# Patient Record
Sex: MALE | Race: WHITE | Employment: OTHER | URBAN - METROPOLITAN AREA
[De-identification: names, ages, dates, MRNs, and addresses within clinical notes are randomized per-mention and may not be internally consistent; named-entity substitution may affect disease eponyms.]

---

## 2017-04-11 ENCOUNTER — HOSPITAL ENCOUNTER (EMERGENCY)
Age: 57
Discharge: HOME | End: 2017-04-11
Attending: EMERGENCY MEDICINE
Payer: MEDICARE

## 2017-04-11 VITALS
HEIGHT: 69 IN | SYSTOLIC BLOOD PRESSURE: 141 MMHG | TEMPERATURE: 98.4 F | OXYGEN SATURATION: 95 % | HEART RATE: 88 BPM | BODY MASS INDEX: 35.55 KG/M2 | RESPIRATION RATE: 16 BRPM | WEIGHT: 240 LBS | DIASTOLIC BLOOD PRESSURE: 92 MMHG

## 2017-04-11 DIAGNOSIS — L02.91 ABSCESS: Primary | ICD-10-CM

## 2017-04-11 PROCEDURE — 99282 EMERGENCY DEPT VISIT SF MDM: CPT

## 2017-04-11 RX ORDER — SULFAMETHOXAZOLE AND TRIMETHOPRIM 800; 160 MG/1; MG/1
1 TABLET ORAL 2 TIMES DAILY
Qty: 20 TABLET | Refills: 0 | Status: SHIPPED | OUTPATIENT
Start: 2017-04-11 | End: 2017-04-21

## 2017-04-11 RX ORDER — CEPHALEXIN 500 MG/1
500 CAPSULE ORAL 4 TIMES DAILY
Qty: 40 CAPSULE | Refills: 0 | Status: SHIPPED | OUTPATIENT
Start: 2017-04-11 | End: 2017-04-21

## 2017-04-11 ASSESSMENT — ENCOUNTER SYMPTOMS: COLOR CHANGE: 1

## 2017-04-11 ASSESSMENT — PAIN SCALES - GENERAL: PAINLEVEL_OUTOF10: 8

## 2017-05-06 ENCOUNTER — APPOINTMENT (OUTPATIENT)
Dept: GENERAL RADIOLOGY | Age: 57
End: 2017-05-06
Payer: MEDICARE

## 2017-05-06 ENCOUNTER — HOSPITAL ENCOUNTER (EMERGENCY)
Age: 57
Discharge: HOME | End: 2017-05-06
Payer: MEDICARE

## 2017-05-06 VITALS
SYSTOLIC BLOOD PRESSURE: 160 MMHG | RESPIRATION RATE: 24 BRPM | OXYGEN SATURATION: 96 % | DIASTOLIC BLOOD PRESSURE: 88 MMHG | TEMPERATURE: 97.7 F | HEART RATE: 88 BPM

## 2017-05-06 DIAGNOSIS — S60.459S: ICD-10-CM

## 2017-05-06 DIAGNOSIS — R07.9 CHEST PAIN, UNSPECIFIED TYPE: Primary | ICD-10-CM

## 2017-05-06 DIAGNOSIS — B99.9 GRANULOMA DUE TO INFECTION: ICD-10-CM

## 2017-05-06 LAB
ABSOLUTE EOS #: 0.1 K/UL (ref 0–0.4)
ABSOLUTE LYMPH #: 1.6 K/UL (ref 1–4.8)
ABSOLUTE MONO #: 0.7 K/UL (ref 0.2–0.8)
ANION GAP SERPL CALCULATED.3IONS-SCNC: 14 MMOL/L (ref 9–17)
BASOPHILS # BLD: 1 %
BASOPHILS ABSOLUTE: 0 K/UL (ref 0–0.2)
BNP INTERPRETATION: ABNORMAL
BUN BLDV-MCNC: 18 MG/DL (ref 6–20)
BUN/CREAT BLD: 17 (ref 9–20)
CALCIUM SERPL-MCNC: 8.9 MG/DL (ref 8.6–10.4)
CHLORIDE BLD-SCNC: 103 MMOL/L (ref 98–107)
CK MB: 3.9 NG/ML
CO2: 23 MMOL/L (ref 20–31)
CREAT SERPL-MCNC: 1.08 MG/DL (ref 0.7–1.2)
D-DIMER QUANTITATIVE: 0.72 MG/L FEU
DIFFERENTIAL TYPE: ABNORMAL
EOSINOPHILS RELATIVE PERCENT: 1 %
GFR AFRICAN AMERICAN: >60 ML/MIN
GFR NON-AFRICAN AMERICAN: >60 ML/MIN
GFR SERPL CREATININE-BSD FRML MDRD: ABNORMAL ML/MIN/{1.73_M2}
GFR SERPL CREATININE-BSD FRML MDRD: ABNORMAL ML/MIN/{1.73_M2}
GLUCOSE BLD-MCNC: 134 MG/DL (ref 70–99)
HCT VFR BLD CALC: 42.9 % (ref 41–53)
HEMOGLOBIN: 14.4 G/DL (ref 13.5–17.5)
LYMPHOCYTES # BLD: 22 %
MCH RBC QN AUTO: 29.1 PG (ref 26–34)
MCHC RBC AUTO-ENTMCNC: 33.5 G/DL (ref 31–37)
MCV RBC AUTO: 86.7 FL (ref 80–100)
MONOCYTES # BLD: 10 %
MYOGLOBIN: 62 NG/ML (ref 28–72)
PDW BLD-RTO: 14.6 % (ref 11.5–14.5)
PLATELET # BLD: 127 K/UL (ref 130–400)
PLATELET ESTIMATE: ABNORMAL
PMV BLD AUTO: 8.8 FL (ref 6–12)
POTASSIUM SERPL-SCNC: 3.9 MMOL/L (ref 3.7–5.3)
PRO-BNP: 1082 PG/ML
RBC # BLD: 4.95 M/UL (ref 4.5–5.9)
RBC # BLD: ABNORMAL 10*6/UL
SEG NEUTROPHILS: 66 %
SEGMENTED NEUTROPHILS ABSOLUTE COUNT: 4.7 K/UL (ref 1.8–7.7)
SODIUM BLD-SCNC: 140 MMOL/L (ref 135–144)
TOTAL CK: 116 U/L (ref 39–308)
TROPONIN INTERP: NORMAL
TROPONIN T: <0.03 NG/ML
WBC # BLD: 7.1 K/UL (ref 3.5–11)
WBC # BLD: ABNORMAL 10*3/UL

## 2017-05-06 PROCEDURE — 73140 X-RAY EXAM OF FINGER(S): CPT

## 2017-05-06 PROCEDURE — 85379 FIBRIN DEGRADATION QUANT: CPT

## 2017-05-06 PROCEDURE — 82550 ASSAY OF CK (CPK): CPT

## 2017-05-06 PROCEDURE — 11055 PARING/CUTG B9 HYPRKER LES 1: CPT

## 2017-05-06 PROCEDURE — 82553 CREATINE MB FRACTION: CPT

## 2017-05-06 PROCEDURE — 88305 TISSUE EXAM BY PATHOLOGIST: CPT

## 2017-05-06 PROCEDURE — 84484 ASSAY OF TROPONIN QUANT: CPT

## 2017-05-06 PROCEDURE — 71020 XR CHEST STANDARD TWO VW: CPT

## 2017-05-06 PROCEDURE — 85025 COMPLETE CBC W/AUTO DIFF WBC: CPT

## 2017-05-06 PROCEDURE — 80048 BASIC METABOLIC PNL TOTAL CA: CPT

## 2017-05-06 PROCEDURE — 83880 ASSAY OF NATRIURETIC PEPTIDE: CPT

## 2017-05-06 PROCEDURE — 6370000000 HC RX 637 (ALT 250 FOR IP)

## 2017-05-06 PROCEDURE — 2500000003 HC RX 250 WO HCPCS

## 2017-05-06 PROCEDURE — 99285 EMERGENCY DEPT VISIT HI MDM: CPT

## 2017-05-06 PROCEDURE — 83874 ASSAY OF MYOGLOBIN: CPT

## 2017-05-06 PROCEDURE — 93005 ELECTROCARDIOGRAM TRACING: CPT

## 2017-05-06 RX ORDER — CARVEDILOL 12.5 MG/1
12.5 TABLET ORAL 2 TIMES DAILY WITH MEALS
Status: ON HOLD | COMMUNITY
End: 2019-02-20

## 2017-05-06 RX ORDER — FLUTICASONE PROPIONATE 50 MCG
1 SPRAY, SUSPENSION (ML) NASAL DAILY
Status: ON HOLD | COMMUNITY
End: 2019-02-17

## 2017-05-06 RX ORDER — CLOPIDOGREL BISULFATE 75 MG/1
75 TABLET ORAL DAILY
Status: ON HOLD | COMMUNITY
End: 2019-02-20

## 2017-05-06 RX ORDER — CEPHALEXIN 500 MG/1
500 CAPSULE ORAL 3 TIMES DAILY
Qty: 21 CAPSULE | Refills: 0 | Status: SHIPPED | OUTPATIENT
Start: 2017-05-06 | End: 2017-05-31 | Stop reason: ALTCHOICE

## 2017-05-06 RX ORDER — LIDOCAINE HYDROCHLORIDE 10 MG/ML
20 INJECTION, SOLUTION INFILTRATION; PERINEURAL ONCE
Status: COMPLETED | OUTPATIENT
Start: 2017-05-06 | End: 2017-05-06

## 2017-05-06 RX ORDER — FEXOFENADINE HCL 180 MG/1
180 TABLET ORAL DAILY
COMMUNITY
End: 2017-11-22

## 2017-05-06 RX ORDER — DOXYCYCLINE 100 MG/1
100 TABLET ORAL 2 TIMES DAILY
Qty: 28 TABLET | Refills: 0 | Status: SHIPPED | OUTPATIENT
Start: 2017-05-06 | End: 2017-05-20

## 2017-05-06 RX ORDER — FENOFIBRATE 160 MG/1
160 TABLET ORAL DAILY
Status: ON HOLD | COMMUNITY
End: 2017-11-23 | Stop reason: HOSPADM

## 2017-05-06 RX ADMIN — LIDOCAINE HYDROCHLORIDE 20 ML: 10 INJECTION, SOLUTION INFILTRATION; PERINEURAL at 16:32

## 2017-05-06 RX ADMIN — THROMBIN TOPICAL RECOMBINANT 5000 UNITS: KIT at 17:49

## 2017-05-06 ASSESSMENT — PAIN DESCRIPTION - LOCATION: LOCATION: CHEST;FINGER (COMMENT WHICH ONE)

## 2017-05-06 ASSESSMENT — ENCOUNTER SYMPTOMS
SHORTNESS OF BREATH: 1
CHEST TIGHTNESS: 1
NAUSEA: 1
PHOTOPHOBIA: 0

## 2017-05-06 ASSESSMENT — PAIN SCALES - GENERAL
PAINLEVEL_OUTOF10: 7
PAINLEVEL_OUTOF10: 7

## 2017-05-06 ASSESSMENT — PAIN DESCRIPTION - DESCRIPTORS: DESCRIPTORS: DISCOMFORT

## 2017-05-06 ASSESSMENT — PAIN DESCRIPTION - PAIN TYPE: TYPE: ACUTE PAIN

## 2017-05-08 LAB
EKG ATRIAL RATE: 78 BPM
EKG P AXIS: 69 DEGREES
EKG P-R INTERVAL: 210 MS
EKG Q-T INTERVAL: 400 MS
EKG QRS DURATION: 130 MS
EKG QTC CALCULATION (BAZETT): 456 MS
EKG R AXIS: 72 DEGREES
EKG T AXIS: 43 DEGREES
EKG VENTRICULAR RATE: 78 BPM

## 2017-05-11 LAB — DERMATOLOGY PATHOLOGY REPORT: NORMAL

## 2017-05-31 ENCOUNTER — HOSPITAL ENCOUNTER (EMERGENCY)
Age: 57
Discharge: HOME | End: 2017-05-31
Payer: MEDICARE

## 2017-05-31 VITALS
BODY MASS INDEX: 35.25 KG/M2 | DIASTOLIC BLOOD PRESSURE: 98 MMHG | HEART RATE: 90 BPM | HEIGHT: 69 IN | TEMPERATURE: 97.7 F | RESPIRATION RATE: 18 BRPM | SYSTOLIC BLOOD PRESSURE: 148 MMHG | OXYGEN SATURATION: 98 % | WEIGHT: 238 LBS

## 2017-05-31 DIAGNOSIS — L03.011 PARONYCHIA OF FINGER, RIGHT: Primary | ICD-10-CM

## 2017-05-31 DIAGNOSIS — L03.90 CELLULITIS, UNSPECIFIED CELLULITIS SITE: ICD-10-CM

## 2017-05-31 PROCEDURE — 99283 EMERGENCY DEPT VISIT LOW MDM: CPT

## 2017-05-31 RX ORDER — CEPHALEXIN 500 MG/1
500 CAPSULE ORAL 3 TIMES DAILY
Qty: 21 CAPSULE | Refills: 0 | Status: SHIPPED | OUTPATIENT
Start: 2017-05-31 | End: 2017-07-06

## 2017-05-31 RX ORDER — HYDROCODONE BITARTRATE AND ACETAMINOPHEN 5; 325 MG/1; MG/1
1 TABLET ORAL EVERY 6 HOURS PRN
Qty: 10 TABLET | Refills: 0 | Status: SHIPPED | OUTPATIENT
Start: 2017-05-31 | End: 2017-06-07

## 2017-05-31 RX ORDER — DOXYCYCLINE HYCLATE 100 MG
100 TABLET ORAL 2 TIMES DAILY
Qty: 20 TABLET | Refills: 0 | Status: SHIPPED | OUTPATIENT
Start: 2017-05-31 | End: 2017-07-06

## 2017-05-31 ASSESSMENT — ENCOUNTER SYMPTOMS
SINUS PRESSURE: 0
DIARRHEA: 0
WHEEZING: 0
SORE THROAT: 0
COLOR CHANGE: 0
COUGH: 0
NAUSEA: 0
ABDOMINAL PAIN: 0
CONSTIPATION: 0
SHORTNESS OF BREATH: 0
RHINORRHEA: 0
VOMITING: 0

## 2017-05-31 ASSESSMENT — PAIN SCALES - GENERAL
PAINLEVEL_OUTOF10: 9
PAINLEVEL_OUTOF10: 9

## 2017-05-31 ASSESSMENT — PAIN DESCRIPTION - LOCATION: LOCATION: FINGER (COMMENT WHICH ONE)

## 2017-05-31 ASSESSMENT — PAIN DESCRIPTION - ORIENTATION: ORIENTATION: LEFT

## 2017-07-06 ENCOUNTER — HOSPITAL ENCOUNTER (EMERGENCY)
Age: 57
Discharge: HOME | End: 2017-07-06
Attending: EMERGENCY MEDICINE
Payer: MEDICARE

## 2017-07-06 VITALS
TEMPERATURE: 97.9 F | HEIGHT: 69 IN | DIASTOLIC BLOOD PRESSURE: 98 MMHG | OXYGEN SATURATION: 95 % | BODY MASS INDEX: 35.55 KG/M2 | WEIGHT: 240 LBS | SYSTOLIC BLOOD PRESSURE: 151 MMHG | HEART RATE: 103 BPM | RESPIRATION RATE: 18 BRPM

## 2017-07-06 DIAGNOSIS — L03.011 PARONYCHIA OF RIGHT THUMB: Primary | ICD-10-CM

## 2017-07-06 PROCEDURE — 99283 EMERGENCY DEPT VISIT LOW MDM: CPT

## 2017-07-06 RX ORDER — CEPHALEXIN 500 MG/1
500 CAPSULE ORAL 3 TIMES DAILY
Qty: 30 CAPSULE | Refills: 0 | Status: SHIPPED | OUTPATIENT
Start: 2017-07-06 | End: 2017-11-22 | Stop reason: ALTCHOICE

## 2017-07-06 RX ORDER — SULFAMETHOXAZOLE AND TRIMETHOPRIM 800; 160 MG/1; MG/1
1 TABLET ORAL 2 TIMES DAILY
Qty: 20 TABLET | Refills: 0 | Status: SHIPPED | OUTPATIENT
Start: 2017-07-06 | End: 2017-11-22 | Stop reason: ALTCHOICE

## 2017-07-06 ASSESSMENT — PAIN DESCRIPTION - LOCATION: LOCATION: FINGER (COMMENT WHICH ONE)

## 2017-07-06 ASSESSMENT — PAIN DESCRIPTION - FREQUENCY: FREQUENCY: CONTINUOUS

## 2017-07-06 ASSESSMENT — PAIN DESCRIPTION - DESCRIPTORS: DESCRIPTORS: ACHING;DISCOMFORT;TENDER;THROBBING

## 2017-07-06 ASSESSMENT — PAIN SCALES - GENERAL: PAINLEVEL_OUTOF10: 7

## 2017-11-22 ENCOUNTER — HOSPITAL ENCOUNTER (INPATIENT)
Age: 57
LOS: 1 days | Discharge: HOME | DRG: 443 | End: 2017-11-23
Attending: EMERGENCY MEDICINE | Admitting: INTERNAL MEDICINE
Payer: MEDICARE

## 2017-11-22 ENCOUNTER — APPOINTMENT (OUTPATIENT)
Dept: CT IMAGING | Age: 57
DRG: 443 | End: 2017-11-22
Payer: MEDICARE

## 2017-11-22 ENCOUNTER — APPOINTMENT (OUTPATIENT)
Dept: ULTRASOUND IMAGING | Age: 57
DRG: 443 | End: 2017-11-22
Payer: MEDICARE

## 2017-11-22 ENCOUNTER — APPOINTMENT (OUTPATIENT)
Dept: MRI IMAGING | Age: 57
DRG: 443 | End: 2017-11-22
Payer: MEDICARE

## 2017-11-22 DIAGNOSIS — R79.89 ABNORMAL LIVER FUNCTION TEST: ICD-10-CM

## 2017-11-22 DIAGNOSIS — R10.11 ABDOMINAL PAIN, RIGHT UPPER QUADRANT: Primary | ICD-10-CM

## 2017-11-22 PROBLEM — E80.6 HYPERBILIRUBINEMIA: Status: ACTIVE | Noted: 2017-11-22

## 2017-11-22 LAB
-: ABNORMAL
ABSOLUTE EOS #: 0.3 K/UL (ref 0–0.4)
ABSOLUTE IMMATURE GRANULOCYTE: ABNORMAL K/UL (ref 0–0.3)
ABSOLUTE LYMPH #: 0.7 K/UL (ref 1–4.8)
ABSOLUTE MONO #: 0.6 K/UL (ref 0.2–0.8)
ALBUMIN SERPL-MCNC: 3.8 G/DL (ref 3.5–5.2)
ALBUMIN/GLOBULIN RATIO: ABNORMAL (ref 1–2.5)
ALP BLD-CCNC: 189 U/L (ref 40–129)
ALT SERPL-CCNC: 129 U/L (ref 5–41)
AMORPHOUS: ABNORMAL
AMYLASE: 34 U/L (ref 28–100)
ANION GAP SERPL CALCULATED.3IONS-SCNC: 11 MMOL/L (ref 9–17)
AST SERPL-CCNC: 73 U/L
BACTERIA: ABNORMAL
BASOPHILS # BLD: 1 % (ref 0–2)
BASOPHILS ABSOLUTE: 0 K/UL (ref 0–0.2)
BILIRUB SERPL-MCNC: 3.66 MG/DL (ref 0.3–1.2)
BILIRUBIN DIRECT: 2.43 MG/DL
BILIRUBIN URINE: ABNORMAL
BILIRUBIN, INDIRECT: 1.23 MG/DL (ref 0–1)
BUN BLDV-MCNC: 18 MG/DL (ref 6–20)
BUN/CREAT BLD: 21 (ref 9–20)
CALCIUM SERPL-MCNC: 9 MG/DL (ref 8.6–10.4)
CASTS UA: ABNORMAL /LPF
CHLORIDE BLD-SCNC: 101 MMOL/L (ref 98–107)
CO2: 23 MMOL/L (ref 20–31)
COLOR: ABNORMAL
COMMENT UA: ABNORMAL
CREAT SERPL-MCNC: 0.87 MG/DL (ref 0.7–1.2)
CRYSTALS, UA: ABNORMAL /HPF
DIFFERENTIAL TYPE: ABNORMAL
EOSINOPHILS RELATIVE PERCENT: 7 % (ref 1–4)
EPITHELIAL CELLS UA: ABNORMAL /HPF (ref 0–5)
GFR AFRICAN AMERICAN: >60 ML/MIN
GFR NON-AFRICAN AMERICAN: >60 ML/MIN
GFR SERPL CREATININE-BSD FRML MDRD: ABNORMAL ML/MIN/{1.73_M2}
GFR SERPL CREATININE-BSD FRML MDRD: ABNORMAL ML/MIN/{1.73_M2}
GLOBULIN: ABNORMAL G/DL (ref 1.5–3.8)
GLUCOSE BLD-MCNC: 155 MG/DL (ref 70–99)
GLUCOSE URINE: NEGATIVE
HAV IGM SER IA-ACNC: NONREACTIVE
HAV IGM SER IA-ACNC: NONREACTIVE
HCT VFR BLD CALC: 44.3 % (ref 41–53)
HEMOGLOBIN: 14.7 G/DL (ref 13.5–17.5)
HEPATITIS B CORE IGM ANTIBODY: NONREACTIVE
HEPATITIS B CORE IGM ANTIBODY: NONREACTIVE
HEPATITIS B SURFACE ANTIGEN: NONREACTIVE
HEPATITIS B SURFACE ANTIGEN: NONREACTIVE
HEPATITIS C ANTIBODY: NONREACTIVE
HEPATITIS C ANTIBODY: NONREACTIVE
IMMATURE GRANULOCYTES: ABNORMAL %
KETONES, URINE: NEGATIVE
LEUKOCYTE ESTERASE, URINE: NEGATIVE
LIPASE: 62 U/L (ref 13–60)
LYMPHOCYTES # BLD: 16 % (ref 24–44)
MCH RBC QN AUTO: 29.1 PG (ref 26–34)
MCHC RBC AUTO-ENTMCNC: 33.3 G/DL (ref 31–37)
MCV RBC AUTO: 87.4 FL (ref 80–100)
MONOCYTES # BLD: 13 % (ref 1–7)
MUCUS: ABNORMAL
NITRITE, URINE: NEGATIVE
OTHER OBSERVATIONS UA: ABNORMAL
PDW BLD-RTO: 15.1 % (ref 11.5–14.5)
PH UA: 5.5 (ref 5–8)
PLATELET # BLD: 102 K/UL (ref 130–400)
PLATELET ESTIMATE: ABNORMAL
PMV BLD AUTO: 8.4 FL (ref 6–12)
POTASSIUM SERPL-SCNC: 4 MMOL/L (ref 3.7–5.3)
PROTEIN UA: ABNORMAL
RBC # BLD: 5.07 M/UL (ref 4.5–5.9)
RBC # BLD: ABNORMAL 10*6/UL
RBC UA: ABNORMAL /HPF (ref 0–2)
RENAL EPITHELIAL, UA: ABNORMAL /HPF
SEG NEUTROPHILS: 63 % (ref 36–66)
SEGMENTED NEUTROPHILS ABSOLUTE COUNT: 2.7 K/UL (ref 1.8–7.7)
SODIUM BLD-SCNC: 135 MMOL/L (ref 135–144)
SPECIFIC GRAVITY UA: 1.03 (ref 1–1.03)
TOTAL PROTEIN: 6.6 G/DL (ref 6.4–8.3)
TRICHOMONAS: ABNORMAL
TURBIDITY: CLEAR
URINE HGB: NEGATIVE
UROBILINOGEN, URINE: NORMAL
WBC # BLD: 4.3 K/UL (ref 3.5–11)
WBC # BLD: ABNORMAL 10*3/UL
WBC UA: ABNORMAL /HPF (ref 0–5)
YEAST: ABNORMAL

## 2017-11-22 PROCEDURE — 80074 ACUTE HEPATITIS PANEL: CPT

## 2017-11-22 PROCEDURE — 82150 ASSAY OF AMYLASE: CPT

## 2017-11-22 PROCEDURE — 74176 CT ABD & PELVIS W/O CONTRAST: CPT

## 2017-11-22 PROCEDURE — 80076 HEPATIC FUNCTION PANEL: CPT

## 2017-11-22 PROCEDURE — 99284 EMERGENCY DEPT VISIT MOD MDM: CPT

## 2017-11-22 PROCEDURE — 85025 COMPLETE CBC W/AUTO DIFF WBC: CPT

## 2017-11-22 PROCEDURE — 2580000003 HC RX 258: Performed by: NURSE PRACTITIONER

## 2017-11-22 PROCEDURE — 80048 BASIC METABOLIC PNL TOTAL CA: CPT

## 2017-11-22 PROCEDURE — A9579 GAD-BASE MR CONTRAST NOS,1ML: HCPCS | Performed by: EMERGENCY MEDICINE

## 2017-11-22 PROCEDURE — 1200000000 HC SEMI PRIVATE

## 2017-11-22 PROCEDURE — 36415 COLL VENOUS BLD VENIPUNCTURE: CPT

## 2017-11-22 PROCEDURE — 76705 ECHO EXAM OF ABDOMEN: CPT

## 2017-11-22 PROCEDURE — 2500000003 HC RX 250 WO HCPCS: Performed by: EMERGENCY MEDICINE

## 2017-11-22 PROCEDURE — 81001 URINALYSIS AUTO W/SCOPE: CPT

## 2017-11-22 PROCEDURE — 6360000004 HC RX CONTRAST MEDICATION: Performed by: EMERGENCY MEDICINE

## 2017-11-22 PROCEDURE — 2580000003 HC RX 258: Performed by: EMERGENCY MEDICINE

## 2017-11-22 PROCEDURE — 74183 MRI ABD W/O CNTR FLWD CNTR: CPT

## 2017-11-22 PROCEDURE — 2580000003 HC RX 258: Performed by: INTERNAL MEDICINE

## 2017-11-22 PROCEDURE — 83690 ASSAY OF LIPASE: CPT

## 2017-11-22 RX ORDER — CARVEDILOL 12.5 MG/1
12.5 TABLET ORAL 2 TIMES DAILY WITH MEALS
Status: DISCONTINUED | OUTPATIENT
Start: 2017-11-22 | End: 2017-11-23 | Stop reason: HOSPADM

## 2017-11-22 RX ORDER — FLUTICASONE PROPIONATE 50 MCG
1 SPRAY, SUSPENSION (ML) NASAL DAILY
Status: DISCONTINUED | OUTPATIENT
Start: 2017-11-23 | End: 2017-11-23 | Stop reason: HOSPADM

## 2017-11-22 RX ORDER — BISACODYL 10 MG
10 SUPPOSITORY, RECTAL RECTAL DAILY PRN
Status: DISCONTINUED | OUTPATIENT
Start: 2017-11-22 | End: 2017-11-23 | Stop reason: HOSPADM

## 2017-11-22 RX ORDER — POTASSIUM CHLORIDE 7.45 MG/ML
10 INJECTION INTRAVENOUS PRN
Status: DISCONTINUED | OUTPATIENT
Start: 2017-11-22 | End: 2017-11-23 | Stop reason: HOSPADM

## 2017-11-22 RX ORDER — MORPHINE SULFATE 2 MG/ML
2 INJECTION, SOLUTION INTRAMUSCULAR; INTRAVENOUS
Status: DISCONTINUED | OUTPATIENT
Start: 2017-11-22 | End: 2017-11-23 | Stop reason: HOSPADM

## 2017-11-22 RX ORDER — SODIUM CHLORIDE 0.9 % (FLUSH) 0.9 %
10 SYRINGE (ML) INJECTION PRN
Status: DISCONTINUED | OUTPATIENT
Start: 2017-11-22 | End: 2017-11-23 | Stop reason: HOSPADM

## 2017-11-22 RX ORDER — NICOTINE 21 MG/24HR
1 PATCH, TRANSDERMAL 24 HOURS TRANSDERMAL DAILY PRN
Status: DISCONTINUED | OUTPATIENT
Start: 2017-11-22 | End: 2017-11-23 | Stop reason: HOSPADM

## 2017-11-22 RX ORDER — MORPHINE SULFATE 4 MG/ML
4 INJECTION, SOLUTION INTRAMUSCULAR; INTRAVENOUS
Status: DISCONTINUED | OUTPATIENT
Start: 2017-11-22 | End: 2017-11-23 | Stop reason: HOSPADM

## 2017-11-22 RX ORDER — NITROGLYCERIN 0.4 MG/1
0.4 TABLET SUBLINGUAL EVERY 5 MIN PRN
Status: DISCONTINUED | OUTPATIENT
Start: 2017-11-22 | End: 2017-11-23 | Stop reason: HOSPADM

## 2017-11-22 RX ORDER — LISINOPRIL 10 MG/1
5 TABLET ORAL DAILY
Status: CANCELLED | OUTPATIENT
Start: 2017-11-22

## 2017-11-22 RX ORDER — ONDANSETRON 2 MG/ML
4 INJECTION INTRAMUSCULAR; INTRAVENOUS EVERY 6 HOURS PRN
Status: DISCONTINUED | OUTPATIENT
Start: 2017-11-22 | End: 2017-11-23 | Stop reason: HOSPADM

## 2017-11-22 RX ORDER — BACTERIOSTATIC SODIUM CHLORIDE 0.9 %
20 VIAL (ML) INJECTION
Status: COMPLETED | OUTPATIENT
Start: 2017-11-22 | End: 2017-11-22

## 2017-11-22 RX ORDER — POTASSIUM CHLORIDE 20 MEQ/1
40 TABLET, EXTENDED RELEASE ORAL PRN
Status: DISCONTINUED | OUTPATIENT
Start: 2017-11-22 | End: 2017-11-23 | Stop reason: HOSPADM

## 2017-11-22 RX ORDER — LISINOPRIL AND HYDROCHLOROTHIAZIDE 12.5; 1 MG/1; MG/1
1 TABLET ORAL DAILY
Status: CANCELLED | OUTPATIENT
Start: 2017-11-22

## 2017-11-22 RX ORDER — POTASSIUM CHLORIDE 20MEQ/15ML
40 LIQUID (ML) ORAL PRN
Status: DISCONTINUED | OUTPATIENT
Start: 2017-11-22 | End: 2017-11-23 | Stop reason: HOSPADM

## 2017-11-22 RX ORDER — SODIUM CHLORIDE 9 MG/ML
INJECTION, SOLUTION INTRAVENOUS CONTINUOUS
Status: DISCONTINUED | OUTPATIENT
Start: 2017-11-22 | End: 2017-11-23 | Stop reason: HOSPADM

## 2017-11-22 RX ORDER — NITROGLYCERIN 0.4 MG/1
0.4 TABLET SUBLINGUAL EVERY 5 MIN PRN
Status: ON HOLD | COMMUNITY
End: 2019-02-20 | Stop reason: HOSPADM

## 2017-11-22 RX ORDER — CETIRIZINE HYDROCHLORIDE 10 MG/1
10 TABLET ORAL DAILY
Status: CANCELLED | OUTPATIENT
Start: 2017-11-22

## 2017-11-22 RX ORDER — CLOPIDOGREL BISULFATE 75 MG/1
75 TABLET ORAL DAILY
Status: DISCONTINUED | OUTPATIENT
Start: 2017-11-22 | End: 2017-11-23 | Stop reason: HOSPADM

## 2017-11-22 RX ORDER — MAGNESIUM SULFATE 1 G/100ML
1 INJECTION INTRAVENOUS PRN
Status: DISCONTINUED | OUTPATIENT
Start: 2017-11-22 | End: 2017-11-23 | Stop reason: HOSPADM

## 2017-11-22 RX ORDER — SODIUM CHLORIDE 0.9 % (FLUSH) 0.9 %
10 SYRINGE (ML) INJECTION EVERY 12 HOURS SCHEDULED
Status: DISCONTINUED | OUTPATIENT
Start: 2017-11-22 | End: 2017-11-23 | Stop reason: HOSPADM

## 2017-11-22 RX ORDER — ASPIRIN 81 MG/1
81 TABLET ORAL DAILY
Status: DISCONTINUED | OUTPATIENT
Start: 2017-11-22 | End: 2017-11-23 | Stop reason: HOSPADM

## 2017-11-22 RX ORDER — SODIUM CHLORIDE 9 MG/ML
INJECTION, SOLUTION INTRAVENOUS CONTINUOUS
Status: DISCONTINUED | OUTPATIENT
Start: 2017-11-22 | End: 2017-11-23

## 2017-11-22 RX ORDER — SODIUM CHLORIDE 0.9 % (FLUSH) 0.9 %
10 SYRINGE (ML) INJECTION
Status: COMPLETED | OUTPATIENT
Start: 2017-11-22 | End: 2017-11-22

## 2017-11-22 RX ADMIN — SODIUM CHLORIDE 20 ML: 9 INJECTION, SOLUTION INTRAMUSCULAR; INTRAVENOUS; SUBCUTANEOUS at 16:55

## 2017-11-22 RX ADMIN — Medication 10 ML: at 19:18

## 2017-11-22 RX ADMIN — SODIUM CHLORIDE 150 ML/HR: 9 INJECTION, SOLUTION INTRAVENOUS at 15:17

## 2017-11-22 RX ADMIN — SODIUM CHLORIDE: 9 INJECTION, SOLUTION INTRAVENOUS at 19:19

## 2017-11-22 RX ADMIN — GADOPENTETATE DIMEGLUMINE 20 ML: 469.01 INJECTION INTRAVENOUS at 16:55

## 2017-11-22 RX ADMIN — Medication 10 ML: at 16:56

## 2017-11-22 ASSESSMENT — PAIN SCALES - GENERAL: PAINLEVEL_OUTOF10: 0

## 2017-11-22 ASSESSMENT — ENCOUNTER SYMPTOMS
CONSTIPATION: 0
COLOR CHANGE: 0
VOMITING: 1
ABDOMINAL PAIN: 1
DIARRHEA: 0
ANAL BLEEDING: 0
NAUSEA: 1
SHORTNESS OF BREATH: 0
COUGH: 0
BACK PAIN: 0
BLOOD IN STOOL: 0

## 2017-11-22 NOTE — ED PROVIDER NOTES
[address] ED  eMERGENCY dEPARTMENT eNCOUnter      Pt Name: Lou Paulino  MRN: [id number]  [place] 1960  Date of evaluation: 11/22/2017  Provider: Shagufta Tadeo NP    CHIEF COMPLAINT       Chief Complaint   Patient presents with    Hematuria    Hernia         HISTORY OF PRESENT ILLNESS  (Location/Symptom, Timing/Onset, Context/Setting, Quality, Duration, Modifying Factors, Severity.)   Lou Paulino is a 60 y.o. male who presents to the emergency department via private auto. Reports right abd discomfort/bloating and one episode of emesis Sunday 11-19-17. The following day he developed hematuria. Denies fever, chills, dysuria, diarrhea, constipation. Rates his pain 0/10 at this time. Nursing Notes were reviewed. ALLERGIES     Review of patient's allergies indicates no known allergies. CURRENT MEDICATIONS       Current Discharge Medication List      CONTINUE these medications which have NOT CHANGED    Details   cephALEXin (KEFLEX) 500 MG capsule Take 1 capsule by mouth 3 times daily  Qty: 30 capsule, Refills: 0      fluticasone (FLONASE) 50 MCG/ACT nasal spray 1 spray by Nasal route daily      carvedilol (COREG) 12.5 MG tablet Take 12.5 mg by mouth 2 times daily (with meals)      fenofibrate 160 MG tablet Take 160 mg by mouth daily      clopidogrel (PLAVIX) 75 MG tablet Take 75 mg by mouth daily      atorvastatin (LIPITOR) 80 MG tablet Take 80 mg by mouth nightly      lisinopril-hydrochlorothiazide (PRINZIDE;ZESTORETIC) 10-12.5 MG per tablet Take 1 tablet by mouth daily      aspirin 81 MG tablet Take 81 mg by mouth daily. lisinopril (PRINIVIL;ZESTRIL) 5 MG tablet Take 1 tablet by mouth daily.   Qty: 30 tablet, Refills: 3      sulfamethoxazole-trimethoprim (BACTRIM DS) 800-160 MG per tablet Take 1 tablet by mouth 2 times daily  Qty: 20 tablet, Refills: 0      fexofenadine (ALLEGRA) 180 MG tablet Take 180 mg by mouth daily      nitroGLYCERIN (NITROSTAT) 0.3 MG SL tablet Place 1 HENT:   Mouth/Throat: Oropharynx is clear and moist.   Eyes: Conjunctivae are normal.   Cardiovascular: Normal rate, regular rhythm and normal heart sounds. Pulmonary/Chest: Effort normal and breath sounds normal. No respiratory distress. He has no wheezes. He has no rales. Abdominal: Soft. Bowel sounds are normal. He exhibits no distension. There is no tenderness. There is no rebound, no guarding and no CVA tenderness. Musculoskeletal: He exhibits no edema. Neurological: He is alert and oriented to person, place, and time. Skin: Skin is warm and dry. No rash noted. He is not diaphoretic. Psychiatric: He has a normal mood and affect. His behavior is normal.   Vitals reviewed. DIAGNOSTIC RESULTS     RADIOLOGY:   Non-plain film images such as CT, Ultrasound and MRI are read by the radiologist. [facility] radiographic images are visualized and preliminarily interpreted by the emergency physician with the below findings:    Interpretation per the Radiologist below, if available at the time of this note:    Ct Abdomen Pelvis Wo Iv Contrast Additional Contrast? None    Result Date: 11/22/2017  EXAMINATION: CT OF THE ABDOMEN AND PELVIS WITHOUT CONTRAST 11/22/2017 1:35 pm TECHNIQUE: CT of the abdomen and pelvis was performed without the administration of intravenous contrast. Multiplanar reformatted images are provided for review. Dose modulation, iterative reconstruction, and/or weight based adjustment of the mA/kV was utilized to reduce the radiation dose to as low as reasonably achievable. COMPARISON: None. HISTORY: ORDERING SYSTEM PROVIDED HISTORY: rt abd pain, hematuria TECHNOLOGIST PROVIDED HISTORY: Additional Contrast?->None FINDINGS: Lower Chest: There is mild cardiomegaly. Organs: The liver, pancreas, and spleen are grossly within normal limits for a noncontrast study.   There is mild nonspecific thickening of the gallbladder wall at the level of the gallbladder fundus, best appreciated on image number 77 of series 2. No adrenal masses are identified and there is no hydronephrosis. There are nonobstructing right renal stones. There is stranding of fat around both kidneys. No ureteral stones or bladder stones are detected. GI/Bowel: There is no small bowel obstruction, free-air, or free-fluid. The appendix is normal in caliber. There is diverticulosis coli. Pelvis: Pelvic phleboliths are present. There is mild stranding of fat around the urinary bladder. Peritoneum/Retroperitoneum: Vascular calcifications are present. There is a mildly enlarged portacaval lymph node measuring up to 15 mm in short axis. Bones/Soft Tissues: There is grade 1 anterolisthesis of L5 on S1 secondary to bilateral pars interarticularis defects with mild multilevel degenerative disc disease. Right-sided nephrolithiasis without evidence of an obstructive uropathy. Stranding of fat around the urinary bladder -rule out cystitis. Diverticulosis coli without evidence of diverticulitis. Focal thickening of the wall of the gallbladder fundus, possibly related to a collapsed Phrygian cap. Us Gallbladder Ruq    Result Date: 11/22/2017  EXAMINATION: RIGHT UPPER QUADRANT ULTRASOUND 11/22/2017 2:13 pm COMPARISON: None. HISTORY: ORDERING SYSTEM PROVIDED HISTORY: RUQ pain Right upper quadrant pain for 2 days FINDINGS: LIVER:  The liver demonstrates normal echogenicity without evidence of intrahepatic biliary ductal dilatation. BILIARY SYSTEM:  Gallbladder is unremarkable without evidence of pericholecystic fluid, wall thickening or stones. Negative sonographic Rodriguez's sign. Common bile duct is within normal limits measuring 3.6 cm. RIGHT KIDNEY: The right kidney is grossly unremarkable without evidence of hydronephrosis. PANCREAS:  Not evaluated OTHER: No evidence of right upper quadrant ascites. Unremarkable right upper quadrant ultrasound.      LABS:  Labs Reviewed   UA W/REFLEX CULTURE - Abnormal; Notable for the following: Urinalysis was negative for Hgb. He will be admitted for further evaluation and treatment. Hepatitis panel is pending. CONSULTS:  IP CONSULT TO GI      FINAL IMPRESSION      1. Abdominal pain, right upper quadrant    2.  Abnormal liver function test          DISPOSITION/PLAN   DISPOSITION Admitted    PATIENT REFERRED TO:   Sherman Portillo, [address] [place] [address]  [phone]            DISCHARGE MEDICATIONS:     Current Discharge Medication List              (Please note that portions of this note were completed with a voice recognition program.  Efforts were made to edit the dictations but occasionally words are mis-transcribed.)    JESSICA Verdugo NP  11/22/17 2832

## 2017-11-23 VITALS
WEIGHT: 240 LBS | BODY MASS INDEX: 35.55 KG/M2 | HEART RATE: 66 BPM | SYSTOLIC BLOOD PRESSURE: 112 MMHG | HEIGHT: 69 IN | OXYGEN SATURATION: 95 % | TEMPERATURE: 97.7 F | RESPIRATION RATE: 20 BRPM | DIASTOLIC BLOOD PRESSURE: 65 MMHG

## 2017-11-23 PROBLEM — N20.0 RIGHT NEPHROLITHIASIS: Status: ACTIVE | Noted: 2017-11-23

## 2017-11-23 PROBLEM — R10.11 RUQ PAIN: Status: ACTIVE | Noted: 2017-11-23

## 2017-11-23 PROBLEM — R31.0 GROSS HEMATURIA: Status: ACTIVE | Noted: 2017-11-23

## 2017-11-23 LAB
ALBUMIN SERPL-MCNC: 3.6 G/DL (ref 3.5–5.2)
ALBUMIN/GLOBULIN RATIO: ABNORMAL (ref 1–2.5)
ALP BLD-CCNC: 176 U/L (ref 40–129)
ALT SERPL-CCNC: 114 U/L (ref 5–41)
ANION GAP SERPL CALCULATED.3IONS-SCNC: 12 MMOL/L (ref 9–17)
AST SERPL-CCNC: 67 U/L
BILIRUB SERPL-MCNC: 2.63 MG/DL (ref 0.3–1.2)
BUN BLDV-MCNC: 16 MG/DL (ref 6–20)
BUN/CREAT BLD: 23 (ref 9–20)
CALCIUM SERPL-MCNC: 8.4 MG/DL (ref 8.6–10.4)
CHLORIDE BLD-SCNC: 101 MMOL/L (ref 98–107)
CO2: 25 MMOL/L (ref 20–31)
CREAT SERPL-MCNC: 0.7 MG/DL (ref 0.7–1.2)
GFR AFRICAN AMERICAN: >60 ML/MIN
GFR NON-AFRICAN AMERICAN: >60 ML/MIN
GFR SERPL CREATININE-BSD FRML MDRD: ABNORMAL ML/MIN/{1.73_M2}
GFR SERPL CREATININE-BSD FRML MDRD: ABNORMAL ML/MIN/{1.73_M2}
GLUCOSE BLD-MCNC: 122 MG/DL (ref 70–99)
HCT VFR BLD CALC: 42 % (ref 41–53)
HEMOGLOBIN: 13.9 G/DL (ref 13.5–17.5)
MCH RBC QN AUTO: 29.2 PG (ref 26–34)
MCHC RBC AUTO-ENTMCNC: 33.1 G/DL (ref 31–37)
MCV RBC AUTO: 88.2 FL (ref 80–100)
PDW BLD-RTO: 15 % (ref 11.5–14.5)
PLATELET # BLD: 103 K/UL (ref 130–400)
PMV BLD AUTO: 9.1 FL (ref 6–12)
POTASSIUM SERPL-SCNC: 3.7 MMOL/L (ref 3.7–5.3)
RBC # BLD: 4.77 M/UL (ref 4.5–5.9)
SODIUM BLD-SCNC: 138 MMOL/L (ref 135–144)
TOTAL PROTEIN: 6.4 G/DL (ref 6.4–8.3)
WBC # BLD: 5 K/UL (ref 3.5–11)

## 2017-11-23 PROCEDURE — 90732 PPSV23 VACC 2 YRS+ SUBQ/IM: CPT | Performed by: INTERNAL MEDICINE

## 2017-11-23 PROCEDURE — 6370000000 HC RX 637 (ALT 250 FOR IP): Performed by: INTERNAL MEDICINE

## 2017-11-23 PROCEDURE — 36415 COLL VENOUS BLD VENIPUNCTURE: CPT

## 2017-11-23 PROCEDURE — 99222 1ST HOSP IP/OBS MODERATE 55: CPT | Performed by: INTERNAL MEDICINE

## 2017-11-23 PROCEDURE — 2580000003 HC RX 258: Performed by: INTERNAL MEDICINE

## 2017-11-23 PROCEDURE — 6360000002 HC RX W HCPCS: Performed by: INTERNAL MEDICINE

## 2017-11-23 PROCEDURE — G0009 ADMIN PNEUMOCOCCAL VACCINE: HCPCS | Performed by: INTERNAL MEDICINE

## 2017-11-23 PROCEDURE — 80053 COMPREHEN METABOLIC PANEL: CPT

## 2017-11-23 PROCEDURE — 85027 COMPLETE CBC AUTOMATED: CPT

## 2017-11-23 RX ADMIN — ASPIRIN 81 MG: 81 TABLET, COATED ORAL at 08:34

## 2017-11-23 RX ADMIN — PNEUMOCOCCAL VACCINE POLYVALENT 0.5 ML
25; 25; 25; 25; 25; 25; 25; 25; 25; 25; 25; 25; 25; 25; 25; 25; 25; 25; 25; 25; 25; 25; 25 INJECTION, SOLUTION INTRAMUSCULAR; SUBCUTANEOUS at 13:11

## 2017-11-23 RX ADMIN — SODIUM CHLORIDE: 9 INJECTION, SOLUTION INTRAVENOUS at 04:17

## 2017-11-23 RX ADMIN — FLUTICASONE PROPIONATE 1 SPRAY: 50 SPRAY, METERED NASAL at 08:34

## 2017-11-23 RX ADMIN — ENOXAPARIN SODIUM 40 MG: 40 INJECTION SUBCUTANEOUS at 08:34

## 2017-11-23 RX ADMIN — Medication 10 ML: at 08:33

## 2017-11-23 RX ADMIN — CLOPIDOGREL BISULFATE 75 MG: 75 TABLET ORAL at 08:34

## 2017-11-23 RX ADMIN — CARVEDILOL 12.5 MG: 12.5 TABLET, FILM COATED ORAL at 08:34

## 2017-11-23 NOTE — CONSULTS
extended release tablet 40 mEq, 40 mEq, Oral, PRN **OR** potassium chloride 20 MEQ/15ML (10%) oral solution 40 mEq, 40 mEq, Oral, PRN **OR** potassium chloride 10 mEq/100 mL IVPB (Peripheral Line), 10 mEq, Intravenous, PRN, Yolette Columbus P Blood, DO    magnesium sulfate 1 g in dextrose 5% 100 mL IVPB, 1 g, Intravenous, PRN, Charli P Blood, DO    morphine injection 2 mg, 2 mg, Intravenous, Q2H PRN **OR** morphine injection 4 mg, 4 mg, Intravenous, Q2H PRN, Charli P Blood, DO    magnesium hydroxide (MILK OF MAGNESIA) 400 MG/5ML suspension 30 mL, 30 mL, Oral, Daily PRN, Yolette Mateo P Blood, DO    bisacodyl (DULCOLAX) suppository 10 mg, 10 mg, Rectal, Daily PRN, Yolette Mateo P Blood, DO    ondansetron (ZOFRAN) injection 4 mg, 4 mg, Intravenous, Q6H PRN, Charli P Blood, DO    nicotine (NICODERM CQ) 21 MG/24HR 1 patch, 1 patch, Transdermal, Daily PRN, Yolette Columbus P Blood, DO    0.9 % sodium chloride infusion, , Intravenous, Continuous, Charli P Blood, DO, Last Rate: 75 mL/hr at 11/23/17 0417    enoxaparin (LOVENOX) injection 40 mg, 40 mg, Subcutaneous, Daily, Charli P Blood, DO, 40 mg at 11/23/17 0834       ALLERGIES:   No Known Allergies       FAMILY HISTORY: The patient's family history was reviewed. SOCIAL HISTORY:   Social History     Social History    Marital status: Single     Spouse name: N/A    Number of children: N/A    Years of education: N/A     Occupational History    Not on file. Social History Main Topics    Smoking status: Former Smoker     Quit date: 1/16/1999    Smokeless tobacco: Never Used    Alcohol use No    Drug use: No    Sexual activity: Not on file     Other Topics Concern    Not on file     Social History Narrative    No narrative on file         REVIEW OF SYSTEMS: A 12-point review of systems was obtained and pertinent positives and negatives were enumerated above in the history of present illness. All other reviewed systems / symptoms were negative.      ROS      PHYSICAL EXAMINATION: Vital signs reviewed per the nursing documentation. [unfilled]   [unfilled]   Body mass index is 35.55 kg/m². General:  A O x 3 in NAD   Psych: . Normal affect. Mentation normal   HEENT: PERRLA. Clear conjunctivae and sclerae. Moist oral mucosae, no lesions or ulcers. The neck is supple, without lymphadenopathy or jugular venous distension. No masses. Normal thyroid. Cardiovascular: S1 S2 RRR no rubs or murmurs. Pulmonary: clear BL. No accessory muscle usage. Abdominal Exam: Soft, NT ND, no hepato or spleno megaly, +BS, no ascites. No groin masses or lymphadenopathy. Extremities: No edema. Skin: Warm skin. No skin rash. No spider nevi palmar erythema nail dystrophy. Joint: No joint swelling or deformity. Neurological: intact sensory. DTR+.  No asterixis     LABORATORY DATA: Reviewed   Lab Results   Component Value Date    WBC 5.0 11/23/2017    HGB 13.9 11/23/2017    HCT 42.0 11/23/2017    MCV 88.2 11/23/2017     (L) 11/23/2017     11/23/2017    K 3.7 11/23/2017     11/23/2017    CO2 25 11/23/2017    BUN 16 11/23/2017    CREATININE 0.70 11/23/2017    LABPROT 6.5 01/02/2012    LABALBU 3.6 11/23/2017    BILITOT 2.63 (H) 11/23/2017    ALKPHOS 176 (H) 11/23/2017    AST 67 (H) 11/23/2017     (H) 11/23/2017      Lab Results   Component Value Date    RBC 4.77 11/23/2017    HGB 13.9 11/23/2017    MCV 88.2 11/23/2017    MCH 29.2 11/23/2017    MCHC 33.1 11/23/2017    RDW 15.0 (H) 11/23/2017    MPV 9.1 11/23/2017    BASOPCT 1 11/22/2017    LYMPHSABS 0.70 (L) 11/22/2017    MONOSABS 0.60 11/22/2017    NEUTROABS 2.70 11/22/2017    EOSABS 0.30 11/22/2017    BASOSABS 0.00 11/22/2017          DIAGNOSTIC TESTING:   Ct Abdomen Pelvis Wo Iv Contrast Additional Contrast? None    Result Date: 11/22/2017  EXAMINATION: CT OF THE ABDOMEN AND PELVIS WITHOUT CONTRAST 11/22/2017 1:35 pm TECHNIQUE: CT of the abdomen and pelvis was performed without the administration of intravenous contrast. Multiplanar reformatted images are provided for review. Dose modulation, iterative reconstruction, and/or weight based adjustment of the mA/kV was utilized to reduce the radiation dose to as low as reasonably achievable. COMPARISON: None. HISTORY: ORDERING SYSTEM PROVIDED HISTORY: rt abd pain, hematuria TECHNOLOGIST PROVIDED HISTORY: Additional Contrast?->None FINDINGS: Lower Chest: There is mild cardiomegaly. Organs: The liver, pancreas, and spleen are grossly within normal limits for a noncontrast study. There is mild nonspecific thickening of the gallbladder wall at the level of the gallbladder fundus, best appreciated on image number 77 of series 2. No adrenal masses are identified and there is no hydronephrosis. There are nonobstructing right renal stones. There is stranding of fat around both kidneys. No ureteral stones or bladder stones are detected. GI/Bowel: There is no small bowel obstruction, free-air, or free-fluid. The appendix is normal in caliber. There is diverticulosis coli. Pelvis: Pelvic phleboliths are present. There is mild stranding of fat around the urinary bladder. Peritoneum/Retroperitoneum: Vascular calcifications are present. There is a mildly enlarged portacaval lymph node measuring up to 15 mm in short axis. Bones/Soft Tissues: There is grade 1 anterolisthesis of L5 on S1 secondary to bilateral pars interarticularis defects with mild multilevel degenerative disc disease. Right-sided nephrolithiasis without evidence of an obstructive uropathy. Stranding of fat around the urinary bladder -rule out cystitis. Diverticulosis coli without evidence of diverticulitis. Focal thickening of the wall of the gallbladder fundus, possibly related to a collapsed Phrygian cap. Us Gallbladder Ruq    Result Date: 11/22/2017  EXAMINATION: RIGHT UPPER QUADRANT ULTRASOUND 11/22/2017 2:13 pm COMPARISON: None.  HISTORY: ORDERING SYSTEM PROVIDED HISTORY: RUQ pain Right upper

## 2017-11-23 NOTE — PLAN OF CARE
Problem: Pain:  Goal: Pain level will decrease  Pain level will decrease   Outcome: Ongoing  Pain level assessed and rated on a 0-10 scale  Assess characteristics of pain  PRN pain medication given per pt request  Non-pharmacological interventions implemented  Report ineffective pain management to physician  Update pt and family of any changes  Pt instructed to call out with new onset of pain  Continue to monitor      Goal: Control of acute pain  Control of acute pain   Outcome: Ongoing  Pain level assessed and rated on a 0-10 scale  Assess characteristics of pain  PRN pain medication given per pt request  Non-pharmacological interventions implemented  Report ineffective pain management to physician  Update pt and family of any changes  Pt instructed to call out with new onset of pain  Continue to monitor

## 2017-11-23 NOTE — H&P
[facility]    HISTORY AND PHYSICAL EXAMINATION            Date:   11/23/2017  Patient name:  Eugenie Banerjee  Date of admission:  11/22/2017 12:55 PM  MRN:   [id number]  Account:  [de-identified]  YOB: 1960  PCP:    Keira Chen PA-C  Room:   2015/2015-01  Code Status:    Full Code    Chief Complaint:     Chief Complaint   Patient presents with    Hematuria    Hernia       History Obtained From:     patient, electronic medical record    History of Present Illness: The patient is a 60 y.o. Non-/non  male who presents with Hematuria and Hernia   and he is admitted to the hospital for the management of  Blood in urine. He states he has had painless hematuria for 4 days pta. The day before it started he had a distended abdomen with n/v. After vomiting, he felt better. The hematuria is a \"clear red\" after initially being pink. Then developed ruq pain yesterday and had ongoing hematuria so came to hospital for eval.  He had tried prevacid and zantac with no relief. Hasn't eaten much last couple of days. Feels fine today and noticed urine is clearing some, but is still blood tinged        Past Medical History:     Past Medical History:   Diagnosis Date    CAD (coronary artery disease)     Cancer ([facility] [address]     skin    CHF (congestive heart failure) (HCC)     Heart attack     Heart disease     Hyperlipidemia     Hypertension     Stroke ([facility] [address]     2011        Past Surgical History:     Past Surgical History:   Procedure Laterality Date    CORONARY ANGIOPLASTY WITH STENT PLACEMENT      CORONARY ARTERY BYPASS GRAFT      qrad    EYE SURGERY      EYE SURGERY          Medications Prior to Admission:     Prior to Admission medications    Medication Sig Start Date End Date Taking?  Authorizing Provider   Polyethyl Glycol-Propyl Glycol (SYSTANE OP) Place into the left eye as needed (dry eye)    Yes Historical Provider, in drinking, increase in urination, hot or cold intolerance  MUSCULOSKELETAL:  negative joint pains, muscle aches, swelling of joints  NEUROLOGICAL:  negative for headaches, dizziness, lightheadedness, numbness, pain, tingling extremities  BEHAVIOR/PSYCH:  negative for depression, anxiety    Physical Exam:   /81   Pulse 73   Temp 98.1 °F (36.7 °C) (Oral)   Resp 20   Ht 5' 8.9\" (1.75 m)   Wt 240 lb (108.9 kg)   SpO2 96%   BMI 35.55 kg/m²   Temp (24hrs), Av.8 °F (36.6 °C), Min:97.5 °F (36.4 °C), Max:98.1 °F (36.7 °C)    No results for input(s): POCGLU in the last 72 hours. Intake/Output Summary (Last 24 hours) at 17 1128  Last data filed at 17 5729   Gross per 24 hour   Intake           812.38 ml   Output              200 ml   Net           612.38 ml       General Appearance:  alert, well appearing, and in no acute distress  Mental status: oriented to person, place, and time with normal affect  Head:  normocephalic, atraumatic. Eye: no icterus, redness, pupils equal and reactive, extraocular eye movements intact, conjunctiva clear  Ear: normal external ear, no discharge, hearing intact  Nose:  no drainage noted  Mouth: mucous membranes moist  Neck: supple, no carotid bruits, thyroid not palpable  Lungs: Bilateral equal air entry, clear to ausculation, no wheezing, rales or rhonchi, normal effort  Cardiovascular: normal rate, regular rhythm, no murmur, gallop, rub.   Abdomen: Soft, nontender, nondistended, normal bowel sounds, no hepatomegaly or splenomegaly; obese  Neurologic: There are no new focal motor or sensory deficits, normal muscle tone and bulk, no abnormal sensation, normal speech, cranial nerves II through XII grossly intact  Skin: No gross lesions, rashes, bruising or bleeding on exposed skin area  Extremities:  peripheral pulses palpable, no pedal edema or calf pain with palpation  Psych: normal affect     Osteopathic Examination: negative for  myalgias, arthralgias, pain, Direct 2.43 (H) <0.31 mg/dL    Bilirubin, Indirect 1.23 (H) 0.00 - 1.00 mg/dL    Total Protein 6.6 6.4 - 8.3 g/dL    Globulin NOT REPORTED 1.5 - 3.8 g/dL    Albumin/Globulin Ratio NOT REPORTED 1.0 - 2.5   Lipase    Collection Time: 11/22/17  1:37 PM   Result Value Ref Range    Lipase 62 (H) 13 - 60 U/L   Amylase    Collection Time: 11/22/17  1:37 PM   Result Value Ref Range    Amylase 34 28 - 100 U/L   Hepatitis Panel, Acute    Collection Time: 11/22/17  1:37 PM   Result Value Ref Range    Hepatitis B Surface Ag NONREACTIVE NR    Hepatitis C Ab NONREACTIVE NR    Hep B Core Ab, IgM NONREACTIVE NR    Hep A IgM NONREACTIVE NR   Hepatitis Panel, Acute    Collection Time: 11/22/17  6:14 PM   Result Value Ref Range    Hepatitis B Surface Ag NONREACTIVE NR    Hepatitis C Ab NONREACTIVE NR    Hep B Core Ab, IgM NONREACTIVE NR    Hep A IgM NONREACTIVE NR   Comprehensive metabolic panel    Collection Time: 11/23/17  7:02 AM   Result Value Ref Range    Glucose 122 (H) 70 - 99 mg/dL    BUN 16 6 - 20 mg/dL    CREATININE 0.70 0.70 - 1.20 mg/dL    Bun/Cre Ratio 23 (H) 9 - 20    Calcium 8.4 (L) 8.6 - 10.4 mg/dL    Sodium 138 135 - 144 mmol/L    Potassium 3.7 3.7 - 5.3 mmol/L    Chloride 101 98 - 107 mmol/L    CO2 25 20 - 31 mmol/L    Anion Gap 12 9 - 17 mmol/L    Alkaline Phosphatase 176 (H) 40 - 129 U/L     (H) 5 - 41 U/L    AST 67 (H) <40 U/L    Total Bilirubin 2.63 (H) 0.3 - 1.2 mg/dL    Total Protein 6.4 6.4 - 8.3 g/dL    Alb 3.6 3.5 - 5.2 g/dL    Albumin/Globulin Ratio NOT REPORTED 1.0 - 2.5    GFR Non-African American >60 >60 mL/min    GFR African American >60 >60 mL/min    GFR Comment          GFR Staging NOT REPORTED    CBC    Collection Time: 11/23/17  7:02 AM   Result Value Ref Range    WBC 5.0 3.5 - 11.0 k/uL    RBC 4.77 4.5 - 5.9 m/uL    Hemoglobin 13.9 13.5 - 17.5 g/dL    Hematocrit 42.0 41 - 53 %    MCV 88.2 80 - 100 fL    MCH 29.2 26 - 34 pg    MCHC 33.1 31 - 37 g/dL    RDW 15.0 (H) 11.5 - 14.5 %    Platelets

## 2017-11-23 NOTE — DISCHARGE SUMMARY
[facility]    Discharge Summary     Patient ID: Boyd Martínez  :  1960   MRN: [id number]     ACCOUNT:  [de-identified]   Patient's PCP: Awa Clinton PA-C  Admit Date: 2017   Discharge Date: 2017     Length of Stay: 1  Code Status:  Full Code  Admitting Physician: No Palacios Blood, DO  Discharge Physician: No Palacios Blood, DO     Active Discharge Diagnoses:     Primary Problem  Hyperbilirubinemia      Hospital Problems  Active Hospital Problems    Diagnosis Date Noted    Gross hematuria [R31.0] 2017    RUQ pain [R10.11] 2017    Right nephrolithiasis [N20.0] 2017    Hypertension [I10]     Hyperlipidemia [E78.5]     CAD (coronary artery disease) [I25.10]     Hyperbilirubinemia [E80.6] 2017       Admission Condition:  fair     Discharged Condition: good    Hospital Stay:     Hospital Course:  Boyd Martínez is a 60 y.o. male who was admitted for the management of   Hyperbilirubinemia , presented to ER with Hematuria and Hernia    Admitted with painless hematuria and hyperbilirubinemia. Statin and fibrate held. Hep panel neg. mrcp and ruq us neg.   Seen by gi: they recommend outpt f/u; believe it may have been a passed stone      Significant therapeutic interventions: ivf, hold meds    Significant Diagnostic Studies:   Labs / Micro:  CBC:   Lab Results   Component Value Date    WBC 5.0 2017    RBC 4.77 2017    RBC 5.00 2012    HGB 13.9 2017    HCT 42.0 2017    MCV 88.2 2017    MCH 29.2 2017    MCHC 33.1 2017    RDW 15.0 2017     2017     2012     CMP:    Lab Results   Component Value Date    GLUCOSE 122 2017    GLUCOSE 132 2012     2017    K 3.7 2017     2017    CO2 25 2017    BUN 16 2017    CREATININE 0.70 2017    ANIONGAP 12 2017    ALKPHOS 176 2017 bilateral pars interarticularis defects with mild multilevel degenerative disc disease. Right-sided nephrolithiasis without evidence of an obstructive uropathy. Stranding of fat around the urinary bladder -rule out cystitis. Diverticulosis coli without evidence of diverticulitis. Focal thickening of the wall of the gallbladder fundus, possibly related to a collapsed Phrygian cap. Us Gallbladder Ruq    Result Date: 11/22/2017  EXAMINATION: RIGHT UPPER QUADRANT ULTRASOUND 11/22/2017 2:13 pm COMPARISON: None. HISTORY: ORDERING SYSTEM PROVIDED HISTORY: RUQ pain Right upper quadrant pain for 2 days FINDINGS: LIVER:  The liver demonstrates normal echogenicity without evidence of intrahepatic biliary ductal dilatation. BILIARY SYSTEM:  Gallbladder is unremarkable without evidence of pericholecystic fluid, wall thickening or stones. Negative sonographic Rodriguez's sign. Common bile duct is within normal limits measuring 3.6 cm. RIGHT KIDNEY: The right kidney is grossly unremarkable without evidence of hydronephrosis. PANCREAS:  Not evaluated OTHER: No evidence of right upper quadrant ascites. Unremarkable right upper quadrant ultrasound. Mri Abdomen W Wo Contrast Mrcp    Result Date: 11/22/2017  EXAMINATION: MRI OF THE ABDOMEN WITH AND WITHOUT CONTRAST AND MRCP 11/22/2017 4:56 pm TECHNIQUE: Multiplanar multisequence MRI of the abdomen was performed with and without the administration of intravenous contrast.  After initial T2 axial and coronal images, thick slab, thin slab and 3D coronal MRCP sequences were obtained without the administration of intravenous contrast.  MIP images are provided for review. COMPARISON: CT dated November 22, 2017 and ultrasound dated November 22, 2017 HISTORY: ORDERING SYSTEM PROVIDED HISTORY: hyperbilirubinemia FINDINGS: Lung bases:  Clear lung bases. Cardiomegaly. Organs: The liver is normal in size, contour, and signal intensity. No focal hepatic masses.   The gallbladder is normal without gallstones or wall thickening. Specifically, the gallbladder fundus is unremarkable. No intra or extrahepatic biliary dilatation. The spleen, pancreas, adrenal glands, and kidneys demonstrate no significant abnormality. Stomach and visualized small and large bowel:  No significant abnormality. Peritoneal cavity/Retroperitoneum:  No free fluid. No pathologic lymphadenopathy. Aorta and its branches are normal in course and caliber. Soft tissues/bones:  No acute or aggressive osseous lesion. 1. No MRI findings to explain the patient's hyperbilirubinemia. 2. No intra or extrahepatic biliary dilatation. No choledocholithiasis. 3. Normal gallbladder. Specifically, the gallbladder fundus is unremarkable. 4. Cardiomegaly. Consultations:    Consults:     Final Specialist Recommendations/Findings:   IP CONSULT TO GI      The patient was seen and examined on day of discharge and this discharge summary is in conjunction with any daily progress note from day of discharge. Discharge plan:     Disposition: Home    Physician Follow Up:     Della Wynn PA-C  [facility]  [phone]         GI    Requiring Further Evaluation/Follow Up POST HOSPITALIZATION/Incidental Findings: repeat lft    Diet: cardiac diet    Activity: As tolerated    Instructions to Patient:     Discharge Medications:      Medication List      CHANGE how you take these medications    nitroGLYCERIN 0.4 MG SL tablet  Commonly known as:  NITROSTAT  What changed:  Another medication with the same name was removed. Continue taking this medication, and follow the directions you see here.         CONTINUE taking these medications    aspirin 81 MG tablet     carvedilol 12.5 MG tablet  Commonly known as:  COREG     clopidogrel 75 MG tablet  Commonly known as:  PLAVIX     fluticasone 50 MCG/ACT nasal spray  Commonly known as:  FLONASE     MULTIVITAMIN ADULT PO     SYSTANE OP        STOP taking these medications    atorvastatin 80 MG tablet  Commonly known as:  LIPITOR     fenofibrate 160 MG tablet     fexofenadine 180 MG tablet  Commonly known as:  ALLEGRA            Time Spent on discharge is  20 mins in patient examination, evaluation, counseling as well as medication reconciliation, prescriptions for required medications, discharge plan and follow up. Electronically signed by   Karina Sandhu DO  11/23/2017  11:33 AM      Thank you Dr. Adenike Gan PA-C, [place] for the opportunity to be involved in this patient's care.

## 2018-01-26 ENCOUNTER — HOSPITAL ENCOUNTER (OUTPATIENT)
Age: 58
Setting detail: SPECIMEN
Discharge: HOME | End: 2018-01-26
Payer: MEDICARE

## 2018-01-28 LAB
CULTURE: ABNORMAL
CULTURE: ABNORMAL
DIRECT EXAM: ABNORMAL
DIRECT EXAM: ABNORMAL
Lab: ABNORMAL
ORGANISM: ABNORMAL
SPECIMEN DESCRIPTION: ABNORMAL
STATUS: ABNORMAL

## 2018-06-15 ENCOUNTER — HOSPITAL ENCOUNTER (EMERGENCY)
Age: 58
Discharge: HOME | End: 2018-06-15
Attending: EMERGENCY MEDICINE

## 2018-06-15 VITALS
HEART RATE: 96 BPM | BODY MASS INDEX: 34.26 KG/M2 | TEMPERATURE: 97.8 F | OXYGEN SATURATION: 100 % | HEIGHT: 70 IN | WEIGHT: 239.3 LBS | RESPIRATION RATE: 14 BRPM | SYSTOLIC BLOOD PRESSURE: 148 MMHG | DIASTOLIC BLOOD PRESSURE: 110 MMHG

## 2018-06-15 DIAGNOSIS — L03.012 CELLULITIS OF FINGER OF LEFT HAND: Primary | ICD-10-CM

## 2018-06-15 DIAGNOSIS — L03.011 CELLULITIS OF FINGER OF RIGHT HAND: ICD-10-CM

## 2018-06-15 PROCEDURE — 99283 EMERGENCY DEPT VISIT LOW MDM: CPT

## 2018-06-15 RX ORDER — DOXYCYCLINE HYCLATE 100 MG
100 TABLET ORAL 2 TIMES DAILY
Qty: 20 TABLET | Refills: 0 | Status: SHIPPED | OUTPATIENT
Start: 2018-06-15 | End: 2018-06-25

## 2018-06-15 ASSESSMENT — ENCOUNTER SYMPTOMS: COLOR CHANGE: 1

## 2018-06-15 ASSESSMENT — PAIN DESCRIPTION - LOCATION: LOCATION: FINGER (COMMENT WHICH ONE)

## 2018-06-15 ASSESSMENT — PAIN SCALES - GENERAL: PAINLEVEL_OUTOF10: 3

## 2018-06-15 ASSESSMENT — PAIN DESCRIPTION - PAIN TYPE: TYPE: ACUTE PAIN

## 2019-01-10 ENCOUNTER — HOSPITAL ENCOUNTER (EMERGENCY)
Age: 59
Discharge: HOME | End: 2019-01-10
Attending: EMERGENCY MEDICINE
Payer: MEDICAID

## 2019-01-10 VITALS
BODY MASS INDEX: 36.96 KG/M2 | DIASTOLIC BLOOD PRESSURE: 105 MMHG | SYSTOLIC BLOOD PRESSURE: 172 MMHG | RESPIRATION RATE: 20 BRPM | HEIGHT: 66 IN | WEIGHT: 230 LBS | HEART RATE: 89 BPM | OXYGEN SATURATION: 98 % | TEMPERATURE: 97.9 F

## 2019-01-10 DIAGNOSIS — H00.012 HORDEOLUM EXTERNUM OF RIGHT LOWER EYELID: Primary | ICD-10-CM

## 2019-01-10 PROCEDURE — 99283 EMERGENCY DEPT VISIT LOW MDM: CPT

## 2019-01-10 RX ORDER — ERYTHROMYCIN 5 MG/G
OINTMENT OPHTHALMIC
Qty: 1 TUBE | Refills: 0 | Status: ON HOLD | OUTPATIENT
Start: 2019-01-10 | End: 2019-02-20 | Stop reason: HOSPADM

## 2019-01-10 ASSESSMENT — PAIN DESCRIPTION - DESCRIPTORS: DESCRIPTORS: PRESSURE

## 2019-01-10 ASSESSMENT — ENCOUNTER SYMPTOMS
FACIAL SWELLING: 1
PHOTOPHOBIA: 0
COLOR CHANGE: 1
SHORTNESS OF BREATH: 0
VOICE CHANGE: 0
EYE PAIN: 0
SORE THROAT: 0
TROUBLE SWALLOWING: 0
COUGH: 0

## 2019-01-10 ASSESSMENT — VISUAL ACUITY
OS: 20/25
OD: 20/25
OU: 20/20

## 2019-01-10 ASSESSMENT — PAIN DESCRIPTION - ORIENTATION: ORIENTATION: RIGHT

## 2019-01-10 ASSESSMENT — PAIN SCALES - GENERAL: PAINLEVEL_OUTOF10: 10

## 2019-01-10 ASSESSMENT — PAIN DESCRIPTION - PAIN TYPE: TYPE: ACUTE PAIN

## 2019-01-10 ASSESSMENT — PAIN DESCRIPTION - LOCATION: LOCATION: EYE

## 2019-02-17 ENCOUNTER — HOSPITAL ENCOUNTER (INPATIENT)
Age: 59
LOS: 3 days | Discharge: HOME | DRG: 291 | End: 2019-02-20
Attending: EMERGENCY MEDICINE | Admitting: INTERNAL MEDICINE
Payer: MEDICARE

## 2019-02-17 ENCOUNTER — APPOINTMENT (OUTPATIENT)
Dept: GENERAL RADIOLOGY | Age: 59
DRG: 291 | End: 2019-02-17
Payer: MEDICARE

## 2019-02-17 DIAGNOSIS — I50.9 ACUTE ON CHRONIC CONGESTIVE HEART FAILURE, UNSPECIFIED HEART FAILURE TYPE (HCC): ICD-10-CM

## 2019-02-17 DIAGNOSIS — J18.9 PNEUMONIA DUE TO ORGANISM: Primary | ICD-10-CM

## 2019-02-17 PROBLEM — I50.21 ACUTE SYSTOLIC HF (HEART FAILURE) (HCC): Status: ACTIVE | Noted: 2019-02-17

## 2019-02-17 LAB
ABSOLUTE EOS #: 0.1 K/UL (ref 0–0.4)
ABSOLUTE IMMATURE GRANULOCYTE: ABNORMAL K/UL (ref 0–0.3)
ABSOLUTE LYMPH #: 1.2 K/UL (ref 1–4.8)
ABSOLUTE MONO #: 0.5 K/UL (ref 0.2–0.8)
ANION GAP SERPL CALCULATED.3IONS-SCNC: 14 MMOL/L (ref 9–17)
BASOPHILS # BLD: 0 % (ref 0–2)
BASOPHILS ABSOLUTE: 0 K/UL (ref 0–0.2)
BNP INTERPRETATION: ABNORMAL
BUN BLDV-MCNC: 17 MG/DL (ref 8–23)
BUN/CREAT BLD: 18 (ref 9–20)
CALCIUM SERPL-MCNC: 8.7 MG/DL (ref 8.6–10.4)
CHLORIDE BLD-SCNC: 102 MMOL/L (ref 98–107)
CO2: 24 MMOL/L (ref 20–31)
CREAT SERPL-MCNC: 0.96 MG/DL (ref 0.7–1.2)
DIFFERENTIAL TYPE: ABNORMAL
DIRECT EXAM: NORMAL
EOSINOPHILS RELATIVE PERCENT: 1 % (ref 1–4)
GFR AFRICAN AMERICAN: >60 ML/MIN
GFR NON-AFRICAN AMERICAN: >60 ML/MIN
GFR SERPL CREATININE-BSD FRML MDRD: ABNORMAL ML/MIN/{1.73_M2}
GFR SERPL CREATININE-BSD FRML MDRD: ABNORMAL ML/MIN/{1.73_M2}
GLUCOSE BLD-MCNC: 139 MG/DL (ref 70–99)
HCT VFR BLD CALC: 46.3 % (ref 41–53)
HEMOGLOBIN: 14.6 G/DL (ref 13.5–17.5)
IMMATURE GRANULOCYTES: ABNORMAL %
LYMPHOCYTES # BLD: 19 % (ref 24–44)
Lab: NORMAL
MCH RBC QN AUTO: 27.1 PG (ref 26–34)
MCHC RBC AUTO-ENTMCNC: 31.6 G/DL (ref 31–37)
MCV RBC AUTO: 85.6 FL (ref 80–100)
MONOCYTES # BLD: 8 % (ref 1–7)
MYOGLOBIN: 67 NG/ML (ref 28–72)
MYOGLOBIN: 86 NG/ML (ref 28–72)
NRBC AUTOMATED: ABNORMAL PER 100 WBC
PDW BLD-RTO: 14.6 % (ref 11.5–14.5)
PLATELET # BLD: 99 K/UL (ref 130–400)
PLATELET ESTIMATE: ABNORMAL
PMV BLD AUTO: ABNORMAL FL (ref 6–12)
POTASSIUM SERPL-SCNC: 4 MMOL/L (ref 3.7–5.3)
PRO-BNP: 1092 PG/ML
RBC # BLD: 5.41 M/UL (ref 4.5–5.9)
RBC # BLD: ABNORMAL 10*6/UL
SEG NEUTROPHILS: 72 % (ref 36–66)
SEGMENTED NEUTROPHILS ABSOLUTE COUNT: 4.3 K/UL (ref 1.8–7.7)
SODIUM BLD-SCNC: 140 MMOL/L (ref 135–144)
SPECIMEN DESCRIPTION: NORMAL
TROPONIN INTERP: ABNORMAL
TROPONIN INTERP: NORMAL
TROPONIN T: ABNORMAL NG/ML
TROPONIN T: NORMAL NG/ML
TROPONIN, HIGH SENSITIVITY: 22 NG/L (ref 0–22)
TROPONIN, HIGH SENSITIVITY: 23 NG/L (ref 0–22)
WBC # BLD: 6.1 K/UL (ref 3.5–11)
WBC # BLD: ABNORMAL 10*3/UL

## 2019-02-17 PROCEDURE — 85025 COMPLETE CBC W/AUTO DIFF WBC: CPT

## 2019-02-17 PROCEDURE — 87804 INFLUENZA ASSAY W/OPTIC: CPT

## 2019-02-17 PROCEDURE — 93005 ELECTROCARDIOGRAM TRACING: CPT

## 2019-02-17 PROCEDURE — 99222 1ST HOSP IP/OBS MODERATE 55: CPT | Performed by: NURSE PRACTITIONER

## 2019-02-17 PROCEDURE — 84484 ASSAY OF TROPONIN QUANT: CPT

## 2019-02-17 PROCEDURE — 2580000003 HC RX 258: Performed by: INTERNAL MEDICINE

## 2019-02-17 PROCEDURE — 87040 BLOOD CULTURE FOR BACTERIA: CPT

## 2019-02-17 PROCEDURE — 6360000002 HC RX W HCPCS: Performed by: EMERGENCY MEDICINE

## 2019-02-17 PROCEDURE — 80048 BASIC METABOLIC PNL TOTAL CA: CPT

## 2019-02-17 PROCEDURE — 6370000000 HC RX 637 (ALT 250 FOR IP): Performed by: INTERNAL MEDICINE

## 2019-02-17 PROCEDURE — 2580000003 HC RX 258: Performed by: EMERGENCY MEDICINE

## 2019-02-17 PROCEDURE — 83880 ASSAY OF NATRIURETIC PEPTIDE: CPT

## 2019-02-17 PROCEDURE — 71045 X-RAY EXAM CHEST 1 VIEW: CPT

## 2019-02-17 PROCEDURE — 36415 COLL VENOUS BLD VENIPUNCTURE: CPT

## 2019-02-17 PROCEDURE — 2060000000 HC ICU INTERMEDIATE R&B

## 2019-02-17 PROCEDURE — 99285 EMERGENCY DEPT VISIT HI MDM: CPT

## 2019-02-17 PROCEDURE — 83874 ASSAY OF MYOGLOBIN: CPT

## 2019-02-17 RX ORDER — CARVEDILOL 12.5 MG/1
12.5 TABLET ORAL 2 TIMES DAILY WITH MEALS
Status: DISCONTINUED | OUTPATIENT
Start: 2019-02-18 | End: 2019-02-20 | Stop reason: HOSPADM

## 2019-02-17 RX ORDER — CLOPIDOGREL BISULFATE 75 MG/1
75 TABLET ORAL DAILY
Status: DISCONTINUED | OUTPATIENT
Start: 2019-02-18 | End: 2019-02-20 | Stop reason: HOSPADM

## 2019-02-17 RX ORDER — ONDANSETRON 2 MG/ML
4 INJECTION INTRAMUSCULAR; INTRAVENOUS EVERY 6 HOURS PRN
Status: DISCONTINUED | OUTPATIENT
Start: 2019-02-17 | End: 2019-02-17 | Stop reason: SDUPTHER

## 2019-02-17 RX ORDER — SODIUM CHLORIDE 0.9 % (FLUSH) 0.9 %
10 SYRINGE (ML) INJECTION PRN
Status: DISCONTINUED | OUTPATIENT
Start: 2019-02-17 | End: 2019-02-20 | Stop reason: HOSPADM

## 2019-02-17 RX ORDER — ERYTHROMYCIN 5 MG/G
OINTMENT OPHTHALMIC NIGHTLY
Status: DISCONTINUED | OUTPATIENT
Start: 2019-02-17 | End: 2019-02-20 | Stop reason: HOSPADM

## 2019-02-17 RX ORDER — DOCUSATE SODIUM 100 MG/1
100 CAPSULE, LIQUID FILLED ORAL 2 TIMES DAILY
Status: DISCONTINUED | OUTPATIENT
Start: 2019-02-17 | End: 2019-02-20 | Stop reason: HOSPADM

## 2019-02-17 RX ORDER — IPRATROPIUM BROMIDE AND ALBUTEROL SULFATE 2.5; .5 MG/3ML; MG/3ML
1 SOLUTION RESPIRATORY (INHALATION) EVERY 4 HOURS PRN
Status: DISCONTINUED | OUTPATIENT
Start: 2019-02-17 | End: 2019-02-20 | Stop reason: HOSPADM

## 2019-02-17 RX ORDER — ASPIRIN 81 MG/1
81 TABLET, CHEWABLE ORAL DAILY
Status: DISCONTINUED | OUTPATIENT
Start: 2019-02-18 | End: 2019-02-20 | Stop reason: HOSPADM

## 2019-02-17 RX ORDER — ONDANSETRON 2 MG/ML
4 INJECTION INTRAMUSCULAR; INTRAVENOUS EVERY 6 HOURS PRN
Status: DISCONTINUED | OUTPATIENT
Start: 2019-02-17 | End: 2019-02-20 | Stop reason: HOSPADM

## 2019-02-17 RX ORDER — FLUTICASONE PROPIONATE 50 MCG
1 SPRAY, SUSPENSION (ML) NASAL DAILY
Status: DISCONTINUED | OUTPATIENT
Start: 2019-02-18 | End: 2019-02-20 | Stop reason: HOSPADM

## 2019-02-17 RX ORDER — FUROSEMIDE 10 MG/ML
40 INJECTION INTRAMUSCULAR; INTRAVENOUS 2 TIMES DAILY
Status: DISCONTINUED | OUTPATIENT
Start: 2019-02-18 | End: 2019-02-20

## 2019-02-17 RX ORDER — ONDANSETRON 4 MG/1
4 TABLET, ORALLY DISINTEGRATING ORAL EVERY 6 HOURS PRN
Status: DISCONTINUED | OUTPATIENT
Start: 2019-02-17 | End: 2019-02-20 | Stop reason: HOSPADM

## 2019-02-17 RX ORDER — BISACODYL 10 MG
10 SUPPOSITORY, RECTAL RECTAL DAILY PRN
Status: DISCONTINUED | OUTPATIENT
Start: 2019-02-17 | End: 2019-02-20 | Stop reason: HOSPADM

## 2019-02-17 RX ORDER — LISINOPRIL 5 MG/1
5 TABLET ORAL DAILY
Status: DISCONTINUED | OUTPATIENT
Start: 2019-02-18 | End: 2019-02-20 | Stop reason: HOSPADM

## 2019-02-17 RX ORDER — SODIUM CHLORIDE 0.9 % (FLUSH) 0.9 %
10 SYRINGE (ML) INJECTION EVERY 12 HOURS SCHEDULED
Status: DISCONTINUED | OUTPATIENT
Start: 2019-02-17 | End: 2019-02-20 | Stop reason: HOSPADM

## 2019-02-17 RX ORDER — NITROGLYCERIN 0.4 MG/1
0.4 TABLET SUBLINGUAL EVERY 5 MIN PRN
Status: DISCONTINUED | OUTPATIENT
Start: 2019-02-17 | End: 2019-02-20 | Stop reason: HOSPADM

## 2019-02-17 RX ORDER — FUROSEMIDE 10 MG/ML
40 INJECTION INTRAMUSCULAR; INTRAVENOUS ONCE
Status: COMPLETED | OUTPATIENT
Start: 2019-02-17 | End: 2019-02-17

## 2019-02-17 RX ORDER — ACETAMINOPHEN 325 MG/1
650 TABLET ORAL EVERY 4 HOURS PRN
Status: DISCONTINUED | OUTPATIENT
Start: 2019-02-17 | End: 2019-02-20 | Stop reason: HOSPADM

## 2019-02-17 RX ADMIN — AZITHROMYCIN MONOHYDRATE 500 MG: 500 INJECTION, POWDER, LYOPHILIZED, FOR SOLUTION INTRAVENOUS at 18:26

## 2019-02-17 RX ADMIN — Medication 10 ML: at 22:34

## 2019-02-17 RX ADMIN — CEFTRIAXONE SODIUM 1 G: 1 INJECTION, POWDER, FOR SOLUTION INTRAMUSCULAR; INTRAVENOUS at 17:55

## 2019-02-17 RX ADMIN — FUROSEMIDE 40 MG: 10 INJECTION, SOLUTION INTRAMUSCULAR; INTRAVENOUS at 18:26

## 2019-02-17 RX ADMIN — ERYTHROMYCIN: 5 OINTMENT OPHTHALMIC at 22:33

## 2019-02-17 RX ADMIN — DOCUSATE SODIUM 100 MG: 100 CAPSULE, LIQUID FILLED ORAL at 22:34

## 2019-02-17 ASSESSMENT — ENCOUNTER SYMPTOMS
ABDOMINAL PAIN: 0
CONSTIPATION: 0
COUGH: 1
VOMITING: 0
SHORTNESS OF BREATH: 1
COLOR CHANGE: 0
FACIAL SWELLING: 0
DIARRHEA: 0
EYE DISCHARGE: 0
EYE REDNESS: 0

## 2019-02-17 ASSESSMENT — PAIN SCALES - GENERAL: PAINLEVEL_OUTOF10: 0

## 2019-02-18 ENCOUNTER — APPOINTMENT (OUTPATIENT)
Dept: GENERAL RADIOLOGY | Age: 59
DRG: 291 | End: 2019-02-18
Payer: MEDICARE

## 2019-02-18 PROBLEM — Z91.199 NONCOMPLIANCE: Status: ACTIVE | Noted: 2019-02-18

## 2019-02-18 LAB
ANION GAP SERPL CALCULATED.3IONS-SCNC: 10 MMOL/L (ref 9–17)
BUN BLDV-MCNC: 15 MG/DL (ref 8–23)
BUN/CREAT BLD: 16 (ref 9–20)
CALCIUM SERPL-MCNC: 8.9 MG/DL (ref 8.6–10.4)
CHLORIDE BLD-SCNC: 102 MMOL/L (ref 98–107)
CHOLESTEROL/HDL RATIO: 9.6
CHOLESTEROL: 277 MG/DL
CO2: 29 MMOL/L (ref 20–31)
CREAT SERPL-MCNC: 0.93 MG/DL (ref 0.7–1.2)
EKG ATRIAL RATE: 100 BPM
EKG P AXIS: 65 DEGREES
EKG P-R INTERVAL: 198 MS
EKG Q-T INTERVAL: 390 MS
EKG QRS DURATION: 124 MS
EKG QTC CALCULATION (BAZETT): 503 MS
EKG R AXIS: 90 DEGREES
EKG T AXIS: 23 DEGREES
EKG VENTRICULAR RATE: 100 BPM
ESTIMATED AVERAGE GLUCOSE: 146 MG/DL
GFR AFRICAN AMERICAN: >60 ML/MIN
GFR NON-AFRICAN AMERICAN: >60 ML/MIN
GFR SERPL CREATININE-BSD FRML MDRD: ABNORMAL ML/MIN/{1.73_M2}
GFR SERPL CREATININE-BSD FRML MDRD: ABNORMAL ML/MIN/{1.73_M2}
GLUCOSE BLD-MCNC: 116 MG/DL (ref 70–99)
HBA1C MFR BLD: 6.7 % (ref 4–6)
HCT VFR BLD CALC: 44.4 % (ref 41–53)
HDLC SERPL-MCNC: 29 MG/DL
HEMOGLOBIN: 14.6 G/DL (ref 13.5–17.5)
LDL CHOLESTEROL: 232 MG/DL (ref 0–130)
LV EF: 35 %
LVEF MODALITY: NORMAL
MAGNESIUM: 2 MG/DL (ref 1.6–2.6)
MCH RBC QN AUTO: 27.9 PG (ref 26–34)
MCHC RBC AUTO-ENTMCNC: 33 G/DL (ref 31–37)
MCV RBC AUTO: 84.6 FL (ref 80–100)
NRBC AUTOMATED: ABNORMAL PER 100 WBC
PDW BLD-RTO: 14.6 % (ref 11.5–14.5)
PLATELET # BLD: 98 K/UL (ref 130–400)
PMV BLD AUTO: ABNORMAL FL (ref 6–12)
POTASSIUM SERPL-SCNC: 3.8 MMOL/L (ref 3.7–5.3)
RBC # BLD: 5.25 M/UL (ref 4.5–5.9)
SODIUM BLD-SCNC: 141 MMOL/L (ref 135–144)
TRIGL SERPL-MCNC: 79 MG/DL
TSH SERPL DL<=0.05 MIU/L-ACNC: 1.21 MIU/L (ref 0.3–5)
VLDLC SERPL CALC-MCNC: ABNORMAL MG/DL (ref 1–30)
WBC # BLD: 5.7 K/UL (ref 3.5–11)

## 2019-02-18 PROCEDURE — 6360000002 HC RX W HCPCS: Performed by: INTERNAL MEDICINE

## 2019-02-18 PROCEDURE — 6370000000 HC RX 637 (ALT 250 FOR IP): Performed by: INTERNAL MEDICINE

## 2019-02-18 PROCEDURE — 83036 HEMOGLOBIN GLYCOSYLATED A1C: CPT

## 2019-02-18 PROCEDURE — 2580000003 HC RX 258: Performed by: INTERNAL MEDICINE

## 2019-02-18 PROCEDURE — 2060000000 HC ICU INTERMEDIATE R&B

## 2019-02-18 PROCEDURE — 2500000003 HC RX 250 WO HCPCS: Performed by: NURSE PRACTITIONER

## 2019-02-18 PROCEDURE — 99232 SBSQ HOSP IP/OBS MODERATE 35: CPT | Performed by: INTERNAL MEDICINE

## 2019-02-18 PROCEDURE — 84443 ASSAY THYROID STIM HORMONE: CPT

## 2019-02-18 PROCEDURE — 36415 COLL VENOUS BLD VENIPUNCTURE: CPT

## 2019-02-18 PROCEDURE — 80048 BASIC METABOLIC PNL TOTAL CA: CPT

## 2019-02-18 PROCEDURE — 71046 X-RAY EXAM CHEST 2 VIEWS: CPT

## 2019-02-18 PROCEDURE — 85027 COMPLETE CBC AUTOMATED: CPT

## 2019-02-18 PROCEDURE — 93306 TTE W/DOPPLER COMPLETE: CPT

## 2019-02-18 PROCEDURE — 80061 LIPID PANEL: CPT

## 2019-02-18 PROCEDURE — 83735 ASSAY OF MAGNESIUM: CPT

## 2019-02-18 RX ORDER — ATORVASTATIN CALCIUM 80 MG/1
80 TABLET, FILM COATED ORAL NIGHTLY
Status: DISCONTINUED | OUTPATIENT
Start: 2019-02-18 | End: 2019-02-20 | Stop reason: HOSPADM

## 2019-02-18 RX ADMIN — DOCUSATE SODIUM 100 MG: 100 CAPSULE, LIQUID FILLED ORAL at 09:03

## 2019-02-18 RX ADMIN — Medication 10 ML: at 09:05

## 2019-02-18 RX ADMIN — FUROSEMIDE 40 MG: 10 INJECTION, SOLUTION INTRAMUSCULAR; INTRAVENOUS at 09:03

## 2019-02-18 RX ADMIN — ACETAMINOPHEN 650 MG: 325 TABLET ORAL at 09:18

## 2019-02-18 RX ADMIN — CARVEDILOL 12.5 MG: 12.5 TABLET, FILM COATED ORAL at 17:27

## 2019-02-18 RX ADMIN — CARVEDILOL 12.5 MG: 12.5 TABLET, FILM COATED ORAL at 09:04

## 2019-02-18 RX ADMIN — Medication 10 ML: at 20:56

## 2019-02-18 RX ADMIN — MICONAZOLE NITRATE: 20.6 POWDER TOPICAL at 09:48

## 2019-02-18 RX ADMIN — FLUTICASONE PROPIONATE 1 SPRAY: 50 SPRAY, METERED NASAL at 09:05

## 2019-02-18 RX ADMIN — LISINOPRIL 5 MG: 5 TABLET ORAL at 09:05

## 2019-02-18 RX ADMIN — ERYTHROMYCIN: 5 OINTMENT OPHTHALMIC at 20:56

## 2019-02-18 RX ADMIN — ASPIRIN 81 MG 81 MG: 81 TABLET ORAL at 09:05

## 2019-02-18 RX ADMIN — ATORVASTATIN CALCIUM 80 MG: 80 TABLET, FILM COATED ORAL at 20:56

## 2019-02-18 RX ADMIN — MICONAZOLE NITRATE: 20.6 POWDER TOPICAL at 20:56

## 2019-02-18 RX ADMIN — CLOPIDOGREL BISULFATE 75 MG: 75 TABLET ORAL at 09:04

## 2019-02-18 RX ADMIN — FUROSEMIDE 40 MG: 10 INJECTION, SOLUTION INTRAMUSCULAR; INTRAVENOUS at 17:27

## 2019-02-18 RX ADMIN — MICONAZOLE NITRATE: 20.6 POWDER TOPICAL at 00:07

## 2019-02-18 RX ADMIN — ENOXAPARIN SODIUM 40 MG: 40 INJECTION SUBCUTANEOUS at 12:55

## 2019-02-18 ASSESSMENT — PAIN DESCRIPTION - ORIENTATION
ORIENTATION: RIGHT;LEFT;LOWER

## 2019-02-18 ASSESSMENT — PAIN SCALES - GENERAL
PAINLEVEL_OUTOF10: 2
PAINLEVEL_OUTOF10: 6
PAINLEVEL_OUTOF10: 5
PAINLEVEL_OUTOF10: 0
PAINLEVEL_OUTOF10: 8
PAINLEVEL_OUTOF10: 8
PAINLEVEL_OUTOF10: 0
PAINLEVEL_OUTOF10: 0
PAINLEVEL_OUTOF10: 3

## 2019-02-18 ASSESSMENT — PAIN DESCRIPTION - ONSET
ONSET: ON-GOING
ONSET: AWAKENED FROM SLEEP
ONSET: ON-GOING

## 2019-02-18 ASSESSMENT — PAIN DESCRIPTION - PROGRESSION
CLINICAL_PROGRESSION: GRADUALLY IMPROVING

## 2019-02-18 ASSESSMENT — PAIN DESCRIPTION - PAIN TYPE
TYPE: ACUTE PAIN

## 2019-02-18 ASSESSMENT — PAIN DESCRIPTION - LOCATION
LOCATION: FOOT

## 2019-02-18 ASSESSMENT — PAIN DESCRIPTION - FREQUENCY
FREQUENCY: INTERMITTENT

## 2019-02-18 ASSESSMENT — PAIN DESCRIPTION - DESCRIPTORS
DESCRIPTORS: ACHING;DISCOMFORT
DESCRIPTORS: DISCOMFORT;SORE
DESCRIPTORS: DULL
DESCRIPTORS: ACHING;DISCOMFORT

## 2019-02-19 LAB
ANION GAP SERPL CALCULATED.3IONS-SCNC: 12 MMOL/L (ref 9–17)
BUN BLDV-MCNC: 19 MG/DL (ref 8–23)
BUN/CREAT BLD: 20 (ref 9–20)
CALCIUM SERPL-MCNC: 8.6 MG/DL (ref 8.6–10.4)
CHLORIDE BLD-SCNC: 100 MMOL/L (ref 98–107)
CO2: 27 MMOL/L (ref 20–31)
CREAT SERPL-MCNC: 0.93 MG/DL (ref 0.7–1.2)
GFR AFRICAN AMERICAN: >60 ML/MIN
GFR NON-AFRICAN AMERICAN: >60 ML/MIN
GFR SERPL CREATININE-BSD FRML MDRD: ABNORMAL ML/MIN/{1.73_M2}
GFR SERPL CREATININE-BSD FRML MDRD: ABNORMAL ML/MIN/{1.73_M2}
GLUCOSE BLD-MCNC: 113 MG/DL (ref 70–99)
POTASSIUM SERPL-SCNC: 3.8 MMOL/L (ref 3.7–5.3)
SODIUM BLD-SCNC: 139 MMOL/L (ref 135–144)

## 2019-02-19 PROCEDURE — 36415 COLL VENOUS BLD VENIPUNCTURE: CPT

## 2019-02-19 PROCEDURE — 2060000000 HC ICU INTERMEDIATE R&B

## 2019-02-19 PROCEDURE — 80048 BASIC METABOLIC PNL TOTAL CA: CPT

## 2019-02-19 PROCEDURE — 6370000000 HC RX 637 (ALT 250 FOR IP): Performed by: INTERNAL MEDICINE

## 2019-02-19 PROCEDURE — 6360000002 HC RX W HCPCS: Performed by: INTERNAL MEDICINE

## 2019-02-19 PROCEDURE — 99232 SBSQ HOSP IP/OBS MODERATE 35: CPT | Performed by: INTERNAL MEDICINE

## 2019-02-19 PROCEDURE — 2580000003 HC RX 258: Performed by: INTERNAL MEDICINE

## 2019-02-19 RX ADMIN — FLUTICASONE PROPIONATE 1 SPRAY: 50 SPRAY, METERED NASAL at 08:46

## 2019-02-19 RX ADMIN — Medication 10 ML: at 11:57

## 2019-02-19 RX ADMIN — MICONAZOLE NITRATE: 20.6 POWDER TOPICAL at 08:43

## 2019-02-19 RX ADMIN — MICONAZOLE NITRATE: 20.6 POWDER TOPICAL at 20:05

## 2019-02-19 RX ADMIN — ENOXAPARIN SODIUM 40 MG: 40 INJECTION SUBCUTANEOUS at 08:43

## 2019-02-19 RX ADMIN — CARVEDILOL 12.5 MG: 12.5 TABLET, FILM COATED ORAL at 17:56

## 2019-02-19 RX ADMIN — Medication 10 ML: at 20:06

## 2019-02-19 RX ADMIN — FUROSEMIDE 40 MG: 10 INJECTION, SOLUTION INTRAMUSCULAR; INTRAVENOUS at 17:56

## 2019-02-19 RX ADMIN — DOCUSATE SODIUM 100 MG: 100 CAPSULE, LIQUID FILLED ORAL at 20:06

## 2019-02-19 RX ADMIN — CLOPIDOGREL BISULFATE 75 MG: 75 TABLET ORAL at 08:43

## 2019-02-19 RX ADMIN — ATORVASTATIN CALCIUM 80 MG: 80 TABLET, FILM COATED ORAL at 20:06

## 2019-02-19 RX ADMIN — CARVEDILOL 12.5 MG: 12.5 TABLET, FILM COATED ORAL at 08:43

## 2019-02-19 RX ADMIN — FUROSEMIDE 40 MG: 10 INJECTION, SOLUTION INTRAMUSCULAR; INTRAVENOUS at 08:43

## 2019-02-19 RX ADMIN — ASPIRIN 81 MG 81 MG: 81 TABLET ORAL at 08:43

## 2019-02-19 RX ADMIN — DOCUSATE SODIUM 100 MG: 100 CAPSULE, LIQUID FILLED ORAL at 08:43

## 2019-02-19 RX ADMIN — LISINOPRIL 5 MG: 5 TABLET ORAL at 08:43

## 2019-02-19 RX ADMIN — ERYTHROMYCIN: 5 OINTMENT OPHTHALMIC at 20:10

## 2019-02-19 ASSESSMENT — PAIN SCALES - GENERAL
PAINLEVEL_OUTOF10: 0

## 2019-02-20 VITALS
RESPIRATION RATE: 18 BRPM | HEIGHT: 69 IN | TEMPERATURE: 98.4 F | OXYGEN SATURATION: 94 % | WEIGHT: 243.6 LBS | DIASTOLIC BLOOD PRESSURE: 82 MMHG | BODY MASS INDEX: 36.08 KG/M2 | HEART RATE: 85 BPM | SYSTOLIC BLOOD PRESSURE: 106 MMHG

## 2019-02-20 LAB
ANION GAP SERPL CALCULATED.3IONS-SCNC: 10 MMOL/L (ref 9–17)
BUN BLDV-MCNC: 23 MG/DL (ref 8–23)
BUN/CREAT BLD: 25 (ref 9–20)
CALCIUM SERPL-MCNC: 8.8 MG/DL (ref 8.6–10.4)
CHLORIDE BLD-SCNC: 100 MMOL/L (ref 98–107)
CO2: 28 MMOL/L (ref 20–31)
CREAT SERPL-MCNC: 0.92 MG/DL (ref 0.7–1.2)
GFR AFRICAN AMERICAN: >60 ML/MIN
GFR NON-AFRICAN AMERICAN: >60 ML/MIN
GFR SERPL CREATININE-BSD FRML MDRD: ABNORMAL ML/MIN/{1.73_M2}
GFR SERPL CREATININE-BSD FRML MDRD: ABNORMAL ML/MIN/{1.73_M2}
GLUCOSE BLD-MCNC: 129 MG/DL (ref 70–99)
POTASSIUM SERPL-SCNC: 4.4 MMOL/L (ref 3.7–5.3)
SODIUM BLD-SCNC: 138 MMOL/L (ref 135–144)

## 2019-02-20 PROCEDURE — 6360000002 HC RX W HCPCS: Performed by: INTERNAL MEDICINE

## 2019-02-20 PROCEDURE — 36415 COLL VENOUS BLD VENIPUNCTURE: CPT

## 2019-02-20 PROCEDURE — 6370000000 HC RX 637 (ALT 250 FOR IP): Performed by: INTERNAL MEDICINE

## 2019-02-20 PROCEDURE — G0008 ADMIN INFLUENZA VIRUS VAC: HCPCS | Performed by: INTERNAL MEDICINE

## 2019-02-20 PROCEDURE — 2580000003 HC RX 258: Performed by: INTERNAL MEDICINE

## 2019-02-20 PROCEDURE — 90686 IIV4 VACC NO PRSV 0.5 ML IM: CPT | Performed by: INTERNAL MEDICINE

## 2019-02-20 PROCEDURE — 80048 BASIC METABOLIC PNL TOTAL CA: CPT

## 2019-02-20 PROCEDURE — 99238 HOSP IP/OBS DSCHRG MGMT 30/<: CPT | Performed by: INTERNAL MEDICINE

## 2019-02-20 RX ORDER — CLOPIDOGREL BISULFATE 75 MG/1
75 TABLET ORAL DAILY
Qty: 30 TABLET | Refills: 3 | Status: ON HOLD | OUTPATIENT
Start: 2019-02-20 | End: 2020-12-22 | Stop reason: HOSPADM

## 2019-02-20 RX ORDER — ATORVASTATIN CALCIUM 80 MG/1
80 TABLET, FILM COATED ORAL NIGHTLY
Qty: 30 TABLET | Refills: 3 | Status: SHIPPED | OUTPATIENT
Start: 2019-02-20

## 2019-02-20 RX ORDER — CARVEDILOL 12.5 MG/1
12.5 TABLET ORAL 2 TIMES DAILY WITH MEALS
Qty: 60 TABLET | Refills: 3 | Status: SHIPPED | OUTPATIENT
Start: 2019-02-20

## 2019-02-20 RX ORDER — BUMETANIDE 1 MG/1
1 TABLET ORAL DAILY
Status: DISCONTINUED | OUTPATIENT
Start: 2019-02-20 | End: 2019-02-20 | Stop reason: HOSPADM

## 2019-02-20 RX ORDER — BUMETANIDE 1 MG/1
1 TABLET ORAL DAILY
Qty: 30 TABLET | Refills: 3 | Status: ON HOLD | OUTPATIENT
Start: 2019-02-20 | End: 2019-03-03 | Stop reason: SDUPTHER

## 2019-02-20 RX ORDER — LISINOPRIL 5 MG/1
5 TABLET ORAL DAILY
Qty: 30 TABLET | Refills: 3 | Status: ON HOLD | OUTPATIENT
Start: 2019-02-21 | End: 2022-10-15 | Stop reason: HOSPADM

## 2019-02-20 RX ADMIN — FLUTICASONE PROPIONATE 1 SPRAY: 50 SPRAY, METERED NASAL at 08:54

## 2019-02-20 RX ADMIN — Medication 10 ML: at 08:38

## 2019-02-20 RX ADMIN — ENOXAPARIN SODIUM 40 MG: 40 INJECTION SUBCUTANEOUS at 08:37

## 2019-02-20 RX ADMIN — DOCUSATE SODIUM 100 MG: 100 CAPSULE, LIQUID FILLED ORAL at 08:38

## 2019-02-20 RX ADMIN — CARVEDILOL 12.5 MG: 12.5 TABLET, FILM COATED ORAL at 08:38

## 2019-02-20 RX ADMIN — ASPIRIN 81 MG 81 MG: 81 TABLET ORAL at 08:38

## 2019-02-20 RX ADMIN — MICONAZOLE NITRATE: 20.6 POWDER TOPICAL at 08:54

## 2019-02-20 RX ADMIN — BUMETANIDE 1 MG: 1 TABLET ORAL at 11:01

## 2019-02-20 RX ADMIN — CLOPIDOGREL BISULFATE 75 MG: 75 TABLET ORAL at 08:38

## 2019-02-20 RX ADMIN — INFLUENZA A VIRUS A/MICHIGAN/45/2015 X-275 (H1N1) ANTIGEN (FORMALDEHYDE INACTIVATED), INFLUENZA A VIRUS A/SINGAPORE/INFIMH-16-0019/2016 IVR-186 (H3N2) ANTIGEN (FORMALDEHYDE INACTIVATED), INFLUENZA B VIRUS B/PHUKET/3073/2013 ANTIGEN (FORMALDEHYDE INACTIVATED), AND INFLUENZA B VIRUS B/MARYLAND/15/2016 BX-69A ANTIGEN (FORMALDEHYDE INACTIVATED) 0.5 ML: 15; 15; 15; 15 INJECTION, SUSPENSION INTRAMUSCULAR at 11:00

## 2019-02-20 RX ADMIN — FUROSEMIDE 40 MG: 10 INJECTION, SOLUTION INTRAMUSCULAR; INTRAVENOUS at 08:38

## 2019-02-20 ASSESSMENT — PAIN SCALES - GENERAL
PAINLEVEL_OUTOF10: 0

## 2019-02-23 LAB
CULTURE: NORMAL
CULTURE: NORMAL
Lab: NORMAL
Lab: NORMAL
SPECIMEN DESCRIPTION: NORMAL
SPECIMEN DESCRIPTION: NORMAL

## 2019-03-01 ENCOUNTER — APPOINTMENT (OUTPATIENT)
Dept: CT IMAGING | Age: 59
DRG: 293 | End: 2019-03-01
Payer: MEDICARE

## 2019-03-01 ENCOUNTER — APPOINTMENT (OUTPATIENT)
Dept: GENERAL RADIOLOGY | Age: 59
DRG: 293 | End: 2019-03-01
Payer: MEDICARE

## 2019-03-01 ENCOUNTER — TELEPHONE (OUTPATIENT)
Dept: OTHER | Facility: CLINIC | Age: 59
End: 2019-03-01

## 2019-03-01 ENCOUNTER — HOSPITAL ENCOUNTER (INPATIENT)
Age: 59
LOS: 2 days | Discharge: HOME | DRG: 293 | End: 2019-03-03
Attending: EMERGENCY MEDICINE | Admitting: INTERNAL MEDICINE
Payer: MEDICARE

## 2019-03-01 DIAGNOSIS — M79.89 LEG SWELLING: Primary | ICD-10-CM

## 2019-03-01 DIAGNOSIS — R06.00 DYSPNEA, UNSPECIFIED TYPE: ICD-10-CM

## 2019-03-01 DIAGNOSIS — I50.23 ACUTE ON CHRONIC SYSTOLIC (CONGESTIVE) HEART FAILURE (HCC): ICD-10-CM

## 2019-03-01 DIAGNOSIS — I50.9 CONGESTIVE HEART FAILURE, UNSPECIFIED HF CHRONICITY, UNSPECIFIED HEART FAILURE TYPE (HCC): ICD-10-CM

## 2019-03-01 LAB
ABSOLUTE EOS #: 0.2 K/UL (ref 0–0.4)
ABSOLUTE IMMATURE GRANULOCYTE: ABNORMAL K/UL (ref 0–0.3)
ABSOLUTE LYMPH #: 1.2 K/UL (ref 1–4.8)
ABSOLUTE MONO #: 0.7 K/UL (ref 0.2–0.8)
ANION GAP SERPL CALCULATED.3IONS-SCNC: 10 MMOL/L (ref 9–17)
BASOPHILS # BLD: 1 % (ref 0–2)
BASOPHILS ABSOLUTE: 0.1 K/UL (ref 0–0.2)
BNP INTERPRETATION: ABNORMAL
BUN BLDV-MCNC: 19 MG/DL (ref 8–23)
BUN/CREAT BLD: 23 (ref 9–20)
CALCIUM SERPL-MCNC: 8.9 MG/DL (ref 8.6–10.4)
CHLORIDE BLD-SCNC: 101 MMOL/L (ref 98–107)
CO2: 27 MMOL/L (ref 20–31)
CREAT SERPL-MCNC: 0.84 MG/DL (ref 0.7–1.2)
D-DIMER QUANTITATIVE: 0.86 MG/L FEU
DIFFERENTIAL TYPE: ABNORMAL
EOSINOPHILS RELATIVE PERCENT: 2 % (ref 1–4)
GFR AFRICAN AMERICAN: >60 ML/MIN
GFR NON-AFRICAN AMERICAN: >60 ML/MIN
GFR SERPL CREATININE-BSD FRML MDRD: ABNORMAL ML/MIN/{1.73_M2}
GFR SERPL CREATININE-BSD FRML MDRD: ABNORMAL ML/MIN/{1.73_M2}
GLUCOSE BLD-MCNC: 106 MG/DL (ref 70–99)
HCT VFR BLD CALC: 43.2 % (ref 41–53)
HEMOGLOBIN: 14.3 G/DL (ref 13.5–17.5)
IMMATURE GRANULOCYTES: ABNORMAL %
LYMPHOCYTES # BLD: 17 % (ref 24–44)
MAGNESIUM: 1.8 MG/DL (ref 1.6–2.6)
MCH RBC QN AUTO: 27.5 PG (ref 26–34)
MCHC RBC AUTO-ENTMCNC: 33 G/DL (ref 31–37)
MCV RBC AUTO: 83.4 FL (ref 80–100)
MONOCYTES # BLD: 10 % (ref 1–7)
NRBC AUTOMATED: ABNORMAL PER 100 WBC
PDW BLD-RTO: 15.5 % (ref 11.5–14.5)
PHOSPHORUS: 3.4 MG/DL (ref 2.5–4.5)
PLATELET # BLD: 115 K/UL (ref 130–400)
PLATELET ESTIMATE: ABNORMAL
PMV BLD AUTO: 9.3 FL (ref 6–12)
POTASSIUM SERPL-SCNC: 4 MMOL/L (ref 3.7–5.3)
PRO-BNP: 1090 PG/ML
RBC # BLD: 5.18 M/UL (ref 4.5–5.9)
RBC # BLD: ABNORMAL 10*6/UL
SEG NEUTROPHILS: 70 % (ref 36–66)
SEGMENTED NEUTROPHILS ABSOLUTE COUNT: 5.3 K/UL (ref 1.8–7.7)
SODIUM BLD-SCNC: 138 MMOL/L (ref 135–144)
TROPONIN INTERP: NORMAL
TROPONIN T: NORMAL NG/ML
TROPONIN, HIGH SENSITIVITY: 22 NG/L (ref 0–22)
WBC # BLD: 7.5 K/UL (ref 3.5–11)
WBC # BLD: ABNORMAL 10*3/UL

## 2019-03-01 PROCEDURE — 83880 ASSAY OF NATRIURETIC PEPTIDE: CPT

## 2019-03-01 PROCEDURE — 2060000000 HC ICU INTERMEDIATE R&B

## 2019-03-01 PROCEDURE — 6360000002 HC RX W HCPCS: Performed by: EMERGENCY MEDICINE

## 2019-03-01 PROCEDURE — 71260 CT THORAX DX C+: CPT

## 2019-03-01 PROCEDURE — 71046 X-RAY EXAM CHEST 2 VIEWS: CPT

## 2019-03-01 PROCEDURE — 2580000003 HC RX 258: Performed by: INTERNAL MEDICINE

## 2019-03-01 PROCEDURE — 96365 THER/PROPH/DIAG IV INF INIT: CPT

## 2019-03-01 PROCEDURE — 80048 BASIC METABOLIC PNL TOTAL CA: CPT

## 2019-03-01 PROCEDURE — 84484 ASSAY OF TROPONIN QUANT: CPT

## 2019-03-01 PROCEDURE — 85025 COMPLETE CBC W/AUTO DIFF WBC: CPT

## 2019-03-01 PROCEDURE — 93005 ELECTROCARDIOGRAM TRACING: CPT

## 2019-03-01 PROCEDURE — 6360000002 HC RX W HCPCS: Performed by: INTERNAL MEDICINE

## 2019-03-01 PROCEDURE — 84100 ASSAY OF PHOSPHORUS: CPT

## 2019-03-01 PROCEDURE — 2580000003 HC RX 258: Performed by: EMERGENCY MEDICINE

## 2019-03-01 PROCEDURE — 6370000000 HC RX 637 (ALT 250 FOR IP): Performed by: INTERNAL MEDICINE

## 2019-03-01 PROCEDURE — 96376 TX/PRO/DX INJ SAME DRUG ADON: CPT

## 2019-03-01 PROCEDURE — 83735 ASSAY OF MAGNESIUM: CPT

## 2019-03-01 PROCEDURE — 2500000003 HC RX 250 WO HCPCS: Performed by: INTERNAL MEDICINE

## 2019-03-01 PROCEDURE — 96375 TX/PRO/DX INJ NEW DRUG ADDON: CPT

## 2019-03-01 PROCEDURE — 99223 1ST HOSP IP/OBS HIGH 75: CPT | Performed by: NURSE PRACTITIONER

## 2019-03-01 PROCEDURE — 6360000004 HC RX CONTRAST MEDICATION: Performed by: EMERGENCY MEDICINE

## 2019-03-01 PROCEDURE — 99285 EMERGENCY DEPT VISIT HI MDM: CPT

## 2019-03-01 PROCEDURE — 2500000003 HC RX 250 WO HCPCS: Performed by: EMERGENCY MEDICINE

## 2019-03-01 PROCEDURE — 94761 N-INVAS EAR/PLS OXIMETRY MLT: CPT

## 2019-03-01 PROCEDURE — 85379 FIBRIN DEGRADATION QUANT: CPT

## 2019-03-01 RX ORDER — ASPIRIN 81 MG/1
81 TABLET ORAL DAILY
Status: DISCONTINUED | OUTPATIENT
Start: 2019-03-01 | End: 2019-03-03 | Stop reason: HOSPADM

## 2019-03-01 RX ORDER — ONDANSETRON 2 MG/ML
4 INJECTION INTRAMUSCULAR; INTRAVENOUS EVERY 6 HOURS PRN
Status: DISCONTINUED | OUTPATIENT
Start: 2019-03-01 | End: 2019-03-01 | Stop reason: CLARIF

## 2019-03-01 RX ORDER — NICOTINE 21 MG/24HR
1 PATCH, TRANSDERMAL 24 HOURS TRANSDERMAL DAILY PRN
Status: DISCONTINUED | OUTPATIENT
Start: 2019-03-01 | End: 2019-03-03 | Stop reason: HOSPADM

## 2019-03-01 RX ORDER — BUMETANIDE 0.25 MG/ML
1 INJECTION, SOLUTION INTRAMUSCULAR; INTRAVENOUS EVERY 12 HOURS
Status: DISCONTINUED | OUTPATIENT
Start: 2019-03-01 | End: 2019-03-03 | Stop reason: HOSPADM

## 2019-03-01 RX ORDER — BUMETANIDE 0.25 MG/ML
1 INJECTION, SOLUTION INTRAMUSCULAR; INTRAVENOUS ONCE
Status: COMPLETED | OUTPATIENT
Start: 2019-03-01 | End: 2019-03-01

## 2019-03-01 RX ORDER — SODIUM CHLORIDE 0.9 % (FLUSH) 0.9 %
10 SYRINGE (ML) INJECTION PRN
Status: DISCONTINUED | OUTPATIENT
Start: 2019-03-01 | End: 2019-03-01 | Stop reason: SDUPTHER

## 2019-03-01 RX ORDER — CLOPIDOGREL BISULFATE 75 MG/1
75 TABLET ORAL DAILY
Status: DISCONTINUED | OUTPATIENT
Start: 2019-03-01 | End: 2019-03-03 | Stop reason: HOSPADM

## 2019-03-01 RX ORDER — MAGNESIUM SULFATE 1 G/100ML
1 INJECTION INTRAVENOUS
Status: DISCONTINUED | OUTPATIENT
Start: 2019-03-01 | End: 2019-03-01

## 2019-03-01 RX ORDER — ATORVASTATIN CALCIUM 80 MG/1
80 TABLET, FILM COATED ORAL NIGHTLY
Status: DISCONTINUED | OUTPATIENT
Start: 2019-03-01 | End: 2019-03-03 | Stop reason: HOSPADM

## 2019-03-01 RX ORDER — ACETAMINOPHEN 325 MG/1
650 TABLET ORAL EVERY 4 HOURS PRN
Status: DISCONTINUED | OUTPATIENT
Start: 2019-03-01 | End: 2019-03-03 | Stop reason: HOSPADM

## 2019-03-01 RX ORDER — LORAZEPAM 2 MG/ML
1 INJECTION INTRAMUSCULAR ONCE
Status: COMPLETED | OUTPATIENT
Start: 2019-03-01 | End: 2019-03-01

## 2019-03-01 RX ORDER — SODIUM CHLORIDE 0.9 % (FLUSH) 0.9 %
10 SYRINGE (ML) INJECTION EVERY 12 HOURS SCHEDULED
Status: DISCONTINUED | OUTPATIENT
Start: 2019-03-01 | End: 2019-03-03 | Stop reason: HOSPADM

## 2019-03-01 RX ORDER — LISINOPRIL 5 MG/1
5 TABLET ORAL DAILY
Status: DISCONTINUED | OUTPATIENT
Start: 2019-03-01 | End: 2019-03-03 | Stop reason: HOSPADM

## 2019-03-01 RX ORDER — SODIUM CHLORIDE 0.9 % (FLUSH) 0.9 %
10 SYRINGE (ML) INJECTION PRN
Status: DISCONTINUED | OUTPATIENT
Start: 2019-03-01 | End: 2019-03-03 | Stop reason: HOSPADM

## 2019-03-01 RX ORDER — CARVEDILOL 12.5 MG/1
12.5 TABLET ORAL 2 TIMES DAILY WITH MEALS
Status: DISCONTINUED | OUTPATIENT
Start: 2019-03-01 | End: 2019-03-03 | Stop reason: HOSPADM

## 2019-03-01 RX ORDER — ONDANSETRON 2 MG/ML
4 INJECTION INTRAMUSCULAR; INTRAVENOUS EVERY 6 HOURS PRN
Status: DISCONTINUED | OUTPATIENT
Start: 2019-03-01 | End: 2019-03-03 | Stop reason: HOSPADM

## 2019-03-01 RX ORDER — 0.9 % SODIUM CHLORIDE 0.9 %
80 INTRAVENOUS SOLUTION INTRAVENOUS ONCE
Status: COMPLETED | OUTPATIENT
Start: 2019-03-01 | End: 2019-03-01

## 2019-03-01 RX ORDER — MAGNESIUM SULFATE 1 G/100ML
1 INJECTION INTRAVENOUS ONCE
Status: COMPLETED | OUTPATIENT
Start: 2019-03-01 | End: 2019-03-01

## 2019-03-01 RX ORDER — ONDANSETRON 4 MG/1
4 TABLET, ORALLY DISINTEGRATING ORAL EVERY 6 HOURS PRN
Status: DISCONTINUED | OUTPATIENT
Start: 2019-03-01 | End: 2019-03-03 | Stop reason: HOSPADM

## 2019-03-01 RX ADMIN — MAGNESIUM SULFATE IN DEXTROSE 1 G: 10 INJECTION, SOLUTION INTRAVENOUS at 16:54

## 2019-03-01 RX ADMIN — LORAZEPAM 1 MG: 2 INJECTION, SOLUTION INTRAMUSCULAR; INTRAVENOUS at 15:19

## 2019-03-01 RX ADMIN — ASPIRIN 81 MG: 81 TABLET, COATED ORAL at 22:29

## 2019-03-01 RX ADMIN — BUMETANIDE 1 MG: 0.25 INJECTION INTRAMUSCULAR; INTRAVENOUS at 15:29

## 2019-03-01 RX ADMIN — MAGNESIUM SULFATE HEPTAHYDRATE 1 G: 1 INJECTION, SOLUTION INTRAVENOUS at 15:53

## 2019-03-01 RX ADMIN — Medication 10 ML: at 22:31

## 2019-03-01 RX ADMIN — Medication 10 ML: at 15:46

## 2019-03-01 RX ADMIN — MAGNESIUM SULFATE HEPTAHYDRATE 1 G: 1 INJECTION, SOLUTION INTRAVENOUS at 19:21

## 2019-03-01 RX ADMIN — CLOPIDOGREL BISULFATE 75 MG: 75 TABLET ORAL at 22:30

## 2019-03-01 RX ADMIN — BUMETANIDE 1 MG: 0.25 INJECTION INTRAMUSCULAR; INTRAVENOUS at 22:27

## 2019-03-01 RX ADMIN — LISINOPRIL 5 MG: 5 TABLET ORAL at 22:28

## 2019-03-01 RX ADMIN — IOPAMIDOL 75 ML: 755 INJECTION, SOLUTION INTRAVENOUS at 15:46

## 2019-03-01 RX ADMIN — ENOXAPARIN SODIUM 30 MG: 30 INJECTION SUBCUTANEOUS at 22:30

## 2019-03-01 RX ADMIN — BUMETANIDE 1 MG: 0.25 INJECTION INTRAMUSCULAR; INTRAVENOUS at 14:31

## 2019-03-01 RX ADMIN — SODIUM CHLORIDE 80 ML: 9 INJECTION, SOLUTION INTRAVENOUS at 15:46

## 2019-03-01 RX ADMIN — ATORVASTATIN CALCIUM 80 MG: 80 TABLET, FILM COATED ORAL at 22:29

## 2019-03-01 ASSESSMENT — PAIN SCALES - GENERAL
PAINLEVEL_OUTOF10: 0
PAINLEVEL_OUTOF10: 0

## 2019-03-02 ENCOUNTER — APPOINTMENT (OUTPATIENT)
Dept: GENERAL RADIOLOGY | Age: 59
DRG: 293 | End: 2019-03-02
Payer: MEDICARE

## 2019-03-02 LAB
ANION GAP SERPL CALCULATED.3IONS-SCNC: 10 MMOL/L (ref 9–17)
BNP INTERPRETATION: ABNORMAL
BUN BLDV-MCNC: 14 MG/DL (ref 8–23)
BUN/CREAT BLD: 16 (ref 9–20)
CALCIUM SERPL-MCNC: 8.5 MG/DL (ref 8.6–10.4)
CHLORIDE BLD-SCNC: 100 MMOL/L (ref 98–107)
CO2: 31 MMOL/L (ref 20–31)
CREAT SERPL-MCNC: 0.87 MG/DL (ref 0.7–1.2)
EKG ATRIAL RATE: 81 BPM
EKG P AXIS: 87 DEGREES
EKG P-R INTERVAL: 214 MS
EKG Q-T INTERVAL: 432 MS
EKG QRS DURATION: 128 MS
EKG QTC CALCULATION (BAZETT): 501 MS
EKG R AXIS: 93 DEGREES
EKG T AXIS: 55 DEGREES
EKG VENTRICULAR RATE: 81 BPM
GFR AFRICAN AMERICAN: >60 ML/MIN
GFR NON-AFRICAN AMERICAN: >60 ML/MIN
GFR SERPL CREATININE-BSD FRML MDRD: ABNORMAL ML/MIN/{1.73_M2}
GFR SERPL CREATININE-BSD FRML MDRD: ABNORMAL ML/MIN/{1.73_M2}
GLUCOSE BLD-MCNC: 104 MG/DL (ref 70–99)
HCT VFR BLD CALC: 45.9 % (ref 41–53)
HEMOGLOBIN: 14.6 G/DL (ref 13.5–17.5)
MAGNESIUM: 2 MG/DL (ref 1.6–2.6)
MCH RBC QN AUTO: 27 PG (ref 26–34)
MCHC RBC AUTO-ENTMCNC: 31.9 G/DL (ref 31–37)
MCV RBC AUTO: 84.9 FL (ref 80–100)
NRBC AUTOMATED: ABNORMAL PER 100 WBC
PDW BLD-RTO: 16 % (ref 11.5–14.5)
PLATELET # BLD: 120 K/UL (ref 130–400)
PMV BLD AUTO: 9.6 FL (ref 6–12)
POTASSIUM SERPL-SCNC: 3.8 MMOL/L (ref 3.7–5.3)
PRO-BNP: 901 PG/ML
RBC # BLD: 5.41 M/UL (ref 4.5–5.9)
SODIUM BLD-SCNC: 141 MMOL/L (ref 135–144)
TSH SERPL DL<=0.05 MIU/L-ACNC: 1.37 MIU/L (ref 0.3–5)
WBC # BLD: 7.7 K/UL (ref 3.5–11)

## 2019-03-02 PROCEDURE — 2580000003 HC RX 258: Performed by: INTERNAL MEDICINE

## 2019-03-02 PROCEDURE — 97530 THERAPEUTIC ACTIVITIES: CPT

## 2019-03-02 PROCEDURE — 36415 COLL VENOUS BLD VENIPUNCTURE: CPT

## 2019-03-02 PROCEDURE — 6370000000 HC RX 637 (ALT 250 FOR IP): Performed by: INTERNAL MEDICINE

## 2019-03-02 PROCEDURE — 6360000002 HC RX W HCPCS: Performed by: INTERNAL MEDICINE

## 2019-03-02 PROCEDURE — 2060000000 HC ICU INTERMEDIATE R&B

## 2019-03-02 PROCEDURE — 97165 OT EVAL LOW COMPLEX 30 MIN: CPT

## 2019-03-02 PROCEDURE — 97535 SELF CARE MNGMENT TRAINING: CPT

## 2019-03-02 PROCEDURE — 2500000003 HC RX 250 WO HCPCS: Performed by: INTERNAL MEDICINE

## 2019-03-02 PROCEDURE — 84443 ASSAY THYROID STIM HORMONE: CPT

## 2019-03-02 PROCEDURE — 83880 ASSAY OF NATRIURETIC PEPTIDE: CPT

## 2019-03-02 PROCEDURE — 83735 ASSAY OF MAGNESIUM: CPT

## 2019-03-02 PROCEDURE — 71046 X-RAY EXAM CHEST 2 VIEWS: CPT

## 2019-03-02 PROCEDURE — 85027 COMPLETE CBC AUTOMATED: CPT

## 2019-03-02 PROCEDURE — 99232 SBSQ HOSP IP/OBS MODERATE 35: CPT | Performed by: INTERNAL MEDICINE

## 2019-03-02 PROCEDURE — 80048 BASIC METABOLIC PNL TOTAL CA: CPT

## 2019-03-02 RX ADMIN — ENOXAPARIN SODIUM 30 MG: 30 INJECTION SUBCUTANEOUS at 08:38

## 2019-03-02 RX ADMIN — CARVEDILOL 12.5 MG: 12.5 TABLET, FILM COATED ORAL at 18:11

## 2019-03-02 RX ADMIN — ASPIRIN 81 MG: 81 TABLET, COATED ORAL at 08:38

## 2019-03-02 RX ADMIN — BUMETANIDE 1 MG: 0.25 INJECTION INTRAMUSCULAR; INTRAVENOUS at 08:38

## 2019-03-02 RX ADMIN — ATORVASTATIN CALCIUM 80 MG: 80 TABLET, FILM COATED ORAL at 20:15

## 2019-03-02 RX ADMIN — BUMETANIDE 1 MG: 0.25 INJECTION INTRAMUSCULAR; INTRAVENOUS at 20:15

## 2019-03-02 RX ADMIN — CARVEDILOL 12.5 MG: 12.5 TABLET, FILM COATED ORAL at 08:38

## 2019-03-02 RX ADMIN — MICONAZOLE NITRATE: 20.6 POWDER TOPICAL at 14:22

## 2019-03-02 RX ADMIN — MICONAZOLE NITRATE: 20.6 POWDER TOPICAL at 20:16

## 2019-03-02 RX ADMIN — CLOPIDOGREL BISULFATE 75 MG: 75 TABLET ORAL at 08:38

## 2019-03-02 RX ADMIN — ENOXAPARIN SODIUM 30 MG: 30 INJECTION SUBCUTANEOUS at 20:15

## 2019-03-02 RX ADMIN — Medication 10 ML: at 08:30

## 2019-03-02 RX ADMIN — Medication 10 ML: at 20:15

## 2019-03-02 RX ADMIN — LISINOPRIL 5 MG: 5 TABLET ORAL at 08:38

## 2019-03-02 ASSESSMENT — PAIN DESCRIPTION - LOCATION: LOCATION: GROIN

## 2019-03-02 ASSESSMENT — PAIN SCALES - GENERAL
PAINLEVEL_OUTOF10: 0
PAINLEVEL_OUTOF10: 10
PAINLEVEL_OUTOF10: 0

## 2019-03-02 ASSESSMENT — PAIN DESCRIPTION - PAIN TYPE: TYPE: ACUTE PAIN

## 2019-03-02 ASSESSMENT — PAIN DESCRIPTION - ORIENTATION: ORIENTATION: RIGHT

## 2019-03-03 VITALS
OXYGEN SATURATION: 95 % | HEART RATE: 77 BPM | DIASTOLIC BLOOD PRESSURE: 74 MMHG | WEIGHT: 243.17 LBS | TEMPERATURE: 98.7 F | BODY MASS INDEX: 36.02 KG/M2 | RESPIRATION RATE: 15 BRPM | SYSTOLIC BLOOD PRESSURE: 123 MMHG | HEIGHT: 69 IN

## 2019-03-03 LAB
ANION GAP SERPL CALCULATED.3IONS-SCNC: 10 MMOL/L (ref 9–17)
BUN BLDV-MCNC: 19 MG/DL (ref 8–23)
BUN/CREAT BLD: 21 (ref 9–20)
CALCIUM SERPL-MCNC: 8.5 MG/DL (ref 8.6–10.4)
CHLORIDE BLD-SCNC: 99 MMOL/L (ref 98–107)
CO2: 31 MMOL/L (ref 20–31)
CREAT SERPL-MCNC: 0.9 MG/DL (ref 0.7–1.2)
GFR AFRICAN AMERICAN: >60 ML/MIN
GFR NON-AFRICAN AMERICAN: >60 ML/MIN
GFR SERPL CREATININE-BSD FRML MDRD: ABNORMAL ML/MIN/{1.73_M2}
GFR SERPL CREATININE-BSD FRML MDRD: ABNORMAL ML/MIN/{1.73_M2}
GLUCOSE BLD-MCNC: 161 MG/DL (ref 70–99)
POTASSIUM SERPL-SCNC: 3.9 MMOL/L (ref 3.7–5.3)
SODIUM BLD-SCNC: 140 MMOL/L (ref 135–144)

## 2019-03-03 PROCEDURE — 2500000003 HC RX 250 WO HCPCS: Performed by: INTERNAL MEDICINE

## 2019-03-03 PROCEDURE — 2580000003 HC RX 258: Performed by: INTERNAL MEDICINE

## 2019-03-03 PROCEDURE — 99239 HOSP IP/OBS DSCHRG MGMT >30: CPT | Performed by: INTERNAL MEDICINE

## 2019-03-03 PROCEDURE — 6360000002 HC RX W HCPCS: Performed by: INTERNAL MEDICINE

## 2019-03-03 PROCEDURE — 36415 COLL VENOUS BLD VENIPUNCTURE: CPT

## 2019-03-03 PROCEDURE — 80048 BASIC METABOLIC PNL TOTAL CA: CPT

## 2019-03-03 PROCEDURE — 6370000000 HC RX 637 (ALT 250 FOR IP): Performed by: INTERNAL MEDICINE

## 2019-03-03 RX ORDER — BUMETANIDE 1 MG/1
TABLET ORAL
Qty: 45 TABLET | Refills: 3 | Status: ON HOLD | OUTPATIENT
Start: 2019-03-03 | End: 2019-03-13 | Stop reason: SDUPTHER

## 2019-03-03 RX ADMIN — CLOPIDOGREL BISULFATE 75 MG: 75 TABLET ORAL at 08:51

## 2019-03-03 RX ADMIN — MICONAZOLE NITRATE: 20.6 POWDER TOPICAL at 09:00

## 2019-03-03 RX ADMIN — LISINOPRIL 5 MG: 5 TABLET ORAL at 08:51

## 2019-03-03 RX ADMIN — ASPIRIN 81 MG: 81 TABLET, COATED ORAL at 08:51

## 2019-03-03 RX ADMIN — Medication 10 ML: at 09:00

## 2019-03-03 RX ADMIN — ENOXAPARIN SODIUM 30 MG: 30 INJECTION SUBCUTANEOUS at 08:51

## 2019-03-03 RX ADMIN — CARVEDILOL 12.5 MG: 12.5 TABLET, FILM COATED ORAL at 08:50

## 2019-03-03 RX ADMIN — BUMETANIDE 1 MG: 0.25 INJECTION INTRAMUSCULAR; INTRAVENOUS at 08:51

## 2019-03-03 ASSESSMENT — PAIN SCALES - GENERAL
PAINLEVEL_OUTOF10: 0

## 2019-03-11 ENCOUNTER — HOSPITAL ENCOUNTER (INPATIENT)
Age: 59
LOS: 2 days | Discharge: HOME | DRG: 291 | End: 2019-03-13
Attending: EMERGENCY MEDICINE | Admitting: INTERNAL MEDICINE
Payer: MEDICARE

## 2019-03-11 ENCOUNTER — APPOINTMENT (OUTPATIENT)
Dept: GENERAL RADIOLOGY | Age: 59
DRG: 291 | End: 2019-03-11
Payer: MEDICARE

## 2019-03-11 DIAGNOSIS — J18.9 PNEUMONIA DUE TO ORGANISM: ICD-10-CM

## 2019-03-11 DIAGNOSIS — I50.9 CONGESTIVE HEART FAILURE, UNSPECIFIED HF CHRONICITY, UNSPECIFIED HEART FAILURE TYPE (HCC): ICD-10-CM

## 2019-03-11 DIAGNOSIS — R06.00 DYSPNEA AND RESPIRATORY ABNORMALITIES: Primary | ICD-10-CM

## 2019-03-11 DIAGNOSIS — R06.89 DYSPNEA AND RESPIRATORY ABNORMALITIES: Primary | ICD-10-CM

## 2019-03-11 DIAGNOSIS — Z78.9 FAILURE OF OUTPATIENT TREATMENT: ICD-10-CM

## 2019-03-11 LAB
ABSOLUTE EOS #: 0.1 K/UL (ref 0–0.4)
ABSOLUTE IMMATURE GRANULOCYTE: ABNORMAL K/UL (ref 0–0.3)
ABSOLUTE LYMPH #: 1.1 K/UL (ref 1–4.8)
ABSOLUTE MONO #: 0.6 K/UL (ref 0.2–0.8)
ANION GAP SERPL CALCULATED.3IONS-SCNC: 10 MMOL/L (ref 9–17)
BASOPHILS # BLD: 1 % (ref 0–2)
BASOPHILS ABSOLUTE: 0 K/UL (ref 0–0.2)
BNP INTERPRETATION: ABNORMAL
BUN BLDV-MCNC: 17 MG/DL (ref 8–23)
BUN/CREAT BLD: 19 (ref 9–20)
CALCIUM SERPL-MCNC: 8.6 MG/DL (ref 8.6–10.4)
CHLORIDE BLD-SCNC: 103 MMOL/L (ref 98–107)
CO2: 26 MMOL/L (ref 20–31)
CREAT SERPL-MCNC: 0.91 MG/DL (ref 0.7–1.2)
DIFFERENTIAL TYPE: ABNORMAL
EOSINOPHILS RELATIVE PERCENT: 2 % (ref 1–4)
GFR AFRICAN AMERICAN: >60 ML/MIN
GFR NON-AFRICAN AMERICAN: >60 ML/MIN
GFR SERPL CREATININE-BSD FRML MDRD: ABNORMAL ML/MIN/{1.73_M2}
GFR SERPL CREATININE-BSD FRML MDRD: ABNORMAL ML/MIN/{1.73_M2}
GLUCOSE BLD-MCNC: 115 MG/DL (ref 70–99)
HCT VFR BLD CALC: 42.4 % (ref 41–53)
HEMOGLOBIN: 13.8 G/DL (ref 13.5–17.5)
IMMATURE GRANULOCYTES: ABNORMAL %
LYMPHOCYTES # BLD: 21 % (ref 24–44)
MCH RBC QN AUTO: 27.4 PG (ref 26–34)
MCHC RBC AUTO-ENTMCNC: 32.5 G/DL (ref 31–37)
MCV RBC AUTO: 84.3 FL (ref 80–100)
MONOCYTES # BLD: 10 % (ref 1–7)
NRBC AUTOMATED: ABNORMAL PER 100 WBC
PDW BLD-RTO: 16 % (ref 11.5–14.5)
PLATELET # BLD: 99 K/UL (ref 130–400)
PLATELET ESTIMATE: ABNORMAL
PMV BLD AUTO: 9.6 FL (ref 6–12)
POTASSIUM SERPL-SCNC: 3.8 MMOL/L (ref 3.7–5.3)
PRO-BNP: 1142 PG/ML
RBC # BLD: 5.03 M/UL (ref 4.5–5.9)
RBC # BLD: ABNORMAL 10*6/UL
SEG NEUTROPHILS: 66 % (ref 36–66)
SEGMENTED NEUTROPHILS ABSOLUTE COUNT: 3.6 K/UL (ref 1.8–7.7)
SODIUM BLD-SCNC: 139 MMOL/L (ref 135–144)
WBC # BLD: 5.4 K/UL (ref 3.5–11)
WBC # BLD: ABNORMAL 10*3/UL

## 2019-03-11 PROCEDURE — 2580000003 HC RX 258: Performed by: EMERGENCY MEDICINE

## 2019-03-11 PROCEDURE — 36415 COLL VENOUS BLD VENIPUNCTURE: CPT

## 2019-03-11 PROCEDURE — 96365 THER/PROPH/DIAG IV INF INIT: CPT

## 2019-03-11 PROCEDURE — 85025 COMPLETE CBC W/AUTO DIFF WBC: CPT

## 2019-03-11 PROCEDURE — 87070 CULTURE OTHR SPECIMN AEROBIC: CPT

## 2019-03-11 PROCEDURE — 96375 TX/PRO/DX INJ NEW DRUG ADDON: CPT

## 2019-03-11 PROCEDURE — 2500000003 HC RX 250 WO HCPCS: Performed by: EMERGENCY MEDICINE

## 2019-03-11 PROCEDURE — 87205 SMEAR GRAM STAIN: CPT

## 2019-03-11 PROCEDURE — 6360000002 HC RX W HCPCS: Performed by: EMERGENCY MEDICINE

## 2019-03-11 PROCEDURE — 71045 X-RAY EXAM CHEST 1 VIEW: CPT

## 2019-03-11 PROCEDURE — 1200000000 HC SEMI PRIVATE

## 2019-03-11 PROCEDURE — 99284 EMERGENCY DEPT VISIT MOD MDM: CPT

## 2019-03-11 PROCEDURE — 80048 BASIC METABOLIC PNL TOTAL CA: CPT

## 2019-03-11 PROCEDURE — 83880 ASSAY OF NATRIURETIC PEPTIDE: CPT

## 2019-03-11 PROCEDURE — 87040 BLOOD CULTURE FOR BACTERIA: CPT

## 2019-03-11 RX ORDER — IPRATROPIUM BROMIDE AND ALBUTEROL SULFATE 2.5; .5 MG/3ML; MG/3ML
1 SOLUTION RESPIRATORY (INHALATION)
Status: DISCONTINUED | OUTPATIENT
Start: 2019-03-12 | End: 2019-03-12

## 2019-03-11 RX ORDER — ACETAMINOPHEN 325 MG/1
650 TABLET ORAL EVERY 4 HOURS PRN
Status: DISCONTINUED | OUTPATIENT
Start: 2019-03-11 | End: 2019-03-13 | Stop reason: HOSPADM

## 2019-03-11 RX ORDER — SODIUM CHLORIDE 9 MG/ML
INJECTION, SOLUTION INTRAVENOUS CONTINUOUS
Status: DISCONTINUED | OUTPATIENT
Start: 2019-03-11 | End: 2019-03-12

## 2019-03-11 RX ORDER — NICOTINE 21 MG/24HR
1 PATCH, TRANSDERMAL 24 HOURS TRANSDERMAL DAILY PRN
Status: DISCONTINUED | OUTPATIENT
Start: 2019-03-11 | End: 2019-03-13 | Stop reason: HOSPADM

## 2019-03-11 RX ORDER — LISINOPRIL 5 MG/1
5 TABLET ORAL DAILY
Status: DISCONTINUED | OUTPATIENT
Start: 2019-03-12 | End: 2019-03-13 | Stop reason: HOSPADM

## 2019-03-11 RX ORDER — ATORVASTATIN CALCIUM 80 MG/1
80 TABLET, FILM COATED ORAL NIGHTLY
Status: DISCONTINUED | OUTPATIENT
Start: 2019-03-11 | End: 2019-03-13 | Stop reason: HOSPADM

## 2019-03-11 RX ORDER — ALBUTEROL SULFATE 2.5 MG/3ML
2.5 SOLUTION RESPIRATORY (INHALATION)
Status: DISCONTINUED | OUTPATIENT
Start: 2019-03-11 | End: 2019-03-12

## 2019-03-11 RX ORDER — BUMETANIDE 0.25 MG/ML
0.5 INJECTION, SOLUTION INTRAMUSCULAR; INTRAVENOUS ONCE
Status: COMPLETED | OUTPATIENT
Start: 2019-03-11 | End: 2019-03-11

## 2019-03-11 RX ORDER — SODIUM CHLORIDE 0.9 % (FLUSH) 0.9 %
10 SYRINGE (ML) INJECTION PRN
Status: DISCONTINUED | OUTPATIENT
Start: 2019-03-11 | End: 2019-03-13 | Stop reason: HOSPADM

## 2019-03-11 RX ORDER — SODIUM CHLORIDE 0.9 % (FLUSH) 0.9 %
10 SYRINGE (ML) INJECTION EVERY 12 HOURS SCHEDULED
Status: DISCONTINUED | OUTPATIENT
Start: 2019-03-11 | End: 2019-03-13 | Stop reason: HOSPADM

## 2019-03-11 RX ORDER — CARVEDILOL 12.5 MG/1
12.5 TABLET ORAL 2 TIMES DAILY WITH MEALS
Status: DISCONTINUED | OUTPATIENT
Start: 2019-03-12 | End: 2019-03-13 | Stop reason: HOSPADM

## 2019-03-11 RX ORDER — CLOPIDOGREL BISULFATE 75 MG/1
75 TABLET ORAL DAILY
Status: DISCONTINUED | OUTPATIENT
Start: 2019-03-12 | End: 2019-03-13 | Stop reason: HOSPADM

## 2019-03-11 RX ORDER — ONDANSETRON 2 MG/ML
4 INJECTION INTRAMUSCULAR; INTRAVENOUS EVERY 6 HOURS PRN
Status: DISCONTINUED | OUTPATIENT
Start: 2019-03-11 | End: 2019-03-12 | Stop reason: SDUPTHER

## 2019-03-11 RX ORDER — ALBUTEROL SULFATE 2.5 MG/3ML
2.5 SOLUTION RESPIRATORY (INHALATION)
Status: DISCONTINUED | OUTPATIENT
Start: 2019-03-11 | End: 2019-03-13 | Stop reason: HOSPADM

## 2019-03-11 RX ORDER — BUMETANIDE 1 MG/1
1 TABLET ORAL DAILY
Status: DISCONTINUED | OUTPATIENT
Start: 2019-03-12 | End: 2019-03-12

## 2019-03-11 RX ORDER — ASPIRIN 81 MG/1
81 TABLET ORAL DAILY
Status: DISCONTINUED | OUTPATIENT
Start: 2019-03-12 | End: 2019-03-13 | Stop reason: HOSPADM

## 2019-03-11 RX ADMIN — BUMETANIDE 0.5 MG: 0.25 INJECTION INTRAMUSCULAR; INTRAVENOUS at 21:06

## 2019-03-11 RX ADMIN — AZITHROMYCIN MONOHYDRATE 500 MG: 500 INJECTION, POWDER, LYOPHILIZED, FOR SOLUTION INTRAVENOUS at 22:56

## 2019-03-11 RX ADMIN — CEFTRIAXONE SODIUM 1 G: 1 INJECTION, POWDER, FOR SOLUTION INTRAMUSCULAR; INTRAVENOUS at 21:07

## 2019-03-11 ASSESSMENT — PAIN DESCRIPTION - PAIN TYPE: TYPE: ACUTE PAIN

## 2019-03-11 ASSESSMENT — PAIN DESCRIPTION - DESCRIPTORS: DESCRIPTORS: TIGHTNESS

## 2019-03-11 ASSESSMENT — ENCOUNTER SYMPTOMS
NAUSEA: 0
ABDOMINAL PAIN: 0
DIARRHEA: 0
SINUS PRESSURE: 0
RHINORRHEA: 0
COUGH: 0
COLOR CHANGE: 0
SORE THROAT: 0
VOMITING: 0
WHEEZING: 0
SHORTNESS OF BREATH: 0
CONSTIPATION: 0

## 2019-03-11 ASSESSMENT — PAIN DESCRIPTION - LOCATION: LOCATION: LEG

## 2019-03-11 ASSESSMENT — PAIN SCALES - GENERAL: PAINLEVEL_OUTOF10: 10

## 2019-03-11 ASSESSMENT — PAIN DESCRIPTION - ORIENTATION: ORIENTATION: RIGHT;LEFT

## 2019-03-12 ENCOUNTER — APPOINTMENT (OUTPATIENT)
Dept: GENERAL RADIOLOGY | Age: 59
DRG: 291 | End: 2019-03-12
Payer: MEDICARE

## 2019-03-12 LAB
ADENOVIRUS PCR: NOT DETECTED
ANION GAP SERPL CALCULATED.3IONS-SCNC: 10 MMOL/L (ref 9–17)
BORDETELLA PERTUSSIS PCR: NOT DETECTED
BUN BLDV-MCNC: 15 MG/DL (ref 8–23)
BUN/CREAT BLD: 16 (ref 9–20)
CALCIUM SERPL-MCNC: 8.5 MG/DL (ref 8.6–10.4)
CHLAMYDIA PNEUMONIAE BY PCR: NOT DETECTED
CHLORIDE BLD-SCNC: 103 MMOL/L (ref 98–107)
CO2: 29 MMOL/L (ref 20–31)
CORONAVIRUS 229E PCR: NOT DETECTED
CORONAVIRUS HKU1 PCR: NOT DETECTED
CORONAVIRUS NL63 PCR: NOT DETECTED
CORONAVIRUS OC43 PCR: NOT DETECTED
CREAT SERPL-MCNC: 0.91 MG/DL (ref 0.7–1.2)
GFR AFRICAN AMERICAN: >60 ML/MIN
GFR NON-AFRICAN AMERICAN: >60 ML/MIN
GFR SERPL CREATININE-BSD FRML MDRD: ABNORMAL ML/MIN/{1.73_M2}
GFR SERPL CREATININE-BSD FRML MDRD: ABNORMAL ML/MIN/{1.73_M2}
GLUCOSE BLD-MCNC: 103 MG/DL (ref 70–99)
HCT VFR BLD CALC: 40 % (ref 41–53)
HEMOGLOBIN: 13 G/DL (ref 13.5–17.5)
HUMAN METAPNEUMOVIRUS PCR: NOT DETECTED
INFLUENZA A BY PCR: NOT DETECTED
INFLUENZA A H1 (2009) PCR: NORMAL
INFLUENZA A H1 PCR: NORMAL
INFLUENZA A H3 PCR: NORMAL
INFLUENZA B BY PCR: NOT DETECTED
MCH RBC QN AUTO: 27 PG (ref 26–34)
MCHC RBC AUTO-ENTMCNC: 32.6 G/DL (ref 31–37)
MCV RBC AUTO: 82.9 FL (ref 80–100)
MYCOPLASMA PNEUMONIAE PCR: NOT DETECTED
NRBC AUTOMATED: ABNORMAL PER 100 WBC
PARAINFLUENZA 1 PCR: NOT DETECTED
PARAINFLUENZA 2 PCR: NOT DETECTED
PARAINFLUENZA 3 PCR: NOT DETECTED
PARAINFLUENZA 4 PCR: NOT DETECTED
PDW BLD-RTO: 16.1 % (ref 11.5–14.5)
PLATELET # BLD: 92 K/UL (ref 130–400)
PMV BLD AUTO: 9.5 FL (ref 6–12)
POTASSIUM SERPL-SCNC: 3.6 MMOL/L (ref 3.7–5.3)
PROCALCITONIN: 0.06 NG/ML
RBC # BLD: 4.83 M/UL (ref 4.5–5.9)
RESP SYNCYTIAL VIRUS PCR: NOT DETECTED
RHINO/ENTEROVIRUS PCR: NOT DETECTED
SODIUM BLD-SCNC: 142 MMOL/L (ref 135–144)
SPECIMEN DESCRIPTION: NORMAL
WBC # BLD: 4.7 K/UL (ref 3.5–11)

## 2019-03-12 PROCEDURE — 87581 M.PNEUMON DNA AMP PROBE: CPT

## 2019-03-12 PROCEDURE — 94760 N-INVAS EAR/PLS OXIMETRY 1: CPT

## 2019-03-12 PROCEDURE — 6370000000 HC RX 637 (ALT 250 FOR IP): Performed by: NURSE PRACTITIONER

## 2019-03-12 PROCEDURE — 87798 DETECT AGENT NOS DNA AMP: CPT

## 2019-03-12 PROCEDURE — 6360000002 HC RX W HCPCS: Performed by: NURSE PRACTITIONER

## 2019-03-12 PROCEDURE — 36415 COLL VENOUS BLD VENIPUNCTURE: CPT

## 2019-03-12 PROCEDURE — 94640 AIRWAY INHALATION TREATMENT: CPT

## 2019-03-12 PROCEDURE — 87633 RESP VIRUS 12-25 TARGETS: CPT

## 2019-03-12 PROCEDURE — 2500000003 HC RX 250 WO HCPCS: Performed by: NURSE PRACTITIONER

## 2019-03-12 PROCEDURE — 97116 GAIT TRAINING THERAPY: CPT

## 2019-03-12 PROCEDURE — APPSS60 APP SPLIT SHARED TIME 46-60 MINUTES: Performed by: NURSE PRACTITIONER

## 2019-03-12 PROCEDURE — 87486 CHLMYD PNEUM DNA AMP PROBE: CPT

## 2019-03-12 PROCEDURE — 97530 THERAPEUTIC ACTIVITIES: CPT

## 2019-03-12 PROCEDURE — 1200000000 HC SEMI PRIVATE

## 2019-03-12 PROCEDURE — 84145 PROCALCITONIN (PCT): CPT

## 2019-03-12 PROCEDURE — 71046 X-RAY EXAM CHEST 2 VIEWS: CPT

## 2019-03-12 PROCEDURE — 85027 COMPLETE CBC AUTOMATED: CPT

## 2019-03-12 PROCEDURE — 80048 BASIC METABOLIC PNL TOTAL CA: CPT

## 2019-03-12 PROCEDURE — 99222 1ST HOSP IP/OBS MODERATE 55: CPT | Performed by: INTERNAL MEDICINE

## 2019-03-12 PROCEDURE — 2500000003 HC RX 250 WO HCPCS: Performed by: INTERNAL MEDICINE

## 2019-03-12 PROCEDURE — 97535 SELF CARE MNGMENT TRAINING: CPT

## 2019-03-12 PROCEDURE — 97163 PT EVAL HIGH COMPLEX 45 MIN: CPT

## 2019-03-12 PROCEDURE — 97166 OT EVAL MOD COMPLEX 45 MIN: CPT

## 2019-03-12 PROCEDURE — 2580000003 HC RX 258: Performed by: NURSE PRACTITIONER

## 2019-03-12 RX ORDER — POTASSIUM CHLORIDE 20 MEQ/1
40 TABLET, EXTENDED RELEASE ORAL ONCE
Status: COMPLETED | OUTPATIENT
Start: 2019-03-12 | End: 2019-03-12

## 2019-03-12 RX ORDER — BUMETANIDE 0.25 MG/ML
1 INJECTION, SOLUTION INTRAMUSCULAR; INTRAVENOUS 2 TIMES DAILY
Status: DISCONTINUED | OUTPATIENT
Start: 2019-03-12 | End: 2019-03-13 | Stop reason: HOSPADM

## 2019-03-12 RX ORDER — ALBUTEROL SULFATE 2.5 MG/3ML
2.5 SOLUTION RESPIRATORY (INHALATION) 3 TIMES DAILY
Status: DISCONTINUED | OUTPATIENT
Start: 2019-03-12 | End: 2019-03-13 | Stop reason: HOSPADM

## 2019-03-12 RX ORDER — NITROGLYCERIN 0.4 MG/1
0.4 TABLET SUBLINGUAL EVERY 5 MIN PRN
COMMUNITY

## 2019-03-12 RX ORDER — VITAMIN E 268 MG
400 CAPSULE ORAL DAILY
COMMUNITY
End: 2022-01-12

## 2019-03-12 RX ORDER — ONDANSETRON 4 MG/1
4 TABLET, ORALLY DISINTEGRATING ORAL EVERY 6 HOURS PRN
Status: DISCONTINUED | OUTPATIENT
Start: 2019-03-12 | End: 2019-03-13 | Stop reason: HOSPADM

## 2019-03-12 RX ORDER — BUMETANIDE 1 MG/1
1 TABLET ORAL 2 TIMES DAILY
Status: DISCONTINUED | OUTPATIENT
Start: 2019-03-13 | End: 2019-03-12

## 2019-03-12 RX ORDER — ONDANSETRON 2 MG/ML
4 INJECTION INTRAMUSCULAR; INTRAVENOUS EVERY 6 HOURS PRN
Status: DISCONTINUED | OUTPATIENT
Start: 2019-03-12 | End: 2019-03-13 | Stop reason: HOSPADM

## 2019-03-12 RX ORDER — BUMETANIDE 0.25 MG/ML
1 INJECTION, SOLUTION INTRAMUSCULAR; INTRAVENOUS ONCE
Status: COMPLETED | OUTPATIENT
Start: 2019-03-12 | End: 2019-03-12

## 2019-03-12 RX ADMIN — CARVEDILOL 12.5 MG: 12.5 TABLET, FILM COATED ORAL at 16:25

## 2019-03-12 RX ADMIN — BUMETANIDE 1 MG: 1 TABLET ORAL at 09:49

## 2019-03-12 RX ADMIN — ATORVASTATIN CALCIUM 80 MG: 80 TABLET, FILM COATED ORAL at 21:33

## 2019-03-12 RX ADMIN — BUMETANIDE 1 MG: 0.25 INJECTION INTRAMUSCULAR; INTRAVENOUS at 21:33

## 2019-03-12 RX ADMIN — CLOPIDOGREL BISULFATE 75 MG: 75 TABLET ORAL at 09:49

## 2019-03-12 RX ADMIN — ALBUTEROL SULFATE 2.5 MG: 2.5 SOLUTION RESPIRATORY (INHALATION) at 15:01

## 2019-03-12 RX ADMIN — Medication 10 ML: at 01:40

## 2019-03-12 RX ADMIN — LISINOPRIL 5 MG: 5 TABLET ORAL at 09:49

## 2019-03-12 RX ADMIN — CEFTRIAXONE SODIUM 1 G: 1 INJECTION, POWDER, FOR SOLUTION INTRAMUSCULAR; INTRAVENOUS at 21:30

## 2019-03-12 RX ADMIN — ALBUTEROL SULFATE 2.5 MG: 2.5 SOLUTION RESPIRATORY (INHALATION) at 21:09

## 2019-03-12 RX ADMIN — BUMETANIDE 1 MG: 0.25 INJECTION INTRAMUSCULAR; INTRAVENOUS at 09:49

## 2019-03-12 RX ADMIN — CARVEDILOL 12.5 MG: 12.5 TABLET, FILM COATED ORAL at 09:49

## 2019-03-12 RX ADMIN — Medication 10 ML: at 09:50

## 2019-03-12 RX ADMIN — ATORVASTATIN CALCIUM 80 MG: 80 TABLET, FILM COATED ORAL at 01:40

## 2019-03-12 RX ADMIN — ALBUTEROL SULFATE 2.5 MG: 2.5 SOLUTION RESPIRATORY (INHALATION) at 10:01

## 2019-03-12 RX ADMIN — Medication 10 ML: at 21:36

## 2019-03-12 RX ADMIN — POTASSIUM CHLORIDE 40 MEQ: 20 TABLET, EXTENDED RELEASE ORAL at 09:49

## 2019-03-12 RX ADMIN — ASPIRIN 81 MG: 81 TABLET, COATED ORAL at 09:49

## 2019-03-12 ASSESSMENT — PAIN SCALES - GENERAL
PAINLEVEL_OUTOF10: 0

## 2019-03-13 VITALS
TEMPERATURE: 97.8 F | RESPIRATION RATE: 16 BRPM | BODY MASS INDEX: 36.84 KG/M2 | OXYGEN SATURATION: 95 % | DIASTOLIC BLOOD PRESSURE: 72 MMHG | HEIGHT: 68 IN | WEIGHT: 243.1 LBS | SYSTOLIC BLOOD PRESSURE: 119 MMHG | HEART RATE: 77 BPM

## 2019-03-13 LAB
ABSOLUTE EOS #: 0.1 K/UL (ref 0–0.4)
ABSOLUTE IMMATURE GRANULOCYTE: ABNORMAL K/UL (ref 0–0.3)
ABSOLUTE LYMPH #: 1.3 K/UL (ref 1–4.8)
ABSOLUTE MONO #: 0.5 K/UL (ref 0.2–0.8)
ANION GAP SERPL CALCULATED.3IONS-SCNC: 9 MMOL/L (ref 9–17)
BASOPHILS # BLD: 1 % (ref 0–2)
BASOPHILS ABSOLUTE: 0 K/UL (ref 0–0.2)
BNP INTERPRETATION: ABNORMAL
BUN BLDV-MCNC: 14 MG/DL (ref 8–23)
BUN/CREAT BLD: 16 (ref 9–20)
CALCIUM SERPL-MCNC: 8.6 MG/DL (ref 8.6–10.4)
CHLORIDE BLD-SCNC: 101 MMOL/L (ref 98–107)
CO2: 27 MMOL/L (ref 20–31)
CREAT SERPL-MCNC: 0.86 MG/DL (ref 0.7–1.2)
DIFFERENTIAL TYPE: ABNORMAL
EOSINOPHILS RELATIVE PERCENT: 2 % (ref 1–4)
GFR AFRICAN AMERICAN: >60 ML/MIN
GFR NON-AFRICAN AMERICAN: >60 ML/MIN
GFR SERPL CREATININE-BSD FRML MDRD: ABNORMAL ML/MIN/{1.73_M2}
GFR SERPL CREATININE-BSD FRML MDRD: ABNORMAL ML/MIN/{1.73_M2}
GLUCOSE BLD-MCNC: 168 MG/DL (ref 70–99)
HCT VFR BLD CALC: 42.2 % (ref 41–53)
HEMOGLOBIN: 13.9 G/DL (ref 13.5–17.5)
IMMATURE GRANULOCYTES: ABNORMAL %
LYMPHOCYTES # BLD: 23 % (ref 24–44)
MCH RBC QN AUTO: 27.4 PG (ref 26–34)
MCHC RBC AUTO-ENTMCNC: 32.9 G/DL (ref 31–37)
MCV RBC AUTO: 83.3 FL (ref 80–100)
MONOCYTES # BLD: 10 % (ref 1–7)
NRBC AUTOMATED: ABNORMAL PER 100 WBC
PDW BLD-RTO: 16.3 % (ref 11.5–14.5)
PLATELET # BLD: 105 K/UL (ref 130–400)
PLATELET ESTIMATE: ABNORMAL
PMV BLD AUTO: 9.5 FL (ref 6–12)
POTASSIUM SERPL-SCNC: 3.8 MMOL/L (ref 3.7–5.3)
PRO-BNP: 895 PG/ML
RBC # BLD: 5.06 M/UL (ref 4.5–5.9)
RBC # BLD: ABNORMAL 10*6/UL
SEG NEUTROPHILS: 64 % (ref 36–66)
SEGMENTED NEUTROPHILS ABSOLUTE COUNT: 3.5 K/UL (ref 1.8–7.7)
SODIUM BLD-SCNC: 137 MMOL/L (ref 135–144)
WBC # BLD: 5.4 K/UL (ref 3.5–11)
WBC # BLD: ABNORMAL 10*3/UL

## 2019-03-13 PROCEDURE — 99238 HOSP IP/OBS DSCHRG MGMT 30/<: CPT | Performed by: INTERNAL MEDICINE

## 2019-03-13 PROCEDURE — 36415 COLL VENOUS BLD VENIPUNCTURE: CPT

## 2019-03-13 PROCEDURE — APPSS60 APP SPLIT SHARED TIME 46-60 MINUTES: Performed by: NURSE PRACTITIONER

## 2019-03-13 PROCEDURE — 97530 THERAPEUTIC ACTIVITIES: CPT

## 2019-03-13 PROCEDURE — 2500000003 HC RX 250 WO HCPCS: Performed by: INTERNAL MEDICINE

## 2019-03-13 PROCEDURE — 2580000003 HC RX 258: Performed by: NURSE PRACTITIONER

## 2019-03-13 PROCEDURE — 6360000002 HC RX W HCPCS: Performed by: NURSE PRACTITIONER

## 2019-03-13 PROCEDURE — 97116 GAIT TRAINING THERAPY: CPT

## 2019-03-13 PROCEDURE — 83880 ASSAY OF NATRIURETIC PEPTIDE: CPT

## 2019-03-13 PROCEDURE — 85025 COMPLETE CBC W/AUTO DIFF WBC: CPT

## 2019-03-13 PROCEDURE — 6370000000 HC RX 637 (ALT 250 FOR IP): Performed by: NURSE PRACTITIONER

## 2019-03-13 PROCEDURE — 97110 THERAPEUTIC EXERCISES: CPT

## 2019-03-13 PROCEDURE — 80048 BASIC METABOLIC PNL TOTAL CA: CPT

## 2019-03-13 RX ORDER — VITAMIN E 268 MG
400 CAPSULE ORAL DAILY
Status: DISCONTINUED | OUTPATIENT
Start: 2019-03-13 | End: 2019-03-13 | Stop reason: RX

## 2019-03-13 RX ORDER — NITROGLYCERIN 0.4 MG/1
0.4 TABLET SUBLINGUAL EVERY 5 MIN PRN
Status: DISCONTINUED | OUTPATIENT
Start: 2019-03-13 | End: 2019-03-13 | Stop reason: HOSPADM

## 2019-03-13 RX ORDER — AZITHROMYCIN 250 MG/1
250 TABLET, FILM COATED ORAL SEE ADMIN INSTRUCTIONS
Qty: 6 TABLET | Refills: 0 | Status: SHIPPED | OUTPATIENT
Start: 2019-03-13 | End: 2019-03-18

## 2019-03-13 RX ORDER — BUMETANIDE 1 MG/1
TABLET ORAL
Qty: 60 TABLET | Refills: 3 | Status: ON HOLD | OUTPATIENT
Start: 2019-03-13 | End: 2022-10-12

## 2019-03-13 RX ADMIN — LISINOPRIL 5 MG: 5 TABLET ORAL at 10:36

## 2019-03-13 RX ADMIN — Medication 10 ML: at 10:36

## 2019-03-13 RX ADMIN — ASPIRIN 81 MG: 81 TABLET, COATED ORAL at 10:36

## 2019-03-13 RX ADMIN — CARVEDILOL 12.5 MG: 12.5 TABLET, FILM COATED ORAL at 10:35

## 2019-03-13 RX ADMIN — CLOPIDOGREL BISULFATE 75 MG: 75 TABLET ORAL at 10:36

## 2019-03-13 RX ADMIN — AZITHROMYCIN MONOHYDRATE 500 MG: 500 INJECTION, POWDER, LYOPHILIZED, FOR SOLUTION INTRAVENOUS at 00:17

## 2019-03-13 RX ADMIN — BUMETANIDE 1 MG: 0.25 INJECTION INTRAMUSCULAR; INTRAVENOUS at 10:36

## 2019-04-01 ENCOUNTER — HOSPITAL ENCOUNTER (OUTPATIENT)
Dept: OTHER | Age: 59
Discharge: HOME | End: 2019-04-01
Payer: MEDICARE

## 2019-04-01 VITALS
HEART RATE: 82 BPM | OXYGEN SATURATION: 99 % | RESPIRATION RATE: 16 BRPM | DIASTOLIC BLOOD PRESSURE: 68 MMHG | SYSTOLIC BLOOD PRESSURE: 112 MMHG | WEIGHT: 231.8 LBS | BODY MASS INDEX: 35.25 KG/M2

## 2019-04-01 PROCEDURE — G0463 HOSPITAL OUTPT CLINIC VISIT: HCPCS

## 2019-04-01 PROCEDURE — 99211 OFF/OP EST MAY X REQ PHY/QHP: CPT

## 2019-04-01 ASSESSMENT — PAIN SCALES - GENERAL: PAINLEVEL_OUTOF10: 0

## 2019-04-01 NOTE — PROGRESS NOTES
Date:  2019  Time:  3:43 PM    CHF Clinic at [address] [facility]    Office: [phone] Fax: [phone]    Re:  Johana Noriega   Patient : 1960    Vital Signs: /68   Pulse 82   Resp 16   Wt 231 lb 12.8 oz (105.1 kg)   SpO2 99%   BMI 35.25 kg/m²                       O2 Device: None (Room air)                           No results for input(s): CBC, HGB, HCT, WBC, PLATELET, NA, K, CL, CO2, BUN, CREATININE, GLUCOSE, BNP, INR in the last 72 hours. Respiratory:    Assessment  Charting Type: Reassessment    Breath Sounds  Right Upper Lobe: Clear  Right Middle Lobe: Clear  Right Lower Lobe: Clear  Left Upper Lobe: Clear  Left Lower Lobe: Clear    Cough/Sputum  Cough: Dry  Frequency: Occasional         Peripheral Vascular  RLE Edema: +1, Pitting  LLE Edema: +2, Pitting      Complaints: None at this time    Physician Orders None    Comment : Patient here for initial visit. Weight and vital signs obtained and assessment completed. Patient was recently discharged from [facility] with CHF related symptoms. He states he is feeling much better, no complaints today. He denies any chest pain or shortness of breath at this time. Moderate lower leg, ankle and pedal edema noted, L>R. Patient states the leg swelling is improved since he was discharged from the hospital, but still has significant swelling to the left leg. Encouraged patient to wear knee high compression stockings during the day and patient verbalized understanding. Teaching initiated on importance of daily weights, following a low sodium diet, fluid restrictions of 2L/24 hours, S/S CHF and disease process. Handouts on low sodium diet given to patient to take home. Inquired about his appointment with Cardiologist and he states he doesn't have one scheduled. [facility] Cardiology office and made appt. With Dr. Joseluis Rodriguez for 04/15/19 at 315 pm. Patient notified of appt. And next CHF Clinic appt. Set for 4 weeks. Patient encouraged to call clinic if any problems related to CHF arise prior to next visit and he verbalized understanding.     Electronically signed by Gurjit Garcia RN on 4/1/2019 at 3:43 PM

## 2019-04-01 NOTE — PROGRESS NOTES
bathrooms. Add extra light switches or use remote switches (such as switches that go on or off when you clap your hands) to make it easier to turn lights on if you have to get up during the night. · Install sturdy handrails on stairways. Put grab bars near your shower, bathtub, and toilet. · Store household items on low shelves so that you do not have to climb or reach high. Or use a reaching device that you can get at a medical supply store. If you have to climb for something, use a step stool with handrails, or ask someone to get it for you. · Keep a cordless phone and a flashlight with new batteries by your bed. If possible, put a phone in each of the main rooms of your house, or carry a cell phone in case you fall and cannot reach a phone. Or you can wear a device around your neck or wrist. You push a button that sends a signal for help. · Wear low-heeled shoes that fit well and give your feet good support. Use footwear with nonskid soles. Check the heels and soles of your shoes for wear. Repair or replace worn heels or soles. · Do not wear socks without shoes on wood floors. · Walk on the grass when the sidewalks are slippery. If you live in an area that gets snow and ice in the winter, sprinkle salt on slippery steps and sidewalks. Where can you learn more? Go to https://Satomilm.Streamezzo. org and sign in to your [employer] account. Enter V009 in the [facility] box to learn more about Diabetes and Preventing Falls: After Your Visit.     If you do not have an account, please click on the Sign Up Now link. © 7266-6593 Healthwise, Incorporated. Care instructions adapted under license by [facility].  This care instruction is for use with your licensed healthcare professional. If you have questions about a medical condition or this instruction, always ask your healthcare professional. [address] any warranty or liability for your use of this information. Content Version: 69.8.954251;  Last Revised: August 6, 2013

## 2019-04-01 NOTE — PROGRESS NOTES
Verbally reviewed medication list with patient; patient verbalized understanding. Discussed 2000mg/day sodium restricted diet; patient verbalized understanding. Moderate daily exercise encouraged as tolerated. Discussed rest breaks as needed; patient verbalized understanding. Patient instructed to weigh self at the same time of each day, using same clothes and same scale; reinforced teaching to monitor for 3-5 lb weight increase over 1-2 days, and to notify the CHF clinic at [phone] or physician office if weight change noted. Patient verbalized understanding. Risks of smoking discussed with the patient if applicable; patient strongly discouraged to smoke. Patient verbalized understanding. Signs and symptoms of CHF discussed with patient, such as feeling more tired than normal, feeling short of breath, coughing that increases when you lie down, sudden weight gain, swelling of your feet, legs or belly. Patient verbalized understanding to notify the CHF clinic at [phone] or physician office if these symptoms occur. Compliance with plan of care and further disease process causes discussed with patient, patient encouraged to keep all follow up appointments. Patient verbalized understanding.

## 2019-05-08 ENCOUNTER — HOSPITAL ENCOUNTER (OUTPATIENT)
Dept: OTHER | Age: 59
Discharge: HOME | End: 2019-05-08
Payer: MEDICAID

## 2019-05-08 VITALS
WEIGHT: 234.8 LBS | RESPIRATION RATE: 16 BRPM | OXYGEN SATURATION: 100 % | SYSTOLIC BLOOD PRESSURE: 104 MMHG | DIASTOLIC BLOOD PRESSURE: 66 MMHG | BODY MASS INDEX: 35.7 KG/M2 | HEART RATE: 80 BPM

## 2019-05-08 PROCEDURE — 99212 OFFICE O/P EST SF 10 MIN: CPT | Performed by: NURSE PRACTITIONER

## 2019-05-08 RX ORDER — M-VIT,TX,IRON,MINS/CALC/FOLIC 27MG-0.4MG
1 TABLET ORAL DAILY
COMMUNITY

## 2019-05-08 RX ORDER — MULTIVIT WITH MINERALS/LUTEIN
250 TABLET ORAL
Status: ON HOLD | COMMUNITY
End: 2022-10-15 | Stop reason: HOSPADM

## 2019-05-08 ASSESSMENT — PAIN SCALES - GENERAL: PAINLEVEL_OUTOF10: 0

## 2019-05-08 NOTE — PROGRESS NOTES
Date:  2019  Time:  3:39 PM    CHF Clinic at [address] [place] [address]    Office: [phone] Fax: [phone]    Re:  Tamara Hernandes   Patient : 1960    Vital Signs: /66   Pulse 80   Resp 16   Wt 234 lb 12.8 oz (106.5 kg)   SpO2 100%   BMI 35.70 kg/m²                       O2 Device: None (Room air)                           No results for input(s): CBC, HGB, HCT, WBC, PLATELET, NA, K, CL, CO2, BUN, CREATININE, GLUCOSE, BNP, INR in the last 72 hours. Respiratory:    Assessment  Charting Type: Reassessment    Breath Sounds  Right Upper Lobe: Clear  Right Middle Lobe: Clear  Right Lower Lobe: Clear  Left Upper Lobe: Clear  Left Lower Lobe: Clear    Cough/Sputum  Cough: Dry  Frequency: Occasional         Peripheral Vascular  RLE Edema: Pitting, Trace  LLE Edema: None      Complaints: None at this time    Physician Orders None    Comment : Weight up 3 lbs from last month. Patient states he has been drinking more than the 2L fluid limit and ate out yesterday as well. Reinforced low sodium diet and monitoring his fluid intake. Weight and vital signs checked and assessment completed. He denies any increase in chest pain or shortness of breath at this time. Minimal edema noted to lower legs, ankles and feet with a decrease noted from last visit. Medication list reviewed and updated. Patient is having nasal surgery later this month at a [address] facility. Instructed patient that scheduling office needs to check with Cardiology about stopping ASA and Plavix prior to surgery and patient states he is aware and will check with scheduling office. No S/S acute CHF noted at this time. Next CHF Clinic appt. In 4 weeks.     Electronically signed by Piedad Bowling RN on 2019 at 3:39 PM

## 2019-05-08 NOTE — PROGRESS NOTES
Verbally reviewed medication list with patient; patient verbalized understanding. Discussed 2000mg/day sodium restricted diet; patient verbalized understanding. Moderate daily exercise encouraged as tolerated. Discussed rest breaks as needed; patient verbalized understanding. Patient instructed to weigh self at the same time of each day, using same clothes and same scale; reinforced teaching to monitor for 3-5 lb weight increase over 1-2 days, and to notify the CHF clinic at [phone] or physician office if weight change noted. Patient verbalized understanding. Risks of smoking discussed with the patient if applicable; patient strongly discouraged to smoke. Patient verbalized understanding. Signs and symptoms of CHF discussed with patient, such as feeling more tired than normal, feeling short of breath, coughing that increases when you lie down, sudden weight gain, swelling of your feet, legs or belly. Patient verbalized understanding to notify the CHF clinic at [phone] or physician office if these symptoms occur. Compliance with plan of care and further disease process causes discussed with patient, patient encouraged to keep all follow up appointments. Patient verbalized understanding.

## 2019-06-25 ENCOUNTER — HOSPITAL ENCOUNTER (OUTPATIENT)
Dept: OTHER | Age: 59
Discharge: HOME | End: 2019-06-25
Payer: MEDICAID

## 2019-06-25 VITALS
WEIGHT: 239.38 LBS | OXYGEN SATURATION: 99 % | BODY MASS INDEX: 36.4 KG/M2 | DIASTOLIC BLOOD PRESSURE: 73 MMHG | SYSTOLIC BLOOD PRESSURE: 105 MMHG | HEART RATE: 71 BPM | RESPIRATION RATE: 18 BRPM

## 2019-06-25 PROCEDURE — G0463 HOSPITAL OUTPT CLINIC VISIT: HCPCS

## 2019-06-25 PROCEDURE — 99212 OFFICE O/P EST SF 10 MIN: CPT

## 2019-06-25 NOTE — PROGRESS NOTES
Date:  2019  Time:  1:48 PM    CHF Clinic at [address] [place] [address]    Office: [phone] Fax: [phone]    Re:  Caryl Diehl   Patient : 1960    Vital Signs: /73   Pulse 71   Resp 18   Wt 239 lb 6 oz (108.6 kg)   SpO2 99%   BMI 36.40 kg/m²                       O2 Device: None (Room air)                           No results for input(s): CBC, HGB, HCT, WBC, PLATELET, NA, K, CL, CO2, BUN, CREATININE, GLUCOSE, BNP, INR in the last 72 hours. Respiratory:    Assessment  Charting Type: Reassessment    Breath Sounds  Right Upper Lobe: Clear  Right Middle Lobe: Clear  Right Lower Lobe: Clear  Left Upper Lobe: Clear  Left Lower Lobe: Clear    Cough/Sputum  Cough: None         Peripheral Vascular  RLE Edema: Trace  LLE Edema: None      Complaints: \" I gained weight\"  Physician Orders none at this time   Comment :Arya ambulates per without shortness of breath. His weight is up 5.5 lbs since 19. Pt has not been adhering to a low Na diet and has a poor concept of what he was not to eat. Lately he is eating more because of a recent nasal surgery he able to taste food again. Hand outs on low Na diet given to pt and wife and    discussed at length. Medication compliance stressed pt is to call [facility] office for further instructions on his Lipitor and and possible additional medication.  Follow up assessment will be on 19  Electronically signed by Benjamin Blanc RN on 2019 at 1:48 PM

## 2019-06-25 NOTE — PROGRESS NOTES
Verbally reviewed medication list with patient; patient verbalized understanding. Discussed 2000mg/day sodium restricted diet; patient verbalized understanding. Moderate daily exercise encouraged as tolerated. Discussed rest breaks as needed; patient verbalized understanding. Patient instructed to weigh self at the same time of each day, using same clothes and same scale; reinforced teaching to monitor for 3-5 lb weight increase over 1-2 days, and to notify the CHF clinic at [phone] or physician office if weight change noted. Patient verbalized understanding. Risks of smoking discussed with the patient if applicable; patient strongly discouraged to smoke. Patient verbalized understanding. Signs and symptoms of CHF discussed with patient, such as feeling more tired than normal, feeling short of breath, coughing that increases when you lie down, sudden weight gain, swelling of your feet, legs or belly. Patient verbalized understanding to notify the CHF clinic at [phone] or physician office if these symptoms occur. Compliance with plan of care and further disease process causes discussed with patient, patient encouraged to keep all follow up appointments. Patient verbalized understanding.

## 2019-06-27 ENCOUNTER — HOSPITAL ENCOUNTER (EMERGENCY)
Age: 59
Discharge: HOME | End: 2019-06-27
Attending: EMERGENCY MEDICINE
Payer: MEDICARE

## 2019-06-27 VITALS
TEMPERATURE: 97.5 F | RESPIRATION RATE: 18 BRPM | HEART RATE: 68 BPM | HEIGHT: 68 IN | WEIGHT: 220 LBS | OXYGEN SATURATION: 96 % | DIASTOLIC BLOOD PRESSURE: 78 MMHG | BODY MASS INDEX: 33.34 KG/M2 | SYSTOLIC BLOOD PRESSURE: 119 MMHG

## 2019-06-27 DIAGNOSIS — S00.86XA INSECT BITE, NONVENOMOUS OF FACE, NECK, AND SCALP EXCEPT EYE, INITIAL ENCOUNTER: Primary | ICD-10-CM

## 2019-06-27 DIAGNOSIS — S00.06XA INSECT BITE, NONVENOMOUS OF FACE, NECK, AND SCALP EXCEPT EYE, INITIAL ENCOUNTER: Primary | ICD-10-CM
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
## 2019-06-27 DIAGNOSIS — S10.96XA INSECT BITE, NONVENOMOUS OF FACE, NECK, AND SCALP EXCEPT EYE, INITIAL ENCOUNTER: Primary | ICD-10-CM

## 2019-06-27 PROCEDURE — 96372 THER/PROPH/DIAG INJ SC/IM: CPT

## 2019-06-27 PROCEDURE — 99282 EMERGENCY DEPT VISIT SF MDM: CPT

## 2019-06-27 PROCEDURE — 6360000002 HC RX W HCPCS: Performed by: EMERGENCY MEDICINE

## 2019-06-27 RX ORDER — DEXAMETHASONE SODIUM PHOSPHATE 10 MG/ML
10 INJECTION INTRAMUSCULAR; INTRAVENOUS ONCE
Status: COMPLETED | OUTPATIENT
Start: 2019-06-27 | End: 2019-06-27

## 2019-06-27 RX ADMIN — DEXAMETHASONE SODIUM PHOSPHATE 10 MG: 10 INJECTION INTRAMUSCULAR; INTRAVENOUS at 02:15

## 2019-06-27 NOTE — ED NOTES
Pt presents to the er c/o bug bites.  Pt has a red non raised area on his right cheek and an area on the anterior right side of his neck with a 2 incha lamar of red raised rash without drainage     Meera Hinojosa RN  06/27/19 [id number]

## 2019-06-27 NOTE — ED PROVIDER NOTES
EMERGENCY DEPARTMENT ENCOUNTER    Pt Name: Mya Vela  MRN: [id number]  [place] 1960  Date of evaluation: 6/27/19  CHIEF COMPLAINT       Chief Complaint   Patient presents with   [address]   HPI    78-year-old male presents to the ED for evaluation of redness and swelling over to the right side of his neck. Patient states that him and his wife has been getting bitten by insects at their house. Patient states that he has noted he was bitten in the neck yesterday. Patient is here because the bite site has been swelling and his wife wants him to be evaluated in the ER because his wife developed anaphylactic reaction to insect bite and had to be intubated in the past.  Patient denies difficult breathing or difficulty swallowing. Patient denies fever chills. Patient denies chest pain. REVIEW OF SYSTEMS     Review of Systems   All other systems reviewed and are negative. PASTMEDICAL HISTORY     Past Medical History:   Diagnosis Date    CAD (coronary artery disease)     Cancer ([facility]     skin    CHF (congestive heart failure) (HCC)     Heart attack ([facility]     Heart disease     Hyperlipidemia     Hypertension     MI [facility]ocardial infarction) ([facility]     MRSA (methicillin resistant staph aureus) culture positive 01/26/2018    abdomen    Stroke [facility])     2011     SURGICAL HISTORY       Past Surgical History:   Procedure Laterality Date    CORONARY ANGIOPLASTY WITH STENT PLACEMENT      CORONARY ARTERY BYPASS GRAFT      qrad    CORONARY ARTERY BYPASS GRAFT      EYE SURGERY      EYE SURGERY       CURRENT MEDICATIONS       Previous Medications    ASCORBIC ACID (VITAMIN C) 250 MG TABLET    Take 250 mg by mouth three times a week    ASPIRIN 81 MG CHEWABLE TABLET    Take 81 mg by mouth daily. ATORVASTATIN (LIPITOR) 80 MG TABLET    Take 1 tablet by mouth nightly    BUMETANIDE (BUMEX) 1 MG TABLET    Take 1 tablet by mouth every morning AND 1 tablet Daily with lunch. CARVEDILOL (COREG) 12.5 MG TABLET    Take 1 tablet by mouth 2 times daily (with meals)    CLOPIDOGREL (PLAVIX) 75 MG TABLET    Take 1 tablet by mouth daily    LISINOPRIL (PRINIVIL;ZESTRIL) 5 MG TABLET    Take 1 tablet by mouth daily    MULTIPLE VITAMINS-MINERALS (THERAPEUTIC MULTIVITAMIN-MINERALS) TABLET    Take 1 tablet by mouth daily    NITROGLYCERIN (NITROSTAT) 0.4 MG SL TABLET    Place 0.4 mg under the tongue every 5 minutes as needed for Chest pain up to max of 3 total doses. If no relief after 1 dose, call 911. VITAMIN E 400 UNIT CAPSULE    Take 400 Units by mouth daily     ALLERGIES     has No Known Allergies. FAMILY HISTORY     indicated that his mother is . He indicated that his father is . He indicated that only one of his two sisters is alive. SOCIAL HISTORY       Social History     Tobacco Use    Smoking status: Former Smoker     Last attempt to quit: 1999     Years since quittin.4    Smokeless tobacco: Never Used   Substance Use Topics    Alcohol use: No    Drug use: No     PHYSICAL EXAM     INITIAL VITALS:   Vitals:    19 0032 19 0032   BP:  119/78   Pulse:  68   Resp:  18   Temp:  97.5 °F (36.4 °C)   TempSrc:  Oral   SpO2:  96%   Weight: 220 lb (99.8 kg)    Height: 5' 8\" (1.727 m)        Physical Exam   Constitutional: He is oriented to person, place, and time. He appears well-developed. HENT:   Head: Normocephalic and atraumatic. Eyes: Conjunctivae and EOM are normal.   Neck: Normal range of motion. Neck supple. Cardiovascular: Normal rate, S1 normal and normal heart sounds. Pulmonary/Chest: Effort normal and breath sounds normal.   Abdominal: Soft. There is no tenderness. Musculoskeletal: Normal range of motion. He exhibits no edema. Neurological: He is alert and oriented to person, place, and time. Skin: Skin is warm and dry. MEDICAL DECISION MAKING:     Allergic dermatitis to insect bites.   Patient given a shot of Decadron in the department. Patient will continue taking Benadryl at home as needed. CRITICAL CARE:       PROCEDURES:    Procedures    DIAGNOSTIC RESULTS   EKG:All EKG's are interpreted by the Emergency Department Physician who either signs or Co-signs this chart in the absence of a cardiologist.        RADIOLOGY:All plain film, CT, MRI, and formal ultrasound images (except ED bedside ultrasound) are read by the radiologist, see reports below, unless otherwisenoted in MDM or here. No orders to display     LABS: All lab results were reviewed by myself, and all abnormals are listed below. Labs Reviewed - No data to display    EMERGENCY DEPARTMENTCOURSE:         Vitals:    Vitals:    06/27/19 0032 06/27/19 0032   BP:  119/78   Pulse:  68   Resp:  18   Temp:  97.5 °F (36.4 °C)   TempSrc:  Oral   SpO2:  96%   Weight: 220 lb (99.8 kg)    Height: 5' 8\" (1.727 m)        The patient was given the following medications while in the emergency department:  Orders Placed This Encounter   Medications    dexamethasone (DECADRON) injection 10 mg     CONSULTS:  None    FINAL IMPRESSION      1.  Insect bite, nonvenomous of face, neck, and scalp except eye, initial encounter          DISPOSITION/PLAN   DISPOSITION Decision To Discharge 06/27/2019 02:13:29 AM      PATIENT REFERRED TO:  Eddie Rose, [address]  [phone]    Schedule an appointment as soon as possible for a visit       DISCHARGE MEDICATIONS:  New Prescriptions    No medications on file     Junito Irene MD  Attending Emergency Physician                    Minnie Weiss MD  06/27/19 1938

## 2019-07-06 ENCOUNTER — HOSPITAL ENCOUNTER (EMERGENCY)
Age: 59
Discharge: HOME | End: 2019-07-06
Payer: MEDICARE

## 2019-07-06 VITALS
TEMPERATURE: 98.4 F | HEART RATE: 87 BPM | HEIGHT: 68 IN | OXYGEN SATURATION: 98 % | DIASTOLIC BLOOD PRESSURE: 90 MMHG | RESPIRATION RATE: 18 BRPM | BODY MASS INDEX: 34.1 KG/M2 | WEIGHT: 225 LBS | SYSTOLIC BLOOD PRESSURE: 148 MMHG

## 2019-07-06 DIAGNOSIS — R21 FACIAL RASH: Primary | ICD-10-CM

## 2019-07-06 PROCEDURE — 6370000000 HC RX 637 (ALT 250 FOR IP): Performed by: PHYSICIAN ASSISTANT

## 2019-07-06 PROCEDURE — 99282 EMERGENCY DEPT VISIT SF MDM: CPT

## 2019-07-06 RX ORDER — MUPIROCIN CALCIUM 20 MG/G
CREAM TOPICAL
Qty: 1 TUBE | Refills: 0 | Status: SHIPPED | OUTPATIENT
Start: 2019-07-06 | End: 2019-08-05

## 2019-07-06 RX ORDER — DOXYCYCLINE HYCLATE 100 MG
100 TABLET ORAL 2 TIMES DAILY
Qty: 14 TABLET | Refills: 0 | Status: SHIPPED | OUTPATIENT
Start: 2019-07-06 | End: 2019-07-13

## 2019-07-06 RX ORDER — DOXYCYCLINE 100 MG/1
100 CAPSULE ORAL ONCE
Status: COMPLETED | OUTPATIENT
Start: 2019-07-06 | End: 2019-07-06

## 2019-07-06 RX ADMIN — DOXYCYCLINE 100 MG: 100 CAPSULE ORAL at 23:36

## 2019-07-06 ASSESSMENT — PAIN SCALES - GENERAL: PAINLEVEL_OUTOF10: 10

## 2019-07-06 ASSESSMENT — PAIN DESCRIPTION - ORIENTATION: ORIENTATION: RIGHT

## 2019-07-06 ASSESSMENT — PAIN DESCRIPTION - DESCRIPTORS: DESCRIPTORS: TENDER

## 2019-07-06 ASSESSMENT — PAIN DESCRIPTION - PAIN TYPE: TYPE: ACUTE PAIN

## 2019-07-06 ASSESSMENT — PAIN DESCRIPTION - LOCATION: LOCATION: FACE

## 2019-07-07 ASSESSMENT — ENCOUNTER SYMPTOMS
VOMITING: 0
COUGH: 0
COLOR CHANGE: 0
NAUSEA: 0
WHEEZING: 0
ABDOMINAL PAIN: 0

## 2019-07-23 ENCOUNTER — HOSPITAL ENCOUNTER (OUTPATIENT)
Dept: OTHER | Age: 59
Discharge: HOME | End: 2019-07-23
Payer: MEDICARE

## 2019-07-23 VITALS
HEART RATE: 84 BPM | BODY MASS INDEX: 36.31 KG/M2 | OXYGEN SATURATION: 98 % | WEIGHT: 238.8 LBS | DIASTOLIC BLOOD PRESSURE: 77 MMHG | SYSTOLIC BLOOD PRESSURE: 115 MMHG | RESPIRATION RATE: 18 BRPM

## 2019-07-23 PROCEDURE — 99212 OFFICE O/P EST SF 10 MIN: CPT | Performed by: NURSE PRACTITIONER

## 2019-07-23 NOTE — PROGRESS NOTES
Preventing Falls: After Your Visit  Your Care Instructions  If you are a patient with heart problems you may be at risk for falling because of unsteadiness, dizziness, diabetes, and shortness of breathe. Some of your medicines also may add to your risk. By making your home safer, you can lower your risk of falling. You can make your home safer with a few simple measures. Follow-up care is a key part of your treatment and safety. Be sure to make and go to all appointments, and call your doctor if you are having problems. It's also a good idea to know your test results and keep a list of the medicines you take. How can you care for yourself at home? Taking care of yourself  · Keep your blood sugar at a target level (which you set with your doctor). · Exercise regularly, as approved by your Doctor, to improve your strength, muscle tone, and balance. Walk if you can. Swimming may be a good choice if you cannot walk easily. · Have your vision checked as often as your doctor recommends. It is usually once a year or more often if you have eye problems. · Know the side effects of the medicines you take. Ask your doctor or pharmacist whether the medicines you take can affect your balance. Sleeping pills or sedatives can affect your balance. · Limit the amount of alcohol you drink. Alcohol can impair your balance and other senses. ·   Preventing falls at home  · Remove raised doorway thresholds, throw rugs, and clutter. Repair loose carpet or raised areas in the floor. · Move furniture and electrical cords to keep them out of walking paths. · Use nonskid floor wax, and wipe up spills right away, especially on ceramic tile floors. · If you use a walker or cane, put rubber tips on it. If you use crutches, clean the bottoms of them regularly with an abrasive pad, such as steel wool. · Keep your house well lit, especially [place], and outside walkways.  Use night-lights in areas such as hallways and

## 2019-07-23 NOTE — PROGRESS NOTES
Date:  2019  Time:  1:31 PM    CHF Clinic at [facility]    Office: [phone] Fax: [phone]    Re:  Jaylon Saul   Patient : 1960    Vital Signs: /77   Pulse 84   Resp 18   Wt 238 lb 12.8 oz (108.3 kg)   SpO2 98%   BMI 36.31 kg/m²                       O2 Device: None (Room air)                         No results for input(s): CBC, HGB, HCT, WBC, PLATELET, NA, K, CL, CO2, BUN, CREATININE, GLUCOSE, BNP, INR in the last 72 hours. Respiratory:       Breath Sounds  Right Upper Lobe: Clear  Right Middle Lobe: Clear  Right Lower Lobe: Clear  Left Upper Lobe: Clear  Left Lower Lobe: Clear    Cough/Sputum  Cough: None     Peripheral Vascular  RLE Edema: +1, Pitting  LLE Edema: +1, Pitting    Complaints: Ongoing SOB with exertion but not increased. Comment : Weight decreased 1/2 pound. Ongoing SOB with exertion but not increased. No chest pain. Reinforced low sodium diet, fluid limits, and daily weights. Encourage patient to call with concerns. F/u appointment 19.     Electronically signed by Maribel Rios RN on 2019 at 1:31 PM

## 2019-08-23 ENCOUNTER — HOSPITAL ENCOUNTER (OUTPATIENT)
Dept: VASCULAR LAB | Age: 59
Discharge: HOME | End: 2019-08-23
Payer: MEDICARE

## 2019-08-23 DIAGNOSIS — E78.5 HYPERLIPIDEMIA, UNSPECIFIED HYPERLIPIDEMIA TYPE: Primary | ICD-10-CM

## 2019-08-23 PROCEDURE — 93880 EXTRACRANIAL BILAT STUDY: CPT

## 2019-08-27 ENCOUNTER — OFFICE VISIT (OUTPATIENT)
Dept: DERMATOLOGY | Age: 59
End: 2019-08-27
Payer: MEDICARE

## 2019-08-27 VITALS
DIASTOLIC BLOOD PRESSURE: 87 MMHG | WEIGHT: 239.6 LBS | HEIGHT: 69 IN | HEART RATE: 91 BPM | OXYGEN SATURATION: 98 % | BODY MASS INDEX: 35.49 KG/M2 | SYSTOLIC BLOOD PRESSURE: 134 MMHG

## 2019-08-27 DIAGNOSIS — L73.9 FOLLICULITIS: Primary | ICD-10-CM

## 2019-08-27 PROCEDURE — 3017F COLORECTAL CA SCREEN DOC REV: CPT | Performed by: DERMATOLOGY

## 2019-08-27 PROCEDURE — G8598 ASA/ANTIPLAT THER USED: HCPCS | Performed by: DERMATOLOGY

## 2019-08-27 PROCEDURE — 1036F TOBACCO NON-USER: CPT | Performed by: DERMATOLOGY

## 2019-08-27 PROCEDURE — G8427 DOCREV CUR MEDS BY ELIG CLIN: HCPCS | Performed by: DERMATOLOGY

## 2019-08-27 PROCEDURE — G8417 CALC BMI ABV UP PARAM F/U: HCPCS | Performed by: DERMATOLOGY

## 2019-08-27 PROCEDURE — 99202 OFFICE O/P NEW SF 15 MIN: CPT | Performed by: DERMATOLOGY

## 2019-08-27 RX ORDER — CLINDAMYCIN PHOSPHATE 10 UG/ML
LOTION TOPICAL
Qty: 60 ML | Refills: 3 | Status: SHIPPED | OUTPATIENT
Start: 2019-08-27 | End: 2020-07-23 | Stop reason: ALTCHOICE

## 2019-08-27 RX ORDER — DOXYCYCLINE HYCLATE 100 MG/1
CAPSULE ORAL
Qty: 60 CAPSULE | Refills: 0 | Status: SHIPPED | OUTPATIENT
Start: 2019-08-27 | End: 2020-01-23

## 2019-08-28 ENCOUNTER — TELEPHONE (OUTPATIENT)
Dept: DERMATOLOGY | Age: 59
End: 2019-08-28

## 2019-08-28 NOTE — TELEPHONE ENCOUNTER
Pt called stating that his scripts are expensive. Was wondering if there was something else. Dr. William Bragg wanted to prescribe.  Said he tried good rx and it didn't do him any good

## 2019-09-12 ENCOUNTER — HOSPITAL ENCOUNTER (OUTPATIENT)
Dept: OTHER | Age: 59
Discharge: HOME | End: 2019-09-12
Payer: MEDICARE

## 2019-09-12 VITALS
HEART RATE: 84 BPM | OXYGEN SATURATION: 98 % | DIASTOLIC BLOOD PRESSURE: 82 MMHG | RESPIRATION RATE: 18 BRPM | SYSTOLIC BLOOD PRESSURE: 118 MMHG

## 2019-09-12 PROCEDURE — 99212 OFFICE O/P EST SF 10 MIN: CPT | Performed by: NURSE PRACTITIONER

## 2019-09-12 NOTE — PROGRESS NOTES
bathrooms. Add extra light switches or use remote switches (such as switches that go on or off when you clap your hands) to make it easier to turn lights on if you have to get up during the night. · Install sturdy handrails on stairways. Put grab bars near your shower, bathtub, and toilet. · Store household items on low shelves so that you do not have to climb or reach high. Or use a reaching device that you can get at a medical supply store. If you have to climb for something, use a step stool with handrails, or ask someone to get it for you. · Keep a cordless phone and a flashlight with new batteries by your bed. If possible, put a phone in each of the main rooms of your house, or carry a cell phone in case you fall and cannot reach a phone. Or you can wear a device around your neck or wrist. You push a button that sends a signal for help. · Wear low-heeled shoes that fit well and give your feet good support. Use footwear with nonskid soles. Check the heels and soles of your shoes for wear. Repair or replace worn heels or soles. · Do not wear socks without shoes on wood floors. · Walk on the grass when the sidewalks are slippery. If you live in an area that gets snow and ice in the winter, sprinkle salt on slippery steps and sidewalks. Where can you learn more? Go to https://[employer]. org and sign in to your [employer] account. Enter Y381 in the [facility] box to learn more about Diabetes and Preventing Falls: After Your Visit.     If you do not have an account, please click on the Sign Up Now link. © 7553-1336 Healthwise, Incorporated. Care instructions adapted under license by [facility].  This care instruction is for use with your licensed healthcare professional. If you have questions about a medical condition or this instruction, always ask your healthcare professional. [address] any warranty or liability for your use of this

## 2019-09-18 ENCOUNTER — TELEPHONE (OUTPATIENT)
Dept: GASTROENTEROLOGY | Age: 59
End: 2019-09-18

## 2019-10-02 NOTE — TELEPHONE ENCOUNTER
Patient called back to schedule colon screen per referral. New patient questionnaire scanned. Patient does have CHF and is on Plavix/ASA. Scheduled new patient appointment for 11/11/19 At [facility] with Dr Abebe Mendieta with patient's girlfriend, Danna Councilman. New patient packet mailed. Offered sooner at [place] location and patient declined. Put on wait list for [address]

## 2019-10-10 ENCOUNTER — HOSPITAL ENCOUNTER (OUTPATIENT)
Age: 59
Discharge: HOME | End: 2019-10-10
Payer: MEDICARE

## 2019-10-10 ENCOUNTER — HOSPITAL ENCOUNTER (OUTPATIENT)
Dept: OTHER | Age: 59
Discharge: HOME | End: 2019-10-10
Payer: MEDICARE

## 2019-10-10 VITALS
BODY MASS INDEX: 35.59 KG/M2 | OXYGEN SATURATION: 98 % | WEIGHT: 241 LBS | HEART RATE: 93 BPM | DIASTOLIC BLOOD PRESSURE: 106 MMHG | RESPIRATION RATE: 20 BRPM | SYSTOLIC BLOOD PRESSURE: 164 MMHG

## 2019-10-10 DIAGNOSIS — I50.23 ACUTE ON CHRONIC SYSTOLIC (CONGESTIVE) HEART FAILURE (HCC): Primary | ICD-10-CM

## 2019-10-10 LAB
ANION GAP SERPL CALCULATED.3IONS-SCNC: 15 MMOL/L (ref 9–17)
BUN BLDV-MCNC: 13 MG/DL (ref 8–23)
BUN/CREAT BLD: ABNORMAL (ref 9–20)
CALCIUM SERPL-MCNC: 9 MG/DL (ref 8.6–10.4)
CHLORIDE BLD-SCNC: 104 MMOL/L (ref 98–107)
CO2: 21 MMOL/L (ref 20–31)
CREAT SERPL-MCNC: 0.59 MG/DL (ref 0.7–1.2)
GFR AFRICAN AMERICAN: >60 ML/MIN
GFR NON-AFRICAN AMERICAN: >60 ML/MIN
GFR SERPL CREATININE-BSD FRML MDRD: ABNORMAL ML/MIN/{1.73_M2}
GFR SERPL CREATININE-BSD FRML MDRD: ABNORMAL ML/MIN/{1.73_M2}
GLUCOSE BLD-MCNC: 135 MG/DL (ref 70–99)
POTASSIUM SERPL-SCNC: 4.5 MMOL/L (ref 3.7–5.3)
SODIUM BLD-SCNC: 140 MMOL/L (ref 135–144)

## 2019-10-10 PROCEDURE — 99213 OFFICE O/P EST LOW 20 MIN: CPT

## 2019-10-10 PROCEDURE — 80048 BASIC METABOLIC PNL TOTAL CA: CPT

## 2019-11-22 ENCOUNTER — HOSPITAL ENCOUNTER (OUTPATIENT)
Dept: OTHER | Age: 59
Discharge: HOME | End: 2019-11-22
Payer: MEDICARE

## 2019-11-22 VITALS
DIASTOLIC BLOOD PRESSURE: 90 MMHG | OXYGEN SATURATION: 97 % | HEART RATE: 86 BPM | SYSTOLIC BLOOD PRESSURE: 130 MMHG | BODY MASS INDEX: 35.12 KG/M2 | RESPIRATION RATE: 20 BRPM | WEIGHT: 237.8 LBS

## 2019-11-22 PROCEDURE — 99212 OFFICE O/P EST SF 10 MIN: CPT

## 2019-12-03 ENCOUNTER — TELEPHONE (OUTPATIENT)
Dept: GASTROENTEROLOGY | Age: 59
End: 2019-12-03

## 2019-12-06 ENCOUNTER — TELEPHONE (OUTPATIENT)
Dept: GASTROENTEROLOGY | Age: 59
End: 2019-12-06

## 2019-12-13 ENCOUNTER — HOSPITAL ENCOUNTER (EMERGENCY)
Age: 59
Discharge: HOME | End: 2019-12-13
Attending: EMERGENCY MEDICINE
Payer: MEDICARE

## 2019-12-13 VITALS
WEIGHT: 246.2 LBS | RESPIRATION RATE: 16 BRPM | SYSTOLIC BLOOD PRESSURE: 136 MMHG | HEART RATE: 72 BPM | DIASTOLIC BLOOD PRESSURE: 78 MMHG | BODY MASS INDEX: 37.31 KG/M2 | TEMPERATURE: 97.3 F | OXYGEN SATURATION: 98 % | HEIGHT: 68 IN

## 2019-12-13 DIAGNOSIS — H92.02 LEFT EAR PAIN: Primary | ICD-10-CM

## 2019-12-13 PROCEDURE — 99283 EMERGENCY DEPT VISIT LOW MDM: CPT

## 2019-12-13 PROCEDURE — 6370000000 HC RX 637 (ALT 250 FOR IP): Performed by: EMERGENCY MEDICINE

## 2019-12-13 RX ORDER — IBUPROFEN 800 MG/1
800 TABLET ORAL EVERY 8 HOURS PRN
Qty: 30 TABLET | Refills: 0 | Status: SHIPPED | OUTPATIENT
Start: 2019-12-13 | End: 2020-01-23

## 2019-12-13 RX ORDER — IBUPROFEN 800 MG/1
800 TABLET ORAL ONCE
Status: COMPLETED | OUTPATIENT
Start: 2019-12-13 | End: 2019-12-13

## 2019-12-13 RX ADMIN — IBUPROFEN 800 MG: 800 TABLET, FILM COATED ORAL at 02:25

## 2019-12-13 ASSESSMENT — ENCOUNTER SYMPTOMS
COLOR CHANGE: 0
COUGH: 0
EYE DISCHARGE: 0
DIARRHEA: 0
NAUSEA: 0
EYE REDNESS: 0
SORE THROAT: 0
RHINORRHEA: 0
SHORTNESS OF BREATH: 0
VOMITING: 0

## 2019-12-13 ASSESSMENT — PAIN SCALES - GENERAL: PAINLEVEL_OUTOF10: 8

## 2019-12-20 ENCOUNTER — HOSPITAL ENCOUNTER (OUTPATIENT)
Dept: OTHER | Age: 59
Discharge: HOME | End: 2019-12-20
Payer: MEDICARE

## 2019-12-20 VITALS
SYSTOLIC BLOOD PRESSURE: 145 MMHG | OXYGEN SATURATION: 96 % | RESPIRATION RATE: 18 BRPM | DIASTOLIC BLOOD PRESSURE: 87 MMHG | BODY MASS INDEX: 36.19 KG/M2 | WEIGHT: 238 LBS | HEART RATE: 87 BPM

## 2019-12-20 PROCEDURE — 99212 OFFICE O/P EST SF 10 MIN: CPT | Performed by: NURSE PRACTITIONER

## 2020-01-21 ENCOUNTER — HOSPITAL ENCOUNTER (OUTPATIENT)
Dept: OTHER | Age: 60
Discharge: HOME | End: 2020-01-21
Payer: MEDICARE

## 2020-01-21 VITALS
DIASTOLIC BLOOD PRESSURE: 92 MMHG | HEART RATE: 80 BPM | WEIGHT: 243 LBS | RESPIRATION RATE: 20 BRPM | SYSTOLIC BLOOD PRESSURE: 152 MMHG | BODY MASS INDEX: 36.95 KG/M2 | OXYGEN SATURATION: 98 %

## 2020-01-21 PROCEDURE — 99212 OFFICE O/P EST SF 10 MIN: CPT

## 2020-01-21 NOTE — PROGRESS NOTES
Date:  2020  Time:  2:02 PM    CHF Clinic at [address] [place] [address]    Office: [phone] Fax: [phone]    Re:  Verdon Paget   Patient : 1960    Vital Signs: BP (!) 152/92 Comment: No meds taken yet today  Pulse 80   Resp 20   Wt 243 lb (110.2 kg)   SpO2 98%   BMI 36.95 kg/m²                       O2 Device: None (Room air)                           No results for input(s): CBC, HGB, HCT, WBC, PLATELET, NA, K, CL, CO2, BUN, CREATININE, GLUCOSE, BNP, INR in the last 72 hours. Respiratory:    Assessment  Charting Type: Reassessment    Breath Sounds  Right Upper Lobe: Clear  Right Middle Lobe: Clear  Right Lower Lobe: Clear  Left Upper Lobe: Clear  Left Lower Lobe: Clear    Cough/Sputum  Cough: None       Peripheral Vascular  RLE Edema: None  LLE Edema: None    Complaints: Nothing new    Comment : Weight increased 5 lbs in one month. Patient states he has  Been eating too much lately. No other new or acute s/s CHF. Reminded of low salt diet and fluid limits. States compliance with meds. Needs to reschedule a stress test that he cancelled at [address] [place] [address] last month. Also being worked up for a colonoscopy soon. Next visit here 4 weeks 20.     Electronically signed by Drea Serrano RN on 2020 at 2:02 PM

## 2020-01-23 ENCOUNTER — OFFICE VISIT (OUTPATIENT)
Dept: GASTROENTEROLOGY | Age: 60
End: 2020-01-23
Payer: MEDICARE

## 2020-01-23 ENCOUNTER — TELEPHONE (OUTPATIENT)
Dept: GASTROENTEROLOGY | Age: 60
End: 2020-01-23

## 2020-01-23 ENCOUNTER — HOSPITAL ENCOUNTER (OUTPATIENT)
Age: 60
Setting detail: SPECIMEN
Discharge: HOME | End: 2020-01-23
Payer: MEDICARE

## 2020-01-23 VITALS
HEART RATE: 85 BPM | WEIGHT: 247 LBS | SYSTOLIC BLOOD PRESSURE: 135 MMHG | BODY MASS INDEX: 37.56 KG/M2 | DIASTOLIC BLOOD PRESSURE: 89 MMHG

## 2020-01-23 LAB
ABSOLUTE EOS #: 0.1 K/UL (ref 0–0.44)
ABSOLUTE IMMATURE GRANULOCYTE: 0.04 K/UL (ref 0–0.3)
ABSOLUTE LYMPH #: 2.1 K/UL (ref 1.1–3.7)
ABSOLUTE MONO #: 0.72 K/UL (ref 0.1–1.2)
ALBUMIN SERPL-MCNC: 4.2 G/DL (ref 3.5–5.2)
ALBUMIN/GLOBULIN RATIO: 1.3 (ref 1–2.5)
ALP BLD-CCNC: 93 U/L (ref 40–129)
ALT SERPL-CCNC: 22 U/L (ref 5–41)
AST SERPL-CCNC: 20 U/L
BASOPHILS # BLD: 1 % (ref 0–2)
BASOPHILS ABSOLUTE: 0.04 K/UL (ref 0–0.2)
BILIRUB SERPL-MCNC: 0.78 MG/DL (ref 0.3–1.2)
BILIRUBIN DIRECT: 0.16 MG/DL
BILIRUBIN, INDIRECT: 0.62 MG/DL (ref 0–1)
DIFFERENTIAL TYPE: ABNORMAL
EOSINOPHILS RELATIVE PERCENT: 2 % (ref 1–4)
GLOBULIN: NORMAL G/DL (ref 1.5–3.8)
HCT VFR BLD CALC: 51.1 % (ref 40.7–50.3)
HEMOGLOBIN: 16.7 G/DL (ref 13–17)
IMMATURE GRANULOCYTES: 1 %
LYMPHOCYTES # BLD: 31 % (ref 24–43)
MCH RBC QN AUTO: 29 PG (ref 25.2–33.5)
MCHC RBC AUTO-ENTMCNC: 32.7 G/DL (ref 28.4–34.8)
MCV RBC AUTO: 88.7 FL (ref 82.6–102.9)
MONOCYTES # BLD: 11 % (ref 3–12)
NRBC AUTOMATED: 0 PER 100 WBC
PDW BLD-RTO: 13.2 % (ref 11.8–14.4)
PLATELET # BLD: 113 K/UL (ref 138–453)
PLATELET ESTIMATE: ABNORMAL
PMV BLD AUTO: 11.2 FL (ref 8.1–13.5)
RBC # BLD: 5.76 M/UL (ref 4.21–5.77)
RBC # BLD: ABNORMAL 10*6/UL
SEG NEUTROPHILS: 54 % (ref 36–65)
SEGMENTED NEUTROPHILS ABSOLUTE COUNT: 3.87 K/UL (ref 1.5–8.1)
TOTAL PROTEIN: 7.4 G/DL (ref 6.4–8.3)
WBC # BLD: 6.9 K/UL (ref 3.5–11.3)
WBC # BLD: ABNORMAL 10*3/UL

## 2020-01-23 PROCEDURE — G8417 CALC BMI ABV UP PARAM F/U: HCPCS | Performed by: INTERNAL MEDICINE

## 2020-01-23 PROCEDURE — G8427 DOCREV CUR MEDS BY ELIG CLIN: HCPCS | Performed by: INTERNAL MEDICINE

## 2020-01-23 PROCEDURE — 1036F TOBACCO NON-USER: CPT | Performed by: INTERNAL MEDICINE

## 2020-01-23 PROCEDURE — 3017F COLORECTAL CA SCREEN DOC REV: CPT | Performed by: INTERNAL MEDICINE

## 2020-01-23 PROCEDURE — G8484 FLU IMMUNIZE NO ADMIN: HCPCS | Performed by: INTERNAL MEDICINE

## 2020-01-23 PROCEDURE — 99213 OFFICE O/P EST LOW 20 MIN: CPT | Performed by: INTERNAL MEDICINE

## 2020-01-23 ASSESSMENT — ENCOUNTER SYMPTOMS
CONSTIPATION: 0
CHOKING: 0
SINUS PRESSURE: 0
ABDOMINAL PAIN: 0
VOICE CHANGE: 0
RECTAL PAIN: 0
DIARRHEA: 0
BLOOD IN STOOL: 0
BACK PAIN: 0
ANAL BLEEDING: 0
COUGH: 0
VOMITING: 0
ABDOMINAL DISTENTION: 0
EYES NEGATIVE: 1
SORE THROAT: 0
TROUBLE SWALLOWING: 0
WHEEZING: 0
RESPIRATORY NEGATIVE: 1
NAUSEA: 0
GASTROINTESTINAL NEGATIVE: 1
ALLERGIC/IMMUNOLOGIC NEGATIVE: 1

## 2020-01-23 NOTE — TELEPHONE ENCOUNTER
Colonoscopy ordered for patient today. Valery Gonzalez chose not to schedule at this time, he reported that he has other operations that need to occur first. Valery Gonzalez plans to call to schedule colon screening at a later date. Valery Gonzalez is on Plavix and will need clearance.

## 2020-01-23 NOTE — PROGRESS NOTES
100 MG capsule, Take 1 pill twice daily with food, Disp: 60 capsule, Rfl: 0    clindamycin (CLEOCIN T) 1 % lotion, Apply to face, chest and back daily, Disp: 60 mL, Rfl: 3    benzoyl peroxide 5 % external liquid, Wash face, chest and back 1-2 times daily, Disp: 227 g, Rfl: 3    Multiple Vitamins-Minerals (THERAPEUTIC MULTIVITAMIN-MINERALS) tablet, Take 1 tablet by mouth daily, Disp: , Rfl:     Ascorbic Acid (VITAMIN C) 250 MG tablet, Take 250 mg by mouth three times a week, Disp: , Rfl:     bumetanide (BUMEX) 1 MG tablet, Take 1 tablet by mouth every morning AND 1 tablet Daily with lunch., Disp: 60 tablet, Rfl: 3    nitroGLYCERIN (NITROSTAT) 0.4 MG SL tablet, Place 0.4 mg under the tongue every 5 minutes as needed for Chest pain up to max of 3 total doses. If no relief after 1 dose, call 911., Disp: , Rfl:     vitamin E 400 UNIT capsule, Take 400 Units by mouth daily, Disp: , Rfl:     atorvastatin (LIPITOR) 80 MG tablet, Take 1 tablet by mouth nightly, Disp: 30 tablet, Rfl: 3    carvedilol (COREG) 12.5 MG tablet, Take 1 tablet by mouth 2 times daily (with meals), Disp: 60 tablet, Rfl: 3    clopidogrel (PLAVIX) 75 MG tablet, Take 1 tablet by mouth daily, Disp: 30 tablet, Rfl: 3    lisinopril (PRINIVIL;ZESTRIL) 5 MG tablet, Take 1 tablet by mouth daily, Disp: 30 tablet, Rfl: 3    aspirin 81 MG chewable tablet, Take 81 mg by mouth daily. , Disp: , Rfl:     ALLERGIES:   No Known Allergies    FAMILY HISTORY:       Problem Relation Age of Onset    Coronary Art Dis Father     Liver Disease Father     Alcohol Abuse Sister          SOCIAL HISTORY:   Social History     Socioeconomic History    Marital status: Single     Spouse name: Not on file    Number of children: Not on file    Years of education: Not on file    Highest education level: Not on file   Occupational History    Not on file   Social Needs    Financial resource strain: Not on file    Food insecurity:     Worry: Not on file     Inability: Not on file    Transportation needs:     Medical: Not on file     Non-medical: Not on file   Tobacco Use    Smoking status: Former Smoker     Last attempt to quit: 1999     Years since quittin.0    Smokeless tobacco: Never Used   Substance and Sexual Activity    Alcohol use: No    Drug use: No    Sexual activity: Not on file   Lifestyle    Physical activity:     Days per week: Not on file     Minutes per session: Not on file    Stress: Not on file   Relationships    Social connections:     Talks on phone: Not on file     Gets together: Not on file     Attends Hoahaoism service: Not on file     Active member of club or organization: Not on file     Attends meetings of clubs or organizations: Not on file     Relationship status: Not on file    Intimate partner violence:     Fear of current or ex partner: Not on file     Emotionally abused: Not on file     Physically abused: Not on file     Forced sexual activity: Not on file   Other Topics Concern    Not on file   Social History Narrative    Not on file       REVIEW OF SYSTEMS: A 12-point review of systemswas obtained and pertinent positives and negatives were enumerated above in the history of present illness. All other reviewed systems / symptoms were negative. Review of Systems   Constitutional: Negative. Negative for appetite change, fatigue and unexpected weight change. HENT: Negative. Negative for dental problem, postnasal drip, sinus pressure, sore throat, trouble swallowing and voice change. Eyes: Negative. Negative for visual disturbance. Respiratory: Negative. Negative for cough, choking and wheezing. Cardiovascular: Negative. Negative for chest pain, palpitations and leg swelling. Gastrointestinal: Negative. Negative for abdominal distention, abdominal pain, anal bleeding, blood in stool, constipation, diarrhea, nausea, rectal pain and vomiting. Endocrine: Negative. Genitourinary: Negative.   Negative for difficulty urinating. Musculoskeletal: Negative. Negative for arthralgias, back pain, gait problem and myalgias. Skin: Negative. Allergic/Immunologic: Negative. Negative for environmental allergies and food allergies. Neurological: Negative. Negative for dizziness, weakness, light-headedness, numbness and headaches. Hematological: Negative. Does not bruise/bleed easily. Psychiatric/Behavioral: Negative. Negative for sleep disturbance. The patient is not nervous/anxious. LABORATORY DATA: Reviewed  Lab Results   Component Value Date    WBC 5.4 03/13/2019    HGB 13.9 03/13/2019    HCT 42.2 03/13/2019    MCV 83.3 03/13/2019     (L) 03/13/2019     10/10/2019    K 4.5 10/10/2019     10/10/2019    CO2 21 10/10/2019    BUN 13 10/10/2019    CREATININE 0.59 (L) 10/10/2019    LABPROT 6.5 01/02/2012    LABALBU 3.6 11/23/2017    BILITOT 2.63 (H) 11/23/2017    ALKPHOS 176 (H) 11/23/2017    AST 67 (H) 11/23/2017     (H) 11/23/2017         Lab Results   Component Value Date    RBC 5.06 03/13/2019    HGB 13.9 03/13/2019    MCV 83.3 03/13/2019    MCH 27.4 03/13/2019    MCHC 32.9 03/13/2019    RDW 16.3 (H) 03/13/2019    MPV 9.5 03/13/2019    BASOPCT 1 03/13/2019    LYMPHSABS 1.30 03/13/2019    MONOSABS 0.50 03/13/2019    NEUTROABS 3.50 03/13/2019    EOSABS 0.10 03/13/2019    BASOSABS 0.00 03/13/2019         DIAGNOSTIC TESTING:     No results found. There were no vitals taken for this visit. PHYSICAL EXAMINATION: Vital signs reviewed per the nursing documentation. There is no height or weight on file to calculate BMI. Physical Exam      I personally reviewed the nurse's notes and documentation and I agree with her notes. General: alert, appears stated age and cooperative Psych: Normal. and Alert and oriented, appropriate affect. . Normal affect. Mentation normal  HEENT: PERRLA. Clear conjunctivae and sclerae. Moist oral mucosae, no lesions or ulcers.   The neck is supple, without lymphadenopathy or jugular venous distension. No masses. Normal thyroid. Cardiovascular: S1 S2 RRR no rubs or murmurs. Pulmonary: clear BL. No accessory muscle usage. Abdominal Exam: Soft, NT ND, no hepato or spleno megaly, +BS, no ascites. IMPRESSION: Mr. Kaveh Day is a 62 y.o. male with elevated LFTs and thrombocytopenia. We discussed work-up. We will start with rechecking LFTs, fibro-Scan, and screening colonoscopy. He is going for stress test soon. He will get hernia repair. He will call us after that and will arrange for the colonoscopy to be done 1 month after his surgery. Spent 30 minutes providing patient education and counseling. Thank you for allowing me to participate in the care of Mr. Kaveh Day. For any further questions please do not hesitate to contact me. I have reviewed and agree with the MA/LPN ROS. Note is dictated utilizing voice recognition software. Unfortunately this leads to occasional typographical errors. Please contact our office if you have any questions.     Sridevi Long MD  [facility]  O: #[phone]

## 2020-01-29 ENCOUNTER — OFFICE VISIT (OUTPATIENT)
Dept: DERMATOLOGY | Age: 60
End: 2020-01-29
Payer: MEDICARE

## 2020-01-29 ENCOUNTER — HOSPITAL ENCOUNTER (OUTPATIENT)
Age: 60
Setting detail: SPECIMEN
Discharge: HOME | End: 2020-01-29
Payer: MEDICARE

## 2020-01-29 VITALS
OXYGEN SATURATION: 96 % | DIASTOLIC BLOOD PRESSURE: 97 MMHG | WEIGHT: 243.2 LBS | HEIGHT: 68 IN | BODY MASS INDEX: 36.86 KG/M2 | SYSTOLIC BLOOD PRESSURE: 161 MMHG | HEART RATE: 92 BPM

## 2020-01-29 PROCEDURE — 3017F COLORECTAL CA SCREEN DOC REV: CPT | Performed by: DERMATOLOGY

## 2020-01-29 PROCEDURE — G8427 DOCREV CUR MEDS BY ELIG CLIN: HCPCS | Performed by: DERMATOLOGY

## 2020-01-29 PROCEDURE — 11102 TANGNTL BX SKIN SINGLE LES: CPT | Performed by: DERMATOLOGY

## 2020-01-29 PROCEDURE — 1036F TOBACCO NON-USER: CPT | Performed by: DERMATOLOGY

## 2020-01-29 PROCEDURE — 99213 OFFICE O/P EST LOW 20 MIN: CPT | Performed by: DERMATOLOGY

## 2020-01-29 PROCEDURE — G8484 FLU IMMUNIZE NO ADMIN: HCPCS | Performed by: DERMATOLOGY

## 2020-01-29 PROCEDURE — G8417 CALC BMI ABV UP PARAM F/U: HCPCS | Performed by: DERMATOLOGY

## 2020-01-29 NOTE — PATIENT INSTRUCTIONS
BIOPSY WOUND CARE    A biopsy is where a small piece of skin tissue is removed and examined by a pathologist.  When a biopsy is done, there is a small wound site that requires proper care to prevent infection and scarring. Some biopsies require sutures and their removal.    How to Care for Biopsy Wound    A.  Leave band-aid or dressing on for 24 hours. B. Wash two times a day with soap and water. C.  Let the wound air dry, then apply Vaseline ointment and cover with a Band-Aid       unless otherwise instructed by your provider. D. If there is slight discomfort, you may give acetaminophen or ibuprofen. Bleeding:  Every effort is made to stop bleeding prior to you leaving the dermatology clinic. Unfortunately, people will occasionally start to bleed after leaving the clinic. If you start to bleed hold firm pressure for 15 minutes to the area. If you are on blood thinners I recommend keeping a product called wound seal (can buy at any drug store) at home as this can be a useful adjunct to stop bleeding. If after holding pressure for 15 minutes and/or applying wound seal the bleeding does not stop please call the dermatology clinic. When To Call the Doctor    Call the Dermatology Clinic or your doctor if any of the following occur:    A. Redness and swelling  B. Tenderness and warm to touch  C.  Drainage from wound  D. Fever    Biopsy Results    Biopsy results are usually available in 1-2 weeks. We provide biopsy results in letters for begin results or we will call for any concerning results. If you have not heard from our staff please call the office within 2 weeks. Please call our office with any concerns at [phone].

## 2020-01-30 RX ORDER — LIDOCAINE HYDROCHLORIDE AND EPINEPHRINE 10; 10 MG/ML; UG/ML
0.5 INJECTION, SOLUTION INFILTRATION; PERINEURAL ONCE
Status: COMPLETED | OUTPATIENT
Start: 2020-01-30 | End: 2020-01-30

## 2020-01-30 RX ADMIN — LIDOCAINE HYDROCHLORIDE AND EPINEPHRINE 0.5 ML: 10; 10 INJECTION, SOLUTION INFILTRATION; PERINEURAL at 07:51

## 2020-01-30 NOTE — PROGRESS NOTES
and warm to touch  C.  Drainage from wound  D. Fever    Biopsy Results    Biopsy results are usually available in 1-2 weeks. We provide biopsy results in letters for begin results or we will call for any concerning results. If you have not heard from our staff please call the office within 2 weeks. Please call our office with any concerns at [phone]. Photo surveillance performed: Yes    Follow-up: based on biopsy    This note was created with the assistance of aspeech-recognition program.  Although the intention is to generate a document that actually reflects thecontent of the visit, no guarantees can be provided that every mistake has been identified and corrected by editing.     Electronically signed by Kristin Saldana MD on 1/30/20 at 7:49 AM

## 2020-02-05 LAB — DERMATOLOGY PATHOLOGY REPORT: NORMAL

## 2020-02-18 ENCOUNTER — HOSPITAL ENCOUNTER (OUTPATIENT)
Dept: OTHER | Age: 60
Discharge: HOME | End: 2020-02-18
Payer: MEDICARE

## 2020-02-18 VITALS
BODY MASS INDEX: 36.92 KG/M2 | WEIGHT: 242.8 LBS | HEART RATE: 96 BPM | DIASTOLIC BLOOD PRESSURE: 92 MMHG | OXYGEN SATURATION: 98 % | SYSTOLIC BLOOD PRESSURE: 152 MMHG | RESPIRATION RATE: 20 BRPM

## 2020-02-18 PROCEDURE — 99212 OFFICE O/P EST SF 10 MIN: CPT

## 2020-02-20 ENCOUNTER — HOSPITAL ENCOUNTER (EMERGENCY)
Age: 60
Discharge: HOME | End: 2020-02-20
Attending: EMERGENCY MEDICINE
Payer: MEDICARE

## 2020-02-20 VITALS
SYSTOLIC BLOOD PRESSURE: 147 MMHG | HEIGHT: 68 IN | BODY MASS INDEX: 36.37 KG/M2 | TEMPERATURE: 99.6 F | DIASTOLIC BLOOD PRESSURE: 70 MMHG | WEIGHT: 240 LBS | OXYGEN SATURATION: 96 % | HEART RATE: 101 BPM | RESPIRATION RATE: 18 BRPM

## 2020-02-20 PROCEDURE — 2500000003 HC RX 250 WO HCPCS: Performed by: NURSE PRACTITIONER

## 2020-02-20 PROCEDURE — 99282 EMERGENCY DEPT VISIT SF MDM: CPT

## 2020-02-20 RX ORDER — LIDOCAINE HYDROCHLORIDE 10 MG/ML
20 INJECTION, SOLUTION INFILTRATION; PERINEURAL ONCE
Status: COMPLETED | OUTPATIENT
Start: 2020-02-20 | End: 2020-02-20

## 2020-02-20 RX ORDER — CEPHALEXIN 500 MG/1
500 CAPSULE ORAL 3 TIMES DAILY
Qty: 21 CAPSULE | Refills: 0 | Status: SHIPPED | OUTPATIENT
Start: 2020-02-20 | End: 2020-07-23 | Stop reason: ALTCHOICE

## 2020-02-20 RX ADMIN — LIDOCAINE HYDROCHLORIDE 20 ML: 10 INJECTION, SOLUTION INFILTRATION; PERINEURAL at 14:22

## 2020-02-20 ASSESSMENT — ENCOUNTER SYMPTOMS
DIARRHEA: 0
RHINORRHEA: 0
VOMITING: 0
SHORTNESS OF BREATH: 0
COUGH: 0
SINUS PRESSURE: 0
ABDOMINAL PAIN: 0
WHEEZING: 0
CONSTIPATION: 0
COLOR CHANGE: 0
NAUSEA: 0
SORE THROAT: 0

## 2020-02-20 ASSESSMENT — PAIN DESCRIPTION - PAIN TYPE: TYPE: ACUTE PAIN

## 2020-02-20 ASSESSMENT — PAIN DESCRIPTION - DESCRIPTORS: DESCRIPTORS: THROBBING;TENDER

## 2020-02-20 ASSESSMENT — PAIN SCALES - GENERAL
PAINLEVEL_OUTOF10: 10
PAINLEVEL_OUTOF10: 10

## 2020-02-20 ASSESSMENT — PAIN DESCRIPTION - ORIENTATION: ORIENTATION: LEFT

## 2020-02-20 ASSESSMENT — PAIN DESCRIPTION - LOCATION: LOCATION: FINGER (COMMENT WHICH ONE)

## 2020-02-20 NOTE — ED PROVIDER NOTES
eMERGENCY dEPARTMENT eNCOUnter   Independent Attestation     Pt Name: Thien Painting  MRN: [id number]  [place] 1960  Date of evaluation: 2/20/20     Thien Painting is a 62 y.o. male with CC: Foreign Body (lt hand index finger past 2 wks)      Based on the medical record the care appears appropriate. I was personally available for consultation in the Emergency Department.     Amber Daniels MD  Attending Emergency Physician                    Raheem Starkey MD  02/20/20 8659
Negative for chest pain and palpitations. Gastrointestinal: Negative for abdominal pain, constipation, diarrhea, nausea and vomiting. Genitourinary: Negative for dysuria and hematuria. Musculoskeletal: Negative for arthralgias and myalgias. Skin: Negative for color change and rash. Neurological: Negative for dizziness, weakness and headaches. Hematological: Negative for adenopathy. All other systems reviewed and are negative. Except as noted above the remainder of the review of systems was reviewed and negative. PHYSICAL EXAM    (up to 7 for level 4, 8 or more for level 5)     ED Triage Vitals [02/20/20 1418]   BP Temp Temp Source Pulse Resp SpO2 Height Weight   (!) 147/70 99.6 °F (37.6 °C) Oral 101 18 96 % 5' 8\" (1.727 m) 240 lb (108.9 kg)       Physical Exam  Vitals signs reviewed. Constitutional:       Appearance: He is well-developed. HENT:      Head: Normocephalic and atraumatic. Eyes:      Conjunctiva/sclera: Conjunctivae normal.      Pupils: Pupils are equal, round, and reactive to light. Neck:      Musculoskeletal: Normal range of motion and neck supple. Cardiovascular:      Rate and Rhythm: Normal rate and regular rhythm. Pulmonary:      Effort: Pulmonary effort is normal. No respiratory distress. Breath sounds: Normal breath sounds. No stridor. Abdominal:      General: Bowel sounds are normal.      Palpations: Abdomen is soft. Musculoskeletal: Normal range of motion. Hands:    Lymphadenopathy:      Cervical: No cervical adenopathy. Skin:     General: Skin is warm and dry. Findings: No rash. Neurological:      Mental Status: He is alert and oriented to person, place, and time. LABS:  Labs Reviewed - No data to display    All other labs were within normal range or not returned as of this dictation.     EMERGENCY DEPARTMENT COURSE and DIFFERENTIAL DIAGNOSIS/MDM:   Vitals:    Vitals:    02/20/20 1418   BP: (!) 147/70   Pulse: 101   Resp:

## 2020-02-20 NOTE — ED NOTES
PT to ED with a wood splinter in his index finger Lt hand for the last week, pt has swelling and redness to his finger, pt states he pulled it out.      Isma Perez RN  02/20/20 7021

## 2020-03-03 ENCOUNTER — TELEPHONE (OUTPATIENT)
Dept: OTHER | Age: 60
End: 2020-03-03

## 2020-03-03 ENCOUNTER — HOSPITAL ENCOUNTER (OUTPATIENT)
Dept: NUCLEAR MEDICINE | Age: 60
Discharge: HOME | End: 2020-03-05
Payer: MEDICARE

## 2020-03-03 ENCOUNTER — HOSPITAL ENCOUNTER (OUTPATIENT)
Dept: NON INVASIVE DIAGNOSTICS | Age: 60
Discharge: HOME | End: 2020-03-03
Payer: MEDICARE

## 2020-03-03 RX ORDER — SODIUM CHLORIDE 0.9 % (FLUSH) 0.9 %
10 SYRINGE (ML) INJECTION PRN
Status: DISCONTINUED | OUTPATIENT
Start: 2020-03-03 | End: 2020-03-03 | Stop reason: HOSPADM

## 2020-03-03 RX ORDER — ALBUTEROL SULFATE 90 UG/1
2 AEROSOL, METERED RESPIRATORY (INHALATION) PRN
Status: DISCONTINUED | OUTPATIENT
Start: 2020-03-03 | End: 2020-03-03 | Stop reason: HOSPADM

## 2020-03-03 RX ORDER — METOPROLOL TARTRATE 5 MG/5ML
5 INJECTION INTRAVENOUS EVERY 5 MIN PRN
Status: DISCONTINUED | OUTPATIENT
Start: 2020-03-03 | End: 2020-03-03 | Stop reason: HOSPADM

## 2020-03-03 RX ORDER — NITROGLYCERIN 0.4 MG/1
0.4 TABLET SUBLINGUAL EVERY 5 MIN PRN
Status: DISCONTINUED | OUTPATIENT
Start: 2020-03-03 | End: 2020-03-03 | Stop reason: HOSPADM

## 2020-03-03 RX ORDER — ATROPINE SULFATE 0.1 MG/ML
0.5 INJECTION INTRAVENOUS EVERY 5 MIN PRN
Status: DISCONTINUED | OUTPATIENT
Start: 2020-03-03 | End: 2020-03-03 | Stop reason: HOSPADM

## 2020-03-03 RX ORDER — SODIUM CHLORIDE 9 MG/ML
500 INJECTION, SOLUTION INTRAVENOUS CONTINUOUS PRN
Status: DISCONTINUED | OUTPATIENT
Start: 2020-03-03 | End: 2020-03-03 | Stop reason: HOSPADM

## 2020-03-03 RX ORDER — AMINOPHYLLINE DIHYDRATE 25 MG/ML
50 INJECTION, SOLUTION INTRAVENOUS PRN
Status: DISCONTINUED | OUTPATIENT
Start: 2020-03-03 | End: 2020-03-03 | Stop reason: HOSPADM

## 2020-03-03 NOTE — TELEPHONE ENCOUNTER
Patient phoned here complaining of cough and a runny nose for 15 days. He had to cancel his stress test because he couldn't lay flat. Suggested to patient he visit his PCP. With conversation, patient does not sound short of breath. He voices understanding to call for appt with PCP.

## 2020-05-26 ENCOUNTER — TELEPHONE (OUTPATIENT)
Dept: GASTROENTEROLOGY | Age: 60
End: 2020-05-26

## 2020-07-23 ENCOUNTER — INITIAL CONSULT (OUTPATIENT)
Dept: BARIATRICS/WEIGHT MGMT | Age: 60
End: 2020-07-23
Payer: MEDICARE

## 2020-07-23 VITALS
WEIGHT: 240 LBS | SYSTOLIC BLOOD PRESSURE: 128 MMHG | RESPIRATION RATE: 18 BRPM | DIASTOLIC BLOOD PRESSURE: 78 MMHG | BODY MASS INDEX: 36.37 KG/M2 | HEART RATE: 76 BPM | HEIGHT: 68 IN | TEMPERATURE: 97 F

## 2020-07-23 PROCEDURE — G8427 DOCREV CUR MEDS BY ELIG CLIN: HCPCS | Performed by: SURGERY

## 2020-07-23 PROCEDURE — 99204 OFFICE O/P NEW MOD 45 MIN: CPT | Performed by: SURGERY

## 2020-07-23 PROCEDURE — G8417 CALC BMI ABV UP PARAM F/U: HCPCS | Performed by: SURGERY

## 2020-07-23 PROCEDURE — 1036F TOBACCO NON-USER: CPT | Performed by: SURGERY

## 2020-07-23 PROCEDURE — 3017F COLORECTAL CA SCREEN DOC REV: CPT | Performed by: SURGERY

## 2020-07-27 ENCOUNTER — HOSPITAL ENCOUNTER (OUTPATIENT)
Dept: OTHER | Age: 60
Discharge: HOME | End: 2020-07-27
Payer: MEDICARE

## 2020-07-27 VITALS
WEIGHT: 244 LBS | RESPIRATION RATE: 16 BRPM | OXYGEN SATURATION: 99 % | BODY MASS INDEX: 36.14 KG/M2 | HEART RATE: 85 BPM | HEIGHT: 69 IN | SYSTOLIC BLOOD PRESSURE: 130 MMHG | TEMPERATURE: 96.5 F | DIASTOLIC BLOOD PRESSURE: 90 MMHG

## 2020-07-27 PROCEDURE — 99212 OFFICE O/P EST SF 10 MIN: CPT

## 2020-07-27 NOTE — PROGRESS NOTES
Date:  2020  Time:  10:25 AM    CHF Clinic at [facility]    Office: [phone] Fax: [phone]    Re:  Tahira Pulido   Patient : 1960    Vital Signs: BP (!) 130/90   Pulse 85   Temp 96.5 °F (35.8 °C)   Resp 16   Ht 5' 9\" (1.753 m)   Wt 244 lb (110.7 kg)   SpO2 99%   BMI 36.03 kg/m²                                                   No results for input(s): CBC, HGB, HCT, WBC, PLATELET, NA, K, CL, CO2, BUN, CREATININE, GLUCOSE, BNP, INR in the last 72 hours. Respiratory:    Assessment  Charting Type: Reassessment    Breath Sounds  Right Upper Lobe: Clear  Right Middle Lobe: Clear  Right Lower Lobe: Clear  Left Upper Lobe: Clear  Left Lower Lobe: Clear    Cough/Sputum  Cough: None         Peripheral Vascular  RLE Edema: Trace  LLE Edema: Trace      Complaints: No new compliants    Physician Orders No new orders    Comment : patient last seen in February weight 242 today 244 no signs of Acute CHF noted. Trace pedal edema measurements down from February . Discussed in detail low sodium diet and fluid restriction of 2000ml per day. Patients medications unchanged continues on [id number] PO BID, Has eye surgery and hernia surgeryin August scheduled will see CHF in .     Electronically signed by Hermila Owen RN on 2020 at 10:25 AM

## 2020-08-02 NOTE — PROGRESS NOTES
[facility]  [facility] INVASIVE BARIATRIC SURG  [address] [place] [address] [place]  [address] [facility] [address]  Dept: [phone]    ROBOTIC & MINIMALLY INVASIVE SURGERY  PROGRESS NOTE INITIAL EVALUATION     Patient: Paty Crum        Service Date: 2020      HPI:     Chief Complaint   Patient presents with    Hernia     umbilical, R sided inguinal     Surgical Consult       The patient is a pleasant 62y.o. year old male  with morbid obesity and a ventral and possible left inguinal hernia, who stands Height: 5' 8\" (172.7 cm) tall with a weight of Weight: 240 lb (108.9 kg) , resulting in a BMI of Body mass index is 36.49 kg/m². He would like the hernias repaired as he does have pain. He states the hernias reduces. He noticed a left groin bulge. He denies diarrhea, constipation, melena or hematochezia. He had recent stent placement in the past few years and is on anticoagulation. The patient denies  a history of deep vein thrombosis, pulmonary embolism, renal failure and hepatic failure.       Medical History:  Past Medical History:   Diagnosis Date    CAD (coronary artery disease)     Cancer ([facility]     skin    CHF (congestive heart failure) (HCC)     Heart attack ([facility]     Heart disease     Hyperlipidemia     Hypertension     MI (myocardial infarction) ([facility]     MRSA (methicillin resistant staph aureus) culture positive 2018    abdomen    Stroke [facility])            Surgical History:  Past Surgical History:   Procedure Laterality Date    CORONARY ANGIOPLASTY WITH STENT PLACEMENT      CORONARY ARTERY BYPASS GRAFT      qrad    CORONARY ARTERY BYPASS GRAFT      EYE SURGERY      EYE SURGERY         Family History:      Problem Relation Age of Onset    Coronary Art Dis Father     Liver Disease Father     Alcohol Abuse Sister        Social History:   Social History     Tobacco Use    Smoking status: Former Smoker     Last attempt to quit: 1999     Years since quittin.5    [] Denies need for education    English Speaking Ability    [x] Speaks English well   [] Reads English well   [] Understands spoken english    [] Understands written English   [] No need for interpretive support      [] Might benefit from interpretive support   []  required for all services     REVIEW OF SYSTEMS: (Negative unless marked otherwise)       Do you or have you had any of the following?   Cardiovascular YES NO Respiratory YES NO   High Blood Pressure   [x]   [] COPD   []   []   Heart Attack   []   [] TB/Positive skin Test   []   []   Congestive Heart Failure   []   [] Obstructive Sleep Apnea   []   []   Coronary Artery Disease   [x]   [] Asthma   []   []   Circulation Problems   [x]   []      Activity Intolerance   [x]   [] Gastrointestinal YES NO   Peripheral Vascular Disease   []   [] Gastric Problems   []   []        Colorectal problems   []   []   Hematological YES NO Ulcer disease   []   []   Bleeding Tendencies   []   [] Liver disease   []   []   Blood Transfusion last 30d   []   [] Gallstones   []   []   Anemia   []   [] Refulx or Heartburn   []   []   Blood Clots   []   []      High Cholesterol   []   [] Muscoloskeletal YES NO   High Triglycerides   []   [] Joint Limitations   [x]   []      Muscle Weakness   []   []   Eyes, Ears, Nose, Throat YES NO Multiple Sclerosis   []   []   Cataracts   []   [] Arthritis   [x]   []   Glasses   []   []      Blurred Vision   []   [] Cancer   [x]   []   Hearing Aids   []   [] Type: Skin     Ringing in Ears   []   []      Difficulty Swallowing   []   [] Encodrine YES NO      Diabetes   []   []   Neurological YES NO Thyroid   []   []   Stroke   []   []      Seizure   []   [] Psychiatric Disorder YES NO   Dizziness/Blackouts/Fainting   []   [] Depression   []   []   Memory Impairement   []   [] Bipolar   []   []   Parkinson's   []   [] Anxiety disorder   []   []           Genitourinary/Gyn YES NO Skin Intact   [x]   []   Urinary Infection   []   [] Rash x   Stones   []   []      Kidney Disease   []   []      Incontinent   []   []                                     PRESENT ILLNESS:     Weight Parameters  Weight 240 lb (108.9 kg)   Height 5' 8\" (1.727 m)   BMI Body mass index is 36.49 kg/m². IBW     EBW               IMMUNIZATION STATUS  Immunization History   Administered Date(s) Administered    Influenza Virus Vaccine 10/22/2017    Influenza, [place], 6 mo and older, IM, PF (Flulaval, Fluarix) 02/20/2019    Pneumococcal Polysaccharide ([id number]) 11/23/2017       FALLS ASSESSMENT    [x] LOW RISK FOR FALLS    [] MODERATE RISK FOR FALLS    [] Difficulty walking/selfcare    [] Falls in the past 2 months    [] Suspicion of Clinician    [] Other:      SMOKING CESSATION     [x] Not needed     [] Instructed to stop smoking    [] Pamphlet community resources given     VTE SCREEN    [] Family hx DVT/PE  /   [] Personal hx of DVT/PE    [x] Denies any family or personal hx of DVT/PE    Physician Review    [x] Past medical, family, & social history reviewed and discussed with patient. Review of surgery and post-surgical changes (by surgeon for surgical patients only)    [x] Lifelong diet expectations reviewed with patient      PHYSICAL EXAMINATION:      /78 (Site: Right Upper Arm, Position: Sitting, Cuff Size: Large Adult)   Pulse 76   Temp 97 °F (36.1 °C) (Infrared)   Resp 18   Ht 5' 8\" (1.727 m)   Wt 240 lb (108.9 kg)   BMI 36.49 kg/m²     Constitutional:  Vital signs are normal. The patient appears well-developed   HEENT:      Head: Normocephalic. Atraumatic     Eyes: pupils are equal and reactive. No scleral icterus is present. Neck: No mass and no thyromegaly present. Cardiovascular: Normal rate, regular rhythm, S1 normal and S2 normal.  Bilateral pulses present. Pulmonary/Chest: Effort normal and breath sounds normal. No retractions. Abdominal: Soft. Normal appearance. There is no organomegaly. No tenderness.  There is no rigidity, no rebound, no guarding and no Rodriguez's sign. Reducible ventral, left inguinal and possible right inguinal hernia  Musculoskeletal:      Right lower leg: Normal. No tenderness and no edema. Left lower leg: Normal. No tenderness and no edema. Lymphadenopathy:     No cervical adenopathy, No Exrtemity Adenopathy. Neurological: The patient is alert and oriented. Moving all four extremities equally, sensation grossly intact bilateral.  Skin: Skin is warm, dry and intact. Psychiatric: The patient has a normal mood and affect. Speech is normal and behavior is normal. Judgment and thought content normal. Cognition and memory are normal.     RECOMMENDATIONS:     We spent a great deal of time discussing the risks and benefits of Robotic/Laparoscopic Ventral and bilateral inguinal hernia repair with mesh, possible open, including but not limited to injury to intra-abdominal organs, recurrence of hernias, mesh infection or complications, the need for re-operative therapy,  prolonged hospitalization,  mechanical ventilation,  and death. We discussed the possibility of bleeding, the need for blood transfusions, blood clots, hospital-acquired and intra-abdominal infection, anastomotic stricture, and worsening neuralgia or chronic james . We discussed the need for post-operative visit compliance, behavior modifications and diet changes. PLAN:       Diagnosis Orders   1. Umbilical hernia without obstruction and without gangrene     2. Non-recurrent bilateral inguinal hernia without obstruction or gangrene     3.  Acute on chronic systolic (congestive) heart failure (HCC)                We discussed hernia repair options vs conservative management  Will need cardiology clearance  Will need to hold antiplatelet therapy  We discussed the risks of bleeding  Risks of hernia repair discussed, he would like to move forward with surgery  Will need PAT testing and labs        Electronically signed by Jacquelyn Greer DO on 8/2/2020 at 10:07 AM

## 2020-08-03 ENCOUNTER — HOSPITAL ENCOUNTER (OUTPATIENT)
Dept: PREADMISSION TESTING | Age: 60
Setting detail: SPECIMEN
Discharge: HOME | End: 2020-08-07
Payer: MEDICARE

## 2020-08-03 PROCEDURE — U0003 INFECTIOUS AGENT DETECTION BY NUCLEIC ACID (DNA OR RNA); SEVERE ACUTE RESPIRATORY SYNDROME CORONAVIRUS 2 (SARS-COV-2) (CORONAVIRUS DISEASE [COVID-19]), AMPLIFIED PROBE TECHNIQUE, MAKING USE OF HIGH THROUGHPUT TECHNOLOGIES AS DESCRIBED BY CMS-2020-01-R: HCPCS

## 2020-08-06 ENCOUNTER — HOSPITAL ENCOUNTER (OUTPATIENT)
Dept: CARDIAC CATH/INVASIVE PROCEDURES | Age: 60
Discharge: HOME | End: 2020-08-06
Payer: MEDICARE

## 2020-08-06 VITALS
SYSTOLIC BLOOD PRESSURE: 131 MMHG | WEIGHT: 241 LBS | DIASTOLIC BLOOD PRESSURE: 89 MMHG | HEIGHT: 69 IN | RESPIRATION RATE: 18 BRPM | TEMPERATURE: 98.2 F | BODY MASS INDEX: 35.7 KG/M2 | HEART RATE: 77 BPM | OXYGEN SATURATION: 97 %

## 2020-08-06 LAB
GFR NON-AFRICAN AMERICAN: >60 ML/MIN
GFR SERPL CREATININE-BSD FRML MDRD: >60 ML/MIN
GFR SERPL CREATININE-BSD FRML MDRD: NORMAL ML/MIN/{1.73_M2}
GLUCOSE BLD-MCNC: 177 MG/DL (ref 74–100)
POC CHLORIDE: 106 MMOL/L (ref 98–107)
POC CREATININE: 0.7 MG/DL (ref 0.51–1.19)
POC HEMATOCRIT: 50 % (ref 41–53)
POC HEMOGLOBIN: 16.8 G/DL (ref 13.5–17.5)
POC POTASSIUM: 4.3 MMOL/L (ref 3.5–4.5)
POC SODIUM: 140 MMOL/L (ref 138–146)
SARS-COV-2, NAA: NOT DETECTED

## 2020-08-06 PROCEDURE — 84295 ASSAY OF SERUM SODIUM: CPT

## 2020-08-06 PROCEDURE — C1894 INTRO/SHEATH, NON-LASER: HCPCS

## 2020-08-06 PROCEDURE — C1725 CATH, TRANSLUMIN NON-LASER: HCPCS

## 2020-08-06 PROCEDURE — 2709999900 HC NON-CHARGEABLE SUPPLY

## 2020-08-06 PROCEDURE — 7100000001 HC PACU RECOVERY - ADDTL 15 MIN

## 2020-08-06 PROCEDURE — 82947 ASSAY GLUCOSE BLOOD QUANT: CPT

## 2020-08-06 PROCEDURE — C1769 GUIDE WIRE: HCPCS

## 2020-08-06 PROCEDURE — 2500000003 HC RX 250 WO HCPCS

## 2020-08-06 PROCEDURE — 85014 HEMATOCRIT: CPT

## 2020-08-06 PROCEDURE — C1760 CLOSURE DEV, VASC: HCPCS

## 2020-08-06 PROCEDURE — 82435 ASSAY OF BLOOD CHLORIDE: CPT

## 2020-08-06 PROCEDURE — 6360000002 HC RX W HCPCS

## 2020-08-06 PROCEDURE — 82565 ASSAY OF CREATININE: CPT

## 2020-08-06 PROCEDURE — 6360000004 HC RX CONTRAST MEDICATION

## 2020-08-06 PROCEDURE — 85049 AUTOMATED PLATELET COUNT: CPT

## 2020-08-06 PROCEDURE — 84132 ASSAY OF SERUM POTASSIUM: CPT

## 2020-08-06 PROCEDURE — 93459 L HRT ART/GRFT ANGIO: CPT | Performed by: INTERNAL MEDICINE

## 2020-08-06 PROCEDURE — 7100000000 HC PACU RECOVERY - FIRST 15 MIN

## 2020-08-06 RX ORDER — SODIUM CHLORIDE 0.9 % (FLUSH) 0.9 %
10 SYRINGE (ML) INJECTION PRN
Status: DISCONTINUED | OUTPATIENT
Start: 2020-08-06 | End: 2020-08-07 | Stop reason: HOSPADM

## 2020-08-06 RX ORDER — SODIUM CHLORIDE 9 MG/ML
INJECTION, SOLUTION INTRAVENOUS CONTINUOUS
Status: DISCONTINUED | OUTPATIENT
Start: 2020-08-06 | End: 2020-08-07 | Stop reason: HOSPADM

## 2020-08-06 RX ADMIN — SODIUM CHLORIDE: 9 INJECTION, SOLUTION INTRAVENOUS at 12:18

## 2020-08-06 NOTE — OP NOTE
[facility]        Date:   8/6/2020  Patient name:  Delano Mejia  Date of admission:  8/6/2020 11:23 AM  MRN:   [id number]  YOB: 1960    CARDIAC CATHETERIZATION    Operators:  Jerrod Law MD      Procedure performed:       [x] Left Heart Catheterization. [x] Graft Angiography. [x] Left Ventriculography. [] Right Heart Catheterization. [x] Coronary Angiography. [] Aortic Valve Studies. [] PCI:      [] Other:         Pre Procedure Conscious Sedation Data:    ASA Class:    [] I [x] II [] III [] IV    Mallampati Class:  [] I [x] II [] III [] IV      Indication:  [] STEMI      [x] + Stress test  [] ACS      [] + EKG Changes  [] Non Q MI       [] Significant Risk Factors  [] Recurrent Angina             [] Diabetes Mellitus    [] New LBBB      [] Uncontrolled HTN. [] CHF / Low EF changes     [] Abnormal CTA / Ca Score  [x] Other: Pre op    Procedure:  Access:  [x] Femoral  [] Radial  artery       [x] Right  [] Left    Procedure: After informed consent was obtained with explanation of the risks and benefits, patient was brought to the cath lab. The right and left groin were prepped and draped in sterile fashion. 1% lidocaine was used for local block. The  right femoral artery was cannulated with 6  Fr sheath with brisk arterial blood return. The side port was frequently flushed and aspirated with normal saline. Findings:     LMCA: Normal 0% stenosis.     LAD: Single stenosis. with patent LIMA-LAD  Lesion on Prox LAD: 95% stenosis. LCx: Chronic occlusion 100 % . with patent SVG-OM  Lesion on Prox CX: 100% stenosis. RCA: Chronic occlusion 100 % . Lesion on Prox RCA: Ostial.100% stenosis. Cardiac Grafts      -  There is a LIMA graft that originates at the LIMA and attaches to the      Mid LAD. Patent with 0% stenosis      -  There is a Vein graft that originates at the Aorta Left and attaches to      the 1st Ob Samantha.  Patent with luminal irregularities of 20-30%. Coronary Tree      Dominance: Right      The LV gram was performed in the POSEY 30 position. LVEF: 40%    Estimated blood loss: 10 ml    Conclusions:  1. Multi-vessel Coronary Artery Disease. 2. Patent LIMA-LAD and SVG to OM. 3. Occluded native RCA with left to right collaterals. 4. Mildly impaired ventricular function. 5. Dilated aortic root with no other grafts seen. Recommendations:  1. Medical Therapy. 2. Risk Factors Modification. 3. Post Cath Protocol    History and Risk Factors    [x] Hypertension     [] Family history of CAD  [x] Hyperlipidemia     [] Cerebrovascular Disease   [] Prior MI       [] Peripheral Vascular disease   [] Prior PCI              [] Diabetes Mellitus    [] Left Main PCI. [] Currently on Dialysis. [x] Prior CABG. [] Currently smoker. [] Cardiac Arrest outside of healthcare facility. [] Yes    [x] No        Witnessed     [] Yes   [] No     Arrest after arrival of EMS  [] Yes   [] No     [] Cardiac Arrest at other Facility. [] Yes   [x] No    Pre-Procedure Information. Heart Failure       [] Yes    [x] No        Class  [] I      [x] II  [] III    [] IV. New Diagnosis    [x] Yes  [] No    HF Type      [x] Systolic   [] Diastolic          [] Unknown. Diagnostic Test:   EKG       [x] Normal   [] Abnormal    New antiarrhythmia medications:    [] Yes   [] No   New onset atrial fibrillation / Flutter     [] Yes   [] No   ECG Abnormalities:      [] V. Fib   [] Danae V. Tach           [] NS V. T   [] New LBBB           [] T.  Inv  []  ST dev > 0.5 mm         [] PVC's freq  [] PVC's infrequent    Stress Test Performed:      [x] Yes    [] No     Type:     [] Stress Echo   [] Exercise Stress Test (no imaging)      [] Stress Nuclear  [] Stress Imaging     Results   [] Negative   [x] Positive        [] Indeterminate  [] Unavailable     If Positive/ Risk / Extent of Ischemia:       [] Low  [x] Intermediate         [] High  [] Unavailable      Cardiac CTA Performed:     [] Yes    [x] No      Results   [] CAD   [] Non obstructive CAD      [] No CAD   [] Uncertain      [] Unknown   [] Structural Disease. Pre Procedure Medications:   [x] Yes    [] No         [x] ASA   [] Beta Blockers      [] Nitrate   [] Ca Channel Blockers      [] Ranolazine   [x] Statin       [] Plavix/Others antiplatelets      Electronically signed on 8/6/2020 at 4:41 PM by: Bryce Dickey MD  Fellow, [address]      Physician Statement  I have discussed the case of Mark Multani including pertinent history and exam findings with the resident. I have seen and examined the patient and the key elements of the encounter have been performed by me. I agree with the assessment, plan and orders as documented by the resident With changes made to the note. Procedure performed by me.     Electronically signed by Bishop Melissa MD on 8/9/2020 at 10:07 PM.    [facility]      [phone]

## 2020-08-06 NOTE — H&P
[facility]   History & Physical               Today's Date: 8/6/2020  Patient Name: Delano Mejia  Date of admission: 8/6/2020 11:23 AM  Patient's age: 62 y.o., 1960  Admission Dx: Abnormal result of other cardiovascular function study [R94.39]    Reason for Admission:  Abnormal Stress test    CHIEF COMPLAINT:    No chief complaint on file. History Obtained From:  patient    HISTORY OF PRESENT ILLNESS:    The patient is a 62 y.o.  male who is admitted to the hospital for an elective cath. Patient was being evaluated by primary care provider for an elective hernia repair and upon questioning reported dyspnea on exertion. PCP subsequently ordered a stress test which showed inferior wall large transmural infarct, basal anterior wall mild ischemia and inferior wall severe hypokinesis with an EF of 30%. Patient is here today to follow up on his positive stress test with an elective cath. Reports no chest pain, SOB, palpitations, syncope or other CV symptoms today. Past Medical History:   has a past medical history of CAD (coronary artery disease), Cancer ([address] CHF (congestive heart failure) ([address] Heart attack ([address] Heart disease, Hyperlipidemia, Hypertension, MI (myocardial infarction) ([address] MRSA (methicillin resistant staph aureus) culture positive, and Stroke ([facility] [address] Past Surgical History:   has a past surgical history that includes Eye surgery; Coronary artery bypass graft; Coronary angioplasty with stent; eye surgery; and Coronary artery bypass graft. Home Medications:    Prior to Admission medications    Medication Sig Start Date End Date Taking?  Authorizing Provider   Multiple Vitamins-Minerals (THERAPEUTIC MULTIVITAMIN-MINERALS) tablet Take 1 tablet by mouth daily    Historical Provider, MD   Ascorbic Acid (VITAMIN C) 250 MG tablet Take 250 mg by mouth three times a week    Historical Provider, MD   bumetanide (BUMEX) 1 MG tablet Take 1 tablet by mouth every morning AND 1 tablet Daily with lunch. 3/13/19   ELY Black NP   nitroGLYCERIN (NITROSTAT) 0.4 MG SL tablet Place 0.4 mg under the tongue every 5 minutes as needed for Chest pain up to max of 3 total doses. If no relief after 1 dose, call 911. Historical Provider, MD   vitamin E 400 UNIT capsule Take 400 Units by mouth daily    Historical Provider, MD   atorvastatin (LIPITOR) 80 MG tablet Take 1 tablet by mouth nightly 2/20/19   Morena Sethi MD   carvedilol (COREG) 12.5 MG tablet Take 1 tablet by mouth 2 times daily (with meals) 2/20/19   Morena Sethi MD   clopidogrel (PLAVIX) 75 MG tablet Take 1 tablet by mouth daily 2/20/19   Morena Sethi MD   lisinopril (PRINIVIL;ZESTRIL) 5 MG tablet Take 1 tablet by mouth daily 2/21/19   Morena Sethi MD   aspirin 81 MG chewable tablet Take 81 mg by mouth daily. Historical Provider, MD        Current Facility-Administered Medications: 0.9 % sodium chloride infusion, , Intravenous, Continuous  sodium chloride flush 0.9 % injection 10 mL, 10 mL, Intravenous, PRN    Allergies:  Patient has no known allergies. Social History:   reports that he quit smoking about 21 years ago. He has never used smokeless tobacco. He reports that he does not drink alcohol or use drugs. Family History: family history includes Alcohol Abuse in his sister; Coronary Art Dis in his father; Liver Disease in his father. REVIEW OF SYSTEMS:      · Constitutional: there has been no unanticipated weight loss. · Eyes: No visual changes or diplopia. · ENT: No Headaches  · Cardiovascular:  Remaining as above  · Respiratory: No cough  · Gastrointestinal: No abdominal pain. No change in bowel or bladder habits. · Genitourinary: No dysuria, trouble voiding, or hematuria. · Neurological: No headache. PHYSICAL EXAM:      There were no vitals taken for this visit.    No intake or output data in the 24 hours ending 08/06/20 1156    Constitutional and General Appearance: alert, cooperative, no distress and appears stated age  [de-identified]:  · PERRL, EOMI  Respiratory:  · Normal excursion and expansion without use of accessory muscles  · Resp Auscultation:  Good respiratory effort. No for increased work of breathing. On auscultation: clear to auscultation bilaterally  Cardiovascular:  · Regular rate and rhythm  · S1/S2  · No murmurs  · The apical impulse is not displaced  Abdomen:  · Soft  · Bowel sounds present  · Non-tender to palpation  Extremities:  · No cyanosis or clubbing  · No Lower extremity edema  Skin:  · Warm and dry  Neurological:  · Alert and oriented. · Moves all extremities well    DATA:    Diagnostics:    Stress Test: 6/25/2020  Inferior wall large transmural infarct. Basal anterior wall mild ischemia, inferior wall severe hypokinesis. EF 30%      Labs:       FASTING LIPID PANEL:  Lab Results   Component Value Date    HDL 29 02/18/2019    TRIG 79 02/18/2019         Patient's Active Problem List  Active Problems:    * No active hospital problems. *  Resolved Problems:    * No resolved hospital problems. *      IMPRESSION:    1. Non-ischemic cardiomyopathy EF 35%  2. HFrEF (EF 35%)  3. CAD s/p CABG - LIMA to LAD & SVG to OM  4. HTN  5. HLD    RECOMMENDATIONS:  1. Proceed with procedure  2. Recommendations to follow after procedure      Discussed with patient and Nurse. Stephanie Azevedo M.D. Fellow, [address]      Attending Physician Statement  I have discussed the case of Sara Adams including pertinent history and exam findings with the resident. I have seen and examined the patient and the key elements of the encounter have been performed by me. I agree with the assessment, plan and orders as documented by the resident With changes made to the note.      Electronically signed by Dominique Mcpherson MD on 8/9/2020 at 9:53 PM.    [facility]      [phone]

## 2020-08-06 NOTE — PROGRESS NOTES
Patient admitted, consent signed and questions answered. Patient ready for procedure. Call light to reach with side rails up 2 of 2. sites clipped. family at bedside with patient. History and physical done.

## 2020-08-07 LAB — PLATELET # BLD: 99 K/UL (ref 138–453)

## 2020-08-24 ENCOUNTER — HOSPITAL ENCOUNTER (OUTPATIENT)
Dept: OTHER | Age: 60
Discharge: HOME | End: 2020-08-24
Payer: MEDICARE

## 2020-08-24 VITALS
HEART RATE: 82 BPM | OXYGEN SATURATION: 99 % | DIASTOLIC BLOOD PRESSURE: 82 MMHG | RESPIRATION RATE: 20 BRPM | WEIGHT: 239 LBS | SYSTOLIC BLOOD PRESSURE: 140 MMHG | BODY MASS INDEX: 35.29 KG/M2

## 2020-08-24 PROCEDURE — 99212 OFFICE O/P EST SF 10 MIN: CPT

## 2020-08-24 NOTE — PROGRESS NOTES
Date:  2020  Time:  10:34 AM    CHF Clinic at [facility]    Office: [phone] Fax: [phone]    Re:  Kanchan Joseph   Patient : 1960    Vital Signs: BP (!) 140/82   Pulse 82   Resp 20   Wt 239 lb (108.4 kg)   SpO2 99%   BMI 35.29 kg/m²                       O2 Device: Nasal cannula                           No results for input(s): CBC, HGB, HCT, WBC, PLATELET, NA, K, CL, CO2, BUN, CREATININE, GLUCOSE, BNP, INR in the last 72 hours. Respiratory:    Assessment  Charting Type: Reassessment    Breath Sounds  Right Upper Lobe: Clear  Right Middle Lobe: Clear  Right Lower Lobe: Clear, Diminished  Left Upper Lobe: Clear  Left Lower Lobe: Clear, Diminished    Cough/Sputum  Cough: None       Peripheral Vascular  RLE Edema: None  LLE Edema: None    Complaints: Nothing new    Comment : Patient here ambulatory. Weight down 5 lbs in one month. No new or acute s/s CHF. Reminded of low salt diet and fluid limits. Next visit here 2020.     Electronically signed by Clary Stiles RN on 2020 at 10:34 AM

## 2020-09-21 ENCOUNTER — HOSPITAL ENCOUNTER (EMERGENCY)
Age: 60
Discharge: HOME | End: 2020-09-21
Attending: EMERGENCY MEDICINE
Payer: MEDICARE

## 2020-09-21 VITALS
WEIGHT: 238 LBS | TEMPERATURE: 98.3 F | DIASTOLIC BLOOD PRESSURE: 62 MMHG | HEIGHT: 68 IN | HEART RATE: 88 BPM | BODY MASS INDEX: 36.07 KG/M2 | RESPIRATION RATE: 14 BRPM | OXYGEN SATURATION: 99 % | SYSTOLIC BLOOD PRESSURE: 132 MMHG

## 2020-09-21 PROCEDURE — 99281 EMR DPT VST MAYX REQ PHY/QHP: CPT

## 2020-09-21 RX ORDER — SULFAMETHOXAZOLE AND TRIMETHOPRIM 800; 160 MG/1; MG/1
1 TABLET ORAL 2 TIMES DAILY
Qty: 20 TABLET | Refills: 0 | Status: SHIPPED | OUTPATIENT
Start: 2020-09-21 | End: 2020-10-01

## 2020-09-21 RX ORDER — CEPHALEXIN 500 MG/1
500 CAPSULE ORAL 3 TIMES DAILY
Qty: 30 CAPSULE | Refills: 0 | Status: SHIPPED | OUTPATIENT
Start: 2020-09-21 | End: 2020-10-01

## 2020-09-21 ASSESSMENT — PAIN DESCRIPTION - LOCATION: LOCATION: ARM

## 2020-09-21 ASSESSMENT — PAIN SCALES - GENERAL: PAINLEVEL_OUTOF10: 3

## 2020-09-21 ASSESSMENT — PAIN DESCRIPTION - DESCRIPTORS: DESCRIPTORS: DISCOMFORT

## 2020-09-21 ASSESSMENT — ENCOUNTER SYMPTOMS
VOMITING: 0
SINUS PRESSURE: 0
COUGH: 0
NAUSEA: 0
SHORTNESS OF BREATH: 0
COLOR CHANGE: 1

## 2020-09-21 ASSESSMENT — PAIN DESCRIPTION - ORIENTATION: ORIENTATION: RIGHT;LEFT

## 2020-09-21 ASSESSMENT — PAIN DESCRIPTION - PAIN TYPE: TYPE: ACUTE PAIN

## 2020-09-21 NOTE — ED PROVIDER NOTES
eMERGENCY dEPARTMENT eNCOUnter   Independent Attestation     Pt Name: Tahira Pulido  MRN: [id number]  [place] 1960  Date of evaluation: 9/21/20     Tahira Pulido is a 62 y.o. male with CC: Insect Bite (bilateral arms)      Based on the medical record the care appears appropriate. I was personally available for consultation in the Emergency Department.     Baldomero Bueno MD  Attending Emergency Physician                    Baldomero Bueno MD  09/21/20 0186

## 2020-09-21 NOTE — ED PROVIDER NOTES
[address] ED  EMERGENCY DEPARTMENT ENCOUNTER      Pt Name: Paty Crum  MRN: [id number]  [place] 1960  Date of evaluation: 9/21/20  CHIEF COMPLAINT       Chief Complaint   Patient presents with    Insect Bite     bilateral arms     HISTORY OF PRESENT ILLNESS   Presents emergency room via private auto with bug bites to the forearms that started 2 weeks ago. He states that he noticed some bites and scabs to the bilateral forearms a couple of weeks ago. 3 days ago he noticed a larger 1 in the left forearm that has increased in size over the past 2 days. He is got a central scab with some clear drainage. He denies any purulent drainage, fevers, body aches or vomiting. The history is provided by the patient. REVIEW OF SYSTEMS     Review of Systems   Constitutional: Negative for activity change and fever. HENT: Negative for congestion and sinus pressure. Respiratory: Negative for cough and shortness of breath. Cardiovascular: Negative for chest pain and palpitations. Gastrointestinal: Negative for nausea and vomiting. Musculoskeletal: Negative for arthralgias and joint swelling. Skin: Positive for color change and wound. Neurological: Negative for dizziness and headaches. Psychiatric/Behavioral: Negative for confusion and decreased concentration.      PASTMEDICAL HISTORY     Past Medical History:   Diagnosis Date    CAD (coronary artery disease)     Cancer ([facility]     skin    CHF (congestive heart failure) (HCC)     Heart attack ([facility]     Heart disease     Hyperlipidemia     Hypertension     MI [facility]myocardial infarction) ([facility] [facility]     MRSA (methicillin resistant staph aureus) culture positive 01/26/2018    abdomen    Stroke [facility])     2011     SURGICAL HISTORY       Past Surgical History:   Procedure Laterality Date   [address] [facility]    unsure of date, bilateral radials    CORONARY ANGIOPLASTY WITH STENT PLACEMENT      CORONARY ARTERY BYPASS GRAFT      qrad    CORONARY ARTERY BYPASS GRAFT      EYE SURGERY      EYE SURGERY      SKIN BIOPSY      back     CURRENT MEDICATIONS       Discharge Medication List as of 2020  5:46 PM      CONTINUE these medications which have NOT CHANGED    Details   Multiple Vitamins-Minerals (THERAPEUTIC MULTIVITAMIN-MINERALS) tablet Take 1 tablet by mouth dailyHistorical Med      Ascorbic Acid (VITAMIN C) 250 MG tablet Take 250 mg by mouth three times a weekHistorical Med      bumetanide (BUMEX) 1 MG tablet Take 1 tablet by mouth every morning AND 1 tablet Daily with lunch., Disp-60 tablet, R-3Normal      nitroGLYCERIN (NITROSTAT) 0.4 MG SL tablet Place 0.4 mg under the tongue every 5 minutes as needed for Chest pain up to max of 3 total doses. If no relief after 1 dose, call 911. Historical Med      vitamin E 400 UNIT capsule Take 400 Units by mouth dailyHistorical Med      atorvastatin (LIPITOR) 80 MG tablet Take 1 tablet by mouth nightly, Disp-30 tablet, R-3Normal      carvedilol (COREG) 12.5 MG tablet Take 1 tablet by mouth 2 times daily (with meals), Disp-60 tablet, R-3Normal      clopidogrel (PLAVIX) 75 MG tablet Take 1 tablet by mouth daily, Disp-30 tablet, R-3Normal      lisinopril (PRINIVIL;ZESTRIL) 5 MG tablet Take 1 tablet by mouth daily, Disp-30 tablet, R-3Normal      aspirin 81 MG chewable tablet Take 81 mg by mouth daily. Historical Med           ALLERGIES     has No Known Allergies. FAMILY HISTORY     He indicated that his mother is . He indicated that his father is . He indicated that only one of his two sisters is alive.      SOCIAL HISTORY       Social History     Tobacco Use    Smoking status: Former Smoker     Last attempt to quit: 1999     Years since quittin.6    Smokeless tobacco: Never Used   Substance Use Topics    Alcohol use: No    Drug use: No     PHYSICAL EXAM     INITIAL VITALS: /62   Pulse 88   Temp 98.3 °F (36.8 °C) (Oral)   Resp 14   Ht 5' 8\" (1.727 m)   Wt 238 lb (108 kg) SpO2 99%   BMI 36.19 kg/m²    Physical Exam  Constitutional:       Appearance: Normal appearance. HENT:      Right Ear: External ear normal.      Left Ear: External ear normal.   Eyes:      General:         Right eye: No discharge. Left eye: No discharge. Conjunctiva/sclera: Conjunctivae normal.   Cardiovascular:      Rate and Rhythm: Normal rate and regular rhythm. Pulmonary:      Effort: Pulmonary effort is normal.      Breath sounds: Normal breath sounds. Musculoskeletal: Normal range of motion. Skin:     General: Skin is warm and dry. Comments: 3 to 4 mm scabbed areas to the bilateral forearms. 1 area to the right proximal forearm measuring approximately 2 cm in diameter. Small central scab measuring 2 mm. Scant amount of clear drainage. No purulent drainage. No additional surrounding erythema. No fluctuance   Neurological:      General: No focal deficit present. Mental Status: He is alert and oriented to person, place, and time. Psychiatric:         Mood and Affect: Mood normal.         Thought Content: Thought content normal.         DIAGNOSTIC RESULTS     RADIOLOGY:All plain film, CT, MRI, and formal ultrasound images (except ED bedside ultrasound) are read by the radiologist, see reports below, unless otherwise noted in MDM or here. Interpretation per the Radiologist below, if available at the time of this note:    No orders to display       LABS:  Labs Reviewed - No data to display    All other labs were within normal range or not returned as of this dictation. EMERGENCY DEPARTMENT COURSE and DIFFERENTIAL DIAGNOSIS/MDM:   Vitals:    Vitals:    20 1723   BP: 132/62   Pulse: 88   Resp: 14   Temp: 98.3 °F (36.8 °C)   TempSrc: Oral   SpO2: 99%   Weight: 238 lb (108 kg)   Height: 5' 8\" (1.727 m)       Medical Decision Makin-year-old male with an area of cellulitis. He is afebrile does not appear ill. Emergent work-up is not indicated.   He will be started on antibiotics. He has an appointment with his PCP on Friday that he was encouraged to keep for follow-up. The patient was given the following meds in the ED:  Orders Placed This Encounter   Medications    sulfamethoxazole-trimethoprim (BACTRIM DS) 800-160 MG per tablet     Sig: Take 1 tablet by mouth 2 times daily for 10 days     Dispense:  20 tablet     Refill:  0    cephALEXin (KEFLEX) 500 MG capsule     Sig: Take 1 capsule by mouth 3 times daily for 10 days     Dispense:  30 capsule     Refill:  0       FINAL IMPRESSION      1.  Cellulitis of left upper extremity          DISPOSITION/PLAN   DISPOSITION Decision To Discharge 09/21/2020 05:42:17 PM      PATIENT REFERRED TO:   Priscilla Dangelo PA-C  [address]  [facility] [address] [facility]  [phone]      Friday as scheduled      DISCHARGE MEDICATIONS:     Discharge Medication List as of 9/21/2020  5:46 PM      START taking these medications    Details   sulfamethoxazole-trimethoprim (BACTRIM DS) 800-160 MG per tablet Take 1 tablet by mouth 2 times daily for 10 days, Disp-20 tablet,R-0Print      cephALEXin (KEFLEX) 500 MG capsule Take 1 capsule by mouth 3 times daily for 10 days, Disp-30 capsule,R-0Print             CRITICAL CARE TIME       Please note that portions of this note were completed with a voice recognition program.      ELY OLIVER CNP, APRN - CNP  09/21/20 2542

## 2020-10-27 ENCOUNTER — HOSPITAL ENCOUNTER (OUTPATIENT)
Dept: OTHER | Age: 60
Discharge: HOME | End: 2020-10-27
Payer: MEDICARE

## 2020-10-27 VITALS
WEIGHT: 242 LBS | TEMPERATURE: 98 F | DIASTOLIC BLOOD PRESSURE: 80 MMHG | SYSTOLIC BLOOD PRESSURE: 120 MMHG | HEART RATE: 81 BPM | RESPIRATION RATE: 16 BRPM | OXYGEN SATURATION: 96 % | BODY MASS INDEX: 36.8 KG/M2

## 2020-10-27 PROCEDURE — 99212 OFFICE O/P EST SF 10 MIN: CPT

## 2020-11-09 ENCOUNTER — TELEPHONE (OUTPATIENT)
Dept: BARIATRICS/WEIGHT MGMT | Age: 60
End: 2020-11-09

## 2020-11-09 NOTE — TELEPHONE ENCOUNTER
Patient calls and is ready to go forward with    Umbilical hernia repair  Bilateral Inguinal Hernia repair    He states he has had some other issues going on in his life and his wife had to have surgery also. I sent another request for cardiac clearance to [facility] today and told patient I will follow up with him in the next 24-48 hours.

## 2020-11-18 NOTE — TELEPHONE ENCOUNTER
Spoke with patient he states he is having a test w/[facility] on 11/23 and should receive clearance after that. If patient does not hear from our office by 11/25 he will contact us as a follow up.

## 2020-11-18 NOTE — TELEPHONE ENCOUNTER
Patient called stating he is having \" a scan\" on 11/13/20 for [facility]. He wanted to know if we had cardiac clearance and I let him now we did not. He is calling their office to see when we will get the clearance sent back to our office.

## 2020-11-19 ENCOUNTER — HOSPITAL ENCOUNTER (OUTPATIENT)
Dept: ULTRASOUND IMAGING | Age: 60
Discharge: HOME | End: 2020-11-21
Payer: MEDICARE

## 2020-11-19 PROCEDURE — 76705 ECHO EXAM OF ABDOMEN: CPT

## 2020-11-19 PROCEDURE — 91200 LIVER ELASTOGRAPHY: CPT

## 2020-11-22 ENCOUNTER — APPOINTMENT (OUTPATIENT)
Dept: CT IMAGING | Age: 60
End: 2020-11-22
Payer: MEDICARE

## 2020-11-22 ENCOUNTER — APPOINTMENT (OUTPATIENT)
Dept: GENERAL RADIOLOGY | Age: 60
End: 2020-11-22
Payer: MEDICARE

## 2020-11-22 ENCOUNTER — HOSPITAL ENCOUNTER (EMERGENCY)
Age: 60
Discharge: HOME | End: 2020-11-22
Attending: EMERGENCY MEDICINE
Payer: MEDICARE

## 2020-11-22 VITALS
HEIGHT: 69 IN | HEART RATE: 79 BPM | WEIGHT: 240 LBS | BODY MASS INDEX: 35.55 KG/M2 | RESPIRATION RATE: 22 BRPM | DIASTOLIC BLOOD PRESSURE: 77 MMHG | OXYGEN SATURATION: 97 % | TEMPERATURE: 98 F | SYSTOLIC BLOOD PRESSURE: 141 MMHG

## 2020-11-22 LAB
-: ABNORMAL
ABSOLUTE EOS #: 0.16 K/UL (ref 0–0.44)
ABSOLUTE IMMATURE GRANULOCYTE: 0.04 K/UL (ref 0–0.3)
ABSOLUTE LYMPH #: 2.06 K/UL (ref 1.1–3.7)
ABSOLUTE MONO #: 0.67 K/UL (ref 0.1–1.2)
ALBUMIN SERPL-MCNC: 4.7 G/DL (ref 3.5–5.2)
ALBUMIN/GLOBULIN RATIO: ABNORMAL (ref 1–2.5)
ALP BLD-CCNC: 95 U/L (ref 40–129)
ALT SERPL-CCNC: 23 U/L (ref 5–41)
AMORPHOUS: ABNORMAL
ANION GAP SERPL CALCULATED.3IONS-SCNC: 11 MMOL/L (ref 9–17)
APPEARANCE: CLEAR
AST SERPL-CCNC: 22 U/L
BACTERIA: ABNORMAL
BASOPHILS # BLD: 1 % (ref 0–2)
BASOPHILS ABSOLUTE: 0.05 K/UL (ref 0–0.2)
BILIRUB SERPL-MCNC: 0.62 MG/DL (ref 0.3–1.2)
BILIRUBIN URINE: NEGATIVE
BILIRUBIN, POC: NEGATIVE
BLOOD URINE, POC: NORMAL
BUN BLDV-MCNC: 17 MG/DL (ref 8–23)
BUN/CREAT BLD: 20 (ref 9–20)
CALCIUM SERPL-MCNC: 9.5 MG/DL (ref 8.6–10.4)
CASTS UA: ABNORMAL /LPF
CHLORIDE BLD-SCNC: 103 MMOL/L (ref 98–107)
CHP ED QC CHECK: YES
CLARITY, POC: CLEAR
CO2: 24 MMOL/L (ref 20–31)
COLOR, POC: YELLOW
COLOR: YELLOW
COMMENT UA: ABNORMAL
CREAT SERPL-MCNC: 0.83 MG/DL (ref 0.7–1.2)
CRYSTALS, UA: ABNORMAL /HPF
DIFFERENTIAL TYPE: ABNORMAL
EOSINOPHILS RELATIVE PERCENT: 2 % (ref 1–4)
EPITHELIAL CELLS UA: ABNORMAL /HPF (ref 0–5)
GFR AFRICAN AMERICAN: >60 ML/MIN
GFR NON-AFRICAN AMERICAN: >60 ML/MIN
GFR SERPL CREATININE-BSD FRML MDRD: ABNORMAL ML/MIN/{1.73_M2}
GFR SERPL CREATININE-BSD FRML MDRD: ABNORMAL ML/MIN/{1.73_M2}
GLUCOSE BLD-MCNC: 141 MG/DL (ref 70–99)
GLUCOSE URINE, POC: NEGATIVE
GLUCOSE URINE: NEGATIVE
HCT VFR BLD CALC: 48.3 % (ref 40.7–50.3)
HEMOGLOBIN: 16.4 G/DL (ref 13–17)
IMMATURE GRANULOCYTES: 1 %
KETONES, POC: NEGATIVE
KETONES, URINE: NEGATIVE
LEUKOCYTE EST, POC: NEGATIVE
LEUKOCYTE ESTERASE, URINE: NEGATIVE
LYMPHOCYTES # BLD: 31 % (ref 24–43)
MCH RBC QN AUTO: 30.1 PG (ref 25.2–33.5)
MCHC RBC AUTO-ENTMCNC: 34 G/DL (ref 28.4–34.8)
MCV RBC AUTO: 88.8 FL (ref 82.6–102.9)
MONOCYTES # BLD: 10 % (ref 3–12)
MUCUS: ABNORMAL
NITRITE, POC: NEGATIVE
NITRITE, URINE: NEGATIVE
NRBC AUTOMATED: 0 PER 100 WBC
OTHER OBSERVATIONS UA: ABNORMAL
PDW BLD-RTO: 12.4 % (ref 11.8–14.4)
PH UA: 6 (ref 5–8)
PH, POC: 5.5
PLATELET # BLD: 128 K/UL (ref 138–453)
PLATELET ESTIMATE: ABNORMAL
PMV BLD AUTO: 10.7 FL (ref 8.1–13.5)
POTASSIUM SERPL-SCNC: 3.9 MMOL/L (ref 3.7–5.3)
PROTEIN UA: NEGATIVE
PROTEIN, POC: NORMAL
RBC # BLD: 5.44 M/UL (ref 4.21–5.77)
RBC # BLD: ABNORMAL 10*6/UL
RBC UA: ABNORMAL /HPF (ref 0–2)
RENAL EPITHELIAL, UA: ABNORMAL /HPF
SEG NEUTROPHILS: 56 % (ref 36–65)
SEGMENTED NEUTROPHILS ABSOLUTE COUNT: 3.74 K/UL (ref 1.5–8.1)
SODIUM BLD-SCNC: 138 MMOL/L (ref 135–144)
SPECIFIC GRAVITY UA: 1.03 (ref 1–1.03)
SPECIFIC GRAVITY, POC: 1.03
TOTAL PROTEIN: 7.4 G/DL (ref 6.4–8.3)
TRICHOMONAS: ABNORMAL
TURBIDITY: CLEAR
URINE HGB: ABNORMAL
UROBILINOGEN, POC: 0.2
UROBILINOGEN, URINE: NORMAL
WBC # BLD: 6.7 K/UL (ref 3.5–11.3)
WBC # BLD: ABNORMAL 10*3/UL
WBC UA: ABNORMAL /HPF (ref 0–5)
YEAST: ABNORMAL

## 2020-11-22 PROCEDURE — 74177 CT ABD & PELVIS W/CONTRAST: CPT

## 2020-11-22 PROCEDURE — 96376 TX/PRO/DX INJ SAME DRUG ADON: CPT

## 2020-11-22 PROCEDURE — 96374 THER/PROPH/DIAG INJ IV PUSH: CPT

## 2020-11-22 PROCEDURE — 6360000002 HC RX W HCPCS: Performed by: PHYSICIAN ASSISTANT

## 2020-11-22 PROCEDURE — 85025 COMPLETE CBC W/AUTO DIFF WBC: CPT

## 2020-11-22 PROCEDURE — 81001 URINALYSIS AUTO W/SCOPE: CPT

## 2020-11-22 PROCEDURE — 72100 X-RAY EXAM L-S SPINE 2/3 VWS: CPT

## 2020-11-22 PROCEDURE — 6360000004 HC RX CONTRAST MEDICATION: Performed by: PHYSICIAN ASSISTANT

## 2020-11-22 PROCEDURE — 99282 EMERGENCY DEPT VISIT SF MDM: CPT

## 2020-11-22 PROCEDURE — 96375 TX/PRO/DX INJ NEW DRUG ADDON: CPT

## 2020-11-22 PROCEDURE — 80053 COMPREHEN METABOLIC PANEL: CPT

## 2020-11-22 PROCEDURE — 2580000003 HC RX 258: Performed by: PHYSICIAN ASSISTANT

## 2020-11-22 RX ORDER — ONDANSETRON 2 MG/ML
4 INJECTION INTRAMUSCULAR; INTRAVENOUS ONCE
Status: COMPLETED | OUTPATIENT
Start: 2020-11-22 | End: 2020-11-22

## 2020-11-22 RX ORDER — 0.9 % SODIUM CHLORIDE 0.9 %
80 INTRAVENOUS SOLUTION INTRAVENOUS ONCE
Status: COMPLETED | OUTPATIENT
Start: 2020-11-22 | End: 2020-11-22

## 2020-11-22 RX ORDER — MORPHINE SULFATE 4 MG/ML
4 INJECTION, SOLUTION INTRAMUSCULAR; INTRAVENOUS ONCE
Status: COMPLETED | OUTPATIENT
Start: 2020-11-22 | End: 2020-11-22

## 2020-11-22 RX ORDER — KETOROLAC TROMETHAMINE 30 MG/ML
30 INJECTION, SOLUTION INTRAMUSCULAR; INTRAVENOUS ONCE
Status: COMPLETED | OUTPATIENT
Start: 2020-11-22 | End: 2020-11-22

## 2020-11-22 RX ORDER — HYDROCODONE BITARTRATE AND ACETAMINOPHEN 5; 325 MG/1; MG/1
1-2 TABLET ORAL EVERY 8 HOURS PRN
Qty: 12 TABLET | Refills: 0 | Status: SHIPPED | OUTPATIENT
Start: 2020-11-22 | End: 2020-11-25

## 2020-11-22 RX ORDER — METHOCARBAMOL 750 MG/1
750 TABLET, FILM COATED ORAL 4 TIMES DAILY
Qty: 40 TABLET | Refills: 0 | Status: SHIPPED | OUTPATIENT
Start: 2020-11-22 | End: 2020-12-02

## 2020-11-22 RX ORDER — PREDNISONE 20 MG/1
20 TABLET ORAL 2 TIMES DAILY
Qty: 10 TABLET | Refills: 0 | Status: SHIPPED | OUTPATIENT
Start: 2020-11-22 | End: 2020-11-27

## 2020-11-22 RX ORDER — HYDROMORPHONE HYDROCHLORIDE 1 MG/ML
1 INJECTION, SOLUTION INTRAMUSCULAR; INTRAVENOUS; SUBCUTANEOUS ONCE
Status: COMPLETED | OUTPATIENT
Start: 2020-11-22 | End: 2020-11-22

## 2020-11-22 RX ORDER — ORPHENADRINE CITRATE 30 MG/ML
60 INJECTION INTRAMUSCULAR; INTRAVENOUS ONCE
Status: COMPLETED | OUTPATIENT
Start: 2020-11-22 | End: 2020-11-22

## 2020-11-22 RX ORDER — SODIUM CHLORIDE 0.9 % (FLUSH) 0.9 %
10 SYRINGE (ML) INJECTION ONCE
Status: COMPLETED | OUTPATIENT
Start: 2020-11-22 | End: 2020-11-22

## 2020-11-22 RX ORDER — DEXAMETHASONE SODIUM PHOSPHATE 10 MG/ML
10 INJECTION, SOLUTION INTRAMUSCULAR; INTRAVENOUS ONCE
Status: COMPLETED | OUTPATIENT
Start: 2020-11-22 | End: 2020-11-22

## 2020-11-22 RX ADMIN — MORPHINE SULFATE 4 MG: 4 INJECTION, SOLUTION INTRAMUSCULAR; INTRAVENOUS at 19:36

## 2020-11-22 RX ADMIN — DEXAMETHASONE SODIUM PHOSPHATE 10 MG: 10 INJECTION, SOLUTION INTRAMUSCULAR; INTRAVENOUS at 22:11

## 2020-11-22 RX ADMIN — ONDANSETRON 4 MG: 2 INJECTION INTRAMUSCULAR; INTRAVENOUS at 22:18

## 2020-11-22 RX ADMIN — Medication 10 ML: at 20:54

## 2020-11-22 RX ADMIN — SODIUM CHLORIDE 80 ML: 9 INJECTION, SOLUTION INTRAVENOUS at 20:54

## 2020-11-22 RX ADMIN — ONDANSETRON 4 MG: 2 INJECTION INTRAMUSCULAR; INTRAVENOUS at 19:36

## 2020-11-22 RX ADMIN — KETOROLAC TROMETHAMINE 30 MG: 30 INJECTION, SOLUTION INTRAMUSCULAR at 22:12

## 2020-11-22 RX ADMIN — ORPHENADRINE CITRATE 60 MG: 60 INJECTION INTRAMUSCULAR; INTRAVENOUS at 22:12

## 2020-11-22 RX ADMIN — IOPAMIDOL 75 ML: 755 INJECTION, SOLUTION INTRAVENOUS at 20:54

## 2020-11-22 RX ADMIN — HYDROMORPHONE HYDROCHLORIDE 1 MG: 1 INJECTION, SOLUTION INTRAMUSCULAR; INTRAVENOUS; SUBCUTANEOUS at 20:34

## 2020-11-22 RX ADMIN — HYDROMORPHONE HYDROCHLORIDE 1 MG: 1 INJECTION, SOLUTION INTRAMUSCULAR; INTRAVENOUS; SUBCUTANEOUS at 22:19

## 2020-11-22 ASSESSMENT — PAIN SCALES - GENERAL
PAINLEVEL_OUTOF10: 7
PAINLEVEL_OUTOF10: 10
PAINLEVEL_OUTOF10: 9
PAINLEVEL_OUTOF10: 10
PAINLEVEL_OUTOF10: 10

## 2020-11-23 NOTE — ED PROVIDER NOTES
every 5 minutes as needed for Chest pain up to max of 3 total doses. If no relief after 1 dose, call 911. VITAMIN E 400 UNIT CAPSULE    Take 400 Units by mouth daily       PAST MEDICAL HISTORY         Diagnosis Date    CAD (coronary artery disease)     Cancer ([facility] [address]     skin    CHF (congestive heart failure) (HCC)     Heart attack ([facility] [address] [facility]     Heart disease     Hyperlipidemia     Hypertension     MI (myocardial infarction) ([facility] [facility]     MRSA (methicillin resistant staph aureus) culture positive 2018    abdomen    Stroke [facility])     2011       SURGICAL HISTORY           Procedure Laterality Date    CARDIAC SURGERY      unsure of date, bilateral radials    CORONARY ANGIOPLASTY WITH STENT PLACEMENT      CORONARY ARTERY BYPASS GRAFT      qrad    CORONARY ARTERY BYPASS GRAFT      EYE SURGERY      EYE SURGERY      SKIN BIOPSY      back         FAMILY HISTORY           Problem Relation Age of Onset    Coronary Art Dis Father     Liver Disease Father     Alcohol Abuse Sister      Family Status   Relation Name Status    Father      Mother      Sister  Alive    Sister          SOCIAL HISTORY      reports that he quit smoking about 21 years ago. He has never used smokeless tobacco. He reports that he does not drink alcohol or use drugs. REVIEW OFSYSTEMS    (2-9 systems for level 4, 10 or more for level 5)   Review of Systems    Except as noted above the remainder of the review of systems was reviewed and negative. PHYSICAL EXAM    (up to 7 for level 4, 8 or more for level 5)     ED Triage Vitals [20 1856]   BP Temp Temp Source Pulse Resp SpO2 Height Weight   (!) 141/77 98 °F (36.7 °C) Oral 79 22 97 % 5' 9\" (1.753 m) 240 lb (108.9 kg)      Physical Exam  Constitutional:       Appearance: He is well-developed. HENT:      Head: Normocephalic and atraumatic. Neck:      Musculoskeletal: Normal range of motion and neck supple.    Cardiovascular:      Rate and Rhythm: Normal rate and regular rhythm. Pulmonary:      Effort: Pulmonary effort is normal.      Breath sounds: Normal breath sounds. Abdominal:      Palpations: Abdomen is soft. Tenderness: There is abdominal tenderness. Musculoskeletal: Normal range of motion. Back:    Skin:     General: Skin is warm. Neurological:      Mental Status: He is alert and oriented to person, place, and time. Psychiatric:         Behavior: Behavior normal.                 DIAGNOSTIC RESULTS     EKG: All EKG's are interpreted by the Emergency Department Physician who either signs or Co-signs this chart in the absence of a cardiologist.        RADIOLOGY:   Non-plain film images such as CT, Ultrasound and MRI are read by the radiologist. Plain radiographic images arevisualized and preliminarily interpreted by the emergency physician with the below findings:        Interpretation per the Radiologist below, if available at thetime of this note:          ED BEDSIDE ULTRASOUND:   Performed by ED Physician - none    LABS:  Labs Reviewed   CBC WITH AUTO DIFFERENTIAL - Abnormal; Notable for the following components:       Result Value    Platelets 635 (*)     Immature Granulocytes 1 (*)     All other components within normal limits   COMPREHENSIVE METABOLIC PANEL - Abnormal; Notable for the following components:    Glucose 141 (*)     All other components within normal limits   URINE RT REFLEX TO CULTURE - Abnormal; Notable for the following components:    Specific Gravity, UA 1.033 (*)     Urine Hgb TRACE (*)     All other components within normal limits   MICROSCOPIC URINALYSIS - Abnormal; Notable for the following components:    Bacteria, UA RARE (*)     All other components within normal limits   POCT URINALYSIS DIPSTICK - Normal       All other labs were within normal range or not returned as of this dictation.     EMERGENCY DEPARTMENT COURSE and DIFFERENTIAL DIAGNOSIS/MDM:   Vitals:    Vitals:    11/22/20 1856   BP: (!) 141/77   Pulse: 79   Resp: 22   Temp: 98 °F (36.7 °C)   TempSrc: Oral   SpO2: 97%   Weight: 240 lb (108.9 kg)   Height: 5' 9\" (1.753 m)     Will DC home. Work up is negative for any acute process. Will treat for pain and have patient follow up with his surgeon. Pain seems more consistent with sciatica today. Pre-hypertension/Hypertension: The patient has been informed that they may have pre-hypertension or Hypertension based on a blood pressure reading in the emergency department. I recommend that the patient call the primary care provider listed on their discharge instructions or a physician of their choice this week to arrange follow up for further evaluation of possible pre-hypertension or Hypertension. CONSULTS:  None    PROCEDURES:  Procedures        FINAL IMPRESSION      1. Unilateral inguinal hernia without obstruction or gangrene, recurrence not specified    2. Sciatica of right side    3. Elevated blood pressure reading          DISPOSITION/PLAN   DISPOSITION Decision To Discharge 11/22/2020 09:46:22 PM      PATIENTREFERRED TO:   Marcin Diaz PA-C  [facility]  [phone]    In 3 days        DISCHARGE MEDICATIONS:     New Prescriptions    HYDROCODONE-ACETAMINOPHEN (NORCO) 5-325 MG PER TABLET    Take 1-2 tablets by mouth every 8 hours as needed for Pain for up to 3 days.     METHOCARBAMOL (ROBAXIN-750) 750 MG TABLET    Take 1 tablet by mouth 4 times daily for 10 days    PREDNISONE (DELTASONE) 20 MG TABLET    Take 1 tablet by mouth 2 times daily for 5 days           (Please note that portions of this note were completed with a voice recognition program.  Efforts were made to edit thedictations but occasionally words are mis-transcribed.)    JEYSON Weber PA-C  11/22/20 5655

## 2020-11-23 NOTE — ED PROVIDER NOTES
The patient was seen and examined by me in conjunction with the mid-level provider. I agree with his/her assessment and treatment plan. My clinical impression is that the patient has sciatica.      Dieudonne Villegas MD  11/22/20 2028

## 2020-11-23 NOTE — ED NOTES
Pt presents to the er c/o pain radiating from his right lower back down through the right buttock and into the right posterior thigh with no known injury pt states that he has ahernia in his right buttock     Star Casanova RN  11/22/20 7757

## 2020-11-25 NOTE — TELEPHONE ENCOUNTER
Patient called asking if we had received his cardiac clearance. I let patient know we still have not received.

## 2020-12-08 NOTE — TELEPHONE ENCOUNTER
Received cardiac clearance from [facility] - scanned into media  Patient states he is in a lot of pain and wants this done ASAP states he is in pain and was in [place] [facility] on 11/22/20  Per [facility] he needs to be off Asprin for 7 days. Per sheet:  XI RoboticBilateral Inguinal hernia and umbilical hernia repair w/mesh                     2 hours    Dr. Avila Armijo - 12/21, 12/22 or 12/23?

## 2020-12-10 RX ORDER — SODIUM CHLORIDE, SODIUM LACTATE, POTASSIUM CHLORIDE, CALCIUM CHLORIDE 600; 310; 30; 20 MG/100ML; MG/100ML; MG/100ML; MG/100ML
1000 INJECTION, SOLUTION INTRAVENOUS CONTINUOUS
Status: CANCELLED | OUTPATIENT
Start: 2020-12-10

## 2020-12-10 NOTE — TELEPHONE ENCOUNTER
Spoke with patient and confirmed all upcoming dates and times.   Hold Plavix and ASA starting 12/14/20

## 2020-12-10 NOTE — TELEPHONE ENCOUNTER
Spoke with patient - he is good with being scheduled on 12/21/20  Explained to patient that I will call him back after dates and times are finalized.   Patient verbalized understanding that he needs to stop Plavix and ASA on 12/14/20

## 2020-12-14 ENCOUNTER — HOSPITAL ENCOUNTER (OUTPATIENT)
Dept: PREADMISSION TESTING | Age: 60
Discharge: HOME | End: 2020-12-18
Payer: MEDICARE

## 2020-12-14 VITALS
DIASTOLIC BLOOD PRESSURE: 91 MMHG | HEIGHT: 69 IN | TEMPERATURE: 96.1 F | RESPIRATION RATE: 20 BRPM | SYSTOLIC BLOOD PRESSURE: 143 MMHG | BODY MASS INDEX: 35.26 KG/M2 | HEART RATE: 55 BPM | WEIGHT: 238.04 LBS | OXYGEN SATURATION: 97 %

## 2020-12-14 LAB
INR BLD: 1
MRSA, DNA, NASAL: ABNORMAL
PROTHROMBIN TIME: 10.3 SEC (ref 9–12)
SPECIMEN DESCRIPTION: ABNORMAL

## 2020-12-14 PROCEDURE — 87641 MR-STAPH DNA AMP PROBE: CPT

## 2020-12-14 PROCEDURE — 36415 COLL VENOUS BLD VENIPUNCTURE: CPT

## 2020-12-14 PROCEDURE — 85610 PROTHROMBIN TIME: CPT

## 2020-12-14 ASSESSMENT — PAIN DESCRIPTION - FREQUENCY: FREQUENCY: CONTINUOUS

## 2020-12-14 ASSESSMENT — PAIN DESCRIPTION - ONSET: ONSET: AWAKENED FROM SLEEP

## 2020-12-14 ASSESSMENT — PAIN DESCRIPTION - ORIENTATION: ORIENTATION: RIGHT

## 2020-12-14 ASSESSMENT — PAIN DESCRIPTION - PAIN TYPE: TYPE: CHRONIC PAIN

## 2020-12-14 ASSESSMENT — PAIN DESCRIPTION - PROGRESSION: CLINICAL_PROGRESSION: GRADUALLY WORSENING

## 2020-12-14 ASSESSMENT — PAIN DESCRIPTION - LOCATION: LOCATION: BACK;LEG

## 2020-12-14 ASSESSMENT — PAIN SCALES - GENERAL: PAINLEVEL_OUTOF10: 1

## 2020-12-14 ASSESSMENT — PAIN DESCRIPTION - DESCRIPTORS: DESCRIPTORS: ACHING;BURNING

## 2020-12-14 NOTE — H&P (VIEW-ONLY)
History and Physical    Pt Name: John Law  MRN: [id number]  YOB: 1960  Date of evaluation: 12/14/2020    SUBJECTIVE:   History of Chief Complaint:    Patient complains of inguinal hernia. He has been experiencing quite a bit of pain relating to this, including pain in the lower abdomen and then also into the legs, right worse than the left. He has been scheduled for repair of bilateral inguinal hernias, as well as umbilical.  Past Medical History    has a past medical history of CAD (coronary artery disease), CHF (congestive heart failure) ([facility] [address] Heart attack ([facility] [address] Hyperlipidemia, Hypertension, MI (myocardial infarction) ([facility] MRSA (methicillin resistant staph aureus) culture positive, Skin cancer, [place], Stroke [facility]), Wears dentures, Wears reading eyeglasses, and Wellness examination. Past Surgical History   has a past surgical history that includes Coronary artery bypass graft; Coronary angioplasty with stent; skin biopsy; Cardiac surgery (1999); Cardiac catheterization (08/06/2020); eye surgery; and Mandible surgery. Medications  Prior to Admission medications    Medication Sig Start Date End Date Taking? Authorizing Provider   Multiple Vitamins-Minerals (THERAPEUTIC MULTIVITAMIN-MINERALS) tablet Take 1 tablet by mouth daily   Yes Historical Provider, MD   Ascorbic Acid (VITAMIN C) 250 MG tablet Take 250 mg by mouth three times a week   Yes Historical Provider, MD   bumetanide (BUMEX) 1 MG tablet Take 1 tablet by mouth every morning AND 1 tablet Daily with lunch. 3/13/19  Yes ELY Black NP   nitroGLYCERIN (NITROSTAT) 0.4 MG SL tablet Place 0.4 mg under the tongue every 5 minutes as needed for Chest pain up to max of 3 total doses. If no relief after 1 dose, call 911.   Dr. Belle Nelson   Yes Historical Provider, MD   vitamin E 400 UNIT capsule Take 400 Units by mouth daily Stop 7 days prior to surgery   Yes Historical Provider, MD atorvastatin (LIPITOR) 80 MG tablet Take 1 tablet by mouth nightly  Patient taking differently: Take 80 mg by mouth nightly Dr. Camron Rodriguez 2/20/19  Yes Jaz Whitaker MD   carvedilol (COREG) 12.5 MG tablet Take 1 tablet by mouth 2 times daily (with meals)  Patient taking differently: Take 12.5 mg by mouth 2 times daily (with meals) Dr. Camron Rodriguez 2/20/19  Yes Jaz Whitaker MD   clopidogrel (PLAVIX) 75 MG tablet Take 1 tablet by mouth daily  Patient taking differently: Take 75 mg by mouth daily Stop 7 days prior to surgery and resume after per Dr. Camron Rodriguez 2/20/19  Yes Jaz Whitaker MD   lisinopril (PRINIVIL;ZESTRIL) 5 MG tablet Take 1 tablet by mouth daily  Patient taking differently: Take 5 mg by mouth daily Dr. Camron Rodriguez 2/21/19  Yes Jaz Whitaker MD   aspirin 81 MG chewable tablet Take 81 mg by mouth daily Per Dr. Camron Rodriguez. Ok to stop 7 days prior to surgery   Yes Toro Amato MD     Allergies  has No Known Allergies. Family History  family history includes Alcohol Abuse in his sister; Coronary Art Dis in his father; Liver Disease in his father. Social History   reports that he quit smoking about 21 years ago. He has never used smokeless tobacco.   reports no history of alcohol use. reports no history of drug use. Marital Status single  Occupation disabled    OBJECTIVE:   VITALS:  height is 5' 9\" (1.753 m) and weight is 238 lb 0.6 oz (108 kg). His temporal temperature is 96.1 °F (35.6 °C). His blood pressure is 143/91 (abnormal) and his pulse is 55. His respiration is 20 and oxygen saturation is 97%. CONSTITUTIONAL:alert & oriented x 3, no acute distress. Pleasant and talkative. SKIN:  Warm and dry, no rashes on exposed areas of skin. Right forearm with large linear scar. HEAD:  Normocephalic, atraumatic. EYES: PERRL. EOMs intact. Wearing glasses. EARS:  Hearing grossly WNL. NOSE:  Nares patent. No rhinorrhea.   MOUTH/THROAT:  benign  NECK:supple, no lymphadenopathy LUNGS: Clear to auscultation bilaterally, no wheezes. CARDIOVASCULAR:   Regular rate and rhythm. ABDOMEN: soft, non tender, non distended. Rotund. EXTREMITIES: no edema bilateral lower extremities. Testing:   EKG: in paper chart from [facility]  Labs pending: drawn 12/14/2020  And also in epic    IMPRESSIONS:   1. hernias  2.  has a past medical history of CAD (coronary artery disease), CHF (congestive heart failure) ([facility] [address] [address] Heart attack ([facility] [address] Hyperlipidemia, Hypertension, MI (myocardial infarction) ([facility] [address] MRSA (methicillin resistant staph aureus) culture positive (01/26/2018), Skin cancer, [place], Stroke [facility]), Wears dentures, Wears reading eyeglasses, and Wellness examination.    PLANS:   1. Bilateral inguinal and umbilical hernia repairs    REBECCA ONEAL PA-C  Electronically signed 12/14/2020 at 10:56 AM

## 2020-12-14 NOTE — H&P
History and Physical    Pt Name: Lianne Seth  MRN: [id number]  YOB: 1960  Date of evaluation: 12/14/2020    SUBJECTIVE:   History of Chief Complaint:    Patient complains of inguinal hernia. He has been experiencing quite a bit of pain relating to this, including pain in the lower abdomen and then also into the legs, right worse than the left. He has been scheduled for repair of bilateral inguinal hernias, as well as umbilical.  Past Medical History    has a past medical history of CAD (coronary artery disease), CHF (congestive heart failure) ([facility] [address] Heart attack ([facility] [address] Hyperlipidemia, Hypertension, [facility] MRSA (methicillin resistant staph aureus) culture positive, Skin cancer, Snores, Stroke [facility]), Wears dentures, Wears reading eyeglasses, and Wellness examination. Past Surgical History   has a past surgical history that includes Coronary artery bypass graft; Coronary angioplasty with stent; skin biopsy; Cardiac surgery (1999); Cardiac catheterization (08/06/2020); eye surgery; and Mandible surgery. Medications  Prior to Admission medications    Medication Sig Start Date End Date Taking? Authorizing Provider   Multiple Vitamins-Minerals (THERAPEUTIC MULTIVITAMIN-MINERALS) tablet Take 1 tablet by mouth daily   Yes Historical Provider, MD   Ascorbic Acid (VITAMIN C) 250 MG tablet Take 250 mg by mouth three times a week   Yes Historical Provider, MD   bumetanide (BUMEX) 1 MG tablet Take 1 tablet by mouth every morning AND 1 tablet Daily with lunch. 3/13/19  Yes ELY Black NP   nitroGLYCERIN (NITROSTAT) 0.4 MG SL tablet Place 0.4 mg under the tongue every 5 minutes as needed for Chest pain up to max of 3 total doses. If no relief after 1 dose, call 911.   Dr. Lucy Khan   Yes Historical Provider, MD   vitamin E 400 UNIT capsule Take 400 Units by mouth daily Stop 7 days prior to surgery   Yes Historical Provider, MD   atorvastatin (LIPITOR) 80 MG tablet Take 1 tablet by mouth nightly  Patient taking differently: Take 80 mg by mouth nightly Dr. Anita Martino 2/20/19  Yes Paco Mckeon MD   carvedilol (COREG) 12.5 MG tablet Take 1 tablet by mouth 2 times daily (with meals)  Patient taking differently: Take 12.5 mg by mouth 2 times daily (with meals) Dr. Anita Martino 2/20/19  Yes Paco Mckeon MD   clopidogrel (PLAVIX) 75 MG tablet Take 1 tablet by mouth daily  Patient taking differently: Take 75 mg by mouth daily Stop 7 days prior to surgery and resume after per Dr. Anita Martino 2/20/19  Yes Paco Mckeon MD   lisinopril (PRINIVIL;ZESTRIL) 5 MG tablet Take 1 tablet by mouth daily  Patient taking differently: Take 5 mg by mouth daily Dr. Anita Martino 2/21/19  Yes Paco Mckeon MD   aspirin 81 MG chewable tablet Take 81 mg by mouth daily Per Dr. Anita Martino. Ok to stop 7 days prior to surgery   Yes Historical Provider, MD     Allergies  has No Known Allergies. Family History  family history includes Alcohol Abuse in his sister; Coronary Art Dis in his father; Liver Disease in his father. Social History   reports that he quit smoking about 21 years ago. He has never used smokeless tobacco.   reports no history of alcohol use. reports no history of drug use. Marital Status single  Occupation disabled    OBJECTIVE:   VITALS:  height is 5' 9\" (1.753 m) and weight is 238 lb 0.6 oz (108 kg). His temporal temperature is 96.1 °F (35.6 °C). His blood pressure is 143/91 (abnormal) and his pulse is 55. His respiration is 20 and oxygen saturation is 97%. CONSTITUTIONAL:alert & oriented x 3, no acute distress. Pleasant and talkative. SKIN:  Warm and dry, no rashes on exposed areas of skin. Right forearm with large linear scar. HEAD:  Normocephalic, atraumatic. EYES: PERRL. EOMs intact. Wearing glasses. EARS:  Hearing grossly WNL. NOSE:  Nares patent. No rhinorrhea. MOUTH/THROAT:  benign  NECK:supple, no lymphadenopathy  LUNGS: Clear to auscultation bilaterally, no wheezes.   CARDIOVASCULAR:   Regular rate

## 2020-12-15 ENCOUNTER — TELEPHONE (OUTPATIENT)
Dept: BARIATRICS/WEIGHT MGMT | Age: 60
End: 2020-12-15

## 2020-12-15 NOTE — TELEPHONE ENCOUNTER
Elsie from PAT called, stated patient's MRSA is positive, please advise if ointment is going to be ordered. Left message for PAT.

## 2020-12-17 ENCOUNTER — HOSPITAL ENCOUNTER (OUTPATIENT)
Dept: LAB | Age: 60
Setting detail: SPECIMEN
Discharge: HOME | End: 2020-12-17
Payer: MEDICARE

## 2020-12-17 PROCEDURE — U0003 INFECTIOUS AGENT DETECTION BY NUCLEIC ACID (DNA OR RNA); SEVERE ACUTE RESPIRATORY SYNDROME CORONAVIRUS 2 (SARS-COV-2) (CORONAVIRUS DISEASE [COVID-19]), AMPLIFIED PROBE TECHNIQUE, MAKING USE OF HIGH THROUGHPUT TECHNOLOGIES AS DESCRIBED BY CMS-2020-01-R: HCPCS

## 2020-12-18 LAB
SARS-COV-2, RAPID: NORMAL
SARS-COV-2: NORMAL
SARS-COV-2: NOT DETECTED
SOURCE: NORMAL

## 2020-12-21 ENCOUNTER — HOSPITAL ENCOUNTER (OUTPATIENT)
Age: 60
Setting detail: OBSERVATION
Discharge: HOME | End: 2020-12-22
Attending: SURGERY | Admitting: SURGERY
Payer: MEDICARE

## 2020-12-21 ENCOUNTER — ANESTHESIA (OUTPATIENT)
Dept: OPERATING ROOM | Age: 60
End: 2020-12-21
Payer: MEDICARE

## 2020-12-21 ENCOUNTER — ANESTHESIA EVENT (OUTPATIENT)
Dept: OPERATING ROOM | Age: 60
End: 2020-12-21
Payer: MEDICARE

## 2020-12-21 VITALS — OXYGEN SATURATION: 99 % | DIASTOLIC BLOOD PRESSURE: 88 MMHG | TEMPERATURE: 98.6 F | SYSTOLIC BLOOD PRESSURE: 135 MMHG

## 2020-12-21 PROBLEM — Z87.19 STATUS POST INGUINAL HERNIA REPAIR: Status: ACTIVE | Noted: 2020-12-21

## 2020-12-21 PROBLEM — Z98.890 STATUS POST INGUINAL HERNIA REPAIR: Status: ACTIVE | Noted: 2020-12-21

## 2020-12-21 LAB
GLUCOSE BLD-MCNC: 202 MG/DL (ref 74–100)
POC CHLORIDE: 104 MMOL/L (ref 98–107)
POC IONIZED CALCIUM: 1.15 MMOL/L (ref 1.15–1.33)
POC POTASSIUM: 4.6 MMOL/L (ref 3.5–4.5)
POC SODIUM: 139 MMOL/L (ref 138–146)

## 2020-12-21 PROCEDURE — 2580000003 HC RX 258: Performed by: SURGERY

## 2020-12-21 PROCEDURE — 3700000001 HC ADD 15 MINUTES (ANESTHESIA): Performed by: SURGERY

## 2020-12-21 PROCEDURE — S2900 ROBOTIC SURGICAL SYSTEM: HCPCS | Performed by: SURGERY

## 2020-12-21 PROCEDURE — C1760 CLOSURE DEV, VASC: HCPCS | Performed by: SURGERY

## 2020-12-21 PROCEDURE — 6360000002 HC RX W HCPCS: Performed by: NURSE ANESTHETIST, CERTIFIED REGISTERED

## 2020-12-21 PROCEDURE — 2500000003 HC RX 250 WO HCPCS: Performed by: NURSE ANESTHETIST, CERTIFIED REGISTERED

## 2020-12-21 PROCEDURE — 6360000002 HC RX W HCPCS

## 2020-12-21 PROCEDURE — 82435 ASSAY OF BLOOD CHLORIDE: CPT

## 2020-12-21 PROCEDURE — 3600000009 HC SURGERY ROBOT BASE: Performed by: SURGERY

## 2020-12-21 PROCEDURE — 3600000019 HC SURGERY ROBOT ADDTL 15MIN: Performed by: SURGERY

## 2020-12-21 PROCEDURE — 7100000001 HC PACU RECOVERY - ADDTL 15 MIN: Performed by: SURGERY

## 2020-12-21 PROCEDURE — 88304 TISSUE EXAM BY PATHOLOGIST: CPT

## 2020-12-21 PROCEDURE — 2709999900 HC NON-CHARGEABLE SUPPLY: Performed by: SURGERY

## 2020-12-21 PROCEDURE — 84132 ASSAY OF SERUM POTASSIUM: CPT

## 2020-12-21 PROCEDURE — 2720000010 HC SURG SUPPLY STERILE: Performed by: SURGERY

## 2020-12-21 PROCEDURE — 2580000003 HC RX 258: Performed by: ANESTHESIOLOGY

## 2020-12-21 PROCEDURE — C1781 MESH (IMPLANTABLE): HCPCS | Performed by: SURGERY

## 2020-12-21 PROCEDURE — 6360000002 HC RX W HCPCS: Performed by: ANESTHESIOLOGY

## 2020-12-21 PROCEDURE — 49652 PR LAP, VENTRAL HERNIA REPAIR,REDUCIBLE: CPT | Performed by: SURGERY

## 2020-12-21 PROCEDURE — 49650 LAP ING HERNIA REPAIR INIT: CPT | Performed by: SURGERY

## 2020-12-21 PROCEDURE — 6360000002 HC RX W HCPCS: Performed by: SURGERY

## 2020-12-21 PROCEDURE — 3700000000 HC ANESTHESIA ATTENDED CARE: Performed by: SURGERY

## 2020-12-21 PROCEDURE — 2500000003 HC RX 250 WO HCPCS: Performed by: SURGERY

## 2020-12-21 PROCEDURE — 7100000000 HC PACU RECOVERY - FIRST 15 MIN: Performed by: SURGERY

## 2020-12-21 PROCEDURE — 82330 ASSAY OF CALCIUM: CPT

## 2020-12-21 PROCEDURE — 84295 ASSAY OF SERUM SODIUM: CPT

## 2020-12-21 PROCEDURE — G0378 HOSPITAL OBSERVATION PER HR: HCPCS

## 2020-12-21 PROCEDURE — 82947 ASSAY GLUCOSE BLOOD QUANT: CPT

## 2020-12-21 DEVICE — MESH SURG W3.5XL6IN POLY SELF FIXATING RECT W/ RESRB PLA: Type: IMPLANTABLE DEVICE | Site: GROIN | Status: FUNCTIONAL

## 2020-12-21 DEVICE — MESH SURG DIA4.5IN CIR SEPRA TECHNOLOGY VENTRALIGHT: Type: IMPLANTABLE DEVICE | Status: FUNCTIONAL

## 2020-12-21 RX ORDER — OXYCODONE HYDROCHLORIDE AND ACETAMINOPHEN 5; 325 MG/1; MG/1
1 TABLET ORAL EVERY 6 HOURS PRN
Status: DISCONTINUED | OUTPATIENT
Start: 2020-12-21 | End: 2020-12-22 | Stop reason: HOSPADM

## 2020-12-21 RX ORDER — ONDANSETRON 4 MG/1
4 TABLET, ORALLY DISINTEGRATING ORAL EVERY 8 HOURS PRN
Status: DISCONTINUED | OUTPATIENT
Start: 2020-12-21 | End: 2020-12-22 | Stop reason: HOSPADM

## 2020-12-21 RX ORDER — IPRATROPIUM BROMIDE AND ALBUTEROL SULFATE 2.5; .5 MG/3ML; MG/3ML
1 SOLUTION RESPIRATORY (INHALATION)
Status: DISCONTINUED | OUTPATIENT
Start: 2020-12-22 | End: 2020-12-22 | Stop reason: HOSPADM

## 2020-12-21 RX ORDER — HEPARIN SODIUM 5000 [USP'U]/ML
5000 INJECTION, SOLUTION INTRAVENOUS; SUBCUTANEOUS EVERY 8 HOURS SCHEDULED
Status: DISCONTINUED | OUTPATIENT
Start: 2020-12-21 | End: 2020-12-22 | Stop reason: HOSPADM

## 2020-12-21 RX ORDER — FENTANYL CITRATE 50 UG/ML
50 INJECTION, SOLUTION INTRAMUSCULAR; INTRAVENOUS EVERY 5 MIN PRN
Status: DISCONTINUED | OUTPATIENT
Start: 2020-12-21 | End: 2020-12-21 | Stop reason: HOSPADM

## 2020-12-21 RX ORDER — HEPARIN SODIUM 5000 [USP'U]/ML
5000 INJECTION, SOLUTION INTRAVENOUS; SUBCUTANEOUS ONCE
Status: COMPLETED | OUTPATIENT
Start: 2020-12-21 | End: 2020-12-21

## 2020-12-21 RX ORDER — FENTANYL CITRATE 50 UG/ML
25 INJECTION, SOLUTION INTRAMUSCULAR; INTRAVENOUS EVERY 5 MIN PRN
Status: DISCONTINUED | OUTPATIENT
Start: 2020-12-21 | End: 2020-12-21 | Stop reason: HOSPADM

## 2020-12-21 RX ORDER — SODIUM CHLORIDE 0.9 % (FLUSH) 0.9 %
10 SYRINGE (ML) INJECTION EVERY 12 HOURS SCHEDULED
Status: DISCONTINUED | OUTPATIENT
Start: 2020-12-21 | End: 2020-12-22 | Stop reason: HOSPADM

## 2020-12-21 RX ORDER — PROMETHAZINE HYDROCHLORIDE 25 MG/1
25 TABLET ORAL EVERY 6 HOURS PRN
Qty: 20 TABLET | Refills: 0 | Status: SHIPPED | OUTPATIENT
Start: 2020-12-21 | End: 2020-12-28

## 2020-12-21 RX ORDER — DEXAMETHASONE SODIUM PHOSPHATE 4 MG/ML
INJECTION, SOLUTION INTRA-ARTICULAR; INTRALESIONAL; INTRAMUSCULAR; INTRAVENOUS; SOFT TISSUE PRN
Status: DISCONTINUED | OUTPATIENT
Start: 2020-12-21 | End: 2020-12-21 | Stop reason: SDUPTHER

## 2020-12-21 RX ORDER — NITROGLYCERIN 0.4 MG/1
0.4 TABLET SUBLINGUAL EVERY 5 MIN PRN
Status: DISCONTINUED | OUTPATIENT
Start: 2020-12-21 | End: 2020-12-22 | Stop reason: HOSPADM

## 2020-12-21 RX ORDER — CYCLOBENZAPRINE HCL 10 MG
10 TABLET ORAL 2 TIMES DAILY
Status: DISCONTINUED | OUTPATIENT
Start: 2020-12-21 | End: 2020-12-22 | Stop reason: HOSPADM

## 2020-12-21 RX ORDER — LISINOPRIL 5 MG/1
5 TABLET ORAL DAILY
Status: DISCONTINUED | OUTPATIENT
Start: 2020-12-22 | End: 2020-12-22 | Stop reason: HOSPADM

## 2020-12-21 RX ORDER — BUPIVACAINE HYDROCHLORIDE 5 MG/ML
INJECTION, SOLUTION PERINEURAL PRN
Status: DISCONTINUED | OUTPATIENT
Start: 2020-12-21 | End: 2020-12-21 | Stop reason: HOSPADM

## 2020-12-21 RX ORDER — ROCURONIUM BROMIDE 10 MG/ML
INJECTION, SOLUTION INTRAVENOUS PRN
Status: DISCONTINUED | OUTPATIENT
Start: 2020-12-21 | End: 2020-12-21 | Stop reason: SDUPTHER

## 2020-12-21 RX ORDER — CLOPIDOGREL BISULFATE 75 MG/1
75 TABLET ORAL DAILY
Status: DISCONTINUED | OUTPATIENT
Start: 2020-12-22 | End: 2020-12-22

## 2020-12-21 RX ORDER — CARVEDILOL 12.5 MG/1
12.5 TABLET ORAL 2 TIMES DAILY WITH MEALS
Status: DISCONTINUED | OUTPATIENT
Start: 2020-12-22 | End: 2020-12-22 | Stop reason: HOSPADM

## 2020-12-21 RX ORDER — NEOSTIGMINE METHYLSULFATE 5 MG/5 ML
SYRINGE (ML) INTRAVENOUS PRN
Status: DISCONTINUED | OUTPATIENT
Start: 2020-12-21 | End: 2020-12-21 | Stop reason: SDUPTHER

## 2020-12-21 RX ORDER — OXYCODONE HYDROCHLORIDE AND ACETAMINOPHEN 5; 325 MG/1; MG/1
2 TABLET ORAL EVERY 6 HOURS PRN
Status: DISCONTINUED | OUTPATIENT
Start: 2020-12-21 | End: 2020-12-22 | Stop reason: HOSPADM

## 2020-12-21 RX ORDER — FENTANYL CITRATE 50 UG/ML
INJECTION, SOLUTION INTRAMUSCULAR; INTRAVENOUS
Status: COMPLETED
Start: 2020-12-21 | End: 2020-12-21

## 2020-12-21 RX ORDER — MAGNESIUM HYDROXIDE 1200 MG/15ML
LIQUID ORAL CONTINUOUS PRN
Status: COMPLETED | OUTPATIENT
Start: 2020-12-21 | End: 2020-12-21

## 2020-12-21 RX ORDER — ATORVASTATIN CALCIUM 80 MG/1
80 TABLET, FILM COATED ORAL NIGHTLY
Status: DISCONTINUED | OUTPATIENT
Start: 2020-12-21 | End: 2020-12-22 | Stop reason: HOSPADM

## 2020-12-21 RX ORDER — SODIUM CHLORIDE, SODIUM LACTATE, POTASSIUM CHLORIDE, CALCIUM CHLORIDE 600; 310; 30; 20 MG/100ML; MG/100ML; MG/100ML; MG/100ML
1000 INJECTION, SOLUTION INTRAVENOUS CONTINUOUS
Status: DISCONTINUED | OUTPATIENT
Start: 2020-12-21 | End: 2020-12-21

## 2020-12-21 RX ORDER — FENTANYL CITRATE 50 UG/ML
INJECTION, SOLUTION INTRAMUSCULAR; INTRAVENOUS PRN
Status: DISCONTINUED | OUTPATIENT
Start: 2020-12-21 | End: 2020-12-21 | Stop reason: SDUPTHER

## 2020-12-21 RX ORDER — PROMETHAZINE HYDROCHLORIDE 25 MG/1
25 TABLET ORAL EVERY 6 HOURS PRN
Status: DISCONTINUED | OUTPATIENT
Start: 2020-12-21 | End: 2020-12-22 | Stop reason: HOSPADM

## 2020-12-21 RX ORDER — ONDANSETRON 2 MG/ML
INJECTION INTRAMUSCULAR; INTRAVENOUS PRN
Status: DISCONTINUED | OUTPATIENT
Start: 2020-12-21 | End: 2020-12-21 | Stop reason: SDUPTHER

## 2020-12-21 RX ORDER — OXYCODONE HYDROCHLORIDE AND ACETAMINOPHEN 5; 325 MG/1; MG/1
1 TABLET ORAL EVERY 6 HOURS PRN
Qty: 28 TABLET | Refills: 0 | Status: SHIPPED | OUTPATIENT
Start: 2020-12-21 | End: 2020-12-28

## 2020-12-21 RX ORDER — PROPOFOL 10 MG/ML
INJECTION, EMULSION INTRAVENOUS PRN
Status: DISCONTINUED | OUTPATIENT
Start: 2020-12-21 | End: 2020-12-21 | Stop reason: SDUPTHER

## 2020-12-21 RX ORDER — SODIUM CHLORIDE 0.9 % (FLUSH) 0.9 %
10 SYRINGE (ML) INJECTION PRN
Status: DISCONTINUED | OUTPATIENT
Start: 2020-12-21 | End: 2020-12-22 | Stop reason: HOSPADM

## 2020-12-21 RX ORDER — BUMETANIDE 1 MG/1
1 TABLET ORAL DAILY
Status: DISCONTINUED | OUTPATIENT
Start: 2020-12-22 | End: 2020-12-22 | Stop reason: HOSPADM

## 2020-12-21 RX ORDER — VANCOMYCIN HYDROCHLORIDE 1 G/20ML
INJECTION, POWDER, LYOPHILIZED, FOR SOLUTION INTRAVENOUS PRN
Status: DISCONTINUED | OUTPATIENT
Start: 2020-12-21 | End: 2020-12-21 | Stop reason: SDUPTHER

## 2020-12-21 RX ORDER — LIDOCAINE HYDROCHLORIDE 10 MG/ML
INJECTION, SOLUTION EPIDURAL; INFILTRATION; INTRACAUDAL; PERINEURAL PRN
Status: DISCONTINUED | OUTPATIENT
Start: 2020-12-21 | End: 2020-12-21 | Stop reason: SDUPTHER

## 2020-12-21 RX ORDER — ONDANSETRON 2 MG/ML
4 INJECTION INTRAMUSCULAR; INTRAVENOUS EVERY 6 HOURS PRN
Status: DISCONTINUED | OUTPATIENT
Start: 2020-12-21 | End: 2020-12-22 | Stop reason: HOSPADM

## 2020-12-21 RX ORDER — CYCLOBENZAPRINE HCL 10 MG
10 TABLET ORAL 3 TIMES DAILY PRN
Qty: 21 TABLET | Refills: 0 | Status: SHIPPED | OUTPATIENT
Start: 2020-12-21 | End: 2020-12-31

## 2020-12-21 RX ORDER — GLYCOPYRROLATE 1 MG/5 ML
SYRINGE (ML) INTRAVENOUS PRN
Status: DISCONTINUED | OUTPATIENT
Start: 2020-12-21 | End: 2020-12-21 | Stop reason: SDUPTHER

## 2020-12-21 RX ORDER — PHENYLEPHRINE HYDROCHLORIDE 10 MG/ML
INJECTION INTRAVENOUS PRN
Status: DISCONTINUED | OUTPATIENT
Start: 2020-12-21 | End: 2020-12-21 | Stop reason: SDUPTHER

## 2020-12-21 RX ADMIN — Medication 0.4 MG: at 19:27

## 2020-12-21 RX ADMIN — FENTANYL CITRATE 50 MCG: 50 INJECTION, SOLUTION INTRAMUSCULAR; INTRAVENOUS at 20:37

## 2020-12-21 RX ADMIN — Medication 1 MG: at 19:30

## 2020-12-21 RX ADMIN — Medication 0.2 MG: at 19:30

## 2020-12-21 RX ADMIN — LIDOCAINE HYDROCHLORIDE 50 MG: 10 INJECTION, SOLUTION EPIDURAL; INFILTRATION; INTRACAUDAL; PERINEURAL at 15:23

## 2020-12-21 RX ADMIN — VANCOMYCIN HYDROCHLORIDE 1000 MG: 1 INJECTION, POWDER, LYOPHILIZED, FOR SOLUTION INTRAVENOUS at 15:35

## 2020-12-21 RX ADMIN — ONDANSETRON 4 MG: 2 INJECTION INTRAMUSCULAR; INTRAVENOUS at 18:15

## 2020-12-21 RX ADMIN — ROCURONIUM BROMIDE 10 MG: 10 INJECTION, SOLUTION INTRAVENOUS at 18:16

## 2020-12-21 RX ADMIN — ROCURONIUM BROMIDE 10 MG: 10 INJECTION, SOLUTION INTRAVENOUS at 17:03

## 2020-12-21 RX ADMIN — FENTANYL CITRATE 50 MCG: 50 INJECTION, SOLUTION INTRAMUSCULAR; INTRAVENOUS at 20:32

## 2020-12-21 RX ADMIN — PHENYLEPHRINE HYDROCHLORIDE 200 MCG: 10 INJECTION INTRAVENOUS at 15:44

## 2020-12-21 RX ADMIN — PHENYLEPHRINE HYDROCHLORIDE 100 MCG: 10 INJECTION INTRAVENOUS at 16:46

## 2020-12-21 RX ADMIN — SODIUM CHLORIDE, POTASSIUM CHLORIDE, SODIUM LACTATE AND CALCIUM CHLORIDE 1000 ML: 600; 310; 30; 20 INJECTION, SOLUTION INTRAVENOUS at 13:17

## 2020-12-21 RX ADMIN — PROPOFOL 150 MG: 10 INJECTION, EMULSION INTRAVENOUS at 15:23

## 2020-12-21 RX ADMIN — SODIUM CHLORIDE, POTASSIUM CHLORIDE, SODIUM LACTATE AND CALCIUM CHLORIDE: 600; 310; 30; 20 INJECTION, SOLUTION INTRAVENOUS at 17:04

## 2020-12-21 RX ADMIN — FENTANYL CITRATE 50 MCG: 50 INJECTION, SOLUTION INTRAMUSCULAR; INTRAVENOUS at 19:28

## 2020-12-21 RX ADMIN — HEPARIN SODIUM 5000 UNITS: 5000 INJECTION INTRAVENOUS; SUBCUTANEOUS at 13:15

## 2020-12-21 RX ADMIN — ROCURONIUM BROMIDE 50 MG: 10 INJECTION, SOLUTION INTRAVENOUS at 15:24

## 2020-12-21 RX ADMIN — FENTANYL CITRATE 50 MCG: 50 INJECTION, SOLUTION INTRAMUSCULAR; INTRAVENOUS at 22:06

## 2020-12-21 RX ADMIN — DEXAMETHASONE SODIUM PHOSPHATE 4 MG: 4 INJECTION, SOLUTION INTRAMUSCULAR; INTRAVENOUS at 15:41

## 2020-12-21 RX ADMIN — PROPOFOL 50 MG: 10 INJECTION, EMULSION INTRAVENOUS at 15:24

## 2020-12-21 RX ADMIN — Medication 2 MG: at 19:27

## 2020-12-21 RX ADMIN — FENTANYL CITRATE 100 MCG: 50 INJECTION, SOLUTION INTRAMUSCULAR; INTRAVENOUS at 15:23

## 2020-12-21 ASSESSMENT — PULMONARY FUNCTION TESTS
PIF_VALUE: 22
PIF_VALUE: 42
PIF_VALUE: 37
PIF_VALUE: 43
PIF_VALUE: 22
PIF_VALUE: 44
PIF_VALUE: 39
PIF_VALUE: 44
PIF_VALUE: 39
PIF_VALUE: 42
PIF_VALUE: 22
PIF_VALUE: 40
PIF_VALUE: 38
PIF_VALUE: 20
PIF_VALUE: 42
PIF_VALUE: 26
PIF_VALUE: 43
PIF_VALUE: 45
PIF_VALUE: 40
PIF_VALUE: 23
PIF_VALUE: 43
PIF_VALUE: 38
PIF_VALUE: 24
PIF_VALUE: 42
PIF_VALUE: 45
PIF_VALUE: 39
PIF_VALUE: 40
PIF_VALUE: 44
PIF_VALUE: 39
PIF_VALUE: 44
PIF_VALUE: 39
PIF_VALUE: 40
PIF_VALUE: 42
PIF_VALUE: 36
PIF_VALUE: 42
PIF_VALUE: 40
PIF_VALUE: 38
PIF_VALUE: 42
PIF_VALUE: 40
PIF_VALUE: 42
PIF_VALUE: 40
PIF_VALUE: 45
PIF_VALUE: 39
PIF_VALUE: 40
PIF_VALUE: 42
PIF_VALUE: 24
PIF_VALUE: 40
PIF_VALUE: 44
PIF_VALUE: 39
PIF_VALUE: 22
PIF_VALUE: 41
PIF_VALUE: 41
PIF_VALUE: 40
PIF_VALUE: 40
PIF_VALUE: 42
PIF_VALUE: 44
PIF_VALUE: 17
PIF_VALUE: 40
PIF_VALUE: 42
PIF_VALUE: 41
PIF_VALUE: 41
PIF_VALUE: 43
PIF_VALUE: 23
PIF_VALUE: 43
PIF_VALUE: 40
PIF_VALUE: 40
PIF_VALUE: 42
PIF_VALUE: 41
PIF_VALUE: 23
PIF_VALUE: 45
PIF_VALUE: 44
PIF_VALUE: 43
PIF_VALUE: 1
PIF_VALUE: 40
PIF_VALUE: 41
PIF_VALUE: 25
PIF_VALUE: 45
PIF_VALUE: 39
PIF_VALUE: 2
PIF_VALUE: 39
PIF_VALUE: 44
PIF_VALUE: 2
PIF_VALUE: 1
PIF_VALUE: 45
PIF_VALUE: 40
PIF_VALUE: 45
PIF_VALUE: 39
PIF_VALUE: 42
PIF_VALUE: 39
PIF_VALUE: 40
PIF_VALUE: 39
PIF_VALUE: 42
PIF_VALUE: 35
PIF_VALUE: 43
PIF_VALUE: 42
PIF_VALUE: 42
PIF_VALUE: 43
PIF_VALUE: 40
PIF_VALUE: 21
PIF_VALUE: 39
PIF_VALUE: 40
PIF_VALUE: 42
PIF_VALUE: 32
PIF_VALUE: 40
PIF_VALUE: 39
PIF_VALUE: 42
PIF_VALUE: 39
PIF_VALUE: 42
PIF_VALUE: 45
PIF_VALUE: 35
PIF_VALUE: 31
PIF_VALUE: 2
PIF_VALUE: 37
PIF_VALUE: 40
PIF_VALUE: 40
PIF_VALUE: 41
PIF_VALUE: 41
PIF_VALUE: 42
PIF_VALUE: 45
PIF_VALUE: 15
PIF_VALUE: 44
PIF_VALUE: 40
PIF_VALUE: 23
PIF_VALUE: 44
PIF_VALUE: 43
PIF_VALUE: 44
PIF_VALUE: 43
PIF_VALUE: 45
PIF_VALUE: 25
PIF_VALUE: 40
PIF_VALUE: 44
PIF_VALUE: 44
PIF_VALUE: 41
PIF_VALUE: 34
PIF_VALUE: 44
PIF_VALUE: 44
PIF_VALUE: 45
PIF_VALUE: 45
PIF_VALUE: 22
PIF_VALUE: 42
PIF_VALUE: 42
PIF_VALUE: 41
PIF_VALUE: 44
PIF_VALUE: 43
PIF_VALUE: 42
PIF_VALUE: 41
PIF_VALUE: 43
PIF_VALUE: 44
PIF_VALUE: 44
PIF_VALUE: 36
PIF_VALUE: 44
PIF_VALUE: 24
PIF_VALUE: 45
PIF_VALUE: 39
PIF_VALUE: 40
PIF_VALUE: 39
PIF_VALUE: 42
PIF_VALUE: 39
PIF_VALUE: 26
PIF_VALUE: 39
PIF_VALUE: 39
PIF_VALUE: 21
PIF_VALUE: 44
PIF_VALUE: 45
PIF_VALUE: 43
PIF_VALUE: 23
PIF_VALUE: 42
PIF_VALUE: 42
PIF_VALUE: 41
PIF_VALUE: 27
PIF_VALUE: 40
PIF_VALUE: 35
PIF_VALUE: 10
PIF_VALUE: 40
PIF_VALUE: 2
PIF_VALUE: 41
PIF_VALUE: 45
PIF_VALUE: 27
PIF_VALUE: 41
PIF_VALUE: 26
PIF_VALUE: 41
PIF_VALUE: 40
PIF_VALUE: 40
PIF_VALUE: 22
PIF_VALUE: 21
PIF_VALUE: 42
PIF_VALUE: 45
PIF_VALUE: 39
PIF_VALUE: 42
PIF_VALUE: 41
PIF_VALUE: 43
PIF_VALUE: 41
PIF_VALUE: 42
PIF_VALUE: 42
PIF_VALUE: 25
PIF_VALUE: 1
PIF_VALUE: 41
PIF_VALUE: 39
PIF_VALUE: 43
PIF_VALUE: 1
PIF_VALUE: 45
PIF_VALUE: 40
PIF_VALUE: 43
PIF_VALUE: 40
PIF_VALUE: 31
PIF_VALUE: 25
PIF_VALUE: 24
PIF_VALUE: 23
PIF_VALUE: 41
PIF_VALUE: 43
PIF_VALUE: 45
PIF_VALUE: 40
PIF_VALUE: 39
PIF_VALUE: 6
PIF_VALUE: 44
PIF_VALUE: 40
PIF_VALUE: 44
PIF_VALUE: 42
PIF_VALUE: 36
PIF_VALUE: 44
PIF_VALUE: 43
PIF_VALUE: 39
PIF_VALUE: 45
PIF_VALUE: 42
PIF_VALUE: 41
PIF_VALUE: 43
PIF_VALUE: 39
PIF_VALUE: 41
PIF_VALUE: 41
PIF_VALUE: 40
PIF_VALUE: 42
PIF_VALUE: 25
PIF_VALUE: 42
PIF_VALUE: 42
PIF_VALUE: 40
PIF_VALUE: 42
PIF_VALUE: 27
PIF_VALUE: 42
PIF_VALUE: 37
PIF_VALUE: 45
PIF_VALUE: 39
PIF_VALUE: 39
PIF_VALUE: 42
PIF_VALUE: 40
PIF_VALUE: 44
PIF_VALUE: 43
PIF_VALUE: 40
PIF_VALUE: 45
PIF_VALUE: 42
PIF_VALUE: 42
PIF_VALUE: 25
PIF_VALUE: 40
PIF_VALUE: 45
PIF_VALUE: 22
PIF_VALUE: 4
PIF_VALUE: 38
PIF_VALUE: 40
PIF_VALUE: 42

## 2020-12-21 ASSESSMENT — PAIN SCALES - GENERAL
PAINLEVEL_OUTOF10: 5
PAINLEVEL_OUTOF10: 0
PAINLEVEL_OUTOF10: 5
PAINLEVEL_OUTOF10: 5
PAINLEVEL_OUTOF10: 9
PAINLEVEL_OUTOF10: 5
PAINLEVEL_OUTOF10: 6
PAINLEVEL_OUTOF10: 7
PAINLEVEL_OUTOF10: 0
PAINLEVEL_OUTOF10: 7
PAINLEVEL_OUTOF10: 0
PAINLEVEL_OUTOF10: 7
PAINLEVEL_OUTOF10: 7
PAINLEVEL_OUTOF10: 5
PAINLEVEL_OUTOF10: 6

## 2020-12-21 ASSESSMENT — PAIN DESCRIPTION - DESCRIPTORS: DESCRIPTORS: ACHING;BURNING

## 2020-12-21 ASSESSMENT — ENCOUNTER SYMPTOMS: SHORTNESS OF BREATH: 1

## 2020-12-21 ASSESSMENT — PAIN - FUNCTIONAL ASSESSMENT: PAIN_FUNCTIONAL_ASSESSMENT: 0-10

## 2020-12-21 NOTE — INTERVAL H&P NOTE
Pt Name: Roetta Fleischer  MRN: [id number]  YOB: 1960  Date of evaluation: 12/21/2020    I have reviewed the patient's history and physical examination completed in pre-admission testing on 12/14/20. No changes to history or on examination today, unless noted below. Negative covid-19 screening on 12/17/20.     RA COSTELLO APRN-CNP  12/21/20  1:15 PM

## 2020-12-21 NOTE — PROGRESS NOTES
16FR DIAZ WITH 10CC BALLOON INSERTED AFTER INDUCTION. STERILE TECHNIQUE USED. ONE ATTEMPT. CLEAR, YELLOW URINE RETURED. PATIENT TOLERATED WELL. PLAN TO D/C AFTER PROCEDURE. SURGEON AND NSA TEAM AWARE.

## 2020-12-21 NOTE — ANESTHESIA PRE PROCEDURE
Department of Anesthesiology  Preprocedure Note       Name:  Rosario Pallas   Age:  62 y.o.  :  1960                                          MRN:  [id number]         Date:  2020      Surgeon: Christine Abel):  Dalia Rivas DO    Procedure: Procedure(s):  XI LAPAROSCOPIC ROBOTIC INGUINAL HERNIA REPAIR WITH MESH; UMBILICAL HERNIA REPAIR WITH MESH    Medications prior to admission:   Prior to Admission medications    Medication Sig Start Date End Date Taking? Authorizing Provider   Multiple Vitamins-Minerals (THERAPEUTIC MULTIVITAMIN-MINERALS) tablet Take 1 tablet by mouth daily   Yes Historical Provider, MD   Ascorbic Acid (VITAMIN C) 250 MG tablet Take 250 mg by mouth three times a week   Yes Historical Provider, MD   bumetanide (BUMEX) 1 MG tablet Take 1 tablet by mouth every morning AND 1 tablet Daily with lunch. 3/13/19  Yes ELY Black NP   vitamin E 400 UNIT capsule Take 400 Units by mouth daily Stop 7 days prior to surgery   Yes Historical Provider, MD   atorvastatin (LIPITOR) 80 MG tablet Take 1 tablet by mouth nightly  Patient taking differently: Take 80 mg by mouth nightly Dr. Alexander Hayes 19  Yes Lindsay Garces MD   carvedilol (COREG) 12.5 MG tablet Take 1 tablet by mouth 2 times daily (with meals)  Patient taking differently: Take 12.5 mg by mouth 2 times daily (with meals) Dr. Alexander Hayes 19  Yes Lindsay Garces MD   lisinopril (PRINIVIL;ZESTRIL) 5 MG tablet Take 1 tablet by mouth daily  Patient taking differently: Take 5 mg by mouth daily Dr. Alexander Hayes 19  Yes Lindsay Garces MD   nitroGLYCERIN (NITROSTAT) 0.4 MG SL tablet Place 0.4 mg under the tongue every 5 minutes as needed for Chest pain up to max of 3 total doses. If no relief after 1 dose, call 911.   Dr. Benites Ours Provider, MD   clopidogrel (PLAVIX) 75 MG tablet Take 1 tablet by mouth daily  Patient taking differently: Take 75 mg by mouth daily Stop 7 days prior to surgery and resume after per Dr. Alexander Hayes 19 Catrachito Mae MD   aspirin 81 MG chewable tablet Take 81 mg by mouth daily Per Dr. Quentin De La Torre. Ok to stop 7 days prior to surgery    Historical Provider, MD       Current medications:    Current Facility-Administered Medications   Medication Dose Route Frequency Provider Last Rate Last Admin    ceFAZolin (ANCEF) 2 g in dextrose 5 % 50 mL IVPB  2 g Intravenous Once Ela Watson,         lactated ringers infusion 1,000 mL  1,000 mL Intravenous Continuous Gomez Bynum MD 50 mL/hr at 12/21/20 1317 1,000 mL at 12/21/20 1317       Allergies:  No Known Allergies    Problem List:    Patient Active Problem List   Diagnosis Code    Hyperbilirubinemia E80.6    Essential hypertension I10    Hyperlipidemia E78.5    CAD (coronary artery disease) I25.10    Gross hematuria R31.0    Abdominal pain, right upper quadrant R10.11    Right nephrolithiasis N20.0    Abnormal liver function test P08.8    Acute systolic HF (heart failure) (HCC) I50.21    Noncompliance Z91.19    Acute on chronic systolic (congestive) heart failure (HCC) I50.23    Pneumonia J18.9    Dyspnea and respiratory abnormalities R06.00, R06.89       Past Medical History:        Diagnosis Date    CAD (coronary artery disease)     2020 cath    CHF (congestive heart failure) ([facility] [address]     Dr. Reece Young Heart [facility])     Hyperlipidemia     Dr. Reece Young Hypertension     Dr. Reece Young MI (myocardial infarction) [facility])     MRSA (methicillin resistant staph aureus) culture positive 01/26/2018    abdomen    Skin cancer     Snores     no cpap    Stroke [facility])     2011  Dr. Quentin De La Torre monitors    Wears dentures     upper full denture    Wears reading eyeglasses     Wellness examination     [facility] PA-C last seen Nov 2020       Past Surgical History:        Procedure Laterality Date    CARDIAC CATHETERIZATION  08/06/2020    Dr. Dolly Hillman therapy.   Report on chart   [address]    unsure of date, bilateral radials    CORONARY ANGIOPLASTY WITH STENT PLACEMENT      6 total stents per patient    CORONARY ARTERY BYPASS GRAFT      4 vessel bypass    EYE SURGERY      eye injury  new lens  and laser lt eye 2019    MANDIBLE SURGERY      plate/hardware present    SKIN BIOPSY      back       Social History:    Social History     Tobacco Use    Smoking status: Former Smoker     Quit date: 1999     Years since quittin.9    Smokeless tobacco: Never Used   Substance Use Topics    Alcohol use: No                                Counseling given: Not Answered      Vital Signs (Current):   Vitals:    20 1302   BP: (!) 159/117   Pulse: 89   Resp: 18   Temp: 97.2 °F (36.2 °C)   TempSrc: Temporal   SpO2: 97%   Weight: 238 lb (108 kg)   Height: 5' 9\" (1.753 m)                                              BP Readings from Last 3 Encounters:   20 (!) 159/117   20 (!) 143/91   20 (!) 141/77       NPO Status: Time of last liquid consumption:                         Time of last solid consumption:                         Date of last liquid consumption: 20                        Date of last solid food consumption: 20    BMI:   Wt Readings from Last 3 Encounters:   20 238 lb (108 kg)   20 238 lb 0.6 oz (108 kg)   20 240 lb (108.9 kg)     Body mass index is 35.15 kg/m².     CBC:   Lab Results   Component Value Date    WBC 6.7 2020    RBC 5.44 2020    RBC 5.00 2012    HGB 16.4 2020    HCT 48.3 2020    MCV 88.8 2020    RDW 12.4 2020     2020     2012       CMP:   Lab Results   Component Value Date     2020    K 3.9 2020     2020    CO2 24 2020    BUN 17 2020    CREATININE 0.83 2020    GFRAA >60 2020    LABGLOM >60 2020    GLUCOSE 141 2020    GLUCOSE 132 2012    PROT 7.4 2020    CALCIUM 9.5 2020    BILITOT 0.62 2020    ALKPHOS 95 11/22/2020    AST 22 11/22/2020    ALT 23 11/22/2020       POC Tests:   Recent Labs     12/21/20  1313   POCGLU 202*   POCNA 139   POCK 4.6*   POCCL 104       Coags:   Lab Results   Component Value Date    PROTIME 10.3 12/14/2020    INR 1.0 12/14/2020       HCG (If Applicable): No results found for: PREGTESTUR, PREGSERUM, HCG, HCGQUANT     ABGs: No results found for: PHART, PO2ART, AUW6QXB, AFX2RVG, BEART, I5GWUMNO     Type & Screen (If Applicable):  No results found for: LABABO, LABRH    Drug/Infectious Status (If Applicable):  Lab Results   Component Value Date    HEPCAB NONREACTIVE 11/22/2017       COVID-19 Screening (If Applicable):   Lab Results   Component Value Date    COVID19 Not Detected 12/17/2020    COVID19 Not Detected 08/03/2020         Anesthesia Evaluation    Airway: Mallampati: II     Neck ROM: full   Dental:    (+) upper dentures      Pulmonary: breath sounds clear to auscultation  (+) pneumonia:  shortness of breath:      (-) sleep apnea                           Cardiovascular:    (+) hypertension:, past MI:, CAD:, CABG/stent:, CHF:, hyperlipidemia        Rhythm: regular  Rate: normal           Beta Blocker:  Dose within 24 Hrs      ROS comment: EF 35%     Neuro/Psych:   (+) CVA:,             GI/Hepatic/Renal: Neg GI/Hepatic/Renal ROS  (+) renal disease: kidney stones,      (-) liver disease       Endo/Other: Negative Endo/Other ROS                    Abdominal:   (+) obese,         Vascular:   + PVD, aortic or cerebral, . Anesthesia Plan      general     ASA 4       Induction: intravenous. Anesthetic plan and risks discussed with patient. Plan discussed with CRNA.                   Elyse Hernandez MD   12/21/2020

## 2020-12-22 VITALS
DIASTOLIC BLOOD PRESSURE: 71 MMHG | HEIGHT: 69 IN | TEMPERATURE: 98.9 F | WEIGHT: 238 LBS | HEART RATE: 73 BPM | SYSTOLIC BLOOD PRESSURE: 112 MMHG | RESPIRATION RATE: 16 BRPM | BODY MASS INDEX: 35.25 KG/M2 | OXYGEN SATURATION: 92 %

## 2020-12-22 PROBLEM — Z98.890 S/P REPAIR OF VENTRAL HERNIA: Status: ACTIVE | Noted: 2020-12-22

## 2020-12-22 PROBLEM — Z87.19 S/P REPAIR OF VENTRAL HERNIA: Status: ACTIVE | Noted: 2020-12-22

## 2020-12-22 PROCEDURE — 6360000002 HC RX W HCPCS: Performed by: STUDENT IN AN ORGANIZED HEALTH CARE EDUCATION/TRAINING PROGRAM

## 2020-12-22 PROCEDURE — 6370000000 HC RX 637 (ALT 250 FOR IP): Performed by: STUDENT IN AN ORGANIZED HEALTH CARE EDUCATION/TRAINING PROGRAM

## 2020-12-22 PROCEDURE — 96372 THER/PROPH/DIAG INJ SC/IM: CPT

## 2020-12-22 PROCEDURE — G0378 HOSPITAL OBSERVATION PER HR: HCPCS

## 2020-12-22 RX ADMIN — ATORVASTATIN CALCIUM 80 MG: 80 TABLET, FILM COATED ORAL at 00:06

## 2020-12-22 RX ADMIN — LISINOPRIL 5 MG: 5 TABLET ORAL at 08:10

## 2020-12-22 RX ADMIN — HEPARIN SODIUM 5000 UNITS: 5000 INJECTION INTRAVENOUS; SUBCUTANEOUS at 06:14

## 2020-12-22 RX ADMIN — OXYCODONE HYDROCHLORIDE AND ACETAMINOPHEN 2 TABLET: 5; 325 TABLET ORAL at 06:14

## 2020-12-22 RX ADMIN — CARVEDILOL 12.5 MG: 12.5 TABLET, FILM COATED ORAL at 08:10

## 2020-12-22 RX ADMIN — CYCLOBENZAPRINE 10 MG: 10 TABLET, FILM COATED ORAL at 08:10

## 2020-12-22 RX ADMIN — CYCLOBENZAPRINE 10 MG: 10 TABLET, FILM COATED ORAL at 00:06

## 2020-12-22 RX ADMIN — OXYCODONE HYDROCHLORIDE AND ACETAMINOPHEN 2 TABLET: 5; 325 TABLET ORAL at 00:07

## 2020-12-22 RX ADMIN — BUMETANIDE 1 MG: 1 TABLET ORAL at 08:10

## 2020-12-22 RX ADMIN — HEPARIN SODIUM 5000 UNITS: 5000 INJECTION INTRAVENOUS; SUBCUTANEOUS at 00:07

## 2020-12-22 ASSESSMENT — PAIN SCALES - GENERAL
PAINLEVEL_OUTOF10: 7
PAINLEVEL_OUTOF10: 7

## 2020-12-22 NOTE — PROGRESS NOTES
General Surgery:  Daily Progress Note                  PATIENT NAME: Lyndsay Pendleton     TODAY'S DATE: 12/22/2020, 6:43 AM    SUBJECTIVE:     Pt seen and examined at bedside this morning. No acute events overnight. Afebrile. Pain well controlled oral pain medication. Tolerating diet. Out of bed ambulating. OBJECTIVE:   VITALS:  /89   Pulse 79   Temp 97.7 °F (36.5 °C) (Oral)   Resp 16   Ht 5' 9\" (1.753 m)   Wt 238 lb (108 kg)   SpO2 92%   BMI 35.15 kg/m²      INTAKE/OUTPUT:      Intake/Output Summary (Last 24 hours) at 12/22/2020 7309  Last data filed at 12/21/2020 2157  Gross per 24 hour   Intake 1700 ml   Output 420 ml   Net 1280 ml       PHYSICAL EXAM:  General Appearance:  awake, alert, oriented, in no acute distress  HEENT:  Normocephalic, atraumatic, mucus membranes moist   Skin:  Skin color, texture, turgor normal. No rashes or lesions. Lungs:  No chest wall tenderness. Heart:  Heart regular rate and rhythm  Abdomen: Soft, nontender, nondistended, port site incisions healing well, no bleeding or drainage noted, no peritoneal signs, no rebound tenderness, wearing abdominal binder  Extremities: Extremities warm to touch, pink, with no edema.         Data:  CBC with Differential:    Lab Results   Component Value Date    WBC 6.7 11/22/2020    RBC 5.44 11/22/2020    RBC 5.00 01/02/2012    HGB 16.4 11/22/2020    HCT 48.3 11/22/2020     11/22/2020     01/02/2012    MCV 88.8 11/22/2020    MCH 30.1 11/22/2020    MCHC 34.0 11/22/2020    RDW 12.4 11/22/2020    LYMPHOPCT 31 11/22/2020    MONOPCT 10 11/22/2020    BASOPCT 1 11/22/2020    MONOSABS 0.67 11/22/2020    LYMPHSABS 2.06 11/22/2020    EOSABS 0.16 11/22/2020    BASOSABS 0.05 11/22/2020    DIFFTYPE NOT REPORTED 11/22/2020     BMP:    Lab Results   Component Value Date     11/22/2020    K 3.9 11/22/2020     11/22/2020    CO2 24 11/22/2020    BUN 17 11/22/2020    LABALBU 4.7 11/22/2020    LABALBU 4.1 01/02/2012    CREATININE 0.83 11/22/2020    CALCIUM 9.5 11/22/2020    GFRAA >60 11/22/2020    LABGLOM >60 11/22/2020    GLUCOSE 141 11/22/2020    GLUCOSE 132 01/02/2012       ASSESSMENT:  Active Hospital Problems    Diagnosis Date Noted    Status post inguinal hernia repair [N27.512, Z87.19] 12/21/2020       1. 62 y.o. male postop day 1 status post bilateral inguinal hernia repair with mesh, umbilical hernia repair with mesh    Plan:  1. Patient seen examined at bedside this morning. 2. General diet as tolerated  3. Pain control  4. Encourage patient to be out of bed to ambulate/up to chair  5. Continue to use incentive spirometer  6. Prescriptions printed in chart. 7. We will plan for discharge later this morning. 8. Discharge instructions discussed at bedside with patient.     Electronically signed by Meet Reynolds DO  on 12/22/2020 at 6:43 AM

## 2020-12-22 NOTE — OP NOTE
Operative Note      Patient: Maykel Mcconnell  YOB: 1960  MRN: [id number]    Date of Procedure: 12/21/2020    Pre-Op Diagnosis: BILATERAL INGUINAL HERNIA, UMBILICAL HERNIA    Post-Op Diagnosis: Same       Procedure(s):  XI LAPAROSCOPIC ROBOTIC INGUINAL HERNIA REPAIR WITH MESH; UMBILICAL HERNIA REPAIR WITH MESH    Surgeon(s):  Jessy Fox DO    Assistant:   First Assistant: Nohemi Maldonado RN  Resident: Olamide Khan DO    Anesthesia: General    Estimated Blood Loss (mL): Minimal    Complications: None    Specimens:   ID Type Source Tests Collected by Time Destination   A : [address] Tissue Hernia Sac SURGICAL PATHOLOGY Jessy Fox DO 12/21/2020 1918        Implants:  Implant Name Type Inv. Item Serial No.  Lot No. LRB No. Used Action   MESH SURG W3.5XL6IN POLY SELF FIXATING RECT W/ RESRB JACEK  MESH SURG W3.5XL6IN POLY SELF FIXATING RECT W/ RESRB JACEK  [employer]  SURGICAL-WD [id number] Left 1 Implanted   MESH SURG W3.5XL6IN POLY SELF FIXATING RECT W/ RESRB JACEK  MESH SURG W3.5XL6IN POLY SELF FIXATING RECT W/ RESRB JACEK  [employer] US SURGICAL-WD [id number] Right 1 Implanted   MESH SURG DIA4. 5IN CIR [employer] VENTRALIGHT  MESH SURG DIA4. 5IN CIR [employer] VENTRALIGHT  BARD DAVOL-WD [id number] N/A 1 Implanted         Drains: * No LDAs found *    Findings: See below    Detailed Description of Procedure:       Operative Indications:  54 year old male with a reducible ventral hernia and a right possible left inguinal hernia.  The risks, benefits and options were explained to the patient.  Option of no surgery given.  Risks of bleeding, infection, mesh infection or complication, chronic pain, need for further surgery, damage to surrounding structures were explained.  The patient consented to the procedure.      Medications: Ancef, Heparin, SCD's      Operative Narrative:   The patient was taken to the operative suite where he was placed in supine position and administered general anesthesia. Mildred Russell was then prepared and draped in normal sterile fashion.  SCD's placed.  Time out called and procedure and patient confirmed.  A 12 mm port with an optical trocar was then placed in the left upper quadrant at Salinas's point. Rhea Child direct visualization pneumoperitoneum was established without incident.  The underlying organs without bleeding or visible succus.     Three 8 mm ports placed for inguinal hernia repair robotically in standard fashion.     There were bilateral indirect hernias and the right had a large direct component.  We started with the left side. A preperitoneal flap created from the medial umbilical ligament and extended lateral. The dissection carried through the preperitoneal space and down to expose coopers ligament.  Coopers ligament further exposed with meticulous blunt dissection.  The direct hernia sac dissected out.  The GORDON dissection carried further lateral exposing the vas deferens, and cord structures, as well as the space 2 cm below Coopers ligament.  The indirect hernia sac dissected out and freed from the cord structures with blunt and sharp dissection until reduced. There was also a large cord lipoma that was reduced. This required significant meticulous dissection.   The preperitoneal flap further created laterally so we could fit a large 10x15 cm ProGrip mesh in the region.  We turned our dissection to the right side where there was a very large indirect and direct defect.     A preperitoneal flap created from the medial umbilical ligament and extended lateral. The dissection carried through the preperitoneal space and down to expose coopers ligament.  Coopers ligament further exposed with meticulous blunt dissection.  The indirect hernia sac dissected out. Radha Specter indirect sac included a piece of bowel that was easily reduced.  The GORDON dissection carried further lateral exposing the vas deferens, and cord structures.  The indirect hernia sac dissected out and freed from the cord structures with blunt and sharp dissection until reduced.  The direct hernia also dissected out.  The preperitoneal flap further created laterally so we could fit a large 10 x 15 cm ProGrip mesh in the region.      10 x 15 cm ProGrip mesh placed down in each side and unrolled to provide maximal coverage for the direct and indirect hernias on both side.  The mesh was noted to sit flush on both sides with good coverage of all defects and about 2 cm below coopers ligament.  The two pieces of mesh did slightly overlap at midline.  Hemostasis noted bilaterally.  I reinforced position of the mesh to the Billy's ligament on the right with a 2-0 vicryl suture to allow for fixation.     The peritoneal flap closed on both sides with a running 2-0 V lock suture.  There was no exposed mesh.  The counts were reported to me as correct.     We undocked the robot. I closed the midline and right sided port with 0 vicryl with a suture passer under visualization.     Separate incisions made to close the umbilical hernia. Then two 8 mm robotic ports then placed under direct visualization in the left abdomen laterally as possible, each about 4 finger breadths apart.  The robot again docked. Elease Rogers site noted.  No viscera in the hernia.   We ran our 3rd 8 mm port through the left upper quadrant 12 mm port.      We dissected at the hernia until there was a full preperitoneal window around the hernia defect in circular fashion, this preperitoneal tissue/fat removed from the abdomen.  The remaining preperitoneal fat withdrawn down from the hernia site and removed from the abdomen.  The hernia site was approximately 2 cm in diameter at the site of maximal diameter. The hernia site was then closed with an 0-V lock suture in running fashion at hernia site.  Proper fascial re-approximation noted.  Hemostasis noted throughout the pre-peritoneal space.      Gloves then changed.  A [employer] ST 4.5 inch

## 2020-12-22 NOTE — BRIEF OP NOTE
Brief Postoperative Note      Patient: Roetta Fleischer  YOB: 1960  MRN: [id number]    Date of Procedure: 12/21/2020    Pre-Op Diagnosis: BILATERAL INGUINAL HERNIA, UMBILICAL HERNIA    Post-Op Diagnosis: Same       Procedure(s):  XI LAPAROSCOPIC ROBOTIC INGUINAL HERNIA REPAIR WITH MESH; UMBILICAL HERNIA REPAIR WITH MESH    Surgeon(s):  Chinyere Beyer DO    Assistant:  First Assistant: Nelsy Feldman RN  Resident: Dimitrios Ross DO    Anesthesia: General    Estimated Blood Loss (mL): 12 mL    Complications: None    Specimens:   ID Type Source Tests Collected by Time Destination   A : [address] [facility] Tissue Hernia Sac SURGICAL PATHOLOGY Chinyere Beyer DO 12/21/2020 1918        Implants:  Implant Name Type Inv. Item Serial No.  Lot No. LRB No. Used Action   MESH SURG W3.5XL6IN POLY SELF FIXATING RECT W/ RESRB JACEK  MESH SURG W3.5XL6IN POLY SELF FIXATING RECT W/ RESRB JACEK  [employer]  SURGICAL-WD [id number] Left 1 Implanted   MESH SURG W3.5XL6IN POLY SELF FIXATING RECT W/ RESRB JACEK  MESH SURG W3.5XL6IN POLY SELF FIXATING RECT W/ RESRB JACEK  [employer] US SURGICAL-WD [id number] Right 1 Implanted   MESH SURG DIA4. 5IN CIR [employer] VENTRALIGHT  MESH SURG DIA4. 5IN CIR [employer] VENTRALIGHT  BARD DAVOL-WD [id number] N/A 1 Implanted         Drains: * No LDAs found *    Findings: repair of bilateral inguinal hernias with mesh, repair of umbilical hernia with mesh.  Wound class 2     Electronically signed by Dimitrios Ross DO on 12/21/2020 at 8:05 PM

## 2020-12-22 NOTE — ANESTHESIA POSTPROCEDURE EVALUATION
Department of Anesthesiology  Postprocedure Note    Patient: Lianne Seth  MRN: [id number]  YOB: 1960  Date of evaluation: 12/21/2020  Time:  9:18 PM     Procedure Summary     Date: 12/21/20 Room / Location: [facility] 16 / [facility]    Anesthesia Start: 9503 Anesthesia Stop: 1948    Procedure: Akbar Macario ROBOTIC INGUINAL HERNIA REPAIR WITH MESH; UMBILICAL HERNIA REPAIR WITH MESH (Bilateral Abdomen) Diagnosis: (BILATERAL INGUINAL HERNIA, UMBILICAL HERNIA)    Surgeons: Jean Carlos Neville DO Responsible Provider: Teena Alfonso MD    Anesthesia Type: general ASA Status: 4          Anesthesia Type: general    Omega Phase I: Omega Score: 8    Omega Phase II:      Last vitals: Reviewed and per EMR flowsheets.        Anesthesia Post Evaluation    Patient location during evaluation: PACU  Patient participation: complete - patient participated  Level of consciousness: sleepy but conscious  Pain score: 5 (sleepy)  Nausea & Vomiting: no nausea  Cardiovascular status: hemodynamically stable  Respiratory status: nasal cannula

## 2020-12-22 NOTE — DISCHARGE SUMMARY
Surgery Discharge Summary     Patient Identification  Akil Holden is a 62 y.o. male. :  1960  Admit Date:  2020    Discharge date:   2020                                  Disposition: home    Discharge Diagnoses:   Patient Active Problem List   Diagnosis    Hyperbilirubinemia    Essential hypertension    Hyperlipidemia    CAD (coronary artery disease)    Gross hematuria    Abdominal pain, right upper quadrant    Right nephrolithiasis    Abnormal liver function test    Acute systolic HF (heart failure) (HCC)    Noncompliance    Acute on chronic systolic (congestive) heart failure (HCC)    Pneumonia    Dyspnea and respiratory abnormalities    Status post inguinal hernia repair    S/P repair of ventral hernia       Condition on discharge: Good    Consults: None    Surgery:   XI LAPAROSCOPIC ROBOTIC INGUINAL HERNIA REPAIR WITH MESH; UMBILICAL HERNIA REPAIR WITH MESH       Patient Instructions: Activity: no heavy lifting, pushing, pulling for 6 weeks, no driving for 2 weeks or while on analgesics  Diet: As tolerated  Follow-up with Dr. Nelly Menard in 1 weeks. See pre-printed instructions in chart and given to patient upon discharge. Discharge Medications:      Raymond Santiago   Home Medication Instructions KTP:[id number]    Printed on:20 1314   Medication Information                      Ascorbic Acid (VITAMIN C) 250 MG tablet  Take 250 mg by mouth three times a week             aspirin 81 MG chewable tablet  Take 81 mg by mouth daily Per Dr. Isela Cagle. Ok to stop 7 days prior to surgery             atorvastatin (LIPITOR) 80 MG tablet  Take 1 tablet by mouth nightly             bumetanide (BUMEX) 1 MG tablet  Take 1 tablet by mouth every morning AND 1 tablet Daily with lunch.              carvedilol (COREG) 12.5 MG tablet  Take 1 tablet by mouth 2 times daily (with meals)             cyclobenzaprine (FLEXERIL) 10 MG tablet  Take 1 tablet by mouth 3 times daily as needed for Muscle spasms             lisinopril (PRINIVIL;ZESTRIL) 5 MG tablet  Take 1 tablet by mouth daily             Multiple Vitamins-Minerals (THERAPEUTIC MULTIVITAMIN-MINERALS) tablet  Take 1 tablet by mouth daily             nitroGLYCERIN (NITROSTAT) 0.4 MG SL tablet  Place 0.4 mg under the tongue every 5 minutes as needed for Chest pain up to max of 3 total doses. If no relief after 1 dose, call 911. Dr. Nelida Amezquita             oxyCODONE-acetaminophen (PERCOCET) 5-325 MG per tablet  Take 1 tablet by mouth every 6 hours as needed for Pain for up to 7 days. Intended supply: 7 days. Take lowest dose possible to manage pain             promethazine (PHENERGAN) 25 MG tablet  Take 1 tablet by mouth every 6 hours as needed for Nausea             vitamin E 400 UNIT capsule  Take 400 Units by mouth daily Stop 7 days prior to surgery                  HPI and Hospital Course:   62 y.o. male presented on 12/21/2020 for repair of his bilateral inguinal hernias and umbilical hernia. Patient was taken to the operating room. Patient underwent robotic repair of his bilateral inguinal hernia and umbilical hernia. Both were repaired using mesh. Patient surgical course was uneventful. The patient was admitted postoperatively for pain control and observation. The hospital course was uneventful. On day of discharge pt was tolerating regular diet, pain controlled with oral medications and ambulating without difficulty. Patient's discharge instructions were discussed with him at bedside. All questions and concerns were answered. Patient is to follow-up in the clinic in 1 week for reevaluation.       Electronically signed by Syed Mcqueen DO on 12/22/2020 at 1:14 PM

## 2020-12-22 NOTE — PROGRESS NOTES
POST-OPERATIVE PROGRESS NOTE    Patient: Deb Van    DATE: 12/21/2020    Surgery: robotic inguinal hernia repair with mesh and umbilical hernia repair with mesh     Subjective:   Patient seen and examined. Pain is currently controlled. He denies any nausea or vomiting. Has been up [address] [place] [address]. Urinating without difficulty. Has not passed flatus. Objective:  Vital signs and Nurse's note reviewed  Post-op vital signs: stable    BP (!) 148/85   Pulse 85   Temp 97.9 °F (36.6 °C) (Oral)   Resp 16   Ht 5' 9\" (1.753 m)   Wt 238 lb (108 kg)   SpO2 94%   BMI 35.15 kg/m²    Gen:  A&Ox3, NAD  CV: Regular rate   Resp: no acute respiratory distress or accessory muscle use   Abd:  Soft, mild distention, appropriately tender to palpation. Skin incisions clean and dry with skin glue intact   Ext:  Warm, no cyanosis or edema    Assessment:   POD # 0 S/P robotic inguinal hernia repair with mesh and umbilical hernia repair with mesh  Recovering well post-op     Plan:    Continue current care  Pain control: dilaudid and percocet prn   Diet: general   Increase activity as tolerated.       Electronically signed by Blaze Forte DO  on 12/21/2020 at 11:57 PM

## 2020-12-23 LAB — SURGICAL PATHOLOGY REPORT: NORMAL

## 2020-12-30 ENCOUNTER — OFFICE VISIT (OUTPATIENT)
Dept: BARIATRICS/WEIGHT MGMT | Age: 60
End: 2020-12-30

## 2020-12-30 VITALS
WEIGHT: 240 LBS | RESPIRATION RATE: 20 BRPM | SYSTOLIC BLOOD PRESSURE: 118 MMHG | HEIGHT: 69 IN | DIASTOLIC BLOOD PRESSURE: 70 MMHG | BODY MASS INDEX: 35.55 KG/M2 | HEART RATE: 74 BPM

## 2020-12-30 DIAGNOSIS — Z98.890 S/P REPAIR OF VENTRAL HERNIA: Primary | ICD-10-CM

## 2020-12-30 DIAGNOSIS — Z87.19 S/P REPAIR OF VENTRAL HERNIA: Primary | ICD-10-CM

## 2020-12-30 PROCEDURE — 99024 POSTOP FOLLOW-UP VISIT: CPT | Performed by: SURGERY

## 2021-01-04 RX ORDER — POLYETHYLENE GLYCOL 3350 17 G/17G
POWDER, FOR SOLUTION ORAL
Qty: 238 G | Refills: 0 | Status: SHIPPED | OUTPATIENT
Start: 2021-01-04 | End: 2022-04-04

## 2021-01-04 NOTE — TELEPHONE ENCOUNTER
Talked to Daya Gaspar regarding scheduling. He is now scheduled STA Tuesday 01/26/21 @ 9:30 am scr colon Miralax/Mag Cit Dr Prudencio Portillo. Covid test STA 01/22/21 @ 2:30 pm.  Bowel prep instructions mailed. Plavix clearance faxed to Dr Pattie Brunner at [facility].

## 2021-01-08 ENCOUNTER — TELEPHONE (OUTPATIENT)
Dept: GASTROENTEROLOGY | Age: 61
End: 2021-01-08

## 2021-01-08 NOTE — TELEPHONE ENCOUNTER
Cardiac clearance received and scanned. Ok to hold Plavix 7 days prior to procedure per Dr Rhonda Hay.

## 2021-01-08 NOTE — TELEPHONE ENCOUNTER
[facility] for Scot Alfonso to inform him of cardiac clearance being received. He can hold Plavix 7 days prior to colonoscopy beginning on 01/19/21.

## 2021-01-10 NOTE — PROGRESS NOTES
[facility]  [address]  [address]  [facility] [address]  Dept: [phone]    12/30/2020    CC: Post Hernia repair    History:  56year old male 1 week post ventral and inguinal hernia repair. He is doing well. No nausea, vomiting, fevers/chills. Having stools. No wound concerns. Having stools.   Tolerating diet    Review of Systems:    General  Negative for: [] Weight Change   [x] Fatigue   [x] Fevers & Chills    [] Appetite change [] Other:    Positive for: [] Weight Change   [] Fatigue   [] Fevers & Chills    [] Appetite change [] Other:   Cardiac  Negative for: [x] Chest Pain   [] Difficulty Breathing   [] Leg Cramps [x] Edema of Lower Extremeties    [] Left   [] Right      Positive for: [] Chest Pain   [] Difficulty Breathing   [] Leg Cramps [] Edema of Lower Extremeties    [] Left   [] Right   Pulmonary  Negative for: [x] Shortness of Breath [] Wheeze [x] Cough  [] Calf Pain     Positive for: [] Shortness of Breath [] Wheeze [] Cough  [] Calf Pain   Gastro-Intestinal Negative for: [] Heartburn   [] Reflux   [] Dysphagia   [] Melena   [] BRBPR  [x] Vomiting   [x] Abdominal Pain   [] Diarrhea  [] Hernia    [x] Constipation  [] Other:     Positive for: [] Heartburn   [] Reflux   [] Dysphagia   [] Melena   [] BRBPR  [] Vomiting   [] Abdominal Pain   [] Diarrhea  [] Hernia    [] Constipation  [] Other:    Muskuloskeletal Negative for: [] Joint pain   [] Back pain   [] Knee Pain   [] Muscle weakness   [x] Edema [] Other:     Positive for: [] Joint pain   [] Back pain   [] Knee Pain   [] Muscle weakness  [] Edema [] Other:    Neurologic Negative for: [] Syncope   [] Insomnia   [] Being treated for depression  [] Other:     Positive for: [] Syncope   [] Insomnia   [] Being treated for depression  [] Other:    Skin Negative for: [] Wound:   [] Open   [] Draining   [] Incisional     [] Rash   [] Hair Loss  [] Other:     Positive for: [] Wound:   [] Open   [] Draining [] Incisional  [] Rash   [] Hair Loss  [] Other:      Physical Exam:  /70 (Site: Left Upper Arm, Position: Sitting, Cuff Size: Large Adult)   Pulse 74   Resp 20   Ht 5' 9\" (1.753 m)   Wt 240 lb (108.9 kg)   BMI 35.44 kg/m²   Constitutional:  Vital signs are normal. The patient appears well-developed and well-nourished. HEENT:   Head: Normocephalic. Atraumatic  Eyes: pupils are equal and reactive. No scleral icterus is present. Neck: No mass and no thyromegaly present. Cardiovascular: Normal rate, regular rhythm, S1 normal and S2 normal.  Radial pulses present   Pulmonary/Chest: Effort normal and breath sounds normal. No retractions  Abdominal: Soft. Normal appearance. There is no organomegaly. No tenderness. There is no rigidity, no rebound, no guarding and no Rodriguez's sign. Musculoskeletal:        Right lower leg: Normal. No tenderness and no edema. Left lower leg: Normal. No tenderness and no edema. Neurological: The patient is alert and oriented. Moving all 4 extremities, sensation grossly intact bilateral  Skin: Skin is warm, dry and intact. Psychiatric: The patient has a normal mood and affect.  Speech is normal and behavior is normal. Judgment and thought content normal. Cognition and memory are normal.     Assessment:  1 week post hernia repair  Incisions CDI    Plan:  No lifting over 15 lbs for 6 weeks  Resume all anticoagulation, he had not yet resumed it as asked  Showers, do not submerge wounds  Ambulation  Overall doing well

## 2021-01-13 ENCOUNTER — TELEPHONE (OUTPATIENT)
Dept: GASTROENTEROLOGY | Age: 61
End: 2021-01-13

## 2021-01-13 NOTE — TELEPHONE ENCOUNTER
Returned call to [facility], no answer. LVM stating that new instructions will be mailed but it may be best for him to call with an e-mail address to send them to due to slow mail time.

## 2021-01-13 NOTE — TELEPHONE ENCOUNTER
Arya bennett stating he lost his bowel prep instructions. He would like new instructions mailed and a return call.

## 2021-01-14 NOTE — TELEPHONE ENCOUNTER
Patient called office in need of prep instructions. Has not received in the mail and had lost the other one. Will be picking up at [facility] on 1/15/21 between 12:30-2 pm.  Writer thanked and call ended. Printed and given to check-in staff to take with them for the patient.

## 2021-01-15 ENCOUNTER — OFFICE VISIT (OUTPATIENT)
Dept: BARIATRICS/WEIGHT MGMT | Age: 61
End: 2021-01-15

## 2021-01-15 VITALS
WEIGHT: 239 LBS | HEIGHT: 69 IN | BODY MASS INDEX: 35.4 KG/M2 | HEART RATE: 88 BPM | SYSTOLIC BLOOD PRESSURE: 142 MMHG | RESPIRATION RATE: 18 BRPM | TEMPERATURE: 97.2 F | DIASTOLIC BLOOD PRESSURE: 72 MMHG

## 2021-01-15 DIAGNOSIS — Z98.890 S/P REPAIR OF VENTRAL HERNIA: Primary | ICD-10-CM

## 2021-01-15 DIAGNOSIS — Z87.19 S/P REPAIR OF VENTRAL HERNIA: Primary | ICD-10-CM

## 2021-01-15 PROCEDURE — 99024 POSTOP FOLLOW-UP VISIT: CPT | Performed by: SURGERY

## 2021-01-15 RX ORDER — LANOLIN ALCOHOL/MO/W.PET/CERES
100 CREAM (GRAM) TOPICAL DAILY
COMMUNITY
End: 2021-11-17 | Stop reason: HOSPADM

## 2021-01-22 ENCOUNTER — HOSPITAL ENCOUNTER (OUTPATIENT)
Dept: LAB | Age: 61
Setting detail: SPECIMEN
Discharge: HOME | End: 2021-01-22
Payer: MEDICARE

## 2021-01-22 DIAGNOSIS — Z01.818 PREOP TESTING: Primary | ICD-10-CM

## 2021-01-22 PROCEDURE — U0005 INFEC AGEN DETEC AMPLI PROBE: HCPCS

## 2021-01-22 PROCEDURE — U0003 INFECTIOUS AGENT DETECTION BY NUCLEIC ACID (DNA OR RNA); SEVERE ACUTE RESPIRATORY SYNDROME CORONAVIRUS 2 (SARS-COV-2) (CORONAVIRUS DISEASE [COVID-19]), AMPLIFIED PROBE TECHNIQUE, MAKING USE OF HIGH THROUGHPUT TECHNOLOGIES AS DESCRIBED BY CMS-2020-01-R: HCPCS

## 2021-01-24 LAB
SARS-COV-2, RAPID: NORMAL
SARS-COV-2: NORMAL
SARS-COV-2: NOT DETECTED
SOURCE: NORMAL

## 2021-01-24 NOTE — PROGRESS NOTES
[facility]  [facility]  [address]  [address]  [place] [facility] [address]  Dept: [phone]    1/15/2021    CC: Post Hernia repair    History:  56year old male 3 week post ventral and inguinal hernia repair. He is doing well. No nausea, vomiting, fevers/chills. Having stools. Had a suture pop at the skin, appears to be seroma, no signs of infection  Having stools.   Tolerating diet    Review of Systems:    General  Negative for: [] Weight Change   [x] Fatigue   [x] Fevers & Chills    [] Appetite change [] Other:    Positive for: [] Weight Change   [] Fatigue   [] Fevers & Chills    [] Appetite change [] Other:   Cardiac  Negative for: [x] Chest Pain   [x] Difficulty Breathing   [] Leg Cramps [x] Edema of Lower Extremeties    [] Left   [] Right      Positive for: [] Chest Pain   [] Difficulty Breathing   [] Leg Cramps [] Edema of Lower Extremeties    [] Left   [] Right   Pulmonary  Negative for: [x] Shortness of Breath [x] Wheeze [x] Cough  [] Calf Pain     Positive for: [] Shortness of Breath [] Wheeze [] Cough  [] Calf Pain   Gastro-Intestinal Negative for: [] Heartburn   [] Reflux   [] Dysphagia   [] Melena   [] BRBPR  [x] Vomiting   [x] Abdominal Pain   [] Diarrhea  [] Hernia    [x] Constipation  [] Other:     Positive for: [] Heartburn   [] Reflux   [] Dysphagia   [] Melena   [] BRBPR  [] Vomiting   [] Abdominal Pain   [] Diarrhea  [] Hernia    [] Constipation  [] Other:    Muskuloskeletal Negative for: [] Joint pain   [] Back pain   [] Knee Pain   [] Muscle weakness   [x] Edema [] Other:     Positive for: [] Joint pain   [] Back pain   [] Knee Pain   [] Muscle weakness  [] Edema [] Other:    Neurologic Negative for: [x] Syncope   [x] Insomnia   [] Being treated for depression  [] Other:     Positive for: [] Syncope   [] Insomnia   [] Being treated for depression  [] Other:    Skin Negative for: [] Wound:   [] Open   [] Draining   [] Incisional     [] Rash   [] Hair Loss  [] Other:     Positive for: [x] Wound:   [] Open   [x] Draining    [] Incisional  [] Rash   [] Hair Loss  [] Other:      Physical Exam:  BP (!) 142/72 (Site: Right Upper Arm, Position: Sitting, Cuff Size: Large Adult)   Pulse 88   Temp 97.2 °F (36.2 °C)   Resp 18   Ht 5' 9\" (1.753 m)   Wt 239 lb (108.4 kg)   BMI 35.29 kg/m²   Constitutional:  Vital signs are normal. The patient appears well-developed and well-nourished. HEENT:   Head: Normocephalic. Atraumatic  Eyes: pupils are equal and reactive. No scleral icterus is present. Neck: No mass and no thyromegaly present. Cardiovascular: Normal rate, regular rhythm, S1 normal and S2 normal.  Radial pulses present   Pulmonary/Chest: Effort normal and breath sounds normal. No retractions  Abdominal: Soft. Normal appearance. There is no organomegaly. No tenderness. There is no rigidity, no rebound, no guarding and no Rodriguez's sign. Seroma left lateral port site  Musculoskeletal:        Right lower leg: Normal. No tenderness and no edema. Left lower leg: Normal. No tenderness and no edema. Neurological: The patient is alert and oriented. Moving all 4 extremities, sensation grossly intact bilateral  Skin: Skin is warm, dry and intact. Psychiatric: The patient has a normal mood and affect.  Speech is normal and behavior is normal. Judgment and thought content normal. Cognition and memory are normal.     Assessment:  3 week post hernia repair  Seroma left port site, does not appear infected, no erythema, no purulence    Plan:  No lifting over 15 lbs for 6 weeks  Showers, do not submerge wounds  Ambulation  Overall doing well  Follow up at 1 month period to recheck

## 2021-01-25 ENCOUNTER — TELEPHONE (OUTPATIENT)
Dept: PRIMARY CARE CLINIC | Age: 61
End: 2021-01-25

## 2021-01-29 ENCOUNTER — HOSPITAL ENCOUNTER (OUTPATIENT)
Dept: OTHER | Age: 61
Discharge: HOME | End: 2021-01-29
Payer: MEDICARE

## 2021-01-29 VITALS
WEIGHT: 240.2 LBS | BODY MASS INDEX: 35.47 KG/M2 | SYSTOLIC BLOOD PRESSURE: 140 MMHG | DIASTOLIC BLOOD PRESSURE: 100 MMHG | OXYGEN SATURATION: 98 % | HEART RATE: 96 BPM | RESPIRATION RATE: 20 BRPM

## 2021-01-29 PROCEDURE — 99212 OFFICE O/P EST SF 10 MIN: CPT

## 2021-01-29 NOTE — PROGRESS NOTES
Verbally reviewed medication list with patient; patient verbalized understanding. Discussed 2000mg/day sodium restricted diet; patient verbalized understanding. Moderate daily exercise encouraged as tolerated. Discussed rest breaks as needed; patient verbalized understanding. Patient instructed to weigh self at the same time of each day, using same clothes and same scale; reinforced teaching to monitor for 3-5 lb weight increase over 1-2 days, and to notify the CHF clinic at [phone] or physician office if weight change noted. Patient verbalized understanding. Risks of smoking discussed with the patient if applicable; patient strongly discouraged to smoke. Patient verbalized understanding. Signs and symptoms of CHF discussed with patient, such as feeling more tired than normal, feeling short of breath, coughing that increases when you lie down, sudden weight gain, swelling of your feet, legs or belly. Patient verbalized understanding to notify the CHF clinic at [phone] or physician office if these symptoms occur. Compliance with plan of care and further disease process causes discussed with patient, patient encouraged to keep all follow up appointments. Patient verbalized understanding.

## 2021-01-29 NOTE — PROGRESS NOTES
Date:  2021  Time:  1:29 PM    CHF Clinic at [facility]    Office: [phone] Fax: [phone]    Re:  Darwin Mckay   Patient : 1960    Vital Signs: BP (!) 140/100   Pulse 96   Resp 20   Wt 240 lb 3.2 oz (109 kg)   SpO2 98%   BMI 35.47 kg/m²                       O2 Device: None (Room air)                           No results for input(s): CBC, HGB, HCT, WBC, PLATELET, NA, K, CL, CO2, BUN, CREATININE, GLUCOSE, BNP, INR in the last 72 hours. Respiratory:    Assessment  Charting Type: Reassessment    Breath Sounds  Right Upper Lobe: Clear  Right Middle Lobe: Clear  Right Lower Lobe: Clear  Left Upper Lobe: Clear  Left Lower Lobe: Clear    Cough/Sputum  Cough: None         Peripheral Vascular  RLE Edema: None  LLE Edema: None      Complaints: None at this time    Physician Orders None    Comment : Arrived for scheduled visit to clinic. Weight down 1.8 lbs from last month. He denies any increase in dyspnea with exertion or chest pain. No lower leg, ankle or pedal edema noted. Medication list reviewed and updated. Reinforced low sodium diet and fluid restrictions. No s/s acute chf noted at this time. Next CHF Clinic visit in 4 weeks.     Electronically signed by Edyta Corea RN on 2021 at 1:29 PM

## 2021-03-15 ENCOUNTER — HOSPITAL ENCOUNTER (OUTPATIENT)
Dept: OTHER | Age: 61
Discharge: HOME | End: 2021-03-15
Payer: MEDICARE

## 2021-03-15 VITALS
SYSTOLIC BLOOD PRESSURE: 140 MMHG | WEIGHT: 240 LBS | RESPIRATION RATE: 20 BRPM | DIASTOLIC BLOOD PRESSURE: 82 MMHG | OXYGEN SATURATION: 98 % | BODY MASS INDEX: 35.44 KG/M2 | HEART RATE: 69 BPM

## 2021-03-15 PROCEDURE — 99212 OFFICE O/P EST SF 10 MIN: CPT

## 2021-03-15 NOTE — PROGRESS NOTES
Date:  3/15/2021  Time:  2:38 PM    CHF Clinic at [address] [place] [address]    Office: [phone] Fax: [phone]    Re:  Poncho Estrada   Patient : 1960    Vital Signs: BP (!) 140/82   Pulse 69   Resp 20   Wt 240 lb (108.9 kg)   SpO2 98%   BMI 35.44 kg/m²                       O2 Device: None (Room air)                           No results for input(s): CBC, HGB, HCT, WBC, PLATELET, NA, K, CL, CO2, BUN, CREATININE, GLUCOSE, BNP, INR in the last 72 hours. Respiratory:    Assessment  Charting Type: Reassessment    Breath Sounds  Right Upper Lobe: Clear  Right Middle Lobe: Clear  Right Lower Lobe: Clear, Diminished  Left Upper Lobe: Clear  Left Lower Lobe: Clear, Diminished    Cough/Sputum  Cough: None         Peripheral Vascular  RLE Edema: None  LLE Edema: None      Complaints: None at this time    Physician Orders None    Comment : Patient arrived for scheduled visit. His weight is unchanged from last visit 6 weeks ago. He denies any increase in dyspnea with exertion or chest pain. No lower leg, ankle or pedal edema noted. Medication list reviewed and updated. Reinforced low sodium diet and fluid restrictions. Patient does not want to schedule his next appt. At this time due to insurance issues. Will call us when he is ready to set up another appt.     Electronically signed by Julianna Dance, RN on 3/15/2021 at 2:38 PM

## 2021-03-15 NOTE — PROGRESS NOTES
Verbally reviewed medication list with patient; patient verbalized understanding. Discussed 2000mg/day sodium restricted diet; patient verbalized understanding. Moderate daily exercise encouraged as tolerated. Discussed rest breaks as needed; patient verbalized understanding. Patient instructed to weigh self at the same time of each day, using same clothes and same scale; reinforced teaching to monitor for 3-5 lb weight increase over 1-2 days, and to notify the CHF clinic at [phone] or physician office if weight change noted. Patient verbalized understanding. Risks of smoking discussed with the patient if applicable; patient strongly discouraged to smoke. Patient verbalized understanding. Signs and symptoms of CHF discussed with patient, such as feeling more tired than normal, feeling short of breath, coughing that increases when you lie down, sudden weight gain, swelling of your feet, legs or belly. Patient verbalized understanding to notify the CHF clinic at [phone] or physician office if these symptoms occur. Compliance with plan of care and further disease process causes discussed with patient, patient encouraged to keep all follow up appointments. Patient verbalized understanding.

## 2021-05-19 ENCOUNTER — TELEPHONE (OUTPATIENT)
Dept: ONCOLOGY | Age: 61
End: 2021-05-19

## 2021-05-19 NOTE — TELEPHONE ENCOUNTER
RECEIVED A FAXED [address] CANNOT SCHEDULE REFERRAL UNTIL I RECEIVED RECENT LAB RESULTS. WRITER CALLED REFERRING DOC OFFICE AND SPOKE TO BERENICE, BERENICE IS HAVING THE [facility]ICAL [facility]IST[facility] FAX LABS AND A [address] NOTE [phone]. WILL SCHEDULE APPOINTMENT ONCE IT'S RECEIVED.

## 2021-06-04 ENCOUNTER — HOSPITAL ENCOUNTER (OUTPATIENT)
Facility: MEDICAL CENTER | Age: 61
Discharge: HOME | End: 2021-06-04
Payer: MEDICARE

## 2021-06-04 ENCOUNTER — INITIAL CONSULT (OUTPATIENT)
Dept: ONCOLOGY | Age: 61
End: 2021-06-04
Payer: MEDICARE

## 2021-06-04 ENCOUNTER — TELEPHONE (OUTPATIENT)
Dept: ONCOLOGY | Age: 61
End: 2021-06-04

## 2021-06-04 VITALS
DIASTOLIC BLOOD PRESSURE: 106 MMHG | SYSTOLIC BLOOD PRESSURE: 151 MMHG | HEIGHT: 69 IN | BODY MASS INDEX: 35.44 KG/M2 | WEIGHT: 239.3 LBS | HEART RATE: 80 BPM | RESPIRATION RATE: 16 BRPM | TEMPERATURE: 96.5 F

## 2021-06-04 DIAGNOSIS — D69.6 THROMBOCYTOPENIA (HCC): ICD-10-CM

## 2021-06-04 DIAGNOSIS — D69.6 THROMBOCYTOPENIA (HCC): Primary | ICD-10-CM

## 2021-06-04 LAB
ABSOLUTE EOS #: 0.13 K/UL (ref 0–0.44)
ABSOLUTE IMMATURE GRANULOCYTE: 0.03 K/UL (ref 0–0.3)
ABSOLUTE LYMPH #: 1.58 K/UL (ref 1.1–3.7)
ABSOLUTE MONO #: 0.53 K/UL (ref 0.1–1.2)
ABSOLUTE RETIC #: 0.1 M/UL (ref 0.03–0.08)
BASOPHILS # BLD: 1 % (ref 0–2)
BASOPHILS ABSOLUTE: 0.04 K/UL (ref 0–0.2)
DIFFERENTIAL TYPE: ABNORMAL
EOSINOPHILS RELATIVE PERCENT: 2 % (ref 1–4)
FERRITIN: 69 UG/L (ref 30–400)
FOLATE: 17.9 NG/ML
HCT VFR BLD CALC: 47.7 % (ref 40.7–50.3)
HEMOGLOBIN: 15.7 G/DL (ref 13–17)
IMMATURE GRANULOCYTES: 1 %
IMMATURE RETIC FRACT: 13.5 % (ref 2.7–18.3)
IRON SATURATION: 26 % (ref 20–55)
IRON: 88 UG/DL (ref 59–158)
LYMPHOCYTES # BLD: 27 % (ref 24–43)
MCH RBC QN AUTO: 29 PG (ref 25.2–33.5)
MCHC RBC AUTO-ENTMCNC: 32.9 G/DL (ref 28.4–34.8)
MCV RBC AUTO: 88.2 FL (ref 82.6–102.9)
MONOCYTES # BLD: 9 % (ref 3–12)
NRBC AUTOMATED: 0 PER 100 WBC
PDW BLD-RTO: 13 % (ref 11.8–14.4)
PLATELET # BLD: 105 K/UL (ref 138–453)
PLATELET ESTIMATE: ABNORMAL
PMV BLD AUTO: 10.2 FL (ref 8.1–13.5)
RBC # BLD: 5.41 M/UL (ref 4.21–5.77)
RBC # BLD: ABNORMAL 10*6/UL
RETIC %: 1.8 % (ref 0.5–1.9)
RETIC HEMOGLOBIN: 33.6 PG (ref 28.2–35.7)
SEG NEUTROPHILS: 61 % (ref 36–65)
SEGMENTED NEUTROPHILS ABSOLUTE COUNT: 3.63 K/UL (ref 1.5–8.1)
TOTAL IRON BINDING CAPACITY: 344 UG/DL (ref 250–450)
UNSATURATED IRON BINDING CAPACITY: 256 UG/DL (ref 112–347)
VITAMIN B-12: 448 PG/ML (ref 232–1245)
WBC # BLD: 5.9 K/UL (ref 3.5–11.3)
WBC # BLD: ABNORMAL 10*3/UL

## 2021-06-04 PROCEDURE — 82607 VITAMIN B-12: CPT

## 2021-06-04 PROCEDURE — G8417 CALC BMI ABV UP PARAM F/U: HCPCS | Performed by: INTERNAL MEDICINE

## 2021-06-04 PROCEDURE — 85045 AUTOMATED RETICULOCYTE COUNT: CPT

## 2021-06-04 PROCEDURE — 99202 OFFICE O/P NEW SF 15 MIN: CPT | Performed by: INTERNAL MEDICINE

## 2021-06-04 PROCEDURE — 82746 ASSAY OF FOLIC ACID SERUM: CPT

## 2021-06-04 PROCEDURE — 83540 ASSAY OF IRON: CPT

## 2021-06-04 PROCEDURE — 99204 OFFICE O/P NEW MOD 45 MIN: CPT | Performed by: INTERNAL MEDICINE

## 2021-06-04 PROCEDURE — 85025 COMPLETE CBC W/AUTO DIFF WBC: CPT

## 2021-06-04 PROCEDURE — 36415 COLL VENOUS BLD VENIPUNCTURE: CPT

## 2021-06-04 PROCEDURE — 82728 ASSAY OF FERRITIN: CPT

## 2021-06-04 PROCEDURE — G8427 DOCREV CUR MEDS BY ELIG CLIN: HCPCS | Performed by: INTERNAL MEDICINE

## 2021-06-04 PROCEDURE — 83550 IRON BINDING TEST: CPT

## 2021-06-04 NOTE — TELEPHONE ENCOUNTER
RAUL HERE FOR CONSULTATION  LABS TODAY  RV 2-3 WKS  LABS CDP, VITAMIN B 12 & FOLATE, FERRITIN, FE TIBC, PATH REVIEW SMEAR DONE ON EXIT  MD VISIT 6/18/21 @ 10AM  AVS PRINTED W/ INSTRUCTIONS AND GIVEN TO PT ON EXIT

## 2021-06-04 NOTE — PROGRESS NOTES
ROBOTIC INGUINAL HERNIA REPAIR WITH MESH; UMBILICAL HERNIA REPAIR WITH MESH performed by Radha Shore DO at [address]      plate/hardware present    SKIN BIOPSY      back       No Known Allergies    Current Outpatient Medications   Medication Sig Dispense Refill    thiamine 100 MG tablet Take 100 mg by mouth daily      vitamin E 100 units capsule Take 100 Units by mouth daily      bisacodyl (DULCOLAX) 5 MG EC tablet TAKE 4 TABS AS DIRECTED BY PHYSICIAN OFFICE 4 tablet 0    Multiple Vitamins-Minerals (THERAPEUTIC MULTIVITAMIN-MINERALS) tablet Take 1 tablet by mouth daily      Ascorbic Acid (VITAMIN C) 250 MG tablet Take 250 mg by mouth three times a week      bumetanide (BUMEX) 1 MG tablet Take 1 tablet by mouth every morning AND 1 tablet Daily with lunch. 60 tablet 3    nitroGLYCERIN (NITROSTAT) 0.4 MG SL tablet Place 0.4 mg under the tongue every 5 minutes as needed for Chest pain up to max of 3 total doses. If no relief after 1 dose, call 911. Dr. Henry Hendrix vitamin E 400 UNIT capsule Take 400 Units by mouth daily Stop 7 days prior to surgery      atorvastatin (LIPITOR) 80 MG tablet Take 1 tablet by mouth nightly (Patient taking differently: Take 80 mg by mouth nightly Dr. Madrid November) 30 tablet 3    carvedilol (COREG) 12.5 MG tablet Take 1 tablet by mouth 2 times daily (with meals) (Patient taking differently: Take 12.5 mg by mouth 2 times daily (with meals) Dr. Madrid November) 60 tablet 3    lisinopril (PRINIVIL;ZESTRIL) 5 MG tablet Take 1 tablet by mouth daily (Patient taking differently: Take 5 mg by mouth daily Dr. Madrid November) 30 tablet 3    aspirin 81 MG chewable tablet Take 81 mg by mouth daily Per Dr. Madrid November. Ok to stop 7 days prior to surgery      polyethylene glycol (GLYCOLAX) 17 GM/SCOOP powder Use as directed by following your patient instructions given by office.  (Patient not taking: Reported on 6/4/2021) 238 g 0    magnesium citrate solution Take 296 mLs by mouth once for 1 dose 296 mL 0     No current facility-administered medications for this visit. Social History     Socioeconomic History    Marital status: Single     Spouse name: Not on file    Number of children: Not on file    Years of education: Not on file    Highest education level: Not on file   Occupational History    Not on file   Tobacco Use    Smoking status: Former Smoker     Quit date: 1999     Years since quittin.3    Smokeless tobacco: Never Used   Vaping Use    Vaping Use: Never used   Substance and Sexual Activity    Alcohol use: No    Drug use: No    Sexual activity: Not on file   Other Topics Concern    Not on file   Social History Narrative    Not on file     Social Determinants of Health     Financial Resource Strain:     Difficulty of Paying Living Expenses:    Food Insecurity:     Worried About Running Out of Food in the Last Year:     [address] in the Last Year:    Transportation Needs:     Lack of Transportation (Medical):  Lack of Transportation (Non-Medical):    Physical Activity:     Days of Exercise per Week:     Minutes of Exercise per Session:    Stress:     Feeling of Stress :    Social Connections:     Frequency of Communication with Friends and Family:     Frequency of Social Gatherings with Friends and Family:     Attends [employer] Services:     Active Member of Clubs or Organizations:     Attends Club or Organization Meetings:     Marital Status:    Intimate Partner Violence:     Fear of Current or Ex-Partner:     Emotionally Abused:     Physically Abused:     Sexually Abused:        Family History   Problem Relation Age of Onset    Coronary Art Dis Father     Liver Disease Father     Alcohol Abuse Sister         REVIEW OF SYSTEM:     Constitutional: No fever or chills.  No night sweats, no weight loss   Eyes: No eye discharge, double vision, or eye pain   HEENT: negative for sore mouth, sore throat, hoarseness and voice change   Respiratory: negative for cough , sputum, dyspnea, wheezing, hemoptysis, chest pain   Cardiovascular: negative for chest pain, dyspnea, palpitations, orthopnea, PND   Gastrointestinal: negative for nausea, vomiting, diarrhea, constipation, abdominal pain, Dysphagia, hematemesis and hematochezia   Genitourinary: negative for frequency, dysuria, nocturia, urinary incontinence, and hematuria   Integument: negative for rash, skin lesions, bruises.    Hematologic/Lymphatic: negative for easy bruising, bleeding, lymphadenopathy, petechiae and swelling/edema   Endocrine: negative for heat or cold intolerance, tremor, weight changes, change in bowel habits and hair loss   Musculoskeletal: negative for myalgias, arthralgias, pain, joint swelling,and bone pain   Neurological: negative for headaches, dizziness, seizures, weakness, numbness       OBJECTIVE:         Vitals:    06/04/21 0946   BP: (!) 151/106   Pulse:    Resp:    Temp:        PHYSICAL EXAM:   General appearance - well appearing, no in pain or distress   Mental status - alert and cooperative   Eyes - pupils equal and reactive, extraocular eye movements intact   Ears - bilateral TM's and external ear canals normal   Mouth - mucous membranes moist, pharynx normal without lesions   Neck - supple, no significant adenopathy   Lymphatics - no palpable lymphadenopathy, no hepatosplenomegaly   Chest - clear to auscultation, no wheezes, rales or rhonchi, symmetric air entry   Heart - normal rate, regular rhythm, normal S1, S2, no murmurs, rubs, clicks or gallops   Abdomen - soft, nontender, nondistended, no masses or organomegaly   Neurological - alert, oriented, normal speech, no focal findings or movement disorder noted   Musculoskeletal - no joint tenderness, deformity or swelling   Extremities - peripheral pulses normal, no pedal edema, no clubbing or cyanosis   Skin - normal coloration and turgor, no rashes, no suspicious skin lesions noted ,      LABORATORY DATA:     Lab Results Component Value Date    WBC 6.7 11/22/2020    HGB 16.4 11/22/2020    HCT 48.3 11/22/2020    MCV 88.8 11/22/2020     (L) 11/22/2020    LYMPHOPCT 31 11/22/2020    RBC 5.44 11/22/2020    MCH 30.1 11/22/2020    MCHC 34.0 11/22/2020    RDW 12.4 11/22/2020    MONOPCT 10 11/22/2020    BASOPCT 1 11/22/2020    NEUTROABS 3.74 11/22/2020    LYMPHSABS 2.06 11/22/2020    MONOSABS 0.67 11/22/2020    EOSABS 0.16 11/22/2020    BASOSABS 0.05 11/22/2020         Chemistry        Component Value Date/Time     11/22/2020 1923    K 3.9 11/22/2020 1923     11/22/2020 1923    CO2 24 11/22/2020 1923    BUN 17 11/22/2020 1923    CREATININE 0.83 11/22/2020 1923        Component Value Date/Time    CALCIUM 9.5 11/22/2020 1923    ALKPHOS 95 11/22/2020 1923    AST 22 11/22/2020 1923    ALT 23 11/22/2020 1923    BILITOT 0.62 11/22/2020 1923        PATHOLOGY DATA:   Reviewed  IMAGING DATA:    Reviewed  ASSESSMENT:    Jimmie Patton is a 56 y.o. is a 56 y.o. with history of CAD, CHF with chronic thrombocytopenia. I reviewed his past medical records. His thrombocytopenia appears very mild and has been stable over past 10 years. Given the stability of his thrombocytopenia however many years I do not suspect aggressive underlying bone marrow condition however I will rule out other etiologies for his low platelets including any nutritional deficiency. I will rule out peripheral blood smear. I discussed possibility of bone marrow biopsy if the work-up suggest otherwise or his platelet counts drops further. Patient's questions were sought and answered to satisfaction and agreed to proceed with the plan. PLAN:   Lab work today  Return to clinic in 2 to 3 weeks to discuss further recommendations      Robb Muro MD  Hematologist/Medical Oncologist      This note is created with the assistance of a speech recognition program.  While intending to generate a document that actually reflects the content of the visit, the document

## 2021-06-07 LAB — SURGICAL PATHOLOGY REPORT: NORMAL

## 2021-06-08 LAB — PATHOLOGIST REVIEW: NORMAL

## 2021-06-11 ENCOUNTER — HOSPITAL ENCOUNTER (OUTPATIENT)
Facility: MEDICAL CENTER | Age: 61
End: 2021-06-11
Payer: MEDICAID

## 2021-06-18 ENCOUNTER — OFFICE VISIT (OUTPATIENT)
Dept: ONCOLOGY | Age: 61
End: 2021-06-18
Payer: MEDICARE

## 2021-06-18 ENCOUNTER — TELEPHONE (OUTPATIENT)
Dept: ONCOLOGY | Age: 61
End: 2021-06-18

## 2021-06-18 VITALS
SYSTOLIC BLOOD PRESSURE: 128 MMHG | BODY MASS INDEX: 35.75 KG/M2 | WEIGHT: 242.1 LBS | DIASTOLIC BLOOD PRESSURE: 84 MMHG | TEMPERATURE: 98.2 F | RESPIRATION RATE: 16 BRPM | HEART RATE: 73 BPM

## 2021-06-18 DIAGNOSIS — D69.6 THROMBOCYTOPENIA (HCC): Primary | ICD-10-CM

## 2021-06-18 PROCEDURE — 3017F COLORECTAL CA SCREEN DOC REV: CPT | Performed by: INTERNAL MEDICINE

## 2021-06-18 PROCEDURE — G8417 CALC BMI ABV UP PARAM F/U: HCPCS | Performed by: INTERNAL MEDICINE

## 2021-06-18 PROCEDURE — G8427 DOCREV CUR MEDS BY ELIG CLIN: HCPCS | Performed by: INTERNAL MEDICINE

## 2021-06-18 PROCEDURE — 99211 OFF/OP EST MAY X REQ PHY/QHP: CPT | Performed by: INTERNAL MEDICINE

## 2021-06-18 PROCEDURE — 1036F TOBACCO NON-USER: CPT | Performed by: INTERNAL MEDICINE

## 2021-06-18 PROCEDURE — 99214 OFFICE O/P EST MOD 30 MIN: CPT | Performed by: INTERNAL MEDICINE

## 2021-06-18 NOTE — PROGRESS NOTES
Patient ID: Elissa Alcazar, 1960, [id number], 56 y.o. Referred by : Rebecca Raines PA-C, PA-C  Reason for consultation:   Mild thrombocytopenia  HISTORY OF PRESENT ILLNESS:    Hematologic history:    Elissa Alcazar is a 56 y.o. male with past medical history of CAD, CHF, hyperlipidemia and chronic thrombocytopenia seen during initial consultation visit. Patient recently had blood work with his primary care physician which showed low platelets and therefore he was referred to hematology for further evaluation. Review of his prior records indicate that he has chronic thrombocytopenia at least since 2012. His platelets have been ranging around 100 on most occasions. At times they have been within normal limits. He denies any unintentional weight loss, drenching night sweats fever chills. He denies any smoking or tobacco abuse history and also denies any alcohol abuse history. Interval history:  Patient is returning for visit and to discuss lab results and further recommendations. He denies any new bleeding symptoms. He denies any initial weight loss, drenching night sweats or fever chills. During this visit patient's allergy, social, medical, surgical history and medications were reviewed and updated. Past Medical History:   Diagnosis Date    CAD (coronary artery disease)     2020 cath    CHF (congestive heart failure) (HCC)     Dr. Gupta Augustus attack [facility])     Hyperlipidemia     Dr. Balwinder Solis Hypertension     Dr. Balwinder Solis MI (myocardial infarction) [facility])     MRSA (methicillin resistant staph aureus) culture positive 01/26/2018    abdomen    Skin cancer     Snores     no cpap    Stroke [facility])     2011  Dr. Sara Moreno Wears dentures     upper full denture    Wears reading eyeglasses     Wellness examination     Radha Hickman PA-C last seen Nov 2020       Past Surgical History:   Procedure Laterality Date    CARDIAC CATHETERIZATION  08/06/2020    Dr. Mallory Pugh therapy. Report on chart   [address]    unsure of date, bilateral radials    CORONARY ANGIOPLASTY WITH STENT PLACEMENT      6 total stents per patient    CORONARY ARTERY BYPASS GRAFT      4 vessel bypass    EYE SURGERY      eye injury  new lens  and laser lt eye 2019    HERNIA REPAIR Bilateral 12/21/2020    XI LAPAROSCOPIC ROBOTIC INGUINAL HERNIA REPAIR WITH MESH; UMBILICAL HERNIA REPAIR WITH MESH performed by Radha Shore DO at [address]      plate/hardware present    SKIN BIOPSY      back       No Known Allergies    Current Outpatient Medications   Medication Sig Dispense Refill    thiamine 100 MG tablet Take 100 mg by mouth daily      Multiple Vitamins-Minerals (THERAPEUTIC MULTIVITAMIN-MINERALS) tablet Take 1 tablet by mouth daily      Ascorbic Acid (VITAMIN C) 250 MG tablet Take 250 mg by mouth three times a week      bumetanide (BUMEX) 1 MG tablet Take 1 tablet by mouth every morning AND 1 tablet Daily with lunch. 60 tablet 3    atorvastatin (LIPITOR) 80 MG tablet Take 1 tablet by mouth nightly (Patient taking differently: Take 80 mg by mouth nightly Dr. Madrid November) 30 tablet 3    carvedilol (COREG) 12.5 MG tablet Take 1 tablet by mouth 2 times daily (with meals) (Patient taking differently: Take 12.5 mg by mouth 2 times daily (with meals) Dr. Madrid November) 60 tablet 3    lisinopril (PRINIVIL;ZESTRIL) 5 MG tablet Take 1 tablet by mouth daily (Patient taking differently: Take 5 mg by mouth daily Dr. Madrid November) 30 tablet 3    aspirin 81 MG chewable tablet Take 81 mg by mouth daily Per Dr. Madrid November. Ok to stop 7 days prior to surgery      vitamin E 100 units capsule Take 100 Units by mouth daily (Patient not taking: Reported on 6/18/2021)      polyethylene glycol (GLYCOLAX) 17 GM/SCOOP powder Use as directed by following your patient instructions given by office.  (Patient not taking: Reported on 6/4/2021) 238 g 0    bisacodyl (DULCOLAX) 5 MG EC tablet TAKE 4 TABS AS DIRECTED BY PHYSICIAN OFFICE (Patient not taking: Reported on 2021) 4 tablet 0    magnesium citrate solution Take 296 mLs by mouth once for 1 dose (Patient not taking: Reported on 2021) 296 mL 0    nitroGLYCERIN (NITROSTAT) 0.4 MG SL tablet Place 0.4 mg under the tongue every 5 minutes as needed for Chest pain up to max of 3 total doses. If no relief after 1 dose, call 911. Dr. Sara Mcintosh (Patient not taking: Reported on 2021)      vitamin E 400 UNIT capsule Take 400 Units by mouth daily Stop 7 days prior to surgery (Patient not taking: Reported on 2021)       No current facility-administered medications for this visit. Social History     Socioeconomic History    Marital status: Single     Spouse name: Not on file    Number of children: Not on file    Years of education: Not on file    Highest education level: Not on file   Occupational History    Not on file   Tobacco Use    Smoking status: Former Smoker     Quit date: 1999     Years since quittin.4    Smokeless tobacco: Never Used   Vaping Use    Vaping Use: Never used   Substance and Sexual Activity    Alcohol use: No    Drug use: No    Sexual activity: Not on file   Other Topics Concern    Not on file   Social History Narrative    Not on file     Social Determinants of Health     Financial Resource Strain:     Difficulty of Paying Living Expenses:    Food Insecurity:     Worried About Running Out of Food in the Last Year:     [address] in the Last Year:    Transportation Needs:     Lack of Transportation (Medical):      Lack of Transportation (Non-Medical):    Physical Activity:     Days of Exercise per Week:     Minutes of Exercise per Session:    Stress:     Feeling of Stress :    Social Connections:     Frequency of Communication with Friends and Family:     Frequency of Social Gatherings with Friends and Family:     Attends [employer] Services:     Active Member of Clubs or Organizations:     Attends [employer] or Organization Meetings:     Marital Status:    Intimate Partner Violence:     Fear of Current or Ex-Partner:     Emotionally Abused:     Physically Abused:     Sexually Abused:        Family History   Problem Relation Age of Onset    Coronary Art Dis Father     Liver Disease Father     Alcohol Abuse Sister         REVIEW OF SYSTEM:     Constitutional: No fever or chills. No night sweats, no weight loss   Eyes: No eye discharge, double vision, or eye pain   HEENT: negative for sore mouth, sore throat, hoarseness and voice change   Respiratory: negative for cough , sputum, dyspnea, wheezing, hemoptysis, chest pain   Cardiovascular: negative for chest pain, dyspnea, palpitations, orthopnea, PND   Gastrointestinal: negative for nausea, vomiting, diarrhea, constipation, abdominal pain, Dysphagia, hematemesis and hematochezia   Genitourinary: negative for frequency, dysuria, nocturia, urinary incontinence, and hematuria   Integument: negative for rash, skin lesions, bruises.    Hematologic/Lymphatic: negative for easy bruising, bleeding, lymphadenopathy, petechiae and swelling/edema   Endocrine: negative for heat or cold intolerance, tremor, weight changes, change in bowel habits and hair loss   Musculoskeletal: negative for myalgias, arthralgias, pain, joint swelling,and bone pain   Neurological: negative for headaches, dizziness, seizures, weakness, numbness       OBJECTIVE:         Vitals:    06/18/21 1008   BP: 128/84   Pulse: 73   Resp: 16   Temp: 98.2 °F (36.8 °C)       PHYSICAL EXAM:   General appearance - well appearing, no in pain or distress   Mental status - alert and cooperative   Eyes - pupils equal and reactive, extraocular eye movements intact   Ears - bilateral TM's and external ear canals normal   Mouth - mucous membranes moist, pharynx normal without lesions   Neck - supple, no significant adenopathy   Lymphatics - no palpable lymphadenopathy, no hepatosplenomegaly   Chest - clear to auscultation, no wheezes, rales or rhonchi, symmetric air entry   Heart - normal rate, regular rhythm, normal S1, S2, no murmurs, rubs, clicks or gallops   Abdomen - soft, nontender, nondistended, no masses or organomegaly   Neurological - alert, oriented, normal speech, no focal findings or movement disorder noted   Musculoskeletal - no joint tenderness, deformity or swelling   Extremities - peripheral pulses normal, no pedal edema, no clubbing or cyanosis   Skin - normal coloration and turgor, no rashes, no suspicious skin lesions noted ,      LABORATORY DATA:     Lab Results   Component Value Date    WBC 5.9 06/04/2021    HGB 15.7 06/04/2021    HCT 47.7 06/04/2021    MCV 88.2 06/04/2021     (L) 06/04/2021    LYMPHOPCT 27 06/04/2021    RBC 5.41 06/04/2021    MCH 29.0 06/04/2021    MCHC 32.9 06/04/2021    RDW 13.0 06/04/2021    MONOPCT 9 06/04/2021    BASOPCT 1 06/04/2021    NEUTROABS 3.63 06/04/2021    LYMPHSABS 1.58 06/04/2021    MONOSABS 0.53 06/04/2021    EOSABS 0.13 06/04/2021    BASOSABS 0.04 06/04/2021         Chemistry        Component Value Date/Time     11/22/2020 1923    K 3.9 11/22/2020 1923     11/22/2020 1923    CO2 24 11/22/2020 1923    BUN 17 11/22/2020 1923    CREATININE 0.83 11/22/2020 1923        Component Value Date/Time    CALCIUM 9.5 11/22/2020 1923    ALKPHOS 95 11/22/2020 1923    AST 22 11/22/2020 1923    ALT 23 11/22/2020 1923    BILITOT 0.62 11/22/2020 1923        PATHOLOGY DATA:   Reviewed  IMAGING DATA:    Reviewed  ASSESSMENT:    Misbah Telles is a 56 y.o. is a 56 y.o. with history of CAD, CHF with chronic thrombocytopenia. I reviewed his past medical records. His thrombocytopenia appears very mild and has been stable over past 10 years. Given the stability of his thrombocytopenia however many years I do not suspect aggressive underlying bone marrow condition. I reviewed his recent lab work which showed normal B12 and iron studies.   His peripheral blood smear did not show any morphologic abnormalities. Given the stability of his thrombocytopenia over the past 9 years. I recommended conservative management with follow-up. I will consider bone marrow biopsy if his blood counts drops further. Patient's questions were sought and answered to satisfaction and agreed to proceed with the plan. PLAN:   Return to clinic in 6 months with CBC prior    Robb Manriquez MD  Hematologist/Medical Oncologist      This note is created with the assistance of a speech recognition program.  While intending to generate a document that actually reflects the content of the visit, the document can still have some errors including those of syntax and sound a like substitutions which may escape proof reading. It such instances, actual meaning can be extrapolated by contextual diversion.       CC  Dillon Carter PA-C, JEYSON

## 2021-06-18 NOTE — TELEPHONE ENCOUNTER
[facility] ARRIVES AMBULATORY FOR MD VISIT  DR Jazz Mccabe IN TO SEE PATIENT  ORDERS RECEIVED  RV 6 MONTHS WITH CBC  LABS CDP 12/17/21  MD VISIT 12/17/21 @10AM  AVS PRINTED AND GIVEN TO PATIENT WITH INSTRUCTIONS  PATIENT DISCHARGED AMBULATORY

## 2021-08-06 ENCOUNTER — HOSPITAL ENCOUNTER (EMERGENCY)
Age: 61
Discharge: HOME | End: 2021-08-06
Attending: EMERGENCY MEDICINE
Payer: MEDICARE

## 2021-08-06 VITALS
TEMPERATURE: 98.8 F | HEART RATE: 81 BPM | DIASTOLIC BLOOD PRESSURE: 94 MMHG | OXYGEN SATURATION: 97 % | HEIGHT: 68 IN | SYSTOLIC BLOOD PRESSURE: 147 MMHG | BODY MASS INDEX: 34.86 KG/M2 | WEIGHT: 230 LBS | RESPIRATION RATE: 16 BRPM

## 2021-08-06 DIAGNOSIS — L03.011 PARONYCHIA OF FINGER OF RIGHT HAND: Primary | ICD-10-CM

## 2021-08-06 DIAGNOSIS — S60.463S: ICD-10-CM
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
## 2021-08-06 PROCEDURE — 2500000003 HC RX 250 WO HCPCS: Performed by: EMERGENCY MEDICINE

## 2021-08-06 PROCEDURE — 99282 EMERGENCY DEPT VISIT SF MDM: CPT

## 2021-08-06 PROCEDURE — 26010 DRAINAGE OF FINGER ABSCESS: CPT

## 2021-08-06 RX ORDER — LIDOCAINE HYDROCHLORIDE 10 MG/ML
5 INJECTION, SOLUTION INFILTRATION; PERINEURAL ONCE
Status: COMPLETED | OUTPATIENT
Start: 2021-08-06 | End: 2021-08-06

## 2021-08-06 RX ORDER — CEPHALEXIN 500 MG/1
500 CAPSULE ORAL 4 TIMES DAILY
Qty: 28 CAPSULE | Refills: 0 | Status: SHIPPED | OUTPATIENT
Start: 2021-08-06 | End: 2021-08-13

## 2021-08-06 RX ADMIN — LIDOCAINE HYDROCHLORIDE 5 ML: 10 INJECTION, SOLUTION INFILTRATION; PERINEURAL at 22:01

## 2021-08-06 ASSESSMENT — PAIN SCALES - GENERAL
PAINLEVEL_OUTOF10: 4
PAINLEVEL_OUTOF10: 4

## 2021-08-06 ASSESSMENT — PAIN DESCRIPTION - FREQUENCY: FREQUENCY: CONTINUOUS

## 2021-08-06 ASSESSMENT — PAIN DESCRIPTION - ORIENTATION: ORIENTATION: RIGHT

## 2021-08-06 ASSESSMENT — PAIN DESCRIPTION - LOCATION: LOCATION: FINGER (COMMENT WHICH ONE)

## 2021-08-06 ASSESSMENT — PAIN DESCRIPTION - DESCRIPTORS: DESCRIPTORS: TENDER

## 2021-08-07 NOTE — ED PROVIDER NOTES
EMERGENCY DEPARTMENT ENCOUNTER    Pt Name: Harleen Loredo  MRN: [id number]  [place] 1960  Date of evaluation: 8/6/21  CHIEF COMPLAINT       Chief Complaint   Patient presents with    Insect Bite     right middle finger, 3 days ago     [facility]   Patient is a 56-year-old right-handed male who presents to the ED for evaluation of insect bite.  3 days ago he believes he received an incident bite to middle finger right hand. As a time he has had mild pain, numbness and tingling in same finger. He also reports 1 week of pain and redness to distal tip of middle finger. He reports that his tetanus is up-to-date. No other issues at this time. No fevers, cough, shortness of breath, chest pain, abdominal pain, nausea, vomiting, changes in urine or stool. REVIEW OF SYSTEMS     Review of Systems   All other systems reviewed and are negative. PASTMEDICAL HISTORY     Past Medical History:   Diagnosis Date    CAD (coronary artery disease)     2020 cath    CHF (congestive heart failure) (HCC)     Dr. Goss Flattery attack [facility])     Hyperlipidemia     Dr. Catrachito De La Rosa Hypertension     Dr. Catrachito De La Rosa MI (myocardial infarction) [facility])     MRSA (methicillin resistant staph aureus) culture positive 01/26/2018    abdomen    Skin cancer     Snores     no cpap    Stroke [facility])     2011  Dr. Kiki Crespo Wears dentures     upper full denture    Wears reading eyeglasses     Wellness examination     Pat Kraig BENÍTEZ last seen Nov 2020     SURGICAL HISTORY       Past Surgical History:   Procedure Laterality Date    CARDIAC CATHETERIZATION  08/06/2020    Dr. Hasmukh Boggs therapy.   Report on chart   [address]    unsure of date, bilateral radials    CORONARY ANGIOPLASTY WITH STENT PLACEMENT      6 total stents per patient    CORONARY ARTERY BYPASS GRAFT      4 vessel bypass    EYE SURGERY      eye injury  new lens  and laser lt eye 2019    HERNIA REPAIR Bilateral 12/21/2020 XI LAPAROSCOPIC ROBOTIC INGUINAL HERNIA REPAIR WITH MESH; UMBILICAL HERNIA REPAIR WITH MESH performed by Leanne Baires DO at [address]      plate/hardware present    SKIN BIOPSY      back     CURRENT MEDICATIONS       Discharge Medication List as of 8/6/2021 10:00 PM      CONTINUE these medications which have NOT CHANGED    Details   thiamine 100 MG tablet Take 100 mg by mouth dailyHistorical Med      !! vitamin E 100 units capsule Take 100 Units by mouth daily Historical Med      Multiple Vitamins-Minerals (THERAPEUTIC MULTIVITAMIN-MINERALS) tablet Take 1 tablet by mouth dailyHistorical Med      Ascorbic Acid (VITAMIN C) 250 MG tablet Take 250 mg by mouth three times a weekHistorical Med      bumetanide (BUMEX) 1 MG tablet Take 1 tablet by mouth every morning AND 1 tablet Daily with lunch., Disp-60 tablet, R-3Normal      !! vitamin E 400 UNIT capsule Take 400 Units by mouth daily Stop 7 days prior to surgeryHistorical Med      atorvastatin (LIPITOR) 80 MG tablet Take 1 tablet by mouth nightly, Disp-30 tablet, R-3Normal      carvedilol (COREG) 12.5 MG tablet Take 1 tablet by mouth 2 times daily (with meals), Disp-60 tablet, R-3Normal      lisinopril (PRINIVIL;ZESTRIL) 5 MG tablet Take 1 tablet by mouth daily, Disp-30 tablet, R-3Normal      aspirin 81 MG chewable tablet Take 81 mg by mouth daily Per Dr. Trevor Schrader. Ok to stop 7 days prior to surgeryHistorical Med      polyethylene glycol (GLYCOLAX) 17 GM/SCOOP powder Use as directed by following your patient instructions given by office. , Disp-238 g, R-0Normal      bisacodyl (DULCOLAX) 5 MG EC tablet TAKE 4 TABS AS DIRECTED BY PHYSICIAN OFFICE, Disp-4 tablet, R-0Normal      magnesium citrate solution Take 296 mLs by mouth once for 1 dose, Disp-296 mL, R-0Normal      nitroGLYCERIN (NITROSTAT) 0.4 MG SL tablet Place 0.4 mg under the tongue every 5 minutes as needed for Chest pain up to max of 3 total doses. If no relief after 1 dose, call 911.    TalebHistorical Med       !! - Potential duplicate medications found. Please discuss with provider. ALLERGIES     has No Known Allergies. FAMILY HISTORY     He indicated that his mother is . He indicated that his father is . He indicated that only one of his two sisters is alive. SOCIAL HISTORY       Social History     Tobacco Use    Smoking status: Former Smoker     Quit date: 1999     Years since quittin.5    Smokeless tobacco: Never Used   Vaping Use    Vaping Use: Never used   Substance Use Topics    Alcohol use: No    Drug use: No     PHYSICAL EXAM     INITIAL VITALS: BP (!) 147/94   Pulse 81   Temp 98.8 °F (37.1 °C) (Oral)   Resp 16   Ht 5' 8\" (1.727 m)   Wt 230 lb (104.3 kg)   SpO2 97%   BMI 34.97 kg/m²    Physical Exam  HENT:      Head: Normocephalic. Right Ear: External ear normal.      Left Ear: External ear normal.      Nose: Nose normal.   Eyes:      Conjunctiva/sclera: Conjunctivae normal.   Cardiovascular:      Rate and Rhythm: Normal rate. Pulmonary:      Effort: Pulmonary effort is normal.   Abdominal:      General: Abdomen is flat. Skin:     General: Skin is dry. Comments: Small lesion scabbed over with focal erythema lateral aspect proximal phalanx third digit right hand. Tenderness, swelling lateral aspect fingernail third digit right hand   Neurological:      Mental Status: He is alert. Mental status is at baseline. Psychiatric:         Mood and Affect: Mood normal.         Behavior: Behavior normal.         MEDICAL DECISION MAKING:   The patient is hemodynamically stable, afebrile, nontoxic-appearing. Physical exam notable for small scabbed over lesion proximal phalanx third digit right hand, tenderness swelling lateral aspect fingernail third digit right hand.   Based on history and exam possible insect bite along with paronychia third digit right hand  ED plan for I&D, antibiotics, discharge. PROCEDURES:    Incision/Drainage    Date/Time: 8/6/2021 10:06 PM  Performed by: Jhoana Vitale MD  Authorized by: Jhoana Vitale MD     Consent:     Consent obtained:  Verbal    Consent given by:  Patient    Risks discussed:  Bleeding, incomplete drainage, pain and infection    Alternatives discussed:  No treatment  Location:     Type:  Fluid collection (Paronychia)    Size:  1 cm    Location:  Upper extremity    Upper extremity location:  Finger    Finger location:  R long finger  Pre-procedure details:     Skin preparation:  Chloraprep  Anesthesia (see MAR for exact dosages): Anesthesia method:  Local infiltration    Local anesthetic:  Lidocaine 1% w/o epi  Procedure type:     Complexity:  Simple  Procedure details:     Needle aspiration: no      Incision types:  Stab incision    Incision depth:  Dermal    Scalpel blade:  11    Drainage:  Bloody    Drainage amount:  Scant    Packing materials:  None  Post-procedure details:     Patient tolerance of procedure: Tolerated well, no immediate complications        DIAGNOSTIC RESULTS   EKG:All EKG's are interpreted by the Emergency Department Physician who either signs or Co-signs this chart in the absence of a cardiologist.        RADIOLOGY:All plain film, CT, MRI, and formal ultrasound images (except ED bedside ultrasound) are read by the radiologist, see reports below, unless otherwisenoted in MDM or here. No orders to display     LABS: All lab results were reviewed by myself, and all abnormals are listed below. Labs Reviewed - No data to display    EMERGENCY DEPARTMENTCOURSE:   Patient did well in the ED. Paronychia opened per note above. Given Rx for Keflex. No further work-up indicated at this time. Nursing notes reviewed. At this time this is what I find, the patient appears well and does not appear sick or toxic. I gave my usual and customary discussion of the risks and benefits of discharge versus admission.   I answered the patient's questions. I gave the patient strict return precautions. Patient expressed understanding of the discharge instructions. Dictated but not reviewed. Vitals:    Vitals:    08/06/21 2053   BP: (!) 147/94   Pulse: 81   Resp: 16   Temp: 98.8 °F (37.1 °C)   TempSrc: Oral   SpO2: 97%   Weight: 230 lb (104.3 kg)   Height: 5' 8\" (1.727 m)       The patient was given the following medications while in the emergency department:  Orders Placed This Encounter   Medications    lidocaine 1 % injection 5 mL    cephALEXin (KEFLEX) 500 MG capsule     Sig: Take 1 capsule by mouth 4 times daily for 7 days     Dispense:  28 capsule     Refill:  0     CONSULTS:  None    FINAL IMPRESSION      1. Paronychia of finger of right hand    2.  Insect bite of left middle finger, sequela          DISPOSITION/PLAN   DISPOSITION Decision To Discharge 08/06/2021 09:58:55 PM      PATIENT REFERRED TO:  Fran Ledezma PA-C  [address] [place] [address]  [phone]    In 2 days      DISCHARGE MEDICATIONS:  Discharge Medication List as of 8/6/2021 10:00 PM      START taking these medications    Details   cephALEXin (KEFLEX) 500 MG capsule Take 1 capsule by mouth 4 times daily for 7 days, Disp-28 capsule, R-0Normal           Elisha Brown MD  Attending Emergency Physician                    Dionne Duke MD  08/06/21 1310

## 2021-08-07 NOTE — ED NOTES
Patient education flyer \"[facility] What you need to know\" was provided and reviewed. Questions answered and understanding was verbalized by the patient and/or family.         Alfredo Luz RN  08/06/21 3067

## 2021-10-08 ENCOUNTER — APPOINTMENT (OUTPATIENT)
Dept: GENERAL RADIOLOGY | Age: 61
End: 2021-10-08
Payer: MEDICARE

## 2021-10-08 ENCOUNTER — HOSPITAL ENCOUNTER (EMERGENCY)
Age: 61
Discharge: HOME | End: 2021-10-08
Attending: EMERGENCY MEDICINE
Payer: MEDICARE

## 2021-10-08 VITALS
HEIGHT: 69 IN | DIASTOLIC BLOOD PRESSURE: 95 MMHG | WEIGHT: 210 LBS | SYSTOLIC BLOOD PRESSURE: 134 MMHG | RESPIRATION RATE: 16 BRPM | OXYGEN SATURATION: 97 % | TEMPERATURE: 98 F | BODY MASS INDEX: 31.1 KG/M2 | HEART RATE: 76 BPM

## 2021-10-08 DIAGNOSIS — I50.9 CONGESTIVE HEART FAILURE, UNSPECIFIED HF CHRONICITY, UNSPECIFIED HEART FAILURE TYPE (HCC): Primary | ICD-10-CM

## 2021-10-08 LAB
ABSOLUTE EOS #: 0.09 K/UL (ref 0–0.44)
ABSOLUTE IMMATURE GRANULOCYTE: 0.02 K/UL (ref 0–0.3)
ABSOLUTE LYMPH #: 1.58 K/UL (ref 1.1–3.7)
ABSOLUTE MONO #: 0.64 K/UL (ref 0.1–1.2)
ANION GAP SERPL CALCULATED.3IONS-SCNC: 10 MMOL/L (ref 9–17)
BASOPHILS # BLD: 1 % (ref 0–2)
BASOPHILS ABSOLUTE: 0.03 K/UL (ref 0–0.2)
BNP INTERPRETATION: ABNORMAL
BUN BLDV-MCNC: 20 MG/DL (ref 8–23)
BUN/CREAT BLD: 25 (ref 9–20)
CALCIUM SERPL-MCNC: 9.2 MG/DL (ref 8.6–10.4)
CHLORIDE BLD-SCNC: 105 MMOL/L (ref 98–107)
CO2: 22 MMOL/L (ref 20–31)
CREAT SERPL-MCNC: 0.8 MG/DL (ref 0.7–1.2)
DIFFERENTIAL TYPE: ABNORMAL
EOSINOPHILS RELATIVE PERCENT: 2 % (ref 1–4)
GFR AFRICAN AMERICAN: >60 ML/MIN
GFR NON-AFRICAN AMERICAN: >60 ML/MIN
GFR SERPL CREATININE-BSD FRML MDRD: ABNORMAL ML/MIN/{1.73_M2}
GFR SERPL CREATININE-BSD FRML MDRD: ABNORMAL ML/MIN/{1.73_M2}
GLUCOSE BLD-MCNC: 160 MG/DL (ref 70–99)
HCT VFR BLD CALC: 46.3 % (ref 40.7–50.3)
HEMOGLOBIN: 15.1 G/DL (ref 13–17)
IMMATURE GRANULOCYTES: 0 %
LYMPHOCYTES # BLD: 27 % (ref 24–43)
MCH RBC QN AUTO: 28.8 PG (ref 25.2–33.5)
MCHC RBC AUTO-ENTMCNC: 32.6 G/DL (ref 28.4–34.8)
MCV RBC AUTO: 88.2 FL (ref 82.6–102.9)
MONOCYTES # BLD: 11 % (ref 3–12)
MYOGLOBIN: 45 NG/ML (ref 28–72)
MYOGLOBIN: 48 NG/ML (ref 28–72)
NRBC AUTOMATED: 0 PER 100 WBC
PDW BLD-RTO: 13 % (ref 11.8–14.4)
PLATELET # BLD: 111 K/UL (ref 138–453)
PLATELET ESTIMATE: ABNORMAL
PMV BLD AUTO: 11 FL (ref 8.1–13.5)
POTASSIUM SERPL-SCNC: 4.5 MMOL/L (ref 3.7–5.3)
PRO-BNP: 814 PG/ML
RBC # BLD: 5.25 M/UL (ref 4.21–5.77)
RBC # BLD: ABNORMAL 10*6/UL
SEG NEUTROPHILS: 60 % (ref 36–65)
SEGMENTED NEUTROPHILS ABSOLUTE COUNT: 3.5 K/UL (ref 1.5–8.1)
SODIUM BLD-SCNC: 137 MMOL/L (ref 135–144)
TROPONIN INTERP: NORMAL
TROPONIN INTERP: NORMAL
TROPONIN T: NORMAL NG/ML
TROPONIN T: NORMAL NG/ML
TROPONIN, HIGH SENSITIVITY: 22 NG/L (ref 0–22)
TROPONIN, HIGH SENSITIVITY: 22 NG/L (ref 0–22)
WBC # BLD: 5.9 K/UL (ref 3.5–11.3)
WBC # BLD: ABNORMAL 10*3/UL

## 2021-10-08 PROCEDURE — U0005 INFEC AGEN DETEC AMPLI PROBE: HCPCS

## 2021-10-08 PROCEDURE — 83874 ASSAY OF MYOGLOBIN: CPT

## 2021-10-08 PROCEDURE — 80048 BASIC METABOLIC PNL TOTAL CA: CPT

## 2021-10-08 PROCEDURE — 85025 COMPLETE CBC W/AUTO DIFF WBC: CPT

## 2021-10-08 PROCEDURE — 83880 ASSAY OF NATRIURETIC PEPTIDE: CPT

## 2021-10-08 PROCEDURE — U0003 INFECTIOUS AGENT DETECTION BY NUCLEIC ACID (DNA OR RNA); SEVERE ACUTE RESPIRATORY SYNDROME CORONAVIRUS 2 (SARS-COV-2) (CORONAVIRUS DISEASE [COVID-19]), AMPLIFIED PROBE TECHNIQUE, MAKING USE OF HIGH THROUGHPUT TECHNOLOGIES AS DESCRIBED BY CMS-2020-01-R: HCPCS

## 2021-10-08 PROCEDURE — 84484 ASSAY OF TROPONIN QUANT: CPT

## 2021-10-08 PROCEDURE — 71045 X-RAY EXAM CHEST 1 VIEW: CPT

## 2021-10-08 PROCEDURE — 2500000003 HC RX 250 WO HCPCS: Performed by: PHYSICIAN ASSISTANT

## 2021-10-08 PROCEDURE — 96374 THER/PROPH/DIAG INJ IV PUSH: CPT

## 2021-10-08 PROCEDURE — 93005 ELECTROCARDIOGRAM TRACING: CPT | Performed by: PHYSICIAN ASSISTANT

## 2021-10-08 PROCEDURE — 99284 EMERGENCY DEPT VISIT MOD MDM: CPT

## 2021-10-08 RX ORDER — BUMETANIDE 0.25 MG/ML
1 INJECTION, SOLUTION INTRAMUSCULAR; INTRAVENOUS ONCE
Status: COMPLETED | OUTPATIENT
Start: 2021-10-08 | End: 2021-10-08

## 2021-10-08 RX ADMIN — BUMETANIDE 1 MG: 0.25 INJECTION INTRAMUSCULAR; INTRAVENOUS at 20:57

## 2021-10-08 NOTE — ED PROVIDER NOTES
[address] [place] [address] ED  [facility]      Pt Name: Rashawn Negrete  MRN: [id number]  [place] 1960  Date ofevaluation: 10/8/2021  Provider: Ramila Almeida PA-C    CHIEF COMPLAINT       Chief Complaint   Patient presents with    Shortness of Breath    Fatigue         HISTORY OF PRESENT ILLNESS  (Location/Symptom, Timing/Onset, Context/Setting, Quality, Duration, Modifying Factors, Severity.)   Rashawn Negrete is a 56 y.o. male who presents to the emergency department with shortness of breath over the last couple weeks. No fever chills. No Nausea vomiting. No chest pain. Symptoms described as positive. No diaphoresis. No nausea vomiting. No shortness of breath here currently. Reports history of CHF. Nursing Notes were reviewed. ALLERGIES     Patient has no known allergies. CURRENT MEDICATIONS       Discharge Medication List as of 10/8/2021  8:50 PM      CONTINUE these medications which have NOT CHANGED    Details   thiamine 100 MG tablet Take 100 mg by mouth dailyHistorical Med      !! vitamin E 100 units capsule Take 100 Units by mouth daily Historical Med      polyethylene glycol (GLYCOLAX) 17 GM/SCOOP powder Use as directed by following your patient instructions given by office. , Disp-238 g, R-0Normal      bisacodyl (DULCOLAX) 5 MG EC tablet TAKE 4 TABS AS DIRECTED BY PHYSICIAN OFFICE, Disp-4 tablet, R-0Normal      magnesium citrate solution Take 296 mLs by mouth once for 1 dose, Disp-296 mL, R-0Normal      Multiple Vitamins-Minerals (THERAPEUTIC MULTIVITAMIN-MINERALS) tablet Take 1 tablet by mouth dailyHistorical Med      Ascorbic Acid (VITAMIN C) 250 MG tablet Take 250 mg by mouth three times a weekHistorical Med      bumetanide (BUMEX) 1 MG tablet Take 1 tablet by mouth every morning AND 1 tablet Daily with lunch., Disp-60 tablet, R-3Normal      nitroGLYCERIN (NITROSTAT) 0.4 MG SL tablet Place 0.4 mg under the tongue every 5 minutes as needed for Chest pain up to max of 3 total doses. If no relief after 1 dose, call 911. Dr. Jesika Connors Med      !! vitamin E 400 UNIT capsule Take 400 Units by mouth daily Stop 7 days prior to surgeryHistorical Med      atorvastatin (LIPITOR) 80 MG tablet Take 1 tablet by mouth nightly, Disp-30 tablet, R-3Normal      carvedilol (COREG) 12.5 MG tablet Take 1 tablet by mouth 2 times daily (with meals), Disp-60 tablet, R-3Normal      lisinopril (PRINIVIL;ZESTRIL) 5 MG tablet Take 1 tablet by mouth daily, Disp-30 tablet, R-3Normal      aspirin 81 MG chewable tablet Take 81 mg by mouth daily Per Dr. Edwige Chaparro. Ok to stop 7 days prior to surgeryHistorical Med       !! - Potential duplicate medications found. Please discuss with provider. PAST MEDICAL HISTORY         Diagnosis Date    CAD (coronary artery disease)     2020 cath    CH[facility]congestive [facility] failure[facility] ([facility])     Dr. Alyce Knight Heart attack [facility])     Hyperlipidemia     Dr. Alyce Knight Hypertension     Dr. Alyce Knight MI (myocardial infarction) [facility])     MRSA (methicillin resistant staph aureus) culture positive 01/26/2018    abdomen    Skin cancer     Snores     no cpap    Stroke [facility])     2011  Dr. Parminder Clements Wears dentures     upper full denture    Wears reading eyeglasses     Wellness examination     Cassandra Condon PA-C last seen Nov 2020       SURGICAL HISTORY           Procedure Laterality Date    CARDIAC CATHETERIZATION  08/06/2020    Dr. Odie Seip therapy.   Report on chart   [address]    unsure of date, bilateral radials    CORONARY ANGIOPLASTY WITH STENT PLACEMENT      6 total stents per patient    CORONARY ARTERY BYPASS GRAFT      4 vessel bypass    EYE SURGERY      eye injury  new lens  and laser lt eye 2019    HERNIA REPAIR Bilateral 12/21/2020    XI LAPAROSCOPIC ROBOTIC INGUINAL HERNIA REPAIR WITH MESH; UMBILICAL HERNIA REPAIR WITH MESH performed by Nelia Pinzon DO at [address]      plate/hardware present    SKIN BIOPSY      back         FAMILY HISTORY           Problem Relation Age of Onset    Coronary Art Dis Father     Liver Disease Father     Alcohol Abuse Sister      Family Status   Relation Name Status    Father      Mother      Sister  Alive    Sister          SOCIAL HISTORY      reports that he quit smoking about 22 years ago. He has never used smokeless tobacco. He reports that he does not drink alcohol and does not use drugs. REVIEW OFSYSTEMS    (2-9 systems for level 4, 10 or more for level 5)   Review of Systems    Except as noted above the remainder of the review of systems was reviewed and negative. PHYSICAL EXAM    (up to 7 for level 4, 8 or more for level 5)     ED Triage Vitals [10/08/21 1737]   BP Temp Temp Source Pulse Resp SpO2 Height Weight   (!) 126/112 98 °F (36.7 °C) Oral 74 16 99 % 5' 9\" (1.753 m) 210 lb (95.3 kg)      Physical Exam  Constitutional:       Appearance: He is well-developed. HENT:      Head: Normocephalic and atraumatic. Cardiovascular:      Rate and Rhythm: Normal rate and regular rhythm. Pulmonary:      Effort: Pulmonary effort is normal.      Breath sounds: Normal breath sounds. Abdominal:      Palpations: Abdomen is soft. Musculoskeletal:         General: Normal range of motion. Cervical back: Normal range of motion and neck supple. Skin:     General: Skin is warm. Neurological:      Mental Status: He is alert and oriented to person, place, and time.    Psychiatric:         Behavior: Behavior normal.                 DIAGNOSTIC RESULTS     EKG: All EKG's are interpreted by the Emergency Department Physician who either signs or Co-signs this chart in the absence of a cardiologist.        RADIOLOGY:   Non-plain film images such as CT, Ultrasound and MRI are read by the radiologist. Plain radiographic images arevisualized and preliminarily interpreted by the emergency physician with the below findings:        Interpretation per the Radiologist below, if available at thetime of this note:          ED BEDSIDE ULTRASOUND:   Performed by ED Physician - none    LABS:  Labs Reviewed   CBC WITH AUTO DIFFERENTIAL - Abnormal; Notable for the following components:       Result Value    Platelets 909 (*)     All other components within normal limits   BRAIN NATRIURETIC PEPTIDE - Abnormal; Notable for the following components:    Pro- (*)     All other components within normal limits   BASIC METABOLIC PANEL - Abnormal; Notable for the following components:    Glucose 160 (*)     Bun/Cre Ratio 25 (*)     All other components within normal limits   TROP/MYOGLOBIN   TROP/MYOGLOBIN   TROP/MYOGLOBIN   COVID-19       All other labs were within normal range or not returned as of this dictation. EMERGENCY DEPARTMENT COURSE and DIFFERENTIAL DIAGNOSIS/MDM:   Vitals:    Vitals:    10/08/21 1737 10/08/21 1814 10/08/21 2037   BP: (!) 126/112 (!) 122/96 (!) 134/95   Pulse: 74  76   Resp: 16  16   Temp: 98 °F (36.7 °C)     TempSrc: Oral     SpO2: 99% 97%    Weight: 210 lb (95.3 kg)     Height: 5' 9\" (1.753 m)       Patient is in no apparent distress. Mild CHF seen on chest x-ray. BNP is elevated over 800. Given dose of intravenous Bumex here in the ER. Discharged home instructions to increase Bumex by. Doubling it over the next 2 days and to call his doctor Monday for recheck    CONSULTS:  None    PROCEDURES:  Procedures        FINAL IMPRESSION      1.  Congestive heart failure, unspecified HF chronicity, unspecified heart failure type [facility])          DISPOSITION/PLAN   DISPOSITION Decision To Discharge 10/08/2021 08:49:37 PM      PATIENTREFERRED TO:   Yomi York, [address] [facility]  [phone]    In 3 days        DISCHARGE MEDICATIONS:     Discharge Medication List as of 10/8/2021  8:50 PM              (Please note that portions of this note were completed with a voice recognition program.  Efforts were made to edit thedictations but occasionally words are mis-transcribed.)    JEYSON West PA-C  10/08/21 6687

## 2021-10-09 NOTE — ED PROVIDER NOTES
The patient was seen and examined by me in conjunction with the mid-level provider. I agree with his/her assessment and treatment plan. Minimal CHF is found on his chest x-ray.   He does not require admission the hospital.  Will increase his diuretic for a few days and he will follow-up with his cardiologist.     Gama Alanis MD  10/08/21 2040

## 2021-10-10 LAB
SARS-COV-2: NORMAL
SARS-COV-2: NOT DETECTED
SOURCE: NORMAL

## 2021-10-11 LAB
EKG ATRIAL RATE: 300 BPM
EKG Q-T INTERVAL: 438 MS
EKG QRS DURATION: 138 MS
EKG QTC CALCULATION (BAZETT): 459 MS
EKG R AXIS: 86 DEGREES
EKG T AXIS: 61 DEGREES
EKG VENTRICULAR RATE: 66 BPM

## 2021-10-11 PROCEDURE — 93010 ELECTROCARDIOGRAM REPORT: CPT | Performed by: INTERNAL MEDICINE

## 2021-11-11 ENCOUNTER — HOSPITAL ENCOUNTER (OUTPATIENT)
Age: 61
Setting detail: SPECIMEN
Discharge: HOME | End: 2021-11-11
Payer: MEDICARE

## 2021-11-11 LAB
ANION GAP SERPL CALCULATED.3IONS-SCNC: 12 MMOL/L (ref 9–17)
BUN BLDV-MCNC: 13 MG/DL (ref 8–23)
BUN/CREAT BLD: ABNORMAL (ref 9–20)
CALCIUM SERPL-MCNC: 9.3 MG/DL (ref 8.6–10.4)
CHLORIDE BLD-SCNC: 105 MMOL/L (ref 98–107)
CO2: 21 MMOL/L (ref 20–31)
CREAT SERPL-MCNC: 0.74 MG/DL (ref 0.7–1.2)
GFR AFRICAN AMERICAN: >60 ML/MIN
GFR NON-AFRICAN AMERICAN: >60 ML/MIN
GFR SERPL CREATININE-BSD FRML MDRD: ABNORMAL ML/MIN/{1.73_M2}
GFR SERPL CREATININE-BSD FRML MDRD: ABNORMAL ML/MIN/{1.73_M2}
GLUCOSE BLD-MCNC: 141 MG/DL (ref 70–99)
POTASSIUM SERPL-SCNC: 4.3 MMOL/L (ref 3.7–5.3)
SODIUM BLD-SCNC: 138 MMOL/L (ref 135–144)

## 2021-11-17 ENCOUNTER — HOSPITAL ENCOUNTER (OUTPATIENT)
Dept: CARDIAC CATH/INVASIVE PROCEDURES | Age: 61
Discharge: HOME | End: 2021-11-17
Payer: MEDICARE

## 2021-11-17 VITALS
WEIGHT: 240 LBS | HEIGHT: 69 IN | TEMPERATURE: 98.1 F | SYSTOLIC BLOOD PRESSURE: 139 MMHG | OXYGEN SATURATION: 95 % | RESPIRATION RATE: 14 BRPM | BODY MASS INDEX: 35.55 KG/M2 | HEART RATE: 83 BPM | DIASTOLIC BLOOD PRESSURE: 82 MMHG

## 2021-11-17 LAB
GFR NON-AFRICAN AMERICAN: >60 ML/MIN
GFR SERPL CREATININE-BSD FRML MDRD: >60 ML/MIN
GFR SERPL CREATININE-BSD FRML MDRD: NORMAL ML/MIN/{1.73_M2}
GLUCOSE BLD-MCNC: 139 MG/DL (ref 74–100)
LV EF: 33 %
LVEF MODALITY: NORMAL
POC BUN: 15 MG/DL (ref 8–26)
POC CHLORIDE: 105 MMOL/L (ref 98–107)
POC CREATININE: 0.73 MG/DL (ref 0.51–1.19)
POC HEMATOCRIT: 44 % (ref 41–53)
POC HEMOGLOBIN: 14.9 G/DL (ref 13.5–17.5)
POC POTASSIUM: 4.1 MMOL/L (ref 3.5–4.5)
POC SODIUM: 142 MMOL/L (ref 138–146)

## 2021-11-17 PROCEDURE — 93312 ECHO TRANSESOPHAGEAL: CPT

## 2021-11-17 PROCEDURE — 93325 DOPPLER ECHO COLOR FLOW MAPG: CPT

## 2021-11-17 PROCEDURE — 82947 ASSAY GLUCOSE BLOOD QUANT: CPT

## 2021-11-17 PROCEDURE — 85014 HEMATOCRIT: CPT

## 2021-11-17 PROCEDURE — 2709999900 HC NON-CHARGEABLE SUPPLY

## 2021-11-17 PROCEDURE — 7100000001 HC PACU RECOVERY - ADDTL 15 MIN

## 2021-11-17 PROCEDURE — 76377 3D RENDER W/INTRP POSTPROCES: CPT

## 2021-11-17 PROCEDURE — 82435 ASSAY OF BLOOD CHLORIDE: CPT

## 2021-11-17 PROCEDURE — 93005 ELECTROCARDIOGRAM TRACING: CPT | Performed by: INTERNAL MEDICINE

## 2021-11-17 PROCEDURE — 99152 MOD SED SAME PHYS/QHP 5/>YRS: CPT

## 2021-11-17 PROCEDURE — 7100000000 HC PACU RECOVERY - FIRST 15 MIN

## 2021-11-17 PROCEDURE — 93320 DOPPLER ECHO COMPLETE: CPT

## 2021-11-17 PROCEDURE — 82565 ASSAY OF CREATININE: CPT

## 2021-11-17 PROCEDURE — 84132 ASSAY OF SERUM POTASSIUM: CPT

## 2021-11-17 PROCEDURE — 84520 ASSAY OF UREA NITROGEN: CPT

## 2021-11-17 PROCEDURE — 92960 CARDIOVERSION ELECTRIC EXT: CPT | Performed by: INTERNAL MEDICINE

## 2021-11-17 PROCEDURE — 6360000002 HC RX W HCPCS

## 2021-11-17 PROCEDURE — 84295 ASSAY OF SERUM SODIUM: CPT

## 2021-11-17 RX ORDER — SODIUM CHLORIDE 9 MG/ML
INJECTION, SOLUTION INTRAVENOUS CONTINUOUS
Status: DISCONTINUED | OUTPATIENT
Start: 2021-11-17 | End: 2021-11-18 | Stop reason: HOSPADM

## 2021-11-17 RX ORDER — AMIODARONE HYDROCHLORIDE 200 MG/1
200 TABLET ORAL DAILY
Qty: 90 TABLET | Refills: 1 | Status: ON HOLD
Start: 2021-11-17 | End: 2022-10-12

## 2021-11-17 RX ADMIN — SODIUM CHLORIDE: 9 INJECTION, SOLUTION INTRAVENOUS at 11:13

## 2021-11-17 NOTE — PROGRESS NOTES
Ambulated to BR. Tolerated well. IV discontinued. Discharge teaching completed. Family present. Discharged via [facility] with family.

## 2021-11-17 NOTE — PROCEDURES
[facility]  Transesophageal Echocardiogram and Cardioversion       Today's Date:  11/17/2021  Indication:   New onset of Afib     Operators:  Primary:   Dr. Christian Resendiz  (Attending Physician)  Assistant:   Michelle Boswell MD (Cardiovascular Fellow)    Pre Procedure Conscious Sedation Data:    ASA Class:    [] I [] II [x] III [] IV    Mallampati Class:  [x] I [] II [] III [] IV    Patient seen and examined. History and Physical reviewed. Labs reviewed. After informed consent was obtained with explanation of the risks and benefits, the patient was brought to Cath lab. All sedation was administered by the cardiologist. The oropharynx was pre anesthetisized with cetacaine spray. BENITO:    Structures:    LA:  Normal  JARED:  No thrombus  RA:  Normal  RV:   Normal  LV:  Normal  Estimated LVEF: 30 %  Aorta:   Mild atheromatous disease arch  Pericardium: No pericardial effusion        Valves:    Mitral Valve:  Structurally normal.  Severe regurgitation is identified. Aortic Valve: The aortic valve is trileaflet and opens adequately. No regurgitiation is identified. Tricuspid valve: Structurally normal. Mild  regurgitation is identified. Pulmonary valve: Normal. No significant regurgitation    No valvular vegetations or thrombus identified. BENITO Summary:     1. A BENITO was performed without complications. 2. LVEF 30 %  3  Structurally normal mitral valve. Severe MR noted. Central jet and one posterior jet. MR radius 1 cm, ERO 0.35 and MR volume 59 ml consistent with severe MR.   3.  JARED well visualized. No clot noted. 4. Proceed with Cardioversion        CARDIOVERSION:    After an adequate level of sedation was achieved, 200J in biphasic synchronized delivery was administered. conversion to normal sinus rhythm. Cardioversion was successful. The patient awoke without complications.       Recommendations :   - Will start patient on Amiodarone 200 mg BID for 1 week and 200 mg daily after that  - Follow up as OP for Mitraclip evaluation   - Plan was discussed with patient           Electronically signed by Rosa M Dotson MD on 11/17/2021 at 2:07 PM  Cardiovascular Diseases Fellow  [address]  Greta Leach, [facility]

## 2021-11-17 NOTE — PROGRESS NOTES
Received post procedure to [facility] to room 3. Assessment obtained. Restrictions reviewed with patient. Post procedure pathway initiated. Family at side. Patient without complaints.

## 2021-11-17 NOTE — H&P
[facility]  Procedure History and Physical Update          Patient Name: Maine Cruz  MRN:    [id number]  YOB: 1960  Date of evaluation:  11/17/2021    Procedure:     BENITO/CV     Indication for procedure:      New onset of Afib       Please refer to the office note completed by Dr. Mj Obando on 11/16/2021  in the medical record and note that:    [x] I have examined the patient and reviewed the H&P/Consult and there are no changes to be made to the assessment or plan. [] I have examined the patient and reviewed the H&P/Consult and have noted the following changes:    Past Medical History:   Diagnosis Date    CAD (coronary artery disease)     2020 cath    CHF (congestive heart failure) ([facility] [address]     Dr. Jaimee Mojcia attack [facility])     Hyperlipidemia     Dr. Naina Portillo Hypertension     Dr. Naina Portillo MI (myocardial infarction) [facility])     MRSA (methicillin resistant staph aureus) culture positive 01/26/2018    abdomen    Skin cancer     Snores     no cpap    Stroke [facility])     2011  Dr. Sumner [place] Wears dentures     upper full denture    Wears reading eyeglasses     Wellness examination     Akua Solis PA-C last seen Nov 2020       Past Surgical History:   Procedure Laterality Date    CARDIAC CATHETERIZATION  08/06/2020    Dr. Nguyen Blind therapy.   Report on chart   [address]    unsure of date, bilateral radials    CARDIOVERSION  11/17/2021    CORONARY ANGIOPLASTY WITH STENT PLACEMENT      6 total stents per patient    CORONARY ARTERY BYPASS GRAFT      4 vessel bypass    EYE SURGERY      eye injury  new lens  and laser lt eye 2019    HERNIA REPAIR Bilateral 12/21/2020    XI LAPAROSCOPIC ROBOTIC INGUINAL HERNIA REPAIR WITH MESH; UMBILICAL HERNIA REPAIR WITH MESH performed by Rolanda Sanon DO at [address]      plate/hardware present    OTHER SURGICAL HISTORY  11/17/2021    BENITO    SKIN BIOPSY      back       Family History Problem Relation Age of Onset    Coronary Art Dis Father     Liver Disease Father     Alcohol Abuse Sister        No Known Allergies    Prior to Admission medications    Medication Sig Start Date End Date Taking? Authorizing Provider   rivaroxaban (XARELTO) 20 MG TABS tablet Take 20 mg by mouth   Yes Historical Provider, MD   vitamin E 100 units capsule Take 100 Units by mouth daily    Yes Historical Provider, MD   bisacodyl (DULCOLAX) 5 MG EC tablet TAKE 4 TABS AS DIRECTED BY PHYSICIAN OFFICE 1/4/21  Yes Noel Bonilla MD   Multiple Vitamins-Minerals (THERAPEUTIC MULTIVITAMIN-MINERALS) tablet Take 1 tablet by mouth daily   Yes Historical Provider, MD   Ascorbic Acid (VITAMIN C) 250 MG tablet Take 250 mg by mouth three times a week   Yes Historical Provider, MD   bumetanide (BUMEX) 1 MG tablet Take 1 tablet by mouth every morning AND 1 tablet Daily with lunch. 3/13/19  Yes ELY Mata - NP   vitamin E 400 UNIT capsule Take 400 Units by mouth daily Stop 7 days prior to surgery   Yes Historical Provider, MD   carvedilol (COREG) 12.5 MG tablet Take 1 tablet by mouth 2 times daily (with meals)  Patient taking differently: Take 12.5 mg by mouth 2 times daily (with meals) Dr. Sheron Hadley 2/20/19  Yes Pricila Garcia MD   lisinopril (PRINIVIL;ZESTRIL) 5 MG tablet Take 1 tablet by mouth daily  Patient taking differently: Take 5 mg by mouth daily Dr. Sheron Hadley 2/21/19  Yes Pricila Garcia MD   aspirin 81 MG chewable tablet Take 81 mg by mouth daily Per Dr. Sheron Hadley. Ok to stop 7 days prior to surgery   Yes Historical Provider, MD   thiamine 100 MG tablet Take 100 mg by mouth daily    Historical Provider, MD   polyethylene glycol (GLYCOLAX) 17 GM/SCOOP powder Use as directed by following your patient instructions given by office.   Patient not taking: Reported on 6/4/2021 1/4/21   Noel Bonilla MD   magnesium citrate solution Take 296 mLs by mouth once for 1 dose  Patient not taking: Reported on 6/18/2021 1/4/21 1/29/21 Koki Upton MD   nitroGLYCERIN (NITROSTAT) 0.4 MG SL tablet Place 0.4 mg under the tongue every 5 minutes as needed for Chest pain up to max of 3 total doses. If no relief after 1 dose, call 911. Dr. Mj Sarmiento  Patient not taking: Reported on 6/18/2021    Historical Provider, MD   atorvastatin (LIPITOR) 80 MG tablet Take 1 tablet by mouth nightly  Patient taking differently: Take 80 mg by mouth nightly Dr. Mj Sarmiento 2/20/19   Shawna Govea MD         Vitals:    11/17/21 1105   BP: 125/85   Pulse: 77   Resp: 20   Temp: 98.1 °F (36.7 °C)   SpO2: 98%       Constitutional and General Appearance:   alert, cooperative, no distress and appears stated age  HEENT:  · PERRL, EOMI  Respiratory:  · Normal excursion and expansion without use of accessory muscles  · Resp Auscultation:  Good respiratory effort. No for increased work of breathing. On auscultation: clear to auscultation bilaterally  Cardiovascular:  · Regular rate and rhythm. · S1/S2  · No murmurs. · The apical impulse is not displaced  Abdomen:  · Soft  · Bowel sounds present  · Non-tender to palpation  Extremities:  · No cyanosis or clubbing  · Lower extremity edema: No.  Skin:  · Warm and dry  Neurological:  · Alert and oriented. · Moves all extremities well        1. Afib with RVR   2. H/o CAD - As above   3. ISchemic cardiomyopathy       Plan:  · Proceed with planned procedure. - BENITO/CV  · Further orders to follow. Pre Procedure Conscious Sedation Data:     ASA Class:                  [] I [] II [x] III [] IV     Mallampati Class:       [] I [] II [x] III [] IV     understanding the above risks and alternatives to the procedure. Transesophageal echocardiography (procedure) has been fully reviewed with the patient and written informed consent has been obtained. The risks, benefits, and alternatives were discussed, in detail, with patient.      Briefly, the potential risks include, but are not limited to, bleeding, damage to teeth or pharynx, esophageal

## 2021-11-18 LAB
EKG ATRIAL RATE: 326 BPM
EKG Q-T INTERVAL: 436 MS
EKG QRS DURATION: 136 MS
EKG QTC CALCULATION (BAZETT): 493 MS
EKG R AXIS: 90 DEGREES
EKG T AXIS: 47 DEGREES
EKG VENTRICULAR RATE: 77 BPM

## 2021-11-18 PROCEDURE — 93010 ELECTROCARDIOGRAM REPORT: CPT | Performed by: INTERNAL MEDICINE

## 2021-11-22 ENCOUNTER — APPOINTMENT (OUTPATIENT)
Dept: GENERAL RADIOLOGY | Age: 61
End: 2021-11-22
Payer: MEDICARE

## 2021-11-22 ENCOUNTER — HOSPITAL ENCOUNTER (EMERGENCY)
Age: 61
Discharge: HOME | End: 2021-11-22
Attending: EMERGENCY MEDICINE
Payer: MEDICARE

## 2021-11-22 VITALS
HEART RATE: 86 BPM | RESPIRATION RATE: 22 BRPM | DIASTOLIC BLOOD PRESSURE: 92 MMHG | OXYGEN SATURATION: 96 % | SYSTOLIC BLOOD PRESSURE: 161 MMHG

## 2021-11-22 DIAGNOSIS — R07.9 CHEST PAIN, UNSPECIFIED TYPE: Primary | ICD-10-CM

## 2021-11-22 LAB
ABSOLUTE EOS #: 0.11 K/UL (ref 0–0.44)
ABSOLUTE IMMATURE GRANULOCYTE: 0.02 K/UL (ref 0–0.3)
ABSOLUTE LYMPH #: 1.32 K/UL (ref 1.1–3.7)
ABSOLUTE MONO #: 0.64 K/UL (ref 0.1–1.2)
ANION GAP SERPL CALCULATED.3IONS-SCNC: 12 MMOL/L (ref 9–17)
BASOPHILS # BLD: 1 % (ref 0–2)
BASOPHILS ABSOLUTE: 0.04 K/UL (ref 0–0.2)
BNP INTERPRETATION: ABNORMAL
BUN BLDV-MCNC: 16 MG/DL (ref 8–23)
BUN/CREAT BLD: 20 (ref 9–20)
CALCIUM SERPL-MCNC: 8.9 MG/DL (ref 8.6–10.4)
CHLORIDE BLD-SCNC: 103 MMOL/L (ref 98–107)
CO2: 25 MMOL/L (ref 20–31)
CREAT SERPL-MCNC: 0.82 MG/DL (ref 0.7–1.2)
DIFFERENTIAL TYPE: ABNORMAL
EOSINOPHILS RELATIVE PERCENT: 2 % (ref 1–4)
GFR AFRICAN AMERICAN: >60 ML/MIN
GFR NON-AFRICAN AMERICAN: >60 ML/MIN
GFR SERPL CREATININE-BSD FRML MDRD: ABNORMAL ML/MIN/{1.73_M2}
GFR SERPL CREATININE-BSD FRML MDRD: ABNORMAL ML/MIN/{1.73_M2}
GLUCOSE BLD-MCNC: 146 MG/DL (ref 70–99)
HCT VFR BLD CALC: 45 % (ref 40.7–50.3)
HEMOGLOBIN: 14.6 G/DL (ref 13–17)
IMMATURE GRANULOCYTES: 0 %
LYMPHOCYTES # BLD: 24 % (ref 24–43)
MAGNESIUM: 1.9 MG/DL (ref 1.6–2.6)
MCH RBC QN AUTO: 28.9 PG (ref 25.2–33.5)
MCHC RBC AUTO-ENTMCNC: 32.4 G/DL (ref 28.4–34.8)
MCV RBC AUTO: 88.9 FL (ref 82.6–102.9)
MONOCYTES # BLD: 12 % (ref 3–12)
NRBC AUTOMATED: 0 PER 100 WBC
PDW BLD-RTO: 13.8 % (ref 11.8–14.4)
PLATELET # BLD: 103 K/UL (ref 138–453)
PLATELET ESTIMATE: ABNORMAL
PMV BLD AUTO: 11.2 FL (ref 8.1–13.5)
POTASSIUM SERPL-SCNC: 4.3 MMOL/L (ref 3.7–5.3)
PRO-BNP: 1400 PG/ML
RBC # BLD: 5.06 M/UL (ref 4.21–5.77)
RBC # BLD: ABNORMAL 10*6/UL
SEG NEUTROPHILS: 62 % (ref 36–65)
SEGMENTED NEUTROPHILS ABSOLUTE COUNT: 3.42 K/UL (ref 1.5–8.1)
SODIUM BLD-SCNC: 140 MMOL/L (ref 135–144)
TROPONIN INTERP: NORMAL
TROPONIN INTERP: NORMAL
TROPONIN T: NORMAL NG/ML
TROPONIN T: NORMAL NG/ML
TROPONIN, HIGH SENSITIVITY: 19 NG/L (ref 0–22)
TROPONIN, HIGH SENSITIVITY: 21 NG/L (ref 0–22)
WBC # BLD: 5.6 K/UL (ref 3.5–11.3)
WBC # BLD: ABNORMAL 10*3/UL

## 2021-11-22 PROCEDURE — 83735 ASSAY OF MAGNESIUM: CPT

## 2021-11-22 PROCEDURE — 93005 ELECTROCARDIOGRAM TRACING: CPT | Performed by: EMERGENCY MEDICINE

## 2021-11-22 PROCEDURE — 99283 EMERGENCY DEPT VISIT LOW MDM: CPT

## 2021-11-22 PROCEDURE — 84484 ASSAY OF TROPONIN QUANT: CPT

## 2021-11-22 PROCEDURE — 83880 ASSAY OF NATRIURETIC PEPTIDE: CPT

## 2021-11-22 PROCEDURE — 85025 COMPLETE CBC W/AUTO DIFF WBC: CPT

## 2021-11-22 PROCEDURE — 71045 X-RAY EXAM CHEST 1 VIEW: CPT

## 2021-11-22 PROCEDURE — 80048 BASIC METABOLIC PNL TOTAL CA: CPT

## 2021-11-22 ASSESSMENT — ENCOUNTER SYMPTOMS
DIARRHEA: 0
VOMITING: 0
SHORTNESS OF BREATH: 1
COUGH: 0
EYE REDNESS: 0
NAUSEA: 0
COLOR CHANGE: 0
CHEST TIGHTNESS: 1
RHINORRHEA: 0
SORE THROAT: 0
EYE DISCHARGE: 0

## 2021-11-22 NOTE — ED PROVIDER NOTES
EMERGENCY DEPARTMENT ENCOUNTER    Pt Name: Wellington Garcia  MRN: [id number]  [place] 1960  Date of evaluation: 11/22/21  CHIEF COMPLAINT       Chief Complaint   Patient presents with    Chest Pain     having chest pain on friday, took two nitro sublingual, it went away, was told by doc to come in today to be hecked, no chest pain currently, hx of MI, stents, and quadrupal bypass      HISTORY OF PRESENT ILLNESS   38-year-old male presents with complaints of chest pain or shortness of breath. The patient states that his symptoms actually occurred on Saturday morning. Patient was in the office of his cardiologist on Wednesday, he had a BENITO and cardioversion, he was started on amiodarone. The patient presents today with the symptoms that occurred on Saturday, he had severe chest pain and discomfort that lasted for about an hour, associated with some shortness of breath. He states has been more short of breath over the past few days. REVIEW OF SYSTEMS     Review of Systems   Constitutional: Negative for chills and fever. HENT: Negative for rhinorrhea and sore throat. Eyes: Negative for discharge, redness and visual disturbance. Respiratory: Positive for chest tightness and shortness of breath. Negative for cough. Cardiovascular: Positive for chest pain. Negative for palpitations and leg swelling. Gastrointestinal: Negative for diarrhea, nausea and vomiting. Genitourinary: Negative for dysuria and hematuria. Musculoskeletal: Negative for arthralgias, myalgias and neck pain. Skin: Negative for color change and rash. Neurological: Negative for seizures, weakness and headaches. Psychiatric/Behavioral: Negative for hallucinations, self-injury and suicidal ideas.      PASTMEDICAL HISTORY     Past Medical History:   Diagnosis Date    CAD (coronary artery disease)     2020 cath    CHF [facility]congestive heart failure) ([facility])     Dr. Orville Bhakta attack [facility])     Hyperlipidemia      Cecilia Krishnan Hypertension     Dr. Cecilia Krishnan MI (myocardial infarction) [facility])     MRSA (methicillin resistant staph aureus) culture positive 01/26/2018    abdomen    Skin cancer     Snores     no cpap    Stroke [facility])     2011  Dr. Shannon Subramanian Wears dentures     upper full denture    Wears reading eyeglasses     Wellness examination     Tameka Lopez PA-C last seen Nov 2020     Past Problem List  Patient Active Problem List   Diagnosis Code    Hyperbilirubinemia E80.6    Essential hypertension I10    Hyperlipidemia E78.5    CAD (coronary artery disease) I25.10    Gross hematuria R31.0    Abdominal pain, right upper quadrant R10.11    Right nephrolithiasis N20.0    Abnormal liver function test Q69.8    Acute systolic HF (heart failure) (HCC) I50.21    Noncompliance Z91.19    Acute on chronic systolic (congestive) heart failure (HCC) I50.23    Pneumonia J18.9    Dyspnea and respiratory abnormalities R06.00, R06.89    Status post inguinal hernia repair Z98.890, Z87.19    S/P repair of ventral hernia Z98.890, Z87.19    Post-op pain G89.18    Non-recurrent bilateral inguinal hernia without obstruction or gangrene U54.27    Umbilical hernia with obstruction, without gangrene K42.0     SURGICAL HISTORY       Past Surgical History:   Procedure Laterality Date    CARDIAC CATHETERIZATION  08/06/2020    Dr. Mitchel hardwick.   Report on chart   [address]    unsure of date, bilateral radials    CARDIOVERSION  11/17/2021    CORONARY ANGIOPLASTY WITH STENT PLACEMENT      6 total stents per patient    CORONARY ARTERY BYPASS GRAFT      4 vessel bypass    EYE SURGERY      eye injury  new lens  and laser lt eye 2019    HERNIA REPAIR Bilateral 12/21/2020    XI LAPAROSCOPIC ROBOTIC INGUINAL HERNIA REPAIR WITH MESH; UMBILICAL HERNIA REPAIR WITH MESH performed by Nelly Bowens DO at [address]      plate/hardware present    OTHER SURGICAL HISTORY  11/17/2021 BENITO    SKIN BIOPSY      back     CURRENT MEDICATIONS       Current Discharge Medication List      CONTINUE these medications which have NOT CHANGED    Details   amiodarone (CORDARONE) 200 MG tablet Take 1 tablet by mouth daily Take 2 tablets for 7 days and after that one tablet daily  Qty: 90 tablet, Refills: 1      bumetanide (BUMEX) 1 MG tablet Take 1 tablet by mouth every morning AND 1 tablet Daily with lunch. Qty: 60 tablet, Refills: 3      nitroGLYCERIN (NITROSTAT) 0.4 MG SL tablet Place 0.4 mg under the tongue every 5 minutes as needed for Chest pain up to max of 3 total doses. If no relief after 1 dose, call 911. Dr. Wilner Elder      atorvastatin (LIPITOR) 80 MG tablet Take 1 tablet by mouth nightly  Qty: 30 tablet, Refills: 3      carvedilol (COREG) 12.5 MG tablet Take 1 tablet by mouth 2 times daily (with meals)  Qty: 60 tablet, Refills: 3      lisinopril (PRINIVIL;ZESTRIL) 5 MG tablet Take 1 tablet by mouth daily  Qty: 30 tablet, Refills: 3      rivaroxaban (XARELTO) 20 MG TABS tablet Take 20 mg by mouth      !! vitamin E 100 units capsule Take 100 Units by mouth daily       polyethylene glycol (GLYCOLAX) 17 GM/SCOOP powder Use as directed by following your patient instructions given by office. Qty: 238 g, Refills: 0      bisacodyl (DULCOLAX) 5 MG EC tablet TAKE 4 TABS AS DIRECTED BY PHYSICIAN OFFICE  Qty: 4 tablet, Refills: 0      magnesium citrate solution Take 296 mLs by mouth once for 1 dose  Qty: 296 mL, Refills: 0      Multiple Vitamins-Minerals (THERAPEUTIC MULTIVITAMIN-MINERALS) tablet Take 1 tablet by mouth daily      Ascorbic Acid (VITAMIN C) 250 MG tablet Take 250 mg by mouth three times a week      !! vitamin E 400 UNIT capsule Take 400 Units by mouth daily Stop 7 days prior to surgery      aspirin 81 MG chewable tablet Take 81 mg by mouth daily Per Dr. Wilner Elder. Ok to stop 7 days prior to surgery       !! - Potential duplicate medications found. Please discuss with provider.         ALLERGIES consultation with his cardiologist.  He does not actually have any active chest pain symptoms at this time, he does state he is mildly more short of breath. 3:18 PM EST  Discussed the case with the patient's cardiologist, they recommended discharge with outpatient follow-up, return if symptoms worsen or change. Patient is completely asymptomatic at this time. He feels well and will follow up with cardiologist.         CRITICAL CARE:       PROCEDURES:    Procedures    DIAGNOSTIC RESULTS   EKG:All EKG's are interpreted by the Emergency Department Physician who either signs or Co-signs this chart in the absence of a cardiologist.    Patient's EKG shows sinus rhythm with a rate of 69 prolonged ID interval, QRS QTC intervals unremarkable patient has normal axis, no ST elevations or depressions, nonspecific EKG. RADIOLOGY:All plain film, CT, MRI, and formal ultrasound images (except ED bedside ultrasound) are read by the radiologist, see reports below, unless otherwisenoted in MDM or here. XR CHEST PORTABLE   Final Result        LABS: All lab results were reviewed by myself, and all abnormals are listed below.   Labs Reviewed   BASIC METABOLIC PANEL - Abnormal; Notable for the following components:       Result Value    Glucose 146 (*)     All other components within normal limits   CBC WITH AUTO DIFFERENTIAL - Abnormal; Notable for the following components:    Platelets 910 (*)     All other components within normal limits   BRAIN NATRIURETIC PEPTIDE - Abnormal; Notable for the following components:    Pro-BNP 1,400 (*)     All other components within normal limits   MAGNESIUM   TROPONIN   TROPONIN       EMERGENCY DEPARTMENTCOURSE:         Vitals:    Vitals:    11/22/21 1115 11/22/21 1145 11/22/21 1200 11/22/21 1230   BP: (!) 131/92 (!) 124/93 116/83 110/89   Pulse: 86 87 100 114   Resp: 16 22 17 19   SpO2: 96% 96% 95% 95%       The patient was given the following medications while in the emergency department:  No orders of the defined types were placed in this encounter. CONSULTS:  IP CONSULT TO CARDIOLOGY    FINAL IMPRESSION      1. Chest pain, unspecified type          DISPOSITION/PLAN   DISPOSITION Decision To Discharge 11/22/2021 03:17:50 PM      PATIENT REFERRED TO:  Pearl Molina, [address] [place] [address]  [phone]    Schedule an appointment as soon as possible for a visit in 2 days      DISCHARGE MEDICATIONS:  Current Discharge Medication List        The care is provided during an unprecedented national emergency due to the novel coronavirus, COVID 19.   Ho Franco MD  Attending Emergency Physician                   Ho Franco MD  11/22/21 8021

## 2021-11-23 LAB
EKG ATRIAL RATE: 69 BPM
EKG P AXIS: 56 DEGREES
EKG P-R INTERVAL: 246 MS
EKG Q-T INTERVAL: 422 MS
EKG QRS DURATION: 134 MS
EKG QTC CALCULATION (BAZETT): 452 MS
EKG R AXIS: 93 DEGREES
EKG T AXIS: 46 DEGREES
EKG VENTRICULAR RATE: 69 BPM

## 2021-11-23 PROCEDURE — 93010 ELECTROCARDIOGRAM REPORT: CPT | Performed by: INTERNAL MEDICINE

## 2021-12-13 ENCOUNTER — HOSPITAL ENCOUNTER (OUTPATIENT)
Age: 61
Setting detail: SPECIMEN
Discharge: HOME | End: 2021-12-13

## 2021-12-13 LAB
ANION GAP SERPL CALCULATED.3IONS-SCNC: 11 MMOL/L (ref 9–17)
BUN BLDV-MCNC: 19 MG/DL (ref 8–23)
BUN/CREAT BLD: ABNORMAL (ref 9–20)
CALCIUM SERPL-MCNC: 8.9 MG/DL (ref 8.6–10.4)
CHLORIDE BLD-SCNC: 102 MMOL/L (ref 98–107)
CO2: 24 MMOL/L (ref 20–31)
CREAT SERPL-MCNC: 0.89 MG/DL (ref 0.7–1.2)
GFR AFRICAN AMERICAN: >60 ML/MIN
GFR NON-AFRICAN AMERICAN: >60 ML/MIN
GFR SERPL CREATININE-BSD FRML MDRD: ABNORMAL ML/MIN/{1.73_M2}
GFR SERPL CREATININE-BSD FRML MDRD: ABNORMAL ML/MIN/{1.73_M2}
GLUCOSE BLD-MCNC: 159 MG/DL (ref 70–99)
POTASSIUM SERPL-SCNC: 4.9 MMOL/L (ref 3.7–5.3)
SODIUM BLD-SCNC: 137 MMOL/L (ref 135–144)

## 2022-01-05 ENCOUNTER — HOSPITAL ENCOUNTER (OUTPATIENT)
Facility: MEDICAL CENTER | Age: 62
End: 2022-01-05
Payer: MEDICARE

## 2022-01-12 ENCOUNTER — TELEPHONE (OUTPATIENT)
Dept: ONCOLOGY | Age: 62
End: 2022-01-12

## 2022-01-12 ENCOUNTER — HOSPITAL ENCOUNTER (OUTPATIENT)
Facility: MEDICAL CENTER | Age: 62
Discharge: HOME | End: 2022-01-12
Payer: MEDICARE

## 2022-01-12 ENCOUNTER — OFFICE VISIT (OUTPATIENT)
Dept: ONCOLOGY | Age: 62
End: 2022-01-12
Payer: MEDICARE

## 2022-01-12 VITALS
BODY MASS INDEX: 35.6 KG/M2 | SYSTOLIC BLOOD PRESSURE: 136 MMHG | RESPIRATION RATE: 18 BRPM | HEART RATE: 73 BPM | DIASTOLIC BLOOD PRESSURE: 92 MMHG | WEIGHT: 241.1 LBS | TEMPERATURE: 97.9 F

## 2022-01-12 DIAGNOSIS — D69.6 THROMBOCYTOPENIA (HCC): ICD-10-CM

## 2022-01-12 DIAGNOSIS — D69.6 THROMBOCYTOPENIA (HCC): Primary | ICD-10-CM

## 2022-01-12 LAB
ABSOLUTE EOS #: 0.12 K/UL (ref 0–0.44)
ABSOLUTE IMMATURE GRANULOCYTE: 0.01 K/UL (ref 0–0.3)
ABSOLUTE LYMPH #: 1.51 K/UL (ref 1.1–3.7)
ABSOLUTE MONO #: 0.49 K/UL (ref 0.1–1.2)
BASOPHILS # BLD: 1 % (ref 0–2)
BASOPHILS ABSOLUTE: 0.03 K/UL (ref 0–0.2)
DIFFERENTIAL TYPE: ABNORMAL
EOSINOPHILS RELATIVE PERCENT: 2 % (ref 1–4)
HCT VFR BLD CALC: 45.1 % (ref 40.7–50.3)
HEMOGLOBIN: 14.5 G/DL (ref 13–17)
IMMATURE GRANULOCYTES: 0 %
LYMPHOCYTES # BLD: 29 % (ref 24–43)
MCH RBC QN AUTO: 28.5 PG (ref 25.2–33.5)
MCHC RBC AUTO-ENTMCNC: 32.2 G/DL (ref 28.4–34.8)
MCV RBC AUTO: 88.6 FL (ref 82.6–102.9)
MONOCYTES # BLD: 9 % (ref 3–12)
NRBC AUTOMATED: 0 PER 100 WBC
PDW BLD-RTO: 14.7 % (ref 11.8–14.4)
PLATELET # BLD: 94 K/UL (ref 138–453)
PLATELET ESTIMATE: ABNORMAL
PMV BLD AUTO: 10.5 FL (ref 8.1–13.5)
RBC # BLD: 5.09 M/UL (ref 4.21–5.77)
RBC # BLD: ABNORMAL 10*6/UL
SEG NEUTROPHILS: 59 % (ref 36–65)
SEGMENTED NEUTROPHILS ABSOLUTE COUNT: 3.09 K/UL (ref 1.5–8.1)
WBC # BLD: 5.3 K/UL (ref 3.5–11.3)
WBC # BLD: ABNORMAL 10*3/UL

## 2022-01-12 PROCEDURE — G8484 FLU IMMUNIZE NO ADMIN: HCPCS | Performed by: INTERNAL MEDICINE

## 2022-01-12 PROCEDURE — 3017F COLORECTAL CA SCREEN DOC REV: CPT | Performed by: INTERNAL MEDICINE

## 2022-01-12 PROCEDURE — 1036F TOBACCO NON-USER: CPT | Performed by: INTERNAL MEDICINE

## 2022-01-12 PROCEDURE — 99211 OFF/OP EST MAY X REQ PHY/QHP: CPT | Performed by: INTERNAL MEDICINE

## 2022-01-12 PROCEDURE — 85025 COMPLETE CBC W/AUTO DIFF WBC: CPT

## 2022-01-12 PROCEDURE — 36415 COLL VENOUS BLD VENIPUNCTURE: CPT

## 2022-01-12 PROCEDURE — G8417 CALC BMI ABV UP PARAM F/U: HCPCS | Performed by: INTERNAL MEDICINE

## 2022-01-12 PROCEDURE — 99214 OFFICE O/P EST MOD 30 MIN: CPT | Performed by: INTERNAL MEDICINE

## 2022-01-12 PROCEDURE — G8427 DOCREV CUR MEDS BY ELIG CLIN: HCPCS | Performed by: INTERNAL MEDICINE

## 2022-01-12 NOTE — TELEPHONE ENCOUNTER
[facility] ARRIVES AMBULATORY FOR MD VISIT  DR James Day IN TO SEE PATIENT  ORDERS RECEIVED  RV 6 MONTHS WITH CBC  LABS CDP  07/13/22  MD VISIT 07/13/22 @2PM  AVS PRINTED AND GIVEN TO PATIENT WITH INSTRUCTIONS  PATIENT DISCHARGED AMBULATORY

## 2022-01-12 NOTE — PROGRESS NOTES
Patient ID: Sma Ortega, 1960, [id number], 59 y.o. Referred by : Marylou Jackson PA-C  Reason for consultation:   Mild thrombocytopenia  HISTORY OF PRESENT ILLNESS:    Hematologic history:    Sam Ortega is a 59 y.o. male with past medical history of CAD, CHF, hyperlipidemia and chronic thrombocytopenia seen during initial consultation visit. Patient recently had blood work with his primary care physician which showed low platelets and therefore he was referred to hematology for further evaluation. Review of his prior records indicate that he has chronic thrombocytopenia at least since 2012. His platelets have been ranging around 100 on most occasions. At times they have been within normal limits. He denies any unintentional weight loss, drenching night sweats fever chills. He denies any smoking or tobacco abuse history and also denies any alcohol abuse history. Interval history:  Patient is return for follow-up with discuss lab results and further recommendations. His recent platelets are stable around 94. He denies any significant bleeding or easy bruising. He reportedly had a fall in first week of January on ice and hurt his left shoulder. He denies any significant injury. He reports that his range of motion is now returning back to normal.      He denies any unintentional weight loss, drenching night sweats fever chills visit. He is eating and drinking well. During this visit patient's allergy, social, medical, surgical history and medications were reviewed and updated.       Past Medical History:   Diagnosis Date    CAD (coronary artery disease)     2020 cath    CHF (congestive heart failure) ([facility])     Dr. Celeste Mc attack [facility])     Hyperlipidemia     Dr. Macey Rainey Hypertension     Dr. Macey Rainey MI (myocardial infarction) [facility])     MRSA (methicillin resistant staph aureus) culture positive 01/26/2018    abdomen    Skin cancer     Snores     no cpap    Stroke [facility]) 2011  Dr. Donny Raines Wears dentures     upper full denture    Wears reading eyeglasses     Wellness examination     Fei Vidal PA-C last seen Nov 2020       Past Surgical History:   Procedure Laterality Date    CARDIAC CATHETERIZATION  08/06/2020    Dr. Christophe Boswell therapy. Report on chart   [address] [facility]    unsure of date, bilateral radials    CARDIOVERSION  11/17/2021    CORONARY ANGIOPLASTY WITH STENT PLACEMENT      6 total stents per patient    CORONARY ARTERY BYPASS GRAFT      4 vessel bypass    EYE SURGERY      eye injury  new lens  and laser lt eye 2019    HERNIA REPAIR Bilateral 12/21/2020    XI LAPAROSCOPIC ROBOTIC INGUINAL HERNIA REPAIR WITH MESH; UMBILICAL HERNIA REPAIR WITH MESH performed by Solitario Morales DO at [address]      plate/hardware present    OTHER SURGICAL HISTORY  11/17/2021    BENITO    SKIN BIOPSY      back       No Known Allergies    Current Outpatient Medications   Medication Sig Dispense Refill    rivaroxaban (XARELTO) 20 MG TABS tablet Take 20 mg by mouth      amiodarone (CORDARONE) 200 MG tablet Take 1 tablet by mouth daily Take 2 tablets for 7 days and after that one tablet daily 90 tablet 1    vitamin E 100 units capsule Take 100 Units by mouth daily       Multiple Vitamins-Minerals (THERAPEUTIC MULTIVITAMIN-MINERALS) tablet Take 1 tablet by mouth daily      Ascorbic Acid (VITAMIN C) 250 MG tablet Take 250 mg by mouth three times a week      bumetanide (BUMEX) 1 MG tablet Take 1 tablet by mouth every morning AND 1 tablet Daily with lunch.  60 tablet 3    atorvastatin (LIPITOR) 80 MG tablet Take 1 tablet by mouth nightly (Patient taking differently: Take 80 mg by mouth nightly Dr. Felisa Boeck) 30 tablet 3    carvedilol (COREG) 12.5 MG tablet Take 1 tablet by mouth 2 times daily (with meals) (Patient taking differently: Take 12.5 mg by mouth 2 times daily (with meals) Dr. Felisa Boeck) 60 tablet 3    lisinopril (PRINIVIL;ZESTRIL) 5 MG tablet Take 1 tablet by mouth daily (Patient taking differently: Take 5 mg by mouth daily Dr. Shimon Gary) 30 tablet 3    aspirin 81 MG chewable tablet Take 81 mg by mouth daily Per Dr. Shimon Gary. Ok to stop 7 days prior to surgery      polyethylene glycol (GLYCOLAX) 17 GM/SCOOP powder Use as directed by following your patient instructions given by office. (Patient not taking: Reported on 2021) 238 g 0    bisacodyl (DULCOLAX) 5 MG EC tablet TAKE 4 TABS AS DIRECTED BY PHYSICIAN OFFICE (Patient not taking: Reported on 2022) 4 tablet 0    magnesium citrate solution Take 296 mLs by mouth once for 1 dose (Patient not taking: Reported on 2021) 296 mL 0    nitroGLYCERIN (NITROSTAT) 0.4 MG SL tablet Place 0.4 mg under the tongue every 5 minutes as needed for Chest pain up to max of 3 total doses. If no relief after 1 dose, call 911. Dr. Shimon Gary (Patient not taking: Reported on 2022)       No current facility-administered medications for this visit. Social History     Socioeconomic History    Marital status: Single     Spouse name: Not on file    Number of children: Not on file    Years of education: Not on file    Highest education level: Not on file   Occupational History    Not on file   Tobacco Use    Smoking status: Former Smoker     Quit date: 1999     Years since quittin.0    Smokeless tobacco: Never Used   Vaping Use    Vaping Use: Never used   Substance and Sexual Activity    Alcohol use: No    Drug use: No    Sexual activity: Not on file   Other Topics Concern    Not on file   Social History Narrative    Not on file     Social Determinants of Health     Financial Resource Strain:     Difficulty of Paying Living Expenses: Not on file   Food Insecurity:     Worried About [address] in the Last Year: Not on file    Denny of Food in the Last Year: Not on file   Transportation Needs:     Lack of Transportation (Medical):  Not on file    Lack of Transportation (Non-Medical): Not on file   Physical Activity:     Days of Exercise per Week: Not on file    Minutes of Exercise per Session: Not on file   Stress:     Feeling of Stress : Not on file   Social Connections:     Frequency of Communication with Friends and Family: Not on file    Frequency of Social Gatherings with Friends and Family: Not on file    Attends Druze Services: Not on file    Active Member of [address] [place] [address] or Organizations: Not on file    Attends Club or Organization Meetings: Not on file    Marital Status: Not on file   Intimate Partner Violence:     Fear of Current or Ex-Partner: Not on file    Emotionally Abused: Not on file    Physically Abused: Not on file    Sexually Abused: Not on file   Housing Stability:     Unable to Pay for Housing in the Last Year: Not on file    Number of [place] in the Last Year: Not on file    Unstable Housing in the Last Year: Not on file       Family History   Problem Relation Age of Onset    Coronary Art Dis Father     Liver Disease Father     Alcohol Abuse Sister         REVIEW OF SYSTEM:     Constitutional: No fever or chills. No night sweats, no weight loss   Eyes: No eye discharge, double vision, or eye pain   HEENT: negative for sore mouth, sore throat, hoarseness and voice change   Respiratory: negative for cough , sputum, dyspnea, wheezing, hemoptysis, chest pain   Cardiovascular: negative for chest pain, dyspnea, palpitations, orthopnea, PND   Gastrointestinal: negative for nausea, vomiting, diarrhea, constipation, abdominal pain, Dysphagia, hematemesis and hematochezia   Genitourinary: negative for frequency, dysuria, nocturia, urinary incontinence, and hematuria   Integument: negative for rash, skin lesions, bruises.    Hematologic/Lymphatic: negative for easy bruising, bleeding, lymphadenopathy, petechiae and swelling/edema   Endocrine: negative for heat or cold intolerance, tremor, weight changes, change in bowel habits and hair loss Musculoskeletal: negative for myalgias, arthralgias, pain, joint swelling,and bone pain   Neurological: negative for headaches, dizziness, seizures, weakness, numbness       OBJECTIVE:         Vitals:    01/12/22 1409   BP: (!) 136/92   Pulse:    Resp:    Temp:        PHYSICAL EXAM:   General appearance - well appearing, no in pain or distress   Mental status - alert and cooperative   Eyes - pupils equal and reactive, extraocular eye movements intact   Ears - bilateral TM's and external ear canals normal   Mouth - mucous membranes moist, pharynx normal without lesions   Neck - supple, no significant adenopathy   Lymphatics - no palpable lymphadenopathy, no hepatosplenomegaly   Chest - clear to auscultation, no wheezes, rales or rhonchi, symmetric air entry   Heart - normal rate, regular rhythm, normal S1, S2, no murmurs, rubs, clicks or gallops   Abdomen - soft, nontender, nondistended, no masses or organomegaly   Neurological - alert, oriented, normal speech, no focal findings or movement disorder noted   Musculoskeletal - no joint tenderness, deformity or swelling   Extremities - peripheral pulses normal, no pedal edema, no clubbing or cyanosis   Skin - normal coloration and turgor, no rashes, no suspicious skin lesions noted ,      LABORATORY DATA:     Lab Results   Component Value Date    WBC 5.3 01/12/2022    HGB 14.5 01/12/2022    HCT 45.1 01/12/2022    MCV 88.6 01/12/2022    PLT 94 (L) 01/12/2022    LYMPHOPCT 29 01/12/2022    RBC 5.09 01/12/2022    MCH 28.5 01/12/2022    MCHC 32.2 01/12/2022    RDW 14.7 (H) 01/12/2022    MONOPCT 9 01/12/2022    BASOPCT 1 01/12/2022    NEUTROABS 3.09 01/12/2022    LYMPHSABS 1.51 01/12/2022    MONOSABS 0.49 01/12/2022    EOSABS 0.12 01/12/2022    BASOSABS 0.03 01/12/2022         Chemistry        Component Value Date/Time     12/13/2021 1250    K 4.9 12/13/2021 1250     12/13/2021 1250    CO2 24 12/13/2021 1250    BUN 19 12/13/2021 1250    CREATININE 0.89 12/13/2021 1250        Component Value Date/Time    CALCIUM 8.9 12/13/2021 1250    ALKPHOS 95 11/22/2020 1923    AST 22 11/22/2020 1923    ALT 23 11/22/2020 1923    BILITOT 0.62 11/22/2020 1923        PATHOLOGY DATA:   Reviewed  IMAGING DATA:    Reviewed  ASSESSMENT:    Yamileth Hood is a 59 y.o. is a 59 y.o. with history of CAD, CHF with chronic thrombocytopenia. I reviewed his past medical records. His thrombocytopenia appears very mild and has been stable over past 10 years. Given the stability of his thrombocytopenia however many years I do not suspect aggressive underlying bone marrow condition. I reviewed the recent lab work and discuss that his platelets are stable and no indication for bone marrow biopsy at this point. Will continue to monitor  I will consider bone marrow biopsy if his platelet counts drops further  Patient's questions were sought and answered to satisfaction and agreed to proceed with the plan. PLAN:   I reviewed the recent lab work, discussed the diagnosis and treatment, should patient   His platelets are stable at 80 K  We will plan to repeat a CBC in 6 months   Return to clinic 6 months with labs prior       Robb Peña MD  Hematologist/Medical Oncologist      This note is created with the assistance of a speech recognition program.  While intending to generate a document that actually reflects the content of the visit, the document can still have some errors including those of syntax and sound a like substitutions which may escape proof reading. It such instances, actual meaning can be extrapolated by contextual diversion. On this date 1/12/22  I have spent 40 minutes reviewing previous notes, test results and face to face with the patient discussing the diagnosis and importance of compliance with the treatment plan. Greater than 50% of that time was spent face-to-face with the patient in counseling and coordinating her care.         MANUEL Chaidez, JEYSON

## 2022-03-28 ENCOUNTER — APPOINTMENT (OUTPATIENT)
Dept: GENERAL RADIOLOGY | Age: 62
End: 2022-03-28
Payer: MEDICARE

## 2022-03-28 ENCOUNTER — HOSPITAL ENCOUNTER (EMERGENCY)
Age: 62
Discharge: HOME | End: 2022-03-28
Attending: EMERGENCY MEDICINE
Payer: MEDICARE

## 2022-03-28 VITALS
BODY MASS INDEX: 32.49 KG/M2 | HEART RATE: 98 BPM | WEIGHT: 220 LBS | OXYGEN SATURATION: 98 % | TEMPERATURE: 98.1 F | DIASTOLIC BLOOD PRESSURE: 108 MMHG | RESPIRATION RATE: 18 BRPM | SYSTOLIC BLOOD PRESSURE: 160 MMHG

## 2022-03-28 DIAGNOSIS — S46.912A LEFT SHOULDER STRAIN, INITIAL ENCOUNTER: Primary | ICD-10-CM

## 2022-03-28 PROCEDURE — 99284 EMERGENCY DEPT VISIT MOD MDM: CPT

## 2022-03-28 PROCEDURE — 71101 X-RAY EXAM UNILAT RIBS/CHEST: CPT

## 2022-03-28 PROCEDURE — 6370000000 HC RX 637 (ALT 250 FOR IP): Performed by: PHYSICIAN ASSISTANT

## 2022-03-28 PROCEDURE — 73030 X-RAY EXAM OF SHOULDER: CPT

## 2022-03-28 PROCEDURE — 6360000002 HC RX W HCPCS: Performed by: PHYSICIAN ASSISTANT

## 2022-03-28 PROCEDURE — 96372 THER/PROPH/DIAG INJ SC/IM: CPT

## 2022-03-28 RX ORDER — MORPHINE SULFATE 4 MG/ML
4 INJECTION, SOLUTION INTRAMUSCULAR; INTRAVENOUS ONCE
Status: COMPLETED | OUTPATIENT
Start: 2022-03-28 | End: 2022-03-28

## 2022-03-28 RX ORDER — HYDROCODONE BITARTRATE AND ACETAMINOPHEN 5; 325 MG/1; MG/1
1-2 TABLET ORAL EVERY 8 HOURS PRN
Qty: 12 TABLET | Refills: 0 | Status: SHIPPED | OUTPATIENT
Start: 2022-03-28 | End: 2022-03-31

## 2022-03-28 RX ORDER — METAXALONE 800 MG/1
800 TABLET ORAL ONCE
Status: COMPLETED | OUTPATIENT
Start: 2022-03-28 | End: 2022-03-28

## 2022-03-28 RX ORDER — METHOCARBAMOL 750 MG/1
750 TABLET, FILM COATED ORAL 4 TIMES DAILY
Qty: 40 TABLET | Refills: 0 | Status: SHIPPED | OUTPATIENT
Start: 2022-03-28 | End: 2022-04-07

## 2022-03-28 RX ORDER — NAPROXEN 500 MG/1
500 TABLET ORAL 2 TIMES DAILY WITH MEALS
Qty: 20 TABLET | Refills: 0 | Status: SHIPPED | OUTPATIENT
Start: 2022-03-28 | End: 2022-03-28

## 2022-03-28 RX ADMIN — MORPHINE SULFATE 4 MG: 4 INJECTION, SOLUTION INTRAMUSCULAR; INTRAVENOUS at 16:40

## 2022-03-28 RX ADMIN — METAXALONE 800 MG: 800 TABLET ORAL at 16:40

## 2022-03-28 ASSESSMENT — PAIN SCALES - GENERAL: PAINLEVEL_OUTOF10: 10

## 2022-03-28 ASSESSMENT — PAIN - FUNCTIONAL ASSESSMENT: PAIN_FUNCTIONAL_ASSESSMENT: 0-10

## 2022-03-28 NOTE — ED PROVIDER NOTES
[address] [place] [address] ED  [facility]      Pt Name: Neva Soria  MRN: [id number]  [place] 1960  Date ofevaluation: 3/28/2022  Provider: Wilner Loya PA-C    CHIEF COMPLAINT       Chief Complaint   Patient presents with    Shoulder Pain     Fell on ice 12 weeks ago; was not evaluated at this time; reports worsening pain          HISTORY OF PRESENT ILLNESS  (Location/Symptom, Timing/Onset, Context/Setting, Quality, Duration, Modifying Factors, Severity.)   Neva Soria is a 59 y.o. male who presents to the emergency department with left shoulder pain status post falling on the ice around 3 months ago. Pain worse with movement and relieved with rest.  Pain has been consistent since the fall. He never sought evaluation. Ports the pain has been present for too long so he cannot wait any longer and decided to come in today. Nursing Notes were reviewed. ALLERGIES     Patient has no known allergies. CURRENT MEDICATIONS       Previous Medications    AMIODARONE (CORDARONE) 200 MG TABLET    Take 1 tablet by mouth daily Take 2 tablets for 7 days and after that one tablet daily    ASCORBIC ACID (VITAMIN C) 250 MG TABLET    Take 250 mg by mouth three times a week    ASPIRIN 81 MG CHEWABLE TABLET    Take 81 mg by mouth daily Per Dr. Evangelista Mitchell. Ok to stop 7 days prior to surgery    ATORVASTATIN (LIPITOR) 80 MG TABLET    Take 1 tablet by mouth nightly    BISACODYL (DULCOLAX) 5 MG EC TABLET    TAKE 4 TABS AS DIRECTED BY PHYSICIAN OFFICE    BUMETANIDE (BUMEX) 1 MG TABLET    Take 1 tablet by mouth every morning AND 1 tablet Daily with lunch.     CARVEDILOL (COREG) 12.5 MG TABLET    Take 1 tablet by mouth 2 times daily (with meals)    LISINOPRIL (PRINIVIL;ZESTRIL) 5 MG TABLET    Take 1 tablet by mouth daily    MAGNESIUM CITRATE SOLUTION    Take 296 mLs by mouth once for 1 dose    MULTIPLE VITAMINS-MINERALS (THERAPEUTIC MULTIVITAMIN-MINERALS) TABLET    Take 1 tablet by mouth daily NITROGLYCERIN (NITROSTAT) 0.4 MG SL TABLET    Place 0.4 mg under the tongue every 5 minutes as needed for Chest pain up to max of 3 total doses. If no relief after 1 dose, call 911. Dr. Gloria Matos [facility]) 17 GM/SCOOP POWDER    Use as directed by following your patient instructions given by office. RIVAROXABAN (XARELTO) 20 MG TABS TABLET    Take 20 mg by mouth    VITAMIN E 100 UNITS CAPSULE    Take 100 Units by mouth daily        PAST MEDICAL HISTORY         Diagnosis Date    CAD (coronary artery disease)     2020 cath    CHF (congestive heart failure) (HCC)     Dr. Noemy Carter attack [facility])     Hyperlipidemia     Dr. Eaton Stable Hypertension     Dr. Eaton Stable MI (myocardial infarction) [facility])     MRSA (methicillin resistant staph aureus) culture positive 01/26/2018    abdomen    Skin cancer     Snores     no cpap    Stroke [facility])     2011  Dr. Korey Sullivan Wears dentures     upper full denture    Wears reading eyeglasses     Wellness examination     Nile Doom PA-C last seen Nov 2020       SURGICAL HISTORY           Procedure Laterality Date    CARDIAC CATHETERIZATION  08/06/2020    Dr. Joyce Alpers therapy.   Report on chart   [address] [facility]    unsure of date, bilateral radials    CARDIOVERSION  11/17/2021    CORONARY ANGIOPLASTY WITH STENT PLACEMENT      6 total stents per patient    CORONARY ARTERY BYPASS GRAFT      4 vessel bypass    EYE SURGERY      eye injury  new lens  and laser lt eye 2019    HERNIA REPAIR Bilateral 12/21/2020    XI LAPAROSCOPIC ROBOTIC INGUINAL HERNIA REPAIR WITH MESH; UMBILICAL HERNIA REPAIR WITH MESH performed by Sonam Zuniga DO at [facility]      plate/hardware present    OTHER SURGICAL HISTORY  11/17/2021    BENITO    SKIN BIOPSY      back         FAMILY HISTORY           Problem Relation Age of Onset    Coronary Art Dis Father     Liver Disease Father     Alcohol Abuse Sister      Family Status Relation Name Status    Father      Mother      Sister  Alive    Sister          SOCIAL HISTORY      reports that he quit smoking about 23 years ago. He has never used smokeless tobacco. He reports that he does not drink alcohol and does not use drugs. REVIEW OFSYSTEMS    (2-9 systems for level 4, 10 or more for level 5)   Review of Systems    Except as noted above the remainder of the review of systems was reviewed and negative. PHYSICAL EXAM    (up to 7 for level 4, 8 or more for level 5)     ED Triage Vitals   BP Temp Temp src Pulse Resp SpO2 Height Weight   22 1619 22 1619 -- 22 1618 22 1618 22 1618 -- 22 1619   (!) 209/140 98.1 °F (36.7 °C)  98 18 98 %  220 lb (99.8 kg)      Physical Exam  Constitutional:       Appearance: He is well-developed. HENT:      Head: Normocephalic and atraumatic. Cardiovascular:      Rate and Rhythm: Normal rate and regular rhythm. Pulmonary:      Effort: Pulmonary effort is normal.      Breath sounds: Normal breath sounds. Abdominal:      Palpations: Abdomen is soft. Musculoskeletal:         General: Normal range of motion. Arms:       Cervical back: Normal range of motion and neck supple. Skin:     General: Skin is warm. Neurological:      Mental Status: He is alert and oriented to person, place, and time.    Psychiatric:         Behavior: Behavior normal.                 DIAGNOSTIC RESULTS     EKG: All EKG's are interpreted by the Emergency Department Physician who either signs or Co-signs this chart in the absence of a cardiologist.        RADIOLOGY:   Non-plain film images such as CT, Ultrasound and MRI are read by the radiologist. Plain radiographic images arevisualized and preliminarily interpreted by the emergency physician with the below findings:        Interpretation per the Radiologist below, if available at thetime of this note:          ED BEDSIDE ULTRASOUND:   Performed by ED Physician - none    LABS:  Labs Reviewed - No data to display    All other labs were within normal range or not returned as of this dictation. EMERGENCY DEPARTMENT COURSE and DIFFERENTIAL DIAGNOSIS/MDM:   Vitals:    Vitals:    03/28/22 1618 03/28/22 1619 03/28/22 1708   BP:  (!) 209/140 (!) 160/108   Pulse: 98     Resp: 18     Temp:  98.1 °F (36.7 °C)    SpO2: 98%     Weight:  220 lb (99.8 kg)      X-rays are negative for any acute process. Patient given a sling discharged home orthopedic referral.    Patient declined nsaids and stated he cannot take them. CONSULTS:  None    PROCEDURES:  Procedures  Left shoulder sling well placed by nursing staff. Post application examination by me reveals that it is appropriately placed and the left upper extremity is neuro/vasc intact. FINAL IMPRESSION      1. Left shoulder strain, initial encounter          DISPOSITION/PLAN   DISPOSITION Decision To Discharge 03/28/2022 05:16:36 PM      PATIENTREFERRED TO:   Pino Ribera MD  [facility]  [address] [phone]    In 3 days      [facility], [address] [place] [address].  [phone]    In 3 days        DISCHARGE MEDICATIONS:     New Prescriptions    HYDROCODONE-ACETAMINOPHEN (NORCO) 5-325 MG PER TABLET    Take 1-2 tablets by mouth every 8 hours as needed for Pain for up to 3 days.     METHOCARBAMOL (ROBAXIN-750) 750 MG TABLET    Take 1 tablet by mouth 4 times daily for 10 days    NAPROXEN (NAPROSYN) 500 MG TABLET    Take 1 tablet by mouth 2 times daily (with meals)           (Please note that portions of this note were completed with a voice recognition program.  Efforts were made to edit thedictations but occasionally words are mis-transcribed.)    JEYSON Cassidy PA-C  03/28/22 Mellisa Ovalle PA-C  03/28/22 1267

## 2022-03-28 NOTE — ED PROVIDER NOTES
This visit was performed by both a physician and an APC. I personally evaluated and examined the patient. I performed all aspects of the MDM as documented. The patient will follow up with orthopedics. He was advised not to wear the sling for any more than 3 days.      Monica Dai MD  03/28/22 2966

## 2022-04-01 ENCOUNTER — TELEPHONE (OUTPATIENT)
Dept: ORTHOPEDIC SURGERY | Age: 62
End: 2022-04-01

## 2022-04-04 ENCOUNTER — HOSPITAL ENCOUNTER (OUTPATIENT)
Dept: GENERAL RADIOLOGY | Age: 62
Discharge: HOME | DRG: 227 | End: 2022-04-06
Payer: MEDICARE

## 2022-04-04 ENCOUNTER — HOSPITAL ENCOUNTER (INPATIENT)
Dept: CARDIAC CATH/INVASIVE PROCEDURES | Age: 62
LOS: 1 days | Discharge: HOME | DRG: 227 | End: 2022-04-05
Attending: INTERNAL MEDICINE | Admitting: INTERNAL MEDICINE
Payer: MEDICARE

## 2022-04-04 ENCOUNTER — APPOINTMENT (OUTPATIENT)
Dept: GENERAL RADIOLOGY | Age: 62
DRG: 227 | End: 2022-04-04
Attending: INTERNAL MEDICINE
Payer: MEDICARE

## 2022-04-04 DIAGNOSIS — Z95.810 STATUS POST IMPLANTATION OF AUTOMATIC CARDIOVERTER/DEFIBRILLATOR (AICD): ICD-10-CM

## 2022-04-04 LAB
GFR NON-AFRICAN AMERICAN: >60 ML/MIN
GFR SERPL CREATININE-BSD FRML MDRD: >60 ML/MIN
GFR SERPL CREATININE-BSD FRML MDRD: NORMAL ML/MIN/{1.73_M2}
GLUCOSE BLD-MCNC: 223 MG/DL (ref 74–100)
POC BUN: 14 MG/DL (ref 8–26)
POC CHLORIDE: 105 MMOL/L (ref 98–107)
POC CREATININE: 0.78 MG/DL (ref 0.51–1.19)
POC HEMATOCRIT: 47 % (ref 41–53)
POC HEMOGLOBIN: 16.1 G/DL (ref 13.5–17.5)
POC POTASSIUM: 4.3 MMOL/L (ref 3.5–4.5)
POC SODIUM: 142 MMOL/L (ref 138–146)

## 2022-04-04 PROCEDURE — 2709999900 HC NON-CHARGEABLE SUPPLY

## 2022-04-04 PROCEDURE — C1892 INTRO/SHEATH,FIXED,PEEL-AWAY: HCPCS

## 2022-04-04 PROCEDURE — C1777 LEAD, AICD, ENDO SINGLE COIL: HCPCS

## 2022-04-04 PROCEDURE — 6360000002 HC RX W HCPCS

## 2022-04-04 PROCEDURE — 2580000003 HC RX 258

## 2022-04-04 PROCEDURE — 2780000010 HC IMPLANT OTHER

## 2022-04-04 PROCEDURE — 84132 ASSAY OF SERUM POTASSIUM: CPT

## 2022-04-04 PROCEDURE — 1200000000 HC SEMI PRIVATE

## 2022-04-04 PROCEDURE — 2580000003 HC RX 258: Performed by: STUDENT IN AN ORGANIZED HEALTH CARE EDUCATION/TRAINING PROGRAM

## 2022-04-04 PROCEDURE — 0JH608Z INSERTION OF DEFIBRILLATOR GENERATOR INTO CHEST SUBCUTANEOUS TISSUE AND FASCIA, OPEN APPROACH: ICD-10-PCS | Performed by: STUDENT IN AN ORGANIZED HEALTH CARE EDUCATION/TRAINING PROGRAM

## 2022-04-04 PROCEDURE — 82947 ASSAY GLUCOSE BLOOD QUANT: CPT

## 2022-04-04 PROCEDURE — 2500000003 HC RX 250 WO HCPCS

## 2022-04-04 PROCEDURE — 6370000000 HC RX 637 (ALT 250 FOR IP): Performed by: STUDENT IN AN ORGANIZED HEALTH CARE EDUCATION/TRAINING PROGRAM

## 2022-04-04 PROCEDURE — 82435 ASSAY OF BLOOD CHLORIDE: CPT

## 2022-04-04 PROCEDURE — 02HK3KZ INSERTION OF DEFIBRILLATOR LEAD INTO RIGHT VENTRICLE, PERCUTANEOUS APPROACH: ICD-10-PCS | Performed by: STUDENT IN AN ORGANIZED HEALTH CARE EDUCATION/TRAINING PROGRAM

## 2022-04-04 PROCEDURE — 71045 X-RAY EXAM CHEST 1 VIEW: CPT

## 2022-04-04 PROCEDURE — 85014 HEMATOCRIT: CPT

## 2022-04-04 PROCEDURE — 84295 ASSAY OF SERUM SODIUM: CPT

## 2022-04-04 PROCEDURE — 7100000000 HC PACU RECOVERY - FIRST 15 MIN

## 2022-04-04 PROCEDURE — C1722 AICD, SINGLE CHAMBER: HCPCS

## 2022-04-04 PROCEDURE — C1894 INTRO/SHEATH, NON-LASER: HCPCS

## 2022-04-04 PROCEDURE — 99222 1ST HOSP IP/OBS MODERATE 55: CPT | Performed by: INTERNAL MEDICINE

## 2022-04-04 PROCEDURE — 33249 INSJ/RPLCMT DEFIB W/LEAD(S): CPT

## 2022-04-04 PROCEDURE — 7100000001 HC PACU RECOVERY - ADDTL 15 MIN

## 2022-04-04 PROCEDURE — 84520 ASSAY OF UREA NITROGEN: CPT

## 2022-04-04 PROCEDURE — 82565 ASSAY OF CREATININE: CPT

## 2022-04-04 RX ORDER — ONDANSETRON 2 MG/ML
4 INJECTION INTRAMUSCULAR; INTRAVENOUS EVERY 6 HOURS PRN
Status: DISCONTINUED | OUTPATIENT
Start: 2022-04-04 | End: 2022-04-05 | Stop reason: HOSPADM

## 2022-04-04 RX ORDER — CEFAZOLIN SODIUM 1 G/50ML
1000 INJECTION, SOLUTION INTRAVENOUS ONCE
Status: DISCONTINUED | OUTPATIENT
Start: 2022-04-04 | End: 2022-04-05 | Stop reason: HOSPADM

## 2022-04-04 RX ORDER — POLYETHYLENE GLYCOL 3350 17 G/17G
17 POWDER, FOR SOLUTION ORAL DAILY PRN
Status: DISCONTINUED | OUTPATIENT
Start: 2022-04-04 | End: 2022-04-05 | Stop reason: HOSPADM

## 2022-04-04 RX ORDER — SODIUM CHLORIDE 0.9 % (FLUSH) 0.9 %
5-40 SYRINGE (ML) INJECTION PRN
Status: DISCONTINUED | OUTPATIENT
Start: 2022-04-04 | End: 2022-04-05 | Stop reason: HOSPADM

## 2022-04-04 RX ORDER — SODIUM CHLORIDE 9 MG/ML
INJECTION, SOLUTION INTRAVENOUS CONTINUOUS
Status: DISCONTINUED | OUTPATIENT
Start: 2022-04-04 | End: 2022-04-05 | Stop reason: HOSPADM

## 2022-04-04 RX ORDER — SODIUM CHLORIDE 9 MG/ML
INJECTION, SOLUTION INTRAVENOUS PRN
Status: DISCONTINUED | OUTPATIENT
Start: 2022-04-04 | End: 2022-04-05 | Stop reason: HOSPADM

## 2022-04-04 RX ORDER — CLOPIDOGREL BISULFATE 75 MG/1
75 TABLET ORAL DAILY
COMMUNITY
End: 2022-06-15 | Stop reason: ALTCHOICE

## 2022-04-04 RX ORDER — ONDANSETRON 2 MG/ML
4 INJECTION INTRAMUSCULAR; INTRAVENOUS EVERY 6 HOURS PRN
Status: DISCONTINUED | OUTPATIENT
Start: 2022-04-04 | End: 2022-04-04

## 2022-04-04 RX ORDER — SODIUM CHLORIDE 9 MG/ML
25 INJECTION, SOLUTION INTRAVENOUS PRN
Status: DISCONTINUED | OUTPATIENT
Start: 2022-04-04 | End: 2022-04-05 | Stop reason: HOSPADM

## 2022-04-04 RX ORDER — SODIUM CHLORIDE 0.9 % (FLUSH) 0.9 %
5-40 SYRINGE (ML) INJECTION EVERY 12 HOURS SCHEDULED
Status: DISCONTINUED | OUTPATIENT
Start: 2022-04-04 | End: 2022-04-05 | Stop reason: HOSPADM

## 2022-04-04 RX ORDER — ACETAMINOPHEN 325 MG/1
650 TABLET ORAL EVERY 6 HOURS PRN
Status: DISCONTINUED | OUTPATIENT
Start: 2022-04-04 | End: 2022-04-05 | Stop reason: HOSPADM

## 2022-04-04 RX ORDER — ONDANSETRON 4 MG/1
4 TABLET, ORALLY DISINTEGRATING ORAL EVERY 8 HOURS PRN
Status: DISCONTINUED | OUTPATIENT
Start: 2022-04-04 | End: 2022-04-05 | Stop reason: HOSPADM

## 2022-04-04 RX ORDER — SODIUM CHLORIDE 0.9 % (FLUSH) 0.9 %
10 SYRINGE (ML) INJECTION PRN
Status: DISCONTINUED | OUTPATIENT
Start: 2022-04-04 | End: 2022-04-05 | Stop reason: HOSPADM

## 2022-04-04 RX ORDER — SODIUM CHLORIDE 0.9 % (FLUSH) 0.9 %
10 SYRINGE (ML) INJECTION EVERY 12 HOURS SCHEDULED
Status: DISCONTINUED | OUTPATIENT
Start: 2022-04-04 | End: 2022-04-05 | Stop reason: HOSPADM

## 2022-04-04 RX ORDER — ACETAMINOPHEN 650 MG/1
650 SUPPOSITORY RECTAL EVERY 6 HOURS PRN
Status: DISCONTINUED | OUTPATIENT
Start: 2022-04-04 | End: 2022-04-05 | Stop reason: HOSPADM

## 2022-04-04 RX ADMIN — SODIUM CHLORIDE, PRESERVATIVE FREE 10 ML: 5 INJECTION INTRAVENOUS at 21:09

## 2022-04-04 RX ADMIN — SODIUM CHLORIDE: 9 INJECTION, SOLUTION INTRAVENOUS at 10:31

## 2022-04-04 RX ADMIN — ACETAMINOPHEN 650 MG: 325 TABLET ORAL at 21:08

## 2022-04-04 ASSESSMENT — PAIN SCALES - GENERAL
PAINLEVEL_OUTOF10: 8
PAINLEVEL_OUTOF10: 0
PAINLEVEL_OUTOF10: 0

## 2022-04-04 ASSESSMENT — ENCOUNTER SYMPTOMS
RESPIRATORY NEGATIVE: 1
GASTROINTESTINAL NEGATIVE: 1

## 2022-04-04 NOTE — H&P
[facility]   History & Physical               Today's Date: 4/4/2022  Patient Name: Young Jin  Date of admission: 4/4/2022 10:07 AM  Patient's age: 59 y. o., 1960  Admission Dx: ICD (implantable cardioverter-defibrillator) battery depletion [Z45.02]    Reason for Admission:  AICD    CHIEF COMPLAINT:    No chief complaint on file. History Obtained From:  patient, electronic medical record    HISTORY OF PRESENT ILLNESS:      The patient is a 59 y.o.  male who is admitted to the hospital for AICD. He has history of ischemic cardiomyopathy. He was seen in the outpatient setting with Dr Ning Pena. Echocardiogram was done and it showed EF less than 35 % thus he was referred for AICD for secondary prevention. Past Medical History:   has a past medical history of CAD (coronary artery disease), CHF (congestive heart failure) ([address] Heart attack ([address] Hyperlipidemia, Hypertension, MI (myocardial infarction) ([address] MRSA (methicillin resistant staph aureus) culture positive, Skin cancer, Snores, Stroke [facility]), Wears dentures, Wears reading eyeglasses, and Wellness examination. Past Surgical History:   has a past surgical history that includes Coronary artery bypass graft; Coronary angioplasty with stent; skin biopsy; eye surgery; Mandible surgery; hernia repair (Bilateral, 12/21/2020); Cardioversion (11/17/2021); other surgical history (11/17/2021); Cardiac surgery (1999); Cardiac catheterization (08/06/2020); and Cardiac defibrillator placement (04/04/2022). Home Medications:    Prior to Admission medications    Medication Sig Start Date End Date Taking?  Authorizing Provider   clopidogrel (PLAVIX) 75 MG tablet Take 75 mg by mouth daily   Yes Historical Provider, MD   OXYCODONE HCL PO Take 1 tablet by mouth 3 times daily as needed Took 2 tabs 4-3-22 at HS   Yes Historical Provider, MD   Cyanocobalamin (VITAMIN B-12 PO) Take 1 tablet by mouth daily   Yes Historical Provider, MD methocarbamol (ROBAXIN-750) 750 MG tablet Take 1 tablet by mouth 4 times daily for 10 days 3/28/22 4/7/22  Miles Gillette PA-C   naproxen (NAPROSYN) 500 MG tablet Take 1 tablet by mouth 2 times daily (with meals) 3/28/22 3/28/22  Miles Gillette PA-C   rivaroxaban (XARELTO) 20 MG TABS tablet Take 20 mg by mouth    Historical Provider, MD   amiodarone (CORDARONE) 200 MG tablet Take 1 tablet by mouth daily Take 2 tablets for 7 days and after that one tablet daily  Patient taking differently: Take 200 mg by mouth daily  11/17/21   Madeleine Kincaid MD   vitamin E 100 units capsule Take 100 Units by mouth daily     Historical Provider, MD   magnesium citrate solution Take 296 mLs by mouth once for 1 dose  Patient not taking: Reported on 6/18/2021 1/4/21 1/29/21  Andrea Fountain MD   Multiple Vitamins-Minerals (THERAPEUTIC MULTIVITAMIN-MINERALS) tablet Take 1 tablet by mouth daily    Historical Provider, MD   Ascorbic Acid (VITAMIN C) 250 MG tablet Take 500 mg by mouth three times a week     Historical Provider, MD   bumetanide (BUMEX) 1 MG tablet Take 1 tablet by mouth every morning AND 1 tablet Daily with lunch. 3/13/19   ELY Schulz - NP   nitroGLYCERIN (NITROSTAT) 0.4 MG SL tablet Place 0.4 mg under the tongue every 5 minutes as needed for Chest pain up to max of 3 total doses. If no relief after 1 dose, call 911.   Dr. Mary Sanders  Patient not taking: Reported on 1/12/2022    Historical Provider, MD   atorvastatin (LIPITOR) 80 MG tablet Take 1 tablet by mouth nightly  Patient taking differently: Take 80 mg by mouth nightly Dr. Mary Sanders 2/20/19   Kev Macias MD   carvedilol (COREG) 12.5 MG tablet Take 1 tablet by mouth 2 times daily (with meals)  Patient taking differently: Take 12.5 mg by mouth 2 times daily (with meals) Dr. Mary Sanders 2/20/19   Kev Macias MD   lisinopril (PRINIVIL;ZESTRIL) 5 MG tablet Take 1 tablet by mouth daily  Patient taking differently: Take 5 mg by mouth daily Dr. Mary Sanders 2/21/19   Nara Hoyos MD   aspirin 81 MG chewable tablet Take 81 mg by mouth daily Per Dr. Noam Gaspar. Ok to stop 7 days prior to surgery    Historical Provider, MD        Current Facility-Administered Medications: vancomycin (VANCOCIN) 1,000 mg in sodium chloride 0.9 % 1,000 mL irrigation, 1,000 mg, Irrigation, Once  0.9 % sodium chloride infusion, , IntraVENous, Continuous  ceFAZolin (ANCEF) 1000 mg in dextrose 5 % 50 mL IVPB (premix), 1,000 mg, IntraVENous, Once    Allergies:  Patient has no known allergies. Social History:   reports that he quit smoking about 23 years ago. He has never used smokeless tobacco. He reports that he does not drink alcohol and does not use drugs. Family History: family history includes Alcohol Abuse in his sister; Coronary Art Dis in his father; Liver Disease in his father. REVIEW OF SYSTEMS:      · Constitutional: there has been no unanticipated weight loss. · Eyes: No visual changes or diplopia. · ENT: No Headaches  · Cardiovascular:  Remaining as above  · Respiratory: No cough  · Gastrointestinal: No abdominal pain. No change in bowel or bladder habits. · Genitourinary: No dysuria, trouble voiding, or hematuria. · Neurological: No headache. PHYSICAL EXAM:      /87   Pulse 80   Temp 98.1 °F (36.7 °C) (Oral)   Resp 18   Ht 5' 9\" (1.753 m)   Wt 233 lb (105.7 kg)   SpO2 95%   BMI 34.41 kg/m²    No intake or output data in the 24 hours ending 04/04/22 1246        Constitutional and General Appearance:   alert, cooperative, no distress and appears stated age  HEENT:  · PERRL, EOMI  Respiratory:  · Normal excursion and expansion without use of accessory muscles  · Resp Auscultation:  Good respiratory effort. No for increased work of breathing. On auscultation: clear to auscultation bilaterally  Cardiovascular:  · Regular rate and rhythm  · S1/S2  · No murmurs  · The apical impulse is not displaced  Abdomen:  · Soft  · Bowel sounds present  · Non-tender to palpation  Extremities:  · No cyanosis or clubbing  · Lower extremity edema: No  Skin:  · Warm and dry  Neurological:  · Alert and oriented. · Moves all extremities well    Labs:     CBC: No results for input(s): WBC, HGB, HCT, PLT in the last 72 hours. BMP:   Recent Labs     04/04/22  1026   CREATININE 0.78   LABGLOM >60     FASTING LIPID PANEL:  Lab Results   Component Value Date    HDL 29 02/18/2019    TRIG 79 02/18/2019     LIVER PROFILE:No results for input(s): AST, ALT, LABALBU in the last 72 hours. Patient's Active Problem List  Active Problems:    * No active hospital problems. *  Resolved Problems:    * No resolved hospital problems. *        IMPRESSION:      1. Ischemic cardiomyopathy  2. CAD s/p CABG  3. Essential hypertension  4. Hyperlipidemia      RECOMMENDATIONS:    AICD    Risks, benefits, alternatives, and details discussed extensively. Accepts and consents. Discussed with patient and Nurse. Raúl Aldridge MD, M.D. Fellow, [address] [place] [address]      Please note that part of this chart were generated using voice recognition  dictation software. Although every effort was made to ensure the accuracy of this automated transcription, some errors in transcription may have occurred. Attestation signed by      Attending Physician Statement:    I have discussed the care of  Vika Gross , including pertinent history and exam findings, with the Cardiology fellow/resident. I have seen and examined the patient and the key elements of all parts of the encounter have been performed by me. I agree with the assessment, plan and orders as documented by the fellow/resident, after I modified exam findings and plan of treatments, and the final version is my approved version of the assessment.      Additional Comments:

## 2022-04-04 NOTE — PROGRESS NOTES
Patient admitted, consent signed, all questions answered. 2% Chlorhexidine cloths used to prep site left anterior chest wall. Pt ready for procedure. Bed in low position, call light to reach with side rails up 2 of 2. Friend at bedside with patient.

## 2022-04-04 NOTE — PROCEDURES
[facility] Cardiology Consultant                Procedure Note  Dual Chamber ICD         Vika Gross (74 y.o., male)  1960 4/4/2022      Procedure: AICD    Operators:  Primary: Dr Blaine Zhao   Assistant/ CV Fellow: Indication: Low EF     Pre Procedure Conscious Sedation Data:    ASA Class:    [] I [x] II [] III [] IV    Mallampati Class:  [] I [x] II [] III [] IV     AICD / CRT Indication: Pre procedure review    Documentation to support the medical necessity f procedure. Reason for placement:     [] Initial [] Recall/Malfunction  [] Upgrade  [] Beyond useful life limit    EF measured by:   [] Echo [] Stress Test  [] Muga  [] Angiography    For Secondary prevention - Complete this section. A.   [] Documented episodes of cardiac arrest due to V.Fib, not due to transient reversible causes, with no irreversible brain damage   (NCD Covered indication I)  [] Documented sustained VT, either spontaneous or induced by EP study, not associated with an acute MI and notdue to a transient or reversible cause; and patient does not have irreversible brain damage from pre existing cerebral disease. (NCD Covered Insication 2). Docment as  []  Ischemic. [] Non Ischemic. For Primary prevention Patients - Complete Section B & C. Section B.  [] Documented familial or inherited conditions with a high risk of life threatening VT (Long QT, HOC). (Cpovered indication 3)  [] Coronary artery disease with documented prior MI annd LVEF < 35%, and inducivble sustained VT or VF at EP study. (NCD Covered Indication 4). [] Documented prior MI and LVEF < 30%. (NCD Covered Indication 5)  [] Ischemic dilated CM (IDCM), documented prior MI, NYHA class II/III heart failure, and EF < 35%. (NCD Covered Indication 6). [] NIDCM > 3 months, NYHA class II/III heart failure, and LVEF < 35%   (NCD Covered Indication 7 & 9).   [] Documented prior MI and NYHA class IV heart failure and meets all current medicare coverage for CRT device. Section C. (if one or more box is checked, then patient does not meet NCD coverage requirements for primary prevention)  [] NYHA class IV (Covered Indications 4 and 8)  [] Cardiogenic shock or symptomatic hypotension while in a stable baseline rhythm. [] CABG or PTCA within past 3 months  [] Acute MI within past 40 days. [] Patient is unable to give informed consent. [] Symptoms / Findings making them a candidate for coronary revascularization. [] Irreversible brain damage from pre existing cerebral disease.  [] Any disease, other than cardiac disease, with likely survival less than 1 year.  / Device Data:        Name    [employer] HARPREET Sheehan # Serial #   Pacer/ICD/Bi-V [id number]  [id number]   RV-Lead [id number]       R VENT   R waves: 6 mV   Threshold: 361   Impedance: 1.5*0.5       [x] Sedation monitoring  [x] Left Subclavian Angiogram   [x] RV lead   [] RA lead   [] LV lead   [] Intra - OP Lead Testing  [] Coronary Sinus Angiogram   [x] Pocket   [x] Generators Implant    Procedure  After the usual preparation of the left neck and chest, the patient was draped in the usual sterile manner. Local anesthesia was infused below the left clavicle from the midline laterally. An incision was made inferior and parallel to the clavicle. The incision was carried down to the fascia. A pocket was formed inferior using blunt dissection. A thin walled 18 gauge needle was used to puncture the left subclavian vein using the modified Seldinger technique. A guide wire was passed into the right heart under fluoroscopic control. This was repeated with a second guide wire. RV Lead Implant:  A 9.0 Syriac sheath was then passed over the guide wire and the guide wire removed. The ventricular lead was advanced through the sheath into the right heart. The lead was then positioned in the right ventricle under fluoroscopic control.   The acute pacing and sensing thresholds were measured and found to be satisfactory. Generator: The implanted leads were attached to the pacemaker / AICD using the setscrews. The pocket was irrigated with antibiotic solution. The pulse generator and leads were coiled and placed in the pocket. Fluoroscopy was used to verify the final placement of the pacemaker and leads. The pocket was closed using multiple layers of suture and a dry sterile dressing was applied. There were no complications, patient tolerated the procedure well. The patient left the EP lab in stable condition. Impression / Device:  Successful Implantation of: AICD  Estimated blood loss less than 25 ml    Plan:  Telemetry monitoring  Interigate pacemaker before discharge  CXR if needed  W[facility] in 7days  Discharge if patient remains stable    Patient instructed to no showering or get the wound wet for 1 week, no driving for 1 week, no lifting more than 10 pounds for 4 weeks, no arm raising above the shoulder for 4 weeks. No lovenox or heparin at any dose is to be given    Electronically signed by Richar Cardenas MD on 4/4/2022 at 12:41 PM      I have reviewed the case / procedure with resident / fellow  I have examined the patient personally  Patient agree with treatment plan as discussed before, final arrangement based on my evaluation and exam.    Risk and benefit of procedure planned were explained in details. Procedure was performed by me personally, with all aspect of the procedure being done using standard protocol. Note was modified based on my own assessment and treatment.     Heath Newman MD  [facility]ology Consultants

## 2022-04-04 NOTE — PROGRESS NOTES
Returns to room [facility] 6 post procedure. Denies discomfort. Pressure dressing intact left anterior chest.    Sling intact to left arm. Taking lunch well. Friend at bedside.

## 2022-04-04 NOTE — RESEARCH
Asked by Dr. Irene Coles to evaluate pt. For protocol: [employer] Registry Study: A Multi-Center Registry Evaluation Participants Who receive [employer] Envelope or No Envelope During CIED Implantation. . Pt met preliminary inclusion/exclusion criteria. Pt  approached at 11:30. Multilpe questions answered. Consent given to pt to read. Pt. Read study consent. Questions answered. Consent voluntarily signed at 12:05 on 4/4/22. A copy of signed consent given to pt.

## 2022-04-04 NOTE — CONSULTS
Patient - Ana Alvarado   MRN -  [id number]   Acct # - [de-identified]   - 1960        Date of evaluation -  2022    REASON FOR THE CONSULTATION:  Left pneumothorax post AICD  HISTORY OF PRESENT ILLNESS:    Ana Alvarado is a 59y.o. year old male who developed left pneumothorax small - post AICD . He has no cough , no sputum , no sob . sats 97 % on ra . He has pain at the site of aicd . No hx of asthma or copd   No home o2           Immunization   Immunization History   Administered Date(s) Administered    Influenza Virus Vaccine 10/22/2017    Influenza, Janas Rota, 6 mo and older, IM, PF (Flulaval, Fluarix) 2019    Pneumococcal Polysaccharide ([id number]) 2017    PAST MEDICAL HISTORY:       Diagnosis Date    CAD (coronary artery disease)      cath    CHF (congestive heart failure) (HCC)     Dr. Dasha Mcqueen attack [facility])     Hyperlipidemia     Dr. Devendra Lindsey Hypertension     Dr. Devendra Lindsey MI (myocardial infarction) [facility])     MRSA (methicillin resistant staph aureus) culture positive 2018    abdomen    Skin cancer     Snores     no cpap    Stroke [facility])       Dr. Sonam Dominguez Wears dentures     upper full denture    Wears reading eyeglasses     Wellness examination     Cecil Gallardo PA-C last seen 2020         Family History:       Problem Relation Age of Onset    Coronary Art Dis Father     Liver Disease Father     Alcohol Abuse Sister        SURGICAL HISTORY:   Past Surgical History:   Procedure Laterality Date    CARDIAC CATHETERIZATION  2020    Dr. Angelina Merritt therapy.   Report [facility]   [facility]   [address]    unsure of date, bilateral radials    CARDIOVERSION  2021    CORONARY ANGIOPLASTY WITH STENT PLACEMENT      6 total stents per patient    CORONARY ARTERY BYPASS GRAFT      4 vessel bypass    EYE SURGERY      eye injury  new lens  and laser lt eye 2019   [address] Bilateral 12/21/2020    XI LAPAROSCOPIC ROBOTIC INGUINAL HERNIA REPAIR WITH MESH; UMBILICAL HERNIA REPAIR WITH MESH performed by Dalia Rivas DO at [address]      plate/hardware present    OTHER SURGICAL HISTORY  11/17/2021    [facility]    SKIN BIOPSY      back           SOCIAL AND OCCUPATIONAL HEALTH:    Sabiha Santiago       TOBACCO:   reports that he quit smoking about 23 years ago. He has never used smokeless tobacco.  ETOH:   reports no history of alcohol use. ALLERGIES:  No Known Allergies    Home Meds:   Prior to Admission medications    Medication Sig Start Date End Date Taking?  Authorizing Provider   clopidogrel (PLAVIX) 75 MG tablet Take 75 mg by mouth daily   Yes Historical Provider, MD   OXYCODONE HCL PO Take 1 tablet by mouth 3 times daily as needed Took 2 tabs 4-3-22 at HS   Yes Historical Provider, MD   Cyanocobalamin (VITAMIN B-12 PO) Take 1 tablet by mouth daily   Yes Historical Provider, MD   methocarbamol (ROBAXIN-750) 750 MG tablet Take 1 tablet by mouth 4 times daily for 10 days 3/28/22 4/7/22  Keturah Alexander PA-C   naproxen (NAPROSYN) 500 MG tablet Take 1 tablet by mouth 2 times daily (with meals) 3/28/22 3/28/22  Keturah Alexander PA-C   rivaroxaban (XARELTO) 20 MG TABS tablet Take 20 mg by mouth    Historical Provider, MD   amiodarone (CORDARONE) 200 MG tablet Take 1 tablet by mouth daily Take 2 tablets for 7 days and after that one tablet daily  Patient taking differently: Take 200 mg by mouth daily  11/17/21   Juan Miguel Foreman MD   vitamin E 100 units capsule Take 100 Units by mouth daily     Historical Provider, MD   magnesium citrate solution Take 296 mLs by mouth once for 1 dose  Patient not taking: Reported on 6/18/2021 1/4/21 1/29/21  Marco A Moreno MD   Multiple Vitamins-Minerals (THERAPEUTIC MULTIVITAMIN-MINERALS) tablet Take 1 tablet by mouth daily    Historical Provider, MD   Ascorbic Acid (VITAMIN C) 250 MG tablet Take 500 mg by mouth three times a week     Historical Provider, MD   bumetanide (BUMEX) 1 MG tablet Take 1 tablet by mouth every morning AND 1 tablet Daily with lunch. 3/13/19   Kae Dubin, APRN - NP   nitroGLYCERIN (NITROSTAT) 0.4 MG SL tablet Place 0.4 mg under the tongue every 5 minutes as needed for Chest pain up to max of 3 total doses. If no relief after 1 dose, call 911. Dr. Mark Butler  Patient not taking: Reported on 1/12/2022    Historical Provider, MD   atorvastatin (LIPITOR) 80 MG tablet Take 1 tablet by mouth nightly  Patient taking differently: Take 80 mg by mouth nightly Dr. Mark Butler 2/20/19   Cinda Martin MD   carvedilol (COREG) 12.5 MG tablet Take 1 tablet by mouth 2 times daily (with meals)  Patient taking differently: Take 12.5 mg by mouth 2 times daily (with meals) Dr. Mark Butler 2/20/19   Cinda Martin MD   lisinopril (PRINIVIL;ZESTRIL) 5 MG tablet Take 1 tablet by mouth daily  Patient taking differently: Take 5 mg by mouth daily Dr. Mark Butler 2/21/19   Cinda Martin MD   aspirin 81 MG chewable tablet Take 81 mg by mouth daily Per Dr. Mark Butler. Ok to stop 7 days prior to surgery    Historical Provider, MD     CURRENT MEDS :  Scheduled Meds:   vancomycin irrigation  1,000 mg Irrigation Once    ceFAZolin  1,000 mg IntraVENous Once    sodium chloride flush  5-40 mL IntraVENous 2 times per day    sodium chloride flush  10 mL IntraVENous 2 times per day    enoxaparin  40 mg SubCUTAneous Daily       Continuous Infusions:   sodium chloride Stopped (04/04/22 1505)    sodium chloride      sodium chloride             REVIEW OF SYSTEMS:  Review of Systems   Constitutional: Negative. HENT: Negative. Respiratory: Negative. Cardiovascular: Negative for palpitations and leg swelling. Meadows at icd site    Gastrointestinal: Negative. Neurological: Negative.            Vitals:  /81   Pulse 88   Temp 98.1 °F (36.7 °C) (Oral)   Resp 20   Ht 5' 9\" (1.753 m)   Wt 233 lb (105.7 kg)   SpO2 98%   BMI 34.41 kg/m² PHYSICAL EXAM:  General Appearance:    Alert, cooperative, no distress, appears stated age   Head:    Normocephalic, without obvious abnormality, atraumatic      Eyes:    PERRL, conjunctiva/corneas clear, EOM's intact   Ears:    Normal TM's and external ear canals, both ears   Nose:   Nares normal, septum midline, mucosa normal, no drainage        or sinus tenderness   Throat:   Lips, mucosa, and tongue normal; teeth and gums normal   Neck:   Supple, symmetrical, trachea midline, no adenopathy;     thyroid:  no enlargement/tenderness/nodules;    Back:     Symmetric, no curvature, ROM normal, no CVA tenderness   Lungs:       Clear to auscultation bilaterally, respirations unlabored no dullness no bb , no wheezing no rales , vent all lobes , diaphram motion normal    Chest Wall:   left icd bandaged       Heart:    Regular rate and rhythm, S1 and S2 normal, no murmur, rub        or gallop no rvh                           Abdomen:                                                 Pulses:                              Skin:                  Lymph nodes:                    Neurologic:                  Soft, non-tender, bowel sounds active all four quadrants,     no masses, no organomegaly         2+ and symmetric all extremities     Skin color, texture, turgor normal, no rashes or lesions       Cervical, supraclavicular not enlarged or matted or tender        Clubbing No  Lower ext edema No1+   [] , 2 +  [] , 3+   []  Upper ext edema No       Musculoskeletal - no joint swelling or tenderness or synovitis          CBC: No results for input(s): WBC, HGB, PLT in the last 72 hours. BMP:    Recent Labs     04/04/22  1026   CREATININE 0.78     Hepatic: No results for input(s): AST, ALT, ALB, BILITOT, ALKPHOS in the last 72 hours.   Amylase:   Lab Results   Component Value Date    AMYLASE 34 11/22/2017     Lipase:   Lab Results   Component Value Date    LIPASE 62 11/22/2017     Troponin: No results for input(s): TROPONINI in the last 72 hours. BNP: No results for input(s): BNP in the last 72 hours. Lipids: No results for input(s): CHOL, HDL in the last 72 hours. Invalid input(s): LDLCALCU  ABGs: No results found for: PHART, PO2ART, AUM9QPN  INR: No results for input(s): INR in the last 72 hours. Thyroid:   Lab Results   Component Value Date    TSH 1.37 03/02/2019     Urinalysis: No results for input(s): BACTERIA, BLOODU, CLARITYU, COLORU, PHUR, PROTEINU, RBCUA, SPECGRAV, BILIRUBINUR, NITRU, WBCUA, LEUKOCYTESUR, GLUCOSEU in the last 72 hours. XR RIBS LEFT INCLUDE CHEST (MIN 3 VIEWS)    Result Date: 3/28/2022  No acute cardiopulmonary process negative for acute displaced rib fracture Negative for shoulder fracture. XR CHEST PORTABLE    Result Date: 4/4/2022  1. There is a possible small left apical pneumothorax. However when compared to the prior left rib films could this could reflect shadow from the anterior left 2nd rib which is otherwise obscured due to cardiac device. 2. Mild pulmonary edema. The findings were sent to the Radiology Results [address] at 3:06 pm on 4/4/2022to be communicated to a licensed caregiver. IMPRESSION:    Left iatrogenic pneumothorax   ischemic cardiomyopathy   Post AI CD     Hx of cad , CABG              PLAN:      Xray chest reviewed - agree with radiologist -  But Will repeat chest xray in 4 hrs at 8 Pm  - if progressive then will need chest tube . If stable or improved then xray chest in am   D/w pt and his family   D/w Rn         I hope this updates you on my evaluation and clinical thinking. Thank you for allowing me to participate in his care.      Sincerely,      Electronically signed by Landen Ortiz MD on 4/4/2022 at 3:44 PM

## 2022-04-05 ENCOUNTER — APPOINTMENT (OUTPATIENT)
Dept: GENERAL RADIOLOGY | Age: 62
DRG: 227 | End: 2022-04-05
Attending: INTERNAL MEDICINE
Payer: MEDICARE

## 2022-04-05 VITALS
WEIGHT: 233 LBS | OXYGEN SATURATION: 95 % | TEMPERATURE: 97.4 F | BODY MASS INDEX: 34.51 KG/M2 | DIASTOLIC BLOOD PRESSURE: 96 MMHG | HEART RATE: 79 BPM | HEIGHT: 69 IN | RESPIRATION RATE: 17 BRPM | SYSTOLIC BLOOD PRESSURE: 164 MMHG

## 2022-04-05 LAB
ALBUMIN SERPL-MCNC: 3.9 G/DL (ref 3.5–5.2)
ALBUMIN/GLOBULIN RATIO: 1.4 (ref 1–2.5)
ALP BLD-CCNC: 86 U/L (ref 40–129)
ALT SERPL-CCNC: 16 U/L (ref 5–41)
ANION GAP SERPL CALCULATED.3IONS-SCNC: 11 MMOL/L (ref 9–17)
AST SERPL-CCNC: 20 U/L
BILIRUB SERPL-MCNC: 1.15 MG/DL (ref 0.3–1.2)
BUN BLDV-MCNC: 12 MG/DL (ref 8–23)
CALCIUM SERPL-MCNC: 8.5 MG/DL (ref 8.6–10.4)
CHLORIDE BLD-SCNC: 101 MMOL/L (ref 98–107)
CO2: 23 MMOL/L (ref 20–31)
CREAT SERPL-MCNC: 0.57 MG/DL (ref 0.7–1.2)
GFR AFRICAN AMERICAN: >60 ML/MIN
GFR NON-AFRICAN AMERICAN: >60 ML/MIN
GFR SERPL CREATININE-BSD FRML MDRD: ABNORMAL ML/MIN/{1.73_M2}
GLUCOSE BLD-MCNC: 193 MG/DL (ref 70–99)
HCT VFR BLD CALC: 45.2 % (ref 40.7–50.3)
HEMOGLOBIN: 15.3 G/DL (ref 13–17)
MCH RBC QN AUTO: 30.1 PG (ref 25.2–33.5)
MCHC RBC AUTO-ENTMCNC: 33.8 G/DL (ref 28.4–34.8)
MCV RBC AUTO: 88.8 FL (ref 82.6–102.9)
NRBC AUTOMATED: 0 PER 100 WBC
PDW BLD-RTO: 13.9 % (ref 11.8–14.4)
PLATELET # BLD: NORMAL K/UL (ref 138–453)
PLATELET, FLUORESCENCE: 102 K/UL (ref 138–453)
PLATELET, IMMATURE FRACTION: 2.9 % (ref 1.1–10.3)
POTASSIUM SERPL-SCNC: 4 MMOL/L (ref 3.7–5.3)
RBC # BLD: 5.09 M/UL (ref 4.21–5.77)
SODIUM BLD-SCNC: 135 MMOL/L (ref 135–144)
TOTAL PROTEIN: 6.6 G/DL (ref 6.4–8.3)
WBC # BLD: 7 K/UL (ref 3.5–11.3)

## 2022-04-05 PROCEDURE — 85027 COMPLETE CBC AUTOMATED: CPT

## 2022-04-05 PROCEDURE — 85055 RETICULATED PLATELET ASSAY: CPT

## 2022-04-05 PROCEDURE — 80053 COMPREHEN METABOLIC PANEL: CPT

## 2022-04-05 PROCEDURE — 2580000003 HC RX 258: Performed by: STUDENT IN AN ORGANIZED HEALTH CARE EDUCATION/TRAINING PROGRAM

## 2022-04-05 PROCEDURE — 71045 X-RAY EXAM CHEST 1 VIEW: CPT

## 2022-04-05 PROCEDURE — 6370000000 HC RX 637 (ALT 250 FOR IP): Performed by: STUDENT IN AN ORGANIZED HEALTH CARE EDUCATION/TRAINING PROGRAM

## 2022-04-05 PROCEDURE — 36415 COLL VENOUS BLD VENIPUNCTURE: CPT

## 2022-04-05 RX ORDER — OXYCODONE HYDROCHLORIDE AND ACETAMINOPHEN 5; 325 MG/1; MG/1
1 TABLET ORAL EVERY 4 HOURS PRN
Status: DISCONTINUED | OUTPATIENT
Start: 2022-04-05 | End: 2022-04-05 | Stop reason: HOSPADM

## 2022-04-05 RX ADMIN — OXYCODONE HYDROCHLORIDE AND ACETAMINOPHEN 1 TABLET: 5; 325 TABLET ORAL at 06:15

## 2022-04-05 RX ADMIN — SODIUM CHLORIDE, PRESERVATIVE FREE 10 ML: 5 INJECTION INTRAVENOUS at 09:02

## 2022-04-05 ASSESSMENT — PAIN SCALES - GENERAL: PAINLEVEL_OUTOF10: 10

## 2022-04-05 NOTE — PROGRESS NOTES
Explained to pt. Discharge instructions. Pt. Uziel Li understanding. All questions answered. Pt. Wheeled down to lobby to awaiting [facility]. All belongings in hand.

## 2022-04-05 NOTE — CARE COORDINATION
Case Management Initial Discharge Plan  Lyndsay Pendleton,             Met with:patient to discuss discharge plans. Information verified: address, contacts, phone number, , insurance Yes  Insurance Provider: Medicare, [place] Medicaid    Emergency Contact/Next of Kin name & number: rose mary Lind, [phone]  Who are involved in patient's support system? Marjonathan Buckner, estuardozachary    PCP: Zac Ernst PA-C  Date of last visit:       Discharge Planning    Living Arrangements:    Lives with [facility] has 2 stories  2 stairs to climb to get into front door, flight of stairs to climb to reach second floor  Location of bedroom/bathroom in home Main level    Patient able to perform ADL's:Independent    Current Services (outpatient & in home) None  DME equipment: None  DME provider: N/a    Is patient receiving oral anticoagulation therapy? Pt states that he was on Eliquis, did not take for the last 3 days prior to coming in. Does patient have any issues/concerns obtaining medications? Yes  If yes, what are patient's concerns? Pt states that Eliquis is expensive. Is there a preferred Pharmacy after hours or on weekends? Yes    If yes, which [facility]? [facility]    Potential Assistance Needed:   Pt states that he has an Eliquis card, states that he was given free samples for physician office. Patient agreeable to home care: No  Luana of choice provided:  n/a    Prior SNF/Rehab Placement and Facility: No  Agreeable to SNF/Rehab: No  Luana of choice provided: n/a     Evaluation: no    Patient expects to be discharged to:   Home    If home: is the family and/or caregiver wiling & able to provide support at home? Yes  Who will be providing this support?  Girlfriend    Transportation provider: Rose Mary  Transportation arrangements needed for discharge: No    Readmission Risk              Risk of Unplanned Readmission:  12             Does patient have a readmission risk score greater than 14?: No  If yes, follow-up appointment must be made within 7 days of discharge. Goals of Care: Increase comfort. Educated pt on transitional options, provided freedom of choice and are agreeable with plan      Discharge Plan: Pt's plan is to home. Pt states that he will have transportation. [address], [address], notified that per pt, Eliquis is expensive, and he states that the coupon card doesn't help as he has Medicare/Medicaid insurance.           Electronically signed by Trinh Giang RN on 4/5/22 at 8:59 AM EDT

## 2022-04-05 NOTE — PLAN OF CARE
Problem: Anxiety:  Goal: Level of anxiety will decrease  Description: Level of anxiety will decrease  4/4/2022 1103 by Nghia Lopez RN  Outcome: Met This Shift     Problem: Falls - Risk of:  Goal: Will remain free from falls  Description: Will remain free from falls  Outcome: Ongoing     Problem: Falls - Risk of:  Goal: Absence of physical injury  Description: Absence of physical injury  Outcome: Ongoing

## 2022-04-05 NOTE — PLAN OF CARE
Problem: Anxiety:  Goal: Level of anxiety will decrease  Description: Level of anxiety will decrease  4/5/2022 1153 by Joan Vincent RN  Outcome: Ongoing  4/5/2022 0058 by Mariella Delgado RN  Outcome: Ongoing     Problem: Falls - Risk of:  Goal: Will remain free from falls  Description: Will remain free from falls  4/5/2022 1153 by Joan Vincent RN  Outcome: Ongoing  4/5/2022 0058 by Mariella Delgado RN  Outcome: Ongoing  Goal: Absence of physical injury  Description: Absence of physical injury  4/5/2022 1153 by Joan Vincent RN  Outcome: Ongoing  4/5/2022 0058 by Mariella Delgado RN  Outcome: Ongoing

## 2022-04-05 NOTE — PROGRESS NOTES
Physical Therapy        Physical Therapy Cancel Note      DATE: 2022    NAME: Abimbola Adam  MRN: [id number]   : 1960      Patient not seen this date for Physical Therapy due to:    Patient Declined: Pt denies need for PT assessment, report independent mobilty  nursing agree as pt is being discharged home to care for dogs. Please reconsult should PT need arise.        Electronically signed by Rosanne Clemons PT on 2022 at 11:07 AM

## 2022-04-05 NOTE — DISCHARGE SUMMARY
[facility]  Discharge Note                 Name:  Christoph Diehl  YOB: 1960  Social Security Number:  [id number]  Medical Record Number:  [id number]    Date of Admission:  4/4/2022  Date of Discharge:  4/6/2022    Admitting physician: Cassandra Prince MD    Discharge Attending: ELY Sy NP, CNP  Primary Care Physician: Carly Blanco PA-C  Consultants: Cardiology  Discharge to Home in stable condition    HOSPITAL ADMISSION PROBLEM LIST:  Patient Active Problem List   Diagnosis    Hyperbilirubinemia    Essential hypertension    Hyperlipidemia    CAD (coronary artery disease)    Gross hematuria    Abdominal pain, right upper quadrant    Right nephrolithiasis    Abnormal liver function test    Acute systolic HF (heart failure) ([address]    Noncompliance    Acute on chronic systolic (congestive) heart failure (HCC)    Pneumonia    Dyspnea and respiratory abnormalities    Status post inguinal hernia repair    S/P repair of ventral hernia    Post-op pain    Non-recurrent bilateral inguinal hernia without obstruction or gangrene    Umbilical hernia with obstruction, without gangrene    Status post implantation of automatic cardioverter/defibrillator (AICD)         Procedures:     HOSPITAL COURSE :           The patient was admitted for:  AICD implant for [facility] Procedures if any: AICD placement  Medications changes recommendation: see medication list  Follow Up Plan: 7-10 days wound check      Discharge exam:   Vitals:    04/05/22 1130   BP: (!) 164/96   Pulse: 79   Resp: 17   Temp: 97.4 °F (36.3 °C)   SpO2: 95%     Neuro: normal  Chest: Clear to ausculation. No wheezing. Cardiac: Regular rate. s1 and s2 auscultated. No murmur noted. SR on tele   Abdomen/groin: soft, non-tender, without masses or organomegaly  Lower extremity edema: none  Left upper chest:  CDI with staples in place.      Discharge Medications:     Medication List      CHANGE how you take these medications     amiodarone 200 MG tablet; Commonly known as: CORDARONE; Take 1 tablet by   mouth daily Take 2 tablets for 7 days and after that one tablet daily; What changed: additional instructions   atorvastatin 80 MG tablet; Commonly known as: LIPITOR; Take 1 tablet by   mouth nightly; What changed: additional instructions   carvedilol 12.5 MG tablet; Commonly known as: COREG; Take 1 tablet by   mouth 2 times daily (with meals); What changed: additional instructions   lisinopril 5 MG tablet; Commonly known as: PRINIVIL;ZESTRIL; Take 1   tablet by mouth daily; What changed: additional instructions     CONTINUE taking these medications     aspirin 81 MG chewable tablet   bumetanide 1 MG tablet; Commonly known as: [employer] [facility] [employer]; Take 1 tablet by mouth   every morning AND 1 tablet Daily with lunch. clopidogrel 75 MG tablet; Commonly known as: PLAVIX   magnesium citrate solution; Take 296 mLs by mouth once for 1 dose   methocarbamol 750 MG tablet; Commonly known as: Robaxin-750; Take 1   tablet by mouth 4 times daily for 10 days   nitroGLYCERIN 0.4 MG SL tablet; Commonly known as: NITROSTAT   OXYCODONE HCL PO   therapeutic multivitamin-minerals tablet   VITAMIN B-12 PO   vitamin C 250 MG tablet   vitamin E 100 units capsule   Xarelto 20 MG Tabs tablet; Generic drug: rivaroxaban       AICD Implant 4/4/2022     / Device Data:          Name     [employer] HARPREET Blanco # Serial #   Pacer/ICD/Bi-V [id number]  [id number]   RV-Lead [id number]         R VENT   R waves: 6 mV   Threshold: 361   Impedance: 1.5*0.5       Impression / Device:  Successful Implantation of: AICD  Estimated blood loss less than 25 ml     Plan:  Telemetry monitoring  Interigate pacemaker before discharge  CXR if needed  Wound check at [facility] in 7days  Discharge if patient remains stable    CXR 4/5/2022  FINDINGS:   Median sternotomy wires are noted.   A left single

## 2022-04-05 NOTE — DISCHARGE INSTR - DIET
Good nutrition is important when healing from an illness, injury, or surgery. Follow any nutrition recommendations given to you during your hospital stay. If you were given an oral nutrition supplement while in the hospital, continue to take this supplement at home. You can take it with meals, in-between meals, and/or before bedtime. These supplements can be purchased at most local grocery stores, pharmacies, and chain [employer]-stores. If you have any questions about your diet or nutrition, call the hospital and ask for the dietitian. Heart-Healthy Diet   Sodium, Fat, and Cholesterol Controlled Diet       What Is a Heart Healthy Diet? A heart-healthy diet is one that limits sodium , certain types of fat , and cholesterol . This type of diet is recommended for:   People with any form of cardiovascular disease (eg, coronary heart disease , peripheral vascular disease , previous heart attack , previous stroke )   People with risk factors for cardiovascular disease, such as high blood pressure , high cholesterol , or diabetes   Anyone who wants to lower their risk of developing cardiovascular disease   Sodium    Sodium is a mineral found in many foods. In general, most people consume much more sodium than they need. Diets high in sodium can increase blood pressure and lead to edema (water retention). On a heart-healthy diet, you should consume no more than 2,300 mg (milligrams) of sodium per dayabout the amount in one teaspoon of table salt. The foods highest in sodium include table salt (about 50% sodium), processed foods, convenience foods, and preserved foods. Cholesterol    Cholesterol is a fat-like, waxy substance in your blood. Our bodies make some cholesterol. It is also found in animal products, with the highest amounts in fatty meat, egg yolks, whole milk, cheese, shellfish, and organ meats. On a heart-healthy diet, you should limit your cholesterol intake to less than 200 mg per day.    It is normal and important to have some cholesterol in your bloodstream. But too much cholesterol can cause plaque to build up within your arteries, which can eventually lead to a heart attack or stroke. The two types of cholesterol that are most commonly referred to are:   Low-density lipoprotein (LDL) cholesterol  Also known as bad cholesterol, this is the cholesterol that tends to build up along your arteries. Bad cholesterol levels are increased by eating fats that are saturated or hydrogenated. Optimal level of this cholesterol is less than 100. Over 130 starts to get risky for heart disease. High-density lipoprotein (HDL) cholesterol  Also known as good cholesterol, this type of cholesterol actually carries cholesterol away from your arteries and may, therefore, help lower your risk of having a heart attack. You want this level to be high (ideally greater than 60). It is a risk to have a level less than 40. You can raise this good cholesterol by eating olive oil, canola oil, avocados, or nuts. Exercise raises this level, too. Fat    Fat is calorie dense and packs a lot of calories into a small amount of food. Even though fats should be limited due to their high calorie content, not all fats are bad. In fact, some fats are quite healthful. Fat can be broken down into four main types.    The good-for-you fats are:   Monounsaturated fat  found in oils such as olive and canola, avocados, and nuts and natural nut butters; can decrease cholesterol levels, while keeping levels of HDL cholesterol high   Polyunsaturated fat  found in oils such as safflower, sunflower, soybean, corn, and sesame; can decrease total cholesterol and LDL cholesterol   Omega-3 fatty acids  particularly those found in fatty fish (such as salmon, trout, tuna, mackerel, herring, and sardines); can decrease risk of arrhythmias, decrease triglyceride levels, and slightly lower blood pressure   The fats that you want to limit are:   Saturated fat  found in animal products, many fast foods, and a few vegetables; increases total blood cholesterol, including LDL levels   Animal fats that are saturated include: butter, lard, whole-milk dairy products, meat fat, and poultry skin   Vegetable fats that are saturated include: hydrogenated shortening, palm oil, coconut oil, cocoa butter   Hydrogenated or trans fat  found in margarine and vegetable shortening, most shelf stable snack foods, and fried foods; increases LDL and decreases HDL     It is generally recommended that you limit your total fat for the day to less than 30% of your total calories. If you follow an 1800-calorie heart healthy diet, for example, this would mean 60 grams of fat or less per day. Saturated fat and trans fat in your diet raises your blood cholesterol the most, much more than dietary cholesterol does. For this reason, on a heart-healthy diet, less than 7% of your calories should come from saturated fat and ideally 0% from trans fat. On an 1800-calorie diet, this translates into less than 14 grams of saturated fat per day, leaving 46 grams of fat to come from mono- and polyunsaturated fats.    Food Choices on a Heart Healthy Diet   Food Category   Foods Recommended   Foods to Avoid   Grains   Breads and rolls without salted tops Most dry and cooked cereals Unsalted crackers and breadsticks Low-sodium or homemade breadcrumbs or stuffing All rice and pastas   Breads, rolls, and crackers with salted tops High-fat baked goods (eg, muffins, donuts, pastries) Quick breads, self-rising flour, and biscuit mixes Regular bread crumbs Instant hot cereals Commercially prepared rice, pasta, or stuffing mixes   Vegetables   Most fresh, frozen, and low-sodium canned vegetables Low-sodium and salt-free vegetable juices Canned vegetables if unsalted or rinsed   Regular canned vegetables and juices, including sauerkraut and pickled vegetables Frozen vegetables with sauces Commercially prepared potato and vegetable mixes   Fruits   Most fresh, frozen, and canned fruits All fruit juices   Fruits processed with salt or sodium   Milk   Nonfat or low-fat (1%) milk Nonfat or low-fat yogurt Cottage cheese, low-fat ricotta, cheeses labeled as low-fat and low-sodium   Whole milk Reduced-fat (2%) milk Malted and chocolate milk Full fat yogurt Most cheeses (unless low-fat and low salt) Buttermilk (no more than 1 cup per week)   Meats and Beans   Lean cuts of fresh or frozen beef, veal, lamb, or pork (look for the word loin) Fresh or frozen poultry without the skin Fresh or frozen fish and some shellfish Egg whites and egg substitutes (Limit whole eggs to three per week) Tofu Nuts or seeds (unsalted, dry-roasted), low-sodium peanut butter Dried peas, beans, and lentils   Any smoked, cured, salted, or canned meat, fish, or poultry (including dixon, chipped beef, cold cuts, hot dogs, sausages, sardines, and anchovies) Poultry skins Breaded and/or fried fish or meats Canned peas, beans, and lentils Salted nuts   Fats and Oils   Olive oil and canola oil Low-sodium, low-fat salad dressings and mayonnaise   Butter, margarine, coconut and palm oils, dixon fat   Snacks, Sweets, and Condiments   Low-sodium or unsalted versions of broths, soups, soy sauce, and condiments Pepper, herbs, and spices; vinegar, lemon, or lime juice Low-fat frozen desserts (yogurt, sherbet, fruit bars) Sugar, cocoa powder, honey, syrup, jam, and preserves Low-fat, trans-fat free cookies, cakes, and pies Jose and animal crackers, fig bars, elli snaps   High-fat desserts Broth, soups, gravies, and sauces, made from instant mixes or other high-sodium ingredients Salted snack foods Canned olives Meat tenderizers, seasoning salt, and most flavored vinegars   Beverages   Low-sodium carbonated beverages Tea and coffee in moderation Soy milk   Commercially softened water   Suggestions   Make whole grains, fruits, and vegetables the base of your diet.     Choose heart-healthy fats such as canola, olive, and flaxseed oil, and foods high in heart-healthy fats, such as nuts, seeds, soybeans, tofu, and fish. Eat fish at least twice per week; the fish highest in omega-3 fatty acids and lowest in mercury include salmon, herring, mackerel, sardines, and canned chunk light tuna. If you eat fish less than twice per week or have high triglycerides, talk to your doctor about taking fish oil supplements. Read food labels. For products low in fat and cholesterol, look for fat free, low-fat, cholesterol free, saturated fat free, and trans fat freeAlso scan the Nutrition Facts Label, which lists saturated fat, trans fat, and cholesterol amounts. For products low in sodium, look for sodium free, very low sodium, low sodium, no added salt, and unsalted   Skip the salt when cooking or at the table; if food needs more flavor, get creative and try out different herbs and spices. Garlic and onion also add substantial flavor to foods. Trim any visible fat off meat and poultry before cooking, and drain the fat off after barber. Use cooking methods that require little or no added fat, such as grilling, boiling, baking, poaching, broiling, roasting, steaming, stir-frying, and sauting. Avoid fast food and convenience food. They tend to be high in saturated and trans fat and have a lot of added salt. Talk to a registered dietitian for individualized diet advice.       Last Reviewed: March 2011 Boyd Barillas MS, MPH, RD   Updated: 3/29/2011

## 2022-04-05 NOTE — PROGRESS NOTES
Occupational [address] [employer]  Occupational Therapy Not Seen Note    DATE: 2022    NAME: Dm Reynaga  MRN: [id number]   : 1960      Patient not seen this date for Occupational Therapy due to:    Patient Declined: Pt up ambulating independently in room at therapist arrival; pt states no need for OT evaluation despite additionally reporting to writer that he was dizzy upon first standing from EOB. Pt educated on role of OT in acute care and benefit of participation in therapy evaluation, pt continued to report no needs for therapy stating \"I'm just over-tired, otherwise I am fine\" and reporting no need for therapy services. Pt provided continued education/encouragement for participation however pt reports no needs and declines therapy services. Pt reports supportive significant other is able to assist him with any needs at discharge. RN is aware and agreeable for therapy to sign off based on pt wishes. Please re-order therapy if pt with change in wishes for therapy services or change in medical status.        Electronically signed by Renea Olivo OT on 2022 at 11:46 AM

## 2022-04-05 NOTE — PROGRESS NOTES
Congestive Heart Failure Education completed and charted. CHF booklet given. Patient was receptive to education. Discussed the  importance of medication compliance. Discussed the importance of a heart healthy diet. Discussed 2000 mg sodium-restricted daily diet. Patient instructed to limit fluid intake to  1.5 to 2 liters per day. Patient instructed to weigh self at the same time of each day each morning, reinforced teaching to monitor for 3-5 lb weight increase over 1-2 days notify physician if change noted. Signs and symptoms of CHF discussed with patient, such as feeling more tired than normal, feeling short of breath, coughing that increases when lying down, sudden weight gain, swelling of the feet, legs or belly. Patient verbalized understanding to notify physician office if these symptoms occur.     EF 35%

## 2022-04-26 ENCOUNTER — TELEPHONE (OUTPATIENT)
Dept: ONCOLOGY | Age: 62
End: 2022-04-26

## 2022-04-26 NOTE — TELEPHONE ENCOUNTER
PT CALLED ON 4/25/22 AT 1346 AND LEFT MESSAGE R/T \"CARDIOLOGY WORK\", \"[facility]\", NAME AND PHONE NUMBER, ABOVE ONLY INFO LEFT IN MESSAGE,   ON 4/26/22CALLED PT BACK, TO ASK IF HAS A QUESTION, BUT NO ANSWER AND NO ANSWERING MACHINE. PT CALLED BACK AND AGAIN TO STATE THAT WANTED TO INFORM OFFICE THAT HE IS HAVING  A \"TEST FOR LUNGS\". NO TEST SCHEDULED IN Epic.    7/13/22 AT 1400 IS NEXT APPT WITH DR Kilgore Or.

## 2022-05-02 ENCOUNTER — HOSPITAL ENCOUNTER (OUTPATIENT)
Age: 62
Setting detail: SPECIMEN
Discharge: HOME | End: 2022-05-02

## 2022-05-02 LAB
ALT SERPL-CCNC: 20 U/L (ref 5–41)
AST SERPL-CCNC: 18 U/L
T3 FREE: 2.82 PG/ML (ref 2.02–4.43)
THYROXINE, FREE: 1.29 NG/DL (ref 0.93–1.7)
TSH SERPL DL<=0.05 MIU/L-ACNC: 1.26 UIU/ML (ref 0.3–5)

## 2022-05-17 ENCOUNTER — HOSPITAL ENCOUNTER (INPATIENT)
Age: 62
LOS: 2 days | Discharge: HOME | DRG: 246 | End: 2022-05-19
Attending: EMERGENCY MEDICINE | Admitting: INTERNAL MEDICINE
Payer: MEDICARE

## 2022-05-17 ENCOUNTER — APPOINTMENT (OUTPATIENT)
Dept: GENERAL RADIOLOGY | Age: 62
DRG: 246 | End: 2022-05-17
Payer: MEDICARE

## 2022-05-17 DIAGNOSIS — I21.4 NSTEMI (NON-ST ELEVATED MYOCARDIAL INFARCTION) (HCC): Primary | ICD-10-CM

## 2022-05-17 DIAGNOSIS — I20.8 STABLE ANGINA (HCC): ICD-10-CM

## 2022-05-17 LAB
ABSOLUTE EOS #: 0.03 K/UL (ref 0–0.44)
ABSOLUTE IMMATURE GRANULOCYTE: 0.03 K/UL (ref 0–0.3)
ABSOLUTE LYMPH #: 1.1 K/UL (ref 1.1–3.7)
ABSOLUTE MONO #: 0.59 K/UL (ref 0.1–1.2)
ANION GAP SERPL CALCULATED.3IONS-SCNC: 12 MMOL/L (ref 9–17)
BASOPHILS # BLD: 0 % (ref 0–2)
BASOPHILS ABSOLUTE: <0.03 K/UL (ref 0–0.2)
BUN BLDV-MCNC: 18 MG/DL (ref 8–23)
CALCIUM SERPL-MCNC: 9.1 MG/DL (ref 8.6–10.4)
CHLORIDE BLD-SCNC: 101 MMOL/L (ref 98–107)
CO2: 22 MMOL/L (ref 20–31)
CREAT SERPL-MCNC: 0.87 MG/DL (ref 0.7–1.2)
EOSINOPHILS RELATIVE PERCENT: 1 % (ref 1–4)
GFR AFRICAN AMERICAN: >60 ML/MIN
GFR NON-AFRICAN AMERICAN: >60 ML/MIN
GFR SERPL CREATININE-BSD FRML MDRD: ABNORMAL ML/MIN/{1.73_M2}
GLUCOSE BLD-MCNC: 306 MG/DL (ref 70–99)
HCT VFR BLD CALC: 42.7 % (ref 40.7–50.3)
HEMOGLOBIN: 14.1 G/DL (ref 13–17)
IMMATURE GRANULOCYTES: 1 %
LYMPHOCYTES # BLD: 19 % (ref 24–43)
MAGNESIUM: 1.8 MG/DL (ref 1.6–2.6)
MCH RBC QN AUTO: 29.7 PG (ref 25.2–33.5)
MCHC RBC AUTO-ENTMCNC: 33 G/DL (ref 28.4–34.8)
MCV RBC AUTO: 90.1 FL (ref 82.6–102.9)
MONOCYTES # BLD: 10 % (ref 3–12)
NRBC AUTOMATED: 0 PER 100 WBC
PARTIAL THROMBOPLASTIN TIME: 82.2 SEC (ref 20.5–30.5)
PDW BLD-RTO: 13.3 % (ref 11.8–14.4)
PLATELET # BLD: ABNORMAL K/UL (ref 138–453)
PLATELET, FLUORESCENCE: 101 K/UL (ref 138–453)
PLATELET, IMMATURE FRACTION: 4 % (ref 1.1–10.3)
POTASSIUM SERPL-SCNC: 3.5 MMOL/L (ref 3.7–5.3)
RBC # BLD: 4.74 M/UL (ref 4.21–5.77)
REASON FOR REJECTION: NORMAL
SEG NEUTROPHILS: 69 % (ref 36–65)
SEGMENTED NEUTROPHILS ABSOLUTE COUNT: 3.97 K/UL (ref 1.5–8.1)
SODIUM BLD-SCNC: 135 MMOL/L (ref 135–144)
TROPONIN, HIGH SENSITIVITY: 82 NG/L (ref 0–22)
TROPONIN, HIGH SENSITIVITY: 89 NG/L (ref 0–22)
WBC # BLD: 5.7 K/UL (ref 3.5–11.3)
ZZ NTE CLEAN UP: ORDERED TEST: NORMAL
ZZ NTE WITH NAME CLEAN UP: SPECIMEN SOURCE: NORMAL

## 2022-05-17 PROCEDURE — 83735 ASSAY OF MAGNESIUM: CPT

## 2022-05-17 PROCEDURE — 71046 X-RAY EXAM CHEST 2 VIEWS: CPT

## 2022-05-17 PROCEDURE — 99285 EMERGENCY DEPT VISIT HI MDM: CPT

## 2022-05-17 PROCEDURE — 85055 RETICULATED PLATELET ASSAY: CPT

## 2022-05-17 PROCEDURE — 96365 THER/PROPH/DIAG IV INF INIT: CPT

## 2022-05-17 PROCEDURE — 84484 ASSAY OF TROPONIN QUANT: CPT

## 2022-05-17 PROCEDURE — 2500000003 HC RX 250 WO HCPCS: Performed by: STUDENT IN AN ORGANIZED HEALTH CARE EDUCATION/TRAINING PROGRAM

## 2022-05-17 PROCEDURE — 2060000000 HC ICU INTERMEDIATE R&B

## 2022-05-17 PROCEDURE — 96375 TX/PRO/DX INJ NEW DRUG ADDON: CPT

## 2022-05-17 PROCEDURE — 85025 COMPLETE CBC W/AUTO DIFF WBC: CPT

## 2022-05-17 PROCEDURE — 80048 BASIC METABOLIC PNL TOTAL CA: CPT

## 2022-05-17 PROCEDURE — 6370000000 HC RX 637 (ALT 250 FOR IP): Performed by: STUDENT IN AN ORGANIZED HEALTH CARE EDUCATION/TRAINING PROGRAM

## 2022-05-17 PROCEDURE — 85730 THROMBOPLASTIN TIME PARTIAL: CPT

## 2022-05-17 PROCEDURE — 6360000002 HC RX W HCPCS: Performed by: STUDENT IN AN ORGANIZED HEALTH CARE EDUCATION/TRAINING PROGRAM

## 2022-05-17 PROCEDURE — 93005 ELECTROCARDIOGRAM TRACING: CPT | Performed by: STUDENT IN AN ORGANIZED HEALTH CARE EDUCATION/TRAINING PROGRAM

## 2022-05-17 PROCEDURE — 99223 1ST HOSP IP/OBS HIGH 75: CPT | Performed by: INTERNAL MEDICINE

## 2022-05-17 RX ORDER — NITROGLYCERIN 20 MG/100ML
5-200 INJECTION INTRAVENOUS CONTINUOUS
Status: DISCONTINUED | OUTPATIENT
Start: 2022-05-17 | End: 2022-05-19 | Stop reason: HOSPADM

## 2022-05-17 RX ORDER — HEPARIN SODIUM 1000 [USP'U]/ML
2000 INJECTION, SOLUTION INTRAVENOUS; SUBCUTANEOUS PRN
Status: DISCONTINUED | OUTPATIENT
Start: 2022-05-17 | End: 2022-05-19 | Stop reason: HOSPADM

## 2022-05-17 RX ORDER — NITROGLYCERIN 0.4 MG/1
0.4 TABLET SUBLINGUAL ONCE
Status: COMPLETED | OUTPATIENT
Start: 2022-05-17 | End: 2022-05-17

## 2022-05-17 RX ORDER — HEPARIN SODIUM 1000 [USP'U]/ML
4000 INJECTION, SOLUTION INTRAVENOUS; SUBCUTANEOUS PRN
Status: DISCONTINUED | OUTPATIENT
Start: 2022-05-17 | End: 2022-05-19 | Stop reason: HOSPADM

## 2022-05-17 RX ORDER — HEPARIN SODIUM AND DEXTROSE 10000; 5 [USP'U]/100ML; G/100ML
5-30 INJECTION INTRAVENOUS CONTINUOUS
Status: DISCONTINUED | OUTPATIENT
Start: 2022-05-17 | End: 2022-05-19

## 2022-05-17 RX ORDER — HEPARIN SODIUM 1000 [USP'U]/ML
4000 INJECTION, SOLUTION INTRAVENOUS; SUBCUTANEOUS ONCE
Status: COMPLETED | OUTPATIENT
Start: 2022-05-17 | End: 2022-05-17

## 2022-05-17 RX ADMIN — HEPARIN SODIUM 4000 UNITS: 1000 INJECTION INTRAVENOUS; SUBCUTANEOUS at 21:20

## 2022-05-17 RX ADMIN — Medication 9.6 UNITS/KG/HR: at 21:21

## 2022-05-17 RX ADMIN — NITROGLYCERIN 0.4 MG: 0.4 TABLET SUBLINGUAL at 21:22

## 2022-05-17 RX ADMIN — NITROGLYCERIN 5 MCG/MIN: 20 INJECTION INTRAVENOUS at 22:35

## 2022-05-17 ASSESSMENT — PAIN SCALES - GENERAL: PAINLEVEL_OUTOF10: 5

## 2022-05-17 ASSESSMENT — PAIN - FUNCTIONAL ASSESSMENT: PAIN_FUNCTIONAL_ASSESSMENT: 0-10

## 2022-05-17 NOTE — ED PROVIDER NOTES
[facility]  Emergency Department Encounter  EmergencyMedicine Resident     Pt Name:Arya Sinclair  MRN: [id number]  [place] 1960  Date of evaluation: 5/17/22  PCP:  Jv العلي PA-C    This patient was evaluated in the Emergency Department for symptoms described in the history of present illness. The patient was evaluated in the context of the global COVID-19 pandemic, which necessitated consideration that the patient might be at risk for infection with the SARS-CoV-2 virus that causes COVID-19. Institutional protocols and algorithms that pertain to the evaluation of patients at risk for COVID-19 are in a state of rapid change based on information released by regulatory bodies including the CDC and federal and state organizations. These policies and algorithms were followed during the patient's care in the ED. CHIEF COMPLAINT       Chief Complaint   Patient presents with    Chest Pain     x2 weeks, increased today, relief with nitro by ems       HISTORY OF PRESENT ILLNESS  (Location/Symptom, Timing/Onset, Context/Setting, Quality, Duration, Modifying Factors, Severity.)      Rosio Kirk is a 59 y.o. male who presents with chest pain. Patient has been having 2 weeks of stable angina, chest pain with exertion, moderate severity, pressure-like, anterior, radiating to bilateral shoulders, associated with shortness of breath. Patient denies diaphoresis, lightheadedness, nausea, vomiting, lower extremity swelling or pain. Patient has history of MI in the past, stents, CABG, follows with cardiology. Patient reports that he called cardiology clinic today, recommended to come to the emergency department for evaluation.   Patient took a full dose aspirin and 2 nitroglycerin tablets prior to arrival.    PAST MEDICAL / SURGICAL / SOCIAL / FAMILY HISTORY      has a past medical history of CAD (coronary artery disease), CHF (congestive heart failure) ([facility] [address] Heart attack ([facility] [address] Hyperlipidemia, Hypertension, MI (myocardial infarction) ([facility] [address] MRSA (methicillin resistant staph aureus) culture positive, Skin cancer, Snores, Stroke [facility]), Wears dentures, Wears reading eyeglasses, and Wellness examination. has a past surgical history that includes Coronary artery bypass graft; Coronary angioplasty with stent; skin biopsy; eye surgery; Mandible surgery; hernia repair (Bilateral, 2020); Cardioversion (2021); other surgical history (2021); Cardiac surgery (); Cardiac catheterization (2020); and Cardiac defibrillator placement (2022). Social History     Socioeconomic History    Marital status: Single     Spouse name: Not on file    Number of children: Not on file    Years of education: Not on file    Highest education level: Not on file   Occupational History    Not on file   Tobacco Use    Smoking status: Former Smoker     Quit date: 1999     Years since quittin.3    Smokeless tobacco: Never Used   Vaping Use    Vaping Use: Never used   Substance and Sexual Activity    Alcohol use: No    Drug use: No    Sexual activity: Not on file   Other Topics Concern    Not on file   Social History Narrative    Not on file     Social Determinants of Health     Financial Resource Strain:     Difficulty of Paying Living Expenses: Not on file   Food Insecurity:     Worried About [address] in the Last Year: Not on file    Denny of Food in the Last Year: Not on file   Transportation Needs:     Lack of Transportation (Medical): Not on file    Lack of Transportation (Non-Medical):  Not on file   Physical Activity:     Days of Exercise per Week: Not on file    Minutes of Exercise per Session: Not on file   Stress:     Feeling of Stress : Not on file   Social Connections:     Frequency of Communication with Friends and Family: Not on file    Frequency of Social Gatherings with Friends and Family: Not on file    Attends Faith Services: Not on file   [address] [facility] [employer] or Organizations: Not on file    Attends Club or Organization Meetings: Not on file    Marital Status: Not on file   Intimate Partner Violence:     Fear of Current or Ex-Partner: Not on file    Emotionally Abused: Not on file    Physically Abused: Not on file    Sexually Abused: Not on file   Housing Stability:     Unable to Pay for Housing in the Last Year: Not on file    Number of [place] in the Last Year: Not on file    Unstable Housing in the Last Year: Not on file       Family History   Problem Relation Age of Onset    Coronary Art Dis Father     Liver Disease Father     Alcohol Abuse Sister        Allergies:  Patient has no known allergies. Home Medications:  Prior to Admission medications    Medication Sig Start Date End Date Taking?  Authorizing Provider   clopidogrel (PLAVIX) 75 MG tablet Take 75 mg by mouth daily    Historical Provider, MD   OXYCODONE HCL PO Take 1 tablet by mouth 3 times daily as needed Took 2 tabs 4-3-22 at HS    Historical Provider, MD   Cyanocobalamin (VITAMIN B-12 PO) Take 1 tablet by mouth daily    Historical Provider, MD   naproxen (NAPROSYN) 500 MG tablet Take 1 tablet by mouth 2 times daily (with meals) 3/28/22 3/28/22  Farida Peralta PA-C   rivaroxaban (XARELTO) 20 MG TABS tablet Take 20 mg by mouth    Historical Provider, MD   amiodarone (CORDARONE) 200 MG tablet Take 1 tablet by mouth daily Take 2 tablets for 7 days and after that one tablet daily  Patient taking differently: Take 200 mg by mouth daily  11/17/21   Trudi Moura MD   vitamin E 100 units capsule Take 100 Units by mouth daily     Historical Provider, MD   magnesium citrate solution Take 296 mLs by mouth once for 1 dose  Patient not taking: Reported on 6/18/2021 1/4/21 1/29/21  Tameka Moore MD   Multiple Vitamins-Minerals (THERAPEUTIC MULTIVITAMIN-MINERALS) tablet Take 1 tablet by mouth daily    Historical Provider, MD   Ascorbic Acid (VITAMIN C) 250 MG difficulty, weakness, light-headedness, numbness and headaches. Psychiatric/Behavioral: Negative for confusion. PHYSICAL EXAM   (up to 7 for level 4, 8 or more for level 5)      INITIAL VITALS:   BP (!) 140/104   Pulse 82   Temp 98 °F (36.7 °C) (Oral)   Resp 18   Ht 5' 9\" (1.753 m)   Wt 241 lb 1.6 oz (109.4 kg)   SpO2 94%   BMI 35.60 kg/m²     Physical Exam  Constitutional:       General: He is not in acute distress. Appearance: Normal appearance. He is well-developed. He is not ill-appearing, toxic-appearing or diaphoretic. HENT:      Head: Normocephalic and atraumatic. Right Ear: External ear normal.      Left Ear: External ear normal.   Eyes:      General:         Right eye: No discharge. Left eye: No discharge. Extraocular Movements: Extraocular movements intact. Pupils: Pupils are equal, round, and reactive to light. Neck:      Vascular: No JVD. Trachea: No tracheal deviation. Cardiovascular:      Rate and Rhythm: Normal rate and regular rhythm. Pulses: Normal pulses. Heart sounds: Normal heart sounds. No murmur heard. No friction rub. No gallop. Pulmonary:      Effort: Pulmonary effort is normal. No respiratory distress. Breath sounds: Normal breath sounds. No stridor. No wheezing, rhonchi or rales. Chest:      Chest wall: No tenderness. Abdominal:      General: There is no distension. Palpations: Abdomen is soft. There is no mass. Tenderness: There is no abdominal tenderness. There is no right CVA tenderness, left CVA tenderness or guarding. Musculoskeletal:         General: No tenderness. Normal range of motion. Cervical back: Normal range of motion and neck supple. No rigidity or tenderness. Right lower leg: No edema. Left lower leg: No edema. Skin:     General: Skin is warm. Capillary Refill: Capillary refill takes less than 2 seconds. Neurological:      General: No focal deficit present. Mental Status: He is alert and oriented to person, place, and time. Cranial Nerves: No cranial nerve deficit. Sensory: No sensory deficit. Motor: No weakness. Coordination: Coordination normal.      Gait: Gait normal.   Psychiatric:         Mood and Affect: Mood normal.         Behavior: Behavior normal.         DIFFERENTIAL  DIAGNOSIS     PLAN (LABS / IMAGING / EKG):  Orders Placed This Encounter   Procedures    XR CHEST (2 VW)    CBC with Auto Differential    Basic Metabolic Panel w/ Reflex to MG    Troponin    Immature Platelet Fraction    APTT    Magnesium    Comprehensive Metabolic Panel w/ Reflex to MG    Magnesium    Troponin    Brain natriuretic peptide    Lipid panel - fasting    CBC    SPECIMEN REJECTION    APTT    PREVIOUS SPECIMEN    ADULT DIET; Clear Liquid; No Caffeine    Vital signs per unit routine    Notify physician    Notify Cardiologist    Bedrest    Daily weights    Intake and output    Place intermittent pneumatic compression device    Telemetry monitoring - continuous duration    Full Code    Inpatient consult to Cardiology    Inpatient consult to Hospitalist    Initiate Oxygen Therapy Protocol    Pulse oximetry, continuous    Pacer Interrogate    EKG 12 Lead    EKG 12 lead    EKG 12 lead    ADMIT TO INPATIENT       MEDICATIONS ORDERED:  Orders Placed This Encounter   Medications    nitroGLYCERIN (NITROSTAT) SL tablet 0.4 mg    heparin (porcine) injection 4,000 Units    heparin (porcine) injection 4,000 Units    heparin (porcine) injection 2,000 Units    heparin 25,000 units in dextrose 5 % 250 mL infusion (rate based)    nitroGLYCERIN 50 mg in dextrose 5% 250 mL infusion     Order Specific Question:   Titrate Infusion? Answer:   Yes     Order Specific Question:   Initial Infusion Dose: Answer:   5 mcg/min     Order Specific Question:   Goal of Therapy is:      Answer:   Chest pain symptom relief     Order Specific Question: Contact Provider if:     Answer:   SBP less than 90 mmHg    amiodarone (CORDARONE) tablet 200 mg    aspirin chewable tablet 81 mg    atorvastatin (LIPITOR) tablet 80 mg    bumetanide (BUMEX) tablet 1 mg    carvedilol (COREG) tablet 12.5 mg    clopidogrel (PLAVIX) tablet 75 mg    lisinopril (PRINIVIL;ZESTRIL) tablet 5 mg    therapeutic multivitamin-minerals 1 tablet    sodium chloride flush 0.9 % injection 5-40 mL    sodium chloride flush 0.9 % injection 10 mL    0.9 % sodium chloride infusion    OR Linked Order Group     potassium chloride (KLOR-CON M) extended release tablet 40 mEq     potassium bicarb-citric acid (EFFER-K) effervescent tablet 40 mEq     potassium chloride 10 mEq/100 mL IVPB (Peripheral Line)    magnesium sulfate 1000 mg in dextrose 5% 100 mL IVPB    OR Linked Order Group     ondansetron (ZOFRAN-ODT) disintegrating tablet 4 mg     ondansetron (ZOFRAN) injection 4 mg    OR Linked Order Group     acetaminophen (TYLENOL) tablet 650 mg     acetaminophen (TYLENOL) suppository 650 mg    magnesium hydroxide (MILK OF MAGNESIA) 400 MG/5ML suspension 30 mL    nitroGLYCERIN (NITROSTAT) SL tablet 0.4 mg       DDX: ACS, atypical chest pain    DIAGNOSTIC RESULTS / EMERGENCY DEPARTMENT COURSE / MDM   LAB RESULTS:  Results for orders placed or performed during the hospital encounter of 05/17/22   CBC with Auto Differential   Result Value Ref Range    WBC 5.7 3.5 - 11.3 k/uL    RBC 4.74 4.21 - 5.77 m/uL    Hemoglobin 14.1 13.0 - 17.0 g/dL    Hematocrit 42.7 40.7 - 50.3 %    MCV 90.1 82.6 - 102.9 fL    MCH 29.7 25.2 - 33.5 pg    MCHC 33.0 28.4 - 34.8 g/dL    RDW 13.3 11.8 - 14.4 %    Platelets See Reflexed IPF Result 138 - 453 k/uL    NRBC Automated 0.0 0.0 per 100 WBC    Seg Neutrophils 69 (H) 36 - 65 %    Lymphocytes 19 (L) 24 - 43 %    Monocytes 10 3 - 12 %    Eosinophils % 1 1 - 4 %    Basophils 0 0 - 2 %    Immature Granulocytes 1 (H) 0 %    Segs Absolute 3.97 1.50 - 8.10 k/uL Absolute Lymph # 1.10 1.10 - 3.70 k/uL    Absolute Mono # 0.59 0.10 - 1.20 k/uL    Absolute Eos # 0.03 0.00 - 0.44 k/uL    Basophils Absolute <0.03 0.00 - 0.20 k/uL    Absolute Immature Granulocyte 0.03 0.00 - 0.30 k/uL   Basic Metabolic Panel w/ Reflex to MG   Result Value Ref Range    Glucose 306 (H) 70 - 99 mg/dL    BUN 18 8 - 23 mg/dL    CREATININE 0.87 0.70 - 1.20 mg/dL    Calcium 9.1 8.6 - 10.4 mg/dL    Sodium 135 135 - 144 mmol/L    Potassium 3.5 (L) 3.7 - 5.3 mmol/L    Chloride 101 98 - 107 mmol/L    CO2 22 20 - 31 mmol/L    Anion Gap 12 9 - 17 mmol/L    GFR Non-African American >60 >60 mL/min    GFR African American >60 >60 mL/min    GFR Comment         Troponin   Result Value Ref Range    Troponin, High Sensitivity 82 (HH) 0 - 22 ng/L   Troponin   Result Value Ref Range    Troponin, High Sensitivity 89 (HH) 0 - 22 ng/L   Immature Platelet Fraction   Result Value Ref Range    Platelet, Immature Fraction 4.0 1.1 - 10.3 %    Platelet, Fluorescence 101 (L) 138 - 453 k/uL   Magnesium   Result Value Ref Range    Magnesium 1.8 1.6 - 2.6 mg/dL   SPECIMEN REJECTION   Result Value Ref Range    Specimen Source . BLOOD     Ordered Test PTT     Reason for Rejection       Unable to perform testing: Specimen quantity not sufficient. APTT   Result Value Ref Range    PTT 82.2 (H) 20.5 - 30.5 sec       IMPRESSION: 40-year-old male with CAD presenting with stable angina, concern for ACS, will obtain EKG, troponins. Will consult with cardiology. Will obtain basic labs to evaluate for anemia, lecture light abnormality. Will obtain chest x-ray. Patient had relief with 2 nitroglycerin tablets, will administer 1 more nitroglycerin tablets and start patient on nitro drip if patient is not chest pain-free. Patient already received aspirin.     RADIOLOGY:  XR CHEST (2 VW)    Result Date: 5/17/2022  EXAMINATION: TWO XRAY VIEWS OF THE CHEST 5/17/2022 5:39 pm COMPARISON: 04/05/2022 HISTORY: ORDERING SYSTEM PROVIDED HISTORY: Chest pain TECHNOLOGIST PROVIDED HISTORY: Chest pain FINDINGS: Stable ICD lead. Cardiac size is enlarged. No acute infiltrates are seen . The central pulmonary vascularity is hazy and indistinct. No pneumothorax. No pleural effusions identified . Postsurgical changes overlying the mediastinum. Stable cardiomegaly and central pulmonary vascular congestion       EKG  EKG Interpretation    Interpreted by me    Rhythm: normal sinus   Rate: normal  Axis: normal  Ectopy: none  Conduction: normal  ST Segments: no acute change  T Waves: no acute change  Q Waves: none    Clinical Impression: no acute changes and normal EKG, similar to prior EKG    All EKG's are interpreted by the Emergency Department Physician who either signs or Co-signs this chart in the absence of a cardiologist.    EMERGENCY DEPARTMENT COURSE:  Patient came to emergency department, HPI and physical exam were conducted. All nursing notes were reviewed. EKG found no acute changes, troponins elevated, started patient on heparin, consulted with cardiology who agreed with nitro drip, heparin drip, admission to [facility] for further management. Patient is admitted for further management of his NSTEMI. No notes of EC Admission Criteria type on file. PROCEDURES:      CONSULTS:  IP CONSULT TO CARDIOLOGY  IP CONSULT TO HOSPITALIST    CRITICAL CARE:      FINAL IMPRESSION      1. NSTEMI (non-ST elevated myocardial infarction) ([address]    2. Stable angina ([address]          DISPOSITION / PLAN     DISPOSITION Admitted 05/17/2022 09:43:13 PM      PATIENT REFERRED TO:  No follow-up provider specified.     DISCHARGE MEDICATIONS:  Current Discharge Medication List          Areli Aparicio MD  Emergency Medicine Resident    (Please note that portions of thisnote were completed with a voice recognition program.  Efforts were made to edit the dictations but occasionally words are mis-transcribed.)        Areli Aparicio MD  Resident  05/18/22 0753

## 2022-05-18 ENCOUNTER — APPOINTMENT (OUTPATIENT)
Dept: CARDIAC CATH/INVASIVE PROCEDURES | Age: 62
DRG: 246 | End: 2022-05-18
Payer: MEDICARE

## 2022-05-18 PROBLEM — E87.6 HYPOKALEMIA: Status: ACTIVE | Noted: 2022-05-18

## 2022-05-18 PROBLEM — R63.0 ANOREXIA: Status: ACTIVE | Noted: 2022-01-01

## 2022-05-18 PROBLEM — D69.6 THROMBOCYTOPENIA (HCC): Status: ACTIVE | Noted: 2022-05-18

## 2022-05-18 PROBLEM — E11.65 UNCONTROLLED TYPE 2 DIABETES MELLITUS WITH HYPERGLYCEMIA (HCC): Status: ACTIVE | Noted: 2022-01-01

## 2022-05-18 PROBLEM — R11.0 NAUSEA: Status: ACTIVE | Noted: 2022-05-18

## 2022-05-18 PROBLEM — G47.33 OSA (OBSTRUCTIVE SLEEP APNEA): Status: ACTIVE | Noted: 2022-05-18

## 2022-05-18 PROBLEM — Z95.1 S/P CABG (CORONARY ARTERY BYPASS GRAFT): Status: ACTIVE | Noted: 2022-05-18

## 2022-05-18 PROBLEM — E66.9 OBESITY (BMI 30-39.9): Status: ACTIVE | Noted: 2022-05-18

## 2022-05-18 LAB
ALBUMIN SERPL-MCNC: 4.1 G/DL (ref 3.5–5.2)
ALBUMIN/GLOBULIN RATIO: 1.5 (ref 1–2.5)
ALP BLD-CCNC: 104 U/L (ref 40–129)
ALT SERPL-CCNC: 20 U/L (ref 5–41)
ANION GAP SERPL CALCULATED.3IONS-SCNC: 14 MMOL/L (ref 9–17)
AST SERPL-CCNC: 24 U/L
BILIRUB SERPL-MCNC: 1.91 MG/DL (ref 0.3–1.2)
BUN BLDV-MCNC: 15 MG/DL (ref 8–23)
CALCIUM SERPL-MCNC: 9.1 MG/DL (ref 8.6–10.4)
CHLORIDE BLD-SCNC: 100 MMOL/L (ref 98–107)
CHOLESTEROL/HDL RATIO: 8.1
CHOLESTEROL: 251 MG/DL
CO2: 25 MMOL/L (ref 20–31)
CREAT SERPL-MCNC: 0.7 MG/DL (ref 0.7–1.2)
EKG ATRIAL RATE: 80 BPM
EKG P AXIS: 74 DEGREES
EKG P-R INTERVAL: 256 MS
EKG Q-T INTERVAL: 456 MS
EKG QRS DURATION: 160 MS
EKG QTC CALCULATION (BAZETT): 525 MS
EKG R AXIS: 98 DEGREES
EKG T AXIS: 60 DEGREES
EKG VENTRICULAR RATE: 80 BPM
GFR AFRICAN AMERICAN: >60 ML/MIN
GFR NON-AFRICAN AMERICAN: >60 ML/MIN
GFR SERPL CREATININE-BSD FRML MDRD: ABNORMAL ML/MIN/{1.73_M2}
GLUCOSE BLD-MCNC: 139 MG/DL (ref 70–99)
GLUCOSE BLD-MCNC: 146 MG/DL (ref 75–110)
GLUCOSE BLD-MCNC: 202 MG/DL (ref 75–110)
HCT VFR BLD CALC: 42.2 % (ref 40.7–50.3)
HDLC SERPL-MCNC: 31 MG/DL
HEMOGLOBIN: 14.4 G/DL (ref 13–17)
LDL CHOLESTEROL: 207 MG/DL (ref 0–130)
MAGNESIUM: 1.7 MG/DL (ref 1.6–2.6)
MCH RBC QN AUTO: 30.4 PG (ref 25.2–33.5)
MCHC RBC AUTO-ENTMCNC: 34.1 G/DL (ref 28.4–34.8)
MCV RBC AUTO: 89 FL (ref 82.6–102.9)
NRBC AUTOMATED: 0 PER 100 WBC
PARTIAL THROMBOPLASTIN TIME: 29.3 SEC (ref 20.5–30.5)
PARTIAL THROMBOPLASTIN TIME: 36.2 SEC (ref 20.5–30.5)
PDW BLD-RTO: 13.3 % (ref 11.8–14.4)
PLATELET # BLD: NORMAL K/UL (ref 138–453)
PLATELET, FLUORESCENCE: 98 K/UL (ref 138–453)
PLATELET, IMMATURE FRACTION: 3.8 % (ref 1.1–10.3)
POTASSIUM SERPL-SCNC: 3.3 MMOL/L (ref 3.7–5.3)
PRO-BNP: 760 PG/ML
RBC # BLD: 4.74 M/UL (ref 4.21–5.77)
SODIUM BLD-SCNC: 139 MMOL/L (ref 135–144)
TOTAL PROTEIN: 6.8 G/DL (ref 6.4–8.3)
TRIGL SERPL-MCNC: 64 MG/DL
TROPONIN, HIGH SENSITIVITY: 128 NG/L (ref 0–22)
TROPONIN, HIGH SENSITIVITY: 140 NG/L (ref 0–22)
WBC # BLD: 6.2 K/UL (ref 3.5–11.3)

## 2022-05-18 PROCEDURE — C1769 GUIDE WIRE: HCPCS

## 2022-05-18 PROCEDURE — C9604 PERC D-E COR REVASC T CABG S: HCPCS

## 2022-05-18 PROCEDURE — C1892 INTRO/SHEATH,FIXED,PEEL-AWAY: HCPCS

## 2022-05-18 PROCEDURE — C1894 INTRO/SHEATH, NON-LASER: HCPCS

## 2022-05-18 PROCEDURE — 6360000004 HC RX CONTRAST MEDICATION

## 2022-05-18 PROCEDURE — 80061 LIPID PANEL: CPT

## 2022-05-18 PROCEDURE — 6360000002 HC RX W HCPCS: Performed by: INTERNAL MEDICINE

## 2022-05-18 PROCEDURE — 2580000003 HC RX 258: Performed by: NURSE PRACTITIONER

## 2022-05-18 PROCEDURE — 6370000000 HC RX 637 (ALT 250 FOR IP)

## 2022-05-18 PROCEDURE — 7100000000 HC PACU RECOVERY - FIRST 15 MIN

## 2022-05-18 PROCEDURE — 82947 ASSAY GLUCOSE BLOOD QUANT: CPT

## 2022-05-18 PROCEDURE — 85027 COMPLETE CBC AUTOMATED: CPT

## 2022-05-18 PROCEDURE — 85730 THROMBOPLASTIN TIME PARTIAL: CPT

## 2022-05-18 PROCEDURE — 93005 ELECTROCARDIOGRAM TRACING: CPT | Performed by: NURSE PRACTITIONER

## 2022-05-18 PROCEDURE — 36415 COLL VENOUS BLD VENIPUNCTURE: CPT

## 2022-05-18 PROCEDURE — 6360000002 HC RX W HCPCS

## 2022-05-18 PROCEDURE — C1874 STENT, COATED/COV W/DEL SYS: HCPCS

## 2022-05-18 PROCEDURE — 2580000003 HC RX 258: Performed by: STUDENT IN AN ORGANIZED HEALTH CARE EDUCATION/TRAINING PROGRAM

## 2022-05-18 PROCEDURE — 84484 ASSAY OF TROPONIN QUANT: CPT

## 2022-05-18 PROCEDURE — 83735 ASSAY OF MAGNESIUM: CPT

## 2022-05-18 PROCEDURE — 93010 ELECTROCARDIOGRAM REPORT: CPT | Performed by: INTERNAL MEDICINE

## 2022-05-18 PROCEDURE — 93455 CORONARY ART/GRFT ANGIO S&I: CPT

## 2022-05-18 PROCEDURE — 99233 SBSQ HOSP IP/OBS HIGH 50: CPT | Performed by: INTERNAL MEDICINE

## 2022-05-18 PROCEDURE — 7100000001 HC PACU RECOVERY - ADDTL 15 MIN

## 2022-05-18 PROCEDURE — 83880 ASSAY OF NATRIURETIC PEPTIDE: CPT

## 2022-05-18 PROCEDURE — 2060000000 HC ICU INTERMEDIATE R&B

## 2022-05-18 PROCEDURE — 6360000002 HC RX W HCPCS: Performed by: STUDENT IN AN ORGANIZED HEALTH CARE EDUCATION/TRAINING PROGRAM

## 2022-05-18 PROCEDURE — C1725 CATH, TRANSLUMIN NON-LASER: HCPCS

## 2022-05-18 PROCEDURE — 2709999900 HC NON-CHARGEABLE SUPPLY

## 2022-05-18 PROCEDURE — 80053 COMPREHEN METABOLIC PANEL: CPT

## 2022-05-18 PROCEDURE — 6370000000 HC RX 637 (ALT 250 FOR IP): Performed by: NURSE PRACTITIONER

## 2022-05-18 PROCEDURE — 6370000000 HC RX 637 (ALT 250 FOR IP): Performed by: INTERNAL MEDICINE

## 2022-05-18 PROCEDURE — 85055 RETICULATED PLATELET ASSAY: CPT

## 2022-05-18 PROCEDURE — C1887 CATHETER, GUIDING: HCPCS

## 2022-05-18 RX ORDER — ATORVASTATIN CALCIUM 80 MG/1
80 TABLET, FILM COATED ORAL NIGHTLY
Status: DISCONTINUED | OUTPATIENT
Start: 2022-05-18 | End: 2022-05-19 | Stop reason: HOSPADM

## 2022-05-18 RX ORDER — INSULIN LISPRO 100 [IU]/ML
0-3 INJECTION, SOLUTION INTRAVENOUS; SUBCUTANEOUS NIGHTLY
Status: DISCONTINUED | OUTPATIENT
Start: 2022-05-18 | End: 2022-05-19 | Stop reason: HOSPADM

## 2022-05-18 RX ORDER — CLOPIDOGREL BISULFATE 75 MG/1
75 TABLET ORAL DAILY
Status: DISCONTINUED | OUTPATIENT
Start: 2022-05-18 | End: 2022-05-19 | Stop reason: HOSPADM

## 2022-05-18 RX ORDER — ACETAMINOPHEN 650 MG/1
650 SUPPOSITORY RECTAL EVERY 6 HOURS PRN
Status: DISCONTINUED | OUTPATIENT
Start: 2022-05-18 | End: 2022-05-19 | Stop reason: HOSPADM

## 2022-05-18 RX ORDER — BUMETANIDE 1 MG/1
1 TABLET ORAL 2 TIMES DAILY
Status: DISCONTINUED | OUTPATIENT
Start: 2022-05-18 | End: 2022-05-18

## 2022-05-18 RX ORDER — AMIODARONE HYDROCHLORIDE 200 MG/1
200 TABLET ORAL DAILY
Status: DISCONTINUED | OUTPATIENT
Start: 2022-05-18 | End: 2022-05-19 | Stop reason: HOSPADM

## 2022-05-18 RX ORDER — NITROGLYCERIN 0.4 MG/1
0.4 TABLET SUBLINGUAL EVERY 5 MIN PRN
Status: DISCONTINUED | OUTPATIENT
Start: 2022-05-18 | End: 2022-05-19 | Stop reason: HOSPADM

## 2022-05-18 RX ORDER — POTASSIUM CHLORIDE 20 MEQ/1
40 TABLET, EXTENDED RELEASE ORAL PRN
Status: DISCONTINUED | OUTPATIENT
Start: 2022-05-18 | End: 2022-05-19 | Stop reason: HOSPADM

## 2022-05-18 RX ORDER — CARVEDILOL 12.5 MG/1
12.5 TABLET ORAL 2 TIMES DAILY WITH MEALS
Status: DISCONTINUED | OUTPATIENT
Start: 2022-05-18 | End: 2022-05-19 | Stop reason: HOSPADM

## 2022-05-18 RX ORDER — POLYETHYLENE GLYCOL 3350 17 G/17G
17 POWDER, FOR SOLUTION ORAL DAILY
Status: DISCONTINUED | OUTPATIENT
Start: 2022-05-18 | End: 2022-05-19 | Stop reason: HOSPADM

## 2022-05-18 RX ORDER — SODIUM CHLORIDE 0.9 % (FLUSH) 0.9 %
5-40 SYRINGE (ML) INJECTION EVERY 12 HOURS SCHEDULED
Status: DISCONTINUED | OUTPATIENT
Start: 2022-05-18 | End: 2022-05-19 | Stop reason: HOSPADM

## 2022-05-18 RX ORDER — POTASSIUM CHLORIDE 7.45 MG/ML
10 INJECTION INTRAVENOUS PRN
Status: DISCONTINUED | OUTPATIENT
Start: 2022-05-18 | End: 2022-05-19 | Stop reason: HOSPADM

## 2022-05-18 RX ORDER — ASPIRIN 81 MG/1
81 TABLET, CHEWABLE ORAL DAILY
Status: DISCONTINUED | OUTPATIENT
Start: 2022-05-18 | End: 2022-05-19 | Stop reason: HOSPADM

## 2022-05-18 RX ORDER — BUMETANIDE 0.25 MG/ML
0.5 INJECTION, SOLUTION INTRAMUSCULAR; INTRAVENOUS 2 TIMES DAILY
Status: DISCONTINUED | OUTPATIENT
Start: 2022-05-18 | End: 2022-05-18

## 2022-05-18 RX ORDER — FUROSEMIDE 10 MG/ML
20 INJECTION INTRAMUSCULAR; INTRAVENOUS 2 TIMES DAILY
Status: DISCONTINUED | OUTPATIENT
Start: 2022-05-18 | End: 2022-05-19 | Stop reason: HOSPADM

## 2022-05-18 RX ORDER — FENTANYL CITRATE 50 UG/ML
25 INJECTION, SOLUTION INTRAMUSCULAR; INTRAVENOUS ONCE
Status: COMPLETED | OUTPATIENT
Start: 2022-05-18 | End: 2022-05-18

## 2022-05-18 RX ORDER — ONDANSETRON 4 MG/1
4 TABLET, ORALLY DISINTEGRATING ORAL EVERY 8 HOURS PRN
Status: DISCONTINUED | OUTPATIENT
Start: 2022-05-18 | End: 2022-05-19 | Stop reason: HOSPADM

## 2022-05-18 RX ORDER — MAGNESIUM SULFATE 1 G/100ML
1000 INJECTION INTRAVENOUS PRN
Status: DISCONTINUED | OUTPATIENT
Start: 2022-05-18 | End: 2022-05-19 | Stop reason: HOSPADM

## 2022-05-18 RX ORDER — INSULIN LISPRO 100 [IU]/ML
0-6 INJECTION, SOLUTION INTRAVENOUS; SUBCUTANEOUS
Status: DISCONTINUED | OUTPATIENT
Start: 2022-05-18 | End: 2022-05-19 | Stop reason: HOSPADM

## 2022-05-18 RX ORDER — SODIUM CHLORIDE 0.9 % (FLUSH) 0.9 %
10 SYRINGE (ML) INJECTION PRN
Status: DISCONTINUED | OUTPATIENT
Start: 2022-05-18 | End: 2022-05-19 | Stop reason: HOSPADM

## 2022-05-18 RX ORDER — FAMOTIDINE 20 MG/1
20 TABLET, FILM COATED ORAL 2 TIMES DAILY
Status: DISCONTINUED | OUTPATIENT
Start: 2022-05-18 | End: 2022-05-19 | Stop reason: HOSPADM

## 2022-05-18 RX ORDER — ACETAMINOPHEN 325 MG/1
650 TABLET ORAL EVERY 6 HOURS PRN
Status: DISCONTINUED | OUTPATIENT
Start: 2022-05-18 | End: 2022-05-19 | Stop reason: HOSPADM

## 2022-05-18 RX ORDER — ONDANSETRON 2 MG/ML
4 INJECTION INTRAMUSCULAR; INTRAVENOUS EVERY 6 HOURS PRN
Status: DISCONTINUED | OUTPATIENT
Start: 2022-05-18 | End: 2022-05-19 | Stop reason: HOSPADM

## 2022-05-18 RX ORDER — DEXTROSE MONOHYDRATE 50 MG/ML
100 INJECTION, SOLUTION INTRAVENOUS PRN
Status: DISCONTINUED | OUTPATIENT
Start: 2022-05-18 | End: 2022-05-19 | Stop reason: HOSPADM

## 2022-05-18 RX ORDER — BUMETANIDE 0.25 MG/ML
1 INJECTION, SOLUTION INTRAMUSCULAR; INTRAVENOUS 2 TIMES DAILY
Status: DISCONTINUED | OUTPATIENT
Start: 2022-05-18 | End: 2022-05-18

## 2022-05-18 RX ORDER — LISINOPRIL 5 MG/1
5 TABLET ORAL DAILY
Status: DISCONTINUED | OUTPATIENT
Start: 2022-05-18 | End: 2022-05-19 | Stop reason: HOSPADM

## 2022-05-18 RX ORDER — M-VIT,TX,IRON,MINS/CALC/FOLIC 27MG-0.4MG
1 TABLET ORAL DAILY
Status: DISCONTINUED | OUTPATIENT
Start: 2022-05-18 | End: 2022-05-19 | Stop reason: HOSPADM

## 2022-05-18 RX ORDER — SODIUM CHLORIDE 9 MG/ML
INJECTION, SOLUTION INTRAVENOUS PRN
Status: DISCONTINUED | OUTPATIENT
Start: 2022-05-18 | End: 2022-05-19 | Stop reason: HOSPADM

## 2022-05-18 RX ADMIN — ATORVASTATIN CALCIUM 80 MG: 80 TABLET, FILM COATED ORAL at 00:45

## 2022-05-18 RX ADMIN — FENTANYL CITRATE 25 MCG: 50 INJECTION, SOLUTION INTRAMUSCULAR; INTRAVENOUS at 19:17

## 2022-05-18 RX ADMIN — LISINOPRIL 5 MG: 5 TABLET ORAL at 10:14

## 2022-05-18 RX ADMIN — HEPARIN SODIUM 4000 UNITS: 1000 INJECTION INTRAVENOUS; SUBCUTANEOUS at 03:33

## 2022-05-18 RX ADMIN — FAMOTIDINE 20 MG: 20 TABLET, FILM COATED ORAL at 10:13

## 2022-05-18 RX ADMIN — SODIUM CHLORIDE, PRESERVATIVE FREE 10 ML: 5 INJECTION INTRAVENOUS at 21:45

## 2022-05-18 RX ADMIN — Medication 1 TABLET: at 10:12

## 2022-05-18 RX ADMIN — ATORVASTATIN CALCIUM 80 MG: 80 TABLET, FILM COATED ORAL at 21:45

## 2022-05-18 RX ADMIN — CARVEDILOL 12.5 MG: 12.5 TABLET, FILM COATED ORAL at 09:05

## 2022-05-18 RX ADMIN — BUMETANIDE 1 MG: 1 TABLET ORAL at 00:45

## 2022-05-18 RX ADMIN — HEPARIN SODIUM 2000 UNITS: 1000 INJECTION INTRAVENOUS; SUBCUTANEOUS at 10:40

## 2022-05-18 RX ADMIN — POTASSIUM CHLORIDE 20 MEQ: 1500 TABLET, EXTENDED RELEASE ORAL at 07:35

## 2022-05-18 RX ADMIN — INSULIN LISPRO 1 UNITS: 100 INJECTION, SOLUTION INTRAVENOUS; SUBCUTANEOUS at 22:18

## 2022-05-18 RX ADMIN — ASPIRIN 81 MG: 81 TABLET, CHEWABLE ORAL at 10:14

## 2022-05-18 RX ADMIN — FAMOTIDINE 20 MG: 20 TABLET, FILM COATED ORAL at 21:45

## 2022-05-18 RX ADMIN — AMIODARONE HYDROCHLORIDE 200 MG: 200 TABLET ORAL at 10:14

## 2022-05-18 RX ADMIN — CLOPIDOGREL 75 MG: 75 TABLET, FILM COATED ORAL at 10:14

## 2022-05-18 RX ADMIN — FUROSEMIDE 20 MG: 10 INJECTION, SOLUTION INTRAMUSCULAR; INTRAVENOUS at 11:39

## 2022-05-18 RX ADMIN — Medication 13.6 UNITS/KG/HR: at 03:40

## 2022-05-18 RX ADMIN — POTASSIUM CHLORIDE 20 MEQ: 1500 TABLET, EXTENDED RELEASE ORAL at 07:41

## 2022-05-18 ASSESSMENT — ENCOUNTER SYMPTOMS
NAUSEA: 1
NAUSEA: 0
ABDOMINAL PAIN: 0
SORE THROAT: 0
VOMITING: 0
CHEST TIGHTNESS: 0
COUGH: 0
CONSTIPATION: 0
BLOOD IN STOOL: 0
WHEEZING: 0
DIARRHEA: 0
APNEA: 1
SHORTNESS OF BREATH: 1
RHINORRHEA: 0

## 2022-05-18 ASSESSMENT — PAIN SCALES - GENERAL
PAINLEVEL_OUTOF10: 0
PAINLEVEL_OUTOF10: 0

## 2022-05-18 NOTE — PROGRESS NOTES
Right femoral art line removed at 19:20   Manual pressure held for 12 minutes. No bleeding or hematoma noted, band aid applied. Pedal pulses per doppler.       Report called to Bhavesh Doyle RN

## 2022-05-18 NOTE — PLAN OF CARE
Problem: Discharge Planning  Goal: Discharge to home or other facility with appropriate resources  5/18/2022 1828 by Lynn Rosenbaum RN  Outcome: Progressing  5/18/2022 0513 by Anna Taylor RN  Outcome: Progressing  5/18/2022 0513 by Anna Taylor RN  Outcome: Progressing  Flowsheets (Taken 5/18/2022 7712)  Discharge to home or other facility with appropriate resources:   Identify barriers to discharge with patient and caregiver   Identify discharge learning needs (meds, wound care, etc)   Arrange for needed discharge resources and transportation as appropriate     Problem: Pain  Goal: Verbalizes/displays adequate comfort level or baseline comfort level  5/18/2022 1828 by Lynn Rosenbaum RN  Outcome: Progressing  5/18/2022 0513 by Anna Taylor RN  Outcome: Progressing  5/18/2022 0513 by Anna Taylor RN  Outcome: Progressing     Problem: Chronic Conditions and Co-morbidities  Goal: Patient's chronic conditions and co-morbidity symptoms are monitored and maintained or improved  Outcome: Progressing     Problem: Safety - Adult  Goal: Free from fall injury  Outcome: Progressing     Problem: ABCDS Injury Assessment  Goal: Absence of physical injury  Outcome: Progressing

## 2022-05-18 NOTE — PLAN OF CARE
Problem: Discharge Planning  Goal: Discharge to home or other facility with appropriate resources  5/18/2022 0513 by Missy Turner RN  Outcome: Progressing  5/18/2022 0513 by Missy Turner RN  Outcome: Progressing  Flowsheets (Taken 5/18/2022 7800)  Discharge to home or other facility with appropriate resources:   Identify barriers to discharge with patient and caregiver   Identify discharge learning needs (meds, wound care, etc)   Arrange for needed discharge resources and transportation as appropriate     Problem: Pain  Goal: Verbalizes/displays adequate comfort level or baseline comfort level  5/18/2022 0513 by Missy Turner RN  Outcome: Progressing  5/18/2022 0513 by Missy Turner RN  Outcome: Progressing

## 2022-05-18 NOTE — PROGRESS NOTES
[facility]  Documentation Note                Admission Dx: Stable angina ([facility] [address] [I20.8]  NSTEMI (non-ST elevated myocardial infarction) ([facility] [address] [I21.4]    Past Medical History:   has a past medical history of CAD (coronary artery disease), CHF (congestive heart failure) ([facility] [address] Heart attack ([facility] [address] Hyperlipidemia, Hypertension, MI (myocardial infarction) ([facility] [address] MRSA (methicillin resistant staph aureus) culture positive, Skin cancer, Snores, Stroke [facility]), Wears dentures, Wears reading eyeglasses, and Wellness examination. Previous Testing:     ICD 4/4/2022: [employer] device implanted by Dr. Shari Buckner     ECHO 3/15/2022: EF 35%, mild MR/TR. BENITO/CV 11/29/2021:   1. A BENITO was performed without complications. 2. LVEF 30 %  3  Structurally normal mitral valve. Severe MR noted. Central jet and one posterior jet. MR radius 1 cm, ERO 0.35 and MR volume 59 ml consistent with severe MR.   3.  JARED well visualized. No clot noted. 4. Successful CV to NSR.     MUGA 12/3/2020: EF 37%. CATH 8/6/2020:   1. Multi-vessel Coronary Artery Disease. 2. Patent LIMA-LAD and SVG to OM. 3. Occluded native RCA with left to right collaterals. 4. Mildly impaired ventricular function. 5. Dilated aortic root with no other grafts seen. Previous office/hospital visit:   Dr. Isela Cagle 12/21/2021:   1. Cardiac cath 2016. MVD. LIMA- Mid LAD and SVG-OM patent, stress test a [facility] in 2017 showed no ischemia with inferior infarction with severely reduced left ventricular systolic function. Cardiolite stress test on June 2008 done in 2020 showed large inferior infarction a mild anterior basal ischemia with an ejection fraction of 30%. Coronary angiography done on August 3, 2020 showed severe native vessel disease a patent LIMA to LAD patent SVG to OM1 occluded RCA with left-to-right collateral seen ejection fraction was reported to be 40% on LV angiogram.  2. Essential hypertension  3.  Ischemic Cardiomyopathy ECHO 2/17/19 EF 35%, Mild MR. Dilated RV with reduced systolic function. MUGA scan done on December 2020 showing ejection fraction of 37%. 4. History of medication noncompliance. 5. Hyperlipidemia on Lipitor. LDL was 191 on May of 2020.  6. Lipid panel 08/06/2021 showed total cholesterol 340, HDL 34, triglyceride 283, .  7. Paroxysmal atrial fibrillation. Carmine done on November 17, 2021 ejection fraction is 30% severe mitral regurgitation noted. Cardioversion into normal sinus rhythm. On amiodarone and Xarelto. Plan --   1. Coronary artery disease status post CABG. Stable and continue current medications. 2. Severe ischemic cardiomyopathy. On maximal medical therapy. Feels much better compared to before. Continue current medications. We will repeat an echocardiogram in 2 months and will follow-up in 2 months to see if he will be a candidate for an AICD placement. 3. hyperlipidemia. On statins. Ena Benoit was declined by his insurance before. We applied again and we will follow-up on that. 4. paroxysmal atrial fibrillation. Has been in normal sinus rhythm and on Xarelto. I will see him back in 2 months.     Brian Larose, [facility]

## 2022-05-18 NOTE — PROGRESS NOTES
Patient had plavix in cath lab. Patient complaining of plavix being stuck in his throat. Patient continues to try and sit up to gag out the pill. Patient is requesting a Dr to come to the bedside. Dr Julieta Forrest at bedside. Suction turned on at trying to ease  Discomfort. Patient states he thinks the pill went down. Groin remains unchanged with artline sutured in place.

## 2022-05-18 NOTE — ED PROVIDER NOTES
[facility]     Emergency Department     Faculty Attestation    I performed a history and physical examination of the patient and discussed management with the resident. I have reviewed and agree with the residents findings including all diagnostic interpretations, and treatment plans as written at the time of my review. Any areas of disagreement are noted on the chart. I was personally present for the key portions of any procedures. I have documented in the chart those procedures where I was not present during the key portions. For Physician Assistant/ Nurse Practitioner cases/documentation I have personally evaluated this patient and have completed at least one if not all key elements of the E/M (history, physical exam, and MDM). Additional findings are as noted. This patient was evaluated in the Emergency Department for symptoms described in the history of present illness. The patient was evaluated in the context of the global COVID-19 pandemic, which necessitated consideration that the patient might be at risk for infection with the SARS-CoV-2 virus that causes COVID-19. Institutional protocols and algorithms that pertain to the evaluation of patients at risk for COVID-19 are in a state of rapid change based on information released by regulatory bodies including the CDC and federal and state organizations. These policies and algorithms were followed during the patient's care in the ED. Primary Care Physician: Edna Norman PA-C    History: This is a 59 y.o. male who presents to the Emergency Department with complaint of pain. The patient presents emergent complaint of chest pain has had off and on for the last 2 weeks. Scribes as a pressure heavy sensation. Says it radiates across his entire anterior chest wall. He has some associated shortness of breath nausea but no vomiting. He does complain of some diaphoresis.   Patient has a history of open heart surgery as well as myocardial infarction. Physical:   height is 5' 9\" (1.753 m) and weight is 230 lb (104.3 kg). His oral temperature is 98.1 °F (36.7 °C). His blood pressure is 146/101 (abnormal) and his pulse is 91. His respiration is 21 and oxygen saturation is 95%. Lungs are clear to auscultation bilateral, heart regular rate and rhythm, abdomen soft nontender    Impression: Chest pain    Plan: Chest x-ray, EKG, CBC, BMP, troponin      EKG Interpretation    Interpreted by me  Sinus rhythm with a first-degree AV block and a ventricular rate of 91, right bundle branch block, prolonged QT corrected  Compared EKG of November 22, 2021, premature atrial complex with aberrant conduction has resolved. Nonspecific interventricular conduction block is progressed to right bundle branch block. (Please note that portions of this note were completed with a voice recognition program.  Efforts were made to edit the dictations but occasionally words are mis-transcribed.)    [facility].  Shalonda Jensen MD, [facility]  Attending Emergency Medicine Physician        Katelin Grossman MD  05/17/22 [id number]

## 2022-05-18 NOTE — CARE COORDINATION
Case Management Initial Discharge Plan  Roetta Fleischer,             Met with:patient to discuss discharge plans. Information verified: address, contacts, phone number, , insurance Yes  Insurance Provider: Medicare, [facility]    Emergency Contact/Next of Kin name & number: [phone]  Who are involved in patient's support system? Daja Amato and friends    PCP: Moon Soliman PA-C  Date of last visit: Dec 2021      Discharge Planning    Living Arrangements:  Spouse/Significant Other     Home has 2 stories  2 stairs to climb to get into front door  Location of bedroom/bathroom in home main level    Patient able to perform ADL's:Independent    Current Services (outpatient & in home) none  DME equipment: walker, cane, w/c  DME provider:     Is patient receiving oral anticoagulation therapy? No        Does patient have any issues/concerns obtaining medications? No      Is there a preferred Pharmacy after hours or on weekends? Yes    If yes, which pharmacy? [facility]    Potential Assistance Needed:  N/A    Patient agreeable to home care: No  [place] of choice provided:  n/a    Prior SNF/Rehab Placement and Facility: N/A  Agreeable to SNF/Rehab: No  [place] of choice provided: n/a     Evaluation: n/a    Expected Discharge date:       Patient expects to be discharged to:   home    If home: is the family and/or caregiver wiling & able to provide support at home? yes  Who will be providing this support? Daja Amtao*    Follow Up Appointment: Best Day/ Time:     Transportation provider: Bishnu Dacosta  Transportation arrangements needed for discharge: No    Readmission Risk              Risk of Unplanned Readmission:  15             Does patient have a readmission risk score greater than 14?: Yes  If yes, follow-up appointment must be made within 7 days of discharge.      Goals of Care: no chest pain      Educated patient on transitional options, provided freedom of choice and are agreeable with plan      Discharge Plan: home with NENITA Barlow          Electronically signed by Clarissa Morillo RN on 5/18/22 at 9:49 AM EDT

## 2022-05-18 NOTE — PROGRESS NOTES
Dr Luh Barreto notified of patients anxiousness and feeling short of breath while being flat. Resp and SO2 all within normal limits. Order for 25mcg of fentanyl received for line pull.

## 2022-05-18 NOTE — CONSULTS
(Bilateral, 12/21/2020); Cardioversion (11/17/2021); other surgical history (11/17/2021); Cardiac surgery (1999); Cardiac catheterization (08/06/2020); and Cardiac defibrillator placement (04/04/2022). Home Medications:    Prior to Admission medications    Medication Sig Start Date End Date Taking? Authorizing Provider   metFORMIN (GLUCOPHAGE) 1000 MG tablet Take 1,000 mg by mouth daily   Yes Historical Provider, MD   clopidogrel (PLAVIX) 75 MG tablet Take 75 mg by mouth daily    Historical Provider, MD   OXYCODONE HCL PO Take 1 tablet by mouth 3 times daily as needed Took 2 tabs 4-3-22 at [facility]    Historical Provider, MD   Cyanocobalamin (VITAMIN B-12 PO) Take 1 tablet by mouth daily    Historical Provider, MD   naproxen (NAPROSYN) 500 MG tablet Take 1 tablet by mouth 2 times daily (with meals) 3/28/22 3/28/22  Mateus Ferguson PA-C   rivaroxaban (XARELTO) 20 MG TABS tablet Take 20 mg by mouth    Historical Provider, MD   amiodarone (CORDARONE) 200 MG tablet Take 1 tablet by mouth daily Take 2 tablets for 7 days and after that one tablet daily  Patient taking differently: Take 200 mg by mouth daily  11/17/21   Yissel Mathews MD   vitamin E 100 units capsule Take 100 Units by mouth daily     Historical Provider, MD   magnesium citrate solution Take 296 mLs by mouth once for 1 dose  Patient not taking: Reported on 6/18/2021 1/4/21 1/29/21  Amber Gagnon MD   Multiple Vitamins-Minerals (THERAPEUTIC MULTIVITAMIN-MINERALS) tablet Take 1 tablet by mouth daily    Historical Provider, MD   Ascorbic Acid (VITAMIN C) 250 MG tablet Take 500 mg by mouth three times a week     Historical Provider, MD   bumetanide (BUMEX) 1 MG tablet Take 1 tablet by mouth every morning AND 1 tablet Daily with lunch. 3/13/19   ELY Sena NP   nitroGLYCERIN (NITROSTAT) 0.4 MG SL tablet Place 0.4 mg under the tongue every 5 minutes as needed for Chest pain up to max of 3 total doses. If no relief after 1 dose, call 911.    Benitez  Patient not taking: Reported on 1/12/2022    Historical Provider, MD   atorvastatin (LIPITOR) 80 MG tablet Take 1 tablet by mouth nightly  Patient taking differently: Take 80 mg by mouth nightly Dr. Christopher Braden 2/20/19   Ema Eli MD   carvedilol (COREG) 12.5 MG tablet Take 1 tablet by mouth 2 times daily (with meals)  Patient taking differently: Take 12.5 mg by mouth 2 times daily (with meals) Dr. Christopher Braden 2/20/19   Ema Eli MD   lisinopril (PRINIVIL;ZESTRIL) 5 MG tablet Take 1 tablet by mouth daily  Patient taking differently: Take 5 mg by mouth daily Dr. Christopher Braden 2/21/19   Ema Eli MD   aspirin 81 MG chewable tablet Take 81 mg by mouth daily Per Dr. Christopher Braden. Ok to stop 7 days prior to surgery    Historical Provider, MD       amiodarone, 200 mg, Oral, Daily    aspirin, 81 mg, Oral, Daily    atorvastatin, 80 mg, Oral, Nightly    carvedilol, 12.5 mg, Oral, BID WC    clopidogrel, 75 mg, Oral, Daily    lisinopril, 5 mg, Oral, Daily    therapeutic multivitamin-minerals, 1 tablet, Oral, Daily    sodium chloride flush, 5-40 mL, IntraVENous, 2 times per day    bumetanide, 1 mg, IntraVENous, BID    famotidine, 20 mg, Oral, BID    polyethylene glycol, 17 g, Oral, Daily      Allergies:  Patient has no known allergies. Social History:   reports that he quit smoking about 23 years ago. He has never used smokeless tobacco. He reports that he does not drink alcohol and does not use drugs. Family History: family history includes Alcohol Abuse in his sister; Coronary Art Dis in his father; Liver Disease in his father. No h/o sudden cardiac death. REVIEW OF SYSTEMS:    · Constitutional: Positive for change in activity level  · Eyes: No visual changes or diplopia. No scleral icterus. · ENT: No Headaches, hearing loss or vertigo. No mouth sores or sore throat. · Cardiovascular: per HPI  · Respiratory: per HPI  · Gastrointestinal: No abdominal pain, appetite loss, blood in stools.  No change in bowel or bladder habits. · Genitourinary: No dysuria, trouble voiding, or hematuria. · Musculoskeletal:  No gait disturbance, No weakness or joint complaints. · Integumentary: No rash or pruritis. · Neurological: No headache, diplopia, change in muscle strength, numbness or tingling. No change in gait, balance, coordination, mood, affect, memory, mentation, behavior. PHYSICAL EXAM:      /66   Pulse 82   Temp 98.1 °F (36.7 °C) (Oral)   Resp 22   Ht 5' 9\" (1.753 m)   Wt 241 lb 1.6 oz (109.4 kg)   SpO2 96%   BMI 35.60 kg/m²    Constitutional and General Appearance: alert, cooperative, no distress and appears stated age  HEENT: PERRL, no cervical lymphadenopathy. No masses palpable. Normal oral mucosa  Respiratory:  · Normal excursion and expansion without use of accessory muscles  · Resp Auscultation: Good respiratory effort. No for increased work of breathing. On auscultation: clear to auscultation bilaterally  Cardiovascular:  · The apical impulse is not displaced  · Heart tones are crisp and normal. regular S1 and S2.  · Jugular venous pulsation Normal  · The carotid upstroke is normal in amplitude and contour without delay or bruit  · Peripheral pulses are symmetrical and full   Abdomen:   · No masses or tenderness  · Bowel sounds present  Extremities:  ·  No Cyanosis or Clubbing  ·  Lower extremity edema: No  ·  Skin: Warm and dry  Neurological:  · Alert and oriented. · Moves all extremities well  · No abnormalities of mood, affect, memory, mentation, or behavior are noted        EKG:    Date: 05/18/22  Reading: No acute ischemia  Right bundle branch block with first-degree AV block    LAST ECHO:  Date: 11/29/2021  Findings Summary:  1. A BENITO was performed without complications. 2. LVEF 30 %  3  Structurally normal mitral valve. Severe MR noted. Central jet and one posterior jet. MR radius 1 cm, ERO 0.35 and MR volume 59 ml consistent with severe MR.   3.  JARED well visualized. No clot noted.    4. Proceed with Cardioversion    LAST Stress Test:   Date of last ST: 11/07/2017  Major Findings:  A Lexiscan nuclear stress study was performed with regadenoson infused   using standard protocol. Aminophylline was not administered   · The stress ECG was negative   · Severe inferior wall defect moderate in size, that is non-reversible,   consistent with infarction     Stress Findings   A Lexiscan nuclear stress study was performed with regadenoson infused using standard protocol. Aminophylline was not administered. . The peak HR was 95, which is 59% of the patient\"s maximum predicted heart rate. The patient reached the end of the infusion. No angina was experienced. The patient's HR and Blood pressure demonstrated a normal response. ECG   Baseline ECG indicates sinus rhythm and non-specific ST-T wave changes with prior inferior myocardial infarction. of 0 mm was noted during stressThere was no ST segment deviation noted during stress. There were no arrhythmias during stress. The stress ECG was negative. Nuclear Study Quality   Overall image quality is good. Indications   Indication for study: CAD. Gated Spect   Severely reduced left ventricular systolic function. Abnormal left ventricular wall motion. The inferolateral wall is akinetic. The inferoseptum is akinetic. Inferior Defect   Severe inferior wall defect moderate in size, that is non-reversible, consistent with infarction    LAST Cardiac Angiography:.  Date: 08/09/2020  Findings:  LMCA: Normal 0% stenosis.     LAD: Single stenosis. with patent LIMA-LAD  Lesion on Prox LAD: 95% stenosis.     LCx: Chronic occlusion 100 % . with patent SVG-OM  Lesion on Prox CX: 100% stenosis.     RCA: Chronic occlusion 100 % . Lesion on Prox RCA: Ostial.100% stenosis.     Cardiac Grafts      - Juris Vickie is a SANTORO graft that originates at the LIMA and attaches to the      Mid LAD.  Patent with 0% stenosis      - Juris Vickie is a Vein graft that originates at the Aorta obstruction or gangrene    Umbilical hernia with obstruction, without gangrene    Status post implantation of automatic cardioverter/defibrillator (AICD)    NSTEMI (non-ST elevated myocardial infarction) ([address]    ARIADNA (obstructive sleep apnea)    S/P CABG (coronary artery bypass graft)    Nausea    Anorexia    Thrombocytopenia (HCC)           IMPRESSION:  1. NSTEMI, typical chest pain on minimal exertion with elevated troponin's( 82--> 89--> 128--> 140) and EKG shows right bundle branch block with first-degree AV block,  2. Acute on chronic HFrEF, EF 30% 11/29/2021  3. CAD s/p CABG LIMA-LAD, SVG-OM 01/2000 S/p heart cath 12/2016 TITI to SVG to OM s/p heart cath 08/09/2020 showing patent grafts with occluded native RCA with left to right collaterals. 4. ICM, EF 30 % on Echo from 11/29/2021 s/p AICD placement 04/04/2022  5. Severe MR, MR radius 1 cm, ERO 0.35 and MR volume 59 ml on BENITO from 11/29/2021  6. Hx of A.fib s/p cardioversion 11/29/2021  7. Hypertension  8. Hyperlipidemia  9. Type 2 DM  10. ARIADNA   11. Chronic thrombocytopenia  12. Hypokalemia      Recommendations:    1. Continue IV heparin and titrate IV nitro drip  2. Continue aspirin 81 mg daily, Plavix 75 mg daily Lipitor 80 mg daily, Coreg 12.5 mg twice daily, lisinopril 5 mg daily and amiodarone 200 mg  3. IV diuresis with Bumex 1 mg twice daily  4. Maintain intake output record   5. Fluid restriction  6. Monitor renal functions  7. Follow-up on AICD interrogation  8. Patient will need heart cath  9. Further recommendations to follow      Discussed with patient, family, and Nurse. Electronically signed by Yoav Farias MD on 5/18/2022 at 6:43 AM    Yoav Farias MD  PGY-2, [id number] [facility].  8:22 AM 05/18/22       Attending Physician Statement  I have discussed the care of the patient, including pertinent history and exam findings, with the resident.  I have seen and examined the patient and the key elements of all parts of the encounter have been performed by me. I agree with the assessment, plan and orders as documented by the resident.   27-year-old male extensive history of CAD history of CABG x2 LIMA to LAD SVG to OM 22 years ago, CHF HFrEF status post recent AICD  Worsening effort angina with positive Trope, NSTEMI  Plan to do the heart catheter  Paola Horner MD

## 2022-05-18 NOTE — H&P
[facility]  Office: [address], DO, Miguel Jackson, DO, Ravinder Ren, DO, Sharri Floriandameon Blood, DO, Fidel Foley MD, Kingsley Leung MD, Cong Ch MD, Shalom Pena MD, Catrachito Mae MD, Kandace Brooke MD, Alicia Ackerman MD, Mykel Hutson, DO, Surekha Stern, DO, Leandra Nogueira MD,  Jackie Oswald, DO, Taylor Martin MD, Damien Suarez MD, Qamar Nath MD, John Wang, DO, Kurtis Ferreira MD, Patricia Hendrix MD, Gillian Rutledge MD, Rosmery Laughlin, [facility], [facility], CNP, Noah Barthel, CNP, Lori Pollack, CNP, Kerry York, CNP, Christiana Dangelo, CNP, Blair Curry PA-C, Dewey Robles, [facility], Hyacinth Sarah, CNP, Sylvia Mijares, CNP, Johanne Ledesma, CNP, Mike Rouse, CNS, Tara Fonseca, [facility], Mendy Verde, CNP, Megan Morel, CNP, Nury Lazaro, [address]    HISTORY AND PHYSICAL EXAMINATION            Date:   5/18/2022  Patient name:  Vika Gross  Date of admission:  5/17/2022  7:19 PM  MRN:   [id number]  Account:  [de-identified]  YOB: 1960  PCP:    Aleta Andujar PA-C  Room:   [address] [place] [address]  Code Status:    Full Code    Chief Complaint:     Chief Complaint   Patient presents with    Chest Pain     x2 weeks, increased today, relief with nitro by ems   \"I've been down and out for at least 10 days now\"    History Obtained From:     patient    History of Present Illness:     Vika Gross is a 59 y.o. white male with ischemic cardiomyopathy, chronic systolic heart failure, multivessel cad s/p prior cabg with chronic occluded RCA with left-to-right collaterals , status post recent AICD in April for chronic systolic heart failure who now presents with 10 to 14-day history intermittent chest pain, worsening lower extremity edema with increasing shortness of breath with minimal activity , elevated troponin with NSTEMI (non-ST elevated myocardial infarction) ([facility] [address]     Patient does describe episodic chest pain over the past few days, episode today lasting 2 hours, waxing and waning in intensity, occurring with minimal activity. Chest pain midsternum without radiation. Does note associated acute severe diaphoresis with \"drenching\" sweating spells with associated nausea shortness of breath. Patient stated he attempted to rub his chest wall with worsening pain from pacemaker discomfort. He states he is struggled with pacemaker discomfort since the pacemaker was placed last month. However as noted worsening \"deeper\" episodic chest pain now. Pressure quality without radiation. he did have an episode yesterday and 1 episode earlier in the week, each lasting 2 to 3 hours. He has had reduced p.o. intake with persistent nausea. Patient admits that he has been reluctant to move with worsening shortness of breath with walking even 10 feet or leaning forward or dressing himself or any minimal activity. He admits he is surprised that his AICD did not fire. He does describe lightheadedness with dizziness. He did note worsening symptoms yesterday evening when he attempted to lay flat and go to bed, states \"I had to sit up to breathe\". Describes some component of pleurisy. he denies any flulike symptoms. Denies any fevers or chills or cough. Denies any exposure to COVID. denies any recent travel. He does have associated nausea but denies any indigestion heartburn or abdominal pain. Denies any reproduction of symptoms with palpation of chest wall or movement of shoulders. Denies any other myalgias or arthralgias. Patient recently evaluated [facility] with concern for possible malignancy?, he believes he was told ? ? Lung cancer. S/p evaluation by Dr. Lexx Dove for low platelets. Patient is unclear of the details.     Past Medical History:     Past Medical History:   Diagnosis Date    CAD (coronary artery disease)     2020 cath    CH[facility]congestive heart failure) ([facility])     Dr. Sami Yoo Heart attack ([facility] [address]     Hyperlipidemia     Dr. Tegan Allen Hypertension     Dr. Tegan Allen [facility] (myocardial infarction) [facility])     MRSA (methicillin resistant staph aureus) culture positive 01/26/2018    abdomen    Skin cancer     Snores     no cpap    Stroke [facility])     2011  Dr. Taty Heard Wears dentures     upper full denture    Wears reading eyeglasses     Wellness examination     Steven Calhoun PA-C last seen Nov 2020        Past Surgical History:     Past Surgical History:   Procedure Laterality Date    CARDIAC CATHETERIZATION  08/06/2020    Dr. Nicolas Adair therapy. Report on [facility]   [facility]  04/04/2022   [address]    unsure of date, bilateral radials    CARDIOVERSION  11/17/2021    CORONARY ANGIOPLASTY WITH STENT PLACEMENT      6 total stents per patient    CORONARY ARTERY BYPASS GRAFT      4 vessel bypass    EYE SURGERY      eye injury  new lens  and laser lt eye 2019    HERNIA REPAIR Bilateral 12/21/2020    XI LAPAROSCOPIC ROBOTIC INGUINAL HERNIA REPAIR WITH MESH; UMBILICAL HERNIA REPAIR WITH MESH performed by Ana Villela DO at [address]      plate/hardware present    OTHER SURGICAL HISTORY  11/17/2021    BENITO    SKIN BIOPSY      back        Medications Prior to Admission:     Prior to Admission medications    Medication Sig Start Date End Date Taking?  Authorizing Provider   metFORMIN (GLUCOPHAGE) 1000 MG tablet Take 1,000 mg by mouth daily   Yes Historical Provider, MD   clopidogrel (PLAVIX) 75 MG tablet Take 75 mg by mouth daily    Historical Provider, MD   OXYCODONE HCL PO Take 1 tablet by mouth 3 times daily as needed Took 2 tabs 4-3-22 at [facility]    Historical Provider, MD   Cyanocobalamin (VITAMIN B-12 PO) Take 1 tablet by mouth daily    Historical Provider, MD   naproxen (NAPROSYN) 500 MG tablet Take 1 tablet by mouth 2 times daily (with meals) 3/28/22 3/28/22  Charmayne Roys, PA-C   rivaroxaban [facility]) 20 MG TABS tablet Take 20 mg by mouth    Historical Provider, MD   amiodarone (CORDARONE) 200 MG tablet Take 1 tablet by mouth daily Take 2 tablets for 7 days and after that one tablet daily  Patient taking differently: Take 200 mg by mouth daily  11/17/21   Bassam Lamas MD   vitamin E 100 units capsule Take 100 Units by mouth daily     Historical Provider, MD   magnesium citrate solution Take 296 mLs by mouth once for 1 dose  Patient not taking: Reported on 6/18/2021 1/4/21 1/29/21  Jessica Corbin MD   Multiple Vitamins-Minerals (THERAPEUTIC MULTIVITAMIN-MINERALS) tablet Take 1 tablet by mouth daily    Historical Provider, MD   Ascorbic Acid (VITAMIN C) 250 MG tablet Take 500 mg by mouth three times a week     Historical Provider, MD   bumetanide (BUMEX) 1 MG tablet Take 1 tablet by mouth every morning AND 1 tablet Daily with lunch. 3/13/19   ELY Weathers NP   nitroGLYCERIN (NITROSTAT) 0.4 MG SL tablet Place 0.4 mg under the tongue every 5 minutes as needed for Chest pain up to max of 3 total doses. If no relief after 1 dose, call 911. Dr. Isela Cagle  Patient not taking: Reported on 1/12/2022    Historical Provider, MD   atorvastatin (LIPITOR) 80 MG tablet Take 1 tablet by mouth nightly  Patient taking differently: Take 80 mg by mouth nightly Dr. Isela Cagle 2/20/19   Elaina Wilkinson MD   carvedilol (COREG) 12.5 MG tablet Take 1 tablet by mouth 2 times daily (with meals)  Patient taking differently: Take 12.5 mg by mouth 2 times daily (with meals) Dr. Isela Cagle 2/20/19   Elaina Wilkinson MD   lisinopril (PRINIVIL;ZESTRIL) 5 MG tablet Take 1 tablet by mouth daily  Patient taking differently: Take 5 mg by mouth daily Dr. Isela Cagle 2/21/19   Elaina Wilkinson MD   aspirin 81 MG chewable tablet Take 81 mg by mouth daily Per Dr. Isela Cagle. Ok to stop 7 days prior to surgery    Historical Provider, MD        Allergies:     Patient has no known allergies. Social History:     Tobacco:    reports that he quit smoking about 23 years ago.  He has never used smokeless tobacco.  Alcohol:      reports no history of alcohol use. Drug Use:  reports no history of drug use. Patient quit tobacco over 25 years ago, denies binge drinking or excessive alcohol use, denies illegal drugs, lives with his wife. Family History:     Family History   Problem Relation Age of Onset    Coronary Art Dis Father     Liver Disease Father     Alcohol Abuse Sister    Father did have a heart attack in his 45s    Review of Systems:     Positive and Negative as described in HPI. Review of Systems     Patient mitts he does not check his sugars. He has been diabetic for a year. Denies any complications from diabetes, no neuropathy. Admits that he does not wear shoes while in the house. He does struggle with venous insufficiency with prior wounds on his anterior shins that he has been using \"creams\" with dramatic improvement. He describes increasing lower extremity edema the past 2 weeks despite reduced p.o. intake. Denies any palpitations or irregular heartbeat. He does struggle with constipation and has been on a stool softener. Denies any issues with recurrent bronchitis pneumonia or COPD. Denies any sinus congestion or postnasal drip patient does have sleep apnea status post prior sinus surgery and UPPV, did not tolerate CPAP. Does struggle with insomnia. Has been relatively sedentary over the past 2 weeks since his symptoms began. Denies any headaches. Denies any visual changes. Patient did receive COVID-vaccine. Denies any COVID exposure. He does have a history of prior stroke but denies any deficits. Denies bleeding or easy bruising. Denies rectal bleeding or bright red blood per rectum. Denies urinary symptoms, no dysuria or hematuria. Denies prior history of DVT PE. [address]   Review of systems otherwise negative, 12 systems reviewed and otherwise unremarkable    Physical Exam:   BP (!) 140/104   Pulse 82   Temp 98 °F (36.7 °C) (Oral)   Resp 18   Ht 5' 9\" (1.753 m)   Wt 241 lb 1.6 oz (109.4 kg)   SpO2 94%   BMI 35.60 kg/m²   Temp (24hrs), Av.1 °F (36.7 °C), Min:98 °F (36.7 °C), Max:98.1 °F (36.7 °C)    No results for input(s): POCGLU in the last 72 hours. No intake or output data in the 24 hours ending 22 0310    Physical Exam   General awake alert sitting up in bed chatty appropriate, follows commands pleasant   Eye exam pupils equally round reactive sclera nonicteric normal horizontal gaze  ENT oral pharynx with moist mucous membranes face symmetric neck supple  Cardiovascular regular rate and rhythm normal S1-S2 without murmurs rubs or gallops. JVD is noted at 30 degrees  Lungs diminished bibasilarly at adequate air exchange nonlabored no tachypnea no wheezing  No accessory muscle use  GI soft obese with minimal discomfort with palpation of epigastrium, no rebound or guarding bowel sounds present  Vascular chronic trophic changes with varicosities present, edema up through the lower ankles with intact dorsalis pedis and posterior tibialis  Derm dirt and debris on the plantar surface of feet, small nonhealing scabs on anterior shin without surrounding cellulitis or erythema or drainage on bilateral shins  Musculoskeletal passive range of motion of upper and lower limbs unremarkable without reproduction of symptoms with palpation of chest wall, no synovitis or joint effusions.   Left chest wall with mild discomfort at pacemaker site, no fluctuance or drainage  Neuro nonfocal normal speech fluency strength symmetric in upper and lower limbs, sensation grossly intact    Investigations:      Laboratory Testing:  Recent Results (from the past 24 hour(s))   CBC with Auto Differential    Collection Time: 22  7:55 PM   Result Value Ref Range    WBC 5.7 3.5 - 11.3 k/uL    RBC 4.74 4.21 - 5.77 m/uL    Hemoglobin 14.1 13.0 - 17.0 g/dL    Hematocrit 42.7 40.7 - 50.3 %    MCV 90.1 82.6 - 102.9 fL    MCH 29.7 25.2 - 33.5 pg    MCHC 33.0 28.4 - 34.8 g/dL RDW 13.3 11.8 - 14.4 %    Platelets See Reflexed IPF Result 138 - 453 k/uL    NRBC Automated 0.0 0.0 per 100 WBC    Seg Neutrophils 69 (H) 36 - 65 %    Lymphocytes 19 (L) 24 - 43 %    Monocytes 10 3 - 12 %    Eosinophils % 1 1 - 4 %    Basophils 0 0 - 2 %    Immature Granulocytes 1 (H) 0 %    Segs Absolute 3.97 1.50 - 8.10 k/uL    Absolute Lymph # 1.10 1.10 - 3.70 k/uL    Absolute Mono # 0.59 0.10 - 1.20 k/uL    Absolute Eos # 0.03 0.00 - 0.44 k/uL    Basophils Absolute <0.03 0.00 - 0.20 k/uL    Absolute Immature Granulocyte 0.03 0.00 - 0.30 k/uL   Basic Metabolic Panel w/ Reflex to MG    Collection Time: 05/17/22  7:55 PM   Result Value Ref Range    Glucose 306 (H) 70 - 99 mg/dL    BUN 18 8 - 23 mg/dL    CREATININE 0.87 0.70 - 1.20 mg/dL    Calcium 9.1 8.6 - 10.4 mg/dL    Sodium 135 135 - 144 mmol/L    Potassium 3.5 (L) 3.7 - 5.3 mmol/L    Chloride 101 98 - 107 mmol/L    CO2 22 20 - 31 mmol/L    Anion Gap 12 9 - 17 mmol/L    GFR Non-African American >60 >60 mL/min    GFR African American >60 >60 mL/min    GFR Comment         Troponin    Collection Time: 05/17/22  7:55 PM   Result Value Ref Range    Troponin, High Sensitivity 82 (HH) 0 - 22 ng/L   Immature Platelet Fraction    Collection Time: 05/17/22  7:55 PM   Result Value Ref Range    Platelet, Immature Fraction 4.0 1.1 - 10.3 %    Platelet, Fluorescence 101 (L) 138 - 453 k/uL   Magnesium    Collection Time: 05/17/22  7:55 PM   Result Value Ref Range    Magnesium 1.8 1.6 - 2.6 mg/dL   SPECIMEN REJECTION    Collection Time: 05/17/22  7:55 PM   Result Value Ref Range    Specimen Source . BLOOD     Ordered Test PTT     Reason for Rejection       Unable to perform testing: Specimen quantity not sufficient.    Troponin    Collection Time: 05/17/22  9:19 PM   Result Value Ref Range    Troponin, High Sensitivity 89 (HH) 0 - 22 ng/L   APTT    Collection Time: 05/17/22 11:24 PM   Result Value Ref Range    PTT 82.2 (H) 20.5 - 30.5 sec   Troponin    Collection Time: 05/18/22 1:01 AM   Result Value Ref Range    Troponin, High Sensitivity 128 (HH) 0 - 22 ng/L       Imaging/Diagnostics:  XR CHEST (2 VW)    Result Date: 5/17/2022  Stable cardiomegaly and central pulmonary vascular congestion           Assessment :      Hospital Problems           Last Modified POA    * (Principal) NSTEMI (non-ST elevated myocardial infarction) ([facility] [address] 5/17/2022 Yes    ARIADNA (obstructive sleep apnea) 5/18/2022 Yes    S/P CABG (coronary artery bypass graft) 5/18/2022 Yes    Nausea 5/18/2022 Yes    Anorexia 5/18/2022 Yes    Thrombocytopenia ([address] 5/18/2022 Yes    Essential hypertension 5/18/2022 Yes    Hyperlipidemia 5/18/2022 Yes    CAD (coronary artery disease) 5/18/2022 Yes    Acute on chronic systolic heart failure ([address] 5/18/2022 Yes          Plan:       1. Admit to the hospital service as inpatient  2. Pending cardiology input continue conservative treatment for suspected non-STEMI, possible type II with demand ischemia associated with acute on chronic systolic heart failure, fluid overload. Continue diuresis  3. ARIADNA intolerant of CPAP status post sinus surgery. Consider further evaluation, possibly contributing to worsening CHF symptoms with nocturnal symptoms/insomnia  4. Type 2 diabetes with acute hyperglycemia continue sliding scale insulin. Encourage blood sugar control  5. Anorexia with weight loss. Likely related to persistent cardiac symptoms. Continue bowel regimen with concern for constipation. Add gi prophylaxis. 6. Chronic thrombocytopenia. Patient does describe recent evaluation by oncology for possible malignancy. Prior chart reviewed, seen in consultation with oncology for low platelets, platelets have remained stable. Denies easy bruising or bleeding issues. Continue to monitor    Anticipate likely hospitalization 3 to 5 days. Discussed with nursing.   Prior labs and chart reviewed in detail.  questions and concerns and treatment plan discussed with patient in detail, approximately 8 minutes, awaiting further input from cardiology. Consultations:   IP CONSULT TO CARDIOLOGY  IP CONSULT TO HOSPITALIST     Patient is admitted as inpatient status because of co-morbidities listed above, severity of signs and symptoms as outlined, requirement for current medical therapies and most importantly because of direct risk to patient if care not provided in a hospital setting. Expected length of stay > 48 hours.     Nakia Sanders MD  5/18/2022  3:10 AM    Copy sent to Dr. Jv العلي PA-C

## 2022-05-19 VITALS
HEART RATE: 63 BPM | SYSTOLIC BLOOD PRESSURE: 107 MMHG | OXYGEN SATURATION: 97 % | HEIGHT: 69 IN | TEMPERATURE: 98.2 F | DIASTOLIC BLOOD PRESSURE: 68 MMHG | WEIGHT: 241.1 LBS | RESPIRATION RATE: 28 BRPM | BODY MASS INDEX: 35.71 KG/M2

## 2022-05-19 LAB
EKG ATRIAL RATE: 91 BPM
EKG P AXIS: 76 DEGREES
EKG P-R INTERVAL: 252 MS
EKG Q-T INTERVAL: 400 MS
EKG QRS DURATION: 164 MS
EKG QTC CALCULATION (BAZETT): 492 MS
EKG R AXIS: 105 DEGREES
EKG T AXIS: 35 DEGREES
EKG VENTRICULAR RATE: 91 BPM
GLUCOSE BLD-MCNC: 273 MG/DL (ref 75–110)
PARTIAL THROMBOPLASTIN TIME: 38.5 SEC (ref 20.5–30.5)

## 2022-05-19 PROCEDURE — B2131ZZ FLUOROSCOPY OF MULTIPLE CORONARY ARTERY BYPASS GRAFTS USING LOW OSMOLAR CONTRAST: ICD-10-PCS | Performed by: INTERNAL MEDICINE

## 2022-05-19 PROCEDURE — 6370000000 HC RX 637 (ALT 250 FOR IP): Performed by: INTERNAL MEDICINE

## 2022-05-19 PROCEDURE — 6360000002 HC RX W HCPCS: Performed by: INTERNAL MEDICINE

## 2022-05-19 PROCEDURE — 93010 ELECTROCARDIOGRAM REPORT: CPT | Performed by: INTERNAL MEDICINE

## 2022-05-19 PROCEDURE — 6360000002 HC RX W HCPCS: Performed by: STUDENT IN AN ORGANIZED HEALTH CARE EDUCATION/TRAINING PROGRAM

## 2022-05-19 PROCEDURE — 82947 ASSAY GLUCOSE BLOOD QUANT: CPT

## 2022-05-19 PROCEDURE — 2580000003 HC RX 258: Performed by: NURSE PRACTITIONER

## 2022-05-19 PROCEDURE — B2151ZZ FLUOROSCOPY OF LEFT HEART USING LOW OSMOLAR CONTRAST: ICD-10-PCS | Performed by: INTERNAL MEDICINE

## 2022-05-19 PROCEDURE — 6370000000 HC RX 637 (ALT 250 FOR IP): Performed by: NURSE PRACTITIONER

## 2022-05-19 PROCEDURE — 85730 THROMBOPLASTIN TIME PARTIAL: CPT

## 2022-05-19 PROCEDURE — 36415 COLL VENOUS BLD VENIPUNCTURE: CPT

## 2022-05-19 PROCEDURE — 4A023N8 MEASUREMENT OF CARDIAC SAMPLING AND PRESSURE, BILATERAL, PERCUTANEOUS APPROACH: ICD-10-PCS | Performed by: INTERNAL MEDICINE

## 2022-05-19 PROCEDURE — 027034Z DILATION OF CORONARY ARTERY, ONE ARTERY WITH DRUG-ELUTING INTRALUMINAL DEVICE, PERCUTANEOUS APPROACH: ICD-10-PCS | Performed by: INTERNAL MEDICINE

## 2022-05-19 PROCEDURE — B2111ZZ FLUOROSCOPY OF MULTIPLE CORONARY ARTERIES USING LOW OSMOLAR CONTRAST: ICD-10-PCS | Performed by: INTERNAL MEDICINE

## 2022-05-19 PROCEDURE — 99238 HOSP IP/OBS DSCHRG MGMT 30/<: CPT | Performed by: INTERNAL MEDICINE

## 2022-05-19 RX ADMIN — CARVEDILOL 12.5 MG: 12.5 TABLET, FILM COATED ORAL at 07:45

## 2022-05-19 RX ADMIN — Medication 1 TABLET: at 07:46

## 2022-05-19 RX ADMIN — POLYETHYLENE GLYCOL 3350 17 G: 17 POWDER, FOR SOLUTION ORAL at 07:50

## 2022-05-19 RX ADMIN — SODIUM CHLORIDE, PRESERVATIVE FREE 10 ML: 5 INJECTION INTRAVENOUS at 07:46

## 2022-05-19 RX ADMIN — Medication 17.6 UNITS/KG/HR: at 07:44

## 2022-05-19 RX ADMIN — LISINOPRIL 5 MG: 5 TABLET ORAL at 07:46

## 2022-05-19 RX ADMIN — AMIODARONE HYDROCHLORIDE 200 MG: 200 TABLET ORAL at 07:48

## 2022-05-19 RX ADMIN — HEPARIN SODIUM 2000 UNITS: 1000 INJECTION INTRAVENOUS; SUBCUTANEOUS at 07:41

## 2022-05-19 RX ADMIN — FUROSEMIDE 20 MG: 10 INJECTION, SOLUTION INTRAMUSCULAR; INTRAVENOUS at 07:46

## 2022-05-19 RX ADMIN — INSULIN LISPRO 3 UNITS: 100 INJECTION, SOLUTION INTRAVENOUS; SUBCUTANEOUS at 12:36

## 2022-05-19 RX ADMIN — ASPIRIN 81 MG: 81 TABLET, CHEWABLE ORAL at 07:45

## 2022-05-19 RX ADMIN — CLOPIDOGREL 75 MG: 75 TABLET, FILM COATED ORAL at 07:45

## 2022-05-19 RX ADMIN — FAMOTIDINE 20 MG: 20 TABLET, FILM COATED ORAL at 07:45

## 2022-05-19 ASSESSMENT — ENCOUNTER SYMPTOMS
VOMITING: 0
ABDOMINAL PAIN: 0
SHORTNESS OF BREATH: 1
APNEA: 1
NAUSEA: 1
COUGH: 0

## 2022-05-19 ASSESSMENT — PAIN SCALES - GENERAL
PAINLEVEL_OUTOF10: 0
PAINLEVEL_OUTOF10: 0

## 2022-05-19 NOTE — PROGRESS NOTES
CLINICAL PHARMACY NOTE: MEDS TO BEDS    Total # of Prescriptions Filled: 1   The following medications were delivered to the patient:  · Metformin    Additional Documentation:  copay collected in cash

## 2022-05-19 NOTE — PROGRESS NOTES
Congestive Heart Failure Education completed and charted. CHF booklet given. Patient was receptive to education. Discussed the  importance of medication compliance. Discussed the importance of a heart healthy diet. Discussed 2000 mg sodium-restricted daily diet. Patient instructed to limit fluid intake to  1.5 to 2 liters per day. Patient instructed to weigh self at the same time of each day each morning, reinforced teaching to monitor for 3-5 lb weight increase over 1-2 days notify physician if change noted. Signs and symptoms of CHF discussed with patient, such as feeling more tired than normal, feeling short of breath, coughing that increases when lying down, sudden weight gain, swelling of the feet, legs or belly. Patient verbalized understanding to notify physician office if these symptoms occur. EF 35%   Pt has been to  [facility] in the past. He does not want a referral at this time. He will contact us he states.

## 2022-05-19 NOTE — PLAN OF CARE
Problem: Discharge Planning  Goal: Discharge to home or other facility with appropriate resources  5/19/2022 0441 by Brad Hubbard RN  Outcome: Progressing  5/18/2022 1828 by Gideon Marcelo RN  Outcome: Progressing     Problem: Pain  Goal: Verbalizes/displays adequate comfort level or baseline comfort level  5/19/2022 0441 by Brad Hubbard RN  Outcome: Progressing  5/18/2022 1828 by Gideon Marcelo RN  Outcome: Progressing     Problem: Chronic Conditions and Co-morbidities  Goal: Patient's chronic conditions and co-morbidity symptoms are monitored and maintained or improved  5/19/2022 0441 by Brad Hubbard RN  Outcome: Progressing  5/18/2022 1828 by Gideon Marcelo RN  Outcome: Progressing     Problem: Safety - Adult  Goal: Free from fall injury  5/19/2022 0441 by Brad Hubbard RN  Outcome: Progressing  5/18/2022 1828 by Gideon Marcelo RN  Outcome: Progressing     Problem: ABCDS Injury Assessment  Goal: Absence of physical injury  5/19/2022 0441 by Brad Hubbard RN  Outcome: Progressing  5/18/2022 1828 by Gideon Marcelo RN  Outcome: Progressing

## 2022-05-19 NOTE — DISCHARGE SUMMARY
[facility]  Office: [address], DO, Tj Matt, DO, Raheem Kirk, DO, Coretta Carnes Blood, DO, Javier Marquez MD, Chrsitian Rubin MD, Manpreet Stuart MD, Anny Kam MD, Jenn Contreras MD, Jahaira Streeter MD, Trenton Lowe MD, Ry Mullen, DO, Merna Brantley, DO, Jadene Boast, MD,  Alina Guerra DO, Jean Pierre Craig MD, Gurdeep Mendoza MD, Manisha Kiran MD, Bree Santos DO, Celeste Felix MD, Siva Clements MD, Audra Swanson MD, Norma Harmon [facility], [facility], CNP, Rayna Catalan CNP, Palmira Giron, CNP, Marco Antonio Ribeiro, CNP, Mari Agrawal, CNP, Yareli Lynch PA-C, Wilma Tobias, [facility], Radha Peralta, CNP, Hadley Pacheco, CNP, Amanda Sahni CNP, Geovany Zambrano, CNS, Dottie Pereira, [facility], Emma Celaya, CNP, Augustin Sheridan, CNP, Kath Mackenzie, [address]    Discharge Summary    Patient ID: Haydee Pike  :  1960   MRN: [id number]     ACCOUNT:  [de-identified]   Patient's PCP: Marcin Diaz PA-C  Admit Date: 2022   Discharge Date: 2022    Discharge Physician: Rosy Fan DO     The patient was seen and examined on day of discharge and this discharge summary is in conjunction with any daily progress note from day of discharge.     Active Discharge Diagnoses:     Primary Problem  NSTEMI (non-ST elevated myocardial infarction) [facility])      Hospital Problems  Active Hospital Problems    Diagnosis Date Noted    Status post implantation of automatic cardioverter/defibrillator (AICD) [Z95.810] 2022     Priority: High    ARIADNA (obstructive sleep apnea) [G47.33] 2022     Priority: Medium    S/P CABG (coronary artery bypass graft) [Z95.1] 2022     Priority: Medium    Nausea [R11.0] 2022 Priority: Medium    Anorexia [R63.0] 05/18/2022     Priority: Medium    Thrombocytopenia ([facility] [D69.6] 05/18/2022     Priority: Medium    Obesity (BMI 30-39. 9) [E66.9] 05/18/2022     Priority: Medium    Hypokalemia [E87.6] 05/18/2022     Priority: Medium    Uncontrolled type 2 diabetes mellitus with hyperglycemia ([facility] [address] [facility] [E11.65] 05/18/2022     Priority: Medium    NSTEMI (non-ST elevated myocardial infarction) ([facility] [address] [facility] [I21.4] 05/17/2022     Priority: Medium    Acute on chronic systolic heart failure (HCC) [I50.23] 03/01/2019    Essential hypertension [I10]     Hypercholesterolemia [E78.00]     CAD (coronary artery disease) [I25.10]          Hospital Course:     Brief History  As documented in the medical record:  \"Arya Gleason is a 59 y. o. white male with ischemic cardiomyopathy, chronic systolic heart failure, multivessel cad s/p prior cabg with chronic occluded RCA with left-to-right collaterals , status post recent AICD in April for chronic systolic heart failure who now presents with 10 to 14-day history intermittent chest pain, worsening lower extremity edema with increasing shortness of breath with minimal activity , elevated troponin with NSTEMI (non-ST elevated myocardial infarction) ([facility] [address]      Patient does describe episodic chest pain over the past few days, episode today lasting 2 hours, waxing and waning in intensity, occurring with minimal activity.  Chest pain midsternum without radiation.  Does note associated acute severe diaphoresis with \"drenching\" sweating spells with associated nausea shortness of breath.  Patient stated he attempted to rub his chest wall with worsening pain from pacemaker discomfort.  He states he is struggled with pacemaker discomfort since the pacemaker was placed last month.  However as noted worsening \"deeper\" episodic chest pain now.  Pressure quality without radiation.  he did have an episode yesterday and 1 episode earlier in the week, each lasting 2 to 3 hours.  He has had reduced p.o. intake with persistent nausea.  Patient admits that he has been reluctant to move with worsening shortness of breath with walking even 10 feet or leaning forward or dressing himself or any minimal activity.  He admits he is surprised that his AICD did not fire.  He does describe lightheadedness with dizziness.  He did note worsening symptoms yesterday evening when he attempted to lay flat and go to bed, states \"I had to sit up to breathe\".  Describes some component of pleurisy.      he denies any flulike symptoms.  Denies any fevers or chills or cough.  Denies any exposure to COVID.   denies any recent travel.  He does have associated nausea but denies any indigestion heartburn or abdominal pain.  Denies any reproduction of symptoms with palpation of chest wall or movement of shoulders.  Denies any other myalgias or arthralgias.      Patient recently evaluated [facility] with concern for possible malignancy?, he believes he was told ? ? Lung cancer.  S/p evaluation by Dr. Rah Ho for low platelets.   Patient is unclear of the details. \"     The intitial assessment and plan included:  \"    * (Principal) NSTEMI (non-ST elevated myocardial infarction) ([address] 5/17/2022 Yes     ARIADNA (obstructive sleep apnea) 5/18/2022 Yes     S/P CABG (coronary artery bypass graft) 5/18/2022 Yes     Nausea 5/18/2022 Yes     Anorexia 5/18/2022 Yes     Thrombocytopenia ([address] 5/18/2022 Yes     Essential hypertension 5/18/2022 Yes     Hyperlipidemia 5/18/2022 Yes     CAD (coronary artery disease) 5/18/2022 Yes     Acute on chronic systolic heart failure ([address] 5/18/2022 Yes          Plan:  1. Admit to the hospital service as inpatient  2. Pending cardiology input continue conservative treatment for suspected non-STEMI, possible type II with demand ischemia associated with acute on chronic systolic heart failure, fluid overload.  Continue diuresis  3.  ARIADNA intolerant of CPAP status post sinus surgery.  Consider further evaluation, possibly contributing to worsening CHF symptoms with nocturnal symptoms/insomnia  4. Type 2 diabetes with acute hyperglycemia continue sliding scale insulin.  Encourage blood sugar control  5. Anorexia with weight loss.  Likely related to persistent cardiac symptoms.  Continue bowel regimen with concern for constipation. Add gi prophylaxis. 6. Chronic thrombocytopenia.  Patient does describe recent evaluation by oncology for possible malignancy.  Prior chart reviewed, seen in consultation with oncology for low platelets, platelets have remained stable.  Denies easy bruising or bleeding issues.  Continue to monitor\"     Database has included:  EKG:  Sinus rhythm with 1st degree A-V block   Right bundle branch block   Abnormal ECG   When compared with ECG of 17-NOV-2021 11:58,   Sinus rhythm has replaced Atrial fibrillation   QRS duration has increased   Inverted T waves have replaced nonspecific T wave abnormality in Inferior leads      Results for Meridith Monday (MRN [id number]) as of 5/18/2022 11:40    Ref. Range 11/22/2021 14:05 5/17/2022 19:55 5/17/2022 21:19 5/18/2022 01:01 5/18/2022 05:04   Troponin, High Sensitivity Latest Ref Range: 0 - 22 ng/L 19 82 (HH) 89 (HH) 128 (HH) 140 (HH)      Pro-PTF432 High       Echo 2019:  Summary  Mild left ventricular hypertrophy  Global left ventricular systolic function is moderately reduced  Estimated ejection fraction is 35 % . Leftward compression of inter-ventricular septum (\"D-sign\") indicating RV  pressure overload. Left atrium is moderately dilated. Right atrium is moderately dilated . Right ventricular dilatation with reduced systolic function.   Mild mitral regurgitation.     Cardiac cath 8/2020:   Multi-vessel Coronary Artery Disease.   Patent LIMA-LAD and SVG to OM.   Occluded native RCA with left to right collaterals.   Mildly impaired ventricular function.   Dilated aortic root with no other grafts seen.    Recommendations    Medical therapy as needed.   Risk factor modification.     Glucose 306  Results for Raz Hu (MRN [id number]) as of 5/18/2022 11:40    Ref. Range 2/18/2019 05:33   Hemoglobin A1C Latest Ref Range: 4.0 - 6.0 % 6.7 (H)      Cardiac cath 5/18:  Patient had PCI with TITI to his SVG     Patient's condition was stabilized  Discharge planning initiated      Discharge plan:     Aspirin  Lipitor  Coreg  Plavix  Heparin DC'd  Hematology follow-up  as scheduled  Cardiology eval & f/u as scheduled   ICD interrogation  Bumex (changed to Lasix as Bumex not available)  Glycemic contol - Monitor and control blood sugars  Blood Pressure - Monitor and control   Correct electrolyte abnormalities   Risk factor management / weight loss    CPAP  DC planning  Will discharge when arrangements complete and ok with other services. Follow-up with PCP in one week, Leandra Brown PA-C  Notify PCP of discharge     No discharge procedures on file. Significant Diagnostic Studies:     Labs / Micro:  Labs:  Hematology:  Recent Labs     05/17/22 1955 05/18/22  0504   WBC 5.7 6.2   RBC 4.74 4.74   HGB 14.1 14.4   HCT 42.7 42.2   MCV 90.1 89.0   MCH 29.7 30.4   MCHC 33.0 34.1   RDW 13.3 13.3   PLT See Reflexed IPF Result See Reflexed IPF Result     Chemistry:  Recent Labs     05/17/22 1955 05/17/22 1955 05/17/22 2119 05/18/22  0101 05/18/22  0504     --   --   --  139   K 3.5*  --   --   --  3.3*     --   --   --  100   CO2 22  --   --   --  25   GLUCOSE 306*  --   --   --  139*   BUN 18  --   --   --  15   CREATININE 0.87  --   --   --  0.70   MG 1.8  --   --   --  1.7   ANIONGAP 12  --   --   --  14   LABGLOM >60  --   --   --  >60   GFRAA >60  --   --   --  >60   CALCIUM 9.1  --   --   --  9.1   PROBNP  --   --   --   --  760*   TROPHS 82*   < > 89* 128* 140*    < > = values in this interval not displayed.      Recent Labs     05/18/22  0504 05/18/22  1205 05/18/22 2012 05/19/22  1222   PROT 6.8  --   --   --    LABALBU 4.1  --   -- --    AST 24  --   --   --    ALT 20  --   --   --    ALKPHOS 104  --   --   --    BILITOT 1.91*  --   --   --    CHOL 251*  --   --   --    HDL 31*  --   --   --    LDLCHOLESTEROL 207*  --   --   --    CHOLHDLRATIO 8.1*  --   --   --    TRIG 64  --   --   --    POCGLU  --  202* 146* 273*       Radiology:    XR CHEST (2 VW)    Result Date: 5/17/2022  EXAMINATION: TWO XRAY VIEWS OF THE CHEST 5/17/2022 5:39 pm COMPARISON: 04/05/2022 HISTORY: ORDERING SYSTEM PROVIDED HISTORY: Chest pain TECHNOLOGIST PROVIDED HISTORY: Chest pain FINDINGS: Stable ICD lead. Cardiac size is enlarged. No acute infiltrates are seen . The central pulmonary vascularity is hazy and indistinct. No pneumothorax. No pleural effusions identified . Postsurgical changes overlying the mediastinum. Stable cardiomegaly and central pulmonary vascular congestion         Consultations:    Consults:     Final Specialist Recommendations/Findings:   IP CONSULT TO CARDIOLOGY  IP CONSULT TO HOSPITALIST        Discharged Condition:    Stable     Disposition: skilled nursing facility     Physician Follow Up:    Sunitha Meza MD  [facility] [address] [facility]  [phone]    On 5/24/2022  at 1:45pm for hospital f/u with cardiology       Activity:  activity as tolerated    Diet:  cardiac diet and diabetic diet     Discharge Medications:      Medication List      CHANGE how you take these medications    amiodarone 200 MG tablet  Commonly known as: CORDARONE  Take 1 tablet by mouth daily Take 2 tablets for 7 days and after that one tablet daily  What changed: additional instructions     atorvastatin 80 MG tablet  Commonly known as: LIPITOR  Take 1 tablet by mouth nightly  What changed: additional instructions     carvedilol 12.5 MG tablet  Commonly known as: COREG  Take 1 tablet by mouth 2 times daily (with meals)  What changed: additional instructions     lisinopril 5 MG tablet  Commonly known as: PRINIVIL;ZESTRIL  Take 1 tablet by mouth daily  What changed: additional instructions     metFORMIN 1000 MG tablet  Commonly known as: GLUCOPHAGE  Take 1 tablet by mouth daily Resume on 5/21  What changed: additional instructions        CONTINUE taking these medications    aspirin 81 MG chewable tablet     bumetanide 1 MG tablet  Commonly known as: BUMEX  Take 1 tablet by mouth every morning AND 1 tablet Daily with lunch. clopidogrel 75 MG tablet  Commonly known as: PLAVIX     magnesium citrate solution  Take 296 mLs by mouth once for 1 dose     nitroGLYCERIN 0.4 MG SL tablet  Commonly known as: NITROSTAT     OXYCODONE HCL PO     therapeutic multivitamin-minerals tablet     VITAMIN B-12 PO     vitamin C 250 MG tablet     vitamin E 100 units capsule     Xarelto 20 MG Tabs tablet  Generic drug: rivaroxaban           Where to Get Your Medications      These medications were sent to [address], [address]  [address]    Phone: [phone]   · metFORMIN 1000 MG tablet         Time Spent on discharge is  20 mins in patient examination, evaluation, counseling, medication reconciliation, discharge plan and follow up. Electronically signed by   Rosy Fan DO  5/19/2022  4:58 PM      Thank you Dr. Marcin Diaz PA-C for the opportunity to be involved in this patient's care.

## 2022-05-19 NOTE — PROGRESS NOTES
[facility]  Office: [address], DO, Adarsh Cease, DO, Tim Raffi, DO, Luan Sanchez Blood, DO, Viet Aiken MD, Jerry Lechuga MD, Deangelo De Jesus MD, Wali Rizo MD, Martha Barry MD, Bharat Lai MD, Genet Kim MD, Lani Thompson, DO, Palmira Fortune, DO, Collin Barillas MD,  Van Reynoso DO, Abimael Levine MD, Aliyah Dumont MD, Jett Spencer MD, Martin Solitario, DO, Lizeth Montiel MD, Jacoby Garcia MD, Lynette Fine MD, Macario Pollock, CNP, [facility], CNP, Yue Quiroga, CNP, Mary Trevizo, CNP, Harriett Márquez, CNP, Roney Mix, CNP, NICK BatistaC, Laila Beltran, DNP, Lennox Ortiz, CNP, Mihai Owen, CNP, Jefferson Wilson, CNP, Wiley Fitzpatrick, CNS, Rosangela Gagnon, DNP, Nancy Sarkar, CNP, Shala Doyle, CNP, Joanie Winkler, CNP         [address]    Progress Note    5/19/2022    1:42 PM    Name:   Roetta Fleischer  MRN:     [id number]     Acct:      [de-identified]   Room:   [address] [place] [address] Day:  2  Admit Date:  5/17/2022  7:19 PM    PCP:   Moon Soliman PA-C  Code Status:  Full Code    Subjective:     C/C:   Chief Complaint   Patient presents with    Chest Pain     x2 weeks, increased today, relief with nitro by ems     Interval History Status:   Improved  No chest pain  Still reporting some dyspnea on exertion  Reports he is still anxious   Has air hunger, yawning    Data Base Updates:  PTT38.5 High    Still on heparin drip    [id number] High      O2 sats satisfactory    Brief History:     As documented in the medical record:  \"Mark Rojean Felty is a 59 y.o. white male with ischemic cardiomyopathy, chronic systolic heart failure, multivessel cad s/p prior cabg with chronic occluded RCA with left-to-right collaterals , status post recent AICD in April for chronic systolic heart failure who now presents with 10 to 14-day history intermittent chest pain, worsening lower extremity edema with increasing shortness of breath with minimal activity , elevated troponin with NSTEMI (non-ST elevated myocardial infarction) ([address] Patient does describe episodic chest pain over the past few days, episode today lasting 2 hours, waxing and waning in intensity, occurring with minimal activity. Chest pain midsternum without radiation. Does note associated acute severe diaphoresis with \"drenching\" sweating spells with associated nausea shortness of breath. Patient stated he attempted to rub his chest wall with worsening pain from pacemaker discomfort. He states he is struggled with pacemaker discomfort since the pacemaker was placed last month. However as noted worsening \"deeper\" episodic chest pain now. Pressure quality without radiation. he did have an episode yesterday and 1 episode earlier in the week, each lasting 2 to 3 hours. He has had reduced p.o. intake with persistent nausea. Patient admits that he has been reluctant to move with worsening shortness of breath with walking even 10 feet or leaning forward or dressing himself or any minimal activity. He admits he is surprised that his AICD did not fire. He does describe lightheadedness with dizziness. He did note worsening symptoms yesterday evening when he attempted to lay flat and go to bed, states \"I had to sit up to breathe\". Describes some component of pleurisy. he denies any flulike symptoms. Denies any fevers or chills or cough. Denies any exposure to COVID. denies any recent travel. He does have associated nausea but denies any indigestion heartburn or abdominal pain. Denies any reproduction of symptoms with palpation of chest wall or movement of shoulders. Denies any other myalgias or arthralgias. Patient recently evaluated at [facility] with concern for possible malignancy?, he believes he was told ? ? Lung cancer. S/p evaluation by Dr. Pamela Valentine for low platelets. Patient is unclear of the details. \"    The intitial assessment and plan Troponin, High Sensitivity Latest Ref Range: 0 - 22 ng/L 19 82 (HH) 89 (HH) 128 (HH) 140 (HH)     [id number] High      Echo 2019:  Summary  Mild left ventricular hypertrophy  Global left ventricular systolic function is moderately reduced  Estimated ejection fraction is 35 % . Leftward compression of inter-ventricular septum (\"D-sign\") indicating RV  pressure overload. Left atrium is moderately dilated. Right atrium is moderately dilated . Right ventricular dilatation with reduced systolic function. Mild mitral regurgitation. Cardiac cath 8/2020:   Multi-vessel Coronary Artery Disease. Patent LIMA-LAD and SVG to OM. Occluded native RCA with left to right collaterals. Mildly impaired ventricular function. Dilated aortic root with no other grafts seen. Recommendations    Medical therapy as needed. Risk factor modification. Glucose 306  Results for Cookie Augustin (MRN [id number]) as of 5/18/2022 11:40   Ref. Range 2/18/2019 05:33   Hemoglobin A1C Latest Ref Range: 4.0 - 6.0 % 6.7 (H)     Cardiac cath 5/18:  Patient had PCI with TITI to his SVG    Patient's condition was stabilized  Discharge planning initiated    Past Medical History:   has a past medical history of CAD (coronary artery disease), CHF (congestive heart failure) ([address] Heart attack ([address] Hyperlipidemia, Hypertension, MI (myocardial infarction) ([address] MRSA (methicillin resistant staph aureus) culture positive, Skin cancer, Snores, Stroke ([address] Wears dentures, Wears reading eyeglasses, and Wellness examination. Social History:   reports that he quit smoking about 23 years ago. He has never used smokeless tobacco. He reports that he does not drink alcohol and does not use drugs.      Family History:   Family History   Problem Relation Age of Onset    Coronary Art Dis Father     Liver Disease Father     Alcohol Abuse Sister        Review of Systems:     Review of Systems   Constitutional: Positive for activity change (Decreased), appetite change (Diminished), diaphoresis and fatigue (Exercise capacity reduced). Respiratory: Positive for apnea and shortness of breath (Dyspnea on exertion). Negative for cough. Cardiovascular: Positive for chest pain (Left retrosternal pain). Negative for palpitations. Gastrointestinal: Positive for nausea. Negative for abdominal pain and vomiting. Genitourinary: Negative for flank pain and hematuria. Musculoskeletal: Positive for gait problem (his legs get \"tired\" rapidly with ambulation suggesting claudication). Skin: Positive for wound (ICD site appears to be healing well). Neurological: Positive for weakness (Generalized). Psychiatric/Behavioral: Positive for sleep disturbance. Physical Examination:        Vitals  /68   Pulse 63   Temp 98.2 °F (36.8 °C) (Oral)   Resp 28   Ht 5' 9\" (1.753 m)   Wt 241 lb 1.6 oz (109.4 kg)   SpO2 97%   BMI 35.60 kg/m²   Temp (24hrs), Av.9 °F (36.6 °C), Min:97.3 °F (36.3 °C), Max:98.2 °F (36.8 °C)    Recent Labs     22  1205 22  1222   POCGLU 202* 146* 273*       Physical Exam  Vitals reviewed. Constitutional:       General: He is not in acute distress. Appearance: He is not diaphoretic. HENT:      Head: Normocephalic. Nose: Nose normal.   Eyes:      General: No scleral icterus. Conjunctiva/sclera: Conjunctivae normal.   Neck:      Trachea: No tracheal deviation. Cardiovascular:      Rate and Rhythm: Normal rate and regular rhythm. Pulmonary:      Effort: Pulmonary effort is normal. No respiratory distress. Breath sounds: Normal breath sounds. No wheezing or rales. Chest:      Chest wall: No tenderness. Abdominal:      General: There is no distension. Palpations: Abdomen is soft. Tenderness: There is no abdominal tenderness. Musculoskeletal:         General: No tenderness. Cervical back: Neck supple. Skin:     General: Skin is warm and dry.    Neurological: Mental Status: He is alert. Medications: Allergies:  No Known Allergies    Current Meds:   Scheduled Meds:    rivaroxaban  20 mg Oral Daily    amiodarone  200 mg Oral Daily    aspirin  81 mg Oral Daily    atorvastatin  80 mg Oral Nightly    carvedilol  12.5 mg Oral BID WC    clopidogrel  75 mg Oral Daily    lisinopril  5 mg Oral Daily    therapeutic multivitamin-minerals  1 tablet Oral Daily    sodium chloride flush  5-40 mL IntraVENous 2 times per day    famotidine  20 mg Oral BID    polyethylene glycol  17 g Oral Daily    furosemide  20 mg IntraVENous BID    insulin lispro  0-6 Units SubCUTAneous TID WC    insulin lispro  0-3 Units SubCUTAneous Nightly     Continuous Infusions:    sodium chloride      dextrose      nitroGLYCERIN Stopped (05/18/22 1554)     PRN Meds: sodium chloride flush, sodium chloride, potassium chloride **OR** potassium alternative oral replacement **OR** potassium chloride, magnesium sulfate, ondansetron **OR** ondansetron, acetaminophen **OR** acetaminophen, magnesium hydroxide, nitroGLYCERIN, glucose, dextrose bolus **OR** dextrose bolus, glucagon (rDNA), dextrose, heparin (porcine), heparin (porcine)    Data:     I/O (24Hr):     Intake/Output Summary (Last 24 hours) at 5/19/2022 1342  Last data filed at 5/19/2022 0800  Gross per 24 hour   Intake 1306.25 ml   Output 350 ml   Net 956.25 ml       Labs:  Hematology:  Recent Labs     05/17/22 1955 05/18/22  0504   WBC 5.7 6.2   RBC 4.74 4.74   HGB 14.1 14.4   HCT 42.7 42.2   MCV 90.1 89.0   MCH 29.7 30.4   MCHC 33.0 34.1   RDW 13.3 13.3   PLT See Reflexed IPF Result See Reflexed IPF Result     Chemistry:  Recent Labs     05/17/22 1955 05/17/22 1955 05/17/22 2119 05/18/22 0101 05/18/22  0504     --   --   --  139   K 3.5*  --   --   --  3.3*     --   --   --  100   CO2 22  --   --   --  25   GLUCOSE 306*  --   --   --  139*   BUN 18  --   --   --  15   CREATININE 0.87  --   --   --  0.70   MG 1.8  -- --   --  1.7   ANIONGAP 12  --   --   --  14   LABGLOM >60  --   --   --  >60   GFRAA >60  --   --   --  >60   CALCIUM 9.1  --   --   --  9.1   PROBNP  --   --   --   --  760*   TROPHS 82*   < > 89* 128* 140*    < > = values in this interval not displayed. Recent Labs     05/18/22  0504 05/18/22  1205 05/18/22 2012 05/19/22  1222   PROT 6.8  --   --   --    LABALBU 4.1  --   --   --    AST 24  --   --   --    ALT 20  --   --   --    ALKPHOS 104  --   --   --    BILITOT 1.91*  --   --   --    CHOL 251*  --   --   --    HDL 31*  --   --   --    LDLCHOLESTEROL 207*  --   --   --    CHOLHDLRATIO 8.1*  --   --   --    TRIG 64  --   --   --    POCGLU  --  202* 146* 273*     ABG:No results found for: POCPH, PHART, PH, POCPCO2, OZN7KPP, PCO2, POCPO2, PO2ART, PO2, POCHCO3, BGQ5PDX, HCO3, NBEA, PBEA, BEART, BE, THGBART, THB, MHR1KLO, RPJV9JLO, Q8PVDEZO, O2SAT, FIO2  Lab Results   Component Value Date/Time    SPECIAL RAC 12ML 03/11/2019 08:52 PM     Lab Results   Component Value Date/Time    CULTURE NO GROWTH 6 DAYS 03/11/2019 08:52 PM       Radiology:  XR CHEST (2 VW)    Result Date: 5/17/2022  Stable cardiomegaly and central pulmonary vascular congestion       Assessment:        Primary Problem  NSTEMI (non-ST elevated myocardial infarction) [facility])    Active Hospital Problems    Diagnosis Date Noted    Status post implantation of automatic cardioverter/defibrillator (AICD) [Z95.810] 04/04/2022     Priority: High    ARIADNA (obstructive sleep apnea) [G47.33] 05/18/2022     Priority: Medium    S/P CABG (coronary artery bypass graft) [Z95.1] 05/18/2022     Priority: Medium    Nausea [R11.0] 05/18/2022     Priority: Medium    Anorexia [R63.0] 05/18/2022     Priority: Medium    Thrombocytopenia (HCC) [D69.6] 05/18/2022     Priority: Medium    Obesity (BMI 30-39. 9) [E66.9] 05/18/2022     Priority: Medium    Hypokalemia [E87.6] 05/18/2022     Priority: Medium    Uncontrolled type 2 diabetes mellitus with hyperglycemia [facility]) [E11.65] 05/18/2022     Priority: Medium    NSTEMI (non-ST elevated myocardial infarction) ([facility] [I21.4] 05/17/2022     Priority: Medium    Acute on chronic systolic heart failure (HCC) [I50.23] 03/01/2019    Essential hypertension [I10]     Hypercholesterolemia [E78.00]     CAD (coronary artery disease) [I25.10]          Plan:        Aspirin  Lipitor  Coreg  Plavix  Heparin DC'd  Hematology follow-up  as scheduled  Cardiology eval & f/u as scheduled   ICD interrogation  Bumex (changed to Lasix as Bumex not available)  Glycemic contol - Monitor and control blood sugars  Blood Pressure - Monitor and control   Correct electrolyte abnormalities   Risk factor management / weight loss    CPAP  DC planning  Will discharge when arrangements complete and ok with other services.   Follow-up with PCP in one week, Elbert Dawkins PA-C  Notify PCP of discharge     IP CONSULT TO CARDIOLOGY  IP CONSULT TO HOSPITALIST    Gayatri Richardson DO  5/19/2022  1:42 PM

## 2022-05-19 NOTE — PLAN OF CARE
Problem: Discharge Planning  Goal: Discharge to home or other facility with appropriate resources  5/19/2022 1017 by Renetta Freeman RN  Outcome: Progressing  Flowsheets (Taken 5/19/2022 0750)  Discharge to home or other facility with appropriate resources: Identify barriers to discharge with patient and caregiver  5/19/2022 0441 by Patsy Chaudhary RN  Outcome: Progressing     Problem: Pain  Goal: Verbalizes/displays adequate comfort level or baseline comfort level  5/19/2022 1017 by Renetta Freeman RN  Outcome: Progressing  Flowsheets (Taken 5/19/2022 0746)  Verbalizes/displays adequate comfort level or baseline comfort level: Assess pain using appropriate pain scale  5/19/2022 0441 by Patsy Chaudhary RN  Outcome: Progressing     Problem: Chronic Conditions and Co-morbidities  Goal: Patient's chronic conditions and co-morbidity symptoms are monitored and maintained or improved  5/19/2022 1017 by Renetta Freeman RN  Outcome: Progressing  Flowsheets (Taken 5/19/2022 0750)  Care Plan - Patient's Chronic Conditions and Co-Morbidity Symptoms are Monitored and Maintained or Improved: Monitor and assess patient's chronic conditions and comorbid symptoms for stability, deterioration, or improvement  5/19/2022 0441 by Patsy Chaudhary RN  Outcome: Progressing     Problem: Safety - Adult  Goal: Free from fall injury  5/19/2022 1017 by Renetta Freeman RN  Outcome: Progressing  5/19/2022 0441 by Patsy Chaudhary RN  Outcome: Progressing     Problem: ABCDS Injury Assessment  Goal: Absence of physical injury  5/19/2022 1017 by Renetta Freeman RN  Outcome: Progressing  5/19/2022 0441 by Patsy Chaudhary RN  Outcome: Progressing

## 2022-05-19 NOTE — PLAN OF CARE
Problem: Discharge Planning  Goal: Discharge to home or other facility with appropriate resources  5/19/2022 1644 by Hay Erickson RN  Outcome: Adequate for Discharge  5/19/2022 1017 by Hay Erickson RN  Outcome: Progressing  Flowsheets (Taken 5/19/2022 0750)  Discharge to home or other facility with appropriate resources: Identify barriers to discharge with patient and caregiver  5/19/2022 0441 by Wili Ellis RN  Outcome: Progressing     Problem: Pain  Goal: Verbalizes/displays adequate comfort level or baseline comfort level  5/19/2022 1644 by Hay Erickson RN  Outcome: Adequate for Discharge  5/19/2022 1017 by Hay Erickson RN  Outcome: Progressing  Flowsheets (Taken 5/19/2022 0746)  Verbalizes/displays adequate comfort level or baseline comfort level: Assess pain using appropriate pain scale  5/19/2022 0441 by Wili Ellis RN  Outcome: Progressing     Problem: Chronic Conditions and Co-morbidities  Goal: Patient's chronic conditions and co-morbidity symptoms are monitored and maintained or improved  5/19/2022 1644 by Hay Erickson RN  Outcome: Adequate for Discharge  5/19/2022 1017 by Hay Erickson RN  Outcome: Progressing  Flowsheets (Taken 5/19/2022 0750)  Care Plan - Patient's Chronic Conditions and Co-Morbidity Symptoms are Monitored and Maintained or Improved: Monitor and assess patient's chronic conditions and comorbid symptoms for stability, deterioration, or improvement  5/19/2022 0441 by Wili Ellis RN  Outcome: Progressing     Problem: Safety - Adult  Goal: Free from fall injury  5/19/2022 1644 by Hay Erickson RN  Outcome: Adequate for Discharge  5/19/2022 1017 by Hay Erickson RN  Outcome: Progressing  5/19/2022 0441 by Wili Ellis RN  Outcome: Progressing     Problem: ABCDS Injury Assessment  Goal: Absence of physical injury  5/19/2022 1644 by Hay Erickson RN  Outcome: Adequate for Discharge  5/19/2022 1017 by Hay Erickson RN  Outcome: Progressing  5/19/2022 0441 by Gretchen Dial RN  Outcome: Progressing     Pt discharged home with wife. Meds to beds delivered. All questions answered. Follow up appointments discussed. Denies further needs.

## 2022-05-19 NOTE — PROGRESS NOTES
Patient transported to room 541. Right groin assessed by Linda Zamora and writer. Groin soft, no bleeding or hematoma noted. RN to check pedal pulses with doppler.

## 2022-05-19 NOTE — PROGRESS NOTES
[facility]  Progress Note                   Date:   5/19/2022  Patient name: Coreen Hernandez  Date of admission:  5/17/2022  7:19 PM  MRN:   [id number]  YOB: 1960  PCP: Catherine Carty PA-C    Reason for Admission: Stable angina ([facility] [I20.8]  NSTEMI (non-ST elevated myocardial infarction) ([facility] [I21.4]    Subjective:       Clinical Changes /Abnormalities: Seen & examined in room. No acute CV issues/cocnerns overnight. Labs, vitals, & tele reviewed. No post cath site issues/concerns. Review of Systems    Medications:   Scheduled Meds:   amiodarone  200 mg Oral Daily    aspirin  81 mg Oral Daily    atorvastatin  80 mg Oral Nightly    carvedilol  12.5 mg Oral BID WC    clopidogrel  75 mg Oral Daily    lisinopril  5 mg Oral Daily    therapeutic multivitamin-minerals  1 tablet Oral Daily    sodium chloride flush  5-40 mL IntraVENous 2 times per day    famotidine  20 mg Oral BID    polyethylene glycol  17 g Oral Daily    furosemide  20 mg IntraVENous BID    insulin lispro  0-6 Units SubCUTAneous TID WC    insulin lispro  0-3 Units SubCUTAneous Nightly     Continuous Infusions:   sodium chloride      dextrose      heparin (PORCINE) Infusion 17.6 Units/kg/hr (05/19/22 0744)    nitroGLYCERIN Stopped (05/18/22 1554)     CBC:   Recent Labs     05/17/22 1955 05/18/22  0504   WBC 5.7 6.2   HGB 14.1 14.4   PLT See Reflexed IPF Result See Reflexed IPF Result     BMP:    Recent Labs     05/17/22 1955 05/18/22  0504    139   K 3.5* 3.3*    100   CO2 22 25   BUN 18 15   CREATININE 0.87 0.70   GLUCOSE 306* 139*     Hepatic:  Recent Labs     05/18/22  0504   AST 24   ALT 20   BILITOT 1.91*   ALKPHOS 104     Troponin:   Recent Labs     05/17/22 2119 05/18/22 0101 05/18/22  0504   TROPHS 89* 128* 140*     BNP: No results for input(s): BNP in the last 72 hours.   Lipids:   Recent Labs     05/18/22  0504   CHOL 251*   HDL 31*     INR: No results for input(s): INR in the last 72 hours. Objective:   Vitals: /68   Pulse 63   Temp 98.2 °F (36.8 °C) (Oral)   Resp 28   Ht 5' 9\" (1.753 m)   Wt 241 lb 1.6 oz (109.4 kg)   SpO2 97%   BMI 35.60 kg/m²   General appearance: alert and cooperative with exam  HEENT: Head: Normocephalic, no lesions, without obvious abnormality. Neck:no JVD, trachea midline, no adenopathy  Lungs: Clear to auscultation, dim throughout. No rales  Heart: Regular rate and rhythm, s1/s2 auscultated, + murmurs  Right groin post cath site CDI. Band aid removed and left open to air. No hematoma or drainage. Abdomen: soft, non-tender, bowel sounds active  Extremities: no edema  Neurologic: not done    ICD 4/4/2022: [employer] device implanted by Dr. Gary Benitez      ECHO 3/15/2022: EF 35%, mild MR/TR.      BENITO/CV 11/29/2021:   1. A BENITO was performed without complications. 2. LVEF 30 %  3  Structurally normal mitral valve. Severe MR noted. Central jet and one posterior jet. MR radius 1 cm, ERO 0.35 and MR volume 59 ml consistent with severe MR.   3.  JARED well visualized. No clot noted.   4. Successful CV to NSR.      MUGA 12/3/2020: EF 37%.      CATH 8/6/2020:   1. Multi-vessel Coronary Artery Disease. 2. Patent LIMA-LAD and SVG to OM. 3. Occluded native RCA with left to right collaterals. 4. Mildly impaired ventricular function. 5. Dilated aortic root with no other grafts seen.     Cath 5/18/2022  Patent LIMA-LAD  SVG-OM1 prox 99% (PTCA/TITI x1)      Assessment / Acute Cardiac Problems:   NSTEMI  CAD w/ prior MVD/CABG 22yrs ago  Acute on chronic systolic CHF with AICD in situ (4/4/22)  Severe MR on BENITO 11/2021  PAF s/p CV 11/2021  HTn  HLP  DM2  ARIADNA  Chronic thrombocytopenia      Patient Active Problem List:     Hyperbilirubinemia     Essential hypertension     Hypercholesterolemia     CAD (coronary artery disease)     Gross hematuria     Abdominal pain, right upper quadrant     Right nephrolithiasis     Abnormal liver function test     Acute systolic HF (heart failure) ([facility])     Noncompliance     Acute on chronic systolic heart failure (HCC)     Pneumonia     Dyspnea and respiratory abnormalities     Status post inguinal hernia repair     S/P repair of ventral hernia     Post-op pain     Non-recurrent bilateral inguinal hernia without obstruction or gangrene     Umbilical hernia with obstruction, without gangrene     Status post implantation of automatic cardioverter/defibrillator (AICD)     NSTEMI (non-ST elevated myocardial infarction) ([facility])     ARIADNA (obstructive sleep apnea)     S/P CABG (coronary artery bypass graft)     Nausea     Anorexia     Thrombocytopenia ([facility])     Obesity (BMI 30-39. 9)     Hypokalemia     Uncontrolled type 2 diabetes mellitus with hyperglycemia ([facility]      Plan of Treatment:   1. Discussed in detail with patient post cath POC including but not limited to medications, diet, exercise, right radial artery site care, and follow-up. Questions and concerns addressed. OK for discharge home today. F/U in office as scheduled. 2. Continue ASA, Plavix, & Xarelto for 1mo. Then will stop ASA.     Electronically signed by ELY Tapia CNP on 5/19/2022 at 12:39 PM  [address]  [phone]

## 2022-05-19 NOTE — OP NOTE
[facility]    CARDIAC CATHETERIZATION    Date:   5/19/2022  Patient name:  Rosio Kirk  Date of admission:  5/17/2022  7:19 PM  MRN:   [id number]  YOB: 1960    Operators:  Primary:   Dr. Flynn Lomeli (Attending Physician)    Assistant/CV fellow:  Sam Jackson MD      Procedure performed:     [x] Left Heart Catheterization. [x] Graft Angiography. [x] Left Ventriculography. [] Right Heart Catheterization. [x] Coronary Angiography. [] Aortic Valve Studies. [] PCI:      [] Other:       Pre Procedure Conscious Sedation Data:  ASA Class:    [] I [x] II [] III [] IV    Mallampati Class:  [] I [x] II [] III [] IV      Indication:  [] STEMI      [] + Stress test  [] ACS      [] + EKG Changes  [] Non Q MI       [x] Significant Risk Factors  [x] Recurrent Angina             [] Diabetes Mellitus    [] New LBBB      [] Uncontrolled HTN. [] CHF / Low EF changes     [] Abnormal CTA / Ca Score      Procedure:  Access:  [x] Femoral  [] Radial  artery       [x] Right  [] Left    Procedure: After informed consent was obtained with explanation of the risks and benefits, patient was brought to the cath lab. The access area was prepped and draped in sterile fashion. 1% lidocaine was used for local block. The artery was cannulated with 6  Fr sheath with brisk arterial blood return. The side port was frequently flushed and aspirated with normal saline. Findings:          Estimated Blood Loss:            [x] Minimal 10-25 cc   [] Minimal < 25 cc      [] Moderate 25-50 cc         [] >50 cc        Electronically signed on 5/19/2022 at 4:00 PM by:    Sam Jackson MD  Fellow, [address] [place] [address] [facility]

## 2022-06-14 ENCOUNTER — HOSPITAL ENCOUNTER (OUTPATIENT)
Facility: MEDICAL CENTER | Age: 62
End: 2022-06-14
Payer: MEDICARE

## 2022-06-15 ENCOUNTER — HOSPITAL ENCOUNTER (OUTPATIENT)
Facility: MEDICAL CENTER | Age: 62
Discharge: HOME | End: 2022-06-15
Payer: MEDICARE

## 2022-06-15 ENCOUNTER — TELEPHONE (OUTPATIENT)
Dept: ONCOLOGY | Age: 62
End: 2022-06-15

## 2022-06-15 ENCOUNTER — OFFICE VISIT (OUTPATIENT)
Dept: ONCOLOGY | Age: 62
End: 2022-06-15
Payer: MEDICARE

## 2022-06-15 VITALS
DIASTOLIC BLOOD PRESSURE: 87 MMHG | WEIGHT: 230.9 LBS | BODY MASS INDEX: 34.1 KG/M2 | TEMPERATURE: 96.3 F | SYSTOLIC BLOOD PRESSURE: 138 MMHG | RESPIRATION RATE: 16 BRPM | HEART RATE: 89 BPM

## 2022-06-15 DIAGNOSIS — D69.6 THROMBOCYTOPENIA (HCC): Primary | ICD-10-CM

## 2022-06-15 DIAGNOSIS — D69.6 THROMBOCYTOPENIA (HCC): ICD-10-CM

## 2022-06-15 LAB
ABSOLUTE EOS #: 0.07 K/UL (ref 0–0.44)
ABSOLUTE IMMATURE GRANULOCYTE: 0.02 K/UL (ref 0–0.3)
ABSOLUTE LYMPH #: 1.56 K/UL (ref 1.1–3.7)
ABSOLUTE MONO #: 0.54 K/UL (ref 0.1–1.2)
BASOPHILS # BLD: 0 % (ref 0–2)
BASOPHILS ABSOLUTE: <0.03 K/UL (ref 0–0.2)
EOSINOPHILS RELATIVE PERCENT: 1 % (ref 1–4)
HCT VFR BLD CALC: 46 % (ref 40.7–50.3)
HEMOGLOBIN: 14.6 G/DL (ref 13–17)
IMMATURE GRANULOCYTES: 0 %
LYMPHOCYTES # BLD: 27 % (ref 24–43)
MCH RBC QN AUTO: 28.8 PG (ref 25.2–33.5)
MCHC RBC AUTO-ENTMCNC: 31.7 G/DL (ref 28.4–34.8)
MCV RBC AUTO: 90.7 FL (ref 82.6–102.9)
MONOCYTES # BLD: 9 % (ref 3–12)
NRBC AUTOMATED: 0 PER 100 WBC
PDW BLD-RTO: 13.2 % (ref 11.8–14.4)
PLATELET # BLD: 118 K/UL (ref 138–453)
PMV BLD AUTO: 11.1 FL (ref 8.1–13.5)
RBC # BLD: 5.07 M/UL (ref 4.21–5.77)
SEG NEUTROPHILS: 63 % (ref 36–65)
SEGMENTED NEUTROPHILS ABSOLUTE COUNT: 3.68 K/UL (ref 1.5–8.1)
WBC # BLD: 5.9 K/UL (ref 3.5–11.3)

## 2022-06-15 PROCEDURE — 99214 OFFICE O/P EST MOD 30 MIN: CPT | Performed by: INTERNAL MEDICINE

## 2022-06-15 PROCEDURE — 85025 COMPLETE CBC W/AUTO DIFF WBC: CPT

## 2022-06-15 PROCEDURE — 1036F TOBACCO NON-USER: CPT | Performed by: INTERNAL MEDICINE

## 2022-06-15 PROCEDURE — 36415 COLL VENOUS BLD VENIPUNCTURE: CPT

## 2022-06-15 PROCEDURE — G8417 CALC BMI ABV UP PARAM F/U: HCPCS | Performed by: INTERNAL MEDICINE

## 2022-06-15 PROCEDURE — G8427 DOCREV CUR MEDS BY ELIG CLIN: HCPCS | Performed by: INTERNAL MEDICINE

## 2022-06-15 PROCEDURE — 99211 OFF/OP EST MAY X REQ PHY/QHP: CPT | Performed by: INTERNAL MEDICINE

## 2022-06-15 PROCEDURE — 1111F DSCHRG MED/CURRENT MED MERGE: CPT | Performed by: INTERNAL MEDICINE

## 2022-06-15 PROCEDURE — 3017F COLORECTAL CA SCREEN DOC REV: CPT | Performed by: INTERNAL MEDICINE

## 2022-06-15 NOTE — TELEPHONE ENCOUNTER
RAUL ARRIVES AMBULATORY FOR MD VISIT  DR Og Turner IN TO SEE PATIENT   ORDERS RECEIVED  RV 6 MONTHS WITH LABS  LABS CDP 12/14/22  MD VISIT 12/14/22 @3:45PM  AVS PRINTED AND GIVEN TO PATIENT WITH INSTRUCTIONS  PATIENT DISCHARGED AMBULATORY

## 2022-06-15 NOTE — PROGRESS NOTES
Patient ID: Ankur Greenfield, 1960, [id number], 59 y.o. Referred by : Avani Feliz PA-C  Reason for consultation:   Mild thrombocytopenia  HISTORY OF PRESENT ILLNESS:    Hematologic history:    Ankur Greenfield is a 59 y.o. male with past medical history of CAD, CHF, hyperlipidemia and chronic thrombocytopenia seen during initial consultation visit. Patient recently had blood work with his primary care physician which showed low platelets and therefore he was referred to hematology for further evaluation. Review of his prior records indicate that he has chronic thrombocytopenia at least since 2012. His platelets have been ranging around 100 on most occasions. At times they have been within normal limits. He denies any unintentional weight loss, drenching night sweats fever chills. He denies any smoking or tobacco abuse history and also denies any alcohol abuse history. Interval history:  Patient is returning for follow-up visit and to discuss lab results and further recommendations. He denies any significant bleeding symptoms. He has occasional nosebleed reported. He denies any unintentional weight loss, drenching night sweats fever chills. Patient is on Eliquis. During this visit patient's allergy, social, medical, surgical history and medications were reviewed and updated.       Past Medical History:   Diagnosis Date    CAD (coronary artery disease)     2020 cath    CHF (congestive heart failure) ([facility])     Dr. Benedict Lama attack [facility])     Hyperlipidemia     Dr. Rubin Singh    Hypertension     Dr. Rubin Singh    MI (myocardial infarction) [facility])     MRSA (methicillin resistant staph aureus) culture positive 01/26/2018    abdomen    Skin cancer     Snores     no cpap    Stroke [facility])     2011  Dr. Rubin Singh monitors    Wears dentures     upper full denture    Wears reading eyeglasses     Wellness examination     Codie Merida PA-C last seen Nov 2020       Past Surgical History:   Procedure Laterality Date CARDIAC CATHETERIZATION  08/06/2020    Dr. Delta Gracia therapy. Report on chart    CARDIAC DEFIBRILLATOR PLACEMENT  04/04/2022    [address]    unsure of date, bilateral radials    CARDIOVERSION  11/17/2021    CORONARY ANGIOPLASTY WITH STENT PLACEMENT      6 total stents per patient    CORONARY ARTERY BYPASS GRAFT      4 vessel bypass    EYE SURGERY      eye injury  new lens  and laser lt eye 2019    HERNIA REPAIR Bilateral 12/21/2020    XI LAPAROSCOPIC ROBOTIC INGUINAL HERNIA REPAIR WITH MESH; UMBILICAL HERNIA REPAIR WITH MESH performed by Laure Alves DO at [address] [place] [address]      plate/hardware present    OTHER SURGICAL HISTORY  11/17/2021    BENITO    SKIN BIOPSY      back       No Known Allergies    Current Outpatient Medications   Medication Sig Dispense Refill    Apixaban (ELIQUIS PO) Take by mouth Patient unsure of dosage / mg.      metFORMIN (GLUCOPHAGE) 1000 MG tablet Take 1 tablet by mouth daily Resume on 5/21 60 tablet 3    Cyanocobalamin (VITAMIN B-12 PO) Take 1 tablet by mouth daily      amiodarone (CORDARONE) 200 MG tablet Take 1 tablet by mouth daily Take 2 tablets for 7 days and after that one tablet daily (Patient taking differently: Take 200 mg by mouth daily ) 90 tablet 1    Multiple Vitamins-Minerals (THERAPEUTIC MULTIVITAMIN-MINERALS) tablet Take 1 tablet by mouth daily      Ascorbic Acid (VITAMIN C) 250 MG tablet Take 500 mg by mouth three times a week       bumetanide (BUMEX) 1 MG tablet Take 1 tablet by mouth every morning AND 1 tablet Daily with lunch.  60 tablet 3    atorvastatin (LIPITOR) 80 MG tablet Take 1 tablet by mouth nightly (Patient taking differently: Take 80 mg by mouth nightly Dr. Kurt Cash) 30 tablet 3    carvedilol (COREG) 12.5 MG tablet Take 1 tablet by mouth 2 times daily (with meals) (Patient taking differently: Take 12.5 mg by mouth 2 times daily (with meals) Dr. Kurt Cash) 60 tablet 3    lisinopril (PRINIVIL;ZESTRIL) 5 MG tablet Take 1 tablet by mouth daily (Patient taking differently: Take 5 mg by mouth daily Dr. Irene Pascal) 30 tablet 3    aspirin 81 MG chewable tablet Take 81 mg by mouth daily Per Dr. Irene Pascal. Ok to stop 7 days prior to surgery      OXYCODONE HCL PO Take 1 tablet by mouth 3 times daily as needed Took 2 tabs 4-3-22 at [facility] (Patient not taking: Reported on 6/15/2022)      vitamin E 100 units capsule Take 100 Units by mouth daily       magnesium citrate solution Take 296 mLs by mouth once for 1 dose (Patient not taking: Reported on 2021) 296 mL 0    nitroGLYCERIN (NITROSTAT) 0.4 MG SL tablet Place 0.4 mg under the tongue every 5 minutes as needed for Chest pain up to max of 3 total doses. If no relief after 1 dose, call 911. Dr. Irene Pascal (Patient not taking: Reported on 2022)       No current facility-administered medications for this visit. Social History     Socioeconomic History    Marital status: Single     Spouse name: Not on file    Number of children: Not on file    Years of education: Not on file    Highest education level: Not on file   Occupational History    Not on file   Tobacco Use    Smoking status: Former Smoker     Quit date: 1999     Years since quittin.4    Smokeless tobacco: Never Used   Vaping Use    Vaping Use: Never used   Substance and Sexual Activity    Alcohol use: No    Drug use: No    Sexual activity: Not on file   Other Topics Concern    Not on file   Social History Narrative    Not on file     Social Determinants of Health     Financial Resource Strain:     Difficulty of Paying Living Expenses: Not on file   Food Insecurity:     Worried About [address] in the Last Year: Not on file    Denny of Food in the Last Year: Not on file   Transportation Needs:     Lack of Transportation (Medical): Not on file    Lack of Transportation (Non-Medical):  Not on file   Physical Activity:     Days of Exercise per Week: Not on file    Minutes of Exercise per Session: Not on file   Stress:     Feeling of Stress : Not on file   Social Connections:     Frequency of Communication with Friends and Family: Not on file    Frequency of Social Gatherings with Friends and Family: Not on file    Attends Mandaeism Services: Not on file    Active Member of Clubs or Organizations: Not on file    Attends Club or Organization Meetings: Not on file    Marital Status: Not on file   Intimate Partner Violence:     Fear of Current or Ex-Partner: Not on file    Emotionally Abused: Not on file    Physically Abused: Not on file    Sexually Abused: Not on file   Housing Stability:     Unable to Pay for Housing in the Last Year: Not on file    Number of [place] in the Last Year: Not on file    Unstable Housing in the Last Year: Not on file       Family History   Problem Relation Age of Onset    Coronary Art Dis Father     Liver Disease Father     Alcohol Abuse Sister         REVIEW OF SYSTEM:     Constitutional: No fever or chills. No night sweats, no weight loss   Eyes: No eye discharge, double vision, or eye pain   HEENT: negative for sore mouth, sore throat, hoarseness and voice change   Respiratory: negative for cough , sputum, dyspnea, wheezing, hemoptysis, chest pain   Cardiovascular: negative for chest pain, dyspnea, palpitations, orthopnea, PND   Gastrointestinal: negative for nausea, vomiting, diarrhea, constipation, abdominal pain, Dysphagia, hematemesis and hematochezia   Genitourinary: negative for frequency, dysuria, nocturia, urinary incontinence, and hematuria   Integument: negative for rash, skin lesions, bruises.    Hematologic/Lymphatic: negative for easy bruising, bleeding, lymphadenopathy, petechiae and swelling/edema   Endocrine: negative for heat or cold intolerance, tremor, weight changes, change in bowel habits and hair loss   Musculoskeletal: negative for myalgias, arthralgias, pain, joint swelling,and bone pain   Neurological: negative for headaches, dizziness, seizures, weakness, numbness       OBJECTIVE: Vitals:    06/15/22 1537   BP: 138/87   Pulse: 89   Resp: 16   Temp: (!) 96.3 °F (35.7 °C)       PHYSICAL EXAM:   General appearance - well appearing, no in pain or distress   Mental status - alert and cooperative   Eyes - pupils equal and reactive, extraocular eye movements intact   Ears - bilateral TM's and external ear canals normal   Mouth - mucous membranes moist, pharynx normal without lesions   Neck - supple, no significant adenopathy   Lymphatics - no palpable lymphadenopathy, no hepatosplenomegaly   Chest - clear to auscultation, no wheezes, rales or rhonchi, symmetric air entry   Heart - normal rate, regular rhythm, normal S1, S2, no murmurs, rubs, clicks or gallops   Abdomen - soft, nontender, nondistended, no masses or organomegaly   Neurological - alert, oriented, normal speech, no focal findings or movement disorder noted   Musculoskeletal - no joint tenderness, deformity or swelling   Extremities - peripheral pulses normal, no pedal edema, no clubbing or cyanosis   Skin - normal coloration and turgor, no rashes, no suspicious skin lesions noted ,      LABORATORY DATA:     Lab Results   Component Value Date    WBC 5.9 06/15/2022    HGB 14.6 06/15/2022    HCT 46.0 06/15/2022    MCV 90.7 06/15/2022     (L) 06/15/2022    LYMPHOPCT 27 06/15/2022    RBC 5.07 06/15/2022    MCH 28.8 06/15/2022    MCHC 31.7 06/15/2022    RDW 13.2 06/15/2022    MONOPCT 9 06/15/2022    BASOPCT 0 06/15/2022    NEUTROABS 3.68 06/15/2022    LYMPHSABS 1.56 06/15/2022    MONOSABS 0.54 06/15/2022    EOSABS 0.07 06/15/2022    BASOSABS <0.03 06/15/2022         Chemistry        Component Value Date/Time     05/18/2022 0504    K 3.3 (L) 05/18/2022 0504     05/18/2022 0504    CO2 25 05/18/2022 0504    BUN 15 05/18/2022 0504    CREATININE 0.70 05/18/2022 0504        Component Value Date/Time    CALCIUM 9.1 05/18/2022 0504    ALKPHOS 104 05/18/2022 0504    AST 24 05/18/2022 0504    ALT 20 05/18/2022 0504    BILITOT 1.91 (H) 05/18/2022 5422        PATHOLOGY DATA:   Reviewed  IMAGING DATA:    Reviewed  ASSESSMENT:    Inderjit Tang is a 59 y.o. is a 59 y.o. with history of CAD, CHF with chronic thrombocytopenia. I reviewed his past medical records. His thrombocytopenia appears very mild and has been stable over past 10 years. Given the stability of his thrombocytopenia however many years I do not suspect aggressive underlying bone marrow condition. I reviewed the recent lab work and discuss that his platelets are stable and no indication for bone marrow biopsy at this point. Will continue to monitor  I will consider bone marrow biopsy if his platelet counts drops further  Patient's questions were sought and answered to satisfaction and agreed to proceed with the plan. PLAN:   I reviewed his recent lab work, discussed diagnosis and treatment recommendations   Clinically he is doing well and denies any symptoms of significant bleeding   His platelets are stable at 118, his hemoglobin and WBCs are within normal limit  Return to clinic in 6 months with labs prior    Robb Zhao MD  Hematologist/Medical Oncologist    This note is created with the assistance of a speech recognition program.  While intending to generate a document that actually reflects the content of the visit, the document can still have some errors including those of syntax and sound a like substitutions which may escape proof reading. It such instances, actual meaning can be extrapolated by contextual diversion. On this date 6/15/22  I have spent 40 minutes reviewing previous notes, test results and face to face with the patient discussing the diagnosis and importance of compliance with the treatment plan. Greater than 50% of that time was spent face-to-face with the patient in counseling and coordinating her care.       CC  Demetrius Marcelo PA-C

## 2022-06-24 ENCOUNTER — HOSPITAL ENCOUNTER (EMERGENCY)
Age: 62
Discharge: HOME | End: 2022-06-24
Attending: EMERGENCY MEDICINE
Payer: MEDICARE

## 2022-06-24 ENCOUNTER — APPOINTMENT (OUTPATIENT)
Dept: CT IMAGING | Age: 62
End: 2022-06-24
Payer: MEDICARE

## 2022-06-24 VITALS
DIASTOLIC BLOOD PRESSURE: 82 MMHG | WEIGHT: 220 LBS | HEIGHT: 69 IN | HEART RATE: 75 BPM | OXYGEN SATURATION: 98 % | RESPIRATION RATE: 18 BRPM | TEMPERATURE: 98.3 F | SYSTOLIC BLOOD PRESSURE: 134 MMHG | BODY MASS INDEX: 32.58 KG/M2

## 2022-06-24 DIAGNOSIS — Z00.00 GENERAL MEDICAL EXAMINATION: Primary | ICD-10-CM

## 2022-06-24 LAB
ABSOLUTE EOS #: 0.1 K/UL (ref 0–0.44)
ABSOLUTE IMMATURE GRANULOCYTE: 0.02 K/UL (ref 0–0.3)
ABSOLUTE LYMPH #: 1.54 K/UL (ref 1.1–3.7)
ABSOLUTE MONO #: 0.67 K/UL (ref 0.1–1.2)
ALBUMIN SERPL-MCNC: 4.1 G/DL (ref 3.5–5.2)
ALP BLD-CCNC: 132 U/L (ref 40–129)
ALT SERPL-CCNC: 20 U/L (ref 5–41)
ANION GAP SERPL CALCULATED.3IONS-SCNC: 10 MMOL/L (ref 9–17)
AST SERPL-CCNC: 21 U/L
BASOPHILS # BLD: 0 % (ref 0–2)
BASOPHILS ABSOLUTE: <0.03 K/UL (ref 0–0.2)
BILIRUB SERPL-MCNC: 1.1 MG/DL (ref 0.3–1.2)
BUN BLDV-MCNC: 16 MG/DL (ref 8–23)
BUN/CREAT BLD: 21 (ref 9–20)
CALCIUM SERPL-MCNC: 9.2 MG/DL (ref 8.6–10.4)
CHLORIDE BLD-SCNC: 104 MMOL/L (ref 98–107)
CO2: 26 MMOL/L (ref 20–31)
CREAT SERPL-MCNC: 0.75 MG/DL (ref 0.7–1.2)
EOSINOPHILS RELATIVE PERCENT: 2 % (ref 1–4)
GFR AFRICAN AMERICAN: >60 ML/MIN
GFR NON-AFRICAN AMERICAN: >60 ML/MIN
GFR SERPL CREATININE-BSD FRML MDRD: ABNORMAL ML/MIN/{1.73_M2}
GLUCOSE BLD-MCNC: 119 MG/DL (ref 70–99)
HCT VFR BLD CALC: 46 % (ref 40.7–50.3)
HEMOGLOBIN: 14.6 G/DL (ref 13–17)
IMMATURE GRANULOCYTES: 0 %
LYMPHOCYTES # BLD: 25 % (ref 24–43)
MCH RBC QN AUTO: 28.6 PG (ref 25.2–33.5)
MCHC RBC AUTO-ENTMCNC: 31.7 G/DL (ref 28.4–34.8)
MCV RBC AUTO: 90.2 FL (ref 82.6–102.9)
MONOCYTES # BLD: 11 % (ref 3–12)
NRBC AUTOMATED: 0 PER 100 WBC
PDW BLD-RTO: 13.4 % (ref 11.8–14.4)
PLATELET # BLD: 114 K/UL (ref 138–453)
PMV BLD AUTO: 10.9 FL (ref 8.1–13.5)
POTASSIUM SERPL-SCNC: 3.9 MMOL/L (ref 3.7–5.3)
RBC # BLD: 5.1 M/UL (ref 4.21–5.77)
SEG NEUTROPHILS: 61 % (ref 36–65)
SEGMENTED NEUTROPHILS ABSOLUTE COUNT: 3.71 K/UL (ref 1.5–8.1)
SODIUM BLD-SCNC: 140 MMOL/L (ref 135–144)
TOTAL PROTEIN: 7.2 G/DL (ref 6.4–8.3)
WBC # BLD: 6.1 K/UL (ref 3.5–11.3)

## 2022-06-24 PROCEDURE — 6360000004 HC RX CONTRAST MEDICATION: Performed by: EMERGENCY MEDICINE

## 2022-06-24 PROCEDURE — 71260 CT THORAX DX C+: CPT

## 2022-06-24 PROCEDURE — 80053 COMPREHEN METABOLIC PANEL: CPT

## 2022-06-24 PROCEDURE — 2580000003 HC RX 258: Performed by: EMERGENCY MEDICINE

## 2022-06-24 PROCEDURE — 99285 EMERGENCY DEPT VISIT HI MDM: CPT

## 2022-06-24 PROCEDURE — 85025 COMPLETE CBC W/AUTO DIFF WBC: CPT

## 2022-06-24 RX ORDER — SODIUM CHLORIDE 0.9 % (FLUSH) 0.9 %
10 SYRINGE (ML) INJECTION PRN
Status: DISCONTINUED | OUTPATIENT
Start: 2022-06-24 | End: 2022-06-25 | Stop reason: HOSPADM

## 2022-06-24 RX ORDER — 0.9 % SODIUM CHLORIDE 0.9 %
80 INTRAVENOUS SOLUTION INTRAVENOUS ONCE
Status: COMPLETED | OUTPATIENT
Start: 2022-06-24 | End: 2022-06-24

## 2022-06-24 RX ADMIN — SODIUM CHLORIDE 80 ML: 9 INJECTION, SOLUTION INTRAVENOUS at 21:24

## 2022-06-24 RX ADMIN — SODIUM CHLORIDE, PRESERVATIVE FREE 10 ML: 5 INJECTION INTRAVENOUS at 21:24

## 2022-06-24 RX ADMIN — IOPAMIDOL 75 ML: 755 INJECTION, SOLUTION INTRAVENOUS at 21:23

## 2022-06-25 NOTE — ED NOTES
Pt to ED via private auto. Pt c/o possible infection around defibrillator. Pt reports he had a defibrillator placed about 6 weeks ago. Pt the incision site appeared to be healing fine until a few days ago pt noted what appeared to be a small circular scab in the middle of the incision site. Now the \"scab\" is black. Black pea sized skin noted in middle of incision site. Pt also reports the incision site appears to be more red than normal.  Pt A&Ox4, ambulatory to room.      Stephanie [facility]  06/24/22 1122

## 2022-06-25 NOTE — ED PROVIDER NOTES
EMERGENCY DEPARTMENT ENCOUNTER    Pt Name: Haydee Pike  MRN: [id number]  [place] 1960  Date of evaluation: 6/24/22  CHIEF COMPLAINT       Chief Complaint   Patient presents with    Other     worried about infection around pacemaker placement (from april). Sent by Dr Aretha Rosenberg   Patient is a 49-year-old male who received pacemaker 8 weeks ago who presents to the ED for evaluation of skin irritation to left chest wall above pacemaker. Patient picked at a small scab over the area approximately 4 days ago. 3 days ago there was a small amount of clear liquid oozing from site. He does not remember any trauma to skin area. Otherwise feeling well. Wife was concerned and sent him to the ED to rule out abscess. No other issues at this time. ROS:  No fevers, cough, shortness of breath, chest pain, abdominal pain, nausea, vomiting, changes in urine or stool. REVIEW OF SYSTEMS     Review of Systems   All other systems reviewed and are negative. PASTMEDICAL HISTORY     Past Medical History:   Diagnosis Date    CAD (coronary artery disease)     2020 cath    CHF (congestive heart failure) ([facility])     Dr. Billy Soares attack [facility])     Hyperlipidemia     Dr. Vanessa Fowler Hypertension     Dr. Vanessa Fowler MI (myocardial infarction) [facility])     MRSA (methicillin resistant staph aureus) culture positive 01/26/2018    abdomen    Skin cancer     Snores     no cpap    Stroke [facility])     2011  Dr. Barb Moran Wears dentures     upper full denture    Wears reading eyeglasses     Wellness examination     Maddie Rodríguez PA-C last seen Nov 2020     SURGICAL HISTORY       Past Surgical History:   Procedure Laterality Date    CARDIAC CATHETERIZATION  08/06/2020    Dr. Fan Santos therapy.   Report [facility] chart   [facility]  04/04/2022   [address]    unsure of date, bilateral radials    CARDIOVERSION  11/17/2021    CORONARY ANGIOPLASTY WITH STENT PLACEMENT 6 total stents per patient    CORONARY ARTERY BYPASS GRAFT      4 vessel bypass    EYE SURGERY      eye injury  new lens  and laser lt eye 2019    HERNIA REPAIR Bilateral 12/21/2020    XI LAPAROSCOPIC ROBOTIC INGUINAL HERNIA REPAIR WITH MESH; UMBILICAL HERNIA REPAIR WITH MESH performed by Nain Louie DO at [address]      plate/hardware present    OTHER SURGICAL HISTORY  11/17/2021    BENITO    SKIN BIOPSY      back     CURRENT MEDICATIONS       Previous Medications    AMIODARONE (CORDARONE) 200 MG TABLET    Take 1 tablet by mouth daily Take 2 tablets for 7 days and after that one tablet daily    APIXABAN (ELIQUIS PO)    Take by mouth Patient unsure of dosage / mg. ASCORBIC ACID (VITAMIN C) 250 MG TABLET    Take 500 mg by mouth three times a week     ASPIRIN 81 MG CHEWABLE TABLET    Take 81 mg by mouth daily Per Dr. Anitha Melendez. Ok to stop 7 days prior to surgery    ATORVASTATIN (LIPITOR) 80 MG TABLET    Take 1 tablet by mouth nightly    BUMETANIDE (BUMEX) 1 MG TABLET    Take 1 tablet by mouth every morning AND 1 tablet Daily with lunch. CARVEDILOL (COREG) 12.5 MG TABLET    Take 1 tablet by mouth 2 times daily (with meals)    CYANOCOBALAMIN (VITAMIN B-12 PO)    Take 1 tablet by mouth daily    LISINOPRIL (PRINIVIL;ZESTRIL) 5 MG TABLET    Take 1 tablet by mouth daily    MAGNESIUM CITRATE SOLUTION    Take 296 mLs by mouth once for 1 dose    METFORMIN (GLUCOPHAGE) 1000 MG TABLET    Take 1 tablet by mouth daily Resume on 5/21    MULTIPLE VITAMINS-MINERALS (THERAPEUTIC MULTIVITAMIN-MINERALS) TABLET    Take 1 tablet by mouth daily    NITROGLYCERIN (NITROSTAT) 0.4 MG SL TABLET    Place 0.4 mg under the tongue every 5 minutes as needed for Chest pain up to max of 3 total doses. If no relief after 1 dose, call 911.   Dr. Anitha Melendez    OXYCODONE HCL PO    Take 1 tablet by mouth 3 times daily as needed Took 2 tabs 4-3-22 at [facility]    VITAMIN E 100 UNITS CAPSULE    Take 100 Units by mouth daily cardiologist.        RADIOLOGY:All plain film, CT, MRI, and formal ultrasound images (except ED bedside ultrasound) are read by the radiologist, see reports below, unless otherwisenoted in MDM or here. CT CHEST W CONTRAST   Final Result   1. Pacemaker electrode overlies upper portion left pectoralis muscle in the   subcutaneous tissues. Large amount of streak and blackout artifact from the   prosthesis limits evaluation. Within this limitation no clear evidence for   fluid collections to suggest abscess. Potentially a very small fluid   collection could be obscured for suspicion is low. 2. Scattered atelectasis/scarring in the lungs. No evidence for pneumonia. 3. Dilated pulmonary trunk and pulmonary arteries with reflux of contrast   into the IVC hepatic veins similar to 2019 exam.  Findings may reflect   pulmonary hypertension and right heart failure. Alternatively, the contrast   reflux into the liver could be related to rate of IV contrast injection. LABS: All lab results were reviewed by myself, and all abnormals are listed below. Labs Reviewed   CBC WITH AUTO DIFFERENTIAL - Abnormal; Notable for the following components:       Result Value    Platelets 330 (*)     All other components within normal limits   COMPREHENSIVE METABOLIC PANEL W/ REFLEX TO MG FOR LOW K - Abnormal; Notable for the following components:    Glucose 119 (*)     Bun/Cre Ratio 21 (*)     Alkaline Phosphatase 132 (*)     All other components within normal limits       EMERGENCY DEPARTMENTCOURSE:   Patient did well in the ED. CT chest negative for abscess. Labs:  Potassium 3.9  Creatinine 0.75  Glucose 119  WBC 6.1    No further work-up indicated at this time. Nursing notes reviewed. At this time this is what I find, the patient appears well and does not appear sick or toxic. I gave my usual and customary discussion of the risks and benefits of discharge versus admission. I answered the patient's questions. I gave the patient strict return precautions. Patient expressed understanding of the discharge instructions. The care is provided during an unprecedented national emergency due to the novel coronavirus, COVID-19. Dictated but not reviewed. Vitals:    Vitals:    06/24/22 1917   BP: 134/82   Pulse: 75   Resp: 18   Temp: 98.3 °F (36.8 °C)   TempSrc: Oral   SpO2: 98%   Weight: 220 lb (99.8 kg)   Height: 5' 9\" (1.753 m)       The patient was given the following medications while in the emergency department:  Orders Placed This Encounter   Medications    0.9 % sodium chloride bolus    sodium chloride flush 0.9 % injection 10 mL    iopamidol (ISOVUE-370) 76 % injection 75 mL     CONSULTS:  None    FINAL IMPRESSION      1.  General medical examination          DISPOSITION/PLAN   DISPOSITION Decision To Discharge 06/24/2022 10:21:20 PM      PATIENT REFERRED TO:  Melchor Berry PA-C  [address] [place] [address]  [phone]    In 2 days      DISCHARGE MEDICATIONS:  New Prescriptions    No medications on file     Suzanne Cooper MD  Attending Emergency Physician                    Sarika Calixto MD  06/24/22 8846

## 2022-06-28 ENCOUNTER — HOSPITAL ENCOUNTER (EMERGENCY)
Age: 62
Discharge: HOME | End: 2022-06-28
Attending: EMERGENCY MEDICINE
Payer: MEDICARE

## 2022-06-28 VITALS
BODY MASS INDEX: 37.67 KG/M2 | DIASTOLIC BLOOD PRESSURE: 80 MMHG | HEART RATE: 75 BPM | SYSTOLIC BLOOD PRESSURE: 124 MMHG | WEIGHT: 240 LBS | TEMPERATURE: 98.2 F | HEIGHT: 67 IN

## 2022-06-28 DIAGNOSIS — L03.811 CELLULITIS AND ABSCESS OF HEAD: Primary | ICD-10-CM

## 2022-06-28 DIAGNOSIS — L02.811 CELLULITIS AND ABSCESS OF HEAD: Primary | ICD-10-CM

## 2022-06-28 PROCEDURE — 99283 EMERGENCY DEPT VISIT LOW MDM: CPT

## 2022-06-28 RX ORDER — CEPHALEXIN 500 MG/1
500 CAPSULE ORAL 4 TIMES DAILY
Qty: 40 CAPSULE | Refills: 0 | Status: SHIPPED | OUTPATIENT
Start: 2022-06-28 | End: 2022-07-08

## 2022-06-28 RX ORDER — SULFAMETHOXAZOLE AND TRIMETHOPRIM 800; 160 MG/1; MG/1
1 TABLET ORAL 2 TIMES DAILY
Qty: 20 TABLET | Refills: 0 | Status: SHIPPED | OUTPATIENT
Start: 2022-06-28 | End: 2022-07-02

## 2022-06-28 ASSESSMENT — ENCOUNTER SYMPTOMS: COLOR CHANGE: 1

## 2022-06-28 NOTE — ED PROVIDER NOTES
[address] [facility] [address] ED  EMERGENCY DEPARTMENT ENCOUNTER      Pt Name: Rosio Kirk  MRN: [id number]  [place] 1960  Date of evaluation: 6/28/2022  Provider: ELY Mcbride CNP    CHIEF COMPLAINT       Chief Complaint   Patient presents with    Abscess         HISTORY OFPRESENT ILLNESS  (Location/Symptom, Timing/Onset, Context/Setting, Quality, Duration, Modifying Factors, Severity.)   Rosio Kirk is a 59 y.o. male who presents to the emergency department by private auto for evaluation of abscess to the back of his head that he noticed 4 days ago. Patient states his girlfriend shaved some of his head around the abscess and squeezed some blood out of the several days ago. Denies fever or chills. Nursing Notes were reviewed. PASTMEDICAL HISTORY     Past Medical History:   Diagnosis Date    CAD (coronary artery disease)     2020 cath    CHF (congestive heart failure) ([facility])     Dr. Tong Hernandez attack [facility])     Hyperlipidemia     Dr. Woodie Goodell Hypertension     Dr. Woodie Goodell MI (myocardial infarction) [facility])     MRSA (methicillin resistant staph aureus) culture positive 01/26/2018    abdomen    Skin cancer     Snores     no cpap    Stroke [facility])     2011  Dr. Ashish Burch Wears dentures     upper full denture    Wears reading eyeglasses     Wellness examination     Amanda Encarnacion PA-C last seen Nov 2020         SURGICAL HISTORY       Past Surgical History:   Procedure Laterality Date    CARDIAC CATHETERIZATION  08/06/2020    Dr. Santiago Outlaw therapy.   Report on chart   [address]  04/04/2022   [address] [facility]    unsure of date, bilateral radials    CARDIOVERSION  11/17/2021    CORONARY ANGIOPLASTY WITH STENT PLACEMENT      6 total stents per patient    CORONARY ARTERY BYPASS GRAFT      4 vessel bypass    EYE SURGERY      eye injury  new lens  and laser lt eye 2019    HERNIA REPAIR Bilateral 12/21/2020    XI LAPAROSCOPIC ROBOTIC INGUINAL HERNIA REPAIR WITH MESH; UMBILICAL HERNIA REPAIR WITH MESH performed by Chinyere Beyer DO at [address]      plate/hardware present    OTHER SURGICAL HISTORY  11/17/2021    BENITO    SKIN BIOPSY      back         CURRENT MEDICATIONS     Previous Medications    AMIODARONE (CORDARONE) 200 MG TABLET    Take 1 tablet by mouth daily Take 2 tablets for 7 days and after that one tablet daily    APIXABAN (ELIQUIS PO)    Take by mouth Patient unsure of dosage / mg. ASCORBIC ACID (VITAMIN C) 250 MG TABLET    Take 500 mg by mouth three times a week     ASPIRIN 81 MG CHEWABLE TABLET    Take 81 mg by mouth daily Per Dr. Chani Garduno. Ok to stop 7 days prior to surgery    ATORVASTATIN (LIPITOR) 80 MG TABLET    Take 1 tablet by mouth nightly    BUMETANIDE (BUMEX) 1 MG TABLET    Take 1 tablet by mouth every morning AND 1 tablet Daily with lunch. CARVEDILOL (COREG) 12.5 MG TABLET    Take 1 tablet by mouth 2 times daily (with meals)    CYANOCOBALAMIN (VITAMIN B-12 PO)    Take 1 tablet by mouth daily    LISINOPRIL (PRINIVIL;ZESTRIL) 5 MG TABLET    Take 1 tablet by mouth daily    MAGNESIUM CITRATE SOLUTION    Take 296 mLs by mouth once for 1 dose    METFORMIN (GLUCOPHAGE) 1000 MG TABLET    Take 1 tablet by mouth daily Resume on 5/21    MULTIPLE VITAMINS-MINERALS (THERAPEUTIC MULTIVITAMIN-MINERALS) TABLET    Take 1 tablet by mouth daily    NITROGLYCERIN (NITROSTAT) 0.4 MG SL TABLET    Place 0.4 mg under the tongue every 5 minutes as needed for Chest pain up to max of 3 total doses. If no relief after 1 dose, call 911. Dr. Chani Garduno    OXYCODONE HCL PO    Take 1 tablet by mouth 3 times daily as needed Took 2 tabs 4-3-22 at [facility]    VITAMIN E 100 UNITS CAPSULE    Take 100 Units by mouth daily        ALLERGIES     Patient has no known allergies.     FAMILY HISTORY       Family History   Problem Relation Age of Onset    Coronary Art Dis Father     Liver Disease Father     Alcohol Abuse Sister           SOCIAL HISTORY       Social History Socioeconomic History    Marital status: Single     Spouse name: None    Number of children: None    Years of education: None    Highest education level: None   Occupational History    None   Tobacco Use    Smoking status: Former Smoker     Quit date: 1999     Years since quittin.4    Smokeless tobacco: Never Used   Vaping Use    Vaping Use: Never used   Substance and Sexual Activity    Alcohol use: No    Drug use: No    Sexual activity: None   Other Topics Concern    None   Social History Narrative    None     Social Determinants of Health     Financial Resource Strain:     Difficulty of Paying Living Expenses: Not on file   Food Insecurity:     Worried About Running Out of Food in the Last Year: Not on file    Denny of Food in the Last Year: Not on file   Transportation Needs:     Lack of Transportation (Medical): Not on file    Lack of Transportation (Non-Medical): Not on file   Physical Activity:     Days of Exercise per Week: Not on file    Minutes of Exercise per Session: Not on file   Stress:     Feeling of Stress : Not on file   Social Connections:     Frequency of Communication with Friends and Family: Not on file    Frequency of Social Gatherings with Friends and Family: Not on file    Attends Sikhism Services: Not on file    Active Member of [address] [place] [address] [employer] or Organizations: Not on file    Attends Club or Organization Meetings: Not on file    Marital Status: Not on file   Intimate Partner Violence:     Fear of Current or Ex-Partner: Not on file    Emotionally Abused: Not on file    Physically Abused: Not on file    Sexually Abused: Not on file   Housing Stability:     Unable to Pay for Housing in the Last Year: Not on file    Number of [place] in the Last Year: Not on file    Unstable Housing in the Last Year: Not on file         REVIEW OF SYSTEMS    (2-9 systems for level 4, 10 or more for level 5)     Review of Systems   Constitutional: Negative for fever. Skin: Positive for color change. All other systems reviewed and are negative. Except as noted above the remainder of the review of systems was reviewed and negative. PHYSICAL EXAM    (up to 7 for level 4, 8 or more for level 5)     ED Triage Vitals [06/28/22 0804]   BP Temp Temp Source Heart Rate Resp SpO2 Height Weight   124/80 98.2 °F (36.8 °C) Oral 75 -- -- 5' 7\" (1.702 m) 240 lb (108.9 kg)       Physical Exam  Constitutional:       Appearance: Normal appearance. He is well-developed, well-groomed and normal weight. HENT:      Head: Normocephalic. Comments: There is a firm abscess noted to the posterior scalp. Small scabbed area noted with some induration around it. Abscess does not fluctuate. Right Ear: External ear normal.      Left Ear: External ear normal.      Nose: Nose normal.   Eyes:      Conjunctiva/sclera: Conjunctivae normal.   Pulmonary:      Effort: Pulmonary effort is normal. No respiratory distress. Musculoskeletal:         General: Normal range of motion. Cervical back: Normal range of motion. Skin:     General: Skin is warm and dry. Findings: Erythema present. Neurological:      Mental Status: He is alert and oriented to person, place, and time. Psychiatric:         Mood and Affect: Mood normal.         Behavior: Behavior normal.         Thought Content:  Thought content normal.         Judgment: Judgment normal.           DIAGNOSTIC RESULTS     EKG:All EKG's are interpreted by the Emergency Department Physician who either signs or Co-signs this chart in the absence of a cardiologist.        RADIOLOGY:   Non-plain film images such as CT, Ultrasound and MRI are read by theradiologist. Plain radiographic images are visualized and preliminarily interpreted by the emergency physician with the below findings:        Interpretation per the Radiologist below, if available at the time of this note:    No orders to display         EDBEDSIDE ULTRASOUND: Performed by Vitor Looney - none    LABS:  Labs Reviewed - No data to display    All other labs were within normal range or not returned as of this dictation. EMERGENCY DEPARTMENT COURSE andDIFFERENTIAL DIAGNOSIS/MDM:   Exam shows abscess with some cellulitis to the posterior head. There is no clinical indication to drain the abscess at that time. Abscess is firm. Patient is afebrile. Discussed findings with the patient. Prescriptions written for Keflex and Bactrim. He is instructed follow-up with his PCP in 1 week. Return to ED if symptoms worsen. Vitals:    Vitals:    06/28/22 0804   BP: 124/80   Pulse: 75   Temp: 98.2 °F (36.8 °C)   TempSrc: Oral   Weight: 240 lb (108.9 kg)   Height: 5' 7\" (1.702 m)         CONSULTS:  None    RES:  Procedures    FINAL IMPRESSION      1.  Cellulitis and abscess of head          DISPOSITION/PLAN   DISPOSITION Decision To Discharge 06/28/2022 09:41:49 AM      PATIENT REFERRED TO:   Vero Mccartney, [address]  [phone]    In 1 week      [facility] ED  [address] [facility]  [phone]    If symptoms worsen      DISCHARGE MEDICATIONS:     New Prescriptions    CEPHALEXIN (KEFLEX) 500 MG CAPSULE    Take 1 capsule by mouth 4 times daily for 10 days    SULFAMETHOXAZOLE-TRIMETHOPRIM (BACTRIM DS) 800-160 MG PER TABLET    Take 1 tablet by mouth 2 times daily for 10 days     Electronically signed by ELY Bess 6/28/2022 at 9:43 AM            ELY Bess CNP  06/28/22 1723

## 2022-06-28 NOTE — ED PROVIDER NOTES
eMERGENCY dEPARTMENT eNCOUnter   Independent Attestation     Pt Name: Laila Phelps  MRN: [id number]  [place] 1960  Date of evaluation: 6/28/22     Laila Phelps is a 59 y.o. male with CC: Abscess      This visit was performed by both a physician and an APC. I performed all aspects of the MDM as documented. The care is provided during an unprecedented national emergency due to the novel coronavirus, COVID 19.     Gilbert Farley DO  Attending Emergency Physician                    R[address] Hern[address]z 1721,   06/28/22 1001

## 2022-07-02 ENCOUNTER — HOSPITAL ENCOUNTER (EMERGENCY)
Age: 62
Discharge: HOME | End: 2022-07-02
Attending: EMERGENCY MEDICINE
Payer: MEDICARE

## 2022-07-02 VITALS
HEART RATE: 78 BPM | WEIGHT: 240 LBS | OXYGEN SATURATION: 97 % | SYSTOLIC BLOOD PRESSURE: 114 MMHG | BODY MASS INDEX: 36.37 KG/M2 | HEIGHT: 68 IN | DIASTOLIC BLOOD PRESSURE: 80 MMHG | RESPIRATION RATE: 20 BRPM | TEMPERATURE: 98.1 F

## 2022-07-02 DIAGNOSIS — L02.91 ABSCESS: Primary | ICD-10-CM

## 2022-07-02 PROCEDURE — 6370000000 HC RX 637 (ALT 250 FOR IP): Performed by: EMERGENCY MEDICINE

## 2022-07-02 PROCEDURE — 99283 EMERGENCY DEPT VISIT LOW MDM: CPT

## 2022-07-02 RX ORDER — DOXYCYCLINE HYCLATE 100 MG
100 TABLET ORAL 2 TIMES DAILY
Qty: 14 TABLET | Refills: 0 | Status: SHIPPED | OUTPATIENT
Start: 2022-07-02 | End: 2022-07-09

## 2022-07-02 RX ORDER — DOXYCYCLINE 100 MG/1
100 CAPSULE ORAL ONCE
Status: COMPLETED | OUTPATIENT
Start: 2022-07-02 | End: 2022-07-02

## 2022-07-02 RX ORDER — ACETAMINOPHEN 500 MG
1000 TABLET ORAL ONCE
Status: COMPLETED | OUTPATIENT
Start: 2022-07-02 | End: 2022-07-02

## 2022-07-02 RX ORDER — TRAMADOL HYDROCHLORIDE 50 MG/1
50 TABLET ORAL EVERY 6 HOURS PRN
Qty: 18 TABLET | Refills: 0 | Status: SHIPPED | OUTPATIENT
Start: 2022-07-02 | End: 2022-07-05

## 2022-07-02 RX ADMIN — DOXYCYCLINE 100 MG: 100 CAPSULE ORAL at 13:38

## 2022-07-02 RX ADMIN — ACETAMINOPHEN 1000 MG: 500 TABLET ORAL at 13:37

## 2022-07-02 ASSESSMENT — ENCOUNTER SYMPTOMS
VOMITING: 0
COLOR CHANGE: 1
NAUSEA: 0

## 2022-07-02 ASSESSMENT — PAIN SCALES - GENERAL: PAINLEVEL_OUTOF10: 3

## 2022-07-02 ASSESSMENT — PAIN - FUNCTIONAL ASSESSMENT: PAIN_FUNCTIONAL_ASSESSMENT: 0-10

## 2022-07-02 NOTE — ED PROVIDER NOTES
[address] [facility]  Emergency Department Encounter     Pt Name: Belkys Gagnon  MRN: [id number]  [place] 1960  Date of evaluation: 7/2/22  PCP:  Mancel Mcardle, PA-C    CHIEF COMPLAINT       Chief Complaint   Patient presents with    Cyst     right posterior of head; started 1 week ago, was seen here       HISTORY OF PRESENT ILLNESS  (Location/Symptom, Timing/Onset, Context/Setting, Quality, Duration, Modifying Factors, Severity.)    Belkys Gagnon is a 59 y.o. male who presents with abscess to the back of his head/neck. He was seen here 1 week ago and started on Keflex and Bactrim and told to come back to did not get better. He states that he is still having pain in the area and has been taking Motrin and applying heat without much improvement. He has not any fevers chills sweats nausea or vomiting. He denies any drainage from the area. PAST MEDICAL / SURGICAL / SOCIAL / FAMILY HISTORY    has a past medical history of CAD (coronary artery disease), CHF (congestive heart failure) ([address] Heart attack ([address] Hyperlipidemia, Hypertension, MI (myocardial infarction) ([address] MRSA (methicillin resistant staph aureus) culture positive, Skin cancer, Snores, Stroke ([address] Wears dentures, Wears reading eyeglasses, and Wellness examination. has a past surgical history that includes Coronary artery bypass graft; Coronary angioplasty with stent; skin biopsy; eye surgery; Mandible surgery; hernia repair (Bilateral, 12/21/2020); Cardioversion (11/17/2021); other surgical history (11/17/2021); Cardiac surgery (1999); Cardiac catheterization (08/06/2020); and Cardiac defibrillator placement (04/04/2022).     Social History     Socioeconomic History    Marital status: Single     Spouse name: Not on file    Number of children: Not on file    Years of education: Not on file    Highest education level: Not on file   Occupational History    Not on file   Tobacco Use    Smoking status: Former Smoker     Quit date: 1999     Years since quittin.4    Smokeless tobacco: Never Used   Vaping Use    Vaping Use: Never used   Substance and Sexual Activity    Alcohol use: No    Drug use: No    Sexual activity: Not Currently   Other Topics Concern    Not on file   Social History Narrative    Not on file     Social Determinants of Health     Financial Resource Strain:     Difficulty of Paying Living Expenses: Not on file   Food Insecurity:     Worried About Running Out of Food in the Last Year: Not on file    Denny of Food in the Last Year: Not on file   Transportation Needs:     Lack of Transportation (Medical): Not on file    Lack of Transportation (Non-Medical): Not on file   Physical Activity:     Days of Exercise per Week: Not on file    Minutes of Exercise per Session: Not on file   Stress:     Feeling of Stress : Not on file   Social Connections:     Frequency of Communication with Friends and Family: Not on file    Frequency of Social Gatherings with Friends and Family: Not on file    Attends Evangelical Services: Not on file    Active Member of [address] [place] [address] or Organizations: Not on file    Attends Club or Organization Meetings: Not on file    Marital Status: Not on file   Intimate Partner Violence:     Fear of Current or Ex-Partner: Not on file    Emotionally Abused: Not on file    Physically Abused: Not on file    Sexually Abused: Not on file   Housing Stability:     Unable to Pay for Housing in the Last Year: Not on file    Number of [place] in the Last Year: Not on file    Unstable Housing in the Last Year: Not on file       Family History   Problem Relation Age of Onset    Coronary Art Dis Father     Liver Disease Father     Alcohol Abuse Sister        Allergies:    Patient has no known allergies. Home Medications:  Prior to Admission medications    Medication Sig Start Date End Date Taking?  Authorizing Provider   doxycycline hyclate (VIBRA-TABS) 100 MG tablet Take 1 tablet by mouth 2 times daily for 7 days 7/2/22 7/9/22 Yes Savana Rao DO   traMADol (ULTRAM) 50 MG tablet Take 1 tablet by mouth every 6 hours as needed for Pain for up to 3 days. Intended supply: 3 days. Take lowest dose possible to manage pain 7/2/22 7/5/22 Yes Savana Rao DO   cephALEXin (KEFLEX) 500 MG capsule Take 1 capsule by mouth 4 times daily for 10 days 6/28/22 7/8/22  ELY Lane - CNP   Apixaban (ELIQUIS PO) Take by mouth Patient unsure of dosage / mg. Historical Provider, MD   metFORMIN (GLUCOPHAGE) 1000 MG tablet Take 1 tablet by mouth daily Resume on 5/21 5/19/22   Trinity Kennedy DO   OXYCODONE HCL PO Take 1 tablet by mouth 3 times daily as needed Took 2 tabs 4-3-22 at [facility]  Patient not taking: Reported on 6/15/2022    Historical Provider, MD   Cyanocobalamin (VITAMIN B-12 PO) Take 1 tablet by mouth daily    Historical Provider, MD   naproxen (NAPROSYN) 500 MG tablet Take 1 tablet by mouth 2 times daily (with meals) 3/28/22 3/28/22  Martha Howard PA-C   amiodarone (CORDARONE) 200 MG tablet Take 1 tablet by mouth daily Take 2 tablets for 7 days and after that one tablet daily  Patient taking differently: Take 200 mg by mouth daily  11/17/21   Oswaldo Pang MD   vitamin E 100 units capsule Take 100 Units by mouth daily     Historical Provider, MD   magnesium citrate solution Take 296 mLs by mouth once for 1 dose  Patient not taking: Reported on 6/18/2021 1/4/21 1/29/21  Zhen Jean MD   Multiple Vitamins-Minerals (THERAPEUTIC MULTIVITAMIN-MINERALS) tablet Take 1 tablet by mouth daily    Historical Provider, MD   Ascorbic Acid (VITAMIN C) 250 MG tablet Take 500 mg by mouth three times a week     Historical Provider, MD   bumetanide (BUMEX) 1 MG tablet Take 1 tablet by mouth every morning AND 1 tablet Daily with lunch.  3/13/19   ELY Damian NP   nitroGLYCERIN (NITROSTAT) 0.4 MG SL tablet Place 0.4 mg under the tongue every 5 minutes as needed for Chest pain up to max of 3 total doses. If no relief after 1 dose, call 911. Dr. iWlner Elder  Patient not taking: Reported on 1/12/2022    Historical Provider, MD   atorvastatin (LIPITOR) 80 MG tablet Take 1 tablet by mouth nightly  Patient taking differently: Take 80 mg by mouth nightly Dr. Wilner Elder 2/20/19   Cici Varghese MD   carvedilol (COREG) 12.5 MG tablet Take 1 tablet by mouth 2 times daily (with meals)  Patient taking differently: Take 12.5 mg by mouth 2 times daily (with meals) Dr. Wilner Elder 2/20/19   Cici Varghese MD   lisinopril (PRINIVIL;ZESTRIL) 5 MG tablet Take 1 tablet by mouth daily  Patient taking differently: Take 5 mg by mouth daily Dr. Wilner Elder 2/21/19   Cici Varghese MD   aspirin 81 MG chewable tablet Take 81 mg by mouth daily Per Dr. Wilner Elder. Ok to stop 7 days prior to surgery    Historical Provider, MD       REVIEW OF SYSTEMS    (2-9 systems for level 4, 10 or more for level 5)    Review of Systems   Constitutional: Negative for chills, diaphoresis and fever. Gastrointestinal: Negative for nausea and vomiting. Musculoskeletal: Positive for myalgias. Skin: Positive for color change and wound. Allergic/Immunologic: Negative for immunocompromised state. PHYSICAL EXAM   (up to 7 for level 4, 8 or more for level 5)    VITALS:   Vitals:    07/02/22 1252   BP: 114/80   Pulse: 78   Resp: 20   Temp: 98.1 °F (36.7 °C)   TempSrc: Oral   SpO2: 97%   Weight: 240 lb (108.9 kg)   Height: 5' 8\" (1.727 m)       Physical Exam  Vitals and nursing note reviewed. Constitutional:       General: He is not in acute distress. Appearance: He is well-developed. He is not diaphoretic. HENT:      Head: Normocephalic and atraumatic. Right Ear: External ear normal.      Left Ear: External ear normal.   Eyes:      Conjunctiva/sclera: Conjunctivae normal.   Pulmonary:      Effort: Pulmonary effort is normal. No respiratory distress. Musculoskeletal:         General: Normal range of motion.       Cervical back: Normal range of motion. Skin:     General: Skin is warm and dry. Findings: Erythema present. Neurological:      General: No focal deficit present. Mental Status: He is alert. Psychiatric:         Behavior: Behavior normal.         DIFFERENTIAL  DIAGNOSIS   PLAN (LABS / IMAGING / EKG):  No orders of the defined types were placed in this encounter. MEDICATIONS ORDERED:  Orders Placed This Encounter   Medications    doxycycline monohydrate (MONODOX) capsule 100 mg     Order Specific Question:   Antimicrobial Indications     Answer:   Skin and Soft Tissue Infection    acetaminophen (TYLENOL) tablet 1,000 mg    doxycycline hyclate (VIBRA-TABS) 100 MG tablet     Sig: Take 1 tablet by mouth 2 times daily for 7 days     Dispense:  14 tablet     Refill:  0    traMADol (ULTRAM) 50 MG tablet     Sig: Take 1 tablet by mouth every 6 hours as needed for Pain for up to 3 days. Intended supply: 3 days. Take lowest dose possible to manage pain     Dispense:  18 tablet     Refill:  0     DIAGNOSTIC RESULTS / EMERGENCYDEPARTMENT COURSE / MDM   LABS:  Labs Reviewed - No data to display    RADIOLOGY:  No results found. EMERGENCY DEPARTMENT COURSE:       MDM  Number of Diagnoses or Management Options  Abscess  Diagnosis management comments: Hard, indurated cyst.  There is no area of fluctuance. It appears that the pre-existing cellulitis is likely improving after antibiotics. I explained to patient that trying to I&D today would likely yield 0 improvement and only cause bleeding more pain and more swelling. He likely needs to follow-up with a dermatologist for complete excision at a later date. Switch his antibiotics to doxycycline. Tramadol for pain. Amount and/or Complexity of Data Reviewed  Review and summarize past medical records: yes    Patient Progress  Patient progress: stable      PROCEDURES:  Procedures     CONSULTS:  None    CRITICAL CARE:  NONE    FINAL IMPRESSION     1.  Abscess DISPOSITION / PLAN   DISPOSITION Decision To Discharge 07/02/2022 01:16:43 PM      Evaluation and treatment course in the ED, and plan of care upon discharge was discussed in length with the patient. Patient had no further questions prior to being discharged and was instructed to return to the ED for new or worsening symptoms. Any changes to existing medications or new prescriptions were reviewed with patient and they expressed understanding of how to correctly take their medications and the possible side effects. PATIENT REFERRED TO:  Shawna Vaughn PA-C  [address]  [facility] [address]  [address] ED  [address] [facility]  [phone]    As needed, If symptoms worsen    Jeny Abbasi MD  [address] [facility] [address] [facility]  [phone]    Schedule an appointment as soon as possible for a visit   As needed for dermatology follow up      DISCHARGE MEDICATIONS:  New Prescriptions    DOXYCYCLINE HYCLATE (VIBRA-TABS) 100 MG TABLET    Take 1 tablet by mouth 2 times daily for 7 days    TRAMADOL (ULTRAM) 50 MG TABLET    Take 1 tablet by mouth every 6 hours as needed for Pain for up to 3 days. Intended supply: 3 days. Take lowest dose possible to manage pain       Savana Edwards DO  Emergency Medicine Physician    (Please note that portions of this note were completed with a voice recognition program.  Efforts were made to edit the dictations but occasionally words are mis-transcribed.)        Ramos Hernadez 1721,   07/02/22 2007

## 2022-07-19 ENCOUNTER — HOSPITAL ENCOUNTER (OUTPATIENT)
Dept: PULMONOLOGY | Age: 62
Discharge: HOME | End: 2022-07-19

## 2022-07-19 DIAGNOSIS — Z79.899 LONG TERM CURRENT USE OF ANTIPSYCHOTIC MEDICATION: ICD-10-CM

## 2022-07-19 DIAGNOSIS — I48.0 PAROXYSMAL ATRIAL FIBRILLATION (HCC): ICD-10-CM

## 2022-08-23 ENCOUNTER — HOSPITAL ENCOUNTER (OUTPATIENT)
Dept: PULMONOLOGY | Age: 62
Discharge: HOME | End: 2022-08-23
Payer: MEDICARE

## 2022-08-23 LAB
DLCO %PRED: 96 %
DLCO PRED: NORMAL
DLCO/VA %PRED: NORMAL
DLCO/VA PRED: NORMAL
DLCO/VA: NORMAL
DLCO: NORMAL
EXPIRATORY TIME-POST: NORMAL
EXPIRATORY TIME: NORMAL
FEF 25-75% %CHNG: NORMAL
FEF 25-75% %PRED-POST: NORMAL
FEF 25-75% %PRED-PRE: NORMAL
FEF 25-75% PRED: NORMAL
FEF 25-75%-POST: NORMAL
FEF 25-75%-PRE: NORMAL
FEV1 %PRED-POST: 47 %
FEV1 %PRED-PRE: 43 %
FEV1 PRED: NORMAL
FEV1-POST: NORMAL
FEV1-PRE: NORMAL
FEV1/FVC %PRED-POST: NORMAL
FEV1/FVC %PRED-PRE: NORMAL
FEV1/FVC PRED: NORMAL
FEV1/FVC-POST: 68 %
FEV1/FVC-PRE: 68 %
FVC %PRED-POST: NORMAL
FVC %PRED-PRE: NORMAL
FVC PRED: NORMAL
FVC-POST: NORMAL
FVC-PRE: NORMAL
GAW %PRED: NORMAL
GAW PRED: NORMAL
GAW: NORMAL
IC %PRED: NORMAL
IC PRED: NORMAL
IC: NORMAL
MEP: NORMAL
MIP: NORMAL
MVV %PRED-PRE: NORMAL
MVV PRED: NORMAL
MVV-PRE: NORMAL
PEF %PRED-POST: NORMAL
PEF %PRED-PRE: NORMAL
PEF PRED: NORMAL
PEF%CHNG: NORMAL
PEF-POST: NORMAL
PEF-PRE: NORMAL
RAW %PRED: NORMAL
RAW PRED: NORMAL
RAW: NORMAL
RV %PRED: NORMAL
RV PRED: NORMAL
RV: NORMAL
SVC %PRED: NORMAL
SVC PRED: NORMAL
SVC: NORMAL
TLC %PRED: 77 %
TLC PRED: NORMAL
TLC: NORMAL
VA %PRED: NORMAL
VA PRED: NORMAL
VA: NORMAL
VTG %PRED: NORMAL
VTG PRED: NORMAL
VTG: NORMAL

## 2022-08-23 PROCEDURE — 94060 EVALUATION OF WHEEZING: CPT

## 2022-08-23 PROCEDURE — 6370000000 HC RX 637 (ALT 250 FOR IP): Performed by: INTERNAL MEDICINE

## 2022-08-23 PROCEDURE — 94726 PLETHYSMOGRAPHY LUNG VOLUMES: CPT

## 2022-08-23 PROCEDURE — 94729 DIFFUSING CAPACITY: CPT

## 2022-08-23 RX ORDER — ALBUTEROL SULFATE 90 UG/1
2 AEROSOL, METERED RESPIRATORY (INHALATION) ONCE
Status: COMPLETED | OUTPATIENT
Start: 2022-08-23 | End: 2022-08-23

## 2022-08-23 RX ADMIN — ALBUTEROL SULFATE 2 PUFF: 90 AEROSOL, METERED RESPIRATORY (INHALATION) at 15:09

## 2022-08-23 ASSESSMENT — PULMONARY FUNCTION TESTS
FEV1_PERCENT_PREDICTED_POST: 47
FEV1/FVC_PRE: 68
FEV1/FVC_POST: 68
FEV1_PERCENT_PREDICTED_PRE: 43

## 2022-09-01 NOTE — PROCEDURES
[address] [facility] [address]                [address] [place] [address], [address] [place] [address]                               PULMONARY FUNCTION    PATIENT NAME: Henry Bueno                       :        1960  MED REC NO:   [id number]                             ROOM:  ACCOUNT NO:   [de-identified]                           ADMIT DATE: 2022  PROVIDER:     Stephanie Glover MD    DATE OF PROCEDURE:  2022    TYPE OF STUDY:  Complete PFT. RESULTS:  The patient had FEV1 of 1.48, which is severely reduced at 43%  predicted. FVC was 2.17, which is severely reduced at 49% predicted. FEV1/FVC was 68. The mid-flows were severely reduced at 31% predicted. Total lung capacity was mildly reduced at 77% predicted. The residual  volume was increased at 133% predicted. Diffusion was normal at 96%  predicted. Following bronchodilators, there was no significant change  in the flows. IMPRESSION:  Severe obstructive pulmonary disease with mild associated  restriction and significant air trapping. Diffusion was normal.  There  was no significant response to bronchodilators. Clinical correlation is  recommended.         Meredith Carmen MD    D: 2022 18:28:31       T: 2022 18:30:50     [email or web address]  Job#: [id number]     Doc#: [id number]    CC:

## 2022-09-02 ENCOUNTER — APPOINTMENT (OUTPATIENT)
Dept: GENERAL RADIOLOGY | Age: 62
End: 2022-09-02
Payer: MEDICARE

## 2022-09-02 ENCOUNTER — HOSPITAL ENCOUNTER (EMERGENCY)
Age: 62
Discharge: HOME | End: 2022-09-02
Attending: EMERGENCY MEDICINE
Payer: MEDICARE

## 2022-09-02 VITALS
HEIGHT: 68 IN | OXYGEN SATURATION: 97 % | WEIGHT: 200 LBS | TEMPERATURE: 98.8 F | RESPIRATION RATE: 14 BRPM | HEART RATE: 89 BPM | SYSTOLIC BLOOD PRESSURE: 142 MMHG | BODY MASS INDEX: 30.31 KG/M2 | DIASTOLIC BLOOD PRESSURE: 97 MMHG

## 2022-09-02 DIAGNOSIS — L03.90 CELLULITIS, UNSPECIFIED CELLULITIS SITE: Primary | ICD-10-CM

## 2022-09-02 LAB
ABSOLUTE EOS #: 0.04 K/UL (ref 0–0.44)
ABSOLUTE IMMATURE GRANULOCYTE: 0.01 K/UL (ref 0–0.3)
ABSOLUTE LYMPH #: 0.99 K/UL (ref 1.1–3.7)
ABSOLUTE MONO #: 0.59 K/UL (ref 0.1–1.2)
ANION GAP SERPL CALCULATED.3IONS-SCNC: 8 MMOL/L (ref 9–17)
BASOPHILS # BLD: 1 % (ref 0–2)
BASOPHILS ABSOLUTE: 0.03 K/UL (ref 0–0.2)
BUN BLDV-MCNC: 14 MG/DL (ref 8–23)
BUN/CREAT BLD: 17 (ref 9–20)
C-REACTIVE PROTEIN: 4.2 MG/L (ref 0–5)
CALCIUM SERPL-MCNC: 9.6 MG/DL (ref 8.6–10.4)
CHLORIDE BLD-SCNC: 101 MMOL/L (ref 98–107)
CO2: 28 MMOL/L (ref 20–31)
CREAT SERPL-MCNC: 0.81 MG/DL (ref 0.7–1.2)
EOSINOPHILS RELATIVE PERCENT: 1 % (ref 1–4)
GFR AFRICAN AMERICAN: >60 ML/MIN
GFR NON-AFRICAN AMERICAN: >60 ML/MIN
GFR SERPL CREATININE-BSD FRML MDRD: ABNORMAL ML/MIN/{1.73_M2}
GLUCOSE BLD-MCNC: 136 MG/DL (ref 70–99)
HCT VFR BLD CALC: 46.7 % (ref 40.7–50.3)
HEMOGLOBIN: 14.3 G/DL (ref 13–17)
IMMATURE GRANULOCYTES: 0 %
LYMPHOCYTES # BLD: 16 % (ref 24–43)
MCH RBC QN AUTO: 26.8 PG (ref 25.2–33.5)
MCHC RBC AUTO-ENTMCNC: 30.6 G/DL (ref 28.4–34.8)
MCV RBC AUTO: 87.6 FL (ref 82.6–102.9)
MONOCYTES # BLD: 10 % (ref 3–12)
NRBC AUTOMATED: 0 PER 100 WBC
PDW BLD-RTO: 15.5 % (ref 11.8–14.4)
PLATELET # BLD: 94 K/UL (ref 138–453)
PMV BLD AUTO: 11.2 FL (ref 8.1–13.5)
POTASSIUM SERPL-SCNC: 4.1 MMOL/L (ref 3.7–5.3)
RBC # BLD: 5.33 M/UL (ref 4.21–5.77)
RBC # BLD: ABNORMAL 10*6/UL
SEDIMENTATION RATE, ERYTHROCYTE: 5 MM/HR (ref 0–20)
SEG NEUTROPHILS: 73 % (ref 36–65)
SEGMENTED NEUTROPHILS ABSOLUTE COUNT: 4.48 K/UL (ref 1.5–8.1)
SODIUM BLD-SCNC: 137 MMOL/L (ref 135–144)
WBC # BLD: 6.1 K/UL (ref 3.5–11.3)

## 2022-09-02 PROCEDURE — 73130 X-RAY EXAM OF HAND: CPT

## 2022-09-02 PROCEDURE — 6360000002 HC RX W HCPCS: Performed by: NURSE PRACTITIONER

## 2022-09-02 PROCEDURE — 96365 THER/PROPH/DIAG IV INF INIT: CPT

## 2022-09-02 PROCEDURE — 85652 RBC SED RATE AUTOMATED: CPT

## 2022-09-02 PROCEDURE — 85025 COMPLETE CBC W/AUTO DIFF WBC: CPT

## 2022-09-02 PROCEDURE — 86140 C-REACTIVE PROTEIN: CPT

## 2022-09-02 PROCEDURE — 2580000003 HC RX 258: Performed by: NURSE PRACTITIONER

## 2022-09-02 PROCEDURE — 80048 BASIC METABOLIC PNL TOTAL CA: CPT

## 2022-09-02 PROCEDURE — 99284 EMERGENCY DEPT VISIT MOD MDM: CPT

## 2022-09-02 RX ORDER — DOXYCYCLINE HYCLATE 100 MG
100 TABLET ORAL 2 TIMES DAILY
Qty: 20 TABLET | Refills: 0 | Status: SHIPPED | OUTPATIENT
Start: 2022-09-02 | End: 2022-09-12

## 2022-09-02 RX ORDER — CEPHALEXIN 500 MG/1
500 CAPSULE ORAL 4 TIMES DAILY
Qty: 40 CAPSULE | Refills: 0 | Status: SHIPPED | OUTPATIENT
Start: 2022-09-02 | End: 2022-09-12

## 2022-09-02 RX ADMIN — CEFEPIME 2000 MG: 2 INJECTION, POWDER, FOR SOLUTION INTRAVENOUS at 17:55

## 2022-09-02 ASSESSMENT — PAIN SCALES - GENERAL: PAINLEVEL_OUTOF10: 5

## 2022-09-02 ASSESSMENT — ENCOUNTER SYMPTOMS
COLOR CHANGE: 1
SHORTNESS OF BREATH: 0

## 2022-09-02 ASSESSMENT — PAIN - FUNCTIONAL ASSESSMENT: PAIN_FUNCTIONAL_ASSESSMENT: 0-10

## 2022-09-02 NOTE — ED PROVIDER NOTES
[facility] ED  eMERGENCY dEPARTMENT eNCOUnter      Pt Name: Antonio Shanks  MRN: [id number]  [place] 1960  Date of evaluation: 9/2/2022  Provider: ELY Mcdonald CNP    CHIEF COMPLAINT       Chief Complaint   Patient presents with    Cellulitis     R middle finger         HISTORY OF PRESENT ILLNESS  (Location/Symptom, Timing/Onset, Context/Setting, Quality, Duration, Modifying Factors, Severity.)   Antonio Shanks is a 59 y.o. male who presents to the emergency department via private auto for pain, swelling, erythema to his right middle finger. Onset was 3 days ago. He woke up with a raised area to to the distal dorsal finger. It has since gotten worse. He is concerned he was bit by a spider. Denies N/T, fever, chills. Rates his pain 5/10 at this time. His tetanus status is up to date per his records. Nursing Notes were reviewed. ALLERGIES     Patient has no known allergies. CURRENT MEDICATIONS       Previous Medications    AMIODARONE (CORDARONE) 200 MG TABLET    Take 1 tablet by mouth daily Take 2 tablets for 7 days and after that one tablet daily    APIXABAN (ELIQUIS PO)    Take by mouth Patient unsure of dosage / mg. ASCORBIC ACID (VITAMIN C) 250 MG TABLET    Take 500 mg by mouth three times a week     ASPIRIN 81 MG CHEWABLE TABLET    Take 81 mg by mouth daily Per Dr. Mj Sarmiento. Ok to stop 7 days prior to surgery    ATORVASTATIN (LIPITOR) 80 MG TABLET    Take 1 tablet by mouth nightly    BUMETANIDE (BUMEX) 1 MG TABLET    Take 1 tablet by mouth every morning AND 1 tablet Daily with lunch.     CARVEDILOL (COREG) 12.5 MG TABLET    Take 1 tablet by mouth 2 times daily (with meals)    CYANOCOBALAMIN (VITAMIN B-12 PO)    Take 1 tablet by mouth daily    LISINOPRIL (PRINIVIL;ZESTRIL) 5 MG TABLET    Take 1 tablet by mouth daily    MAGNESIUM CITRATE SOLUTION    Take 296 mLs by mouth once for 1 dose    METFORMIN (GLUCOPHAGE) 1000 MG TABLET    Take 1 tablet by mouth daily Resume on 5/21    MULTIPLE VITAMINS-MINERALS (THERAPEUTIC MULTIVITAMIN-MINERALS) TABLET    Take 1 tablet by mouth daily    NITROGLYCERIN (NITROSTAT) 0.4 MG SL TABLET    Place 0.4 mg under the tongue every 5 minutes as needed for Chest pain up to max of 3 total doses. If no relief after 1 dose, call 911. Dr. Kimber Hensley    OXYCODONE HCL PO    Take 1 tablet by mouth 3 times daily as needed Took 2 tabs 4-3-22 at HS    VITAMIN E 100 UNITS CAPSULE    Take 100 Units by mouth daily        PAST MEDICAL HISTORY         Diagnosis Date    CAD (coronary artery disease)     2020 cath    CHF (congestive heart failure) ([facility] [address]     Dr. Martino Cornea attack [facility])     Hyperlipidemia     Dr. Kimber Hensley    Hypertension     Dr. Kimber Hensley    MI (myocardial infarction) [facility])     MRSA (methicillin resistant staph aureus) culture positive 01/26/2018    abdomen    Skin cancer     Snores     no cpap    Stroke [facility])     2011  Dr. Kimber Hensley monitors    Wears dentures     upper full denture    Wears reading eyeglasses     Wellness examination     Venita Chaudhary PA-C last seen Nov 2020       SURGICAL HISTORY           Procedure Laterality Date    CARDIAC CATHETERIZATION  08/06/2020    Dr. Tejeda Deter therapy.   Report on chart    CARDIAC DEFIBRILLATOR PLACEMENT  04/04/2022    [address]    unsure of date, bilateral radials    CARDIOVERSION  11/17/2021    CORONARY ANGIOPLASTY WITH STENT PLACEMENT      6 total stents per patient    CORONARY ARTERY BYPASS GRAFT      4 vessel bypass    EYE SURGERY      eye injury  new lens  and laser lt eye 2019    HERNIA REPAIR Bilateral 12/21/2020    XI LAPAROSCOPIC ROBOTIC INGUINAL HERNIA REPAIR WITH MESH; UMBILICAL HERNIA REPAIR WITH MESH performed by Emily Prather DO at [address] [place] [address]      plate/hardware present    OTHER SURGICAL HISTORY  11/17/2021    BENITO    SKIN BIOPSY      back         FAMILY HISTORY           Problem Relation Age of Onset    Coronary Art Dis Father     Liver Disease Father     Alcohol Abuse Sister      Family Status   Relation Name Status    Father      Mother      Sister  Alive    Sister          SOCIAL HISTORY      reports that he quit smoking about 23 years ago. He has never used smokeless tobacco. He reports that he does not drink alcohol and does not use drugs. REVIEW OF SYSTEMS    (2-9 systems for level 4, 10 or more for level 5)     Review of Systems   Constitutional:  Negative for chills, diaphoresis, fatigue and fever. Respiratory:  Negative for shortness of breath. Cardiovascular:  Negative for chest pain. Musculoskeletal:  Positive for arthralgias and myalgias. Skin:  Positive for color change and wound. Negative for rash. Neurological:  Negative for weakness and numbness. Except as noted above the remainder of the review of systems was reviewed and negative. PHYSICAL EXAM    (up to 7 for level 4, 8 or more for level 5)     ED Triage Vitals [22 1511]   BP Temp Temp src Heart Rate Resp SpO2 Height Weight   (!) 142/97 98.8 °F (37.1 °C) -- 89 14 97 % 5' 8\" (1.727 m) 200 lb (90.7 kg)     Physical Exam  Cardiovascular:      Pulses: Normal pulses. Musculoskeletal:      Right wrist: No swelling, deformity, tenderness or bony tenderness. Normal range of motion. Normal pulse. Right hand: No deformity or bony tenderness. Normal capillary refill. Normal pulse. Hands:       Comments: Mild swelling to right middle finger with slight decreased flexion due to the pain and swelling. No obvious deformity noted. Distal sensation intact. Skin:     Capillary Refill: Capillary refill takes less than 2 seconds.           DIAGNOSTIC RESULTS     RADIOLOGY:   Non-plain film images such as CT, Ultrasound and MRI are read by the radiologist. Plain radiographic images are visualized and preliminarily interpreted by the emergency physician with the below findings:    Interpretation per the Radiologist below, if available at the time of this note:    XR HAND RIGHT (MIN 3 VIEWS)    Result Date: 9/2/2022  EXAMINATION: THREE XRAY VIEWS OF THE RIGHT HAND 9/2/2022 5:25 pm COMPARISON: None. HISTORY: ORDERING SYSTEM PROVIDED HISTORY: middle finger injury/infection TECHNOLOGIST PROVIDED HISTORY: middle finger injury/infection Reason for Exam: spider bite to middle finger of rt hand, injury/infection Additional signs and symptoms: spider bite to middle finger of rt hand, injury/infection FINDINGS: There is no evidence of acute fracture. There is normal alignment. No acute joint abnormality. No focal osseous lesion. No focal soft tissue abnormality. No acute osseous abnormality. Surgical clips along the radial aspect of the wrist.       LABS:  Labs Reviewed   BASIC METABOLIC PANEL - Abnormal; Notable for the following components:       Result Value    Glucose 136 (*)     Anion Gap 8 (*)     All other components within normal limits   CBC WITH AUTO DIFFERENTIAL - Abnormal; Notable for the following components:    RDW 15.5 (*)     Platelets 94 (*)     Seg Neutrophils 73 (*)     Lymphocytes 16 (*)     Absolute Lymph # 0.99 (*)     All other components within normal limits   SEDIMENTATION RATE   C-REACTIVE PROTEIN       All other labs were within normal range or not returned as of this dictation. EMERGENCY DEPARTMENT COURSE and DIFFERENTIAL DIAGNOSIS/MDM:   Vitals:    Vitals:    09/02/22 1511   BP: (!) 142/97   Pulse: 89   Resp: 14   Temp: 98.8 °F (37.1 °C)   SpO2: 97%   Weight: 200 lb (90.7 kg)   Height: 5' 8\" (1.727 m)         MEDICATIONS GIVEN IN THE ED:  Medications   cefepime (MAXIPIME) 2000 mg IVPB minibag (2,000 mg IntraVENous New Bag 9/2/22 1755)       CLINICAL DECISION MAKING:  The patient presented alert with a nontoxic appearance and was seen in conjunction with Dr. Yomi Hodgson. Prescriptions were written for keflex and doxycycline. Follow up with pcp for a recheck, further evaluation and treatment.  Evaluation and treatment course in the ED, and plan of care upon discharge was discussed in length with the patient. Patient had no further questions prior to being discharged and was instructed to return to the ED for new or worsening symptoms. Care was provided during an unprecedented national emergency due to the novel coronavirus, Covid-19. FINAL IMPRESSION      1. Cellulitis, unspecified cellulitis site            Problem List  Patient Active Problem List   Diagnosis Code    Hyperbilirubinemia E80.6    Essential hypertension I10    Hypercholesterolemia E78.00    CAD (coronary artery disease) I25.10    Gross hematuria R31.0    Abdominal pain, right upper quadrant R10.11    Right nephrolithiasis N20.0    Abnormal liver function test D24.1    Acute systolic HF (heart failure) ([facility]) I50.21    Noncompliance Z91.19    Acute on chronic systolic heart failure ([facility]) I50.23    Pneumonia J18.9    Dyspnea and respiratory abnormalities R06.00, R06.89    Status post inguinal hernia repair Z98.890, Z87.19    S/P repair of ventral hernia Z98.890, Z87.19    Post-op pain G89.18    Non-recurrent bilateral inguinal hernia without obstruction or gangrene I73.55    Umbilical hernia with obstruction, without gangrene K42.0    Status post implantation of automatic cardioverter/defibrillator (AICD) Z95.810    NSTEMI (non-ST elevated myocardial infarction) ([facility]) I21.4    ARIADNA (obstructive sleep apnea) G47.33    S/P CABG (coronary artery bypass graft) Z95.1    Nausea R11.0    Anorexia R63.0    Thrombocytopenia ([facility]) D69.6    Obesity (BMI 30-39. 9) E66.9    Hypokalemia E87.6    Uncontrolled type 2 diabetes mellitus with hyperglycemia [facility]) E11.65         DISPOSITION/PLAN   DISPOSITION Decision To Discharge 09/02/2022 06:38:56 PM      PATIENT REFERRED TO:   Pawel West, [address]  [phone]    Schedule an appointment as soon as possible for a visit       Neil Hayes MD  . [address] [facility]  [phone]    Schedule an

## 2022-10-04 ENCOUNTER — TELEPHONE (OUTPATIENT)
Dept: INFUSION THERAPY | Facility: MEDICAL CENTER | Age: 62
End: 2022-10-04

## 2022-10-04 NOTE — TELEPHONE ENCOUNTER
Patient has called leaving message that he wants to talk about how he feels. Has had lung test done, not eating, saw Osker Earthly and needs lungs or heart looked at. Patient currently scheduled 12/14/22 with Dr. Dalila Mcclellan. Routed to MD to determine if appointment should be soon.

## 2022-10-11 ENCOUNTER — APPOINTMENT (OUTPATIENT)
Dept: GENERAL RADIOLOGY | Age: 62
DRG: 291 | End: 2022-10-11
Payer: MEDICARE

## 2022-10-11 ENCOUNTER — HOSPITAL ENCOUNTER (INPATIENT)
Age: 62
LOS: 5 days | Discharge: HOME HEALTH | DRG: 291 | End: 2022-10-16
Attending: EMERGENCY MEDICINE | Admitting: INTERNAL MEDICINE
Payer: MEDICARE

## 2022-10-11 DIAGNOSIS — I50.9 CONGESTIVE HEART FAILURE, UNSPECIFIED HF CHRONICITY, UNSPECIFIED HEART FAILURE TYPE (HCC): Primary | ICD-10-CM

## 2022-10-11 PROBLEM — E11.9 TYPE 2 DIABETES MELLITUS, WITHOUT LONG-TERM CURRENT USE OF INSULIN (HCC): Status: ACTIVE | Noted: 2022-10-11

## 2022-10-11 PROBLEM — I50.43 ACUTE ON CHRONIC COMBINED SYSTOLIC AND DIASTOLIC CHF (CONGESTIVE HEART FAILURE) (HCC): Status: ACTIVE | Noted: 2022-01-01

## 2022-10-11 PROBLEM — I48.0 PAROXYSMAL ATRIAL FIBRILLATION (HCC): Status: ACTIVE | Noted: 2022-10-11

## 2022-10-11 LAB
ABSOLUTE EOS #: 0.06 K/UL (ref 0–0.44)
ABSOLUTE IMMATURE GRANULOCYTE: 0.01 K/UL (ref 0–0.3)
ABSOLUTE LYMPH #: 0.82 K/UL (ref 1.1–3.7)
ABSOLUTE MONO #: 0.62 K/UL (ref 0.1–1.2)
ALBUMIN SERPL-MCNC: 3.8 G/DL (ref 3.5–5.2)
ALP BLD-CCNC: 178 U/L (ref 40–129)
ALT SERPL-CCNC: 18 U/L (ref 5–41)
ANION GAP SERPL CALCULATED.3IONS-SCNC: 12 MMOL/L (ref 9–17)
AST SERPL-CCNC: 25 U/L
BASOPHILS # BLD: 0 % (ref 0–2)
BASOPHILS ABSOLUTE: <0.03 K/UL (ref 0–0.2)
BILIRUB SERPL-MCNC: 2 MG/DL (ref 0.3–1.2)
BUN BLDV-MCNC: 22 MG/DL (ref 8–23)
BUN/CREAT BLD: 22 (ref 9–20)
CALCIUM SERPL-MCNC: 9.2 MG/DL (ref 8.6–10.4)
CHLORIDE BLD-SCNC: 99 MMOL/L (ref 98–107)
CO2: 27 MMOL/L (ref 20–31)
CREAT SERPL-MCNC: 0.99 MG/DL (ref 0.7–1.2)
EOSINOPHILS RELATIVE PERCENT: 1 % (ref 1–4)
GFR SERPL CREATININE-BSD FRML MDRD: >60 ML/MIN/1.73M2
GLUCOSE BLD-MCNC: 148 MG/DL (ref 70–99)
HCT VFR BLD CALC: 43.1 % (ref 40.7–50.3)
HEMOGLOBIN: 13.3 G/DL (ref 13–17)
IMMATURE GRANULOCYTES: 0 %
LYMPHOCYTES # BLD: 15 % (ref 24–43)
MCH RBC QN AUTO: 26.4 PG (ref 25.2–33.5)
MCHC RBC AUTO-ENTMCNC: 30.9 G/DL (ref 28.4–34.8)
MCV RBC AUTO: 85.5 FL (ref 82.6–102.9)
MONOCYTES # BLD: 11 % (ref 3–12)
MYOGLOBIN: 49 NG/ML (ref 28–72)
MYOGLOBIN: 56 NG/ML (ref 28–72)
NRBC AUTOMATED: 0 PER 100 WBC
PDW BLD-RTO: 16.3 % (ref 11.8–14.4)
PLATELET # BLD: 110 K/UL (ref 138–453)
PMV BLD AUTO: 11.9 FL (ref 8.1–13.5)
POTASSIUM SERPL-SCNC: 3.7 MMOL/L (ref 3.7–5.3)
PRO-BNP: 631 PG/ML
RBC # BLD: 5.04 M/UL (ref 4.21–5.77)
RBC # BLD: ABNORMAL 10*6/UL
SEG NEUTROPHILS: 73 % (ref 36–65)
SEGMENTED NEUTROPHILS ABSOLUTE COUNT: 4.06 K/UL (ref 1.5–8.1)
SODIUM BLD-SCNC: 138 MMOL/L (ref 135–144)
TOTAL PROTEIN: 6.6 G/DL (ref 6.4–8.3)
TROPONIN, HIGH SENSITIVITY: 25 NG/L (ref 0–22)
TROPONIN, HIGH SENSITIVITY: 25 NG/L (ref 0–22)
WBC # BLD: 5.6 K/UL (ref 3.5–11.3)

## 2022-10-11 PROCEDURE — 2060000000 HC ICU INTERMEDIATE R&B

## 2022-10-11 PROCEDURE — 83874 ASSAY OF MYOGLOBIN: CPT

## 2022-10-11 PROCEDURE — 99285 EMERGENCY DEPT VISIT HI MDM: CPT

## 2022-10-11 PROCEDURE — 93005 ELECTROCARDIOGRAM TRACING: CPT | Performed by: PHYSICIAN ASSISTANT

## 2022-10-11 PROCEDURE — 6360000002 HC RX W HCPCS: Performed by: PHYSICIAN ASSISTANT

## 2022-10-11 PROCEDURE — 36415 COLL VENOUS BLD VENIPUNCTURE: CPT

## 2022-10-11 PROCEDURE — 2500000003 HC RX 250 WO HCPCS: Performed by: PHYSICIAN ASSISTANT

## 2022-10-11 PROCEDURE — 99222 1ST HOSP IP/OBS MODERATE 55: CPT | Performed by: NURSE PRACTITIONER

## 2022-10-11 PROCEDURE — 71045 X-RAY EXAM CHEST 1 VIEW: CPT

## 2022-10-11 PROCEDURE — 84484 ASSAY OF TROPONIN QUANT: CPT

## 2022-10-11 PROCEDURE — 83880 ASSAY OF NATRIURETIC PEPTIDE: CPT

## 2022-10-11 PROCEDURE — 83036 HEMOGLOBIN GLYCOSYLATED A1C: CPT

## 2022-10-11 PROCEDURE — 80053 COMPREHEN METABOLIC PANEL: CPT

## 2022-10-11 PROCEDURE — 85025 COMPLETE CBC W/AUTO DIFF WBC: CPT

## 2022-10-11 PROCEDURE — 96374 THER/PROPH/DIAG INJ IV PUSH: CPT

## 2022-10-11 PROCEDURE — 83540 ASSAY OF IRON: CPT

## 2022-10-11 PROCEDURE — 83550 IRON BINDING TEST: CPT

## 2022-10-11 PROCEDURE — 96375 TX/PRO/DX INJ NEW DRUG ADDON: CPT

## 2022-10-11 RX ORDER — SODIUM CHLORIDE 0.9 % (FLUSH) 0.9 %
5-40 SYRINGE (ML) INJECTION EVERY 12 HOURS SCHEDULED
Status: DISCONTINUED | OUTPATIENT
Start: 2022-10-11 | End: 2022-10-16 | Stop reason: HOSPADM

## 2022-10-11 RX ORDER — ONDANSETRON 2 MG/ML
4 INJECTION INTRAMUSCULAR; INTRAVENOUS ONCE
Status: COMPLETED | OUTPATIENT
Start: 2022-10-11 | End: 2022-10-11

## 2022-10-11 RX ORDER — POTASSIUM CHLORIDE 20 MEQ/1
40 TABLET, EXTENDED RELEASE ORAL PRN
Status: DISCONTINUED | OUTPATIENT
Start: 2022-10-11 | End: 2022-10-16 | Stop reason: HOSPADM

## 2022-10-11 RX ORDER — ASPIRIN 81 MG/1
81 TABLET, CHEWABLE ORAL DAILY
Status: DISCONTINUED | OUTPATIENT
Start: 2022-10-12 | End: 2022-10-13

## 2022-10-11 RX ORDER — ACETAMINOPHEN 650 MG/1
650 SUPPOSITORY RECTAL EVERY 6 HOURS PRN
Status: DISCONTINUED | OUTPATIENT
Start: 2022-10-11 | End: 2022-10-16 | Stop reason: HOSPADM

## 2022-10-11 RX ORDER — INSULIN LISPRO 100 [IU]/ML
0-4 INJECTION, SOLUTION INTRAVENOUS; SUBCUTANEOUS NIGHTLY
Status: DISCONTINUED | OUTPATIENT
Start: 2022-10-11 | End: 2022-10-16 | Stop reason: HOSPADM

## 2022-10-11 RX ORDER — SODIUM CHLORIDE 0.9 % (FLUSH) 0.9 %
10 SYRINGE (ML) INJECTION PRN
Status: DISCONTINUED | OUTPATIENT
Start: 2022-10-11 | End: 2022-10-16 | Stop reason: HOSPADM

## 2022-10-11 RX ORDER — ONDANSETRON 4 MG/1
4 TABLET, ORALLY DISINTEGRATING ORAL EVERY 8 HOURS PRN
Status: DISCONTINUED | OUTPATIENT
Start: 2022-10-11 | End: 2022-10-16 | Stop reason: HOSPADM

## 2022-10-11 RX ORDER — LISINOPRIL 5 MG/1
5 TABLET ORAL DAILY
Status: DISCONTINUED | OUTPATIENT
Start: 2022-10-12 | End: 2022-10-13

## 2022-10-11 RX ORDER — DEXTROSE MONOHYDRATE 100 MG/ML
INJECTION, SOLUTION INTRAVENOUS CONTINUOUS PRN
Status: DISCONTINUED | OUTPATIENT
Start: 2022-10-11 | End: 2022-10-16 | Stop reason: HOSPADM

## 2022-10-11 RX ORDER — ATORVASTATIN CALCIUM 80 MG/1
80 TABLET, FILM COATED ORAL NIGHTLY
Status: DISCONTINUED | OUTPATIENT
Start: 2022-10-11 | End: 2022-10-16 | Stop reason: HOSPADM

## 2022-10-11 RX ORDER — SODIUM CHLORIDE 9 MG/ML
INJECTION, SOLUTION INTRAVENOUS PRN
Status: DISCONTINUED | OUTPATIENT
Start: 2022-10-11 | End: 2022-10-16 | Stop reason: HOSPADM

## 2022-10-11 RX ORDER — MAGNESIUM SULFATE IN WATER 40 MG/ML
2000 INJECTION, SOLUTION INTRAVENOUS PRN
Status: DISCONTINUED | OUTPATIENT
Start: 2022-10-11 | End: 2022-10-16 | Stop reason: HOSPADM

## 2022-10-11 RX ORDER — OXYCODONE HYDROCHLORIDE 5 MG/1
5 TABLET ORAL 3 TIMES DAILY PRN
Status: DISCONTINUED | OUTPATIENT
Start: 2022-10-11 | End: 2022-10-16 | Stop reason: HOSPADM

## 2022-10-11 RX ORDER — POLYETHYLENE GLYCOL 3350 17 G/17G
17 POWDER, FOR SOLUTION ORAL DAILY PRN
Status: DISCONTINUED | OUTPATIENT
Start: 2022-10-11 | End: 2022-10-16 | Stop reason: HOSPADM

## 2022-10-11 RX ORDER — ACETAMINOPHEN 325 MG/1
650 TABLET ORAL EVERY 6 HOURS PRN
Status: DISCONTINUED | OUTPATIENT
Start: 2022-10-11 | End: 2022-10-16 | Stop reason: HOSPADM

## 2022-10-11 RX ORDER — ONDANSETRON 2 MG/ML
4 INJECTION INTRAMUSCULAR; INTRAVENOUS EVERY 6 HOURS PRN
Status: DISCONTINUED | OUTPATIENT
Start: 2022-10-11 | End: 2022-10-16 | Stop reason: HOSPADM

## 2022-10-11 RX ORDER — INSULIN LISPRO 100 [IU]/ML
0-4 INJECTION, SOLUTION INTRAVENOUS; SUBCUTANEOUS
Status: DISCONTINUED | OUTPATIENT
Start: 2022-10-12 | End: 2022-10-16 | Stop reason: HOSPADM

## 2022-10-11 RX ORDER — AMIODARONE HYDROCHLORIDE 200 MG/1
200 TABLET ORAL DAILY
Status: DISCONTINUED | OUTPATIENT
Start: 2022-10-12 | End: 2022-10-16 | Stop reason: HOSPADM

## 2022-10-11 RX ORDER — POTASSIUM CHLORIDE 7.45 MG/ML
10 INJECTION INTRAVENOUS PRN
Status: DISCONTINUED | OUTPATIENT
Start: 2022-10-11 | End: 2022-10-16 | Stop reason: HOSPADM

## 2022-10-11 RX ORDER — BUMETANIDE 0.25 MG/ML
1 INJECTION, SOLUTION INTRAMUSCULAR; INTRAVENOUS EVERY 12 HOURS
Status: DISCONTINUED | OUTPATIENT
Start: 2022-10-12 | End: 2022-10-13

## 2022-10-11 RX ORDER — BUMETANIDE 0.25 MG/ML
1 INJECTION, SOLUTION INTRAMUSCULAR; INTRAVENOUS ONCE
Status: COMPLETED | OUTPATIENT
Start: 2022-10-11 | End: 2022-10-11

## 2022-10-11 RX ORDER — MORPHINE SULFATE 4 MG/ML
4 INJECTION, SOLUTION INTRAMUSCULAR; INTRAVENOUS ONCE
Status: COMPLETED | OUTPATIENT
Start: 2022-10-11 | End: 2022-10-11

## 2022-10-11 RX ORDER — CARVEDILOL 12.5 MG/1
12.5 TABLET ORAL 2 TIMES DAILY WITH MEALS
Status: DISCONTINUED | OUTPATIENT
Start: 2022-10-12 | End: 2022-10-16 | Stop reason: HOSPADM

## 2022-10-11 RX ADMIN — MORPHINE SULFATE 4 MG: 4 INJECTION, SOLUTION INTRAMUSCULAR; INTRAVENOUS at 17:37

## 2022-10-11 RX ADMIN — BUMETANIDE 1 MG: 0.25 INJECTION INTRAMUSCULAR; INTRAVENOUS at 20:38

## 2022-10-11 RX ADMIN — ONDANSETRON 4 MG: 2 INJECTION INTRAMUSCULAR; INTRAVENOUS at 17:38

## 2022-10-11 ASSESSMENT — ENCOUNTER SYMPTOMS
VOMITING: 0
CHEST TIGHTNESS: 0
COUGH: 0
COLOR CHANGE: 0
CONSTIPATION: 0
NAUSEA: 0
ABDOMINAL PAIN: 0
SHORTNESS OF BREATH: 1
DIARRHEA: 0

## 2022-10-11 NOTE — ED PROVIDER NOTES
eMERGENCY dEPARTMENT eNCOUnter   Independent Attestation     Pt Name: Tessa Johnson  MRN: [id number]  [place] 1960  Date of evaluation: 10/11/22     Tessa Johnson is a 59 y.o. male with CC: Leg Swelling ([address] for three days. Sent in from PCP)        This visit was performed by both a physician and an APC. I performed all aspects of the MDM as documented.       The care is provided during an unprecedented national emergency due to the novel coronavirus, Ivin Buerger, MD  Attending Emergency Physician            Brandee Haines MD  10/11/22 1940

## 2022-10-11 NOTE — ED PROVIDER NOTES
every morning AND 1 tablet Daily with lunch. Qty: 60 tablet, Refills: 3      nitroGLYCERIN (NITROSTAT) 0.4 MG SL tablet Place 0.4 mg under the tongue every 5 minutes as needed for Chest pain up to max of 3 total doses. If no relief after 1 dose, call 911. Dr. Aly Orosco      atorvastatin (LIPITOR) 80 MG tablet Take 1 tablet by mouth nightly  Qty: 30 tablet, Refills: 3      carvedilol (COREG) 12.5 MG tablet Take 1 tablet by mouth 2 times daily (with meals)  Qty: 60 tablet, Refills: 3      lisinopril (PRINIVIL;ZESTRIL) 5 MG tablet Take 1 tablet by mouth daily  Qty: 30 tablet, Refills: 3      aspirin 81 MG chewable tablet Take 81 mg by mouth daily Per Dr. Aly Orosco. Ok to stop 7 days prior to surgery             PAST MEDICAL HISTORY         Diagnosis Date    CAD (coronary artery disease)     2020 cath    CHF (congestive heart failure) ([facility])     Dr. Heraclio Roche attack [facility])     Hyperlipidemia     Dr. Aly Orosco    Hypertension     Dr. Aly Orosco    MI (myocardial infarction) [facility])     MRSA (methicillin resistant staph aureus) culture positive 01/26/2018    abdomen    Skin cancer     Snores     no cpap    Stroke [facility])     2011  Dr. Aly Orosco monitors    Wears dentures     upper full denture    Wears reading eyeglasses     Wellness examination     Crys Gross PA-C last seen Nov 2020       SURGICAL HISTORY           Procedure Laterality Date    CARDIAC CATHETERIZATION  08/06/2020    Dr. Jaleel Fernando therapy.   Report on chart    CARDIAC DEFIBRILLATOR PLACEMENT  04/04/2022    [address]    unsure of date, bilateral radials    CARDIOVERSION  11/17/2021    CORONARY ANGIOPLASTY WITH STENT PLACEMENT      6 total stents per patient    CORONARY ARTERY BYPASS GRAFT      4 vessel bypass    EYE SURGERY      eye injury  new lens  and laser lt eye 2019    HERNIA REPAIR Bilateral 12/21/2020    XI LAPAROSCOPIC ROBOTIC INGUINAL HERNIA REPAIR WITH MESH; UMBILICAL HERNIA REPAIR WITH MESH performed by Yolanda Colmenares DO at [facility] MANDIBLE SURGERY      plate/hardware present    OTHER SURGICAL HISTORY  2021    BENITO    SKIN BIOPSY      back         HISTORY           Problem Relation Age of Onset    Coronary Art Dis Father     Liver Disease Father     Alcohol Abuse Sister      Family Status   Relation Name Status    Father      Mother      Sister  Alive    Sister          SOCIAL HISTORY      reports that he quit smoking about 23 years ago. He has never used smokeless tobacco. He reports that he does not drink alcohol and does not use drugs. REVIEW OFSYSTEMS    (2-9 systems for level 4, 10 or more for level 5)   Review of Systems    Except as noted above the remainder of the review of systems was reviewed and negative. PHYSICAL EXAM    (up to 7 for level 4, 8 or more for level 5)     ED Triage Vitals   BP Temp Temp Source Heart Rate Resp SpO2 Height Weight   10/11/22 1538 10/11/22 1535 10/11/22 1535 10/11/22 1535 10/11/22 1535 10/11/22 1535 10/11/22 1535 10/11/22 1535   106/77 98 °F (36.7 °C) Oral 96 (!) 1 96 % 5' 9\" (1.753 m) 220 lb (99.8 kg)     Physical Exam  Constitutional:       Appearance: He is well-developed. HENT:      Head: Normocephalic and atraumatic. Cardiovascular:      Rate and Rhythm: Normal rate and regular rhythm. Pulmonary:      Effort: Pulmonary effort is normal.      Breath sounds: Normal breath sounds. Abdominal:      Palpations: Abdomen is soft. Musculoskeletal:         General: Normal range of motion. Cervical back: Normal range of motion and neck supple. Comments: Abrasions to bilateral lower extremities. Pitting edema bilaterally. With bilateral leg weeping   Skin:     General: Skin is warm. Neurological:      Mental Status: He is alert and oriented to person, place, and time.    Psychiatric:         Behavior: Behavior normal.               DIAGNOSTIC RESULTS     EKG: All EKG's are interpreted by the Emergency Department Physician who either signs or Co-signs this chart in the absence of a cardiologist.        RADIOLOGY:   Non-plain film images such as CT, Ultrasound and MRI are read by the radiologist. Plain radiographic images arevisualized and preliminarily interpreted by the emergency physician with the below findings:        Interpretation per the Radiologist below, if available at thetime of this note:          ED BEDSIDE ULTRASOUND:   Performed by ED Physician - none    LABS:  Labs Reviewed   BRAIN NATRIURETIC PEPTIDE - Abnormal; Notable for the following components:       Result Value    Pro- (*)     All other components within normal limits   TROP/MYOGLOBIN - Abnormal; Notable for the following components:    Troponin, High Sensitivity 25 (*)     All other components within normal limits   TROP/MYOGLOBIN - Abnormal; Notable for the following components:    Troponin, High Sensitivity 25 (*)     All other components within normal limits   CBC WITH AUTO DIFFERENTIAL - Abnormal; Notable for the following components:    RDW 16.3 (*)     Platelets 158 (*)     Seg Neutrophils 73 (*)     Lymphocytes 15 (*)     Absolute Lymph # 0.82 (*)     All other components within normal limits   COMPREHENSIVE METABOLIC PANEL - Abnormal; Notable for the following components:    Glucose 148 (*)     Bun/Cre Ratio 22 (*)     Alkaline Phosphatase 178 (*)     Total Bilirubin 2.0 (*)     All other components within normal limits   TROP/MYOGLOBIN   BASIC METABOLIC PANEL   BRAIN NATRIURETIC PEPTIDE   LIPID PANEL   TSH   MAGNESIUM   CBC WITH AUTO DIFFERENTIAL   IRON AND TIBC   HEMOGLOBIN A1C   POCT GLUCOSE   POCT GLUCOSE       All other labs were within normal range or not returned as of this dictation.     EMERGENCY DEPARTMENT COURSE and DIFFERENTIAL DIAGNOSIS/MDM:   Vitals:    Vitals:    10/11/22 1900 10/11/22 2015 10/11/22 2030 10/11/22 2322   BP: 114/73 123/88 (!) 133/102 135/80   Pulse: 75 74 80 80   Resp:    16   Temp:    97.5 °F (36.4 °C)   TempSrc:    Oral   SpO2:    93% Weight:       Height:         HEARTSCORE:    Will admit for chf.      Given bumex iv. Legs weeping    Short of breath,    Xr concernng for chf.    8:12 PM EDT  Spoke with link meyer from [facility] and admission was accepted     CONSULTS:  IP CONSULT TO HOSPITALIST  IP CONSULT TO DIETITIAN    PROCEDURES:  Procedures  CRITICAL CARE TIME     Due to the high probability of sudden and clinically significant deterioration in the patient's condition he required highest level of my preparedness to intervene urgently. I provided critical care time including documentation time, medication orders and management, reevaluation, vital sign assessment, ordering and reviewing of of lab tests ordering and reviewing of x-ray studies, and admission orders. Aggregate critical care time is between 35  minutes including only time during which I was engaged in work directly related to his care and did not include time spent treating other patients simultaneously. FINAL IMPRESSION      1. Congestive heart failure, unspecified HF chronicity, unspecified heart failure type [facility])          DISPOSITION/PLAN   DISPOSITION Admitted 10/11/2022 08:21:31 PM      PATIENTREFERRED TO:   No follow-up provider specified.     DISCHARGE MEDICATIONS:     Current Discharge Medication List              (Please note that portions of this note were completed with a voice recognition program.  Efforts were made to edit thedictations but occasionally words are mis-transcribed.)    JEYSON Aceves PA-C  10/12/22 0004

## 2022-10-12 ENCOUNTER — APPOINTMENT (OUTPATIENT)
Dept: GENERAL RADIOLOGY | Age: 62
DRG: 291 | End: 2022-10-12
Payer: MEDICARE

## 2022-10-12 LAB
ABSOLUTE EOS #: 0.06 K/UL (ref 0–0.44)
ABSOLUTE IMMATURE GRANULOCYTE: 0.02 K/UL (ref 0–0.3)
ABSOLUTE LYMPH #: 1.42 K/UL (ref 1.1–3.7)
ABSOLUTE MONO #: 0.77 K/UL (ref 0.1–1.2)
ANION GAP SERPL CALCULATED.3IONS-SCNC: 10 MMOL/L (ref 9–17)
BASOPHILS # BLD: 1 % (ref 0–2)
BASOPHILS ABSOLUTE: 0.03 K/UL (ref 0–0.2)
BUN BLDV-MCNC: 21 MG/DL (ref 8–23)
BUN/CREAT BLD: 19 (ref 9–20)
CALCIUM SERPL-MCNC: 9.1 MG/DL (ref 8.6–10.4)
CHLORIDE BLD-SCNC: 99 MMOL/L (ref 98–107)
CHOLESTEROL/HDL RATIO: 9
CHOLESTEROL: 242 MG/DL
CO2: 31 MMOL/L (ref 20–31)
CREAT SERPL-MCNC: 1.09 MG/DL (ref 0.7–1.2)
EKG ATRIAL RATE: 71 BPM
EKG P AXIS: 80 DEGREES
EKG P-R INTERVAL: 244 MS
EKG Q-T INTERVAL: 470 MS
EKG QRS DURATION: 148 MS
EKG QTC CALCULATION (BAZETT): 510 MS
EKG R AXIS: 96 DEGREES
EKG T AXIS: 64 DEGREES
EKG VENTRICULAR RATE: 71 BPM
EOSINOPHILS RELATIVE PERCENT: 1 % (ref 1–4)
ESTIMATED AVERAGE GLUCOSE: 166 MG/DL
GFR SERPL CREATININE-BSD FRML MDRD: >60 ML/MIN/1.73M2
GLUCOSE BLD-MCNC: 112 MG/DL (ref 70–99)
GLUCOSE BLD-MCNC: 119 MG/DL (ref 75–110)
GLUCOSE BLD-MCNC: 122 MG/DL (ref 75–110)
GLUCOSE BLD-MCNC: 159 MG/DL (ref 75–110)
GLUCOSE BLD-MCNC: 171 MG/DL (ref 75–110)
GLUCOSE BLD-MCNC: 201 MG/DL (ref 75–110)
HBA1C MFR BLD: 7.4 % (ref 4–6)
HCT VFR BLD CALC: 43.6 % (ref 40.7–50.3)
HDLC SERPL-MCNC: 27 MG/DL
HEMOGLOBIN: 13.2 G/DL (ref 13–17)
IMMATURE GRANULOCYTES: 0 %
IRON SATURATION: 8 % (ref 20–55)
IRON: 39 UG/DL (ref 59–158)
LDL CHOLESTEROL: 199 MG/DL (ref 0–130)
LYMPHOCYTES # BLD: 23 % (ref 24–43)
MAGNESIUM: 1.8 MG/DL (ref 1.6–2.6)
MCH RBC QN AUTO: 26.3 PG (ref 25.2–33.5)
MCHC RBC AUTO-ENTMCNC: 30.3 G/DL (ref 28.4–34.8)
MCV RBC AUTO: 87 FL (ref 82.6–102.9)
MONOCYTES # BLD: 13 % (ref 3–12)
MYOGLOBIN: 48 NG/ML (ref 28–72)
NRBC AUTOMATED: 0 PER 100 WBC
PDW BLD-RTO: 16.5 % (ref 11.8–14.4)
PLATELET # BLD: 109 K/UL (ref 138–453)
PMV BLD AUTO: 12.1 FL (ref 8.1–13.5)
POTASSIUM SERPL-SCNC: 3.9 MMOL/L (ref 3.7–5.3)
PRO-BNP: 742 PG/ML
RBC # BLD: 5.01 M/UL (ref 4.21–5.77)
RBC # BLD: ABNORMAL 10*6/UL
SEG NEUTROPHILS: 62 % (ref 36–65)
SEGMENTED NEUTROPHILS ABSOLUTE COUNT: 3.79 K/UL (ref 1.5–8.1)
SODIUM BLD-SCNC: 140 MMOL/L (ref 135–144)
TOTAL IRON BINDING CAPACITY: 463 UG/DL (ref 250–450)
TRIGL SERPL-MCNC: 82 MG/DL
TROPONIN, HIGH SENSITIVITY: 26 NG/L (ref 0–22)
TSH SERPL DL<=0.05 MIU/L-ACNC: 1.86 UIU/ML (ref 0.3–5)
UNSATURATED IRON BINDING CAPACITY: 424 UG/DL (ref 112–347)
WBC # BLD: 6.1 K/UL (ref 3.5–11.3)

## 2022-10-12 PROCEDURE — 97535 SELF CARE MNGMENT TRAINING: CPT

## 2022-10-12 PROCEDURE — 80048 BASIC METABOLIC PNL TOTAL CA: CPT

## 2022-10-12 PROCEDURE — 97530 THERAPEUTIC ACTIVITIES: CPT

## 2022-10-12 PROCEDURE — 85025 COMPLETE CBC W/AUTO DIFF WBC: CPT

## 2022-10-12 PROCEDURE — 97163 PT EVAL HIGH COMPLEX 45 MIN: CPT

## 2022-10-12 PROCEDURE — 82947 ASSAY GLUCOSE BLOOD QUANT: CPT

## 2022-10-12 PROCEDURE — 6370000000 HC RX 637 (ALT 250 FOR IP): Performed by: NURSE PRACTITIONER

## 2022-10-12 PROCEDURE — 84443 ASSAY THYROID STIM HORMONE: CPT

## 2022-10-12 PROCEDURE — 97116 GAIT TRAINING THERAPY: CPT

## 2022-10-12 PROCEDURE — 2500000003 HC RX 250 WO HCPCS: Performed by: NURSE PRACTITIONER

## 2022-10-12 PROCEDURE — 6360000002 HC RX W HCPCS: Performed by: NURSE PRACTITIONER

## 2022-10-12 PROCEDURE — 80061 LIPID PANEL: CPT

## 2022-10-12 PROCEDURE — 2580000003 HC RX 258: Performed by: NURSE PRACTITIONER

## 2022-10-12 PROCEDURE — 2060000000 HC ICU INTERMEDIATE R&B

## 2022-10-12 PROCEDURE — 83880 ASSAY OF NATRIURETIC PEPTIDE: CPT

## 2022-10-12 PROCEDURE — 97167 OT EVAL HIGH COMPLEX 60 MIN: CPT

## 2022-10-12 PROCEDURE — 83735 ASSAY OF MAGNESIUM: CPT

## 2022-10-12 PROCEDURE — 36415 COLL VENOUS BLD VENIPUNCTURE: CPT

## 2022-10-12 PROCEDURE — 99232 SBSQ HOSP IP/OBS MODERATE 35: CPT | Performed by: INTERNAL MEDICINE

## 2022-10-12 RX ORDER — AMIODARONE HYDROCHLORIDE 200 MG/1
200 TABLET ORAL DAILY
Status: ON HOLD | COMMUNITY
End: 2022-10-15 | Stop reason: HOSPADM

## 2022-10-12 RX ORDER — BUMETANIDE 1 MG/1
1-2 TABLET ORAL SEE ADMIN INSTRUCTIONS
Status: ON HOLD | COMMUNITY
End: 2022-10-15 | Stop reason: HOSPADM

## 2022-10-12 RX ADMIN — SODIUM CHLORIDE, PRESERVATIVE FREE 10 ML: 5 INJECTION INTRAVENOUS at 20:05

## 2022-10-12 RX ADMIN — AMIODARONE HYDROCHLORIDE 200 MG: 200 TABLET ORAL at 08:21

## 2022-10-12 RX ADMIN — BUMETANIDE 1 MG: 0.25 INJECTION INTRAMUSCULAR; INTRAVENOUS at 08:21

## 2022-10-12 RX ADMIN — LISINOPRIL 5 MG: 5 TABLET ORAL at 08:21

## 2022-10-12 RX ADMIN — ASPIRIN 81 MG 81 MG: 81 TABLET ORAL at 08:22

## 2022-10-12 RX ADMIN — SODIUM CHLORIDE, PRESERVATIVE FREE 10 ML: 5 INJECTION INTRAVENOUS at 00:40

## 2022-10-12 RX ADMIN — METFORMIN HYDROCHLORIDE 1000 MG: 500 TABLET ORAL at 08:21

## 2022-10-12 RX ADMIN — BUMETANIDE 1 MG: 0.25 INJECTION INTRAMUSCULAR; INTRAVENOUS at 20:05

## 2022-10-12 RX ADMIN — CARVEDILOL 12.5 MG: 12.5 TABLET, FILM COATED ORAL at 08:21

## 2022-10-12 RX ADMIN — ATORVASTATIN CALCIUM 80 MG: 80 TABLET, FILM COATED ORAL at 00:39

## 2022-10-12 RX ADMIN — ONDANSETRON 4 MG: 2 INJECTION INTRAMUSCULAR; INTRAVENOUS at 11:06

## 2022-10-12 RX ADMIN — OXYCODONE 5 MG: 5 TABLET ORAL at 08:21

## 2022-10-12 RX ADMIN — CARVEDILOL 12.5 MG: 12.5 TABLET, FILM COATED ORAL at 18:12

## 2022-10-12 RX ADMIN — RIVAROXABAN 20 MG: 20 TABLET, FILM COATED ORAL at 20:05

## 2022-10-12 RX ADMIN — ATORVASTATIN CALCIUM 80 MG: 80 TABLET, FILM COATED ORAL at 20:05

## 2022-10-12 RX ADMIN — SODIUM CHLORIDE, PRESERVATIVE FREE 10 ML: 5 INJECTION INTRAVENOUS at 08:22

## 2022-10-12 ASSESSMENT — PAIN SCALES - GENERAL: PAINLEVEL_OUTOF10: 8

## 2022-10-12 ASSESSMENT — PAIN DESCRIPTION - LOCATION: LOCATION: LEG

## 2022-10-12 NOTE — PROGRESS NOTES
Occupational Therapy  Facility/Department: [facility] CARE  Occupational Therapy Initial Assessment    Name: Nohemy Sanches  : 1960  MRN: [id number]  Date of Service: 10/12/2022    RN reports patient is medically stable for therapy treatment this date. Chart reviewed prior to treatment and patient is agreeable for therapy. All lines intact and patient positioned comfortably at end of treatment. All patient needs addressed prior to ending therapy session. Nohemy Sanches is a 59 y.o. male who presents to the emergency department with leg swelling for the last 3 days. Describes it as weeping. Patient also reports some shortness of breath and labored breathing. Reports history of CHF and MI in the past.  Patient has a defibrillator AICD. Patient denies any recent or active chest pain. No current chest pain. Patient Diagnosis(es): The encounter diagnosis was Congestive heart failure, unspecified HF chronicity, unspecified heart failure type ([address] Past Medical History:  has a past medical history of CAD (coronary artery disease), CHF (congestive heart failure) ([facility] Heart attack ([address] Hyperlipidemia, Hypertension, MI (myocardial infarction) ([address] MRSA (methicillin resistant staph aureus) culture positive, Skin cancer, Snores, Stroke [facility]), Wears dentures, Wears reading eyeglasses, and Wellness examination. Past Surgical History:  has a past surgical history that includes Coronary artery bypass graft; Coronary angioplasty with stent; skin biopsy; eye surgery; Mandible surgery; hernia repair (Bilateral, 2020); Cardioversion (2021); other surgical history (2021); Cardiac surgery (); Cardiac catheterization (2020); and Cardiac defibrillator placement (2022). Assessment   Performance deficits / Impairments: Decreased functional mobility ; Decreased ADL status; Decreased strength;Decreased safe awareness;Decreased endurance;Decreased posture;Decreased balance;Decreased cognition  Assessment: Skilled OT is indicated to increase overall safety awareness in function as well as strenght, balance, ADL status, functional mobility, and cognition to improve functional outcomes, I, and return to home.    Prognosis: Good  Decision Making: High Complexity  REQUIRES OT FOLLOW-UP: Yes  Activity Tolerance  Activity Tolerance: Patient Tolerated treatment well        Plan   Occupational Therapy Plan  Times Per Week: 4-5x/week  Current Treatment Recommendations: Patient/Caregiver education & training, Equipment evaluation, education, & procurement, Positioning, Functional mobility training, Endurance training, Cognitive/Perceptual training, Safety education & training, Self-Care / ADL, Balance training     Restrictions  Restrictions/Precautions  Restrictions/Precautions: Fall Risk, General Precautions    Subjective   General  Chart Reviewed: Yes  Patient assessed for rehabilitation services?: Yes  Family / Caregiver Present: No     Social/Functional History  Social/Functional History  Lives With: Significant other  Type of Home: House  Home Layout: Two level, Able to Live on Main level with bedroom/bathroom  Home Access: Stairs to enter with rails  Entrance Stairs - Number of Steps: 2  Bathroom Shower/Tub: Walk-in shower  Bathroom Toilet: Handicap height  Bathroom Equipment: Grab bars in shower, Built-in shower seat  Home Equipment: Sharion Julian, rolling, Wheelchair-manual  Has the patient had two or more falls in the past year or any fall with injury in the past year?: No (no falls but states his balance has been off lately)  Receives Help From: Other (comment)  ADL Assistance: [address] [place] [address] Avenue: Independent (shares with wife)  Ambulation Assistance: Independent (typically no AD)  Transfer Assistance: Independent  Active : Yes  Mode of Transportation: Car  Occupation: On disability  Type of Occupation: maintenance at [employer] Hobbies: reading       Objective   Heart Rate: 66  Heart Rate Source: Monitor  BP: 112/81  BP Location: Left upper arm  Patient Position: Lying left side  MAP (Calculated): 91.33  Resp: 18  SpO2: 95 %  O2 Device: None (Room air)          Observation/Palpation  Posture: Good  Observation: pt lying in bed. B LEs weeping, edematous, reddened lower legs with sores. Safety Devices  Type of Devices: All fall risk precautions in place; Bed alarm in place;Call light within reach;Nurse notified; Left in bed;Gait belt  Gait  Overall Level of Assistance: Contact-guard assistance (pt completed functional mob in room with and without RW; pt unsteady at first with reliance on device for support. pt improved with being up. Pt ed on use of call light when needing to get up)  Interventions: Safety awareness training;Verbal cues     AROM: Within functional limits  Strength: Within functional limits  Coordination: Within functional limits  Tone: Normal  Sensation: Impaired  ADL  Feeding: Independent  Grooming: Verbal cueing;Stand by assistance  UE Bathing: Stand by assistance  LE Bathing: Contact guard assistance  UE Dressing: Stand by assistance  LE Dressing: Contact guard assistance;Minimal assistance  Toileting: Contact guard assistance;Minimal assistance  Additional Comments: pt is limited by dec. safety awareness, poor attention span. Cues for safety.         Bed mobility  Supine to Sit: Stand by assistance  Sit to Supine: Stand by assistance  Transfers  Sit to stand: Contact guard assistance  Stand to sit: Contact guard assistance  Transfer Comments: cues for safety  Vision  Vision: Impaired  Vision Exceptions: Wears glasses for reading  Hearing  Hearing: Within functional limits  Cognition  Overall Cognitive Status: Exceptions  Attention Span: Difficulty attending to directions (pt is very talkative, tangential. Needs constant cues to redirect,stay on task)  Memory: Appears intact  Safety Judgement: Decreased awareness of need for safety;Decreased awareness of need for assistance  Problem Solving: Assistance required to generate solutions;Assistance required to implement solutions;Assistance required to correct errors made;Decreased awareness of errors  Insights: Decreased awareness of deficits  Initiation: Requires cues for some  Sequencing: Requires cues for some  Cognition Comment: pt is hyperverbal throughout. poor insight into deficits and role in own health. Orientation  Overall Orientation Status: Within Functional Limits  Perception  Overall Perceptual Status: WFL               Education Given To: Patient  Education Provided: Role of Therapy;Plan of Care;Home Exercise Program;Precautions; ADL Adaptive Strategies;Transfer Training;Energy Conservation; Fall Prevention Strategies; Equipment; Family Education  Education Provided Comments: attempting to verballly educate pt on CHF and diabetes for management at home.  Pt with pooor insight into ways he can manage daily; will benefit from further education  Education Method: Demonstration;Verbal  Barriers to Learning: Cognition  Education Outcome: Continued education needed                                                AM-PAC Inpatient Daily Activity Raw Score: 19 (10/12/22 1417)  AM-PAC Inpatient ADL T-Scale Score : 40.22 (10/12/22 1417)  ADL Inpatient CMS 0-100% Score: 42.8 (10/12/22 1417)  ADL Inpatient CMS G-Code Modifier : CK (10/12/22 1417)           Goals  Short Term Goals  Time Frame for Short Term Goals: by discharge, pt will  Short Term Goal 1: demo SBA with ADL transfers with approp AD/DME and good safety  Short Term Goal 2: demo SBA with functional mob in room with good safety for ADL completion with good safety/pacing  Short Term Goal 3: demo SBA with toileting routine with DME as needed  Short Term Goal 4: demo I with UB ADLs and SBA with LB ADLs with good safety, pacing with AE/DME as needed  Short Term Goal 5: demo and verb good understanding of ed provided on fall prevention techs, EC/WS techs, equip needs, pacing, B UE HEP, and disease specific education  Patient Goals   Patient goals : to go home       Therapy Time   Individual Concurrent Group Co-treatment   Time In 0933         Time Out 1030         Minutes 57          Treatment min: [address], [place]

## 2022-10-12 NOTE — CARE COORDINATION
10/12/22 0842   Service Assessment   Patient Orientation Alert and Oriented   Cognition Alert   History Provided By Patient   Primary Caregiver Self   Support Systems Spouse/Significant Other   PCP Verified by CM Yes   Last Visit to PCP Within last 3 months   Prior Functional Level Independent in ADLs/IADLs   Current Functional Level Independent in ADLs/IADLs   Can patient return to prior living arrangement Yes   Ability to make needs known: Fair   Family able to assist with home care needs: Yes   Social/Functional History   Lives With Significant other   Type of [address] Two level   Home Access Stairs to enter with rails   Entrance Stairs - Number of Steps 2   Home Equipment Cane;Walker, rolling; [address] Help From Other (comment)  (significant other Jeancarlos Guo)   ADL Assistance Independent   Active  Yes   Mode of Transportation Car   Discharge Planning   Type of Residence House   Living Arrangements Spouse/Significant Other   Current Services Prior To Admission None   Potential Assistance Needed Home Care   Type of [facility]   Patient expects to be discharged to: House   One/Two Story Residence Two story   History of falls? 0   Services At/After Discharge   Transition of Care Consult (CM Consult) Home Health   Internal Home Health Yes   Condition of Participation: Discharge Planning   The Patient and/or Patient Representative was provided with a Choice of Provider? Patient   The Patient and/Or Patient Representative agree with the Discharge Plan? Yes   Freedom of Choice list was provided with basic dialogue that supports the patient's individualized plan of care/goals, treatment preferences, and shares the quality data associated with the providers? Yes       PCP is JESSICA Joseph last visit 9/28  [facility] [place]. Patient lives with significant other Jeancarlos Guo. He is very independent.  Drives and uses no dme that is in his home. During assessment patient had almost flight of ideas going from one subject to another. He has never used any home care nor snf in past but tells writer he has 10 MI's in his past.     Patient admitted with CHF. On room air. He did have sleep study and is in need of his cpap but has not followed up with pulmonary/pcp to obtain. Therapy has been consulted. Await their evaluation. Did discuss home care regarding CHF education and he is agreeable. Sent referral to [facility] in [place] and notified Ethan Marie.

## 2022-10-12 NOTE — PROGRESS NOTES
[facility]  Office: [address], DO, Iris Contreras, DO, Supriya Patiño, DO, Delores Wu Blood, DO, Naga Bailey MD, James Guido MD, Eliezer Durant MD, Yennifer Carlos MD,  Grey Fisher MD, Crispin Tillman MD, Pete Layer, DO, Cayden Cox MD,  Milagros Angeles MD, Megan Sanchez MD, Eden Vincent, DO, Farida Sanchez MD, Juana Sevilla MD, Petar Avila MD, Galdino Ford MD, Krunal Del Angel MD, Gillian Guillen MD, Kristian Montesinos, DO, Lexie Grossman MD, Gloria Gooden MD, Delaney Kim CNP,  Torsten Balnton, CNP, Craig Sever, CNP, Julian Honeycutt, CNP,  Benjamín Devine, DNP, Chay Nettles, CNP, Adi Cruz, CNP, Ab Villela, CNP, Rajiv Fitzgerald, CNP, Ok Rodriguez, CNP, Tierra Young PA-C, Pipe Lew, CNS, Cr Zhao, TIM, Callie Rojas, FAMILIA, Adeel Hopkins CNP, Katerina Diaz, [place]    Progress Note    10/12/2022    11:37 AM    Name:   Beatrice Reid  MRN:     [id number]     Acct:      [de-identified]   Room:   [address] [place] [address] Day:  1  Admit Date:  10/11/2022  5:00 PM    PCP:   Helio Denney PA-C  Code Status:  Full Code    Subjective:     C/C:   Chief Complaint   Patient presents with    Leg Swelling     [address] [place] [address] for three days. Sent in from PCP     Interval History Status: . Feels slight improvement in leg swelling/blisters and shortness of breath after getting IV diuresis  Denies chest pain  Creatinine 1.09  Brief History:     Patient presents to the emergency room today with complaints of bilateral lower leg weeping edema. Patient states that he has been experiencing his bilateral lower leg weeping edema for approximately 2 weeks. States that they have been swollen, progressed to having blisters, and now the blisters have opened and started to drain clear fluid. Patient has a significant past medical history of coronary artery disease, CHF, hyperlipidemia and CVA. Patient does have an ICD placed and follows with [facility] cardiology consultants. Patient denies any recent fevers, chest pain, nausea, vomiting and diarrhea. Patient states he has been experiencing intermittent chills and has been short of breath for the past 1 to 2 years. Patient states he has followed up with his PCP and was supposed to see a pulmonologist, but has not done so yet. Patient states he has failed a outpatient sleep study twice but has not gotten a CPAP machine yet. Throughout the emergency room evaluation it was noted that the patient's glucose level is 148. proBNP 631. Troponin 25. Alk phos 178. Platelet 761. Chest x-ray was obtained which shows:  Enlarged cardiac silhouette, unchanged. New or worsening airspace opacities   throughout the central left lung, possibly representing atelectasis or   asymmetric pulmonary edema although additional considerations are possible. Review of Systems:     Constitutional:  negative for chills, fevers, sweats  Respiratory:  negative for cough, +dyspnea on exertion, Cardiovascular:  negative for chest pain, chest pressure/discomfort, ++lower extremity edema, denies palpitations  Gastrointestinal:  negative for abdominal pain, constipation, diarrhea, nausea, vomiting  Neurological:  negative for dizziness, headache    Medications:      Allergies:  No Known Allergies    Current Meds:   Scheduled Meds:    apixaban  5 mg Oral BID    amiodarone  200 mg Oral Daily    aspirin  81 mg Oral Daily    atorvastatin  80 mg Oral Nightly    carvedilol  12.5 mg Oral BID WC    lisinopril  5 mg Oral Daily    metFORMIN  1,000 mg Oral Daily    sodium chloride flush  5-40 mL IntraVENous 2 times per day    bumetanide  1 mg IntraVENous Q12H    insulin lispro  0-4 Units SubCUTAneous TID WC    insulin lispro  0-4 Units SubCUTAneous Nightly     Continuous Infusions:    sodium chloride      dextrose       PRN Meds: oxyCODONE, sodium chloride flush, sodium chloride, ondansetron **OR** ondansetron, polyethylene glycol, acetaminophen **OR** acetaminophen, potassium chloride **OR** potassium alternative oral replacement **OR** potassium chloride, magnesium sulfate, glucose, dextrose bolus **OR** dextrose bolus, glucagon (rDNA), dextrose    Data:     Past Medical History:   has a past medical history of CAD (coronary artery disease), CHF (congestive heart failure) ([facility] Heart attack ([facility] Hyperlipidemia, Hypertension, [facility] (my[facility] infarction) ([facility] MRSA (methicillin resistant staph aureus) culture positive, Skin cancer, Snores, Stroke ([facility] Wears dentures, Wears reading eyeglasses, and Wellness examination. Social History:   reports that he quit smoking about 23 years ago. He has never used smokeless tobacco. He reports that he does not drink alcohol and does not use drugs. Family History:   Family History   Problem Relation Age of Onset    Coronary Art Dis Father     Liver Disease Father     Alcohol Abuse Sister        Vitals:  /79   Pulse 86   Temp 98.2 °F (36.8 °C) (Oral)   Resp 16   Ht 5' 9\" (1.753 m)   Wt 220 lb (99.8 kg)   SpO2 94%   BMI 32.49 kg/m²   Temp (24hrs), Av.7 °F (36.5 °C), Min:97 °F (36.1 °C), Max:98.2 °F (36.8 °C)    Recent Labs     10/12/22  0001 10/12/22  0832   POCGLU 201* 122*       I/O (24Hr):     Intake/Output Summary (Last 24 hours) at 10/12/2022 1137  Last data filed at 10/11/2022 2310  Gross per 24 hour   Intake --   Output 500 ml   Net -500 ml       Labs:  Hematology:  Recent Labs     10/11/22  1751 10/12/22  0526   WBC 5.6 6.1   RBC 5.04 5.01   HGB 13.3 13.2   HCT 43.1 43.6   MCV 85.5 87.0   MCH 26.4 26.3   MCHC 30.9 30.3   RDW 16.3* 16.5*   * 109*   MPV 11.9 12.1     Chemistry:  Recent Labs     10/11/22  1751 10/11/22  1935 10/11/22  7848 10/12/22  0526     --   --  140   K 3.7  --   --  3.9   CL 99  --   --  99   CO2 27  --   --  31   GLUCOSE 148*  --   --  112*   BUN 22  --   --  21   CREATININE 0.99  --   -- 1.09   MG  --   --   --  1.8   ANIONGAP 12  --   --  10   LABGLOM >60  --   --  >60   CALCIUM 9.2  --   --  9.1   PROBNP 631*  --   --  742*   TROPHS 25* 25* 26*  --    MYOGLOBIN 56 49 48  --      Recent Labs     10/11/22  1751 10/12/22  0001 10/12/22  0526 10/12/22  0832   PROT 6.6  --   --   --    LABALBU 3.8  --   --   --    TSH  --   --  1.86  --    AST 25  --   --   --    ALT 18  --   --   --    ALKPHOS 178*  --   --   --    BILITOT 2.0*  --   --   --    CHOL  --   --  242*  --    HDL  --   --  27*  --    LDLCHOLESTEROL  --   --  199*  --    CHOLHDLRATIO  --   --  9.0*  --    TRIG  --   --  82  --    POCGLU  --  201*  --  122*     ABG:No results found for: POCPH, PHART, PH, POCPCO2, HMZ3FMT, PCO2, POCPO2, PO2ART, PO2, POCHCO3, YIW5AZY, HCO3, NBEA, PBEA, BEART, BE, THGBART, THB, WVM7ECL, MRBL0DRR, O1FTFXUH, O2SAT, FIO2  Lab Results   Component Value Date/Time    SPECIAL RAC 12ML 03/11/2019 08:52 PM     Lab Results   Component Value Date/Time    CULTURE NO GROWTH 6 DAYS 03/11/2019 08:52 PM       Radiology:  XR CHEST PORTABLE    Result Date: 10/11/2022  Enlarged cardiac silhouette, unchanged. New or worsening airspace opacities throughout the central left lung, possibly representing atelectasis or asymmetric pulmonary edema although additional considerations are possible.        Physical Examination:        General appearance:  alert, cooperative and no distress, sitting comfortably in chair  Mental Status:  oriented to person, place and time and normal affect  Lungs: Diminished breath sounds at bases, few bibasilar Rales c  Heart:  regular rate and rhythm, + murmur,+ AICD  Abdomen:  soft, nontender, nondistended, normal bowel sounds, no masses, hepatomegaly, splenomegaly  Extremities:  ++ Bilateral lower extremity edema with few blisters, no redness, tenderness in the calves  Skin:  no other gross lesions, rashes, induration    Assessment:        Hospital Problems             Last Modified POA    * (Principal) Acute on chronic combined systolic and diastolic CHF (congestive heart failure) ([facility] 10/11/2022 Yes    Status post implantation of automatic cardioverter/defibrillator (AICD) 10/11/2022 Yes    Obesity (BMI 30-39.9) 10/11/2022 Yes    Type 2 diabetes mellitus, without long-term current use of insulin ([facility] [facility] 10/11/2022 Yes    Paroxysmal atrial fibrillation ([facility] 10/11/2022 Yes    Essential hypertension 10/11/2022 Yes    Hypercholesterolemia 10/11/2022 Yes    CAD (coronary artery disease) 10/11/2022 Yes       Plan:        Continue IV diuresis with 1 mg Bumex twice daily  Continue home dose of amiodarone/aspirin/Eliquis/atorvastatin/Coreg/lisinopril/metformin  Consult cardiology due to chronic resistant CHF  Discussed low-salt diet  Repeat chest x-ray and labs in morning    Pool Cannon MD  10/12/2022  11:37 AM

## 2022-10-12 NOTE — H&P
[facility]  Office: [address], DO, Chris Ards, DO, Garfieldaung Coamo, DO, Karan Lance Blood, DO, Fred Green MD, Ericka Fabian MD, Gloria Ruiz MD, Allan Pond MD,  Ken Patino MD, Candy Mahmood MD, Myrna Torres, DO, Nanette Kam MD,  María Neumann MD, Mindy Abraham MD, Tori Moritz, DO, Genoveva Szymanski MD, Cynthia Toledo MD, Tee Gaviria MD, Jessica Huynh MD, Gigi Ardon MD, Ember Charles MD, Vin Cornejo, DO, Ahsan Lundberg MD, Densie Bocanegra MD, Rumalda Snellen, CNP,  Darrick Amor, CNP, Duarte Reid, CNP, Polo Larry, CNP,  Kala Carlos, DNP, Ree Wallace, CNP, Nimco Baer, CNP, Nydia Mercado, CNP, Kyung West, CNP, Elsy Singh, CNP, Uriah Ventura PA-C, Chin Webb, CNS, Trenton Littlejohn, [facility], Danyelle Brantley, CNP, Nadia Humphries, CNP, Mayrlou Cody, CNP         [facility]    HISTORY AND PHYSICAL EXAMINATION            Date:   10/11/2022  Patient name:  Natalie Scott  Date of admission:  10/11/2022  5:00 PM  MRN:   [id number]  Account:  [de-identified]  YOB: 1960  PCP:    Brent Rosenberg PA-C  Room:   [place] [address]  Code Status:    Full    Chief Complaint:     Chief Complaint   Patient presents with    Leg Swelling     [address] [place] [address] for three days. Sent in from PCP     History Obtained From:     patient, electronic medical record    History of Present Illness:     Patient presents to the emergency room today with complaints of bilateral lower leg weeping edema. Patient states that he has been experiencing his bilateral lower leg weeping edema for approximately 2 weeks. States that they have been swollen, progressed to having blisters, and now the blisters have opened and started to drain clear fluid. Patient has a significant past medical history of coronary artery disease, CHF, hyperlipidemia and CVA.   Patient does have an ICD placed and follows with [place] cardiology consultants. Patient denies any recent fevers, chest pain, nausea, vomiting and diarrhea. Patient states he has been experiencing intermittent chills and has been short of breath for the past 1 to 2 years. Patient states he has followed up with his PCP and was supposed to see a pulmonologist, but has not done so yet. Patient states he has failed a outpatient sleep study twice but has not gotten a CPAP machine yet. Throughout the emergency room evaluation it was noted that the patient's glucose level is 148. proBNP 631. Troponin 25. Alk phos 178. Platelet 860. Chest x-ray was obtained which shows:  Enlarged cardiac silhouette, unchanged. New or worsening airspace opacities   throughout the central left lung, possibly representing atelectasis or   asymmetric pulmonary edema although additional considerations are possible. Past Medical History:     Past Medical History:   Diagnosis Date    CAD (coronary artery disease)     2020 cath    CHF (congestive heart failure) ([facility])     Dr. Rosita Faulkner attack [facility])     Hyperlipidemia     Dr. Lou Schaumann    Hypertension     Dr. Lou Schaumann    MI (myocardial infarction) [facility])     MRSA (methicillin resistant staph aureus) culture positive 01/26/2018    abdomen    Skin cancer     Snores     no cpap    Stroke [facility])     2011  Dr. Lou Schaumann monitors    Wears dentures     upper full denture    Wears reading eyeglasses     Wellness examination     Leandra Trevino PA-C last seen Nov 2020        Past Surgical History:     Past Surgical History:   Procedure Laterality Date    CARDIAC CATHETERIZATION  08/06/2020    Dr. Che Toscano therapy.   Report on chart    CARDIAC DEFIBRILLATOR PLACEMENT  04/04/2022    [address]    unsure of date, bilateral radials    CARDIOVERSION  11/17/2021    CORONARY ANGIOPLASTY WITH STENT PLACEMENT      6 total stents per patient    CORONARY ARTERY BYPASS GRAFT      4 vessel bypass    EYE SURGERY      eye injury  new lens and laser lt eye 2019    HERNIA REPAIR Bilateral 12/21/2020    XI LAPAROSCOPIC ROBOTIC INGUINAL HERNIA REPAIR WITH MESH; UMBILICAL HERNIA REPAIR WITH MESH performed by Christine Coburn DO at [address] [place] [address]      plate/hardware present    OTHER SURGICAL HISTORY  11/17/2021    BENITO    SKIN BIOPSY      back        Medications Prior to Admission:     Prior to Admission medications    Medication Sig Start Date End Date Taking? Authorizing Provider   Apixaban (ELIQUIS PO) Take by mouth Patient unsure of dosage / mg. Historical Provider, MD   metFORMIN (GLUCOPHAGE) 1000 MG tablet Take 1 tablet by mouth daily Resume on 5/21 5/19/22   Mikael Lugo DO   OXYCODONE HCL PO Take 1 tablet by mouth 3 times daily as needed Took 2 tabs 4-3-22 at [facility]  Patient not taking: Reported on 6/15/2022    Historical Provider, MD   Cyanocobalamin (VITAMIN B-12 PO) Take 1 tablet by mouth daily    Historical Provider, MD   naproxen (NAPROSYN) 500 MG tablet Take 1 tablet by mouth 2 times daily (with meals) 3/28/22 3/28/22  Tre Hemphill PA-C   amiodarone (CORDARONE) 200 MG tablet Take 1 tablet by mouth daily Take 2 tablets for 7 days and after that one tablet daily  Patient taking differently: Take 200 mg by mouth daily  11/17/21   Eber Jason MD   vitamin E 100 units capsule Take 100 Units by mouth daily     Historical Provider, MD   magnesium citrate solution Take 296 mLs by mouth once for 1 dose  Patient not taking: Reported on 6/18/2021 1/4/21 1/29/21  Jennie Gonzalez MD   Multiple Vitamins-Minerals (THERAPEUTIC MULTIVITAMIN-MINERALS) tablet Take 1 tablet by mouth daily    Historical Provider, MD   Ascorbic Acid (VITAMIN C) 250 MG tablet Take 500 mg by mouth three times a week     Historical Provider, MD   bumetanide (BUMEX) 1 MG tablet Take 1 tablet by mouth every morning AND 1 tablet Daily with lunch.  3/13/19   ELY Sorenson - NP   nitroGLYCERIN (NITROSTAT) 0.4 MG SL tablet Place 0.4 mg under the tongue every 5 minutes as needed for Chest pain up to max of 3 total doses. If no relief after 1 dose, call 911. Dr. Eryn Gonzales  Patient not taking: Reported on 1/12/2022    Historical Provider, MD   atorvastatin (LIPITOR) 80 MG tablet Take 1 tablet by mouth nightly  Patient taking differently: Take 80 mg by mouth nightly Dr. Eryn Gonzales 2/20/19   Fermin Mock MD   carvedilol (COREG) 12.5 MG tablet Take 1 tablet by mouth 2 times daily (with meals)  Patient taking differently: Take 12.5 mg by mouth 2 times daily (with meals) Dr. Eryn Gonzales 2/20/19   Fermin Mock MD   lisinopril (PRINIVIL;ZESTRIL) 5 MG tablet Take 1 tablet by mouth daily  Patient taking differently: Take 5 mg by mouth daily Dr. Eryn Gonzales 2/21/19   Fermin Mock MD   aspirin 81 MG chewable tablet Take 81 mg by mouth daily Per Dr. Eryn Gonzales. Ok to stop 7 days prior to surgery    Historical Provider, MD        Allergies:     Patient has no known allergies. Social History:     Tobacco:    reports that he quit smoking about 23 years ago. He has never used smokeless tobacco.  Alcohol:      reports no history of alcohol use. Drug Use:  reports no history of drug use. Family History:     Family History   Problem Relation Age of Onset    Coronary Art Dis Father     Liver Disease Father     Alcohol Abuse Sister        Review of Systems:     Positive and Negative as described in HPI. Review of Systems   Constitutional:  Positive for chills. Negative for activity change, fatigue and fever. Respiratory:  Positive for shortness of breath. Negative for cough and chest tightness. Cardiovascular:  Positive for leg swelling. Negative for chest pain and palpitations. Gastrointestinal:  Negative for abdominal pain, constipation, diarrhea, nausea and vomiting. Endocrine: Negative for cold intolerance and polyuria. Genitourinary:  Negative for difficulty urinating. Musculoskeletal:  Negative for arthralgias and myalgias. Skin:  Negative for color change and wound. Weeping edema to bilateral lower legs   Neurological:  Negative for dizziness, weakness and numbness. Psychiatric/Behavioral:  Negative for confusion. Physical Exam:   BP (!) 133/102   Pulse 80   Temp 98 °F (36.7 °C) (Oral)   Resp 20   Ht 5' 9\" (1.753 m)   Wt 220 lb (99.8 kg)   SpO2 96%   BMI 32.49 kg/m²   Temp (24hrs), Av °F (36.7 °C), Min:98 °F (36.7 °C), Max:98 °F (36.7 °C)    No results for input(s): POCGLU in the last 72 hours. No intake or output data in the 24 hours ending 10/11/22 2052    Physical Exam  Vitals and nursing note reviewed. Constitutional:       General: He is not in acute distress. Appearance: Normal appearance. HENT:      Head: Normocephalic. Mouth/Throat:      Mouth: Mucous membranes are moist.   Eyes:      Pupils: Pupils are equal, round, and reactive to light. Cardiovascular:      Rate and Rhythm: Normal rate and regular rhythm. Pulses: Normal pulses. Heart sounds: Murmur heard. No friction rub. No gallop. Pulmonary:      Effort: Pulmonary effort is normal. No respiratory distress. Breath sounds: Normal breath sounds. No wheezing or rales. Abdominal:      General: Bowel sounds are normal. There is no distension. Palpations: Abdomen is soft. Tenderness: There is no abdominal tenderness. There is no guarding. Musculoskeletal:         General: Normal range of motion. Cervical back: Normal range of motion. Right lower le+ Edema present. Left lower le+ Edema present. Comments: Weeping edema noted to bilateral lower legs   Skin:     General: Skin is warm and dry. Capillary Refill: Capillary refill takes less than 2 seconds. Coloration: Skin is not pale. Neurological:      General: No focal deficit present. Mental Status: He is alert and oriented to person, place, and time. Motor: No weakness.    Psychiatric:         Mood and Affect: Mood normal.         Behavior: Behavior normal. Thought Content:  Thought content normal.         Judgment: Judgment normal.       Investigations:      Laboratory Testing:  Recent Results (from the past 24 hour(s))   EKG 12 Lead    Collection Time: 10/11/22  5:45 PM   Result Value Ref Range    Ventricular Rate 71 BPM    Atrial Rate 71 BPM    P-R Interval 244 ms    QRS Duration 148 ms    Q-T Interval 470 ms    QTc Calculation (Bazett) 510 ms    P Axis 80 degrees    R Axis 96 degrees    T Axis 64 degrees   Brain Natriuretic Peptide    Collection Time: 10/11/22  5:51 PM   Result Value Ref Range    Pro- (H) <300 pg/mL   TROP/MYOGLOBIN    Collection Time: 10/11/22  5:51 PM   Result Value Ref Range    Troponin, High Sensitivity 25 (H) 0 - 22 ng/L    Myoglobin 56 28 - 72 ng/mL   CBC with Auto Differential    Collection Time: 10/11/22  5:51 PM   Result Value Ref Range    WBC 5.6 3.5 - 11.3 k/uL    RBC 5.04 4.21 - 5.77 m/uL    Hemoglobin 13.3 13.0 - 17.0 g/dL    Hematocrit 43.1 40.7 - 50.3 %    MCV 85.5 82.6 - 102.9 fL    MCH 26.4 25.2 - 33.5 pg    MCHC 30.9 28.4 - 34.8 g/dL    RDW 16.3 (H) 11.8 - 14.4 %    Platelets 608 (L) 615 - 453 k/uL    MPV 11.9 8.1 - 13.5 fL    NRBC Automated 0.0 0.0 per 100 WBC    Seg Neutrophils 73 (H) 36 - 65 %    Lymphocytes 15 (L) 24 - 43 %    Monocytes 11 3 - 12 %    Eosinophils % 1 1 - 4 %    Basophils 0 0 - 2 %    Immature Granulocytes 0 0 %    Segs Absolute 4.06 1.50 - 8.10 k/uL    Absolute Lymph # 0.82 (L) 1.10 - 3.70 k/uL    Absolute Mono # 0.62 0.10 - 1.20 k/uL    Absolute Eos # 0.06 0.00 - 0.44 k/uL    Basophils Absolute <0.03 0.00 - 0.20 k/uL    Absolute Immature Granulocyte 0.01 0.00 - 0.30 k/uL    RBC Morphology ANISOCYTOSIS PRESENT    Comprehensive Metabolic Panel    Collection Time: 10/11/22  5:51 PM   Result Value Ref Range    Glucose 148 (H) 70 - 99 mg/dL    BUN 22 8 - 23 mg/dL    Creatinine 0.99 0.70 - 1.20 mg/dL    Est, Glom Filt Rate >60 >60 mL/min/1.73m2    Bun/Cre Ratio 22 (H) 9 - 20    Calcium 9.2 8.6 - 10.4 with sliding scale coverage  Adult diet  Monitor a.m. labs  PT/OT    Consultations:   IP CONSULT TO HOSPITALIST  IP CONSULT TO DIETITIAN    Patient is admitted as inpatient status because of co-morbidities listed above, severity of signs and symptoms as outlined, requirement for current medical therapies and most importantly because of direct risk to patient if care not provided in a hospital setting. Expected length of stay > 48 hours. On this date 10/11/2022 I have spent 27 minutes reviewing previous notes, test results and face to face with the patient discussing the diagnosis and importance of compliance with the treatment plan as well as documenting on the day of the visit. At least 50% of the time documented was spent with the patient to provide counseling and/or coordination of care.       ELY Hirsch CNP  10/11/2022  8:52 PM    Copy sent to Dr. Be Calloway PA-C

## 2022-10-12 NOTE — PROGRESS NOTES
Transitions of Care Pharmacy Service   Medication Review    The patient's list of current home medications has been reviewed. Source(s) of information: patient, [facility], PCP office    Other Notes He is months overdue for his meds per [address]; he states he has pills at home but is unsure of most of his meds. He states he does not use any other pharmacy. Xarelto is not on his PCP med list and hasn't been refilled from [address] since Jan 2022 but he states he has about 4 weeks worth of samples at home to use. PCP office also has Spironolactone 25mg daily and Plavix 75mg daily on his med list but he has not filled these in about 9 months from [address]. PROVIDER ACTION REQUESTED  Medications that need to be addressed by a physician/nurse practitioner:    Medication Action Requested   Eliquis 5mg   Takes Xarelto 20mg daily instead -- consider adjusting order to match home regimen           Please feel free to call me with any questions about this encounter. Thank you.     Thelma Armendariz [facility]   Transitions of Care Pharmacy Service  Phone:  [phone]  Fax: [phone]      Electronically signed by Thelma Armendariz [facility] on 10/12/2022 at 6:58 PM           Medications Prior to Admission:   amiodarone (CORDARONE) 200 MG tablet, Take 200 mg by mouth daily  bumetanide (BUMEX) 1 MG tablet, Take 1-2 mg by mouth See Admin Instructions Indications: 2mg AM and 1mg PM  rivaroxaban (XARELTO) 20 MG TABS tablet, Take 20 mg by mouth Daily with supper  metFORMIN (GLUCOPHAGE) 1000 MG tablet, Take 1 tablet by mouth daily Resume on 5/21  Cyanocobalamin (VITAMIN B-12 PO), Take 1 tablet by mouth daily  vitamin E 100 units capsule, Take 100 Units by mouth daily   Multiple Vitamins-Minerals (THERAPEUTIC MULTIVITAMIN-MINERALS) tablet, Take 1 tablet by mouth daily  Ascorbic Acid (VITAMIN C) 250 MG tablet, Take 250 mg by mouth three times a week  nitroGLYCERIN (NITROSTAT) 0.4 MG SL tablet, Place 0.4 mg under the tongue every 5 minutes as needed for Chest pain up to max of 3 total doses. If no relief after 1 dose, call 911.   Dr. Star Foreman  atorvastatin (LIPITOR) 80 MG tablet, Take 1 tablet by mouth nightly  carvedilol (COREG) 12.5 MG tablet, Take 1 tablet by mouth 2 times daily (with meals)  lisinopril (PRINIVIL;ZESTRIL) 5 MG tablet, Take 1 tablet by mouth daily  aspirin 325 MG tablet, Take 162.5 mg by mouth daily Takes 1/2 tab (162.5mg) daily

## 2022-10-12 NOTE — PROGRESS NOTES
Nutrition Note    Patient admission is related to CHF. Milton reports an extensive cardiac history and recent illness two weeks ago. Patient states that he lost his taste buds and has been having difficulty with stomach issues. Nutrition consult received for diet education on CHF. Patient reports he is very familiar with a low sodium diet. Patient reports he took cardiac sessions at [place] once a month for two years. Patient declined need for diet education. Continue Low Sodium diet. Will assess for length of stay or nutrition consult.           Douglas LIRAN, RDN, LDN  Lead Clinical Dietitian  RD Office Phone [phone] Patient denies SI,HI, but states she is positive for Auditory Hallucinations. Rates depression 3/10 and anxiety 4/10. During assessment she kept falling asleep. She slept one hour last night (Recorded). States she could not sleep. Attended  group. Will continue to monitor.

## 2022-10-12 NOTE — DISCHARGE INSTR - COC
Continuity of Care Form    Patient Name: Loki Freedman   :  1960  MRN:  [id number]    Admit date:  10/11/2022  Discharge date:  10/16/2022    Code Status Order: Full Code   Advance Directives:     Admitting Physician:  Tiffanie Nicholson MD  PCP: Linda Hoff PA-C    Discharging Nurse: BURKE TORO [facility] Unit/Room#: 1012/1012-02  Discharging Unit Phone Number: [phone]    Emergency Contact:   Extended Emergency Contact Information  Primary Emergency Contact: Cj Putnam  Home Phone: [phone]  Mobile Phone: [phone]  Relation: Domestic Partner  Preferred language: English   needed? No    Past Surgical History:  Past Surgical History:   Procedure Laterality Date    CARDIAC CATHETERIZATION  2020    Dr. Marty Mata therapy.   Report on chart    CARDIAC DEFIBRILLATOR PLACEMENT  2022    [address]    unsure of date, bilateral radials    CARDIOVERSION  2021    CORONARY ANGIOPLASTY WITH STENT PLACEMENT      6 total stents per patient    CORONARY ARTERY BYPASS GRAFT      4 vessel bypass    EYE SURGERY      eye injury  new lens  and laser lt eye 2019    HERNIA REPAIR Bilateral 2020    XI LAPAROSCOPIC ROBOTIC INGUINAL HERNIA REPAIR WITH MESH; UMBILICAL HERNIA REPAIR WITH MESH performed by Pam Sloan DO at [address] [place] [address]      plate/hardware present    OTHER SURGICAL HISTORY  2021    BENITO    SKIN BIOPSY      back       Immunization History:   Immunization History   Administered Date(s) Administered    Influenza Virus Vaccine 10/22/2017    Influenza, Quadv, 6 mo and older, IM, PF (Flulaval, Fluarix) 2019    Pneumococcal Polysaccharide ([id number]) 2017       Active Problems:  Patient Active Problem List   Diagnosis Code    Hyperbilirubinemia E80.6    Essential hypertension I10    Hypercholesterolemia E78.00    CAD (coronary artery disease) I25.10    Gross hematuria R31.0    Abdominal pain, right upper quadrant R10.11    Right nephrolithiasis N20.0    Abnormal liver function test J84.74    Acute systolic HF (heart failure) ([facility]) I50.21    Noncompliance Z91.199    Acute on chronic systolic heart failure ([facility]) I50.23    Pneumonia J18.9    Dyspnea and respiratory abnormalities R06.00, R06.89    Status post inguinal hernia repair Z98.890, Z87.19    S/P repair of ventral hernia Z98.890, Z87.19    Post-op pain G89.18    Non-recurrent bilateral inguinal hernia without obstruction or gangrene E23.23    Umbilical hernia with obstruction, without gangrene K42.0    Status post implantation of automatic cardioverter/defibrillator (AICD) Z95.810    NSTEMI (non-ST elevated myocardial infarction) ([facility]) I21.4    ARIADNA (obstructive sleep apnea) G47.33    S/P CABG (coronary artery bypass graft) Z95.1    Nausea R11.0    Anorexia R63.0    Thrombocytopenia ([facility]) D69.6    Obesity (BMI 30-39. 9) E66.9    Hypokalemia E87.6    Uncontrolled type 2 diabetes mellitus with hyperglycemia ([facility]) E11.65    Acute on chronic combined systolic and diastolic CHF (congestive heart failure) ([facility]) I50.43    Type 2 diabetes mellitus, without long-term current use of insulin ([facility]) E11.9    Paroxysmal atrial fibrillation ([facility]) I48.0       Isolation/Infection:   Isolation            Contact          Patient Infection Status       Infection Onset Added Last Indicated Last Indicated By Review Planned Expiration Resolved Resolved By    MRSA  04/19/15 12/14/20 MRSA DNA Probe, Nasal        Abdomen 1/2018    Resolved    COVID-19 (Rule Out) 10/08/21 10/08/21 10/08/21 COVID-19 (Ordered)   10/10/21 Rule-Out Test Resulted    COVID-19 (Rule Out) 01/22/21 01/22/21 01/22/21 COVID-19 (Ordered)   01/24/21 Rule-Out Test Resulted    COVID-19 (Rule Out) 12/17/20 12/17/20 12/17/20 COVID-19 (Ordered)   12/18/20 Rule-Out Test Resulted    COVID-19 (Rule Out) 08/02/20 08/02/20 08/03/20 Covid-19 Ambulatory (Ordered)   08/06/20 Rule-Out Test Resulted            Nurse Assessment:  Last Vital Signs: /81   Pulse 66   Temp 97.7 °F (36.5 °C) (Oral)   Resp 18   Ht 5' 9\" (1.753 m)   Wt 220 lb (99.8 kg)   SpO2 95%   BMI 32.49 kg/m²     Last documented pain score (0-10 scale): Pain Level: 8  Last Weight:   Wt Readings from Last 1 Encounters:   10/11/22 220 lb (99.8 kg)     Mental Status:  oriented and alert    IV Access:  - None    Nursing Mobility/ADLs:  Walking   Independent  Transfer  Independent  [facility]   Med Delivery   whole    Wound Care Documentation and Therapy:  Incision 04/04/22 Chest Left;Upper (Active)   Number of days: 191        Elimination:  Continence: Bowel: Yes  Bladder: Yes  Urinary Catheter: None   Colostomy/Ileostomy/Ileal Conduit: No       Date of Last BM: 10/13/2022    Intake/Output Summary (Last 24 hours) at 10/12/2022 1434  Last data filed at 10/11/2022 2310  Gross per 24 hour   Intake --   Output 500 ml   Net -500 ml     I/O last 3 completed shifts:  In: -   Out: 500 [Urine:500]    Safety Concerns: At Risk for Falls    Impairments/Disabilities:      None    Nutrition Therapy:  Current Nutrition Therapy:   - Oral Diet:  General    Routes of Feeding: Oral  Liquids: Thin Liquids  Daily Fluid Restriction: no  Last Modified Barium Swallow with Video (Video Swallowing Test): not done    Treatments at the Time of Hospital Discharge:   Respiratory Treatments: na   Oxygen Therapy:  is not on home oxygen therapy. Ventilator:    - No ventilator support    Rehab Therapies: Physical Therapy  Weight Bearing Status/Restrictions: No weight bearing restrictions  Other Medical Equipment (for information only, NOT a DME order):  wheelchair, cane, and walker  Other Treatments:   Skilled RN assessment  Medication reconciliation   CHF education     [place]ID ( [place] HAS L[place]ED. WILL NEED HELP GETTING IT BACK. WE CALLED IN HIS SCRIPTS TO  [facility].  HE MUST PICK THEM UP WITHIN 3 DAYS BUT PREFERABLY TOMORROW. EACH SCRIPT WILL COST 5 DOLLARS. DID CALL HELP PROGRAM AT [facility] TO SEE IF THEY CAN DO ANYTHING TO RE ESTABLISH HIS MEDICAID. THEIR NUMBER [phone]    Patient's personal belongings (please select all that are sent with patient):  Glasses    RN SIGNATURE:  Electronically signed by Selina Rubio RN on 10/15/22 at 11:58 AM EDT    CASE MANAGEMENT/SOCIAL WORK SECTION    Inpatient Status Date: 10/11    Readmission Risk Assessment Score:  Readmission Risk              Risk of Unplanned Readmission:  23           Discharging to Facility/ Agency   Name: Coreen Mosquera   Phone: [phone]  Fax: [phone]      / signature: Electronically signed by Lindsay Ortiz RN on 10/12/22 at 2:35 PM EDT    PHYSICIAN SECTION    Prognosis: Fair    Condition at Discharge: Stable    Rehab Potential (if transferring to Rehab): Good    Recommended Labs or Other Treatments After Discharge: Follow-up with CHF clinic, PT OT, attend weight loss clinic(patient wants that)    Physician Certification: I certify the above information and transfer of Natalie Scott  is necessary for the continuing treatment of the diagnosis listed and that he requires [address] for greater 30 days.      Update Admission H&P: No change in H&P    PHYSICIAN SIGNATURE:  Electronically signed by Cassy Albright MD on 10/15/22 at 11:45 AM EDT

## 2022-10-12 NOTE — PROGRESS NOTES
Pt arrived from ED via stretcher. VS take, tele applied. Oriented pt to room. All questions answered. Will continue to monitor.

## 2022-10-12 NOTE — PROGRESS NOTES
Physical Therapy  Facility/Department: [facility] PROGRESSIVE CARE  Physical Therapy Initial Assessment    Name: Katie Howard  : 1960  MRN: [id number]  Date of Service: 10/12/2022        Patient presents to the emergency room today with complaints of bilateral lower leg weeping edema. Patient states that he has been experiencing his bilateral lower leg weeping edema for approximately 2 weeks. States that they have been swollen, progressed to having blisters, and now the blisters have opened and started to drain clear fluid. Patient has a significant past medical history of coronary artery disease, CHF, hyperlipidemia and CVA. Patient does have an ICD placed and follows with [facility] cardiology consultants. Patient denies any recent fevers, chest pain, nausea, vomiting and diarrhea. Patient states he has been experiencing intermittent chills and has been short of breath for the past 1 to 2 years. Patient states he has followed up with his PCP and was supposed to see a pulmonologist, but has not done so yet. Patient states he has failed a outpatient sleep study twice but has not gotten a CPAP machine yet. Patient Diagnosis(es): The encounter diagnosis was Congestive heart failure, unspecified HF chronicity, unspecified heart failure type ([facility] [address] Past Medical History:  has a past medical history of CAD (coronary artery disease), CHF (congestive heart failure) ([facility] [address] Heart attack ([facility] [address] Hyperlipidemia, Hypertension, MI (myocardial infarction) ([facility] [address] MRSA (methicillin resistant staph aureus) culture positive, Skin cancer, Snores, Stroke [facility]), Wears dentures, Wears reading eyeglasses, and Wellness examination. Past Surgical History:  has a past surgical history that includes Coronary artery bypass graft; Coronary angioplasty with stent; skin biopsy; eye surgery; Mandible surgery; hernia repair (Bilateral, 2020); Cardioversion (2021); other surgical history (2021);  Cardiac surgery (1999); Cardiac catheterization (08/06/2020); and Cardiac defibrillator placement (04/04/2022). Assessment   Assessment: Pt with severe tangential speech making it difficult to communicate with pt. Pt. with poor management of his DM and CHF at home. Provided edu, however pt. is in need of much more review. Pt. is weak with minor balance deficit. Will progress as able. Therapy Prognosis: Good  Decision Making: High Complexity  Barriers to Learning: questionable cognition  Requires PT Follow-Up: Yes  Activity Tolerance  Activity Tolerance: Patient limited by endurance; Patient limited by pain;Treatment limited secondary to medical complications     Plan   Physcial Therapy Plan  General Plan: 5-7 times per week  Safety Devices  Type of Devices:  All fall risk precautions in place, Bed alarm in place, Call light within reach, Nurse notified, Left in bed, Gait belt     Restrictions  Restrictions/Precautions  Restrictions/Precautions: Fall Risk, General Precautions, Up as Tolerated, Bed Alarm     Subjective   General  Chart Reviewed: Yes  Patient assessed for rehabilitation services?: Yes  Family / Caregiver Present: No  Follows Commands: Within Functional Limits         Social/Functional History  Social/Functional History  Lives With: Significant other  Type of Home: House  Home Layout: Two level, Able to Live on Main level with bedroom/bathroom  Home Access: Stairs to enter with rails  Entrance Stairs - Number of Steps: 2  Bathroom Shower/Tub: Walk-in shower  Bathroom Toilet: Handicap height  Bathroom Equipment: Grab bars in shower, Built-in shower seat  Home Equipment: Atlee Trout, rolling, Wheelchair-manual  Has the patient had two or more falls in the past year or any fall with injury in the past year?: No (no falls but states his balance has been off lately)  Receives Help From: Other (comment)  ADL Assistance: Independent  Homemaking Assistance: Independent (shares with wife)  Ambulation Assistance: Independent (typically no AD)  Transfer Assistance: Independent  Active : Yes  Mode of Transportation: Car  Occupation: On disability  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 Needs constant cues to redirect,stay on task)  Memory: Appears intact  Safety Judgement: Decreased awareness of need for safety;Decreased awareness of need for assistance  Problem Solving: Assistance required to generate solutions;Assistance required to implement solutions;Assistance required to correct errors made;Decreased awareness of errors  Insights: Decreased awareness of deficits  Initiation: Requires cues for some  Sequencing: Requires cues for some  Cognition Comment: pt is hyperverbal throughout. poor insight into deficits and role in own health. Objective   Heart Rate: 66  Heart Rate Source: Monitor  BP: 112/81  BP Location: Left upper arm  Patient Position: Lying left side  MAP (Calculated): 91.33  Resp: 18  SpO2: 95 %  O2 Device: None (Room air)     Observation/Palpation  Posture: Good  Observation: Pt. lying in bed. B LEs weeping, edematous, reddened lower legs with sores. Gross Assessment  AROM: Generally decreased, functional  Strength: Generally decreased, functional                 Gait  Overall Level of Assistance: Contact-guard assistance (pt completed functional mob in room with and without RW; pt unsteady at first with reliance on device for support. pt improved with being up.  Pt ed on use of call light when needing to get up)  Interventions: Safety awareness training;Verbal cues  Bed mobility  Supine to Sit: Stand by assistance  Sit to Supine: Stand by assistance  Transfers  Sit to Stand: Minimal Assistance  Stand to Sit: Minimal Assistance  Ambulation  Surface: Level tile  Device: Rolling Walker; No Device  Assistance: Minimal assistance  Quality of Gait: Pt. with slow sergio and decreased step length with and without RW. Min A for both. Pt declined walking farther in room and wanted back to bed vs. chair. States he has not been sleeping. Distance: 35ft (with RW to door and without RW amb. back to bed)                 OutComes Score                                                  AM-PAC Score  AM-PAC Inpatient Mobility Raw Score : 18 (10/12/22 1544)  AM-PAC Inpatient T-Scale Score : 43.63 (10/12/22 1544)  Mobility Inpatient CMS 0-100% Score: 46.58 (10/12/22 1544)  Mobility Inpatient CMS G-Code Modifier : CK (10/12/22 1544)          Tinneti Score       Goals  Short Term Goals  Time Frame for Short Term Goals: 12 visits:  Short Term Goal 1: Pt. to be indep with bed mob. Short Term Goal 2: Pt to be indep with transfers with approp. AD  Short Term Goal 3: Pt. to be indep with gait with approp AD, 50ft (household distance)  Short Term Goal 4: Pt. to tolerate 25+ min. of PT daily for ther ex/ gait/ balance training/ functional mob. training  Patient Goals   Patient Goals : feel better       Education  Patient Education  Education Given To: Patient  Education Provided: Role of Therapy;Plan of Care  Education Provided Comments: disease specific edu- DM, CHF;  walker safety; impt. of OOB  Education Method: Demonstration;Verbal;Printed Information/Hand-outs  Education Outcome: Verbalized understanding;Demonstrated understanding      Therapy Time   Individual Concurrent Group Co-treatment   Time In 1007         Time Out 1100         Minutes 53             Treatment time:  43min.      Laurita Dowd, PT

## 2022-10-12 NOTE — CARE COORDINATION
Discharge planning    Notified per Ginger at [facility] that patient has been accepted.  Updated jroge

## 2022-10-12 NOTE — ED NOTES
ED to inpatient nurses report     Chief Complaint   Patient presents with    Leg Swelling     Wheeping for three days. Sent in from PCP      Present to ED from home  LOC: alert and orientated to name, place, date  Vital signs   Vitals:    10/11/22 1845 10/11/22 1900 10/11/22 2015 10/11/22 2030   BP: (!) 129/93 114/73 123/88 (!) 133/102   Pulse: 74 75 74 80   Resp:       Temp:       TempSrc:       SpO2:       Weight:       Height:          Oxygen Baseline RA    Current needs required RA   LDAs:   Peripheral IV 10/11/22 Distal;Right Forearm (Active)     Mobility: Independent  Fall Risk:    Pending ED orders: None  Present condition: stable  Code Status: full  Consults: IP CONSULT TO HOSPITALIST  IP CONSULT TO DIETITIAN  []  Hospitalist  Completed  [] yes [] no Who:   []  Medicine  Completed  [] yes [] No Who:   []  Cardiology  Completed  [] yes [] No Who:   []  GI   Completed  [] yes [] No Who:   []  Neurology  Completed  [] yes [] No Who:   []  Nephrology Completed  [] yes [] No Who:    []  Vascular  Completed  [] yes [] No Who:   []  Ortho  Completed  [] yes [] No Who:     []  Surgery  Completed  [] yes [] No Who:    []  Urology  Completed  [] yes [] No Who:    []  CT Surgery Completed  [] yes [] No Who:   []  Podiatry  Completed  [] yes [] No Who:    []  Other    Completed  [] yes [] No Who:  Interventions: Bumex, trop x 2  Important Events: Prior defib placed at [facility] this year. Legs swelling and short of breath.          Electronically signed by Ney Ness RN on 10/11/2022 at 8:52 PM     Morgan Olivoart Pollard  10/11/22 2053

## 2022-10-13 ENCOUNTER — TELEPHONE (OUTPATIENT)
Dept: ONCOLOGY | Age: 62
End: 2022-10-13

## 2022-10-13 ENCOUNTER — APPOINTMENT (OUTPATIENT)
Dept: GENERAL RADIOLOGY | Age: 62
DRG: 291 | End: 2022-10-13
Payer: MEDICARE

## 2022-10-13 PROBLEM — R60.0 EDEMA OF BOTH LEGS: Status: ACTIVE | Noted: 2022-10-13

## 2022-10-13 LAB
ANION GAP SERPL CALCULATED.3IONS-SCNC: 11 MMOL/L (ref 9–17)
BUN BLDV-MCNC: 23 MG/DL (ref 8–23)
BUN/CREAT BLD: 21 (ref 9–20)
CALCIUM SERPL-MCNC: 8.8 MG/DL (ref 8.6–10.4)
CHLORIDE BLD-SCNC: 97 MMOL/L (ref 98–107)
CO2: 28 MMOL/L (ref 20–31)
CREAT SERPL-MCNC: 1.09 MG/DL (ref 0.7–1.2)
GFR SERPL CREATININE-BSD FRML MDRD: >60 ML/MIN/1.73M2
GLUCOSE BLD-MCNC: 119 MG/DL (ref 70–99)
GLUCOSE BLD-MCNC: 136 MG/DL (ref 75–110)
GLUCOSE BLD-MCNC: 150 MG/DL (ref 75–110)
GLUCOSE BLD-MCNC: 178 MG/DL (ref 75–110)
IRON SATURATION: 8 % (ref 20–55)
IRON: 34 UG/DL (ref 59–158)
MAGNESIUM: 1.6 MG/DL (ref 1.6–2.6)
POTASSIUM SERPL-SCNC: 3.8 MMOL/L (ref 3.7–5.3)
SODIUM BLD-SCNC: 136 MMOL/L (ref 135–144)
TOTAL IRON BINDING CAPACITY: 422 UG/DL (ref 250–450)
UNSATURATED IRON BINDING CAPACITY: 388 UG/DL (ref 112–347)

## 2022-10-13 PROCEDURE — 2580000003 HC RX 258: Performed by: NURSE PRACTITIONER

## 2022-10-13 PROCEDURE — 97530 THERAPEUTIC ACTIVITIES: CPT

## 2022-10-13 PROCEDURE — 82947 ASSAY GLUCOSE BLOOD QUANT: CPT

## 2022-10-13 PROCEDURE — 97116 GAIT TRAINING THERAPY: CPT

## 2022-10-13 PROCEDURE — 2060000000 HC ICU INTERMEDIATE R&B

## 2022-10-13 PROCEDURE — 80048 BASIC METABOLIC PNL TOTAL CA: CPT

## 2022-10-13 PROCEDURE — 2500000003 HC RX 250 WO HCPCS: Performed by: NURSE PRACTITIONER

## 2022-10-13 PROCEDURE — 83550 IRON BINDING TEST: CPT

## 2022-10-13 PROCEDURE — 6370000000 HC RX 637 (ALT 250 FOR IP): Performed by: NURSE PRACTITIONER

## 2022-10-13 PROCEDURE — 97110 THERAPEUTIC EXERCISES: CPT

## 2022-10-13 PROCEDURE — 2500000003 HC RX 250 WO HCPCS: Performed by: INTERNAL MEDICINE

## 2022-10-13 PROCEDURE — 6370000000 HC RX 637 (ALT 250 FOR IP): Performed by: INTERNAL MEDICINE

## 2022-10-13 PROCEDURE — 99232 SBSQ HOSP IP/OBS MODERATE 35: CPT | Performed by: INTERNAL MEDICINE

## 2022-10-13 PROCEDURE — 97535 SELF CARE MNGMENT TRAINING: CPT

## 2022-10-13 PROCEDURE — 83540 ASSAY OF IRON: CPT

## 2022-10-13 PROCEDURE — 83735 ASSAY OF MAGNESIUM: CPT

## 2022-10-13 PROCEDURE — 36415 COLL VENOUS BLD VENIPUNCTURE: CPT

## 2022-10-13 PROCEDURE — 71045 X-RAY EXAM CHEST 1 VIEW: CPT

## 2022-10-13 RX ORDER — LANOLIN ALCOHOL/MO/W.PET/CERES
6 CREAM (GRAM) TOPICAL ONCE
Status: COMPLETED | OUTPATIENT
Start: 2022-10-13 | End: 2022-10-13

## 2022-10-13 RX ORDER — BUMETANIDE 0.25 MG/ML
2 INJECTION, SOLUTION INTRAMUSCULAR; INTRAVENOUS EVERY 12 HOURS
Status: DISCONTINUED | OUTPATIENT
Start: 2022-10-13 | End: 2022-10-15

## 2022-10-13 RX ORDER — MIDODRINE HYDROCHLORIDE 2.5 MG/1
2.5 TABLET ORAL ONCE
Status: COMPLETED | OUTPATIENT
Start: 2022-10-13 | End: 2022-10-13

## 2022-10-13 RX ORDER — CLOPIDOGREL BISULFATE 75 MG/1
75 TABLET ORAL DAILY
Status: DISCONTINUED | OUTPATIENT
Start: 2022-10-13 | End: 2022-10-16 | Stop reason: HOSPADM

## 2022-10-13 RX ORDER — BUMETANIDE 0.25 MG/ML
1 INJECTION, SOLUTION INTRAMUSCULAR; INTRAVENOUS ONCE
Status: COMPLETED | OUTPATIENT
Start: 2022-10-13 | End: 2022-10-13

## 2022-10-13 RX ORDER — CALCIUM CARBONATE 200(500)MG
500 TABLET,CHEWABLE ORAL 3 TIMES DAILY PRN
Status: DISCONTINUED | OUTPATIENT
Start: 2022-10-13 | End: 2022-10-16 | Stop reason: HOSPADM

## 2022-10-13 RX ADMIN — ATORVASTATIN CALCIUM 80 MG: 80 TABLET, FILM COATED ORAL at 22:04

## 2022-10-13 RX ADMIN — CARVEDILOL 12.5 MG: 12.5 TABLET, FILM COATED ORAL at 08:18

## 2022-10-13 RX ADMIN — OXYCODONE 5 MG: 5 TABLET ORAL at 14:24

## 2022-10-13 RX ADMIN — SODIUM CHLORIDE, PRESERVATIVE FREE 10 ML: 5 INJECTION INTRAVENOUS at 22:05

## 2022-10-13 RX ADMIN — Medication 6 MG: at 02:20

## 2022-10-13 RX ADMIN — CARVEDILOL 12.5 MG: 12.5 TABLET, FILM COATED ORAL at 17:46

## 2022-10-13 RX ADMIN — AMIODARONE HYDROCHLORIDE 200 MG: 200 TABLET ORAL at 08:18

## 2022-10-13 RX ADMIN — RIVAROXABAN 20 MG: 20 TABLET, FILM COATED ORAL at 16:48

## 2022-10-13 RX ADMIN — MIDODRINE HYDROCHLORIDE 2.5 MG: 2.5 TABLET ORAL at 01:06

## 2022-10-13 RX ADMIN — LISINOPRIL 5 MG: 5 TABLET ORAL at 08:19

## 2022-10-13 RX ADMIN — ASPIRIN 81 MG 81 MG: 81 TABLET ORAL at 08:18

## 2022-10-13 RX ADMIN — METFORMIN HYDROCHLORIDE 1000 MG: 500 TABLET ORAL at 08:19

## 2022-10-13 RX ADMIN — BUMETANIDE 2 MG: 0.25 INJECTION INTRAMUSCULAR; INTRAVENOUS at 22:05

## 2022-10-13 RX ADMIN — CLOPIDOGREL BISULFATE 75 MG: 75 TABLET ORAL at 12:00

## 2022-10-13 RX ADMIN — BUMETANIDE 1 MG: 0.25 INJECTION INTRAMUSCULAR; INTRAVENOUS at 11:45

## 2022-10-13 RX ADMIN — EMPAGLIFLOZIN 10 MG: 10 TABLET, FILM COATED ORAL at 11:45

## 2022-10-13 RX ADMIN — BUMETANIDE 1 MG: 0.25 INJECTION INTRAMUSCULAR; INTRAVENOUS at 08:19

## 2022-10-13 RX ADMIN — SODIUM CHLORIDE, PRESERVATIVE FREE 10 ML: 5 INJECTION INTRAVENOUS at 08:22

## 2022-10-13 ASSESSMENT — PAIN SCALES - GENERAL: PAINLEVEL_OUTOF10: 10

## 2022-10-13 ASSESSMENT — PAIN DESCRIPTION - ORIENTATION: ORIENTATION: RIGHT

## 2022-10-13 ASSESSMENT — PAIN DESCRIPTION - LOCATION: LOCATION: FOOT

## 2022-10-13 NOTE — PROGRESS NOTES
Occupational Therapy  Facility/Department: [facility] PROGRESSIVE CARE  Daily Treatment Note  NAME: Royal Adhikari  : 1960  MRN: [id number]    Date of Service: 10/13/2022    Discharge Recommendations:  Patient would benefit from continued therapy after discharge     RN reports patient is medically stable for therapy treatment this date. Chart reviewed prior to treatment and patient is agreeable for therapy. All lines intact and patient positioned comfortably at end of treatment. All patient needs addressed prior to ending therapy session. Patient Diagnosis(es): The encounter diagnosis was Congestive heart failure, unspecified HF chronicity, unspecified heart failure type ([facility] [address] Assessment    Assessment: Pt progressing toward goals but is limited by poor activity tolerance and cognitive deficits. Upon exit, pt with c/o of pain and SOB, OBI Ozuna notified. Skilled OT is indicated to increase overall IND and safety for self-care and functional tasks to return to PLOF. Activity Tolerance: Patient limited by endurance; Patient limited by pain;Treatment limited secondary to medical complications  Discharge Recommendations: Patient would benefit from continued therapy after discharge      Plan   Occupational Therapy Plan  Times Per Week: 4-5x/week  Specific Instructions for Next Treatment: CHF education  Current Treatment Recommendations: Patient/Caregiver education & training;Equipment evaluation, education, & procurement;Positioning; Functional mobility training; Endurance training;Cognitive/Perceptual training; Safety education & training;Self-Care / ADL; Balance training     Restrictions   Restrictions/Precautions  Restrictions/Precautions: Fall Risk, General Precautions    Subjective   Subjective  Subjective: pt agreeable to OT tx.   Orientation  Overall Orientation Status: Within Functional Limits  Cognition  Overall Cognitive Status: Exceptions  Arousal/Alertness: Appropriate responses to stimuli  Following Commands: Follows one step commands with repetition  Attention Span: Difficulty attending to directions (pt is very talkative, tangential. Needs constant cues to redirect,stay on task)  Memory: Appears intact  Safety Judgement: Decreased awareness of need for safety;Decreased awareness of need for assistance  Problem Solving: Assistance required to generate solutions;Assistance required to implement solutions;Assistance required to correct errors made;Decreased awareness of errors  Insights: Decreased awareness of deficits  Initiation: Requires cues for some  Sequencing: Requires cues for some  Cognition Comment: pt is hyperverbal throughout. poor insight into deficits and role in own health. Objective         Bed Mobility Training  Bed Mobility Training: Yes  Overall Level of Assistance: Stand-by assistance  Interventions: Verbal cues (Min cues for proper bed mob tech and safety)  Supine to Sit:  (pt on toilet upon arrival)  Sit to Supine: Stand-by assistance  Scooting: Stand-by assistance    Transfer Training  Transfer Training: Yes  Overall Level of Assistance: Contact-guard assistance;Stand-by assistance  Interventions: Verbal cues; Tactile cues (Min cues for controlled sit<>stand, pacing, and pursed lip breathing)  Sit to Stand: Contact-guard assistance;Stand-by assistance  Stand to Sit: Contact-guard assistance;Stand-by assistance  Toilet Transfer: Stand-by assistance    Functional Mobility  Overall Level of Assistance: Contact-guard assistance (without AD, pt completed functional mob from toilet to bed. bed to door and back to bed. pt with no LOB.  Pt more steady this date compared to eval. Pt with c/o of SOB after functional mob)  Interventions: Safety awareness training;Verbal cues (Min cues for upright posture, pacing, pursed lip breathing, scanning, all to inc safety/reduce fall risk)     ADL  LE Dressing: Stand by assistance  LE Dressing Skilled Clinical Factors: pt donned R sock with SBA  Toileting: Stand by assistance;Supervision  Toileting Skilled Clinical Factors: upon arrival, pt on toilet and completed toileting tasks including clothing mgmt and hygiene with SBA/SUP  Additional Comments: pt is limited by poor activity tolerance and poor attention span              Patient Education  Education Given To: Patient  Education Provided: Role of Therapy;Plan of Care;ADL Adaptive Strategies; Fall Prevention Strategies  Education Provided Comments: OT POC, safety in function, proper footwear during functional mob. Pt difficult to educate this date as pt has tangital speech  Education Method: Verbal  Barriers to Learning: Cognition  Education Outcome: Continued education needed    [facility]: 19    Goals  Short Term Goals  Time Frame for Short Term Goals: by discharge, pt will  Short Term Goal 1: demo SBA with ADL transfers with approp AD/DME and good safety  Short Term Goal 2: demo SBA with functional mob in room with good safety for ADL completion with good safety/pacing  Short Term Goal 3: demo SBA with toileting routine with DME as needed  Short Term Goal 4: demo I with UB ADLs and SBA with LB ADLs with good safety, pacing with AE/DME as needed  Short Term Goal 5: demo and verb good understanding of ed provided on fall prevention techs, EC/WS techs, equip needs, pacing, B UE HEP, and disease specific education  Patient Goals   Patient goals : to go home       Therapy Time   Individual Concurrent Group Co-treatment   Time In 1340         Time Out 1407         Minutes 27               Upon writer exit, call light within reach, pt retired to bed. All lines intact and patient positioned comfortably. All patient needs addressed prior to ending therapy session. Chart reviewed prior to treatment and patient is agreeable for therapy. RN reports patient is medically stable for therapy treatment this date.     Ariane Reyes OTR/L

## 2022-10-13 NOTE — PROGRESS NOTES
[facility]  Office: [address], DO, Conner Patella, DO, Christinajed Prey, DO, Bella Mcdonaldes Blood, DO, Barb Sarkar MD, Waleska Mann MD, Kvng You MD, Candace Cook MD,  Stephanie Ruiz MD, Rosario Calderon MD, Singh Bishop, DO, Samra Espinoza MD,  Iesha Boyd MD, Kathrin Harp MD, Julian Leach, DO, Esther Pacheco MD, Phill Villalba MD, Edyta Elmore MD, Michael Malik MD, Wes Lawrence MD, Lindsay Heredia MD, Zander Higgins, DO, Shai Nugent MD, Karl Jama MD, Abeba Brandon, CNP,  Jaison Whiting, CNP, Sally Arenas, CNP, Merwyn Cooks, CNP,  Priscilla Bean, DNP, Maria Luz Richmond, CNP, Kei Godinez, CNP, Odilon Isaac, CNP, Andres Crockett, CNP, Ghassan Parnell, CNP, Vickey Barr PA-C, Nichol Luan, CNS, Jaswinder Palacios, DNP, Cassi Siddiqui, CNP, Dominique Nunez, CNP, Jett [facility]    Progress Note    10/13/2022    12:55 PM    Name:   Des Smith  MRN:     [id number]     Acct:      [de-identified]   Room:   [address] [place] [address] Day:  2  Admit Date:  10/11/2022  5:00 PM    PCP:   Patsy Villavicencio PA-C  Code Status:  Full Code    Subjective:     C/C:   Chief Complaint   Patient presents with    Leg Swelling     [address] [place] [address] for three days. Sent in from PCP     Interval History Status: . Feels  improvement in leg swelling/blisters and shortness of breath after getting IV diuresis now he is able to lie down in slightly inclined position  Denies chest pain  Creatinine 1.09  Intake output and weight has not been recorded in epic  Brief History:     Patient presents to the emergency room today with complaints of bilateral lower leg weeping edema. Patient states that he has been experiencing his bilateral lower leg weeping edema for approximately 2 weeks.   States that they have been swollen, progressed to having blisters, and now the blisters have opened and started to drain clear fluid. Patient has a significant past medical history of coronary artery disease, CHF, hyperlipidemia and CVA. Patient does have an ICD placed and follows with [facility] cardiology consultants. Patient denies any recent fevers, chest pain, nausea, vomiting and diarrhea. Patient states he has been experiencing intermittent chills and has been short of breath for the past 1 to 2 years. Patient states he has followed up with his PCP and was supposed to see a pulmonologist, but has not done so yet. Patient states he has failed a outpatient sleep study twice but has not gotten a CPAP machine yet. Throughout the emergency room evaluation it was noted that the patient's glucose level is 148. proBNP 631. Troponin 25. Alk phos 178. Platelet 366. Chest x-ray was obtained which shows:  Enlarged cardiac silhouette, unchanged. New or worsening airspace opacities   throughout the central left lung, possibly representing atelectasis or   asymmetric pulmonary edema although additional considerations are possible. Review of Systems:     Constitutional:  negative for chills, fevers, sweats  Respiratory:  negative for cough, +dyspnea on exertion, Cardiovascular:  negative for chest pain, chest pressure/discomfort, ++lower extremity edema, denies palpitations  Gastrointestinal:  negative for abdominal pain, constipation, diarrhea, nausea, vomiting  Neurological:  negative for dizziness, headache    Medications:      Allergies:  No Known Allergies    Current Meds:   Scheduled Meds:    empagliflozin  10 mg Oral Daily    [START ON 10/15/2022] sacubitril-valsartan  1 tablet Oral BID    bumetanide  2 mg IntraVENous Q12H    clopidogrel  75 mg Oral Daily    rivaroxaban  20 mg Oral Dinner    amiodarone  200 mg Oral Daily    atorvastatin  80 mg Oral Nightly    carvedilol  12.5 mg Oral BID WC    metFORMIN  1,000 mg Oral Daily    sodium chloride flush  5-40 mL IntraVENous 2 times per day    insulin lispro  0-4 Units SubCUTAneous TID WC    insulin lispro  0-4 Units SubCUTAneous Nightly     Continuous Infusions:    sodium chloride      dextrose       PRN Meds: oxyCODONE, sodium chloride flush, sodium chloride, ondansetron **OR** ondansetron, polyethylene glycol, acetaminophen **OR** acetaminophen, potassium chloride **OR** potassium alternative oral replacement **OR** potassium chloride, magnesium sulfate, glucose, dextrose bolus **OR** dextrose bolus, glucagon (rDNA), dextrose    Data:     Past Medical History:   has a past medical history of CAD (coronary artery disease), CHF (congestive heart failure) ([facility] Hyperlipidemia, Hypertension, MI (myocardial infarction) ([facility] MRSA (methicillin resistant staph aureus) culture positive, Skin cancer, Snores, Stroke ([facility] Wears dentures, Wears reading eyeglasses, and Wellness examination. Social History:   reports that he quit smoking about 23 years ago. He has never used smokeless tobacco. He reports that he does not drink alcohol and does not use drugs. Family History:   Family History   Problem Relation Age of Onset    Coronary Art Dis Father     Liver Disease Father     Alcohol Abuse Sister        Vitals:  BP 99/66   Pulse 55   Temp 98.2 °F (36.8 °C) (Oral)   Resp 17   Ht 5' 9\" (1.753 m)   Wt 230 lb (104.3 kg)   SpO2 94%   BMI 33.97 kg/m²   Temp (24hrs), Av.9 °F (36.6 °C), Min:97.5 °F (36.4 °C), Max:98.2 °F (36.8 °C)    Recent Labs     10/12/22  1233 10/12/22  1707 10/12/22  1915 10/13/22  0743   POCGLU 159* 119* 171* 150*       I/O (24Hr):   No intake or output data in the 24 hours ending 10/13/22 1255      Labs:  Hematology:  Recent Labs     10/11/22  1751 10/12/22  0526   WBC 5.6 6.1   RBC 5.04 5.01   HGB 13.3 13.2   HCT 43.1 43.6   MCV 85.5 87.0   MCH 26.4 26.3   MCHC 30.9 30.3   RDW 16.3* 16.5*   * 109*   MPV 11.9 12.1     Chemistry:  Recent Labs     10/11/22  1751 10/11/22  1935 10/11/22  2336 10/12/22  0526 10/13/22  0553    --   --  140 136   K 3.7  --   --  3.9 3.8   CL 99  --   --  99 97*   CO2 27  --   --  31 28   GLUCOSE 148*  --   --  112* 119*   BUN 22  --   --  21 23   CREATININE 0.99  --   --  1.09 1.09   MG  --   --   --  1.8 1.6   ANIONGAP 12  --   --  10 11   LABGLOM >60  --   --  >60 >60   CALCIUM 9.2  --   --  9.1 8.8   PROBNP 631*  --   --  742*  --    TROPHS 25* 25* 26*  --   --    MYOGLOBIN 56 49 48  --   --      Recent Labs     10/11/22  1751 10/11/22  2336 10/12/22  0001 10/12/22  0526 10/12/22  0832 10/12/22  1233 10/12/22  1707 10/12/22  1915 10/13/22  0743   PROT 6.6  --   --   --   --   --   --   --   --    LABALBU 3.8  --   --   --   --   --   --   --   --    LABA1C  --  7.4*  --   --   --   --   --   --   --    TSH  --   --   --  1.86  --   --   --   --   --    AST 25  --   --   --   --   --   --   --   --    ALT 18  --   --   --   --   --   --   --   --    ALKPHOS 178*  --   --   --   --   --   --   --   --    BILITOT 2.0*  --   --   --   --   --   --   --   --    CHOL  --   --   --  242*  --   --   --   --   --    HDL  --   --   --  27*  --   --   --   --   --    LDLCHOLESTEROL  --   --   --  199*  --   --   --   --   --    CHOLHDLRATIO  --   --   --  9.0*  --   --   --   --   --    TRIG  --   --   --  82  --   --   --   --   --    POCGLU  --   --  201*  --  122* 159* 119* 171* 150*     ABG:No results found for: POCPH, PHART, PH, POCPCO2, FPF8CIB, PCO2, POCPO2, PO2ART, PO2, POCHCO3, JDD3RIC, HCO3, NBEA, PBEA, BEART, BE, THGBART, THB, ZYL8YPW, CGDX9YLS, F0SXDYSH, O2SAT, FIO2  Lab Results   Component Value Date/Time    SPECIAL RAC 12ML 03/11/2019 08:52 PM     Lab Results   Component Value Date/Time    CULTURE NO GROWTH 6 DAYS 03/11/2019 08:52 PM       Radiology:  XR CHEST PORTABLE    Result Date: 10/11/2022  Enlarged cardiac silhouette, unchanged.   New or worsening airspace opacities throughout the central left lung, possibly representing atelectasis or asymmetric pulmonary edema although additional considerations are possible.        Physical Examination:        General appearance:  alert, cooperative and no distress, sitting comfortably in bed  Mental Status:  oriented to person, place and time and normal affect  Lungs: Diminished breath sounds at bases, few bibasilar Rales   Heart:  regular rate and rhythm, + murmur,+ AICD  Abdomen:  soft, nontender, nondistended, normal bowel sounds, no masses, hepatomegaly, splenomegaly  Extremities:  ++ Bilateral lower extremity edema with few blisters-Ace wraps in place now   Skin:  no other gross lesions, rashes, induration    Assessment:        Hospital Problems             Last Modified POA    * (Principal) Acute on chronic combined systolic and diastolic CHF (congestive heart failure) ([address] 10/11/2022 Yes    Status post implantation of automatic cardioverter/defibrillator (AICD) 10/11/2022 Yes    Obesity (BMI 30-39.9) 10/11/2022 Yes    Type 2 diabetes mellitus, without long-term current use of insulin ([address] 10/11/2022 Yes    Paroxysmal atrial fibrillation ([address] 10/11/2022 Yes    Edema of both legs 10/13/2022 Yes    Essential hypertension 10/11/2022 Yes    Hypercholesterolemia 10/11/2022 Yes    CAD (coronary artery disease) 10/11/2022 Yes     Plan:        Increase IV diuresis with 2 mg Bumex twice daily  Continue home dose of amiodarone/aspirin/Eliquis/atorvastatin/Coreg/lisinopril/metformin  Consulted cardiology due to chronic resistant CHF  Discussed low-salt diet again  Repeat chest x-ray shows mild degree improvement in pulmonary edema    Christopher Robles MD  10/13/2022  12:55 PM

## 2022-10-13 NOTE — PROGRESS NOTES
Physical Therapy  Facility/Department: [facility] PROGRESSIVE CARE  Rehabilitation Physical Therapy Treatment Note    NAME: Nohemy Sanches  : 1960 (20 y.o.)  MRN: [id number]  CODE STATUS: Full Code    Date of Service: 10/13/22    Restrictions:  Restrictions/Precautions: Fall Risk;General Precautions; Up as Tolerated; Bed Alarm     SUBJECTIVE  Subjective  Subjective: Patient up EOB. Patient agreeable to PT treatment. RN states patient is appropriate for PT treatment this date. OBJECTIVE  Cognition  Overall Cognitive Status: Exceptions  Arousal/Alertness: Appropriate responses to stimuli  Following Commands: Follows one step commands with repetition  Attention Span: Difficulty attending to directions  Memory: Appears intact  Safety Judgement: Decreased awareness of need for safety;Decreased awareness of need for assistance  Problem Solving: Assistance required to generate solutions;Assistance required to implement solutions;Assistance required to correct errors made;Decreased awareness of errors  Insights: Decreased awareness of deficits  Initiation: Requires cues for some  Sequencing: Requires cues for some  Cognition Comment: pt is hyperverbal throughout. poor insight into deficits and role in own health. Orientation  Overall Orientation Status: Within Functional Limits    Functional Mobility  Balance  Sitting Balance: Modified independent   Standing Balance: Stand by assistance  Standing Balance  Time: 5 plus minutes  Activity: Patient stood EOB working on [employer] to promote safety in ambulation this date. Comments: Patient requries frequent standing restbreaks throughout  Transfers  Surface: To bed;From bed  Additional Factors: Set-up; Verbal cues  Device:  (No Device)  Sit to Stand  Assistance Level: Stand by assist  Stand to Sit  Assistance Level: Stand by assist      Environmental Mobility  Ambulation  Surface: Level surface  Device:  (No Device)  Distance: 30 feet x2  Activity: Within Room  Activity Comments: slow steady gait pattern requires seated restbreak between attempts. Additional Factors: Set-up; Increased time to complete  Assistance Level: Stand by assist;Contact guard assist  Gait Deviations: Decreased heel strike right;Decreased heel strike left; Slow sergio      Neuromuscular Education  NDT Treatment: Gait ;Lower extremity; Sitting;Standing      PT Exercises  Exercise Treatment: Educated patient on seated ANDREA LE AROM with good understanding and return on demo. ASSESSMENT/PROGRESS TOWARDS GOALS  Vital Signs  BP Location: Right upper arm  O2 Device: None (Room air)    Assessment  Assessment: Patient with decreased aerobic capacity patient requries frequent rest breaks throughout treatment to maximize tolerance and endurance to activities. Activity Tolerance: Patient limited by endurance; Patient limited by pain;Treatment limited secondary to medical complications    Goals  Patient Goals   Patient Goals : feel better  Short Term Goals  Time Frame for Short Term Goals: 12 visits:  Short Term Goal 1: Pt. to be indep with bed mob. Short Term Goal 2: Pt to be indep with transfers with approp. AD  Short Term Goal 3: Pt. to be indep with gait with approp AD, 50ft (household distance)  Short Term Goal 4: Pt. to tolerate 25+ min. of PT daily for ther ex/ gait/ balance training/ functional mob. training    PLAN OF CARE/SAFETY  Physcial Therapy Plan  General Plan: 5-7 times per week  Safety Devices  Type of Devices: All fall risk precautions in place; Bed alarm in place;Call light within reach;Nurse notified; Left in bed;Gait belt    EDUCATION  Education  Education Given To: Patient  Education Provided: Role of Therapy; Energy Conservation;Mobility Training;Transfer Training  Education Provided Comments: Patient provided educational handout regarding heart failure.   Patient educated on things to watch for (new or worsening SOB, increases in physical activity, new or worsening swelling of feet and legs, no significant weight gain(+2 lbs in a day) (+5 lbs in a week), or chest pain. Patient knows to pay attention to dry hacking cough, worsening SOB with activity, discomfort or swelling of abdomen, or trouble sleeping. Patient is educated on seeking medical attention with frequent dry hacking cough, SOB at rest, increased discomfort or swelling in the lower body, sudden weight gain of more than 2-3 lbs in a 24 hour period (or 5 lbs in a week), new or worsening dizziness, confusion, sadness, or depression, loss of appetite, or increase trouble sleeping ( cannot lie flat).   Education Method: Verbal  Barriers to Learning: None;Cognition  Education Outcome: Verbalized understanding;Continued education needed        Therapy Time   Individual Concurrent Group Co-treatment   Time In [address] [place] [address]         Time Out 3616         Minutes [address], 10/13/22 at 3:26 PM

## 2022-10-13 NOTE — PLAN OF CARE
Pt wears gripper socks, uses call light appropriately, and bed in lowest position. Swelling is going down in legs, currently wrapped in ACE wraps. Using urinal to track output due to diuresing with Bumex. Call light within reach, moves around room independently, and free from falls this shift.      Problem: Cardiovascular - Adult  Goal: Maintains optimal cardiac output and hemodynamic stability  10/13/2022 1525 by Jensen Maldonado RN  Outcome: Progressing  10/13/2022 1447 by Jensen Maldonado RN  Outcome: Progressing     Problem: Metabolic/Fluid and Electrolytes - Adult  Goal: Electrolytes maintained within normal limits  10/13/2022 1525 by Jensen Maldonado RN  Outcome: Progressing  10/13/2022 1447 by Jensen Maldonado RN  Outcome: Progressing     Problem: Discharge Planning  Goal: Discharge to home or other facility with appropriate resources  10/13/2022 1525 by Jensen Maldonado RN  Outcome: Progressing  10/13/2022 1447 by Jensen Maldonado RN  Outcome: Progressing

## 2022-10-13 NOTE — CONSULTS
[facility] Cardiology    Consult                        Today's Date: 10/13/2022  Patient Name: Linda Oliveros  Date of admission: 10/11/2022  5:00 PM  Patient's age: 59 y. o., 1960  Admission Dx: Acute on chronic combined systolic and diastolic CHF (congestive heart failure) ([facility]) [I50.43]  Congestive heart failure, unspecified HF chronicity, unspecified heart failure type ([facility] [address] [I50.9]    Reason for Consult:  Cardiac evaluation    Requesting Physician: Pool Cannon MD    CHIEF COMPLAINT:  CHF     History Obtained From:  patient, electronic medical record    HISTORY OF PRESENT ILLNESS:    Patient presents to the emergency room today with complaints of bilateral lower leg weeping edema. Patient states that he has been experiencing his bilateral lower leg weeping edema for approximately 2 weeks. States that they have been swollen, progressed to having blisters, and now the blisters have opened and started to drain clear fluid. Patient has a significant past medical history of coronary artery disease, CHF, hyperlipidemia and CVA. Patient does have an ICD placed and follows with [facility] cardiology consultants. Patient denies any recent fevers, chest pain, nausea, vomiting and diarrhea. Patient states he has been experiencing intermittent chills and has been short of breath for the past 1 to 2 years. Patient states he has followed up with his PCP and was supposed to see a pulmonologist, but has not done so yet. Patient states he has failed a outpatient sleep study twice but has not gotten a CPAP machine yet. Pt seen and examined in the room. Pt states that he is feeling slightly better. He feels like the swelling and fluid overload came on all at once.       No I/O recorded   Past Medical History:   has a past medical history of CAD (coronary artery disease), CHF (congestive heart failure) ([facility] [address] Heart attack ([facility] [address] Hyperlipidemia, Hypertension, MI (myocardial infarction) ([facility] [address] MRSA (methicillin resistant staph aureus) culture positive, Skin cancer, Snores, Stroke [facility]), Wears dentures, Wears reading eyeglasses, and Wellness examination. Past Surgical History:   has a past surgical history that includes Coronary artery bypass graft; Coronary angioplasty with stent; skin biopsy; eye surgery; Mandible surgery; hernia repair (Bilateral, 12/21/2020); Cardioversion (11/17/2021); other surgical history (11/17/2021); Cardiac surgery (1999); Cardiac catheterization (08/06/2020); and Cardiac defibrillator placement (04/04/2022). Home Medications:    Prior to Admission medications    Medication Sig Start Date End Date Taking? Authorizing Provider   amiodarone (CORDARONE) 200 MG tablet Take 200 mg by mouth daily   Yes Historical Provider, MD   bumetanide (BUMEX) 1 MG tablet Take 1-2 mg by mouth See Admin Instructions Indications: 2mg AM and 1mg PM   Yes Historical Provider, MD   rivaroxaban (XARELTO) 20 MG TABS tablet Take 20 mg by mouth Daily with supper   Yes Historical Provider, MD   metFORMIN (GLUCOPHAGE) 1000 MG tablet Take 1 tablet by mouth daily Resume on 5/21 5/19/22   Jerry Sutherland DO   Cyanocobalamin (VITAMIN B-12 PO) Take 1 tablet by mouth daily    Historical Provider, MD   naproxen (NAPROSYN) 500 MG tablet Take 1 tablet by mouth 2 times daily (with meals) 3/28/22 3/28/22  Jeancarlos Estrada PA-C   vitamin E 100 units capsule Take 100 Units by mouth daily     Historical Provider, MD   Multiple Vitamins-Minerals (THERAPEUTIC MULTIVITAMIN-MINERALS) tablet Take 1 tablet by mouth daily    Historical Provider, MD   Ascorbic Acid (VITAMIN C) 250 MG tablet Take 250 mg by mouth three times a week    Historical Provider, MD   nitroGLYCERIN (NITROSTAT) 0.4 MG SL tablet Place 0.4 mg under the tongue every 5 minutes as needed for Chest pain up to max of 3 total doses. If no relief after 1 dose, call 911.   Dr. Oleksandr Michele Provider, MD   atorvastatin (LIPITOR) 80 MG tablet Take 1 tablet by mouth nightly 2/20/19   Jimmie Street MD   carvedilol (COREG) 12.5 MG tablet Take 1 tablet by mouth 2 times daily (with meals) 2/20/19   Jimmie Street MD   lisinopril (PRINIVIL;ZESTRIL) 5 MG tablet Take 1 tablet by mouth daily 2/21/19   Jimmie Street MD   aspirin 325 MG tablet Take 162.5 mg by mouth daily Takes 1/2 tab (162.5mg) daily    Historical Provider, MD       Allergies:  Patient has no known allergies. Social History:   reports that he quit smoking about 23 years ago. He has never used smokeless tobacco. He reports that he does not drink alcohol and does not use drugs. Family History: family history includes Alcohol Abuse in his sister; Coronary Art Dis in his father; Liver Disease in his father. No h/o sudden cardiac death. No for premature CAD    REVIEW OF SYSTEMS:    Constitutional: there has been no unanticipated weight loss. There's been No change in energy level, No change in activity level. Eyes: No visual changes or diplopia. No scleral icterus. ENT: No Headaches, hearing loss or vertigo. No mouth sores or sore throat. Cardiovascular: see above  Respiratory: see above  Gastrointestinal: No abdominal pain, appetite loss, blood in stools. Genitourinary: No dysuria, trouble voiding, or hematuria. Musculoskeletal:  No gait disturbance, No weakness or joint complaints. Integumentary: No rash or pruritis. Neurological: No headache or diplopia. No tingling  Psychiatric: No anxiety, or depression. Endocrine: No temperature intolerance. Hematologic/Lymphatic: No abnormal bruising or bleeding, blood clots or swollen lymph nodes. Allergic/Immunologic: No nasal congestion or hives.       PHYSICAL EXAM:      /86   Pulse 66   Temp 97.5 °F (36.4 °C) (Oral)   Resp 16   Ht 5' 9\" (1.753 m)   Wt 230 lb (104.3 kg)   SpO2 95%   BMI 33.97 kg/m²    Constitutional and General Appearance: alert, cooperative, no distress and appears stated age  [de-identified]: PERRL, no cervical lymphadenopathy. No masses palpable. Normal oral mucosa  Respiratory:  Normal excursion and expansion without use of accessory muscles  Resp Auscultation: Good respiratory effort. No for increased work of breathing. On auscultation: rales in bases to auscultation bilaterally  Cardiovascular:  Heart tones are crisp and normal. regular S1 and S2.  Jugular venous pulsation Normal  The carotid upstroke is normal in amplitude and contour without delay or bruit   Abdomen:   Firm   Bowel sounds present  Extremities:   +2-3 pitting  edema  Neurological:  Alert and oriented. DATA:    Diagnostics:    EKG: normal EKG, normal sinus rhythm, normal sinus rhythm, unchanged from previous tracings. ICD 4/4/2022: [employer] device implanted by Dr. Beth Knowles      ECHO 3/15/2022: EF 35%, mild MR/TR. BENITO/CV 11/29/2021:   1. A BENITO was performed without complications. 2. LVEF 30 %  3  Structurally normal mitral valve. Severe MR noted. Central jet and one posterior jet. MR radius 1 cm, ERO 0.35 and MR volume 59 ml consistent with severe MR.   3.  JARED well visualized. No clot noted. 4. Successful CV to NSR.      MUGA 12/3/2020: EF 37%. CATH 8/6/2020:   Multi-vessel Coronary Artery Disease. Patent LIMA-LAD and SVG to OM. Occluded native RCA with left to right collaterals. Mildly impaired ventricular function. Dilated aortic root with no other grafts seen. Cath 5/18/2022  Patent LIMA-LAD  SVG-OM1 prox 99% (PTCA/TITI x1)    Labs:     CBC:   Recent Labs     10/11/22  1751 10/12/22  0526   WBC 5.6 6.1   HGB 13.3 13.2   HCT 43.1 43.6   * 109*     BMP:   Recent Labs     10/12/22  0526 10/13/22  0553    136   K 3.9 3.8   CO2 31 28   BUN 21 23   CREATININE 1.09 1.09   LABGLOM >60 >60   GLUCOSE 112* 119*     BNP: No results for input(s): BNP in the last 72 hours. PT/INR: No results for input(s): PROTIME, INR in the last 72 hours. APTT:No results for input(s): APTT in the last 72 hours.   CARDIAC ENZYMES:No results for input(s): CKTOTAL, CKMB, CKMBINDEX, TROPONINI in the last 72 hours. FASTING LIPID PANEL:  Lab Results   Component Value Date/Time    HDL 27 10/12/2022 05:26 AM    TRIG 82 10/12/2022 05:26 AM     LIVER PROFILE:  Recent Labs     10/11/22  1751   AST 25   ALT 18   LABALBU 3.8       IMPRESSION:    Patient Active Problem List   Diagnosis    Hyperbilirubinemia    Essential hypertension    Hypercholesterolemia    CAD (coronary artery disease)    Gross hematuria    Abdominal pain, right upper quadrant    Right nephrolithiasis    Abnormal liver function test    Acute systolic HF (heart failure) (HCC)    Noncompliance    Acute on chronic systolic heart failure (HCC)    Pneumonia    Dyspnea and respiratory abnormalities    Status post inguinal hernia repair    S/P repair of ventral hernia    Post-op pain    Non-recurrent bilateral inguinal hernia without obstruction or gangrene    Umbilical hernia with obstruction, without gangrene    Status post implantation of automatic cardioverter/defibrillator (AICD)    NSTEMI (non-ST elevated myocardial infarction) ([facility] [address]    ARIADNA (obstructive sleep apnea)    S/P CABG (coronary artery bypass graft)    Nausea    Anorexia    Thrombocytopenia (HCC)    Obesity (BMI 30-39. 9)    Hypokalemia    Uncontrolled type 2 diabetes mellitus with hyperglycemia (HCC)    Acute on chronic combined systolic and diastolic CHF (congestive heart failure) (HCC)    Type 2 diabetes mellitus, without long-term current use of insulin (HCC)    Paroxysmal atrial fibrillation (HCC)       RECOMMENDATIONS:  Acute on Chronic systolic CHF. Continue Bumex, BB and ACE. Will d/c ACE. Start entrest after 36 hr washout. Will start on Jardiance and plan for verquvo as outpt  PAF. Stable. Continue Amio, BB and Xarelto  CAD with recent PCI  Continue ASA, statin, BB, ACE,. Pt has recent stent 4/22 and is not currently taking plavix. Will d/c ASA and start plavix 75mg daily   Strict I/O. Continue compression stockings. Will recommend CHF clinic as outpt       Discussed with patient and Nurse. Abel Bib, [address]  Cardiology Consult           [phone]       I reviewed the patient history personally, and examined by me during the visit. I have reviewed the H&P/Consult / Progress note as completed, and made appropriate changes to the patient exam and treatment plans.       I have reviewed the case in details including physical exam and treatment plan with resident / fellow / NP    Patient treatment plan was explained to patient, correction in notes was made as appropriate, and discussed final arrangement based on  my evaluation and exam.    Additional Recommendations:      Dillon Cunningham MD  [facility]

## 2022-10-13 NOTE — TELEPHONE ENCOUNTER
WRITER CALLED AND SPOKE TO PT'S WIFE  SHE STATED THAT PT IS IN-HOUSE AND THAT SHE HOPES THE DR CAN COME AND SEE HIM WHILE HE'S IN-HOUSE UNTIL THEN WE WILL SCHEDULE AN APPT ONCE PT IS Ann Butcher.

## 2022-10-13 NOTE — PROGRESS NOTES
[place] [address]  PROGRESS NOTE    Room # 1012/1012-02   Name: Arnaldo Delarosa              Reason for visit: Routine    I visited the patient. Admit Date & Time: 10/11/2022  5:00 PM    Assessment:  Arnaldo Delarosa is a 59 y.o. male. Upon entering the room patient states about their medical condition, states struggles with their medical situation. States worries, fears frustrations. Patient states well , treated well. Patient states good family support, shares about spiritual life, Zoroastrian background, shares Zoroastrian beliefs. Patient shares about outside interests. Intervention:   provided a ministry presence, listening and prayer. Outcome:  Patient open to visit. Plan:  Chaplains will remain available to offer spiritual and emotional support as needed. Electronically signed by Chaplain Misty, on 10/13/2022 at 2:55 PM.  Cy      10/13/22 1294   Encounter Summary   Service Provided For: Patient   Referral/Consult From: [facility]   Support System Unknown   Last Encounter  10/13/22   Complexity of Encounter Moderate   Begin Time 0225   End Time  0245   Total Time Calculated 20 min   Encounter    Type Initial Screen/Assessment   Assessment/Intervention/Outcome   Assessment Anxious   Intervention Active listening;Discussed belief system/Zoroastrian practices/saeid;Discussed illness injury and its impact;Prayer (assurance of)/[place];Sustaining Presence/Ministry of presence   Outcome Comfort;Expressed Gratitude;Engaged in conversation

## 2022-10-13 NOTE — PLAN OF CARE
Problem: Discharge Planning  Goal: Discharge to home or other facility with appropriate resources  Outcome: Progressing  Flowsheets (Taken 10/12/2022 1900)  Discharge to home or other facility with appropriate resources:   Identify barriers to discharge with patient and caregiver   Arrange for needed discharge resources and transportation as appropriate   Identify discharge learning needs (meds, wound care, etc)   Refer to discharge planning if patient needs post-hospital services based on physician order or complex needs related to functional status, cognitive ability or social support system     Problem: Cardiovascular - Adult  Goal: Maintains optimal cardiac output and hemodynamic stability  Outcome: Progressing  Flowsheets (Taken 10/12/2022 1900)  Maintains optimal cardiac output and hemodynamic stability:   Monitor blood pressure and heart rate   Monitor urine output and notify Licensed Independent Practitioner for values outside of normal range     Problem: Metabolic/Fluid and Electrolytes - Adult  Goal: Electrolytes maintained within normal limits  Outcome: Progressing  Flowsheets (Taken 10/12/2022 1900)  Electrolytes maintained within normal limits:   Monitor labs and assess patient for signs and symptoms of electrolyte imbalances   Administer electrolyte replacement as ordered     Problem: Chronic Conditions and Co-morbidities  Goal: Patient's chronic conditions and co-morbidity symptoms are monitored and maintained or improved  Outcome: Progressing  Flowsheets (Taken 10/12/2022 1900)  Care Plan - Patient's Chronic Conditions and Co-Morbidity Symptoms are Monitored and Maintained or Improved:   Monitor and assess patient's chronic conditions and comorbid symptoms for stability, deterioration, or improvement   Collaborate with multidisciplinary team to address chronic and comorbid conditions and prevent exacerbation or deterioration   Update acute care plan with appropriate goals if chronic or comorbid symptoms are exacerbated and prevent overall improvement and discharge

## 2022-10-13 NOTE — TELEPHONE ENCOUNTER
WRITER CALLED AND LEFT A VM MESSAGE TO SCHEDULE AN APPT W/ DR Cyn Pagan IN 2 WKS WAITING ON A CALL BACK

## 2022-10-14 LAB
ANION GAP SERPL CALCULATED.3IONS-SCNC: 10 MMOL/L (ref 9–17)
BUN BLDV-MCNC: 22 MG/DL (ref 8–23)
BUN/CREAT BLD: 19 (ref 9–20)
CALCIUM SERPL-MCNC: 8.8 MG/DL (ref 8.6–10.4)
CHLORIDE BLD-SCNC: 96 MMOL/L (ref 98–107)
CO2: 31 MMOL/L (ref 20–31)
CREAT SERPL-MCNC: 1.14 MG/DL (ref 0.7–1.2)
GFR SERPL CREATININE-BSD FRML MDRD: >60 ML/MIN/1.73M2
GLUCOSE BLD-MCNC: 112 MG/DL (ref 75–110)
GLUCOSE BLD-MCNC: 114 MG/DL (ref 70–99)
GLUCOSE BLD-MCNC: 138 MG/DL (ref 75–110)
GLUCOSE BLD-MCNC: 146 MG/DL (ref 75–110)
GLUCOSE BLD-MCNC: 163 MG/DL (ref 75–110)
MAGNESIUM: 1.6 MG/DL (ref 1.6–2.6)
POTASSIUM SERPL-SCNC: 3.5 MMOL/L (ref 3.7–5.3)
SODIUM BLD-SCNC: 137 MMOL/L (ref 135–144)

## 2022-10-14 PROCEDURE — 36415 COLL VENOUS BLD VENIPUNCTURE: CPT

## 2022-10-14 PROCEDURE — 2060000000 HC ICU INTERMEDIATE R&B

## 2022-10-14 PROCEDURE — 97535 SELF CARE MNGMENT TRAINING: CPT

## 2022-10-14 PROCEDURE — 80048 BASIC METABOLIC PNL TOTAL CA: CPT

## 2022-10-14 PROCEDURE — 99232 SBSQ HOSP IP/OBS MODERATE 35: CPT | Performed by: INTERNAL MEDICINE

## 2022-10-14 PROCEDURE — 82947 ASSAY GLUCOSE BLOOD QUANT: CPT

## 2022-10-14 PROCEDURE — 97530 THERAPEUTIC ACTIVITIES: CPT

## 2022-10-14 PROCEDURE — 2500000003 HC RX 250 WO HCPCS: Performed by: INTERNAL MEDICINE

## 2022-10-14 PROCEDURE — 6370000000 HC RX 637 (ALT 250 FOR IP): Performed by: INTERNAL MEDICINE

## 2022-10-14 PROCEDURE — 83735 ASSAY OF MAGNESIUM: CPT

## 2022-10-14 PROCEDURE — 6370000000 HC RX 637 (ALT 250 FOR IP): Performed by: NURSE PRACTITIONER

## 2022-10-14 PROCEDURE — 6360000002 HC RX W HCPCS: Performed by: NURSE PRACTITIONER

## 2022-10-14 PROCEDURE — 2580000003 HC RX 258: Performed by: NURSE PRACTITIONER

## 2022-10-14 RX ADMIN — RIVAROXABAN 20 MG: 20 TABLET, FILM COATED ORAL at 18:51

## 2022-10-14 RX ADMIN — CARVEDILOL 12.5 MG: 12.5 TABLET, FILM COATED ORAL at 18:51

## 2022-10-14 RX ADMIN — BUMETANIDE 2 MG: 0.25 INJECTION INTRAMUSCULAR; INTRAVENOUS at 10:02

## 2022-10-14 RX ADMIN — MAGNESIUM SULFATE HEPTAHYDRATE 2000 MG: 40 INJECTION, SOLUTION INTRAVENOUS at 16:00

## 2022-10-14 RX ADMIN — SODIUM CHLORIDE, PRESERVATIVE FREE 10 ML: 5 INJECTION INTRAVENOUS at 10:01

## 2022-10-14 RX ADMIN — SODIUM CHLORIDE, PRESERVATIVE FREE 10 ML: 5 INJECTION INTRAVENOUS at 20:34

## 2022-10-14 RX ADMIN — METFORMIN HYDROCHLORIDE 1000 MG: 500 TABLET ORAL at 10:01

## 2022-10-14 RX ADMIN — POTASSIUM CHLORIDE 40 MEQ: 1500 TABLET, EXTENDED RELEASE ORAL at 06:41

## 2022-10-14 RX ADMIN — ONDANSETRON 4 MG: 4 TABLET, ORALLY DISINTEGRATING ORAL at 00:07

## 2022-10-14 RX ADMIN — SODIUM CHLORIDE: 9 INJECTION, SOLUTION INTRAVENOUS at 15:59

## 2022-10-14 RX ADMIN — BUMETANIDE 2 MG: 0.25 INJECTION INTRAMUSCULAR; INTRAVENOUS at 20:34

## 2022-10-14 RX ADMIN — POLYETHYLENE GLYCOL 3350 17 G: 17 POWDER, FOR SOLUTION ORAL at 03:18

## 2022-10-14 RX ADMIN — EMPAGLIFLOZIN 10 MG: 10 TABLET, FILM COATED ORAL at 10:01

## 2022-10-14 RX ADMIN — Medication 500 MG: at 00:07

## 2022-10-14 RX ADMIN — CLOPIDOGREL BISULFATE 75 MG: 75 TABLET ORAL at 10:11

## 2022-10-14 RX ADMIN — ATORVASTATIN CALCIUM 80 MG: 80 TABLET, FILM COATED ORAL at 20:34

## 2022-10-14 RX ADMIN — AMIODARONE HYDROCHLORIDE 200 MG: 200 TABLET ORAL at 10:01

## 2022-10-14 ASSESSMENT — PAIN SCALES - GENERAL
PAINLEVEL_OUTOF10: 0

## 2022-10-14 NOTE — PLAN OF CARE
Problem: Chronic Conditions and Co-morbidities  Goal: Patient's chronic conditions and co-morbidity symptoms are monitored and maintained or improved  10/14/2022 0440 by Larayne Phalen RN  Outcome: Progressing    Problem: Cardiovascular - Adult  Goal: Maintains optimal cardiac output and hemodynamic stability  10/14/2022 0440 by Larayne Phalen, RN  Outcome: Progressing     Problem: Cardiovascular - Adult  Goal: Absence of cardiac dysrhythmias or at baseline  Outcome: Progressing     Problem: Gastrointestinal - Adult  Goal: Maintains or returns to baseline bowel function  Outcome: Progressing

## 2022-10-14 NOTE — PROGRESS NOTES
[facility]  Office: [address], DO, Floridalma Eagle, DO, Eric Jimysal, DO, Joo Boy Blood, DO, Ness Woodson MD, Liam Anderson MD, Eliot Yarbrough MD, Azeem Swain MD,  Karly Castellon MD, Stephanie Steward MD, Paty Santoro, DO, Ignacio Sloan MD,  Fco Montesinos MD, Kristen Huff MD, Pasquale Phelan, DO, Tayler Mc MD, Lera Mcardle, MD, Kirsten Sanchez MD, Harjit Ricketts MD, Keith Lombard, MD, Lianne Hickman MD, Melly Carrizales, DO, Sonam Ty MD, Nadine Dumont MD, Mirian Lee, CNP,  Jen López, CNP, Dottie Galdamez, CNP, Genie Torres, CNP,  Ileana Bernal, [facility], Crow Kendrick, CNP, Ayaan Mcmahon, CNP, Robert Godinez, CNP, Ariela Beth, CNP, Adelaide Quiros, CNP, Anna Hatfield PA-C, Chad Corea, [facility], Carol Cagle, DNP, Oral Gram, CNP, [place], [facility], Kurtis Carlos    Progress Note    10/14/2022    11:57 AM    Name:   Katie Diaz  MRN:     [id number]     Acct:      [de-identified]   Room:   [address] [place] [address] Day:  3  Admit Date:  10/11/2022  5:00 PM    PCP:   Khurram Kiran PA-C  Code Status:  Full Code    Subjective:     C/C:   Chief Complaint   Patient presents with    Leg Swelling     [facility] for three days. Sent in from PCP     Interval History Status:  Having good diuresis with IV Bumex. Feels  improvement in leg swelling/blisters and shortness of breath , now he is able to lie down in slightly inclined position  Denies chest pain    Brief History:     Patient presents to the emergency room today with complaints of bilateral lower leg weeping edema. Patient states that he has been experiencing his bilateral lower leg weeping edema for approximately 2 weeks. States that they have been swollen, progressed to having blisters, and now the blisters have opened and started to drain clear fluid.   Patient has a significant past medical history of coronary artery disease, CHF, hyperlipidemia and CVA. Patient does have an ICD placed and follows with [facility]. Patient denies any recent fevers, chest pain, nausea, vomiting and diarrhea. Patient states he has been experiencing intermittent chills and has been short of breath for the past 1 to 2 years. Patient states he has followed up with his PCP and was supposed to see a pulmonologist, but has not done so yet. Patient states he has failed a outpatient sleep study twice but has not gotten a CPAP machine yet. Throughout the emergency room evaluation it was noted that the patient's glucose level is 148. proBNP 631. Troponin 25. Alk phos 178. Platelet 360. Chest x-ray was obtained which shows:  Enlarged cardiac silhouette, unchanged. New or worsening airspace opacities   throughout the central left lung, possibly representing atelectasis or   asymmetric pulmonary edema although additional considerations are possible. Review of Systems:     Constitutional:  negative for chills, fevers, sweats  Respiratory:  negative for cough, +dyspnea on exertion, Cardiovascular:  negative for chest pain, chest pressure/discomfort, ++lower extremity edema-improving with diuresis, denies palpitations  Gastrointestinal:  negative for abdominal pain, constipation, diarrhea, nausea, vomiting  Neurological:  negative for dizziness, headache    Medications:      Allergies:  No Known Allergies    Current Meds:   Scheduled Meds:    empagliflozin  10 mg Oral Daily    [START ON 10/15/2022] sacubitril-valsartan  1 tablet Oral BID    bumetanide  2 mg IntraVENous Q12H    clopidogrel  75 mg Oral Daily    rivaroxaban  20 mg Oral Dinner    amiodarone  200 mg Oral Daily    atorvastatin  80 mg Oral Nightly    carvedilol  12.5 mg Oral BID     metFORMIN  1,000 mg Oral Daily    sodium chloride flush  5-40 mL IntraVENous 2 times per day    insulin lispro  0-4 Units SubCUTAneous TID     insulin lispro 0-4 Units SubCUTAneous Nightly     Continuous Infusions:    sodium chloride      dextrose       PRN Meds: calcium carbonate, oxyCODONE, sodium chloride flush, sodium chloride, ondansetron **OR** ondansetron, polyethylene glycol, acetaminophen **OR** acetaminophen, potassium chloride **OR** potassium alternative oral replacement **OR** potassium chloride, magnesium sulfate, glucose, dextrose bolus **OR** dextrose bolus, glucagon (rDNA), dextrose    Data:     Past Medical History:   has a past medical history of CAD (coronary artery disease), CHF (congestive heart failure) ([facility] Heart attack ([facility] Hyperlipidemia, Hypertension, [facility] MRSA (methicillin resistant staph aureus) culture positive, Skin cancer, Snores, [facility] Wears dentures, Wears reading eyeglasses, and Wellness examination. Social History:   reports that he quit smoking about 23 years ago. He has never used smokeless tobacco. He reports that he does not drink alcohol and does not use drugs. Family History:   Family History   Problem Relation Age of Onset    Coronary Art Dis Father     Liver Disease Father     Alcohol Abuse Sister        Vitals:  BP 93/63   Pulse 62   Temp 97.9 °F (36.6 °C) (Oral)   Resp 18   Ht 5' 9\" (1.753 m)   Wt 230 lb (104.3 kg)   SpO2 93%   BMI 33.97 kg/m²   Temp (24hrs), Av.7 °F (36.5 °C), Min:97.3 °F (36.3 °C), Max:98.2 °F (36.8 °C)    Recent Labs     10/13/22  0743 10/13/22  1612 10/13/22  1932 10/14/22  0831   POCGLU 150* 136* 178* 112*         I/O (24Hr):     Intake/Output Summary (Last 24 hours) at 10/14/2022 1157  Last data filed at 10/14/2022 0323  Gross per 24 hour   Intake --   Output 1930 ml   Net -1930 ml         Labs:  Hematology:  Recent Labs     10/11/22  1751 10/12/22  0526   WBC 5.6 6.1   RBC 5.04 5.01   HGB 13.3 13.2   HCT 43.1 43.6   MCV 85.5 87.0   MCH 26.4 26.3   MCHC 30.9 30.3   RDW 16.3* 16.5*   * 109*   MPV 11.9 12.1       Chemistry:  Recent Labs 10/11/22  1751 10/11/22  1935 10/11/22  2336 10/12/22  0526 10/13/22  0553 10/14/22  0540     --   --  140 136 137   K 3.7  --   --  3.9 3.8 3.5*   CL 99  --   --  99 97* 96*   CO2 27  --   --  31 28 31   GLUCOSE 148*  --   --  112* 119* 114*   BUN 22  --   --  21 23 22   CREATININE 0.99  --   --  1.09 1.09 1.14   MG  --   --   --  1.8 1.6 1.6   ANIONGAP 12  --   --  10 11 10   LABGLOM >60  --   --  >60 >60 >60   CALCIUM 9.2  --   --  9.1 8.8 8.8   PROBNP 631*  --   --  742*  --   --    TROPHS 25* 25* 26*  --   --   --    MYOGLOBIN 56 49 48  --   --   --        Recent Labs     10/11/22  1751 10/11/22  2336 10/12/22  0001 10/12/22  0526 10/12/22  0832 10/12/22  1707 10/12/22  1915 10/13/22  0743 10/13/22  1612 10/13/22  1932 10/14/22  0831   PROT 6.6  --   --   --   --   --   --   --   --   --   --    LABALBU 3.8  --   --   --   --   --   --   --   --   --   --    LABA1C  --  7.4*  --   --   --   --   --   --   --   --   --    TSH  --   --   --  1.86  --   --   --   --   --   --   --    AST 25  --   --   --   --   --   --   --   --   --   --    ALT 18  --   --   --   --   --   --   --   --   --   --    ALKPHOS 178*  --   --   --   --   --   --   --   --   --   --    BILITOT 2.0*  --   --   --   --   --   --   --   --   --   --    CHOL  --   --   --  242*  --   --   --   --   --   --   --    HDL  --   --   --  27*  --   --   --   --   --   --   --    LDLCHOLESTEROL  --   --   --  199*  --   --   --   --   --   --   --    CHOLHDLRATIO  --   --   --  9.0*  --   --   --   --   --   --   --    TRIG  --   --   --  82  --   --   --   --   --   --   --    POCGLU  --   --    < >  --    < > 119* 171* 150* 136* 178* 112*    < > = values in this interval not displayed.        ABG:No results found for: POCPH, PHART, PH, POCPCO2, TYL7BEO, PCO2, POCPO2, PO2ART, PO2, POCHCO3, NYI5KDT, HCO3, NBEA, PBEA, BEART, BE, THGBART, THB, YKP3AKK, JTPM3LOY, Z6ISRBYM, O2SAT, FIO2  Lab Results   Component Value Date/Time    SPECIAL RAC 12ML 03/11/2019 08:52 PM     Lab Results   Component Value Date/Time    CULTURE NO GROWTH 6 DAYS 03/11/2019 08:52 PM       Radiology:  XR CHEST PORTABLE    Result Date: 10/11/2022  Enlarged cardiac silhouette, unchanged. New or worsening airspace opacities throughout the central left lung, possibly representing atelectasis or asymmetric pulmonary edema although additional considerations are possible.        Physical Examination:        General appearance:  alert, cooperative and no distress, sitting comfortably in bed  Mental Status:  oriented to person, place and time and normal affect  Lungs: Diminished breath sounds at bases, few bibasilar Rales   Heart:  regular rate and rhythm, + murmur,+ AICD  Abdomen:  soft, nontender, nondistended, normal bowel sounds, no masses, hepatomegaly, splenomegaly  Extremities:  ++ Bilateral lower extremity edema with few blisters-improved  Skin:  no other gross lesions, rashes, induration    Assessment:        Hospital Problems             Last Modified POA    * (Principal) Acute on chronic combined systolic and diastolic CHF (congestive heart failure) ([facility] 10/11/2022 Yes    Status post implantation of automatic cardioverter/defibrillator (AICD) 10/11/2022 Yes    Obesity (BMI 30-39.9) 10/11/2022 Yes    Type 2 diabetes mellitus, without long-term current use of insulin ([facility] 10/11/2022 Yes    Paroxysmal atrial fibrillation ([facility] 10/11/2022 Yes    Edema of both legs 10/13/2022 Yes    Essential hypertension 10/11/2022 Yes    Hypercholesterolemia 10/11/2022 Yes    CAD (coronary artery disease) 10/11/2022 Yes     Plan:        Continue IV diuresis with 2 mg Bumex twice daily for today, will switch to oral tomorrow  Continue home dose of amiodarone/aspirin/Eliquis/atorvastatin/Coreg/metformin  Consulted cardiology due to chronic resistant CHF  Plan to start [place] tomorrow, lisinopril was stopped after admission  Discussed low-salt diet again  Wants to join weight loss program  Likely will be discharged tomorrow morning    Taylor Gross MD  10/14/2022  11:57 AM

## 2022-10-14 NOTE — PROGRESS NOTES
Occupational Therapy  Facility/Department: [facility]  Rehabilitation Occupational Therapy Daily Treatment Note    Date: 10/14/22  Patient Name: Skyla Lopes       Room: 1012/1012-02  MRN: [id number]  Account: [de-identified]   : 1960  (62 y.o.) Gender: male          Past Medical History:  has a past medical history of CAD (coronary artery disease), CHF (congestive heart failure) ([facility] Heart attack ([facility] Hyperlipidemia, Hypertension, MI (myocardial infarction) ([facility] MRSA (methicillin resistant staph aureus) culture positive, Skin cancer, Snores, Stroke [facility]), Wears dentures, Wears reading eyeglasses, and Wellness examination. Past Surgical History:   has a past surgical history that includes Coronary artery bypass graft; Coronary angioplasty with stent; skin biopsy; eye surgery; Mandible surgery; hernia repair (Bilateral, 2020); Cardioversion (2021); other surgical history (2021); Cardiac surgery (); Cardiac catheterization (2020); and Cardiac defibrillator placement (2022). Restrictions  Restrictions/Precautions: Fall Risk;General Precautions; Up as Tolerated; Bed Alarm    Subjective  Subjective: Patient agreeable to OT treatment session  Restrictions/Precautions: Fall Risk;General Precautions; Up as Tolerated; Bed Alarm     Objective     Cognition  Overall Cognitive Status: Exceptions  Arousal/Alertness: Appropriate responses to stimuli  Following Commands:  Follows one step commands with repetition  Attention Span: Difficulty attending to directions  Memory: Appears intact  Safety Judgement: Decreased awareness of need for safety;Decreased awareness of need for assistance  Problem Solving: Assistance required to generate solutions;Assistance required to implement solutions;Assistance required to correct errors made;Decreased awareness of errors  Insights: Decreased awareness of deficits  Initiation: Requires cues for some  Sequencing: Requires cues for some  Cognition Comment: pt is hyperverbal throughout. poor insight into deficits and role in own health. Orientation  Overall Orientation Status: Within Functional Limits         ADL  Upper Extremity Bathing  Assistance Level: Set-up  Lower Extremity Bathing  Assistance Level: Set-up  Upper Extremity Dressing  Assistance Level: Set-up  Lower Extremity Dressing  Assistance Level: Set-up  Putting On/Taking Off Footwear  Assistance Level: Set-up  Toileting  Assistance Level: Set-up  Toilet Transfers  Equipment: Standard toilet  Additional Factors: Cues for hand placement;Verbal cues  Assistance Level: Supervision  Tub/Shower Transfers  Type: Shower  Transfer To: Tub transfer bench  Additional Factors: Cues for hand placement;Verbal cues  Assistance Level: Supervision          Functional Mobility  Activity: To/From bathroom (around room)  Assistance Level: Stand by assist  Skilled Clinical Factors: Verbal cues for pacing and overall safety d/t patient with c/o pain in RLE during mobility d/t swelling  Sit to Stand  Assistance Level: Supervision  Stand to Sit  Assistance Level: Supervision         Assessment  Assessment  Activity Tolerance: Patient limited by pain;Treatment limited secondary to medical complications (Raghavendra NELY horn)  Discharge Recommendations: Patient would benefit from continued therapy after discharge  Safety Devices  Safety Devices in place: Yes  Type of devices: All fall risk precautions in place; Left in chair;Call light within reach;Nurse notified; Patient at risk for falls    Patient Education  Education  Education Given To: Patient  Education Provided: Role of Therapy; Mobility Training; Fall Prevention Strategies; Plan of Care;Transfer Training;Energy Conservation;Precautions; Safety;ADL Function;Equipment;DME/Home Modifications  Education Method: Verbal  Education Outcome: Continued education needed    Plan  Occupational Therapy Plan  Times Per Week: 4-5x/week  Current Treatment Recommendations: Patient/Caregiver education & training;Equipment evaluation, education, & procurement;Positioning; Functional mobility training; Endurance training;Cognitive/Perceptual training; Safety education & training;Self-Care / ADL; Balance training    Goals  Patient Goals   Patient goals : to go home  Short Term Goals  Time Frame for Short Term Goals: by discharge, pt will  Short Term Goal 1: demo SBA with ADL transfers with approp AD/DME and good safety  Short Term Goal 2: demo SBA with functional mob in room with good safety for ADL completion with good safety/pacing  Short Term Goal 3: demo SBA with toileting routine with DME as needed  Short Term Goal 4: demo I with UB ADLs and SBA with LB ADLs with good safety, pacing with AE/DME as needed  Short Term Goal 5: demo and verb good understanding of ed provided on fall prevention techs, EC/WS techs, equip needs, pacing, B UE HEP, and disease specific education    AM-PAC Score        AM-PAC Inpatient Daily Activity Raw Score: 20 (10/14/22 1201)  AM-PAC Inpatient ADL T-Scale Score : 42.03 (10/14/22 1201)  ADL Inpatient CMS 0-100% Score: 38.32 (10/14/22 1201)  ADL Inpatient CMS G-Code Modifier : CJ (10/14/22 1201)      Therapy Time   Individual Individual Group Co-treatment   Time In 2154 [id number]       Time Out [id number]       Minutes 32 28        Upon writer exit, call light within reach, pt retired to chair. All lines intact and patient positioned comfortably. All patient needs addressed prior to ending therapy session. Chart reviewed prior to treatment and patient is agreeable for therapy. RN reports patient is medically stable for therapy treatment this date.      RUIZ Phan/SHADY

## 2022-10-14 NOTE — PROGRESS NOTES
[facility]  Progress Note                   Date:   10/14/2022  Patient name: Royal Adhikari  Date of admission:  10/11/2022  5:00 PM  MRN:   [id number]  YOB: 1960  PCP: Aliza Miguel PA-C    Reason for Admission: Acute on chronic combined systolic and diastolic CH[facility]congestive [facility] [I50.43]  Congestive heart failure, unspecified HF chronicity, unspecified heart failure type ([facility] [I50.9]    Subjective:       Clinical Changes /Abnormalities:Patient seen and examined, sitting in chair and working with OT. Denies chest pain or shortness of breath. Tele/vitals/labs reviewed . Discussed case with RN.    -2.4 liter since admission   Review of Systems    Medications:   Scheduled Meds:   empagliflozin  10 mg Oral Daily    [START ON 10/15/2022] sacubitril-valsartan  1 tablet Oral BID    bumetanide  2 mg IntraVENous Q12H    clopidogrel  75 mg Oral Daily    rivaroxaban  20 mg Oral Dinner    amiodarone  200 mg Oral Daily    atorvastatin  80 mg Oral Nightly    carvedilol  12.5 mg Oral BID WC    metFORMIN  1,000 mg Oral Daily    sodium chloride flush  5-40 mL IntraVENous 2 times per day    insulin lispro  0-4 Units SubCUTAneous TID WC    insulin lispro  0-4 Units SubCUTAneous Nightly     Continuous Infusions:   sodium chloride      dextrose       CBC:   Recent Labs     10/11/22  1751 10/12/22  0526   WBC 5.6 6.1   HGB 13.3 13.2   * 109*     BMP:    Recent Labs     10/12/22  0526 10/13/22  0553 10/14/22  0540    136 137   K 3.9 3.8 3.5*   CL 99 97* 96*   CO2 31 28 31   BUN 21 23 22   CREATININE 1.09 1.09 1.14   GLUCOSE 112* 119* 114*     Hepatic:  Recent Labs     10/11/22  1751   AST 25   ALT 18   BILITOT 2.0*   ALKPHOS 178*     Troponin:   Recent Labs     10/11/22  1751 10/11/22  1935 10/11/22  2336   TROPHS 25* 25* 26*     BNP: No results for input(s): BNP in the last 72 hours.   Lipids:   Recent Labs     10/12/22  0526   CHOL 242*   HDL 27*     INR: No results for input(s): INR in the last 72 hours. Objective:   Vitals: BP 93/63   Pulse 62   Temp 97.9 °F (36.6 °C) (Oral)   Resp 18   Ht 5' 9\" (1.753 m)   Wt 230 lb (104.3 kg)   SpO2 93%   BMI 33.97 kg/m²   General appearance: alert and cooperative with exam  HEENT: Head: Normocephalic, no lesions, without obvious abnormality. Neck:no JVD, trachea midline, no adenopathy  Lungs: Clear to auscultation, diminished to bases  Heart: Regular rate and rhythm, s1/s2 auscultated, no murmurs  Abdomen: soft, non-tender, bowel sounds active  Extremities: edema with erythema  noted to lower ext  Neurologic: not done        Assessment / Acute Cardiac Problems:   Acute on Chronic systolic CHF  2 . PAF  3. CAD with recent PCI      Patient Active Problem List:     Hyperbilirubinemia     Essential hypertension     Hypercholesterolemia     CAD (coronary artery disease)     Gross hematuria     Abdominal pain, right upper quadrant     Right nephrolithiasis     Abnormal liver function test     Acute systolic HF (heart failure) ([facility] [address]     Noncompliance     Acute on chronic systolic heart failure (HCC)     Pneumonia     Dyspnea and respiratory abnormalities     Status post inguinal hernia repair     S/P repair of ventral hernia     Post-op pain     Non-recurrent bilateral inguinal hernia without obstruction or gangrene     Umbilical hernia with obstruction, without gangrene     Status post implantation of automatic cardioverter/defibrillator (AICD)     NSTEMI (non-ST elevated myocardial infarction) ([facility])     ARIADNA (obstructive sleep apnea)     S/P CABG (coronary artery bypass graft)     Nausea     Anorexia     Thrombocytopenia (HCC)     Obesity (BMI 30-39. 9)     Hypokalemia     Uncontrolled type 2 diabetes mellitus with hyperglycemia (HCC)     Acute on chronic combined systolic and diastolic CHF (congestive heart failure) ([facility])     Type 2 diabetes mellitus, without long-term current use of insulin (HCC)     Paroxysmal atrial fibrillation (HCC)     Edema of both legs      Plan of Treatment:   Continue plavix  statin, BB  2. Acute on Chronic systolic CHF. clinically not in fluid overload Continue iv Bumex for today and switch to po in am with close monitoring of renal function and strict I&O, continue BB , Jardiance . Start entresto after 36 hr washout. plan for verquvo as outpatient  to optimize medical management  3. Bilateral lower leg extremity edema- rask/ bumps noted on left leg, allergic reaction to something-patient was on his knee waiting in the front yard after it was  fertilized-recommend to continue ace wrap and  antihistamine medication  4.  Keep K>4, Mg>2     Electronically signed by Altamese Apley, APRN - NP on 10/14/2022 at [address] [place] [address] N.  [phone]

## 2022-10-14 NOTE — CARE COORDINATION
Discharge planning    Chart reviewed. Plan is home with tessy. Has nando Hanson, w/c in home. Patient admitted with CHF. He is on RA> cardiology notes: Start entrest after 36 hr washout. Will start on Jardiance and plan for verquvo as outpt. All those meds will likely need PA> which will be through the cardiology office. .. can give one month entresto free with voucher. 1430  Met with patient and voucher for entresto given to him to take to his pharmacy on discharge.

## 2022-10-15 LAB
ANION GAP SERPL CALCULATED.3IONS-SCNC: 11 MMOL/L (ref 9–17)
BUN BLDV-MCNC: 22 MG/DL (ref 8–23)
BUN/CREAT BLD: 18 (ref 9–20)
CALCIUM SERPL-MCNC: 8.8 MG/DL (ref 8.6–10.4)
CHLORIDE BLD-SCNC: 95 MMOL/L (ref 98–107)
CO2: 31 MMOL/L (ref 20–31)
CREAT SERPL-MCNC: 1.25 MG/DL (ref 0.7–1.2)
GFR SERPL CREATININE-BSD FRML MDRD: >60 ML/MIN/1.73M2
GLUCOSE BLD-MCNC: 109 MG/DL (ref 75–110)
GLUCOSE BLD-MCNC: 115 MG/DL (ref 75–110)
GLUCOSE BLD-MCNC: 140 MG/DL (ref 75–110)
GLUCOSE BLD-MCNC: 153 MG/DL (ref 75–110)
GLUCOSE BLD-MCNC: 186 MG/DL (ref 70–99)
MAGNESIUM: 2 MG/DL (ref 1.6–2.6)
POTASSIUM SERPL-SCNC: 3.3 MMOL/L (ref 3.7–5.3)
SODIUM BLD-SCNC: 137 MMOL/L (ref 135–144)

## 2022-10-15 PROCEDURE — 2060000000 HC ICU INTERMEDIATE R&B

## 2022-10-15 PROCEDURE — 99232 SBSQ HOSP IP/OBS MODERATE 35: CPT | Performed by: INTERNAL MEDICINE

## 2022-10-15 PROCEDURE — 80048 BASIC METABOLIC PNL TOTAL CA: CPT

## 2022-10-15 PROCEDURE — 82947 ASSAY GLUCOSE BLOOD QUANT: CPT

## 2022-10-15 PROCEDURE — 2580000003 HC RX 258: Performed by: NURSE PRACTITIONER

## 2022-10-15 PROCEDURE — 83735 ASSAY OF MAGNESIUM: CPT

## 2022-10-15 PROCEDURE — 36415 COLL VENOUS BLD VENIPUNCTURE: CPT

## 2022-10-15 PROCEDURE — 6370000000 HC RX 637 (ALT 250 FOR IP): Performed by: NURSE PRACTITIONER

## 2022-10-15 PROCEDURE — 6370000000 HC RX 637 (ALT 250 FOR IP): Performed by: INTERNAL MEDICINE

## 2022-10-15 RX ORDER — ASPIRIN 81 MG/1
81 TABLET ORAL DAILY
Qty: 30 TABLET | Refills: 5 | Status: SHIPPED | OUTPATIENT
Start: 2022-10-15

## 2022-10-15 RX ORDER — BUMETANIDE 1 MG/1
2 TABLET ORAL 2 TIMES DAILY
Status: DISCONTINUED | OUTPATIENT
Start: 2022-10-15 | End: 2022-10-16 | Stop reason: HOSPADM

## 2022-10-15 RX ORDER — ASPIRIN 81 MG/1
81 TABLET ORAL DAILY
Status: DISCONTINUED | OUTPATIENT
Start: 2022-10-16 | End: 2022-10-16 | Stop reason: HOSPADM

## 2022-10-15 RX ORDER — AMIODARONE HYDROCHLORIDE 200 MG/1
200 TABLET ORAL DAILY
Qty: 30 TABLET | Refills: 0 | Status: SHIPPED | OUTPATIENT
Start: 2022-10-16

## 2022-10-15 RX ORDER — BUMETANIDE 2 MG/1
2 TABLET ORAL 2 TIMES DAILY
Qty: 30 TABLET | Refills: 3 | Status: SHIPPED | OUTPATIENT
Start: 2022-10-15

## 2022-10-15 RX ORDER — CLOPIDOGREL BISULFATE 75 MG/1
75 TABLET ORAL DAILY
Qty: 30 TABLET | Refills: 3 | Status: SHIPPED | OUTPATIENT
Start: 2022-10-16 | End: 2022-10-16 | Stop reason: SDUPTHER

## 2022-10-15 RX ORDER — MIDODRINE HYDROCHLORIDE 5 MG/1
5 TABLET ORAL
Status: DISCONTINUED | OUTPATIENT
Start: 2022-10-16 | End: 2022-10-16 | Stop reason: HOSPADM

## 2022-10-15 RX ADMIN — SODIUM CHLORIDE, PRESERVATIVE FREE 10 ML: 5 INJECTION INTRAVENOUS at 10:15

## 2022-10-15 RX ADMIN — RIVAROXABAN 20 MG: 20 TABLET, FILM COATED ORAL at 17:55

## 2022-10-15 RX ADMIN — POTASSIUM CHLORIDE 40 MEQ: 1500 TABLET, EXTENDED RELEASE ORAL at 06:15

## 2022-10-15 RX ADMIN — EMPAGLIFLOZIN 10 MG: 10 TABLET, FILM COATED ORAL at 10:15

## 2022-10-15 RX ADMIN — METFORMIN HYDROCHLORIDE 1000 MG: 500 TABLET ORAL at 10:15

## 2022-10-15 RX ADMIN — SACUBITRIL AND VALSARTAN 1 TABLET: 24; 26 TABLET, FILM COATED ORAL at 10:15

## 2022-10-15 RX ADMIN — CARVEDILOL 12.5 MG: 12.5 TABLET, FILM COATED ORAL at 17:56

## 2022-10-15 RX ADMIN — ATORVASTATIN CALCIUM 80 MG: 80 TABLET, FILM COATED ORAL at 20:22

## 2022-10-15 RX ADMIN — BUMETANIDE 2 MG: 1 TABLET ORAL at 10:15

## 2022-10-15 RX ADMIN — CLOPIDOGREL BISULFATE 75 MG: 75 TABLET ORAL at 10:24

## 2022-10-15 RX ADMIN — BUMETANIDE 2 MG: 1 TABLET ORAL at 17:57

## 2022-10-15 RX ADMIN — AMIODARONE HYDROCHLORIDE 200 MG: 200 TABLET ORAL at 10:14

## 2022-10-15 RX ADMIN — SODIUM CHLORIDE, PRESERVATIVE FREE 10 ML: 5 INJECTION INTRAVENOUS at 23:38

## 2022-10-15 NOTE — DISCHARGE SUMMARY
[facility]  Office: [address], DO, Pamisa Savage, DO, Cami Delgado, DO, Neno Brock Blood, DO, Veda Mckee MD, Milton Vides MD, Wes Mishra MD, Erin Jean MD,  Orville Weber MD, Lanette Reid MD, Declan Sevilla, DO, Albertina Douglas MD,  Larissa Soliman, DO, Melchor Zaldivar MD, Olamide Doherty MD, Joss Nicholas DO, Torito Rebollar MD, Helen Carson MD, Gerardo Painter MD, Porfirio Yang MD, Orlin Almonte MD, Shu Antunez MD, Javad Arzate, DO, Ping Hensley MD, Shubham Ayers MD, Surinder Lawson, CNP,  Yumi Martinez, CNP, Carlyle Brice, CNP, Ashish Whitaker, CNP,  Olivia Batista, DNP, Mildred David, CNP, Clem Hayes, CNP, Paxton Felix, CNP, Luis Lisa, CNP, [place], CNP, Marixa Russo PA-C, Nora Luna, CNS, Pamela Mares, [facility], Rafael Choe, CNP, Jj Mobley, CNP, Christin Reyes [facility]    Discharge Summary     Patient ID: Nguyễn Contreras  :  1960   MRN: [id number]     ACCOUNT:  [de-identified]   Patient's PCP: Eden Turner PA-C  Admit Date: 10/11/2022   Discharge Date: 10/16/2022     Length of Stay: 5  Code Status:  Full Code  Admitting Physician: Atul Rodriguez MD  Discharge Physician: Atul Rodriguez MD     Active Discharge Diagnoses:     Hospital Problem Lists:  Principal Problem:    Acute on chronic combined systolic and diastolic CHF (congestive heart failure) [facility])  Active Problems:    Status post implantation of automatic cardioverter/defibrillator (AICD)    Obesity (BMI 30-39. 9)    Type 2 diabetes mellitus, without long-term current use of insulin (HCC)    Paroxysmal atrial fibrillation (HCC)    Edema of both legs    Essential hypertension    Hypercholesterolemia    CAD (coronary artery disease)  Resolved Problems:    * No resolved hospital problems.  *      Admission Condition:  poor     Discharged Condition: stable    Hospital Stay:   Admitting history:  Patient presents to the emergency room today with complaints of bilateral lower leg weeping edema. Patient states that he has been experiencing his bilateral lower leg weeping edema for approximately 2 weeks. States that they have been swollen, progressed to having blisters, and now the blisters have opened and started to drain clear fluid. Patient has a significant past medical history of coronary artery disease, CHF, hyperlipidemia and CVA. Patient does have an ICD placed and follows with [facility]. Patient denies any recent fevers, chest pain, nausea, vomiting and diarrhea. Patient states he has been experiencing intermittent chills and has been short of breath for the past 1 to 2 years. Patient states he has followed up with his PCP and was supposed to see a pulmonologist, but has not done so yet. Patient states he has failed a outpatient sleep study twice but has not gotten a CPAP machine yet. Throughout the emergency room evaluation it was noted that the patient's glucose level is 148. proBNP 631. Troponin 25. Alk phos 178. Platelet 443. Chest x-ray was obtained which shows:  Enlarged cardiac silhouette, unchanged. New or worsening airspace opacities   throughout the central left lung, possibly representing atelectasis or   asymmetric pulmonary edema although additional considerations are possible. Hospital Course:     Patient was dizzy yesterday cardiology had held his discharge and was started on midodrine  Today morning was denying dizziness, sitting comfortably in chair  Having good diuresis with oral Bumex.    Feels  improvement in leg swelling/blisters and shortness of breath , now he is able to lie down flat  Denies chest pain    Plan:         Continue oral 2 mg Bumex twice daily   Continue home dose of amiodarone/aspirin/Eliquis/atorvastatin/Coreg/metformin  Evaluated by cardiology due to chronic resistant CHF, started on [facility]  Cardiology plans to start Entresto , lisinopril was stopped after admission currently Entresto deferred due to dizziness episode yesterday  Discussed low-salt diet again  Wants to join weight loss program  Likely will be discharged today to home with home care if cleared by cardiology      Significant therapeutic interventions: See above notes    Significant Diagnostic Studies:   Labs / Micro:  CBC:   Lab Results   Component Value Date/Time    WBC 6.1 10/12/2022 05:26 AM    RBC 5.01 10/12/2022 05:26 AM    RBC 5.00 01/02/2012 07:23 PM    HGB 13.2 10/12/2022 05:26 AM    HCT 43.6 10/12/2022 05:26 AM    MCV 87.0 10/12/2022 05:26 AM    MCH 26.3 10/12/2022 05:26 AM    MCHC 30.3 10/12/2022 05:26 AM    RDW 16.5 10/12/2022 05:26 AM     10/12/2022 05:26 AM     01/02/2012 07:23 PM     BMP:    Lab Results   Component Value Date/Time    GLUCOSE 116 10/16/2022 05:11 AM    GLUCOSE 132 01/02/2012 07:23 PM     10/16/2022 05:11 AM    K 3.3 10/16/2022 05:11 AM    CL 97 10/16/2022 05:11 AM    CO2 33 10/16/2022 05:11 AM    ANIONGAP 7 10/16/2022 05:11 AM    BUN 19 10/16/2022 05:11 AM    CREATININE 1.15 10/16/2022 05:11 AM    BUNCRER 17 10/16/2022 05:11 AM    CALCIUM 8.7 10/16/2022 05:11 AM    LABGLOM >60 10/16/2022 05:11 AM    GFRAA >60 09/02/2022 05:42 PM    GFR      09/02/2022 05:42 PM     HFP:    Lab Results   Component Value Date/Time    PROT 6.6 10/11/2022 05:51 PM     CMP:    Lab Results   Component Value Date/Time    GLUCOSE 116 10/16/2022 05:11 AM    GLUCOSE 132 01/02/2012 07:23 PM     10/16/2022 05:11 AM    K 3.3 10/16/2022 05:11 AM    CL 97 10/16/2022 05:11 AM    CO2 33 10/16/2022 05:11 AM    BUN 19 10/16/2022 05:11 AM    CREATININE 1.15 10/16/2022 05:11 AM    ANIONGAP 7 10/16/2022 05:11 AM    ALKPHOS 178 10/11/2022 05:51 PM    ALT 18 10/11/2022 05:51 PM    AST 25 10/11/2022 05:51 PM    BILITOT 2.0 10/11/2022 05:51 PM    LABALBU 3.8 10/11/2022 05:51 PM    LABALBU 4.1 01/02/2012 07:23 PM    ALBUMIN 1.5 05/18/2022 05:04 AM    LABGLOM >60 10/16/2022 05:11 AM    GFRAA >60 09/02/2022 05:42 PM    GFR      09/02/2022 05:42 PM    PROT 6.6 10/11/2022 05:51 PM    CALCIUM 8.7 10/16/2022 05:11 AM     PT/INR:    Lab Results   Component Value Date/Time    PROTIME 10.3 12/14/2020 11:09 AM    INR 1.0 12/14/2020 11:09 AM     PTT:   Lab Results   Component Value Date/Time    APTT 38.5 05/19/2022 06:23 AM     FLP:    Lab Results   Component Value Date/Time    CHOL 242 10/12/2022 05:26 AM    TRIG 82 10/12/2022 05:26 AM    HDL 27 10/12/2022 05:26 AM     U/A:    Lab Results   Component Value Date/Time    COLORU yellow 11/22/2020 07:49 PM    COLORU YELLOW 11/22/2020 07:43 PM    TURBIDITY CLEAR 11/22/2020 07:43 PM    SPECGRAV 1.030 11/22/2020 07:49 PM    SPECGRAV 1.033 11/22/2020 07:43 PM    HGBUR TRACE 11/22/2020 07:43 PM    PHUR 5.5 11/22/2020 07:49 PM    PHUR 6.0 11/22/2020 07:43 PM    PROTEINU trace 11/22/2020 07:49 PM    PROTEINU NEGATIVE 11/22/2020 07:43 PM    GLUCOSEU negative 11/22/2020 07:49 PM    GLUCOSEU NEGATIVE 11/22/2020 07:43 PM    GLUCOSEU NOT REPORTED 01/02/2012 05:41 PM    KETUA negative 11/22/2020 07:49 PM    KETUA NEGATIVE 11/22/2020 07:43 PM    BILIRUBINUR negative 11/22/2020 07:49 PM    BILIRUBINUR NEGATIVE 11/22/2020 07:43 PM    BILIRUBINUR NOT REPORTED 01/02/2012 05:41 PM    UROBILINOGEN Normal 11/22/2020 07:43 PM    NITRU NEGATIVE 11/22/2020 07:43 PM    LEUKOCYTESUR negative 11/22/2020 07:49 PM    LEUKOCYTESUR NEGATIVE 11/22/2020 07:43 PM     TSH:    Lab Results   Component Value Date/Time    TSH 1.86 10/12/2022 05:26 AM        Radiology:  XR CHEST PORTABLE    Result Date: 10/13/2022  Improved pulmonary edema, mild in degree in the current exam.     XR CHEST PORTABLE    Result Date: 10/11/2022  Enlarged cardiac silhouette, unchanged. New or worsening airspace opacities throughout the central left lung, possibly representing atelectasis or asymmetric pulmonary edema although additional considerations are possible. Consultations:    Consults:     Final Specialist Recommendations/Findings:   IP CONSULT TO HOSPITALIST  IP CONSULT TO DIETITIAN  IP CONSULT TO CARDIOLOGY      The patient was seen and examined on day of discharge and this discharge summary is in conjunction with any daily progress note from day of discharge. Discharge plan:     Disposition: Home with home care    Physician Follow Up:     [facility]  phone [phone]  Follow up  they will call and set up assessment once you are discharged. Sarah Mitchell PA-C  [address] [place], [place] [address]ita [facility] [address] [facility]  [phone]    Schedule an appointment as soon as possible for a visit  hospital follow-up    Concetta Contreras MD  [address]    Call in 2 week(s)  hospital follow-up    [facility]LP [facility]GRAM  [phone]  Follow up  can call to see if they can assist you with what to do to get your medicaid back       Requiring Further Evaluation/Follow Up POST HOSPITALIZATION/Incidental Findings: Follow-up with cardiology as scheduled to decide about Entresto to be started later    Diet: cardiac diet and diabetic diet    Activity: As tolerated    Instructions to Patient: Follow-up with CHF clinic as recommended    Discharge Medications:      Medication List        START taking these medications      aspirin EC 81 MG EC tablet  Take 1 tablet by mouth daily  Replaces: aspirin 325 MG tablet     clopidogrel 75 MG tablet  Commonly known as: PLAVIX  Take 1 tablet by mouth daily     empagliflozin 10 MG tablet  Commonly known as: JARDIANCE  Take 1 tablet by mouth daily     midodrine 5 MG tablet  Commonly known as: PROAMATINE  Take 1 tablet by mouth 3 times daily (before meals)            CHANGE how you take these medications      amiodarone 200 MG tablet  Commonly known as: CORDARONE  Take 1 tablet by mouth daily  What changed:   additional instructions  Another medication with the same name was removed.  Continue taking this medication, and follow the directions you see here. bumetanide 2 MG tablet  Commonly known as: BUMEX  Take 1 tablet by mouth 2 times daily  What changed:   medication strength  See the new instructions. Another medication with the same name was removed. Continue taking this medication, and follow the directions you see here. CONTINUE taking these medications      atorvastatin 80 MG tablet  Commonly known as: LIPITOR  Take 1 tablet by mouth nightly     carvedilol 12.5 MG tablet  Commonly known as: COREG  Take 1 tablet by mouth 2 times daily (with meals)     metFORMIN 1000 MG tablet  Commonly known as: GLUCOPHAGE  Take 1 tablet by mouth daily Resume on 5/21     nitroGLYCERIN 0.4 MG SL tablet  Commonly known as: NITROSTAT     therapeutic multivitamin-minerals tablet     VITAMIN B-12 PO     Xarelto 20 MG Tabs tablet  Generic drug: rivaroxaban            STOP taking these medications      aspirin 325 MG tablet  Replaced by: aspirin EC 81 MG EC tablet     ELIQUIS PO     lisinopril 5 MG tablet  Commonly known as: PRINIVIL;ZESTRIL     vitamin C 250 MG tablet     vitamin E 100 units capsule               Where to Get Your Medications        These medications were sent to [facility] [id number] [place] [id number] - P [phone] - F [phone]  [address] [place] [address], [address] [place] [address]      Phone: [phone]   amiodarone 200 MG tablet  aspirin EC 81 MG EC tablet  bumetanide 2 MG tablet  clopidogrel 75 MG tablet  empagliflozin 10 MG tablet  midodrine 5 MG tablet         No discharge procedures on file. Time Spent on discharge is  35 mins in patient examination, evaluation, counseling as well as medication reconciliation, prescriptions for required medications, discharge plan and follow up. Electronically signed by   Tiffanie Nicholson MD  10/16/2022  3:39 PM      Thank you Dr. Linda Hoff PALiliaC for the opportunity to be involved in this patient's care.

## 2022-10-15 NOTE — PROGRESS NOTES
Performed BP re-check prior to morning medication adminsitration, BP= 90/53 and HR= 64. Spoke with dr. Siobhan Painting about holding or giving Amiodarone and Coreg. He was okay with amiodarone but told writer to hold coreg. Will continue to monitor.

## 2022-10-15 NOTE — PROGRESS NOTES
[facility]  Office: [address], DO, [employer] [facility] [facility], DO, Robin Leventhal, DO, Ruben Escotoont Blood, DO, Ebonie Manriquez MD, Vidal More MD, Gilbert Addison MD, Mack Bose MD,  Gabriel Kincaid MD, Hailee Wayne MD, Leilani Ibrahim, DO, Devika Antonio MD,  Marisol Louise MD, Ana Paula Ge MD, Nataly Mitchell, DO, Rachel Gonsales MD, King Billingsley MD, Zakiya Kaye MD, Ivanna Knott MD, Renu Granados MD, Tom Isaac MD, Daja Garrido, DO, Deyanira Ho MD, Terell Fenton MD, Kelly Olguin, CNP,  Rodríguez Dover, CNP, Sebastian Lee, CNP, Caren Weinberg, CNP,  Caesar Mccollum, [facility], Domo Dunn, CNP, Susi Almonte, CNP, Janay Blanton, CNP, Karishma Mcnulty, CNP, Carlos Espinoza, CNP, Glory Moreira PA-C, Antony De La Rosa, CNS, Kulwant Mojica, [facility], Kyaw Jaime, CNP, Poppy Woods, [facility], [facility]    Progress Note    10/15/2022    11:34 AM    Name:   Manisha Bianchi  MRN:     [id number]     Acct:      [de-identified]   Room:   [address] [place] [address] Day:  4  Admit Date:  10/11/2022  5:00 PM    PCP:   Brandi June PA-C  Code Status:  Full Code    Subjective:     C/C:   Chief Complaint   Patient presents with    Leg Swelling     [address] [place] [address] for three days. Sent in from PCP     Interval History Status:  Having good diuresis with IV Bumex. Feels  improvement in leg swelling/blisters and shortness of breath , now he is able to lie down flat  Denies chest pain  Wound care will be seeing him today  Brief History:     Patient presents to the emergency room today with complaints of bilateral lower leg weeping edema. Patient states that he has been experiencing his bilateral lower leg weeping edema for approximately 2 weeks. States that they have been swollen, progressed to having blisters, and now the blisters have opened and started to drain clear fluid.   Patient has a significant past medical history of coronary artery disease, CHF, hyperlipidemia and CVA. Patient does have an ICD placed and follows with [facility] consultants. Patient denies any recent fevers, chest pain, nausea, vomiting and diarrhea. Patient states he has been experiencing intermittent chills and has been short of breath for the past 1 to 2 years. Patient states he has followed up with his PCP and was supposed to see a pulmonologist, but has not done so yet. Patient states he has failed a outpatient sleep study twice but has not gotten a CPAP machine yet. Throughout the emergency room evaluation it was noted that the patient's glucose level is 148. proBNP 631. Troponin 25. Alk phos 178. Platelet 638. Chest x-ray was obtained which shows:  Enlarged cardiac silhouette, unchanged. New or worsening airspace opacities   throughout the central left lung, possibly representing atelectasis or   asymmetric pulmonary edema although additional considerations are possible. Review of Systems:     Constitutional:  negative for chills, fevers, sweats  Respiratory:  negative for cough, +dyspnea on exertion-improved, Cardiovascular:  negative for chest pain, chest pressure/discomfort, ++lower extremity edema-improving with diuresis, denies palpitations  Gastrointestinal:  negative for abdominal pain, constipation, diarrhea, nausea, vomiting  Neurological:  negative for dizziness, headache    Medications:      Allergies:  No Known Allergies    Current Meds:   Scheduled Meds:    bumetanide  2 mg Oral BID    empagliflozin  10 mg Oral Daily    clopidogrel  75 mg Oral Daily    rivaroxaban  20 mg Oral Dinner    amiodarone  200 mg Oral Daily    atorvastatin  80 mg Oral Nightly    carvedilol  12.5 mg Oral BID WC    metFORMIN  1,000 mg Oral Daily    sodium chloride flush  5-40 mL IntraVENous 2 times per day    insulin lispro  0-4 Units SubCUTAneous TID     insulin lispro  0-4 Units SubCUTAneous Nightly     Continuous Infusions: sodium chloride 100 mL/hr at 10/14/22 1559    dextrose       PRN Meds: calcium carbonate, oxyCODONE, sodium chloride flush, sodium chloride, ondansetron **OR** ondansetron, polyethylene glycol, acetaminophen **OR** acetaminophen, potassium chloride **OR** potassium alternative oral replacement **OR** potassium chloride, magnesium sulfate, glucose, dextrose bolus **OR** dextrose bolus, glucagon (rDNA), dextrose    Data:     Past Medical History:   has a past medical history of CAD (coronary artery disease), CHF (congestive heart failure) ([facility] [facility] [facility] Hyperlipidemia, Hypertension, MI (myocardial infarction) [facility] MRSA (methicillin resistant staph aureus) culture positive, Skin cancer, Snores, Stroke ([facility] Wears dentures, Wears reading eyeglasses, and Wellness examination. Social History:   reports that he quit smoking about 23 years ago. He has never used smokeless tobacco. He reports that he does not drink alcohol and does not use drugs. Family History:   Family History   Problem Relation Age of Onset    Coronary Art Dis Father     Liver Disease Father     Alcohol Abuse Sister        Vitals:  /82   Pulse 65   Temp 97.9 °F (36.6 °C) (Oral)   Resp 16   Ht 5' 9\" (1.753 m)   Wt 230 lb (104.3 kg)   SpO2 94%   BMI 33.97 kg/m²   Temp (24hrs), Av.8 °F (36.6 °C), Min:97.3 °F (36.3 °C), Max:98.2 °F (36.8 °C)    Recent Labs     10/14/22  1214 10/14/22  1715 10/14/22  2001 10/15/22  0739   POCGLU 146* 138* 163* 115*         I/O (24Hr): Intake/Output Summary (Last 24 hours) at 10/15/2022 1134  Last data filed at 10/14/2022 2046  Gross per 24 hour   Intake 720 ml   Output 600 ml   Net 120 ml           Labs:  Hematology:  No results for input(s): WBC, RBC, HGB, HCT, MCV, MCH, MCHC, RDW, PLT, MPV, SEDRATE, CRP, INR, DDIMER, GN9TPWSY, LABABSO in the last 72 hours.     Invalid input(s): PT    Chemistry:  Recent Labs     10/13/22  0553 10/14/22  0540 10/15/22  0459    137 137   K 3.8 3.5* 3.3*   CL 97* 96* 95*   CO2 28 31 31   GLUCOSE 119* 114* 186*   BUN 23 22 22   CREATININE 1.09 1.14 1.25*   MG 1.6 1.6 2.0   ANIONGAP 11 10 11   LABGLOM >60 >60 >60   CALCIUM 8.8 8.8 8.8       Recent Labs     10/13/22  1932 10/14/22  0831 10/14/22  1214 10/14/22  1715 10/14/22  2001 10/15/22  0739   POCGLU 178* 112* 146* 138* 163* 115*       ABG:No results found for: POCPH, PHART, PH, POCPCO2, UBP6FGW, PCO2, POCPO2, PO2ART, PO2, POCHCO3, XLX6ZGZ, HCO3, NBEA, PBEA, BEART, BE, THGBART, THB, UCV9DOU, ZKAQ2VVW, K6TAWYIE, O2SAT, FIO2  Lab Results   Component Value Date/Time    SPECIAL RAC 12ML 03/11/2019 08:52 PM     Lab Results   Component Value Date/Time    CULTURE NO GROWTH 6 DAYS 03/11/2019 08:52 PM       Radiology:  XR CHEST PORTABLE    Result Date: 10/11/2022  Enlarged cardiac silhouette, unchanged. New or worsening airspace opacities throughout the central left lung, possibly representing atelectasis or asymmetric pulmonary edema although additional considerations are possible.        Physical Examination:        General appearance:  alert, cooperative and no distress, sitting comfortably in bed  Mental Status:  oriented to person, place and time and normal affect  Lungs: Diminished breath sounds at bases, few bibasilar Rales   Heart:  regular rate and rhythm, + murmur,+ AICD  Abdomen:  soft, nontender, nondistended, normal bowel sounds, no masses, hepatomegaly, splenomegaly  Extremities:  + Bilateral lower extremity edema with few blisters-improved  Skin:  no other gross lesions, rashes, induration    Assessment:        Hospital Problems             Last Modified POA    * (Principal) Acute on chronic combined systolic and diastolic CHF (congestive heart failure) ([facility] 10/11/2022 Yes    Status post implantation of automatic cardioverter/defibrillator (AICD) 10/11/2022 Yes    Obesity (BMI 30-39.9) 10/11/2022 Yes    Type 2 diabetes mellitus, without long-term current use of insulin ([address] 10/11/2022 Yes    Paroxysmal atrial fibrillation ([facility] 10/11/2022 Yes    Edema of both legs 10/13/2022 Yes    Essential hypertension 10/11/2022 Yes    Hypercholesterolemia 10/11/2022 Yes    CAD (coronary artery disease) 10/11/2022 Yes   Plan:        Switch to oral 2 mg Bumex twice daily   Continue home dose of amiodarone/aspirin/Eliquis/atorvastatin/Coreg/metformin  Evaluated by cardiology due to chronic resistant CHF, started on Jardiance  Cardiology plans to start Entresto , lisinopril was stopped after admission  Discussed low-salt diet again  Wants to join weight loss program  Likely will be discharged today to home with home care if cleared by cardiology after deciding about Entresto and after seen by wound care    Susannah Villarreal MD  10/15/2022  11:34 AM

## 2022-10-16 VITALS
OXYGEN SATURATION: 94 % | TEMPERATURE: 97.5 F | DIASTOLIC BLOOD PRESSURE: 74 MMHG | HEIGHT: 69 IN | WEIGHT: 230 LBS | BODY MASS INDEX: 34.07 KG/M2 | SYSTOLIC BLOOD PRESSURE: 112 MMHG | RESPIRATION RATE: 16 BRPM | HEART RATE: 69 BPM

## 2022-10-16 LAB
ANION GAP SERPL CALCULATED.3IONS-SCNC: 7 MMOL/L (ref 9–17)
BUN BLDV-MCNC: 19 MG/DL (ref 8–23)
BUN/CREAT BLD: 17 (ref 9–20)
CALCIUM SERPL-MCNC: 8.7 MG/DL (ref 8.6–10.4)
CHLORIDE BLD-SCNC: 97 MMOL/L (ref 98–107)
CO2: 33 MMOL/L (ref 20–31)
CREAT SERPL-MCNC: 1.15 MG/DL (ref 0.7–1.2)
GFR SERPL CREATININE-BSD FRML MDRD: >60 ML/MIN/1.73M2
GLUCOSE BLD-MCNC: 116 MG/DL (ref 70–99)
GLUCOSE BLD-MCNC: 125 MG/DL (ref 75–110)
GLUCOSE BLD-MCNC: 134 MG/DL (ref 75–110)
GLUCOSE BLD-MCNC: 97 MG/DL (ref 75–110)
MAGNESIUM: 2.1 MG/DL (ref 1.6–2.6)
POTASSIUM SERPL-SCNC: 3.3 MMOL/L (ref 3.7–5.3)
SODIUM BLD-SCNC: 137 MMOL/L (ref 135–144)

## 2022-10-16 PROCEDURE — 36415 COLL VENOUS BLD VENIPUNCTURE: CPT

## 2022-10-16 PROCEDURE — 6370000000 HC RX 637 (ALT 250 FOR IP): Performed by: NURSE PRACTITIONER

## 2022-10-16 PROCEDURE — 82947 ASSAY GLUCOSE BLOOD QUANT: CPT

## 2022-10-16 PROCEDURE — 83735 ASSAY OF MAGNESIUM: CPT

## 2022-10-16 PROCEDURE — 6370000000 HC RX 637 (ALT 250 FOR IP): Performed by: INTERNAL MEDICINE

## 2022-10-16 PROCEDURE — 99239 HOSP IP/OBS DSCHRG MGMT >30: CPT | Performed by: INTERNAL MEDICINE

## 2022-10-16 PROCEDURE — 2580000003 HC RX 258: Performed by: NURSE PRACTITIONER

## 2022-10-16 PROCEDURE — 80048 BASIC METABOLIC PNL TOTAL CA: CPT

## 2022-10-16 RX ORDER — MIDODRINE HYDROCHLORIDE 5 MG/1
5 TABLET ORAL
Qty: 90 TABLET | Refills: 0 | Status: SHIPPED | OUTPATIENT
Start: 2022-10-16

## 2022-10-16 RX ORDER — CLOPIDOGREL BISULFATE 75 MG/1
75 TABLET ORAL DAILY
Qty: 30 TABLET | Refills: 1 | Status: SHIPPED | OUTPATIENT
Start: 2022-10-16

## 2022-10-16 RX ORDER — MIDODRINE HYDROCHLORIDE 5 MG/1
5 TABLET ORAL
Qty: 90 TABLET | Refills: 3 | Status: SHIPPED | OUTPATIENT
Start: 2022-10-16 | End: 2022-10-16 | Stop reason: SDUPTHER

## 2022-10-16 RX ORDER — ASPIRIN 81 MG/1
81 TABLET ORAL DAILY
Qty: 30 TABLET | Refills: 3 | Status: SHIPPED
Start: 2022-10-17 | End: 2022-10-16 | Stop reason: HOSPADM

## 2022-10-16 RX ADMIN — CARVEDILOL 12.5 MG: 12.5 TABLET, FILM COATED ORAL at 16:32

## 2022-10-16 RX ADMIN — SODIUM CHLORIDE, PRESERVATIVE FREE 10 ML: 5 INJECTION INTRAVENOUS at 08:21

## 2022-10-16 RX ADMIN — AMIODARONE HYDROCHLORIDE 200 MG: 200 TABLET ORAL at 10:31

## 2022-10-16 RX ADMIN — POTASSIUM BICARBONATE 40 MEQ: 782 TABLET, EFFERVESCENT ORAL at 06:42

## 2022-10-16 RX ADMIN — METFORMIN HYDROCHLORIDE 1000 MG: 500 TABLET ORAL at 10:31

## 2022-10-16 RX ADMIN — RIVAROXABAN 20 MG: 20 TABLET, FILM COATED ORAL at 16:32

## 2022-10-16 RX ADMIN — ASPIRIN 81 MG: 81 TABLET, COATED ORAL at 10:31

## 2022-10-16 RX ADMIN — MIDODRINE HYDROCHLORIDE 5 MG: 5 TABLET ORAL at 16:32

## 2022-10-16 RX ADMIN — MIDODRINE HYDROCHLORIDE 5 MG: 5 TABLET ORAL at 06:15

## 2022-10-16 RX ADMIN — BUMETANIDE 2 MG: 1 TABLET ORAL at 10:30

## 2022-10-16 RX ADMIN — MIDODRINE HYDROCHLORIDE 5 MG: 5 TABLET ORAL at 12:51

## 2022-10-16 RX ADMIN — EMPAGLIFLOZIN 10 MG: 10 TABLET, FILM COATED ORAL at 11:01

## 2022-10-16 RX ADMIN — CARVEDILOL 12.5 MG: 12.5 TABLET, FILM COATED ORAL at 10:31

## 2022-10-16 RX ADMIN — ACETAMINOPHEN 650 MG: 325 TABLET, FILM COATED ORAL at 06:15

## 2022-10-16 RX ADMIN — CLOPIDOGREL BISULFATE 75 MG: 75 TABLET ORAL at 11:01

## 2022-10-16 ASSESSMENT — PAIN SCALES - GENERAL: PAINLEVEL_OUTOF10: 8

## 2022-10-16 ASSESSMENT — PAIN DESCRIPTION - LOCATION: LOCATION: HEAD;LEG

## 2022-10-16 NOTE — PROGRESS NOTES
[facility]   Progress Note                   Date:   10/16/2022  Patient name: Thuan Robbins  Date of admission:  10/11/2022  5:00 PM  MRN:   [id number]  YOB: 1960  PCP: Amanuel Pearson PA-C    Reason for Admission:     Subjective:       Clinical Changes / Abnormalities:  Pt feeling better today with no lightheadedness after starting midodrine, with /76 mmHg, and maintaining good diuresis on oral Bumex. Medications:   Scheduled Meds:   bumetanide  2 mg Oral BID    midodrine  5 mg Oral TID AC    aspirin  81 mg Oral Daily    empagliflozin  10 mg Oral Daily    clopidogrel  75 mg Oral Daily    rivaroxaban  20 mg Oral Dinner    amiodarone  200 mg Oral Daily    atorvastatin  80 mg Oral Nightly    carvedilol  12.5 mg Oral BID WC    metFORMIN  1,000 mg Oral Daily    sodium chloride flush  5-40 mL IntraVENous 2 times per day    insulin lispro  0-4 Units SubCUTAneous TID WC    insulin lispro  0-4 Units SubCUTAneous Nightly     Continuous Infusions:   sodium chloride 100 mL/hr at 10/14/22 1559    dextrose       CBC: No results for input(s): WBC, HGB, PLT in the last 72 hours. BMP:    Recent Labs     10/14/22  0540 10/15/22  0459 10/16/22  0511    137 137   K 3.5* 3.3* 3.3*   CL 96* 95* 97*   CO2 31 31 33*   BUN 22 22 19   CREATININE 1.14 1.25* 1.15   GLUCOSE 114* 186* 116*     Hepatic: No results for input(s): AST, ALT, ALB, BILITOT, ALKPHOS in the last 72 hours. Troponin: No results for input(s): TROPONINI in the last 72 hours. BNP: No results for input(s): BNP in the last 72 hours. Lipids: No results for input(s): CHOL, HDL in the last 72 hours. Invalid input(s): LDLCALCU  INR: No results for input(s): INR in the last 72 hours.     Objective:   Vitals: /76   Pulse 64   Temp 97.5 °F (36.4 °C) (Oral)   Resp 16   Ht 5' 9\" (1.753 m)   Wt 230 lb (104.3 kg)   SpO2 94%   BMI 33.97 kg/m²   General appearance: alert and cooperative with exam  HEENT: Head: Normocephalic, no lesions, without obvious abnormality. Neck: no adenopathy, no carotid bruit, no JVD, supple, symmetrical, trachea midline and thyroid not enlarged, symmetric, no tenderness/mass/nodules  Lungs: clear to auscultation bilaterally  Heart: regular rate and rhythm, S1, S2 normal, no murmur, click, rub or gallop  Abdomen: soft, non-tender; bowel sounds normal; no masses,  no organomegaly  Extremities: extremities normal, atraumatic, no cyanosis 1+ edema  Neurologic: Mental status: Alert, oriented, thought content appropriate       EKG: Sinus rhythm, 71 bpm with 1st degree AVB and IVCD with old inferior infarction     2D ECHO 3/15/2022: EF 35%, mild MR/TR. MUGA 12/3/2020: EF 37%. CARDIAC CATHETERIZATION ( 8/6/2020)  Multi-vessel Coronary Artery Disease. Patent LIMA-LAD and SVG to OM. Occluded native RCA with left to right collaterals. Mildly impaired ventricular function. Dilated aortic root with no other grafts seen.      CARDIAC CATHETERIZATION (5/18/22)  Patent LIMA-LAD  SVG-OM1 prox 99% (PTCA/TITI x1)        Assessment / Acute Cardiac Problems:   Acute-on-chronic CHF, improving with diuresis  Bilateral LE edema, improved  Hypotension, improved on midodrine  Ischemic cardiomyopathy, stable LVEF 35%  Stable CAD, h/o CABG and s/p PCI 5/18/22 with TITI to SVG-OM1; patent LIMA-LAD with  of RCA and left-to-right collateral  Type II DM  Essential HTN  PAF; s/p BENITO/CV Nov 2021 and maintaining sinus rhythm on amiodarone  ICD in-situ ( [employer]) implanted 4/4/22 for primary prevention    Patient Active Problem List:     Hyperbilirubinemia     Essential hypertension     Hypercholesterolemia     CAD (coronary artery disease)     Gross hematuria     Abdominal pain, right upper quadrant     Right nephrolithiasis     Abnormal liver function test     Acute systolic HF (heart failure) ([address]     Noncompliance     Acute on chronic systolic heart failure (HCC)     Pneumonia     Dyspnea and respiratory abnormalities     Status post inguinal hernia repair     S/P repair of ventral hernia     Post-op pain     Non-recurrent bilateral inguinal hernia without obstruction or gangrene     Umbilical hernia with obstruction, without gangrene     Status post implantation of automatic cardioverter/defibrillator (AICD)     NSTEMI (non-ST elevated myocardial infarction) ([facility])     ARIADNA (obstructive sleep apnea)     S/P CABG (coronary artery bypass graft)     Nausea     Anorexia     Thrombocytopenia ([facility])     Obesity (BMI 30-39. 9)     Hypokalemia     Uncontrolled type 2 diabetes mellitus with hyperglycemia ([facility])     Acute on chronic combined systolic and diastolic CHF (congestive heart failure) ([facility])     Type 2 diabetes mellitus, without long-term current use of insulin ([facility])     Paroxysmal atrial fibrillation ([facility])     Edema of both legs      Plan of Treatment:   Continue Bumex 2 mg bid  Defer consideration for Entresto at this point due to lower BP's  Continue midodrine 5 mg po TID ( [id number])  Continue carvedilol 12.5 mg po bid  Continue amiodarone and Xarelto  Continue ASA and atorvastatin  Acceptable for discharge from a cardiac standpoint; pt to call [facility] office in [place] to arrange a 2 week f/u appointment.      Electronically signed by Ronen Corrigan MD on 10/16/2022 at 12:29 [facility]  [phone]

## 2022-10-16 NOTE — DISCHARGE INSTRUCTIONS
You have been started on Jardiance. You will need to contact your cardiology office to have them prior authorization to be completed.

## 2022-10-16 NOTE — PROGRESS NOTES
Pt ambulatory during discharged. Pt taken out per wheelchair. Patient discharged via private auto with wife. Discharge paperwork and instructions given to patient. Patient  acknowledges understanding. Scripts given to Patient (e-scripted to patients pharmacy) for [employer]. New medications and side effects explained. Follow up appointments reviewed (directions to make follow up appointment given)  Discharge checklist completed. ESTELA completed and signed [facility]. Any questions answered. Telemetry monitor returned.

## 2022-10-16 NOTE — CARE COORDINATION
Discharge planning    Patient to go home today. Will need to fax over jorge once completed. Noted that patient was started on jardiance. [facility] ([phone]) x 2 and their is no answer, just rang and rang. There is a 14 day free trial coupon in our patient assistance center. Will need to have rx for 14 days written out . There was one sent to his pharmacy for 30 days. Did notify Frantz Fish of discharge home later today. [phone] through to pharmacy and inquired about medications. Pharmacist stated he has no PART D. He [place] on original face sheet on admission. If go thru epic today only showing medicare A& B. Pharmacist did try to re run and they feel it lapsed. Pharmacist stated they can fill the 14 day with voucher if needed off the script they have. Did update OBI Callejas. [id number]  Spoke with patient and updated about his insurance being lapsed. Still insistent he wants to go home. Faxed rx assist form to [facility] outpatient pharmacy. Geovany Monroy will have to call in all scripts to them to be filled. Patient understands each rx will be 5 dollars. If jardiance is not filled under our rx assist can reduce rx to 14 days. Dr Rivera Brambila was notified and in agreement with this plan. Called our HELP department and left message on [place] CORREIA.  Did provide patient with phone number also

## 2022-10-16 NOTE — PROGRESS NOTES
[place] [facility]   Progress Note                   Date:   10/15/2022  Patient name: Linda Oliveros  Date of admission:  10/11/2022  5:00 PM  MRN:   [id number]  YOB: 1960  PCP: Jairo Gallardo PA-C    Reason for Admission:     Subjective:       Clinical Changes / Abnormalities:  Pt up in a chair; maintaining good diuresis ( - 2.9 L ) since admission, with decreasing edema and SOB. He has had mild postional lightheadedness with BP 90/53 mmHg this AM.      Medications:   Scheduled Meds:   bumetanide  2 mg Oral BID    empagliflozin  10 mg Oral Daily    clopidogrel  75 mg Oral Daily    rivaroxaban  20 mg Oral Dinner    amiodarone  200 mg Oral Daily    atorvastatin  80 mg Oral Nightly    carvedilol  12.5 mg Oral BID WC    metFORMIN  1,000 mg Oral Daily    sodium chloride flush  5-40 mL IntraVENous 2 times per day    insulin lispro  0-4 Units SubCUTAneous TID WC    insulin lispro  0-4 Units SubCUTAneous Nightly     Continuous Infusions:   sodium chloride 100 mL/hr at 10/14/22 1559    dextrose       CBC: No results for input(s): WBC, HGB, PLT in the last 72 hours. BMP:    Recent Labs     10/13/22  0553 10/14/22  0540 10/15/22  0459    137 137   K 3.8 3.5* 3.3*   CL 97* 96* 95*   CO2 28 31 31   BUN 23 22 22   CREATININE 1.09 1.14 1.25*   GLUCOSE 119* 114* 186*     Hepatic: No results for input(s): AST, ALT, ALB, BILITOT, ALKPHOS in the last 72 hours. Troponin: No results for input(s): TROPONINI in the last 72 hours. BNP: No results for input(s): BNP in the last 72 hours. Lipids: No results for input(s): CHOL, HDL in the last 72 hours. Invalid input(s): LDLCALCU  INR: No results for input(s): INR in the last 72 hours.     Objective:   Vitals: BP (!) 87/56   Pulse 64   Temp 98.1 °F (36.7 °C) (Oral)   Resp 18   Ht 5' 9\" (1.753 m)   Wt 230 lb (104.3 kg)   SpO2 95%   BMI 33.97 kg/m²   General appearance: alert and cooperative with exam  HEENT: Head: Normocephalic, no lesions, without obvious abnormality. Neck: no adenopathy, no carotid bruit, no JVD, supple, symmetrical, trachea midline and thyroid not enlarged, symmetric, no tenderness/mass/nodules  Lungs: clear to auscultation bilaterally  Heart: regular rate and rhythm, S1, S2 normal, no murmur, click, rub or gallop  Abdomen: soft, non-tender; bowel sounds normal; no masses,  no organomegaly  Extremities: extremities normal, atraumatic, no cyanosis 2+ LE edema    Neurologic: Mental status: Alert, oriented, thought content appropriate    EKG: Sinus rhythm, 71 bpm with 1st degree AVB and IVCD with old inferior infarction    2D ECHO 3/15/2022: EF 35%, mild MR/TR. MUGA 12/3/2020: EF 37%. CARDIAC CATHETERIZATION ( 8/6/2020)  Multi-vessel Coronary Artery Disease. Patent LIMA-LAD and SVG to OM. Occluded native RCA with left to right collaterals. Mildly impaired ventricular function. Dilated aortic root with no other grafts seen.      CARDIAC CATHETERIZATION (5/18/22)  Patent LIMA-LAD  SVG-OM1 prox 99% (PTCA/TITI x1)      Assessment / Acute Cardiac Problems:   Acute-on-chronic CHF, improving with diuresis  Bilateral LE edema  Ischemic cardiomyopathy, LVEF 35%  Stable CAD, h/o CABG and s/p PCI 5/18/22 with TITI to SVG-OM1; patent LIMA-LAD with  of RCA and left-to-right collateral  Type II DM  Essential HTN  PAF; s/p BENITO/CV Nov 2021 and maintaining sinus rhythm on amiodarone  ICD in-situ ( [employer]) implanted 4/4/22 for primary prevention    Patient Active Problem List:     Hyperbilirubinemia     Essential hypertension     Hypercholesterolemia     CAD (coronary artery disease)     Gross hematuria     Abdominal pain, right upper quadrant     Right nephrolithiasis     Abnormal liver function test     Acute systolic HF (heart failure) ([address]     Noncompliance     Acute on chronic systolic heart failure (HCC)     Pneumonia     Dyspnea and respiratory abnormalities     Status post inguinal hernia repair     S/P repair of ventral hernia     Post-op pain     Non-recurrent bilateral inguinal hernia without obstruction or gangrene     Umbilical hernia with obstruction, without gangrene     Status post implantation of automatic cardioverter/defibrillator (AICD)     NSTEMI (non-ST elevated myocardial infarction) ([facility])     ARIADNA (obstructive sleep apnea)     S/P CABG (coronary artery bypass graft)     Nausea     Anorexia     Thrombocytopenia ([facility])     Obesity (BMI 30-39. 9)     Hypokalemia     Uncontrolled type 2 diabetes mellitus with hyperglycemia ([facility])     Acute on chronic combined systolic and diastolic CHF (congestive heart failure) ([facility])     Type 2 diabetes mellitus, without long-term current use of insulin ([facility])     Paroxysmal atrial fibrillation ([facility])     Edema of both legs      Plan of Treatment:   Agree with change to oral Bumex 2 mg bid; continue following intake/output and daily BMP.   Defer consideration for Entresto at this point due to lower BP's  Start midodrine 5 mg po TID ( [id number])  Continue carvedilol 12.5 mg po bid  Continue amiodarone and Xarelto  Restart ECASA 81 mg daily  Continue atorvastatin    Electronically signed by Jack Jacob MD on 10/15/2022 at 9:57 [facility]  [phone]

## 2022-10-16 NOTE — PROGRESS NOTES
[facility]  Office: [address], DO, Bryannahazel Ray, DO, Rodríguez Rodriguez, DO, Alida Sandhu, DO, Malcom Torre MD, Marifer Wilson MD, Sim Grijalva MD, Jose García MD,  Jimmy Guerra MD, Sarahi Voss MD, Earl Amato, DO, Henry Gu MD,  Elizabeth Zhang MD, Krishna Rasmussen MD, Rosalva King DO, Magdi Miguel MD, Jorge Luis De La Vega MD, Nicole Sethi MD, Codie Parekh MD, Latricia Marte MD, Rose Mcneil MD, Viki Delarosa DO, Wilner Giang MD, Tolu Fajardo MD, Jacky Overton, CNP,  Sanjuana Ratliff, CNP, Vinod Loya, CNP, Vernell Villegas, CNP,  Miles Soriano, DNP, Kelsey Goodwin, CNP, Nithya Rivera, CNP, Nathaniel Loaiza, CNP, Tim Merida, CNP, Verona Dial, CNP, Thelma Trinidad PA-C, Héctor Wilburn, CNS, Bennett Laird, [facility], Kade Dixon, CNP, Khalida Mccoy, [facility], Hollie Sol, [facility]    Progress Note    10/16/2022    11:40 AM    Name:   Rachelle Fernando  MRN:     [id number]     Acct:      [de-identified]   Room:   [address] [place] [address] Day:  5  Admit Date:  10/11/2022  5:00 PM    PCP:   Jose Luis Copeland PA-C  Code Status:  Full Code    Subjective:     C/C:   Chief Complaint   Patient presents with    Leg Swelling     [address] [place] [address] for three days. Sent in from PCP     Interval History Status:  Patient was dizzy yesterday many hours after my visit, cardiology had held his discharge and was started on midodrine  Today morning was denying dizziness, sitting comfortably in chair  Having good diuresis with oral Bumex. Feels  improvement in leg swelling/blisters and shortness of breath , now he is able to lie down flat  Denies chest pain    Brief History:     Patient presents to the emergency room today with complaints of bilateral lower leg weeping edema. Patient states that he has been experiencing his bilateral lower leg weeping edema for approximately 2 weeks.   States that they have been swollen, progressed to having blisters, and now the blisters have opened and started to drain clear fluid. Patient has a significant past medical history of coronary artery disease, CHF, hyperlipidemia and CVA. Patient does have an ICD placed and follows with [facility]. Patient denies any recent fevers, chest pain, nausea, vomiting and diarrhea. Patient states he has been experiencing intermittent chills and has been short of breath for the past 1 to 2 years. Patient states he has followed up with his PCP and was supposed to see a pulmonologist, but has not done so yet. Patient states he has failed a outpatient sleep study twice but has not gotten a CPAP machine yet. Throughout the emergency room evaluation it was noted that the patient's glucose level is 148. proBNP 631. Troponin 25. Alk phos 178. Platelet 744. Chest x-ray was obtained which shows:  Enlarged cardiac silhouette, unchanged. New or worsening airspace opacities   throughout the central left lung, possibly representing atelectasis or   asymmetric pulmonary edema although additional considerations are possible. Review of Systems:     Constitutional:  negative for chills, fevers, sweats  Respiratory:  negative for cough, +dyspnea on exertion-improved, Cardiovascular:  negative for chest pain, chest pressure/discomfort, ++lower extremity edema-improving with diuresis, denies palpitations  Gastrointestinal:  negative for abdominal pain, constipation, diarrhea, nausea, vomiting  Neurological:  negative for dizziness, headache    Medications:      Allergies:  No Known Allergies    Current Meds:   Scheduled Meds:    bumetanide  2 mg Oral BID    midodrine  5 mg Oral TID AC    aspirin  81 mg Oral Daily    empagliflozin  10 mg Oral Daily    clopidogrel  75 mg Oral Daily    rivaroxaban  20 mg Oral Dinner    amiodarone  200 mg Oral Daily    atorvastatin  80 mg Oral Nightly    carvedilol  12.5 mg Oral BID WC metFORMIN  1,000 mg Oral Daily    sodium chloride flush  5-40 mL IntraVENous 2 times per day    insulin lispro  0-4 Units SubCUTAneous TID WC    insulin lispro  0-4 Units SubCUTAneous Nightly     Continuous Infusions:    sodium chloride 100 mL/hr at 10/14/22 1559    dextrose       PRN Meds: calcium carbonate, oxyCODONE, sodium chloride flush, sodium chloride, ondansetron **OR** ondansetron, polyethylene glycol, acetaminophen **OR** acetaminophen, potassium chloride **OR** potassium alternative oral replacement **OR** potassium chloride, magnesium sulfate, glucose, dextrose bolus **OR** dextrose bolus, glucagon (rDNA), dextrose    Data:     Past Medical History:   has a past medical history of CAD (coronary artery disease), CHF (congestive heart failure) ([facility] Heart attack ([facility] Hyperlipidemia, Hypertension, MI (myocardial infarction) ([facility] MRSA (methicillin resistant staph aureus) culture positive, Skin cancer, Snores, [facility] Wears dentures, Wears reading eyeglasses, and Wellness examination. Social History:   reports that he quit smoking about 23 years ago. He has never used smokeless tobacco. He reports that he does not drink alcohol and does not use drugs. Family History:   Family History   Problem Relation Age of Onset    Coronary Art Dis Father     Liver Disease Father     Alcohol Abuse Sister        Vitals:  /76   Pulse 64   Temp 97.5 °F (36.4 °C) (Oral)   Resp 16   Ht 5' 9\" (1.753 m)   Wt 230 lb (104.3 kg)   SpO2 94%   BMI 33.97 kg/m²   Temp (24hrs), Av.8 °F (36.6 °C), Min:97.3 °F (36.3 °C), Max:98.2 °F (36.8 °C)    Recent Labs     10/15/22  1138 10/15/22  1643 10/15/22  2000 10/16/22  0809   POCGLU 153* 109 140* 97         I/O (24Hr):     Intake/Output Summary (Last 24 hours) at 10/16/2022 1140  Last data filed at 10/16/2022 0617  Gross per 24 hour   Intake --   Output 1550 ml   Net -1550 ml           Labs:  Hematology:  No results for input(s): WBC, RBC, HGB, HCT, MCV, MCH, MCHC, RDW, PLT, MPV, SEDRATE, CRP, INR, DDIMER, VB1DSZNB, LABABSO in the last 72 hours. Invalid input(s): PT    Chemistry:  Recent Labs     10/14/22  0540 10/15/22  0459 10/16/22  0511    137 137   K 3.5* 3.3* 3.3*   CL 96* 95* 97*   CO2 31 31 33*   GLUCOSE 114* 186* 116*   BUN 22 22 19   CREATININE 1.14 1.25* 1.15   MG 1.6 2.0 2.1   ANIONGAP 10 11 7*   LABGLOM >60 >60 >60   CALCIUM 8.8 8.8 8.7       Recent Labs     10/14/22  2001 10/15/22  0739 10/15/22  1138 10/15/22  1643 10/15/22  2000 10/16/22  0809   POCGLU 163* 115* 153* 109 140* 97       ABG:No results found for: POCPH, PHART, PH, POCPCO2, IVK9DUE, PCO2, POCPO2, PO2ART, PO2, POCHCO3, CVS1KIJ, HCO3, NBEA, PBEA, BEART, BE, THGBART, THB, DNZ4CYS, WYWG6SCW, L0TJITPV, O2SAT, FIO2  Lab Results   Component Value Date/Time    SPECIAL RAC 12ML 03/11/2019 08:52 PM     Lab Results   Component Value Date/Time    CULTURE NO GROWTH 6 DAYS 03/11/2019 08:52 PM       Radiology:  XR CHEST PORTABLE    Result Date: 10/11/2022  Enlarged cardiac silhouette, unchanged. New or worsening airspace opacities throughout the central left lung, possibly representing atelectasis or asymmetric pulmonary edema although additional considerations are possible.        Physical Examination:        General appearance:  alert, cooperative and no distress, sitting comfortably in chair  Mental Status:  oriented to person, place and time and normal affect  Lungs: Diminished breath sounds at bases, few bibasilar Rales   Heart:  regular rate and rhythm, + murmur,+ AICD  Abdomen:  soft, nontender, nondistended, normal bowel sounds, no masses, hepatomegaly, splenomegaly  Extremities:  + Bilateral lower extremity edema with few blisters-improved  Skin:  no other gross lesions, rashes, induration    Assessment:        Hospital Problems             Last Modified POA    * (Principal) Acute on chronic combined systolic and diastolic CHF (congestive heart failure) ([facility] 10/11/2022 Yes    Status post implantation of automatic cardioverter/defibrillator (AICD) 10/11/2022 Yes    Obesity (BMI 30-39.9) 10/11/2022 Yes    Type 2 diabetes mellitus, without long-term current use of insulin ([facility] 10/11/2022 Yes    Paroxysmal atrial fibrillation ([facility] 10/11/2022 Yes    Edema of both legs 10/13/2022 Yes    Essential hypertension 10/11/2022 Yes    Hypercholesterolemia 10/11/2022 Yes    CAD (coronary artery disease) 10/11/2022 Yes   Plan:        Continue oral 2 mg Bumex twice daily   Continue home dose of amiodarone/aspirin/Eliquis/atorvastatin/Coreg/metformin  Evaluated by cardiology due to chronic resistant CHF, started on Jardiance  Cardiology plans to start Entresto , lisinopril was stopped after admission currently deferred due to dizziness episode yesterday  Discussed low-salt diet again  Wants to join weight loss program  Likely will be discharged today to home with home care if cleared by cardiology after deciding about Entresto and after seen by wound care    Shalonda Nowak MD  10/16/2022  11:40 AM

## 2022-10-16 NOTE — PROGRESS NOTES
Patient called out and complained of dizziness upon standing. Blood pressure taken and was 87/56. Cardiology notified. Midodrine 3x daily ordered. See MAR for details. Writer instructed pt to use call light when needing to get up. Pt verbalized understanding. Bed alarm in place.

## 2022-10-16 NOTE — PROGRESS NOTES
Writer attempted to call after hours line to [facility] outpatient pharmacy to call in prescriptions, line continuously ringing then cut out. Attending perfectserved to e-scribe prescriptions to [facility] outpatient pharmacy instead. Attending e-scribed clopidogrel, jardiance, and midodrine to [facility] pharmacy. Patient reminded to call cardiologist to make follow up regarding jardiance prescription. Hai Noguera made aware of discharge plan.

## 2022-10-18 ENCOUNTER — TELEPHONE (OUTPATIENT)
Dept: OTHER | Facility: CLINIC | Age: 62
End: 2022-10-18

## 2022-10-18 ENCOUNTER — APPOINTMENT (OUTPATIENT)
Dept: GENERAL RADIOLOGY | Age: 62
End: 2022-10-18
Payer: MEDICARE

## 2022-10-18 ENCOUNTER — HOSPITAL ENCOUNTER (EMERGENCY)
Age: 62
Discharge: HOME | End: 2022-10-19
Attending: STUDENT IN AN ORGANIZED HEALTH CARE EDUCATION/TRAINING PROGRAM
Payer: MEDICARE

## 2022-10-18 VITALS
DIASTOLIC BLOOD PRESSURE: 66 MMHG | HEART RATE: 73 BPM | RESPIRATION RATE: 17 BRPM | TEMPERATURE: 98.8 F | SYSTOLIC BLOOD PRESSURE: 127 MMHG | OXYGEN SATURATION: 96 %

## 2022-10-18 DIAGNOSIS — L03.119 CELLULITIS OF LOWER EXTREMITY, UNSPECIFIED LATERALITY: Primary | ICD-10-CM

## 2022-10-18 PROCEDURE — 99284 EMERGENCY DEPT VISIT MOD MDM: CPT

## 2022-10-18 PROCEDURE — 85025 COMPLETE CBC W/AUTO DIFF WBC: CPT

## 2022-10-18 PROCEDURE — 73590 X-RAY EXAM OF LOWER LEG: CPT | Performed by: RADIOLOGY

## 2022-10-18 PROCEDURE — 96365 THER/PROPH/DIAG IV INF INIT: CPT

## 2022-10-18 PROCEDURE — 73590 X-RAY EXAM OF LOWER LEG: CPT

## 2022-10-18 PROCEDURE — 83880 ASSAY OF NATRIURETIC PEPTIDE: CPT

## 2022-10-18 PROCEDURE — 2580000003 HC RX 258: Performed by: STUDENT IN AN ORGANIZED HEALTH CARE EDUCATION/TRAINING PROGRAM

## 2022-10-18 PROCEDURE — 6370000000 HC RX 637 (ALT 250 FOR IP): Performed by: STUDENT IN AN ORGANIZED HEALTH CARE EDUCATION/TRAINING PROGRAM

## 2022-10-18 PROCEDURE — 6360000002 HC RX W HCPCS: Performed by: STUDENT IN AN ORGANIZED HEALTH CARE EDUCATION/TRAINING PROGRAM

## 2022-10-18 PROCEDURE — 80048 BASIC METABOLIC PNL TOTAL CA: CPT

## 2022-10-18 RX ORDER — DOXYCYCLINE 100 MG/1
100 CAPSULE ORAL ONCE
Status: COMPLETED | OUTPATIENT
Start: 2022-10-18 | End: 2022-10-18

## 2022-10-18 RX ADMIN — DOXYCYCLINE 100 MG: 100 CAPSULE ORAL at 23:55

## 2022-10-18 RX ADMIN — CEFTRIAXONE 2000 MG: 2 INJECTION, POWDER, FOR SOLUTION INTRAMUSCULAR; INTRAVENOUS at 23:55

## 2022-10-19 ENCOUNTER — TELEPHONE (OUTPATIENT)
Dept: OTHER | Facility: CLINIC | Age: 62
End: 2022-10-19

## 2022-10-19 LAB
ABSOLUTE EOS #: 0.08 K/UL (ref 0–0.44)
ABSOLUTE IMMATURE GRANULOCYTE: 0.01 K/UL (ref 0–0.3)
ABSOLUTE LYMPH #: 1.06 K/UL (ref 1.1–3.7)
ABSOLUTE MONO #: 0.79 K/UL (ref 0.1–1.2)
ANION GAP SERPL CALCULATED.3IONS-SCNC: 8 MMOL/L (ref 9–17)
BASOPHILS # BLD: 1 % (ref 0–2)
BASOPHILS ABSOLUTE: 0.03 K/UL (ref 0–0.2)
BUN BLDV-MCNC: 22 MG/DL (ref 8–23)
BUN/CREAT BLD: 23 (ref 9–20)
CALCIUM SERPL-MCNC: 9.2 MG/DL (ref 8.6–10.4)
CHLORIDE BLD-SCNC: 101 MMOL/L (ref 98–107)
CO2: 29 MMOL/L (ref 20–31)
CREAT SERPL-MCNC: 0.97 MG/DL (ref 0.7–1.2)
EOSINOPHILS RELATIVE PERCENT: 1 % (ref 1–4)
GFR SERPL CREATININE-BSD FRML MDRD: >60 ML/MIN/1.73M2
GLUCOSE BLD-MCNC: 140 MG/DL (ref 70–99)
HCT VFR BLD CALC: 42.4 % (ref 40.7–50.3)
HEMOGLOBIN: 13 G/DL (ref 13–17)
IMMATURE GRANULOCYTES: 0 %
LYMPHOCYTES # BLD: 18 % (ref 24–43)
MCH RBC QN AUTO: 26.4 PG (ref 25.2–33.5)
MCHC RBC AUTO-ENTMCNC: 30.7 G/DL (ref 28–38)
MCV RBC AUTO: 86.2 FL (ref 82.6–102.9)
MONOCYTES # BLD: 14 % (ref 3–12)
PDW BLD-RTO: 16.2 % (ref 11.8–14.4)
PLATELET # BLD: 108 K/UL (ref 138–453)
PMV BLD AUTO: 11.4 FL (ref 8.1–13.5)
POTASSIUM SERPL-SCNC: 3.8 MMOL/L (ref 3.7–5.3)
PRO-BNP: 693 PG/ML
RBC # BLD: 4.92 M/UL (ref 4.21–5.77)
RBC # BLD: ABNORMAL 10*6/UL
SEG NEUTROPHILS: 66 % (ref 36–65)
SEGMENTED NEUTROPHILS ABSOLUTE COUNT: 3.78 K/UL (ref 1.5–8.1)
SODIUM BLD-SCNC: 138 MMOL/L (ref 135–144)
WBC # BLD: 5.8 K/UL (ref 3.5–11.3)

## 2022-10-19 RX ORDER — DOXYCYCLINE HYCLATE 100 MG
100 TABLET ORAL 2 TIMES DAILY
Qty: 20 TABLET | Refills: 0 | Status: SHIPPED | OUTPATIENT
Start: 2022-10-19 | End: 2022-10-19 | Stop reason: SDUPTHER

## 2022-10-19 RX ORDER — DOXYCYCLINE HYCLATE 100 MG
100 TABLET ORAL 2 TIMES DAILY
Qty: 20 TABLET | Refills: 0 | Status: SHIPPED | OUTPATIENT
Start: 2022-10-19 | End: 2022-10-29

## 2022-10-19 NOTE — DISCHARGE INSTRUCTIONS
If given narcotics (opiates) during this Emergency Department visit, you should not drink, drive or operate any machinery for at least 6 hours. Take your medication as indicated and prescribed. If you are given an antibiotic then, make sure you get the prescription filled and take the antibiotics until finished. Drink plenty of water while taking the antibiotics. Avoid drinking alcohol or drinks that have caffeine in it while taking antibiotics. For pain use acetaminophen (Tylenol) or ibuprofen (Motrin / Advil), unless prescribed medications that have acetaminophen or ibuprofen (or similar medications) in it. You can take over the counter acetaminophen tablets (1 - 2 tablets of the 500-mg strength every 6 hours) or ibuprofen tablets (2 tablets every 4 hours). PLEASE RETURN TO THE EMERGENCY DEPARTMENT IMMEDIATELY for worsening symptoms, white drainage from the wound, redness or streaking, or if you develop any concerning symptoms such as: high fever not relieved by acetaminophen (Tylenol) and/or ibuprofen (Motrin / Advil), chills, shortness of breath, chest pain, feeling of your heart fluttering or racing, persistent nausea and/or vomiting, vomiting up blood, blood in your stool, loss of consciousness, numbness, weakness or tingling in the arms or legs or change in color of the extremities, changes in mental status, persistent headache, blurry vision, loss of bladder / bowel control, unable to follow up with your physician, or other any other care or concern.

## 2022-10-19 NOTE — TELEPHONE ENCOUNTER
Writer contacted ED provider to inform of 30 day readmission risk. ED provider informed writer of intention to discharge and follow up recommended.       Call Back: If you need to call back to inform of disposition you can contact me at [phone]

## 2022-10-19 NOTE — TELEPHONE ENCOUNTER
RN access attempted to contact patient regarding need for ED follow-up appointment. Attempt was not successful. Left message to callback for help scheduling ED follow-up appointment. Unable to leave pt a message.  No vm

## 2022-10-19 NOTE — ED NOTES
Gisela Urbina (significant other) is okay to talk with per the pt.       Paresh Luo RN  10/18/22 6756

## 2022-10-20 ASSESSMENT — ENCOUNTER SYMPTOMS
EYE REDNESS: 0
SHORTNESS OF BREATH: 0
EYE DISCHARGE: 0
ABDOMINAL PAIN: 0
COLOR CHANGE: 0

## 2022-10-20 NOTE — ED PROVIDER NOTES
[facility] ED  Emergency Department Encounter     Pt Name: Jaison Lin  MRN: [id number]  [place] 1960  Date of evaluation: 10/20/22  PCP:  Laron Mace PA-C    CHIEF COMPLAINT       Chief Complaint   Patient presents with    Wound Check     Pt reports has right leg infection; home care saw him today and sent him back in to get IV antibiotics     Other     Pt is seeking placement at a skilled care facility        HISTORY OFPRESENT ILLNESS  (Location/Symptom, Timing/Onset, Context/Setting, Quality, Duration, Modifying Factors,Severity.)      Jaison Lin is a 59 y.o. male who presents with lower extremity wounds and concern for infection. Pain is worse with movement and pain is rated as moderate. Pain is located over the bilateral lower extremities and radiates to the same. Was recently admitted for lower extremity swelling has history of CHF taking Lasix. He was reportedly told by his home health care nurse that he his legs were infected that he needed IV antibiotics. Denies any fevers, chills, nausea, vomiting. States his leg swelling is actually reduced. PAST MEDICAL / SURGICAL / SOCIAL / FAMILY HISTORY      has a past medical history of CAD (coronary artery disease), CHF (congestive heart failure) ([address] Heart attack ([address] Hyperlipidemia, Hypertension, MI (myocardial infarction) ([address] MRSA (methicillin resistant staph aureus) culture positive, Skin cancer, Snores, Stroke ([address] Wears dentures, Wears reading eyeglasses, and Wellness examination. has a past surgical history that includes Coronary artery bypass graft; Coronary angioplasty with stent; skin biopsy; eye surgery; Mandible surgery; hernia repair (Bilateral, 12/21/2020); Cardioversion (11/17/2021); other surgical history (11/17/2021); Cardiac surgery (1999); Cardiac catheterization (08/06/2020); and Cardiac defibrillator placement (04/04/2022).     Social History     Socioeconomic History    Marital status: Single     Spouse name: Not on file    Number of children: Not on file    Years of education: Not on file    Highest education level: Not on file   Occupational History    Not on file   Tobacco Use    Smoking status: Former     Types: Cigarettes     Quit date: 1999     Years since quittin.7    Smokeless tobacco: Never   Vaping Use    Vaping Use: Never used   Substance and Sexual Activity    Alcohol use: No    Drug use: No    Sexual activity: Not Currently   Other Topics Concern    Not on file   Social History Narrative    Not on file     Social Determinants of Health     Financial Resource Strain: Not on file   Food Insecurity: Not on file   Transportation Needs: Not on file   Physical Activity: Not on file   Stress: Not on file   Social Connections: Not on file   Intimate Partner Violence: Not on file   Housing Stability: Not on file       Family History   Problem Relation Age of Onset    Coronary Art Dis Father     Liver Disease Father     Alcohol Abuse Sister        Allergies:  Patient has no known allergies. Home Medications:  Prior to Admission medications    Medication Sig Start Date End Date Taking?  Authorizing Provider   doxycycline hyclate (VIBRA-TABS) 100 MG tablet Take 1 tablet by mouth 2 times daily for 10 days 10/19/22 10/29/22 Yes Pham Skinner DO   clopidogrel (PLAVIX) 75 MG tablet Take 1 tablet by mouth daily 10/16/22   John Correa MD   empagliflozin (JARDIANCE) 10 MG tablet Take 1 tablet by mouth daily 10/16/22   John Correa MD   midodrine (PROAMATINE) 5 MG tablet Take 1 tablet by mouth 3 times daily (before meals) 10/16/22   John Correa MD   amiodarone (CORDARONE) 200 MG tablet Take 1 tablet by mouth daily 10/16/22   John Correa MD   bumetanide (BUMEX) 2 MG tablet Take 1 tablet by mouth 2 times daily 10/15/22   John Correa MD   aspirin EC 81 MG EC tablet Take 1 tablet by mouth daily 10/15/22   John Correa MD   rivaroxaban (XARELTO) 20 MG TABS tablet Take 20 mg by mouth Daily with supper    Historical Provider, MD   metFORMIN (GLUCOPHAGE) 1000 MG tablet Take 1 tablet by mouth daily Resume on 5/21 5/19/22   Sarah Saenz DO   Cyanocobalamin (VITAMIN B-12 PO) Take 1 tablet by mouth daily    Historical Provider, MD   naproxen (NAPROSYN) 500 MG tablet Take 1 tablet by mouth 2 times daily (with meals) 3/28/22 3/28/22  Abdelrahman Liu PA-C   Multiple Vitamins-Minerals (THERAPEUTIC MULTIVITAMIN-MINERALS) tablet Take 1 tablet by mouth daily    Historical Provider, MD   nitroGLYCERIN (NITROSTAT) 0.4 MG SL tablet Place 0.4 mg under the tongue every 5 minutes as needed for Chest pain up to max of 3 total doses. If no relief after 1 dose, call 911. Dr. Bernard Paz Provider, MD   atorvastatin (LIPITOR) 80 MG tablet Take 1 tablet by mouth nightly 2/20/19   Arely Rees MD   carvedilol (COREG) 12.5 MG tablet Take 1 tablet by mouth 2 times daily (with meals) 2/20/19   Arely Rees MD       REVIEW OF SYSTEMS    (2-9 systems for level 4, 10 or more for level 5)      Review of Systems   Constitutional:  Negative for chills and fever. Eyes:  Negative for discharge and redness. Respiratory:  Negative for shortness of breath. Cardiovascular:  Positive for leg swelling. Gastrointestinal:  Negative for abdominal pain. Genitourinary:  Negative for dysuria. Musculoskeletal:  Negative for arthralgias. Skin:  Positive for rash and wound. Negative for color change. Allergic/Immunologic: Negative for environmental allergies. Neurological:  Negative for headaches. Psychiatric/Behavioral:  Negative for agitation and confusion. PHYSICAL EXAM   (up to 7 for level 4, 8 or more for level 5)     INITIAL VITALS:    oral temperature is 98.8 °F (37.1 °C). His blood pressure is 127/66 and his pulse is 73. His respiration is 17 and oxygen saturation is 96%. Physical Exam  Vitals and nursing note reviewed. Constitutional:       Appearance: He is well-developed. HENT:      Head: Normocephalic and atraumatic. Nose: Nose normal.      Mouth/Throat:      Mouth: Mucous membranes are moist.   Eyes:      General: No scleral icterus. Conjunctiva/sclera: Conjunctivae normal.      Pupils: Pupils are equal, round, and reactive to light. Cardiovascular:      Rate and Rhythm: Normal rate and regular rhythm. Heart sounds: Normal heart sounds. No murmur heard. No friction rub. No gallop. Pulmonary:      Effort: Pulmonary effort is normal. No respiratory distress. Breath sounds: Normal breath sounds. No wheezing or rales. Musculoskeletal:         General: Normal range of motion. Skin:     Comments: 1+ pitting edema up to mid tib-fib, weeping dressed wounds to bilateral lower extremities, slight erythema bilaterally   Neurological:      Mental Status: He is alert and oriented to person, place, and time.    Psychiatric:         Behavior: Behavior normal.       DIFFERENTIAL  DIAGNOSIS     PLAN (LABS / IMAGING / EKG):  Orders Placed This Encounter   Procedures    XR TIBIA FIBULA RIGHT (2 VIEWS)    XR TIBIA FIBULA LEFT (2 VIEWS)    Basic Metabolic Panel    Brain Natriuretic Peptide    CBC with Auto Differential       MEDICATIONS ORDERED:  Orders Placed This Encounter   Medications    cefTRIAXone (ROCEPHIN) 2,000 mg in dextrose 5 % 50 mL IVPB mini-bag     Order Specific Question:   Antimicrobial Indications     Answer:   Skin and Soft Tissue Infection    doxycycline monohydrate (MONODOX) capsule 100 mg     Order Specific Question:   Antimicrobial Indications     Answer:   Skin and Soft Tissue Infection    DISCONTD: doxycycline hyclate (VIBRA-TABS) 100 MG tablet     Sig: Take 1 tablet by mouth 2 times daily for 10 days     Dispense:  20 tablet     Refill:  0    doxycycline hyclate (VIBRA-TABS) 100 MG tablet     Sig: Take 1 tablet by mouth 2 times daily for 10 days     Dispense:  20 tablet     Refill: 0       DDX: Contusion versus sprain versus fracture    Initial MDM/Plan: 59 y.o. male who presents with lower extremity leg swelling with concern for infection. Patient is completely normal vital signs. Has not been on antibiotics. Will start on doxycycline. Get basic labs. Suspect patient will be discharged. DIAGNOSTIC RESULTS / EMERGENCY DEPARTMENT COURSE / MDM     LABS:  Labs Reviewed   BASIC METABOLIC PANEL - Abnormal; Notable for the following components:       Result Value    Glucose 140 (*)     Bun/Cre Ratio 23 (*)     Anion Gap 8 (*)     All other components within normal limits   BRAIN NATRIURETIC PEPTIDE - Abnormal; Notable for the following components:    Pro- (*)     All other components within normal limits   CBC WITH AUTO DIFFERENTIAL - Abnormal; Notable for the following components:    RDW 16.2 (*)     Platelets 454 (*)     Seg Neutrophils 66 (*)     Lymphocytes 18 (*)     Monocytes 14 (*)     Absolute Lymph # 1.06 (*)     All other components within normal limits         RADIOLOGY:  XR TIBIA FIBULA LEFT (2 VIEWS)   Final Result   No acute osseous abnormality. XR TIBIA FIBULA RIGHT (2 VIEWS)   Final Result   No acute osseous abnormality. EMERGENCY DEPARTMENT COURSE:     Work-up largely unremarkable. Discussed with him taking antibiotics to completion. If he fails the outpatient antibiotics may need admission at that time however at this time no indication for IV antibiotics. Based on the low acuity of concerning symptoms and improvement of symptoms, patient will be discharged with follow up and prescription information listed in the Disposition section. Patient states they will follow-up with primary care physician and/or return to the emergency department should they experience a change or worsening of symptoms. Patient will be discharged with resources: summary of visit, instructions, follow-up information, prescriptions if necessary.   Patient/ family instructed to read discharge paperwork. All of their questions and concerns were addressed. Suspicion for any acute life-threatening processes is low. Patient voices understanding of plan. PROCEDURES:  None    CONSULTS:  None    CRITICAL CARE:  0    FINAL IMPRESSION      1.  Cellulitis of lower extremity, unspecified laterality          DISPOSITION / PLAN     DISPOSITION Decision To Discharge 10/19/2022 01:31:46 AM    Discharge    PATIENTREFERRED TO:  Magdaleno Burris, [address]  [phone]    Schedule an appointment as soon as possible for a visit in 1 week      DISCHARGE MEDICATIONS:  Discharge Medication List as of 10/19/2022  2:14 AM          Deyanira Miller DO  EmergencyMedicine Attending    (Please note that portions of this note were completed with a voice recognition program.  Efforts were made to edit the dictations but occasionally words are mis-transcribed.)       Deyanira Miller DO  10/20/22 0350

## 2022-11-02 ENCOUNTER — TELEPHONE (OUTPATIENT)
Dept: ONCOLOGY | Age: 62
End: 2022-11-02

## 2022-11-02 NOTE — TELEPHONE ENCOUNTER
PT CALLING FOR A \"LUNG SPECIALIST\". Che Blake MD, ASKED PT TO SEE A LUNG SPEC AND PT ASKING IF THERE IS ONE IN OUR OFFICE. WRITER CALLED BACK AND LEFT MESSAGE, THAT THIS IS A HEME-ONC GROUP, NO LUNG SPEC AT THIS OFFICE.

## 2023-01-01 ENCOUNTER — HOSPITAL ENCOUNTER (INPATIENT)
Age: 63
LOS: 1 days | DRG: 423 | End: 2023-03-10
Attending: EMERGENCY MEDICINE | Admitting: FAMILY MEDICINE
Payer: MEDICARE

## 2023-01-01 ENCOUNTER — APPOINTMENT (OUTPATIENT)
Dept: CT IMAGING | Age: 63
DRG: 423 | End: 2023-01-01
Payer: MEDICARE

## 2023-01-01 ENCOUNTER — HOSPITAL ENCOUNTER (OUTPATIENT)
Dept: WOUND CARE | Age: 63
Discharge: HOME | End: 2023-03-09
Payer: MEDICARE

## 2023-01-01 ENCOUNTER — APPOINTMENT (OUTPATIENT)
Dept: GENERAL RADIOLOGY | Age: 63
DRG: 423 | End: 2023-01-01
Payer: MEDICARE

## 2023-01-01 VITALS
HEIGHT: 69 IN | BODY MASS INDEX: 34.36 KG/M2 | TEMPERATURE: 96.3 F | SYSTOLIC BLOOD PRESSURE: 119 MMHG | WEIGHT: 232 LBS | DIASTOLIC BLOOD PRESSURE: 75 MMHG | HEART RATE: 95 BPM

## 2023-01-01 VITALS
HEART RATE: 73 BPM | OXYGEN SATURATION: 97 % | TEMPERATURE: 97.5 F | DIASTOLIC BLOOD PRESSURE: 59 MMHG | RESPIRATION RATE: 18 BRPM | SYSTOLIC BLOOD PRESSURE: 101 MMHG

## 2023-01-01 DIAGNOSIS — R60.0 EDEMA OF BOTH LEGS: Primary | ICD-10-CM

## 2023-01-01 DIAGNOSIS — R18.8 OTHER ASCITES: Primary | ICD-10-CM

## 2023-01-01 DIAGNOSIS — L97.922 SKIN ULCER OF LEFT LOWER LEG WITH FAT LAYER EXPOSED (HCC): ICD-10-CM

## 2023-01-01 DIAGNOSIS — R60.1 ANASARCA: ICD-10-CM

## 2023-01-01 DIAGNOSIS — L97.912 SKIN ULCER OF RIGHT LOWER LEG, WITH FAT LAYER EXPOSED (HCC): ICD-10-CM

## 2023-01-01 LAB
ABSOLUTE EOS #: <0.03 K/UL (ref 0–0.44)
ABSOLUTE IMMATURE GRANULOCYTE: 0.04 K/UL (ref 0–0.3)
ABSOLUTE LYMPH #: 0.88 K/UL (ref 1.1–3.7)
ABSOLUTE MONO #: 0.89 K/UL (ref 0.1–1.2)
ALBUMIN SERPL-MCNC: 3.2 G/DL (ref 3.5–5.2)
ALP SERPL-CCNC: 283 U/L (ref 40–129)
ALT SERPL-CCNC: 14 U/L (ref 5–41)
ANION GAP SERPL CALCULATED.3IONS-SCNC: 16 MMOL/L (ref 9–17)
AST SERPL-CCNC: 32 U/L
BASOPHILS # BLD: 1 % (ref 0–2)
BASOPHILS ABSOLUTE: 0.05 K/UL (ref 0–0.2)
BILIRUB DIRECT SERPL-MCNC: 1.9 MG/DL
BILIRUB INDIRECT SERPL-MCNC: 1.4 MG/DL (ref 0–1)
BILIRUB SERPL-MCNC: 3.3 MG/DL (ref 0.3–1.2)
BNP SERPL-MCNC: 1778 PG/ML
BUN SERPL-MCNC: 23 MG/DL (ref 8–23)
BUN/CREAT BLD: 21 (ref 9–20)
CALCIUM SERPL-MCNC: 9.3 MG/DL (ref 8.6–10.4)
CHLORIDE SERPL-SCNC: 98 MMOL/L (ref 98–107)
CO2 SERPL-SCNC: 19 MMOL/L (ref 20–31)
CREAT SERPL-MCNC: 1.09 MG/DL (ref 0.7–1.2)
EOSINOPHILS RELATIVE PERCENT: 0 % (ref 1–4)
GFR SERPL CREATININE-BSD FRML MDRD: >60 ML/MIN/1.73M2
GLUCOSE BLD-MCNC: 60 MG/DL (ref 75–110)
GLUCOSE SERPL-MCNC: 72 MG/DL (ref 70–99)
HCT VFR BLD AUTO: 43.2 % (ref 40.7–50.3)
HGB BLD-MCNC: 12.9 G/DL (ref 13–17)
IMMATURE GRANULOCYTES: 1 %
INR PPP: 2
LACTIC ACID, SEPSIS: 6 MMOL/L (ref 0.5–1.9)
LACTIC ACID, SEPSIS: 9 MMOL/L (ref 0.5–1.9)
LIPASE SERPL-CCNC: 23 U/L (ref 13–60)
LYMPHOCYTES # BLD: 10 % (ref 24–43)
MAGNESIUM SERPL-MCNC: 1.9 MG/DL (ref 1.6–2.6)
MCH RBC QN AUTO: 24.2 PG (ref 25.2–33.5)
MCHC RBC AUTO-ENTMCNC: 29.9 G/DL (ref 28.4–34.8)
MCV RBC AUTO: 81.1 FL (ref 82.6–102.9)
MONOCYTES # BLD: 10 % (ref 3–12)
NRBC AUTOMATED: 0 PER 100 WBC
PARTIAL THROMBOPLASTIN TIME: 33.1 SEC (ref 23.9–33.8)
PDW BLD-RTO: 19.8 % (ref 11.8–14.4)
PLATELET # BLD AUTO: 189 K/UL (ref 138–453)
PMV BLD AUTO: 10.7 FL (ref 8.1–13.5)
POTASSIUM SERPL-SCNC: 4.9 MMOL/L (ref 3.7–5.3)
PROT SERPL-MCNC: 7.2 G/DL (ref 6.4–8.3)
PROTHROMBIN TIME: 22.9 SEC (ref 11.5–14.2)
RBC # BLD: 5.33 M/UL (ref 4.21–5.77)
RBC # BLD: ABNORMAL 10*6/UL
SARS-COV-2 RDRP RESP QL NAA+PROBE: NOT DETECTED
SEG NEUTROPHILS: 78 % (ref 36–65)
SEGMENTED NEUTROPHILS ABSOLUTE COUNT: 6.8 K/UL (ref 1.5–8.1)
SODIUM SERPL-SCNC: 133 MMOL/L (ref 135–144)
SPECIMEN DESCRIPTION: NORMAL
TROPONIN I SERPL DL<=0.01 NG/ML-MCNC: 39 NG/L (ref 0–22)
TROPONIN I SERPL DL<=0.01 NG/ML-MCNC: 42 NG/L (ref 0–22)
WBC # BLD AUTO: 8.7 K/UL (ref 3.5–11.3)

## 2023-01-01 PROCEDURE — 6360000002 HC RX W HCPCS: Performed by: NURSE PRACTITIONER

## 2023-01-01 PROCEDURE — 2580000003 HC RX 258: Performed by: NURSE PRACTITIONER

## 2023-01-01 PROCEDURE — 99223 1ST HOSP IP/OBS HIGH 75: CPT | Performed by: NURSE PRACTITIONER

## 2023-01-01 PROCEDURE — 96374 THER/PROPH/DIAG INJ IV PUSH: CPT

## 2023-01-01 PROCEDURE — 0JBP0ZZ EXCISION OF LEFT LOWER LEG SUBCUTANEOUS TISSUE AND FASCIA, OPEN APPROACH: ICD-10-PCS | Performed by: NURSE PRACTITIONER

## 2023-01-01 PROCEDURE — 83605 ASSAY OF LACTIC ACID: CPT

## 2023-01-01 PROCEDURE — 87635 SARS-COV-2 COVID-19 AMP PRB: CPT

## 2023-01-01 PROCEDURE — 0JBN0ZZ EXCISION OF RIGHT LOWER LEG SUBCUTANEOUS TISSUE AND FASCIA, OPEN APPROACH: ICD-10-PCS | Performed by: NURSE PRACTITIONER

## 2023-01-01 PROCEDURE — 93005 ELECTROCARDIOGRAM TRACING: CPT | Performed by: EMERGENCY MEDICINE

## 2023-01-01 PROCEDURE — 83690 ASSAY OF LIPASE: CPT

## 2023-01-01 PROCEDURE — 29580 STRAPPING UNNA BOOT: CPT

## 2023-01-01 PROCEDURE — 83735 ASSAY OF MAGNESIUM: CPT

## 2023-01-01 PROCEDURE — 85610 PROTHROMBIN TIME: CPT

## 2023-01-01 PROCEDURE — 2060000000 HC ICU INTERMEDIATE R&B

## 2023-01-01 PROCEDURE — 2580000003 HC RX 258: Performed by: EMERGENCY MEDICINE

## 2023-01-01 PROCEDURE — 96376 TX/PRO/DX INJ SAME DRUG ADON: CPT

## 2023-01-01 PROCEDURE — 74177 CT ABD & PELVIS W/CONTRAST: CPT

## 2023-01-01 PROCEDURE — 85730 THROMBOPLASTIN TIME PARTIAL: CPT

## 2023-01-01 PROCEDURE — 84484 ASSAY OF TROPONIN QUANT: CPT

## 2023-01-01 PROCEDURE — 36415 COLL VENOUS BLD VENIPUNCTURE: CPT

## 2023-01-01 PROCEDURE — 99285 EMERGENCY DEPT VISIT HI MDM: CPT

## 2023-01-01 PROCEDURE — 80048 BASIC METABOLIC PNL TOTAL CA: CPT

## 2023-01-01 PROCEDURE — 11042 DBRDMT SUBQ TIS 1ST 20SQCM/<: CPT

## 2023-01-01 PROCEDURE — 6360000002 HC RX W HCPCS: Performed by: EMERGENCY MEDICINE

## 2023-01-01 PROCEDURE — 85025 COMPLETE CBC W/AUTO DIFF WBC: CPT

## 2023-01-01 PROCEDURE — 71045 X-RAY EXAM CHEST 1 VIEW: CPT

## 2023-01-01 PROCEDURE — 83880 ASSAY OF NATRIURETIC PEPTIDE: CPT

## 2023-01-01 PROCEDURE — 82947 ASSAY GLUCOSE BLOOD QUANT: CPT

## 2023-01-01 PROCEDURE — 83036 HEMOGLOBIN GLYCOSYLATED A1C: CPT

## 2023-01-01 PROCEDURE — 80076 HEPATIC FUNCTION PANEL: CPT

## 2023-01-01 PROCEDURE — 6360000004 HC RX CONTRAST MEDICATION: Performed by: EMERGENCY MEDICINE

## 2023-01-01 RX ORDER — MIDODRINE HYDROCHLORIDE 10 MG/1
10 TABLET ORAL
Status: DISCONTINUED | OUTPATIENT
Start: 2023-01-01 | End: 2023-01-01 | Stop reason: HOSPADM

## 2023-01-01 RX ORDER — ACETAMINOPHEN 325 MG/1
650 TABLET ORAL EVERY 6 HOURS PRN
Status: DISCONTINUED | OUTPATIENT
Start: 2023-01-01 | End: 2023-01-01 | Stop reason: HOSPADM

## 2023-01-01 RX ORDER — 0.9 % SODIUM CHLORIDE 0.9 %
80 INTRAVENOUS SOLUTION INTRAVENOUS ONCE
Status: DISCONTINUED | OUTPATIENT
Start: 2023-01-01 | End: 2023-01-01 | Stop reason: HOSPADM

## 2023-01-01 RX ORDER — ONDANSETRON 2 MG/ML
4 INJECTION INTRAMUSCULAR; INTRAVENOUS EVERY 6 HOURS PRN
Status: DISCONTINUED | OUTPATIENT
Start: 2023-01-01 | End: 2023-01-01 | Stop reason: HOSPADM

## 2023-01-01 RX ORDER — MAGNESIUM SULFATE IN WATER 40 MG/ML
2000 INJECTION, SOLUTION INTRAVENOUS PRN
Status: DISCONTINUED | OUTPATIENT
Start: 2023-01-01 | End: 2023-01-01 | Stop reason: HOSPADM

## 2023-01-01 RX ORDER — BETAMETHASONE DIPROPIONATE 0.05 %
OINTMENT (GRAM) TOPICAL ONCE
Status: CANCELLED | OUTPATIENT
Start: 2023-01-01 | End: 2023-01-01

## 2023-01-01 RX ORDER — FUROSEMIDE 10 MG/ML
40 INJECTION INTRAMUSCULAR; INTRAVENOUS 2 TIMES DAILY
Status: DISCONTINUED | OUTPATIENT
Start: 2023-01-01 | End: 2023-01-01

## 2023-01-01 RX ORDER — DEXTROSE MONOHYDRATE 100 MG/ML
INJECTION, SOLUTION INTRAVENOUS CONTINUOUS PRN
Status: DISCONTINUED | OUTPATIENT
Start: 2023-01-01 | End: 2023-01-01 | Stop reason: HOSPADM

## 2023-01-01 RX ORDER — SODIUM CHLORIDE 0.9 % (FLUSH) 0.9 %
10 SYRINGE (ML) INJECTION PRN
Status: DISCONTINUED | OUTPATIENT
Start: 2023-01-01 | End: 2023-01-01 | Stop reason: HOSPADM

## 2023-01-01 RX ORDER — CLOPIDOGREL BISULFATE 75 MG/1
75 TABLET ORAL DAILY
Status: DISCONTINUED | OUTPATIENT
Start: 2023-03-11 | End: 2023-01-01 | Stop reason: HOSPADM

## 2023-01-01 RX ORDER — POTASSIUM CHLORIDE 20 MEQ/1
40 TABLET, EXTENDED RELEASE ORAL PRN
Status: DISCONTINUED | OUTPATIENT
Start: 2023-01-01 | End: 2023-01-01 | Stop reason: HOSPADM

## 2023-01-01 RX ORDER — POTASSIUM CHLORIDE 7.45 MG/ML
10 INJECTION INTRAVENOUS PRN
Status: DISCONTINUED | OUTPATIENT
Start: 2023-01-01 | End: 2023-01-01 | Stop reason: HOSPADM

## 2023-01-01 RX ORDER — LIDOCAINE HYDROCHLORIDE 20 MG/ML
JELLY TOPICAL ONCE
Status: CANCELLED | OUTPATIENT
Start: 2023-01-01 | End: 2023-01-01

## 2023-01-01 RX ORDER — ATORVASTATIN CALCIUM 80 MG/1
80 TABLET, FILM COATED ORAL NIGHTLY
Status: DISCONTINUED | OUTPATIENT
Start: 2023-01-01 | End: 2023-01-01 | Stop reason: HOSPADM

## 2023-01-01 RX ORDER — LORAZEPAM 2 MG/ML
0.5 INJECTION INTRAMUSCULAR ONCE
Status: COMPLETED | OUTPATIENT
Start: 2023-01-01 | End: 2023-01-01

## 2023-01-01 RX ORDER — GENTAMICIN SULFATE 1 MG/G
OINTMENT TOPICAL ONCE
Status: CANCELLED | OUTPATIENT
Start: 2023-01-01 | End: 2023-01-01

## 2023-01-01 RX ORDER — POLYETHYLENE GLYCOL 3350 17 G/17G
17 POWDER, FOR SOLUTION ORAL DAILY PRN
Status: DISCONTINUED | OUTPATIENT
Start: 2023-01-01 | End: 2023-01-01 | Stop reason: HOSPADM

## 2023-01-01 RX ORDER — INSULIN LISPRO 100 [IU]/ML
0-4 INJECTION, SOLUTION INTRAVENOUS; SUBCUTANEOUS NIGHTLY
Status: DISCONTINUED | OUTPATIENT
Start: 2023-01-01 | End: 2023-01-01 | Stop reason: HOSPADM

## 2023-01-01 RX ORDER — LIDOCAINE 40 MG/G
CREAM TOPICAL ONCE
Status: CANCELLED | OUTPATIENT
Start: 2023-01-01 | End: 2023-01-01

## 2023-01-01 RX ORDER — LIDOCAINE 50 MG/G
OINTMENT TOPICAL ONCE
Status: CANCELLED | OUTPATIENT
Start: 2023-01-01 | End: 2023-01-01

## 2023-01-01 RX ORDER — SPIRONOLACTONE 25 MG/1
25 TABLET ORAL DAILY
Status: DISCONTINUED | OUTPATIENT
Start: 2023-01-01 | End: 2023-01-01 | Stop reason: HOSPADM

## 2023-01-01 RX ORDER — SODIUM CHLORIDE 9 MG/ML
INJECTION, SOLUTION INTRAVENOUS PRN
Status: DISCONTINUED | OUTPATIENT
Start: 2023-01-01 | End: 2023-01-01 | Stop reason: HOSPADM

## 2023-01-01 RX ORDER — INSULIN LISPRO 100 [IU]/ML
0-4 INJECTION, SOLUTION INTRAVENOUS; SUBCUTANEOUS
Status: DISCONTINUED | OUTPATIENT
Start: 2023-01-01 | End: 2023-01-01 | Stop reason: HOSPADM

## 2023-01-01 RX ORDER — LORAZEPAM 2 MG/ML
1 INJECTION INTRAMUSCULAR ONCE
Status: COMPLETED | OUTPATIENT
Start: 2023-01-01 | End: 2023-01-01

## 2023-01-01 RX ORDER — BACITRACIN ZINC AND POLYMYXIN B SULFATE 500; 1000 [USP'U]/G; [USP'U]/G
OINTMENT TOPICAL ONCE
Status: CANCELLED | OUTPATIENT
Start: 2023-01-01 | End: 2023-01-01

## 2023-01-01 RX ORDER — FUROSEMIDE 10 MG/ML
40 INJECTION INTRAMUSCULAR; INTRAVENOUS 2 TIMES DAILY
Status: DISCONTINUED | OUTPATIENT
Start: 2023-01-01 | End: 2023-01-01 | Stop reason: HOSPADM

## 2023-01-01 RX ORDER — ONDANSETRON 4 MG/1
4 TABLET, ORALLY DISINTEGRATING ORAL EVERY 8 HOURS PRN
Status: DISCONTINUED | OUTPATIENT
Start: 2023-01-01 | End: 2023-01-01 | Stop reason: HOSPADM

## 2023-01-01 RX ORDER — BACITRACIN, NEOMYCIN, POLYMYXIN B 400; 3.5; 5 [USP'U]/G; MG/G; [USP'U]/G
OINTMENT TOPICAL ONCE
Status: CANCELLED | OUTPATIENT
Start: 2023-01-01 | End: 2023-01-01

## 2023-01-01 RX ORDER — CLOBETASOL PROPIONATE 0.5 MG/G
OINTMENT TOPICAL ONCE
Status: CANCELLED | OUTPATIENT
Start: 2023-01-01 | End: 2023-01-01

## 2023-01-01 RX ORDER — GINSENG 100 MG
CAPSULE ORAL ONCE
Status: CANCELLED | OUTPATIENT
Start: 2023-01-01 | End: 2023-01-01

## 2023-01-01 RX ORDER — SODIUM CHLORIDE 0.9 % (FLUSH) 0.9 %
10 SYRINGE (ML) INJECTION PRN
Status: DISCONTINUED | OUTPATIENT
Start: 2023-01-01 | End: 2023-01-01 | Stop reason: SDUPTHER

## 2023-01-01 RX ORDER — LIDOCAINE HYDROCHLORIDE 40 MG/ML
SOLUTION TOPICAL ONCE
Status: CANCELLED | OUTPATIENT
Start: 2023-01-01 | End: 2023-01-01

## 2023-01-01 RX ORDER — ACETAMINOPHEN 650 MG/1
650 SUPPOSITORY RECTAL EVERY 6 HOURS PRN
Status: DISCONTINUED | OUTPATIENT
Start: 2023-01-01 | End: 2023-01-01 | Stop reason: HOSPADM

## 2023-01-01 RX ORDER — SODIUM CHLORIDE 0.9 % (FLUSH) 0.9 %
5-40 SYRINGE (ML) INJECTION EVERY 12 HOURS SCHEDULED
Status: DISCONTINUED | OUTPATIENT
Start: 2023-01-01 | End: 2023-01-01 | Stop reason: HOSPADM

## 2023-01-01 RX ORDER — LIDOCAINE HYDROCHLORIDE 20 MG/ML
JELLY TOPICAL ONCE
Status: COMPLETED | OUTPATIENT
Start: 2023-01-01 | End: 2023-01-01

## 2023-01-01 RX ADMIN — LORAZEPAM 1 MG: 2 INJECTION INTRAMUSCULAR at 08:25

## 2023-01-01 RX ADMIN — SODIUM CHLORIDE, PRESERVATIVE FREE 10 ML: 5 INJECTION INTRAVENOUS at 08:17

## 2023-01-01 RX ADMIN — Medication 80 ML: at 08:16

## 2023-01-01 RX ADMIN — FUROSEMIDE 40 MG: 10 INJECTION, SOLUTION INTRAMUSCULAR; INTRAVENOUS at 16:16

## 2023-01-01 RX ADMIN — IOPAMIDOL 75 ML: 755 INJECTION, SOLUTION INTRAVENOUS at 08:16

## 2023-01-01 RX ADMIN — DEXTROSE MONOHYDRATE 250 ML: 100 INJECTION, SOLUTION INTRAVENOUS at 16:39

## 2023-01-01 RX ADMIN — LIDOCAINE HYDROCHLORIDE 6 ML: 20 JELLY TOPICAL at 14:37

## 2023-01-01 RX ADMIN — LORAZEPAM 0.5 MG: 2 INJECTION INTRAMUSCULAR at 07:43

## 2023-01-01 ASSESSMENT — ENCOUNTER SYMPTOMS
ABDOMINAL PAIN: 1
DIARRHEA: 0
EYE REDNESS: 0
COUGH: 0
CONSTIPATION: 0
ABDOMINAL DISTENTION: 1
VOMITING: 0
FACIAL SWELLING: 0
EYE DISCHARGE: 0
SHORTNESS OF BREATH: 1
COLOR CHANGE: 0

## 2023-01-05 ENCOUNTER — HOSPITAL ENCOUNTER (INPATIENT)
Age: 63
LOS: 4 days | Discharge: HOME | DRG: 291 | End: 2023-01-10
Attending: EMERGENCY MEDICINE | Admitting: INTERNAL MEDICINE
Payer: MEDICARE

## 2023-01-05 ENCOUNTER — APPOINTMENT (OUTPATIENT)
Dept: GENERAL RADIOLOGY | Age: 63
DRG: 291 | End: 2023-01-05
Payer: MEDICARE

## 2023-01-05 DIAGNOSIS — L97.922 SKIN ULCER OF LEFT LOWER LEG WITH FAT LAYER EXPOSED (HCC): ICD-10-CM

## 2023-01-05 DIAGNOSIS — R60.0 EDEMA OF BOTH LEGS: ICD-10-CM

## 2023-01-05 DIAGNOSIS — L97.912 SKIN ULCER OF RIGHT LOWER LEG, WITH FAT LAYER EXPOSED (HCC): ICD-10-CM

## 2023-01-05 DIAGNOSIS — I50.9 ACUTE CONGESTIVE HEART FAILURE, UNSPECIFIED HEART FAILURE TYPE (HCC): Primary | ICD-10-CM

## 2023-01-05 DIAGNOSIS — I50.41 ACUTE COMBINED SYSTOLIC AND DIASTOLIC CONGESTIVE HEART FAILURE (HCC): ICD-10-CM

## 2023-01-05 LAB
ABSOLUTE EOS #: 0.07 K/UL (ref 0–0.44)
ABSOLUTE IMMATURE GRANULOCYTE: 0.01 K/UL (ref 0–0.3)
ABSOLUTE LYMPH #: 1.04 K/UL (ref 1.1–3.7)
ABSOLUTE MONO #: 0.79 K/UL (ref 0.1–1.2)
ALBUMIN SERPL-MCNC: 3.4 G/DL (ref 3.5–5.2)
ALP BLD-CCNC: 240 U/L (ref 40–129)
ALT SERPL-CCNC: 14 U/L (ref 5–41)
ANION GAP SERPL CALCULATED.3IONS-SCNC: 11 MMOL/L (ref 9–17)
AST SERPL-CCNC: 27 U/L
BASOPHILS # BLD: 1 % (ref 0–2)
BASOPHILS ABSOLUTE: 0.03 K/UL (ref 0–0.2)
BILIRUB SERPL-MCNC: 2.5 MG/DL (ref 0.3–1.2)
BUN BLDV-MCNC: 17 MG/DL (ref 8–23)
BUN/CREAT BLD: 20 (ref 9–20)
CALCIUM SERPL-MCNC: 8.9 MG/DL (ref 8.6–10.4)
CHLORIDE BLD-SCNC: 98 MMOL/L (ref 98–107)
CO2: 26 MMOL/L (ref 20–31)
CREAT SERPL-MCNC: 0.85 MG/DL (ref 0.7–1.2)
EOSINOPHILS RELATIVE PERCENT: 1 % (ref 1–4)
FLU A ANTIGEN: NEGATIVE
FLU B ANTIGEN: NEGATIVE
GFR SERPL CREATININE-BSD FRML MDRD: >60 ML/MIN/1.73M2
GLUCOSE BLD-MCNC: 109 MG/DL (ref 70–99)
HCT VFR BLD CALC: 39.8 % (ref 40.7–50.3)
HEMOGLOBIN: 12.4 G/DL (ref 13–17)
IMMATURE GRANULOCYTES: 0 %
LYMPHOCYTES # BLD: 18 % (ref 24–43)
MCH RBC QN AUTO: 25.1 PG (ref 25.2–33.5)
MCHC RBC AUTO-ENTMCNC: 31.2 G/DL (ref 28.4–34.8)
MCV RBC AUTO: 80.4 FL (ref 82.6–102.9)
MONOCYTES # BLD: 14 % (ref 3–12)
MYOGLOBIN: 52 NG/ML (ref 28–72)
NRBC AUTOMATED: 0 PER 100 WBC
PDW BLD-RTO: 18.7 % (ref 11.8–14.4)
PLATELET # BLD: 133 K/UL (ref 138–453)
PMV BLD AUTO: 10.7 FL (ref 8.1–13.5)
POTASSIUM SERPL-SCNC: 3.5 MMOL/L (ref 3.7–5.3)
PRO-BNP: 1400 PG/ML
RBC # BLD: 4.95 M/UL (ref 4.21–5.77)
RBC # BLD: ABNORMAL 10*6/UL
SARS-COV-2, RAPID: NOT DETECTED
SEG NEUTROPHILS: 67 % (ref 36–65)
SEGMENTED NEUTROPHILS ABSOLUTE COUNT: 3.88 K/UL (ref 1.5–8.1)
SODIUM BLD-SCNC: 135 MMOL/L (ref 135–144)
SPECIMEN DESCRIPTION: NORMAL
TOTAL PROTEIN: 6.6 G/DL (ref 6.4–8.3)
TROPONIN, HIGH SENSITIVITY: 24 NG/L (ref 0–22)
WBC # BLD: 5.8 K/UL (ref 3.5–11.3)

## 2023-01-05 PROCEDURE — 84484 ASSAY OF TROPONIN QUANT: CPT

## 2023-01-05 PROCEDURE — 93005 ELECTROCARDIOGRAM TRACING: CPT | Performed by: PHYSICIAN ASSISTANT

## 2023-01-05 PROCEDURE — 83880 ASSAY OF NATRIURETIC PEPTIDE: CPT

## 2023-01-05 PROCEDURE — 87804 INFLUENZA ASSAY W/OPTIC: CPT

## 2023-01-05 PROCEDURE — 71045 X-RAY EXAM CHEST 1 VIEW: CPT

## 2023-01-05 PROCEDURE — 80053 COMPREHEN METABOLIC PANEL: CPT

## 2023-01-05 PROCEDURE — 96374 THER/PROPH/DIAG INJ IV PUSH: CPT

## 2023-01-05 PROCEDURE — 2500000003 HC RX 250 WO HCPCS: Performed by: PHYSICIAN ASSISTANT

## 2023-01-05 PROCEDURE — 99285 EMERGENCY DEPT VISIT HI MDM: CPT

## 2023-01-05 PROCEDURE — 85025 COMPLETE CBC W/AUTO DIFF WBC: CPT

## 2023-01-05 PROCEDURE — 83874 ASSAY OF MYOGLOBIN: CPT

## 2023-01-05 PROCEDURE — 87635 SARS-COV-2 COVID-19 AMP PRB: CPT

## 2023-01-05 RX ORDER — BUMETANIDE 0.25 MG/ML
2 INJECTION, SOLUTION INTRAMUSCULAR; INTRAVENOUS ONCE
Status: COMPLETED | OUTPATIENT
Start: 2023-01-05 | End: 2023-01-05

## 2023-01-05 RX ADMIN — BUMETANIDE 2 MG: 0.25 INJECTION INTRAMUSCULAR; INTRAVENOUS at 23:29

## 2023-01-05 ASSESSMENT — PAIN - FUNCTIONAL ASSESSMENT: PAIN_FUNCTIONAL_ASSESSMENT: NONE - DENIES PAIN

## 2023-01-06 PROBLEM — I50.43 ACUTE ON CHRONIC COMBINED SYSTOLIC AND DIASTOLIC CHF (CONGESTIVE HEART FAILURE) (HCC): Chronic | Status: ACTIVE | Noted: 2022-10-11

## 2023-01-06 PROBLEM — I50.9 HEART FAILURE (HCC): Status: ACTIVE | Noted: 2023-01-06

## 2023-01-06 LAB
ANION GAP SERPL CALCULATED.3IONS-SCNC: 7 MMOL/L (ref 9–17)
BUN BLDV-MCNC: 15 MG/DL (ref 8–23)
BUN/CREAT BLD: 16 (ref 9–20)
CALCIUM SERPL-MCNC: 8.6 MG/DL (ref 8.6–10.4)
CHLORIDE BLD-SCNC: 101 MMOL/L (ref 98–107)
CO2: 31 MMOL/L (ref 20–31)
CREAT SERPL-MCNC: 0.96 MG/DL (ref 0.7–1.2)
EKG ATRIAL RATE: 89 BPM
EKG P AXIS: 61 DEGREES
EKG P-R INTERVAL: 238 MS
EKG Q-T INTERVAL: 430 MS
EKG QRS DURATION: 140 MS
EKG QTC CALCULATION (BAZETT): 523 MS
EKG R AXIS: 109 DEGREES
EKG T AXIS: 55 DEGREES
EKG VENTRICULAR RATE: 89 BPM
GFR SERPL CREATININE-BSD FRML MDRD: >60 ML/MIN/1.73M2
GLUCOSE BLD-MCNC: 118 MG/DL (ref 75–110)
GLUCOSE BLD-MCNC: 138 MG/DL (ref 75–110)
GLUCOSE BLD-MCNC: 144 MG/DL (ref 75–110)
GLUCOSE BLD-MCNC: 150 MG/DL (ref 70–99)
GLUCOSE BLD-MCNC: 168 MG/DL (ref 75–110)
GLUCOSE BLD-MCNC: 98 MG/DL (ref 75–110)
MAGNESIUM: 1.7 MG/DL (ref 1.6–2.6)
MYOGLOBIN: 48 NG/ML (ref 28–72)
MYOGLOBIN: 52 NG/ML (ref 28–72)
POTASSIUM SERPL-SCNC: 3.9 MMOL/L (ref 3.7–5.3)
SODIUM BLD-SCNC: 139 MMOL/L (ref 135–144)
TROPONIN, HIGH SENSITIVITY: 22 NG/L (ref 0–22)
TROPONIN, HIGH SENSITIVITY: 24 NG/L (ref 0–22)
TROPONIN, HIGH SENSITIVITY: 24 NG/L (ref 0–22)

## 2023-01-06 PROCEDURE — 83735 ASSAY OF MAGNESIUM: CPT

## 2023-01-06 PROCEDURE — 36415 COLL VENOUS BLD VENIPUNCTURE: CPT

## 2023-01-06 PROCEDURE — 6360000002 HC RX W HCPCS: Performed by: NURSE PRACTITIONER

## 2023-01-06 PROCEDURE — 97530 THERAPEUTIC ACTIVITIES: CPT

## 2023-01-06 PROCEDURE — 6370000000 HC RX 637 (ALT 250 FOR IP): Performed by: INTERNAL MEDICINE

## 2023-01-06 PROCEDURE — 97116 GAIT TRAINING THERAPY: CPT

## 2023-01-06 PROCEDURE — APPSS45 APP SPLIT SHARED TIME 31-45 MINUTES: Performed by: NURSE PRACTITIONER

## 2023-01-06 PROCEDURE — 82947 ASSAY GLUCOSE BLOOD QUANT: CPT

## 2023-01-06 PROCEDURE — 6370000000 HC RX 637 (ALT 250 FOR IP): Performed by: NURSE PRACTITIONER

## 2023-01-06 PROCEDURE — 2580000003 HC RX 258: Performed by: NURSE PRACTITIONER

## 2023-01-06 PROCEDURE — 1200000000 HC SEMI PRIVATE

## 2023-01-06 PROCEDURE — 97166 OT EVAL MOD COMPLEX 45 MIN: CPT

## 2023-01-06 PROCEDURE — 83874 ASSAY OF MYOGLOBIN: CPT

## 2023-01-06 PROCEDURE — 84484 ASSAY OF TROPONIN QUANT: CPT

## 2023-01-06 PROCEDURE — 99223 1ST HOSP IP/OBS HIGH 75: CPT | Performed by: INTERNAL MEDICINE

## 2023-01-06 PROCEDURE — 6370000000 HC RX 637 (ALT 250 FOR IP): Performed by: PHYSICIAN ASSISTANT

## 2023-01-06 PROCEDURE — 80048 BASIC METABOLIC PNL TOTAL CA: CPT

## 2023-01-06 PROCEDURE — 97163 PT EVAL HIGH COMPLEX 45 MIN: CPT

## 2023-01-06 RX ORDER — ENOXAPARIN SODIUM 100 MG/ML
30 INJECTION SUBCUTANEOUS 2 TIMES DAILY
Status: DISCONTINUED | OUTPATIENT
Start: 2023-01-06 | End: 2023-01-06

## 2023-01-06 RX ORDER — M-VIT,TX,IRON,MINS/CALC/FOLIC 27MG-0.4MG
1 TABLET ORAL DAILY
Status: DISCONTINUED | OUTPATIENT
Start: 2023-01-06 | End: 2023-01-10 | Stop reason: HOSPADM

## 2023-01-06 RX ORDER — LISINOPRIL 5 MG/1
5 TABLET ORAL DAILY
Status: DISCONTINUED | OUTPATIENT
Start: 2023-01-07 | End: 2023-01-09

## 2023-01-06 RX ORDER — POTASSIUM CHLORIDE 7.45 MG/ML
10 INJECTION INTRAVENOUS PRN
Status: DISCONTINUED | OUTPATIENT
Start: 2023-01-06 | End: 2023-01-10 | Stop reason: HOSPADM

## 2023-01-06 RX ORDER — FUROSEMIDE 10 MG/ML
40 INJECTION INTRAMUSCULAR; INTRAVENOUS 2 TIMES DAILY
Status: DISCONTINUED | OUTPATIENT
Start: 2023-01-06 | End: 2023-01-10

## 2023-01-06 RX ORDER — MAGNESIUM SULFATE IN WATER 40 MG/ML
2000 INJECTION, SOLUTION INTRAVENOUS PRN
Status: DISCONTINUED | OUTPATIENT
Start: 2023-01-06 | End: 2023-01-10 | Stop reason: HOSPADM

## 2023-01-06 RX ORDER — SODIUM CHLORIDE 9 MG/ML
INJECTION, SOLUTION INTRAVENOUS PRN
Status: DISCONTINUED | OUTPATIENT
Start: 2023-01-06 | End: 2023-01-10 | Stop reason: HOSPADM

## 2023-01-06 RX ORDER — INSULIN LISPRO 100 [IU]/ML
0-4 INJECTION, SOLUTION INTRAVENOUS; SUBCUTANEOUS
Status: DISCONTINUED | OUTPATIENT
Start: 2023-01-06 | End: 2023-01-10 | Stop reason: HOSPADM

## 2023-01-06 RX ORDER — SODIUM CHLORIDE 0.9 % (FLUSH) 0.9 %
10 SYRINGE (ML) INJECTION PRN
Status: DISCONTINUED | OUTPATIENT
Start: 2023-01-06 | End: 2023-01-10 | Stop reason: HOSPADM

## 2023-01-06 RX ORDER — CLOPIDOGREL BISULFATE 75 MG/1
75 TABLET ORAL DAILY
Status: DISCONTINUED | OUTPATIENT
Start: 2023-01-06 | End: 2023-01-10 | Stop reason: HOSPADM

## 2023-01-06 RX ORDER — POTASSIUM CHLORIDE 20 MEQ/1
40 TABLET, EXTENDED RELEASE ORAL ONCE
Status: COMPLETED | OUTPATIENT
Start: 2023-01-06 | End: 2023-01-06

## 2023-01-06 RX ORDER — AMIODARONE HYDROCHLORIDE 200 MG/1
200 TABLET ORAL DAILY
Status: DISCONTINUED | OUTPATIENT
Start: 2023-01-06 | End: 2023-01-10 | Stop reason: HOSPADM

## 2023-01-06 RX ORDER — SODIUM CHLORIDE 0.9 % (FLUSH) 0.9 %
5-40 SYRINGE (ML) INJECTION EVERY 12 HOURS SCHEDULED
Status: DISCONTINUED | OUTPATIENT
Start: 2023-01-06 | End: 2023-01-10 | Stop reason: HOSPADM

## 2023-01-06 RX ORDER — INSULIN LISPRO 100 [IU]/ML
0-4 INJECTION, SOLUTION INTRAVENOUS; SUBCUTANEOUS NIGHTLY
Status: DISCONTINUED | OUTPATIENT
Start: 2023-01-06 | End: 2023-01-10 | Stop reason: HOSPADM

## 2023-01-06 RX ORDER — DEXTROSE MONOHYDRATE 100 MG/ML
INJECTION, SOLUTION INTRAVENOUS CONTINUOUS PRN
Status: DISCONTINUED | OUTPATIENT
Start: 2023-01-06 | End: 2023-01-10 | Stop reason: HOSPADM

## 2023-01-06 RX ORDER — ACETAMINOPHEN 650 MG/1
650 SUPPOSITORY RECTAL EVERY 6 HOURS PRN
Status: DISCONTINUED | OUTPATIENT
Start: 2023-01-06 | End: 2023-01-10 | Stop reason: HOSPADM

## 2023-01-06 RX ORDER — ACETAMINOPHEN 325 MG/1
650 TABLET ORAL EVERY 6 HOURS PRN
Status: DISCONTINUED | OUTPATIENT
Start: 2023-01-06 | End: 2023-01-10 | Stop reason: HOSPADM

## 2023-01-06 RX ORDER — MIDODRINE HYDROCHLORIDE 5 MG/1
5 TABLET ORAL
Status: DISCONTINUED | OUTPATIENT
Start: 2023-01-06 | End: 2023-01-08 | Stop reason: SDUPTHER

## 2023-01-06 RX ORDER — ATORVASTATIN CALCIUM 80 MG/1
80 TABLET, FILM COATED ORAL NIGHTLY
Status: DISCONTINUED | OUTPATIENT
Start: 2023-01-06 | End: 2023-01-10 | Stop reason: HOSPADM

## 2023-01-06 RX ORDER — POLYETHYLENE GLYCOL 3350 17 G/17G
17 POWDER, FOR SOLUTION ORAL DAILY PRN
Status: DISCONTINUED | OUTPATIENT
Start: 2023-01-06 | End: 2023-01-10 | Stop reason: HOSPADM

## 2023-01-06 RX ORDER — CARVEDILOL 12.5 MG/1
12.5 TABLET ORAL 2 TIMES DAILY WITH MEALS
Status: DISCONTINUED | OUTPATIENT
Start: 2023-01-06 | End: 2023-01-09

## 2023-01-06 RX ORDER — ONDANSETRON 2 MG/ML
4 INJECTION INTRAMUSCULAR; INTRAVENOUS EVERY 6 HOURS PRN
Status: DISCONTINUED | OUTPATIENT
Start: 2023-01-06 | End: 2023-01-10 | Stop reason: HOSPADM

## 2023-01-06 RX ORDER — POTASSIUM CHLORIDE 20 MEQ/1
40 TABLET, EXTENDED RELEASE ORAL PRN
Status: DISCONTINUED | OUTPATIENT
Start: 2023-01-06 | End: 2023-01-10 | Stop reason: HOSPADM

## 2023-01-06 RX ORDER — ASPIRIN 81 MG/1
81 TABLET ORAL DAILY
Status: DISCONTINUED | OUTPATIENT
Start: 2023-01-06 | End: 2023-01-09

## 2023-01-06 RX ORDER — ONDANSETRON 4 MG/1
4 TABLET, ORALLY DISINTEGRATING ORAL EVERY 8 HOURS PRN
Status: DISCONTINUED | OUTPATIENT
Start: 2023-01-06 | End: 2023-01-10 | Stop reason: HOSPADM

## 2023-01-06 RX ADMIN — SODIUM CHLORIDE, PRESERVATIVE FREE 10 ML: 5 INJECTION INTRAVENOUS at 22:12

## 2023-01-06 RX ADMIN — MIDODRINE HYDROCHLORIDE 5 MG: 5 TABLET ORAL at 15:43

## 2023-01-06 RX ADMIN — FUROSEMIDE 40 MG: 10 INJECTION, SOLUTION INTRAMUSCULAR; INTRAVENOUS at 08:03

## 2023-01-06 RX ADMIN — ASPIRIN 81 MG: 81 TABLET, COATED ORAL at 08:03

## 2023-01-06 RX ADMIN — CARVEDILOL 12.5 MG: 12.5 TABLET, FILM COATED ORAL at 17:54

## 2023-01-06 RX ADMIN — CLOPIDOGREL BISULFATE 75 MG: 75 TABLET ORAL at 08:03

## 2023-01-06 RX ADMIN — FUROSEMIDE 40 MG: 10 INJECTION, SOLUTION INTRAMUSCULAR; INTRAVENOUS at 17:54

## 2023-01-06 RX ADMIN — SODIUM CHLORIDE, PRESERVATIVE FREE 10 ML: 5 INJECTION INTRAVENOUS at 08:03

## 2023-01-06 RX ADMIN — MIDODRINE HYDROCHLORIDE 5 MG: 5 TABLET ORAL at 12:31

## 2023-01-06 RX ADMIN — RIVAROXABAN 20 MG: 20 TABLET, FILM COATED ORAL at 17:54

## 2023-01-06 RX ADMIN — MIDODRINE HYDROCHLORIDE 5 MG: 5 TABLET ORAL at 08:03

## 2023-01-06 RX ADMIN — AMIODARONE HYDROCHLORIDE 200 MG: 200 TABLET ORAL at 12:31

## 2023-01-06 RX ADMIN — EMPAGLIFLOZIN 10 MG: 10 TABLET, FILM COATED ORAL at 15:43

## 2023-01-06 RX ADMIN — RIVAROXABAN 20 MG: 20 TABLET, FILM COATED ORAL at 03:43

## 2023-01-06 RX ADMIN — POTASSIUM CHLORIDE 40 MEQ: 1500 TABLET, EXTENDED RELEASE ORAL at 03:43

## 2023-01-06 RX ADMIN — MULTIPLE VITAMINS W/ MINERALS TAB 1 TABLET: TAB at 08:03

## 2023-01-06 RX ADMIN — ATORVASTATIN CALCIUM 80 MG: 80 TABLET, FILM COATED ORAL at 22:12

## 2023-01-06 ASSESSMENT — ENCOUNTER SYMPTOMS
DIARRHEA: 0
COLOR CHANGE: 1
CONSTIPATION: 0
COUGH: 1
VOMITING: 0
ABDOMINAL PAIN: 0
ABDOMINAL DISTENTION: 1
SHORTNESS OF BREATH: 1
PHOTOPHOBIA: 0
NAUSEA: 0
BACK PAIN: 0
SORE THROAT: 0

## 2023-01-06 NOTE — DISCHARGE INSTR - COC
Continuity of Care Form    Patient Name: Mark Maldonado   :  1960  MRN:  [id number]    Admit date:  2023  Discharge date:  1/10/23    Code Status Order: Full Code   Advance Directives:     Admitting Physician:  Basil Mueller MD  PCP: Nicolasa Gaviria PA-C    Discharging Nurse: [facility] Unit/Room#:   Discharging Unit Phone Number: [phone]    Emergency Contact:   Extended Emergency Contact Information  Primary Emergency Contact: [facility]  Home Phone: [phone]  Mobile Phone: [phone]  Relation: Other  Preferred language: English   needed? No    Past Surgical History:  Past Surgical History:   Procedure Laterality Date    CARDIAC CATHETERIZATION  2020    Dr. Tayla hardwick.   Report on chart    CARDIAC DEFIBRILLATOR PLACEMENT  2022    [address]    unsure of date, bilateral radials    CARDIOVERSION  2021    CORONARY ANGIOPLASTY WITH STENT PLACEMENT      6 total stents per patient    CORONARY ARTERY BYPASS GRAFT      4 vessel bypass    EYE SURGERY      eye injury  new lens  and laser lt eye 2019    HERNIA REPAIR Bilateral 2020    XI LAPAROSCOPIC ROBOTIC INGUINAL HERNIA REPAIR WITH MESH; UMBILICAL HERNIA REPAIR WITH MESH performed by Phuc Lama DO at [address] [place] [address]      plate/hardware present    OTHER SURGICAL HISTORY  2021    BENITO    SKIN BIOPSY      back       Immunization History:   Immunization History   Administered Date(s) Administered    Influenza Virus Vaccine 10/22/2017    Influenza, Quadv, 6 mo and older, IM, PF (Flulaval, Fluarix) 2019    Pneumococcal Polysaccharide (Vxquupqxd19) 2017       Active Problems:  Patient Active Problem List   Diagnosis Code    Hyperbilirubinemia E80.6    Essential hypertension I10    Hypercholesterolemia E78.00    CAD (coronary artery disease) I25.10    Gross hematuria R31.0    Abdominal pain, right upper quadrant R10.11    Right nephrolithiasis N20.0    Abnormal liver function test T64.86    Acute systolic HF (heart failure) ([facility]) I50.21    Noncompliance Z91.199    Acute on chronic systolic heart failure ([facility]) I50.23    Pneumonia J18.9    Dyspnea and respiratory abnormalities R06.00, R06.89    Status post inguinal hernia repair Z98.890, Z87.19    S/P repair of ventral hernia Z98.890, Z87.19    Post-op pain G89.18    Non-recurrent bilateral inguinal hernia without obstruction or gangrene X33.74    Umbilical hernia with obstruction, without gangrene K42.0    Status post implantation of automatic cardioverter/defibrillator (AICD) Z95.810    NSTEMI (non-ST elevated myocardial infarction) ([facility]) I21.4    ARIADNA (obstructive sleep apnea) G47.33    S/P CABG (coronary artery bypass graft) Z95.1    Nausea R11.0    Anorexia R63.0    Thrombocytopenia ([facility]) D69.6    Obesity (BMI 30-39. 9) E66.9    Hypokalemia E87.6    Uncontrolled type 2 diabetes mellitus with hyperglycemia ([facility]) E11.65    Acute on chronic combined systolic and diastolic CHF (congestive heart failure) ([facility]) I50.43    Type 2 diabetes mellitus, without long-term current use of insulin ([facility]) E11.9    Paroxysmal atrial fibrillation ([facility]) I48.0    Edema of both legs R60.0    Heart failure ([facility]) I50.9       Isolation/Infection:   Isolation            Contact          Patient Infection Status       Infection Onset Added Last Indicated Last Indicated By Review Planned Expiration Resolved Resolved By    MRSA  04/19/15 12/14/20 MRSA DNA Probe, Nasal        Abdomen 1/2018    Resolved    COVID-19 (Rule Out) 01/05/23 01/05/23 01/05/23 COVID-19, Rapid (Ordered)   01/05/23 Rule-Out Test Resulted    COVID-19 (Rule Out) 10/08/21 10/08/21 10/08/21 COVID-19 (Ordered)   10/10/21 Rule-Out Test Resulted    COVID-19 (Rule Out) 01/22/21 01/22/21 01/22/21 COVID-19 (Ordered)   01/24/21 Rule-Out Test Resulted    COVID-19 (Rule Out) 12/17/20 12/17/20 12/17/20 COVID-19 (Ordered)   12/18/20 Rule-Out Test Resulted COVID-19 (Rule Out) 08/02/20 08/02/20 08/03/20 Covid-19 Ambulatory (Ordered)   08/06/20 Rule-Out Test Resulted            Nurse Assessment:  Last Vital Signs: BP 99/65   Pulse 81   Temp 97.7 °F (36.5 °C) (Oral)   Resp 18   Ht 5' 8\" (1.727 m)   Wt 259 lb 4.8 oz (117.6 kg)   SpO2 100%   BMI 39.43 kg/m²     Last documented pain score (0-10 scale):    Last Weight:   Wt Readings from Last 1 Encounters:   01/06/23 259 lb 4.8 oz (117.6 kg)     Mental Status:  oriented and alert    IV Access:  - None    Nursing Mobility/ADLs:  Walking   Independent  Transfer  Independent  Bathing  Independent  Dressing  Independent  Toileting  Independent  Feeding  Independent  Med Admin  Independent  Med Delivery   whole    Wound Care Documentation and Therapy:  Wound 10/14/22 Pretibial Right Open, weeping (Active)   Number of days: 84       Wound 10/14/22 Pretibial Left open, weeping (Active)   Number of days: 84       Incision 04/04/22 Chest Left;Upper (Active)   Number of days: 276     Bilateral lower extremity wound care managed with Unna boot therapy and silver alginate to wound beds on 1/9/2023. Follow-up outpatient wound care center. Home care may change the Unna boot if he is unable to get into the wound care center within 1 week. Referral placed for outpatient wound care center      Elimination:  Continence:    Bowel: Yes  Bladder: Yes  Urinary Catheter: None   Colostomy/Ileostomy/Ileal Conduit: No       Date of Last BM: 1/9/23    Intake/Output Summary (Last 24 hours) at 1/6/2023 1004  Last data filed at 1/6/2023 0448  Gross per 24 hour   Intake --   Output 2200 ml   Net -2200 ml     I/O last 3 completed shifts:  In: -   Out: 2200 [Urine:2200]    Safety Concerns:     None    Impairments/Disabilities:      None    Nutrition Therapy:  Current Nutrition Therapy:   - Oral Diet:  Low Sodium (2gm)    Routes of Feeding: Oral  Liquids: No Restrictions  Daily Fluid Restriction: yes - amount 1500ml/day  Last Modified Barium Swallow with Video (Video Swallowing Test): not done    Treatments at the Time of Hospital Discharge:   Respiratory Treatments: none  Oxygen Therapy:  is not on home oxygen therapy. Ventilator:    - No ventilator support    Rehab Therapies: Physical Therapy and Occupational Therapy  Weight Bearing Status/Restrictions: No weight bearing restrictions  Other Medical Equipment (for information only, NOT a DME order):  walker  Other Treatments: Skilled nursing assessment  Med teaching and compliance  Patient needs to follow with Dr. Michelle Keyes for mitral clip evaluation   Patient needs to [facility]  Patient needs to follow with wound care center at [address] [place] [address]:  Bilateral lower extremity wound care managed with Unna boot therapy and silver alginate to wound beds on 1/9/2023. Follow-up outpatient wound care center. Home care may change the Unna boot if he is unable to get into the wound care center within 1 week. Patient's personal belongings (please select all that are sent with patient):  Glasses, Dentures upper, clothing     RN SIGNATURE:  Electronically signed by Hawa Kidd RN on 1/9/23 at 8:43 PM EST    CASE MANAGEMENT/SOCIAL WORK SECTION    Inpatient Status Date: ***    Readmission Risk Assessment Score:  Readmission Risk              Risk of Unplanned Readmission:  23           Discharging to Facility/ Agency   Name: [facility] care   Address:   Phone: [phone]  Fax: [phone]      / signature: Electronically signed by Kevin Abel RN on 1/6/23 at 10:05 AM EST    PHYSICIAN SECTION    Prognosis: Fair    Condition at Discharge: Stable    Rehab Potential (if transferring to Rehab):  Fair    Recommended Labs or Other Treatments After Discharge: Home care    Physician Certification: I certify the above information and transfer of Valentino Dallas  is necessary for the continuing treatment of the diagnosis listed and that he requires Home Care for greater 30 days.      Update Admission H&P: No change in H&P    PHYSICIAN SIGNATURE:  Electronically signed by Dr. Mariza Landrum MD / ELY Alfaro NP on 1/9/23 at 1:24 PM EST

## 2023-01-06 NOTE — PLAN OF CARE
Problem: Discharge Planning  Goal: Discharge to home or other facility with appropriate resources  1/6/2023 1731 by Antony Rodríguez RN  Outcome: Progressing     Problem: ABCDS Injury Assessment  Goal: Absence of physical injury  1/6/2023 1731 by Antony Rodríguez RN  Outcome: Progressing  Flowsheets (Taken 1/6/2023 0948)  Absence of Physical Injury: Implement safety measures based on patient assessment     Problem: Safety - Adult  Goal: Free from fall injury  1/6/2023 1731 by Antony Rodríguez RN  Outcome: Progressing  Flowsheets (Taken 1/6/2023 0948)  Free From Fall Injury:   Instruct family/caregiver on patient safety   Based on caregiver fall risk screen, instruct family/caregiver to ask for assistance with transferring infant if caregiver noted to have fall risk factors     Problem: Skin/Tissue Integrity  Goal: Absence of new skin breakdown  Description: 1. Monitor for areas of redness and/or skin breakdown  2. Assess vascular access sites hourly  3. Every 4-6 hours minimum:  Change oxygen saturation probe site  4. Every 4-6 hours:  If on nasal continuous positive airway pressure, respiratory therapy assess nares and determine need for appliance change or resting period.   1/6/2023 1731 by Antony Rodríguez RN  Outcome: Progressing     Problem: Chronic Conditions and Co-morbidities  Goal: Patient's chronic conditions and co-morbidity symptoms are monitored and maintained or improved  Outcome: Progressing

## 2023-01-06 NOTE — PROGRESS NOTES
Physical Therapy  Facility/Department: [facility] SURG  Physical Therapy Initial Assessment    Name: Kerri Morejon  : 1960  MRN: [id number]  Date of Service: 2023    Discharge Recommendations:  Patient would benefit from continued therapy after discharge. Due to recent hospitalization and medical condition, pt would benefit from additional intermittent skilled therapy at time of discharge. Please refer to the AM-PAC score for current functional status. HPI (per chart):  Kerri Morejon is a 57 y.o. male who presents to the emergency department with concern for worsening congestive heart failure. Patient reports that his visiting nurse has come by and they are concerned that his CHF is worsening. Reports swelling in his abdomen and also his lower legs. Patient Diagnosis(es): The encounter diagnosis was Acute congestive heart failure, unspecified heart failure type ([facility] [address] Past Medical History:  has a past medical history of CAD (coronary artery disease), CHF (congestive heart failure) ([facility] [address] Heart attack ([facility] [address] Hyperlipidemia, Hypertension, MI (myocardial infarction) ([facility] [address] MRSA (methicillin resistant staph aureus) culture positive, Skin cancer, Snores, Stroke [facility]), Wears dentures, Wears reading eyeglasses, and Wellness examination. Past Surgical History:  has a past surgical history that includes Coronary artery bypass graft; Coronary angioplasty with stent; skin biopsy; eye surgery; Mandible surgery; hernia repair (Bilateral, 2020); Cardioversion (2021); other surgical history (2021); Cardiac surgery (); Cardiac catheterization (2020); and Cardiac defibrillator placement (2022). Assessment   Body Structures, Functions, Activity Limitations Requiring Skilled Therapeutic Intervention: Decreased functional mobility ; Decreased strength;Decreased safe awareness;Decreased endurance  Assessment: Patient demo decreased activity tolerance and decreased safety awareness with 02 tubing. Patient will benefit from skilled PT to improve gait, balance, activity tolerance and for CHF education. Therapy Prognosis: Good  Decision Making: High Complexity  Requires PT Follow-Up: Yes  Activity Tolerance  Activity Tolerance: Patient tolerated treatment well;Patient limited by endurance     Plan   Physcial Therapy Plan  General Plan: 5-7 times per week  Current Treatment Recommendations: Strengthening, Balance training, Functional mobility training, Endurance training, Gait training, Neuromuscular re-education, Home exercise program, Safety education & training, Patient/Caregiver education & training, Therapeutic activities  Safety Devices  Type of Devices: All fall risk precautions in place, Call light within reach, Gait belt, Left in chair, Sitter present     Restrictions  Restrictions/Precautions  Restrictions/Precautions: General Precautions  Position Activity Restriction  Other position/activity restrictions: contact precautions, 2 liters 02,continuous SP02     Subjective   General  Chart Reviewed: Yes  Patient assessed for rehabilitation services?: Yes  Additional Pertinent Hx: MI, CVA, AICD 4/22, CABG, DM, CHF  Response To Previous Treatment: Not applicable  Family / Caregiver Present: No  Diagnosis: Heart failure, 1st degree AV block, Right BBB  Follows Commands: Within Functional Limits  General Comment  Comments: Yesi Valdez RN reports patient medically appropriate for PT. Subjective  Subjective: Patient pleasant and agreeable to PT.          Social/Functional History  Social/Functional History  Lives With: Friend(s)  Type of Home: House  Home Layout: Two level, Able to Live on Main level with bedroom/bathroom  Home Access: Stairs to enter with rails  Entrance Stairs - Number of Steps: 2  Entrance Stairs - Rails: Right  Bathroom Shower/Tub: Walk-in shower  Bathroom Toilet: Standard  Bathroom Equipment: Built-in shower seat  Bathroom Accessibility: Accessible  Has the patient had two or more falls in the past year or any fall with injury in the past year?: No  Receives Help From: Friend(s)  ADL Assistance: Independent  Homemaking Responsibilities: No  Ambulation Assistance: Independent  Transfer Assistance: Independent  Active : Yes  Mode of Transportation: Car  Type of Occupation: Maintenance  Leisure & Hobbies: motorcross, various projects around the home  Vision/Hearing       Cognition   Orientation  Orientation Level: Oriented to place;Oriented to time;Oriented to person;Oriented to situation  Cognition  Overall Cognitive Status: Exceptions  Arousal/Alertness: Appropriate responses to stimuli  Following Commands: Follows all commands without difficulty  Attention Span: Attends with cues to redirect  Memory: Appears intact  Safety Judgement: Decreased awareness of need for safety;Decreased awareness of need for assistance  Problem Solving: Decreased awareness of errors;Assistance required to implement solutions;Assistance required to generate solutions  Insights: Decreased awareness of deficits  Initiation: Does not require cues  Sequencing: Does not require cues  Cognition Comment: Patient hyperverbal and tangental in speech and thought, but easily redirected.      Objective   Heart Rate: 85  Heart Rate Source: Monitor  BP: 118/71  BP Location: Right upper arm  Patient Position: Supine  MAP (Calculated): 87  Resp: 18  SpO2: 95 %  O2 Device: Nasal cannula     Observation/Palpation  Posture: Good  Observation: 2 liters 02, SOB with minimal ambulation, open areas on legs from scratching  Edema: BLE weeping edema, Patient reports abdomenal edema, Lower legs        AROM RLE (degrees)  RLE AROM: WFL  AROM LLE (degrees)  LLE AROM : WFL  Strength RLE  Comment: hip 4+/5, knee and ankles 5/5  Strength LLE  Comment: hip 4+/5, knee and ankles 5/5           Bed mobility  Bridging: independent  Rolling to Left: Independent  Rolling to Right: Independent  Supine to Sit: Independent  Sit to Supine: Independent  Scooting: Independent  Transfers  Sit to Stand: Independent  Stand to Sit: Independent  Bed to Chair: Independent  Stand Pivot Transfers: Independent  Ambulation  Surface: Level tile  Device: No Device  Assistance: Supervision  Quality of Gait: Patient ambulated quickly with little regard for 02 tubing, required assist to manage tubing. Patient SOB with ambulation, reports prior to admission he was only able to amb about 12 feet before having to sit down. Gait Deviations: Increased CHOLO  Distance: 45 feet  Comments: SP02 remain 94% or above throughout treatment on 2 liters of 02     Balance  Sitting - Static: Good  Sitting - Dynamic: Good  Standing - Static: Good  Standing - Dynamic: Good;-           OutComes Score    AM-PAC Score  AM-PAC Inpatient Mobility Raw Score : 21 (01/06/23 1254)  AM-PAC Inpatient T-Scale Score : 50.25 (01/06/23 1254)  Mobility Inpatient CMS 0-100% Score: 28.97 (01/06/23 1254)  Mobility Inpatient CMS G-Code Modifier : CJ (01/06/23 1254)       Goals  Short Term Goals  Time Frame for Short Term Goals: 6 visits  Short Term Goal 1: Patient will ambulate 200 feet indep. Short Term Goal 2: Patient will tolerate 30 minutes of ther-ex and ther-act. Short Term Goal 3: Patient will be indep with HEP. Short Term Goal 4: Patient will demo good understanding of CHF education.   Patient Goals   Patient Goals : Improve breathing       Education  Patient Education  Education Given To: Patient  Education Provided: Role of Therapy;Plan of Care;Energy Conservation  Education Provided Comments: CHF action plan handout  Education Method: Demonstration;Verbal;Printed Information/Hand-outs  Barriers to Learning: None  Education Outcome: Verbalized understanding;Continued education needed      Therapy Time   Individual Concurrent Group Co-treatment   Time In 0946         Time Out 1035         Minutes 49         Timed Code Treatment Minutes: Otis 49, PT

## 2023-01-06 NOTE — ED NOTES
BLE dressings removed.  Gauze is moist with clear fluid bilat and with some light yellow drainage noted on left     Mariana OBI Villa  01/05/23 1279

## 2023-01-06 NOTE — ED PROVIDER NOTES
[address] [place] [address] ED  [facility]      Pt Name: Nguyễn Contreras  MRN: [id number]  [place] 1960  Date ofevaluation: 1/5/2023  Provider: Jeancarlos Estrada PA-C    CHIEF COMPLAINT       Chief Complaint   Patient presents with    Shortness of Breath    Leg Swelling         HISTORY OF PRESENT ILLNESS  (Location/Symptom, Timing/Onset, Context/Setting, Quality, Duration, Modifying Factors, Severity.)   Nguyễn Contreras is a 57 y.o. male who presents to the emergency department with concern for worsening congestive heart failure. Patient reports that his visiting nurse has come by and they are concerned that his CHF is worsening. Reports swelling in his abdomen and also his lower legs. Denies any chest pain. No fevers or chills. No nausea vomiting. Patient does have history of CHF with recent admissions and also takes Bumex at home. Nursing Notes were reviewed. ALLERGIES     Patient has no known allergies.     CURRENT MEDICATIONS       Previous Medications    AMIODARONE (CORDARONE) 200 MG TABLET    Take 1 tablet by mouth daily    ASPIRIN EC 81 MG EC TABLET    Take 1 tablet by mouth daily    ATORVASTATIN (LIPITOR) 80 MG TABLET    Take 1 tablet by mouth nightly    BUMETANIDE (BUMEX) 2 MG TABLET    Take 1 tablet by mouth 2 times daily    CARVEDILOL (COREG) 12.5 MG TABLET    Take 1 tablet by mouth 2 times daily (with meals)    CLOPIDOGREL (PLAVIX) 75 MG TABLET    Take 1 tablet by mouth daily    CYANOCOBALAMIN (VITAMIN B-12 PO)    Take 1 tablet by mouth daily    EMPAGLIFLOZIN (JARDIANCE) 10 MG TABLET    Take 1 tablet by mouth daily    METFORMIN (GLUCOPHAGE) 1000 MG TABLET    Take 1 tablet by mouth daily Resume on 5/21    MIDODRINE (PROAMATINE) 5 MG TABLET    Take 1 tablet by mouth 3 times daily (before meals)    MULTIPLE VITAMINS-MINERALS (THERAPEUTIC MULTIVITAMIN-MINERALS) TABLET    Take 1 tablet by mouth daily    NITROGLYCERIN (NITROSTAT) 0.4 MG SL TABLET    Place 0.4 mg under the tongue every 5 minutes as needed for Chest pain up to max of 3 total doses. If no relief after 1 dose, call 911. Dr. Eryn Gonzales    RIVAROXABAN ([employer]) 20 MG TABS TABLET    Take 20 mg by mouth Daily with supper       PAST MEDICAL HISTORY         Diagnosis Date    CAD (coronary artery disease)      cath    CHF (congestive heart failure) (HCC)     Dr. Aftab Carvalho attack [facility])     Hyperlipidemia     Dr. Eryn Gonzales    Hypertension     Dr. Eryn Gonzales    MI (myocardial infarction) [facility])     MRSA (methicillin resistant staph aureus) culture positive 2018    abdomen    Skin cancer     Snores     no cpap    Stroke [facility])       Dr. Eryn Gonzales monitors    Wears dentures     upper full denture    Wears reading eyeglasses     Wellness examination     Yanique Mcmillan PA-C last seen 2020       SURGICAL HISTORY           Procedure Laterality Date    CARDIAC CATHETERIZATION  2020    Dr. Marilyn Chan therapy. Report on chart    CARDIAC DEFIBRILLATOR PLACEMENT  2022    [address]    unsure of date, bilateral radials    CARDIOVERSION  2021    CORONARY ANGIOPLASTY WITH STENT PLACEMENT      6 total stents per patient    CORONARY ARTERY BYPASS GRAFT      4 vessel bypass    EYE SURGERY      eye injury  new lens  and laser lt eye 2019    HERNIA REPAIR Bilateral 2020    XI LAPAROSCOPIC ROBOTIC INGUINAL HERNIA REPAIR WITH MESH; UMBILICAL HERNIA REPAIR WITH MESH performed by Randy Antunez DO at [address] [place] [address]      plate/hardware present    OTHER SURGICAL HISTORY  2021    BENITO    SKIN BIOPSY      back         HISTORY           Problem Relation Age of Onset    Coronary Art Dis Father     Liver Disease Father     Alcohol Abuse Sister      Family Status   Relation Name Status    Father      Mother      Sister  Alive    Sister          SOCIAL HISTORY      reports that he quit smoking about 23 years ago.  He has never used smokeless tobacco. He reports that he does not drink alcohol and does not use drugs. REVIEW OFSYSTEMS    (2-9 systems for level 4, 10 or more for level 5)   Review of Systems    Except as noted above the remainder of the review of systems was reviewed and negative. PHYSICAL EXAM    (up to 7 for level 4, 8 or more for level 5)     ED Triage Vitals   BP Temp Temp Source Heart Rate Resp SpO2 Height Weight   01/05/23 2206 01/05/23 2204 01/05/23 2204 01/05/23 2204 01/05/23 2204 01/05/23 2204 01/05/23 2204 01/05/23 2204   (!) 127/90 98.9 °F (37.2 °C) Oral 87 21 96 % 5' 8\" (1.727 m) 236 lb (107 kg)     Physical Exam  Constitutional:       Appearance: He is well-developed. HENT:      Head: Normocephalic and atraumatic. Cardiovascular:      Rate and Rhythm: Normal rate and regular rhythm. Pulmonary:      Effort: Pulmonary effort is normal.      Breath sounds: Normal breath sounds. Comments: Bilateral lower extremity edema mildly pitting no calf pain or tenderness negative Homans' sign. Abdominal:      Palpations: Abdomen is soft. Musculoskeletal:         General: Normal range of motion. Cervical back: Normal range of motion and neck supple. Skin:     General: Skin is warm. Neurological:      Mental Status: He is alert and oriented to person, place, and time.    Psychiatric:         Behavior: Behavior normal.               DIAGNOSTIC RESULTS     EKG: All EKG's are interpreted by the Emergency Department Physician who either signs or Co-signs this chart in the absence of a cardiologist.        RADIOLOGY:   Non-plain film images such as CT, Ultrasound and MRI are read by the radiologist. Plain radiographic images arevisualized and preliminarily interpreted by the emergency physician with the below findings:        Interpretation per the Radiologist below, if available at thetime of this note:          ED BEDSIDE ULTRASOUND:   Performed by ED Physician - none    LABS:  Labs Reviewed   CBC WITH AUTO DIFFERENTIAL - Abnormal; Notable for the following components:       Result Value    Hemoglobin 12.4 (*)     Hematocrit 39.8 (*)     MCV 80.4 (*)     MCH 25.1 (*)     RDW 18.7 (*)     Platelets 095 (*)     Seg Neutrophils 67 (*)     Lymphocytes 18 (*)     Monocytes 14 (*)     Absolute Lymph # 1.04 (*)     All other components within normal limits   BRAIN NATRIURETIC PEPTIDE - Abnormal; Notable for the following components:    Pro-BNP 1,400 (*)     All other components within normal limits   COMPREHENSIVE METABOLIC PANEL - Abnormal; Notable for the following components:    Glucose 109 (*)     Potassium 3.5 (*)     Alkaline Phosphatase 240 (*)     Total Bilirubin 2.5 (*)     Albumin 3.4 (*)     All other components within normal limits   TROP/MYOGLOBIN - Abnormal; Notable for the following components:    Troponin, High Sensitivity 24 (*)     All other components within normal limits   RAPID INFLUENZA A/B ANTIGENS   COVID-19, RAPID   TROP/MYOGLOBIN   TROP/MYOGLOBIN       All other labs were within normal range or not returned as of this dictation. EMERGENCY DEPARTMENT COURSE and DIFFERENTIAL DIAGNOSIS/MDM:   Vitals:    Vitals:    01/05/23 2249 01/05/23 2259 01/05/23 2309 01/05/23 2319   BP:  127/84     Pulse: 85 88 88 88   Resp: 23 (!) 32 18 23   Temp:       TempSrc:       SpO2:       Weight:       Height:         HEARTSCORE: Not applicable    Patient is 66-year-old male presenting with shortness of breath and swelling. Patient's BNP is elevated above his baseline along with chest x-ray showing signs of pulmonary edema. No signs of pneumonia. No signs of flu or COVID. Swabs were negative. Patient will be diuresed and admitted to the hospital.  He is agreement plan of care. Symptoms not consistent with pulmonary embolism at this point in time and patient is anticoagulated. The patient was involved in his/her plan of care. The testing that was ordered was discussed with the patient.  Any medications that may have been ordered were discussed with the patient. I have reviewed the patient's previous medical records using the electronic health record that we have available. Labs and imaging were reviewed. I have discusssed recommendation for admission with the patient and/or family. We have discussed benefits of admission and the risks of discharge home. The patient agrees with the plan of care and admission. The patient will be admitted for further evaluation and treatment. I have consulted anayeli np from [facility]. The patient will be admitted to [facility], he/she agrees to accept the patient for further evaluation and treatment. Care was provided during an unprecedented national emergency due to the novel coronavirus, Covid-19. CONSULTS:  None    PROCEDURES:  Procedures  CRITICAL CARE TIME     Due to the high probability of sudden and clinically significant deterioration in the patient's condition he required highest level of my preparedness to intervene urgently. I provided critical care time including documentation time, medication orders and management, reevaluation, vital sign assessment, ordering and reviewing of of lab tests ordering and reviewing of x-ray studies, and admission orders. Aggregate critical care time is between 35  minutes including only time during which I was engaged in work directly related to his care and did not include time spent treating other patients simultaneously. FINAL IMPRESSION      1. Acute congestive heart failure, unspecified heart failure type [facility])          DISPOSITION/PLAN   DISPOSITION Decision To Admit 01/05/2023 11:34:39 PM      PATIENTREFERRED TO:   No follow-up provider specified.     DISCHARGE MEDICATIONS:     New Prescriptions    No medications on file           (Please note that portions of this note were completed with a voice recognition program.  Efforts were made to edit thedictations but occasionally words are mis-transcribed.)    Efrain Henao PA-C Madiha Lawson PA-C  01/06/23 5996

## 2023-01-06 NOTE — ED NOTES
Pt presents to the er per ems for c/o shortness of breath and leg swelling pt states that he became short of breath today pt is with respirations even and unlabored pt bilateral lower legs are with reddish purple color multiple fluid filled blisters which  Are leaking fluid pt states that he was hospitalized two months ago and that his legs have been like this since some time after discharge pt states that he never called his doctor     Everett Evans RN  01/05/23 4972

## 2023-01-06 NOTE — CARE COORDINATION
Case Management Initial Discharge Plan  Tami Michele,             Met with:patient to discuss discharge plans. Information verified: address, contacts, phone number, , insurance Yes  PCP: Alexis Aguilera PA-C  Date of last visit:     Insurance Provider: Medicare    Discharge Planning    Living Arrangements:  Friends   Support Systems:  Friends/Neighbors    Home has 2 stories  2 stairs to climb to get into front door, 0 stairs to climb to reach second floor  Location of bedroom/bathroom in home  - stays on main floor    Patient able to perform ADL's:Independent    Current Services (outpatient & in home) [facility] with [facility] equipment: cane, W/C  DME provider: N/A    Pharmacy: [facility] [address]    Potential Assistance Needed:  Home Care vs ARU    Patient agreeable to home care: Yes  Freedom of choice provided:  yes    Prior SNF/Rehab Placement and Facility: No  Agreeable to SNF/Rehab: Yes  [place] of choice provided: Yes   Evaluation: yes    Expected Discharge date:     Patient expects to be discharged to:   ARU vs C  Follow Up Appointment: Best Day/ Time:      Transportation provider: friend  Transportation arrangements needed for discharge: No    Readmission Risk              Risk of Unplanned Readmission:  23             Does patient have a readmission risk score greater than 14?: Yes  If yes, follow-up appointment must be made within 7 days of discharge. Goal of Care:       Discharge Plan: Met with patient at bedside. Lives with friend, independent and drives. Admitted for SOB and BLE redness and swelling. Has history of CHF and follows with Dr. Eryn Gonzales. Currently on IV Lasix BID. Inpatient consult to heart failure nurse. Current with [facility] and therapy pending. Agreeable to ARU if needed. SW informed. ESTELA initiated. Follow for potential home O2 needs.              Electronically signed by Javier Black RN on 23 at 9:48 AM EST

## 2023-01-06 NOTE — H&P
[facility]  Office: [address], DO, Cassandra Nargis, DO, Jcesvin Forrest, DO, Kinjal Portillo Blood, DO, Lloyd Mckenna MD, Joo Pace MD, Cecille Foley MD, Charlene Lambert MD,  Abraham Jefferson MD, Abdulaziz Vora MD, Clem Hdz DO, Lex Daigle MD,  Rosa M Sam MD, Chaz Zamudio MD, Leonidas Rubin DO, Aura Mora MD, Guera Jc MD, Titus Meade DO, Jg Steven MD, Jesika Alcocer MD, Arian Spangler MD, Cherelle Light MD, Kelli Gleason DO, Yancy Hearn MD, Kary Piedra MD, Nate Norman, CNP,  Catherine Hopper, CNP, Diane Cummings, CNP, Constantine Maradiaga, CNP,  Maggy Rodriguez, [facility], Iram Carlisle, CNP, Dwight Joyce, CNP, Alfred Bardales, CNP, Silvia Laws, CNP, Avery Zamudio, CNP, Ari Webb PA-C, Manasa Hopper, CNS, Pratik More, CNP, Caren Mccarthy, [facility]    HISTORY AND PHYSICAL EXAMINATION            Date:   1/6/2023  Patient name:  Tessa Johnson  Date of admission:  1/5/2023  9:59 PM  MRN:   [id number]  Account:  [de-identified]  YOB: 1960  PCP:    Eulalia Carrillo PA-C  Room:   2009/2009-02  Code Status:    Full Code    Chief Complaint:     Chief Complaint   Patient presents with    Shortness of Breath    Leg Swelling       History Obtained From:     patient, electronic medical record    History of Present Illness:     Tessa Johnson is a 57 y.o. Non- / non  male who presents with Shortness of Breath and Leg Swelling   and is admitted to the hospital for the management of Acute on chronic combined systolic and diastolic CHF (congestive heart failure) ([facility] [address] Keila Youngblood is a 57year old male who presented for evaluation of SOB and bilateral low leg edema. He states that he has home health services who come and change his Unna Boots twice weekly.  He states that today a new nurse came to do his dressings and told him that she was recommending ED evaluation for edema. He states he has been taking his diuretics as prescribed. He states he has chronic SOB and edema through his legs but \"didn't realize it was this bad\". He states he was scheduled to see cardiology on 1/7/23 for follow-up since his last hospitalization in October, 2022. PmHx significant for CAD with drug-eluting stent  (5/22, CHF (EF 30-35%), HTN and HLD. He denies use of tobacco, alcohol or recreational drugs. He had AICD placed April. 2022. ED workup today showed pBNP 1400 with CXR consistent with cardiomegaly and pulmonary edema. He will be admitted for continued care. Past Medical History:     Past Medical History:   Diagnosis Date    CAD (coronary artery disease)     2020 cath    CHF (congestive heart failure) ([facility])     Dr. Alexey Michel attack [facility])     Hyperlipidemia     Dr. Luu Spearing    Hypertension     Dr. Luu Spearing    MI (myocardial infarction) [facility])     MRSA (methicillin resistant staph aureus) culture positive 01/26/2018    abdomen    Skin cancer     Snores     no cpap    Stroke [facility])     2011  Dr. Luu Spearing monitors    Wears dentures     upper full denture    Wears reading eyeglasses     Wellness examination     Joseph Lopez PA-C last seen Nov 2020        Past Surgical History:     Past Surgical History:   Procedure Laterality Date    CARDIAC CATHETERIZATION  08/06/2020    Dr. Cade Vasquez therapy.   Report on chart    CARDIAC DEFIBRILLATOR PLACEMENT  04/04/2022    [address]    unsure of date, bilateral radials    CARDIOVERSION  11/17/2021    CORONARY ANGIOPLASTY WITH STENT PLACEMENT      6 total stents per patient    CORONARY ARTERY BYPASS GRAFT      4 vessel bypass    EYE SURGERY      eye injury  new lens  and laser lt eye 2019    HERNIA REPAIR Bilateral 12/21/2020    XI LAPAROSCOPIC ROBOTIC INGUINAL HERNIA REPAIR WITH MESH; UMBILICAL HERNIA REPAIR WITH MESH performed by Ace Gómez DO at [facility]      plate/hardware present    OTHER SURGICAL HISTORY  11/17/2021    BENITO    SKIN BIOPSY      back        Medications Prior to Admission:     Prior to Admission medications    Medication Sig Start Date End Date Taking? Authorizing Provider   clopidogrel (PLAVIX) 75 MG tablet Take 1 tablet by mouth daily 10/16/22   Rohan You MD   empagliflozin (JARDIANCE) 10 MG tablet Take 1 tablet by mouth daily 10/16/22   Rohan You MD   midodrine (PROAMATINE) 5 MG tablet Take 1 tablet by mouth 3 times daily (before meals) 10/16/22   Rohan You MD   amiodarone (CORDARONE) 200 MG tablet Take 1 tablet by mouth daily 10/16/22   Rohan You MD   bumetanide (BUMEX) 2 MG tablet Take 1 tablet by mouth 2 times daily 10/15/22   Rohan You MD   aspirin EC 81 MG EC tablet Take 1 tablet by mouth daily 10/15/22   Rohan You MD   rivaroxaban (XARELTO) 20 MG TABS tablet Take 20 mg by mouth Daily with supper    Historical Provider, MD   metFORMIN (GLUCOPHAGE) 1000 MG tablet Take 1 tablet by mouth daily Resume on 5/21 5/19/22   Sana Nolan DO   Cyanocobalamin (VITAMIN B-12 PO) Take 1 tablet by mouth daily    Historical Provider, MD   naproxen (NAPROSYN) 500 MG tablet Take 1 tablet by mouth 2 times daily (with meals) 3/28/22 3/28/22  Kusum Mendez PA-C   Multiple Vitamins-Minerals (THERAPEUTIC MULTIVITAMIN-MINERALS) tablet Take 1 tablet by mouth daily    Historical Provider, MD   nitroGLYCERIN (NITROSTAT) 0.4 MG SL tablet Place 0.4 mg under the tongue every 5 minutes as needed for Chest pain up to max of 3 total doses. If no relief after 1 dose, call 911. Dr. Gupta Purchase Provider, MD   atorvastatin (LIPITOR) 80 MG tablet Take 1 tablet by mouth nightly 2/20/19   Clive Alegria MD   carvedilol (COREG) 12.5 MG tablet Take 1 tablet by mouth 2 times daily (with meals) 2/20/19   Clive Alegria MD        Allergies:     Patient has no known allergies.     Social History:     Tobacco:    reports that he quit smoking about 23 years ago. He has never used smokeless tobacco.  Alcohol:      reports no history of alcohol use. Drug Use:  reports no history of drug use. Family History:     Family History   Problem Relation Age of Onset    Coronary Art Dis Father     Liver Disease Father     Alcohol Abuse Sister        Review of Systems:     Positive and Negative as described in HPI. Review of Systems   Constitutional:  Positive for activity change. Negative for fatigue and fever. HENT:  Negative for congestion and sore throat. Eyes:  Negative for photophobia and visual disturbance. Respiratory:  Positive for cough and shortness of breath. Cardiovascular:  Positive for leg swelling. Gastrointestinal:  Positive for abdominal distention. Negative for abdominal pain, constipation, diarrhea, nausea and vomiting. Endocrine: Positive for polyuria. Negative for polyphagia. Genitourinary:  Positive for frequency, scrotal swelling and urgency. Negative for difficulty urinating and dysuria. Musculoskeletal:  Negative for back pain. Skin:  Positive for color change (Bilateral lower legs) and wound (Bilateral lower legs). Allergic/Immunologic: Negative for food allergies and immunocompromised state. Neurological:  Negative for weakness and headaches. Hematological:  Negative for adenopathy. Does not bruise/bleed easily. Psychiatric/Behavioral:  Negative for confusion. The patient is not nervous/anxious. Physical Exam:   /87   Pulse 87   Temp 98.1 °F (36.7 °C)   Resp 17   Ht 5' 8\" (1.727 m)   Wt 236 lb (107 kg)   SpO2 98%   BMI 35.88 kg/m²   Temp (24hrs), Av.5 °F (36.9 °C), Min:98.1 °F (36.7 °C), Max:98.9 °F (37.2 °C)    Recent Labs     23  0046   POCGLU 98       Intake/Output Summary (Last 24 hours) at 2023 0227  Last data filed at 2023 0046  Gross per 24 hour   Intake --   Output 900 ml   Net -900 ml       Physical Exam  Vitals reviewed.    Constitutional: Appearance: He is obese. He is ill-appearing. He is not toxic-appearing. Interventions: Nasal cannula in place. HENT:      Mouth/Throat:      Mouth: Mucous membranes are moist.      Pharynx: Oropharynx is clear. Eyes:      Pupils: Pupils are equal, round, and reactive to light. Cardiovascular:      Rate and Rhythm: Normal rate and regular rhythm. Heart sounds: Normal heart sounds. No friction rub. No gallop. Comments: Pitting edema through thighs and hips. Abdomen taut, non-pitting. Pulmonary:      Effort: Tachypnea (Conversational tachypnea) and accessory muscle usage present. No respiratory distress. Breath sounds: Decreased air movement present. Comments: No rales, wheezing or rhonchi. Abdominal:      General: Bowel sounds are normal. There is distension. Tenderness: There is no abdominal tenderness. There is no right CVA tenderness, left CVA tenderness or guarding. Musculoskeletal:      Cervical back: Normal range of motion. Right lower le+ Pitting Edema present. Left lower le+ Pitting Edema present. Skin:     General: Skin is warm and dry. Capillary Refill: Capillary refill takes less than 2 seconds. Comments: See media for lower legs bilateral discoloration of feet and toes with weeping open area bilateral shins (L > R)    Neurological:      General: No focal deficit present. Mental Status: He is alert and oriented to person, place, and time. Psychiatric:         Mood and Affect: Mood normal.         Behavior: Behavior is cooperative.          Investigations:      Laboratory Testing:  Recent Results (from the past 24 hour(s))   CBC with Auto Differential    Collection Time: 23 10:32 PM   Result Value Ref Range    WBC 5.8 3.5 - 11.3 k/uL    RBC 4.95 4.21 - 5.77 m/uL    Hemoglobin 12.4 (L) 13.0 - 17.0 g/dL    Hematocrit 39.8 (L) 40.7 - 50.3 %    MCV 80.4 (L) 82.6 - 102.9 fL    MCH 25.1 (L) 25.2 - 33.5 pg    MCHC 31.2 28.4 - 34.8 g/dL    RDW 18.7 (H) 11.8 - 14.4 %    Platelets 294 (L) 108 - 453 k/uL    MPV 10.7 8.1 - 13.5 fL    NRBC Automated 0.0 0.0 per 100 WBC    RBC Morphology ANISOCYTOSIS PRESENT     Seg Neutrophils 67 (H) 36 - 65 %    Lymphocytes 18 (L) 24 - 43 %    Monocytes 14 (H) 3 - 12 %    Eosinophils % 1 1 - 4 %    Basophils 1 0 - 2 %    Immature Granulocytes 0 0 %    Segs Absolute 3.88 1.50 - 8.10 k/uL    Absolute Lymph # 1.04 (L) 1.10 - 3.70 k/uL    Absolute Mono # 0.79 0.10 - 1.20 k/uL    Absolute Eos # 0.07 0.00 - 0.44 k/uL    Basophils Absolute 0.03 0.00 - 0.20 k/uL    Absolute Immature Granulocyte 0.01 0.00 - 0.30 k/uL   Brain Natriuretic Peptide    Collection Time: 01/05/23 10:32 PM   Result Value Ref Range    Pro-BNP 1,400 (H) <300 pg/mL   Comprehensive Metabolic Panel    Collection Time: 01/05/23 10:32 PM   Result Value Ref Range    Glucose 109 (H) 70 - 99 mg/dL    BUN 17 8 - 23 mg/dL    Creatinine 0.85 0.70 - 1.20 mg/dL    Est, Glom Filt Rate >60 >60 mL/min/1.73m2    Bun/Cre Ratio 20 9 - 20    Calcium 8.9 8.6 - 10.4 mg/dL    Sodium 135 135 - 144 mmol/L    Potassium 3.5 (L) 3.7 - 5.3 mmol/L    Chloride 98 98 - 107 mmol/L    CO2 26 20 - 31 mmol/L    Anion Gap 11 9 - 17 mmol/L    Alkaline Phosphatase 240 (H) 40 - 129 U/L    ALT 14 5 - 41 U/L    AST 27 <40 U/L    Total Bilirubin 2.5 (H) 0.3 - 1.2 mg/dL    Total Protein 6.6 6.4 - 8.3 g/dL    Albumin 3.4 (L) 3.5 - 5.2 g/dL   TROP/MYOGLOBIN    Collection Time: 01/05/23 10:32 PM   Result Value Ref Range    Troponin, High Sensitivity 24 (H) 0 - 22 ng/L    Myoglobin 52 28 - 72 ng/mL   Flu A/B Ag Detection    Collection Time: 01/05/23 10:32 PM    Specimen: Nasopharyngeal   Result Value Ref Range    Flu A Antigen NEGATIVE NEGATIVE    Flu B Antigen NEGATIVE NEGATIVE   COVID-19, Rapid    Collection Time: 01/05/23 10:32 PM    Specimen: Nasopharyngeal Swab   Result Value Ref Range    Specimen Description . NASOPHARYNGEAL SWAB     SARS-CoV-2, Rapid Not Detected Not Detected TROP/MYOGLOBIN    Collection Time: 01/06/23 12:37 AM   Result Value Ref Range    Troponin, High Sensitivity 24 (H) 0 - 22 ng/L    Myoglobin 48 28 - 72 ng/mL   POC Glucose Fingerstick    Collection Time: 01/06/23 12:46 AM   Result Value Ref Range    POC Glucose 98 75 - 110 mg/dL       Imaging/Diagnostics:  XR CHEST PORTABLE    Result Date: 1/5/2023  Cardiomegaly with pulmonary edema. Assessment :      Hospital Problems             Last Modified POA    * (Principal) Acute on chronic combined systolic and diastolic CHF (congestive heart failure) (HCC) (Chronic) 1/6/2023 Yes    Type 2 diabetes mellitus, without long-term current use of insulin ([facility] [address] 1/6/2023 Yes    Paroxysmal atrial fibrillation ([facility] [address] 1/6/2023 Yes    Edema of both legs 1/6/2023 Yes    CAD (coronary artery disease) 1/6/2023 Yes       Plan:     Patient status inpatient in the  Progressive Unit/Step down    Acute on chronic HFrEF of 30-35%. IV diuresis. Daily weights. Accurate I/O. Unna boots/wound care for lower extremity edema. Consult to heart failure coordinator for home care/compliance education. CAD with stents: Continue Plavix and Xaralto as per home dosing. PAF: Continuous cardiac monitoring. Cordarone. Anticoagulation. DM management: Accu checks ac/hs with SSI coverage. Carb controlled diet. Hypoglycemia treatment orders prn. Case management for discharge planning and need for home services. Consultations:   IP CONSULT TO HEART FAILURE NURSE/COORDINATOR  IP CONSULT TO DIETITIAN     Patient is admitted as inpatient status because of co-morbidities listed above, severity of signs and symptoms as outlined, requirement for current medical therapies and most importantly because of direct risk to patient if care not provided in a hospital setting. Expected length of stay > 48 hours.     ELY Winston NP  1/6/2023  2:27 AM    Copy sent to Dr. Sarah Mitchell PA-C

## 2023-01-06 NOTE — PROGRESS NOTES
Occupational Therapy  Facility/Department: [facility] MED SURG  Occupational Therapy Initial Assessment    Name: Rachelle Fernando  : 1960  MRN: [id number]  Date of Service: 2023    OBI Ibarra reports patient is medically stable for therapy treatment this date. Chart reviewed prior to treatment and patient is agreeable for therapy. All lines intact and patient positioned comfortably at end of treatment. All patient needs addressed prior to ending therapy session. Due to recent hospitalization and medical condition, pt would benefit from additional intermittent skilled therapy at time of discharge. Please refer to the AM-PAC score for current functional status. Discharge Recommendations:  Patient would benefit from continued therapy after discharge  OT Equipment Recommendations  Equipment Needed: Yes  Mobility Devices: ADL Assistive Devices  ADL Assistive Devices: Reacher;Long-handled Sponge       HPI (per chart):  Rachelle Fernando is a 57 y.o. male who presents to the emergency department with concern for worsening congestive heart failure. Patient reports that his visiting nurse has come by and they are concerned that his CHF is worsening. Reports swelling in his abdomen and also his lower legs. Patient Diagnosis(es): The encounter diagnosis was Acute congestive heart failure, unspecified heart failure type ([address] Past Medical History:  has a past medical history of CAD (coronary artery disease), CHF (congestive heart failure) ([address] Heart attack ([address] Hyperlipidemia, Hypertension, MI (myocardial infarction) ([facility] [address] MRSA (methicillin resistant staph aureus) culture positive, Skin cancer, Snores, Stroke [facility]), Wears dentures, Wears reading eyeglasses, and Wellness examination.   Past Surgical History:  has a past surgical history that includes Coronary artery bypass graft; Coronary angioplasty with stent; skin biopsy; eye surgery; Mandible surgery; hernia repair (Bilateral, 2020); Cardioversion (11/17/2021); other surgical history (11/17/2021); Cardiac surgery (1999); Cardiac catheterization (08/06/2020); and Cardiac defibrillator placement (04/04/2022). Assessment   Performance deficits / Impairments: Decreased functional mobility ; Decreased safe awareness;Decreased ADL status; Decreased endurance;Decreased high-level IADLs;Decreased strength  Assessment: Pt presents w/ deficits in functional mobility and ADL status secondary to decreased functional activity tolerance, SOB w/ minimal activity and BLE & abdominal edema. Skilled OT services are indicated at this time to maximize this pt's safety and IND with self care tasks and to facilitate safe return to PLOF as able. Prognosis: Good  Decision Making: Medium Complexity  REQUIRES OT FOLLOW-UP: Yes  Activity Tolerance  Activity Tolerance: Patient Tolerated treatment well;Patient limited by fatigue        Plan   Occupational Therapy Plan  Times Per Week: 4-5x/week 1x/day as tolerated  Current Treatment Recommendations: Strengthening, Balance training, Functional mobility training, Endurance training, Safety education & training, Patient/Caregiver education & training, Self-Care / ADL, Equipment evaluation, education, & procurement, Positioning     Restrictions  Restrictions/Precautions  Restrictions/Precautions: General Precautions, Contact Precautions  Required Braces or Orthoses?: No  Position Activity Restriction  Other position/activity restrictions: Up w/ assist, contact precautions, O2 via NC, continous pulse oximeter, telemetry, LUE IV    Subjective   General  Chart Reviewed: Yes  Patient assessed for rehabilitation services?: Yes  Family / Caregiver Present: No  Subjective  Subjective: Pt supine in bed upon entry, pleasant and agreeable to participation in therapy evaluation.      Social/Functional History  Social/Functional History  Lives With: Friend(s)  Type of Home: House  Home Layout: Two level, Able to Live on Main level with bedroom/bathroom  Home Access: Stairs to enter with rails  Entrance Stairs - Number of Steps: 2  Entrance Stairs - Rails: Right  Bathroom Shower/Tub: Walk-in shower  Bathroom Toilet: Standard  Bathroom Equipment: Built-in shower seat  Bathroom Accessibility: Accessible  Has the patient had two or more falls in the past year or any fall with injury in the past year?: No  Receives Help From: Friend(s)  ADL Assistance: Independent  Homemaking Responsibilities: No  Ambulation Assistance: Independent  Transfer Assistance: Independent  Active : Yes  Mode of Transportation: Car  Type of Occupation: Maintenance  Leisure & Hobbies: motorcross, various projects around the home       Objective           Observation/Palpation  Posture: Good  Observation: on 2L O2 via NC, SOB with minimal ambulation, open areas on legs from scratching & scattered blisters  Edema: BLE weeping edema, Patient reports abdomenal edema, Lower legs  Scar: vertical scars along anterior aspects of B forearms from prior heart sx  Safety Devices  Type of Devices: Call light within reach;Gait belt;Left in chair;Heels elevated for pressure relief; All fall risk precautions in place  Bed Mobility Training  Bed Mobility Training: Yes  Overall Level of Assistance: Independent  Supine to Sit: Independent  Sit to Supine:  (Not observed, pt retired to bedside chair at session end.)  Scooting: Independent  Balance  Sitting: Intact (MOD I while seated at EOB)  Standing: Intact (SBA w/ no AD)  Transfer Training  Transfer Training: Yes  Overall Level of Assistance: Stand-by assistance  Interventions: Safety awareness training (Cues provided for pacing self, upright posture, line awareness, and squaring self to transfer surface all to increase overall safety.)  Sit to Stand: Stand-by assistance  Stand to Sit: Stand-by assistance  Bed to Chair: Stand-by assistance  Functional Mobility  Overall Level of Assistance: Stand-by assistance (Pt completed in room functional mobility, EOB>door x 2, with SBA and no AD. Pt demo'd Fair steadiness throughout, no LOBs noted. Pt did become SOB w/ this activity w/ SpO2 remaining > 94% throughout.)  Interventions: Safety awareness training;Verbal cues (Min cues for line awareness, upright posture, scanning the environment, and pacing self all to increase overall safety and reduce fall risk.)  Assistive Device: Gait belt     AROM: Generally decreased, functional  Strength: Generally decreased, functional (BUE ~4+/5)  Coordination: Generally decreased, functional  Tone: Normal    ADL  Feeding: Setup  Grooming: Stand by assistance;Setup (Seated position)  UE Bathing: Stand by assistance;Setup  LE Bathing: Minimal assistance;Setup  UE Dressing: Minimal assistance  UE Dressing Skilled Clinical Factors: To adjust gown throughout session for increased modesty  LE Dressing: Minimal assistance  Toileting: Minimal assistance  Additional Comments: Pt's participation in ADLs limited d/t decreased functional activity tolerance, BLE/abdominal edema, increased oxygen needs, and SOB w/ minimal activity. Vision  Vision: Impaired (Pt owns glassess, states he does not wear them most of the time)  Hearing  Hearing: Within functional limits    Cognition  Overall Cognitive Status: Exceptions  Arousal/Alertness: Appropriate responses to stimuli  Following Commands: Follows all commands without difficulty  Attention Span: Attends with cues to redirect  Memory: Appears intact  Safety Judgement: Decreased awareness of need for safety;Decreased awareness of need for assistance  Problem Solving: Decreased awareness of errors;Assistance required to implement solutions;Assistance required to generate solutions  Insights: Decreased awareness of deficits  Initiation: Does not require cues  Sequencing: Requires cues for some  Cognition Comment: Patient hyperverbose and tangental in speech and thought, but easily redirected.   Orientation  Overall Orientation Status: Within Functional Limits  Orientation Level: Oriented X4  Perception  Overall Perceptual Status: WFL               Education Given To: Patient  Education Provided: Role of Therapy;Transfer Training;Plan of Care;Energy Conservation; Fall Prevention Strategies; ADL Adaptive Strategies;Precautions (+ CHF Heart Action plan handout)  Education Method: Verbal;Printed Information/Hand-outs  Barriers to Learning: None  Education Outcome: Verbalized understanding;Continued education needed             AM-PAC Score        AM-PAC Inpatient Daily Activity Raw Score: 20 (01/06/23 1502)  AM-PAC Inpatient ADL T-Scale Score : 42.03 (01/06/23 1502)  ADL Inpatient CMS 0-100% Score: 38.32 (01/06/23 1502)  ADL Inpatient CMS G-Code Modifier : CJ (01/06/23 1502)      Goals  Short Term Goals  Time Frame for Short Term Goals: By discharge, pt to demo:  Short Term Goal 1: ADL transfers and functional mobility tasks with MOD I/IND and Good safety with use of AD as needed. Short Term Goal 2: UB ADLs and LB ADLs to MOD I/IND with Good pacing and use of AE/compensatory strategies/AD as needed. Short Term Goal 3: increased BUE strength by 1/2 grade to promote strength/ROM required for safety and IND with self care tasks. Short Term Goal 4: toileting tasks to MOD I/IND with Good safety and use of grab bars/AD/BSC as needed. Short Term Goal 5: active participation in > 25 mins of therapeutic activity/functional tasks of choice to promote functional activity tolerance/balance required for safety and IND with self care tasks. Long Term Goals  Long Term Goal 1: Pt to be IND with fall prevention strategies, EC/WS tech, CHF specific education, discharge/equipment recommendations, and a BUE HEP with use of handouts as needed. Patient Goals   Patient goals : To get stronger!        Therapy Time   Individual Concurrent Group Co-treatment   Time In 1016         Time Out 1055 (+10 mins for chart review & RN coordination) Minutes 39+10=49         Tx Time: 23 minutes       Katherine Macias, OT

## 2023-01-06 NOTE — PROGRESS NOTES
Nutrition Education    Educated on CHF  Learners: Patient  Readiness: Acceptance  Method: Explanation and Handout  Response: Verbalizes Understanding  Contact name and number provided.           Komal LIRAN, RDN, LDN  Lead Clinical Dietitian  RD Office Phone [phone]

## 2023-01-06 NOTE — PROGRESS NOTES
Pt admitted to room 2009 per wheelchair in good condition from ED  Oriented to room and surroundings  Bed in lowest position, wheels locked, 2/4 side rails up  Call light in reach, room free of clutter, adequate lighting provided  Denies any further questions at this time  Instructed to call out with any questions/concerns/new onset of pain and/or n/v   White board updated  Continue to monitor with hourly rounding  STAY WITH ME protocol initiated   Bed alarm on/Fall Risk signs in place/Fall risk sticker to wrist band  Non-skid socks on/at bedside

## 2023-01-06 NOTE — PLAN OF CARE
Problem: Discharge Planning  Goal: Discharge to home or other facility with appropriate resources  Outcome: Progressing  Flowsheets (Taken 1/6/2023 0426)  Discharge to home or other facility with appropriate resources:   Identify barriers to discharge with patient and caregiver   Arrange for needed discharge resources and transportation as appropriate   Identify discharge learning needs (meds, wound care, etc)     Problem: ABCDS Injury Assessment  Goal: Absence of physical injury  Outcome: Progressing  Flowsheets (Taken 1/6/2023 0426)  Absence of Physical Injury: Implement safety measures based on patient assessment     Problem: Safety - Adult  Goal: Free from fall injury  Outcome: Progressing  Note: Proper pt identification  Hourly rounding performed  Anticipatory needs met  Non-skid socks worn  Proper transferring technique  2/4 side rails up  Personal necessities within reach  Bed low and locked  Call light in reach  Proper lighting  Room free of clutter         Problem: Skin/Tissue Integrity  Goal: Absence of new skin breakdown  Description: 1. Monitor for areas of redness and/or skin breakdown  2. Assess vascular access sites hourly  3. Every 4-6 hours minimum:  Change oxygen saturation probe site  4. Every 4-6 hours:  If on nasal continuous positive airway pressure, respiratory therapy assess nares and determine need for appliance change or resting period.   Outcome: Progressing  Note: Skin assessment completed and documented  Ashish scale updated  Relieve pressure to bony prominences  Avoid shearing  Assess s/sx of infection   Air mattress in place  Turns self  Heels elevated  Structural intactness and normal physiological function of skin and mucous membranes

## 2023-01-06 NOTE — ED PROVIDER NOTES
eMERGENCY dEPARTMENT eNCOUnter   Independent Attestation     Pt Name: Jaison Lin  MRN: [id number]  [place] 1960  Date of evaluation: 1/5/23     Jaison Lin is a 57 y.o. male with CC: Shortness of Breath and Leg Swelling        This visit was performed by both a physician and an APC. I performed all aspects of the MDM as documented. The care is provided during an unprecedented national emergency due to the novel coronavirus, COVID 19.     Vale Mcpherson MD  Attending Emergency Physician    EKG sinus rhythm first-degree AV block    Nonspecific T wave changes V2 V3  No STEMI criteria  Right bundle branch block  QTc 523          Uriah La MD  01/05/23 [id number]

## 2023-01-06 NOTE — PROGRESS NOTES
Pharmacy medication reconciliation services requested via:    [x] Perfect Serve Message sent to: Johanna Middleton Pharmacist  [] Phone call/message to [phone]  [] Order placed for Pharmacy Consult with 'Med Rec' in the order comments  [] Other       To Assist with Medication Reconciliation:   [] Request made to Family member to bring medications into the hospital  [] Request made to Family member to call hospital with medication list  [] Message left with physician office  [] Request for medical records made to   [] Other

## 2023-01-06 NOTE — CARE COORDINATION
Social Work- met with patient. He states that he has been having difficulty getting around at home. He states that if he needed to go to [place], he would like to go to [facility].  Sent referral. Rachel Lugo

## 2023-01-06 NOTE — ED NOTES
ED to inpatient nurses report     Chief Complaint   Patient presents with    Shortness of Breath    Leg Swelling      Present to ED from home  LOC: alert and oriented x 4  Vital signs   Vitals:    01/05/23 2359 01/06/23 0009 01/06/23 0019 01/06/23 0029   BP:       Pulse: 92 89 89 87   Resp: 24 18 19 18   Temp:       TempSrc:       SpO2:       Weight:       Height:          Oxygen Baseline 2 liters nasal canula    Current needs required diuretics see admission orders   SEPSIS: no  no] Lactate X 2 ordered (Yes or No)  [no] Antibiotics given (Yes or No)  [no] IV Fluids ordered (Yes or No)             yes] IV completed (Yes or No)  [yes] Hourly Vital Signs (Validated)  no] Outstanding Orders:     LDAs:   Peripheral IV 01/05/23 Left Forearm (Active)       Peripheral IV 01/05/23 Left; Anterior Forearm (Active)   Alcohol Cap Used Yes 01/05/23 2228   Dressing Status New dressing applied;Clean, dry & intact 01/05/23 2228   Dressing Type Transparent 01/05/23 2228     Mobility: up with assist  Fall Risk:  yes  Pending ED orders: see admission orders  Present condition: stable  Code Status: full  Consults: IP CONSULT TO HEART FAILURE NURSE/COORDINATOR  IP CONSULT TO DIETITIAN  [x]  Hospitalist  Completed  [x] yes [] no Who:   []  Medicine  Completed  [] yes [] No Who:   []  Cardiology  Completed  [] yes [] No Who:   []  GI   Completed  [] yes [] No Who:   []  Neurology  Completed  [] yes [] No Who:   []  Nephrology Completed  [] yes [] No Who:    []  Vascular  Completed  [] yes [] No Who:   []  Ortho  Completed  [] yes [] No Who:     []  Surgery  Completed  [] yes [] No Who:    []  Urology  Completed  [] yes [] No Who:    []  CT Surgery Completed  [] yes [] No Who:   []  Podiatry  Completed  [] yes [] No Who:    []  Other    Completed  [] yes [] No Who:  Interventions: bumex   Important Events: pt presented to er per ems for c/o shortness of breath and leg swelling pt bilateral lower legs are with red purple in color pt has weeping blisters on both of  his lower legs cxr shows cardiomegaly with pulmonary edema        Electronically signed by Renny Wu RN on 1/6/2023 at 1:02 AM     Jamilah Gómez RN  01/06/23 9842

## 2023-01-07 LAB
ABSOLUTE EOS #: 0.06 K/UL (ref 0–0.44)
ABSOLUTE IMMATURE GRANULOCYTE: 0.01 K/UL (ref 0–0.3)
ABSOLUTE LYMPH #: 0.9 K/UL (ref 1.1–3.7)
ABSOLUTE MONO #: 0.51 K/UL (ref 0.1–1.2)
ANION GAP SERPL CALCULATED.3IONS-SCNC: 9 MMOL/L (ref 9–17)
BASOPHILS # BLD: 0 % (ref 0–2)
BASOPHILS ABSOLUTE: <0.03 K/UL (ref 0–0.2)
BUN BLDV-MCNC: 18 MG/DL (ref 8–23)
BUN/CREAT BLD: 17 (ref 9–20)
CALCIUM SERPL-MCNC: 8.8 MG/DL (ref 8.6–10.4)
CHLORIDE BLD-SCNC: 99 MMOL/L (ref 98–107)
CHOLESTEROL/HDL RATIO: 9
CHOLESTEROL: 207 MG/DL
CO2: 28 MMOL/L (ref 20–31)
CREAT SERPL-MCNC: 1.04 MG/DL (ref 0.7–1.2)
EOSINOPHILS RELATIVE PERCENT: 1 % (ref 1–4)
GFR SERPL CREATININE-BSD FRML MDRD: >60 ML/MIN/1.73M2
GLUCOSE BLD-MCNC: 114 MG/DL (ref 75–110)
GLUCOSE BLD-MCNC: 121 MG/DL (ref 75–110)
GLUCOSE BLD-MCNC: 123 MG/DL (ref 75–110)
GLUCOSE BLD-MCNC: 129 MG/DL (ref 75–110)
GLUCOSE BLD-MCNC: 133 MG/DL (ref 70–99)
HCT VFR BLD CALC: 38.6 % (ref 40.7–50.3)
HDLC SERPL-MCNC: 23 MG/DL
HEMOGLOBIN: 11.8 G/DL (ref 13–17)
IMMATURE GRANULOCYTES: 0 %
INR BLD: 4.2
LDL CHOLESTEROL: 172 MG/DL (ref 0–130)
LYMPHOCYTES # BLD: 20 % (ref 24–43)
MAGNESIUM: 1.9 MG/DL (ref 1.6–2.6)
MCH RBC QN AUTO: 25 PG (ref 25.2–33.5)
MCHC RBC AUTO-ENTMCNC: 30.6 G/DL (ref 28.4–34.8)
MCV RBC AUTO: 81.8 FL (ref 82.6–102.9)
MONOCYTES # BLD: 11 % (ref 3–12)
NRBC AUTOMATED: 0 PER 100 WBC
PDW BLD-RTO: 18.5 % (ref 11.8–14.4)
PLATELET # BLD: 122 K/UL (ref 138–453)
PMV BLD AUTO: 10.6 FL (ref 8.1–13.5)
POTASSIUM SERPL-SCNC: 3.6 MMOL/L (ref 3.7–5.3)
PRO-BNP: 980 PG/ML
PROTHROMBIN TIME: 40.6 SEC (ref 11.5–14.2)
RBC # BLD: 4.72 M/UL (ref 4.21–5.77)
RBC # BLD: ABNORMAL 10*6/UL
SEG NEUTROPHILS: 67 % (ref 36–65)
SEGMENTED NEUTROPHILS ABSOLUTE COUNT: 2.97 K/UL (ref 1.5–8.1)
SODIUM BLD-SCNC: 136 MMOL/L (ref 135–144)
TRIGL SERPL-MCNC: 62 MG/DL
TSH SERPL DL<=0.05 MIU/L-ACNC: 1.15 UIU/ML (ref 0.3–5)
WBC # BLD: 4.5 K/UL (ref 3.5–11.3)

## 2023-01-07 PROCEDURE — 97116 GAIT TRAINING THERAPY: CPT

## 2023-01-07 PROCEDURE — 99231 SBSQ HOSP IP/OBS SF/LOW 25: CPT | Performed by: INTERNAL MEDICINE

## 2023-01-07 PROCEDURE — 97530 THERAPEUTIC ACTIVITIES: CPT

## 2023-01-07 PROCEDURE — 85610 PROTHROMBIN TIME: CPT

## 2023-01-07 PROCEDURE — 80048 BASIC METABOLIC PNL TOTAL CA: CPT

## 2023-01-07 PROCEDURE — 2580000003 HC RX 258: Performed by: NURSE PRACTITIONER

## 2023-01-07 PROCEDURE — 6370000000 HC RX 637 (ALT 250 FOR IP): Performed by: INTERNAL MEDICINE

## 2023-01-07 PROCEDURE — 84443 ASSAY THYROID STIM HORMONE: CPT

## 2023-01-07 PROCEDURE — 80061 LIPID PANEL: CPT

## 2023-01-07 PROCEDURE — 6360000002 HC RX W HCPCS: Performed by: NURSE PRACTITIONER

## 2023-01-07 PROCEDURE — 6370000000 HC RX 637 (ALT 250 FOR IP): Performed by: NURSE PRACTITIONER

## 2023-01-07 PROCEDURE — 82947 ASSAY GLUCOSE BLOOD QUANT: CPT

## 2023-01-07 PROCEDURE — 36415 COLL VENOUS BLD VENIPUNCTURE: CPT

## 2023-01-07 PROCEDURE — 83735 ASSAY OF MAGNESIUM: CPT

## 2023-01-07 PROCEDURE — 83880 ASSAY OF NATRIURETIC PEPTIDE: CPT

## 2023-01-07 PROCEDURE — 1200000000 HC SEMI PRIVATE

## 2023-01-07 PROCEDURE — 85025 COMPLETE CBC W/AUTO DIFF WBC: CPT

## 2023-01-07 RX ORDER — SPIRONOLACTONE 25 MG/1
25 TABLET ORAL DAILY
Status: DISCONTINUED | OUTPATIENT
Start: 2023-01-07 | End: 2023-01-08

## 2023-01-07 RX ADMIN — AMIODARONE HYDROCHLORIDE 200 MG: 200 TABLET ORAL at 08:48

## 2023-01-07 RX ADMIN — MIDODRINE HYDROCHLORIDE 5 MG: 5 TABLET ORAL at 12:39

## 2023-01-07 RX ADMIN — CLOPIDOGREL BISULFATE 75 MG: 75 TABLET ORAL at 08:48

## 2023-01-07 RX ADMIN — EMPAGLIFLOZIN 10 MG: 10 TABLET, FILM COATED ORAL at 08:48

## 2023-01-07 RX ADMIN — LISINOPRIL 5 MG: 5 TABLET ORAL at 08:48

## 2023-01-07 RX ADMIN — MIDODRINE HYDROCHLORIDE 5 MG: 5 TABLET ORAL at 17:40

## 2023-01-07 RX ADMIN — ATORVASTATIN CALCIUM 80 MG: 80 TABLET, FILM COATED ORAL at 20:35

## 2023-01-07 RX ADMIN — CARVEDILOL 12.5 MG: 12.5 TABLET, FILM COATED ORAL at 08:48

## 2023-01-07 RX ADMIN — ASPIRIN 81 MG: 81 TABLET, COATED ORAL at 08:48

## 2023-01-07 RX ADMIN — ACETAMINOPHEN 650 MG: 325 TABLET ORAL at 20:35

## 2023-01-07 RX ADMIN — SODIUM CHLORIDE, PRESERVATIVE FREE 10 ML: 5 INJECTION INTRAVENOUS at 08:48

## 2023-01-07 RX ADMIN — SODIUM CHLORIDE, PRESERVATIVE FREE 10 ML: 5 INJECTION INTRAVENOUS at 20:35

## 2023-01-07 RX ADMIN — MULTIPLE VITAMINS W/ MINERALS TAB 1 TABLET: TAB at 08:48

## 2023-01-07 RX ADMIN — MIDODRINE HYDROCHLORIDE 5 MG: 5 TABLET ORAL at 08:54

## 2023-01-07 RX ADMIN — CARVEDILOL 12.5 MG: 12.5 TABLET, FILM COATED ORAL at 17:34

## 2023-01-07 RX ADMIN — RIVAROXABAN 20 MG: 20 TABLET, FILM COATED ORAL at 17:34

## 2023-01-07 RX ADMIN — SPIRONOLACTONE 25 MG: 25 TABLET ORAL at 14:39

## 2023-01-07 RX ADMIN — FUROSEMIDE 40 MG: 10 INJECTION, SOLUTION INTRAMUSCULAR; INTRAVENOUS at 17:34

## 2023-01-07 RX ADMIN — FUROSEMIDE 40 MG: 10 INJECTION, SOLUTION INTRAMUSCULAR; INTRAVENOUS at 08:48

## 2023-01-07 ASSESSMENT — PAIN DESCRIPTION - ONSET: ONSET: ON-GOING

## 2023-01-07 ASSESSMENT — PAIN DESCRIPTION - FREQUENCY: FREQUENCY: CONTINUOUS

## 2023-01-07 ASSESSMENT — PAIN DESCRIPTION - DESCRIPTORS: DESCRIPTORS: ACHING

## 2023-01-07 ASSESSMENT — PAIN DESCRIPTION - PAIN TYPE: TYPE: CHRONIC PAIN

## 2023-01-07 ASSESSMENT — PAIN DESCRIPTION - LOCATION: LOCATION: LEG

## 2023-01-07 ASSESSMENT — PAIN SCALES - GENERAL: PAINLEVEL_OUTOF10: 3

## 2023-01-07 ASSESSMENT — PAIN - FUNCTIONAL ASSESSMENT: PAIN_FUNCTIONAL_ASSESSMENT: ACTIVITIES ARE NOT PREVENTED

## 2023-01-07 ASSESSMENT — PAIN DESCRIPTION - ORIENTATION: ORIENTATION: RIGHT

## 2023-01-07 NOTE — PROGRESS NOTES
Transitions of Care Pharmacy Service   Medication Review    The patient's list of current home medications has been reviewed. Source(s) of information: patient, Vimal    Other Notes He is highly noncompliant with his meds; per Vimal the majority of his prescriptions are on profile b/c he didn't pick them up. His list reflects what is prescribed and should be taking. Please feel free to call me with any questions about this encounter. Thank you. Cecilia Ibarra, [facility]   Transitions of Care Pharmacy Service  Phone:  [phone]  Fax: [phone]      Electronically signed by Cecilia Ibarra, [facility] on 1/6/2023 at 7:53 PM           Medications Prior to Admission:   ARTIFICIAL TEAR SOLUTION OP, Place 2 drops into both eyes daily  clopidogrel (PLAVIX) 75 MG tablet, Take 1 tablet by mouth daily  empagliflozin (JARDIANCE) 10 MG tablet, Take 1 tablet by mouth daily  midodrine (PROAMATINE) 5 MG tablet, Take 1 tablet by mouth 3 times daily (before meals)  amiodarone (CORDARONE) 200 MG tablet, Take 1 tablet by mouth daily  bumetanide (BUMEX) 2 MG tablet, Take 1 tablet by mouth 2 times daily  aspirin EC 81 MG EC tablet, Take 1 tablet by mouth daily  rivaroxaban (XARELTO) 20 MG TABS tablet, Take 20 mg by mouth Daily with supper  metFORMIN (GLUCOPHAGE) 1000 MG tablet, Take 1 tablet by mouth daily Resume on 5/21  Cyanocobalamin (VITAMIN B-12 PO), Take 1 tablet by mouth daily  Multiple Vitamins-Minerals (THERAPEUTIC MULTIVITAMIN-MINERALS) tablet, Take 1 tablet by mouth daily  nitroGLYCERIN (NITROSTAT) 0.4 MG SL tablet, Place 0.4 mg under the tongue every 5 minutes as needed for Chest pain up to max of 3 total doses. If no relief after 1 dose, call 911.   Dr. Curt Uribe  atorvastatin (LIPITOR) 80 MG tablet, Take 1 tablet by mouth nightly  carvedilol (COREG) 12.5 MG tablet, Take 1 tablet by mouth 2 times daily (with meals)

## 2023-01-07 NOTE — PROGRESS NOTES
[facility]  Office: [address], DO, Naseem Stephens, DO, Remigio Moreno, DO, Katerina Harp Blood, DO, Gerard Pierce MD, Zeynep Love MD, Ghassan Blake MD, Nora Payne MD,  Angelica Le MD, Jorge Parra MD, Ivonne Powell DO, Peggy Newberry MD,  Magdi Knott MD, Ward Heredia MD, Vanesa Rodriguez DO, Liya Medeiros MD, Guera Harp MD, Blaze Hernandez DO, Francisco Tipton MD, Heather De Jesus MD, Paco Rosenbaum MD, Micha Augustin MD, Monserrat Leija, DO, Maggy Stafford MD, Deven Jean MD, Phillip Zheng, [facility],  Piedad Kelley, CNP, Saqib Bradford, CNP, Zeinab Daniels, CNP,  Franki Lundborg, [facility], Ilana Jasmine, CNP, Norberto Zhao, CNP, aPo Rivas, CNP, Chantelle Kirk, CNP, Antonietta Grijalva, CNP, Ingris Valero PA-C, Terell Whitaker, CNS, Brendon Ramirez, [facility], Elvia Bradford, [facility]    Progress Note    1/7/2023    1:51 PM    Name:   Loki Freedman  MRN:     [id number]     [place]:      [de-identified]   Room:   2009/2009-02  IP Day:  1  Admit Date:  1/5/2023  9:59 PM    PCP:   Linda Hoff PA-C  Code Status:  Full Code    Subjective:     C/C:   Chief Complaint   Patient presents with    Shortness of Breath    Leg Swelling     Interval History Status: . Patient had good diuresis with IV Lasix  Legs swelling is still there but slightly better, got Ace wrap's  proBNP 1400-980  Wants to go to [facility] at discharge    Brief History:   Loki Freedman is a 57 y.o. Non- / non  male who presents with Shortness of Breath and Leg Swelling   and is admitted to the hospital for the management of Acute on chronic combined systolic and diastolic CHF (congestive heart failure) ([facility] [address] Suresh Whitten is a 57year old male who presented for evaluation of SOB and bilateral low leg edema. He states that he has home health services who come and change his Unna Boots twice weekly.  He states that today a new nurse came to do his dressings and told him that she was recommending ED evaluation for edema. He states he has been taking his diuretics as prescribed. He states he has chronic SOB and edema through his legs but \"didn't realize it was this bad\". He states he was scheduled to see cardiology on 1/7/23 for follow-up since his last hospitalization in October, 2022. PmHx significant for CAD with drug-eluting stent  (5/22, CHF (EF 30-35%), HTN and HLD. He denies use of tobacco, alcohol or recreational drugs. He had AICD placed April. 2022. ED workup today showed pBNP 1400 with CXR consistent with cardiomegaly and pulmonary edema. He will be admitted for continued care. 1/6/2023     Patient was previously admitted with similar problem and was discharged after treated for heart failure  Currently he is significant legs edema with stasis dermatitis and complains of exertional shortness of breath  He has AICD in place  During previous admission he was started on Entresto which had to be stopped because of hypotension and was sent back on ACE inhibitor's  Patient states he is compliant with his medications  His home dose of Jardiance was also resumed  Review of Systems:     Constitutional:  negative for chills, fevers, sweats  Respiratory:  negative for cough, +dyspnea on exertion, shortness of breath  Cardiovascular:  negative for chest pain, chest pressure/discomfort,+ lower extremity edema, denies palpitations  Gastrointestinal:  negative for abdominal pain, constipation, diarrhea, nausea, vomiting  Neurological:  negative for dizziness, headache    Medications:      Allergies:  No Known Allergies    Current Meds:   Scheduled Meds:    sodium chloride flush  5-40 mL IntraVENous 2 times per day    lisinopril  5 mg Oral Daily    insulin lispro  0-4 Units SubCUTAneous TID     insulin lispro  0-4 Units SubCUTAneous Nightly    furosemide  40 mg IntraVENous BID    amiodarone  200 mg Oral Daily    aspirin EC  81 mg Oral Daily    atorvastatin  80 mg Oral Nightly    carvedilol  12.5 mg Oral BID WC    clopidogrel  75 mg Oral Daily    midodrine  5 mg Oral TID AC    therapeutic multivitamin-minerals  1 tablet Oral Daily    rivaroxaban  20 mg Oral Dinner    empagliflozin  10 mg Oral Daily     Continuous Infusions:    dextrose      sodium chloride       PRN Meds: glucose, dextrose bolus **OR** dextrose bolus, glucagon (rDNA), dextrose, sodium chloride flush, sodium chloride, ondansetron **OR** ondansetron, polyethylene glycol, acetaminophen **OR** acetaminophen, potassium chloride **OR** potassium alternative oral replacement **OR** potassium chloride, magnesium sulfate, perflutren lipid microspheres    Data:     Past Medical History:   has a past medical history of CAD (coronary artery disease), CHF (congestive heart failure) ([facility] [address] Heart attack ([facility] [address] Hyperlipidemia, Hypertension, [facility] MRSA (methicillin resistant staph aureus) culture positive, Skin cancer, Snores, Stroke ([facility] Wears dentures, Wears reading eyeglasses, and Wellness examination. Social History:   reports that he quit smoking about 23 years ago. He has never used smokeless tobacco. He reports that he does not drink alcohol and does not use drugs. Family History:   Family History   Problem Relation Age of Onset    Coronary Art Dis Father     Liver Disease Father     Alcohol Abuse Sister        Vitals:  /62   Pulse 66   Temp 97.3 °F (36.3 °C) (Axillary)   Resp 18   Ht 5' 8\" (1.727 m)   Wt 258 lb 4.8 oz (117.2 kg)   SpO2 95%   BMI 39.27 kg/m²   Temp (24hrs), Av.5 °F (36.4 °C), Min:97.3 °F (36.3 °C), Max:97.7 °F (36.5 °C)    Recent Labs     23  1627 23  0609 23  1130   POCGLU 118* 138* 121* 129*       I/O (24Hr):     Intake/Output Summary (Last 24 hours) at 2023 1351  Last data filed at 2023 1239  Gross per 24 hour   Intake --   Output 1950 ml   Net -1950 ml Labs:  Hematology:  Recent Labs     01/05/23 2232 01/07/23  0553   WBC 5.8 4.5   RBC 4.95 4.72   HGB 12.4* 11.8*   HCT 39.8* 38.6*   MCV 80.4* 81.8*   MCH 25.1* 25.0*   MCHC 31.2 30.6   RDW 18.7* 18.5*   * 122*   MPV 10.7 10.6   INR  --  4.2     Chemistry:  Recent Labs     01/05/23 2232 01/06/23  0037 01/06/23  0244 01/06/23  0625 01/07/23  0553     --   --  139 136   K 3.5*  --   --  3.9 3.6*   CL 98  --   --  101 99   CO2 26  --   --  31 28   GLUCOSE 109*  --   --  150* 133*   BUN 17  --   --  15 18   CREATININE 0.85  --   --  0.96 1.04   MG  --   --   --  1.7 1.9   ANIONGAP 11  --   --  7* 9   LABGLOM >60  --   --  >60 >60   CALCIUM 8.9  --   --  8.6 8.8   PROBNP 1,400*  --   --   --  980*   TROPHS 24* 24* 24* 22  --    MYOGLOBIN 52 48 52  --   --      Recent Labs     01/05/23 2232 01/06/23  0046 01/06/23  0559 01/06/23  1128 01/06/23  1627 01/06/23  2039 01/07/23  0553 01/07/23  0609 01/07/23  1130   PROT 6.6  --   --   --   --   --   --   --   --    LABALBU 3.4*  --   --   --   --   --   --   --   --    TSH  --   --   --   --   --   --  1.15  --   --    AST 27  --   --   --   --   --   --   --   --    ALT 14  --   --   --   --   --   --   --   --    ALKPHOS 240*  --   --   --   --   --   --   --   --    BILITOT 2.5*  --   --   --   --   --   --   --   --    CHOL  --   --   --   --   --   --  207*  --   --    HDL  --   --   --   --   --   --  23*  --   --    LDLCHOLESTEROL  --   --   --   --   --   --  172*  --   --    CHOLHDLRATIO  --   --   --   --   --   --  9.0*  --   --    TRIG  --   --   --   --   --   --  62  --   --    POCGLU  --    < > 168* 144* 118* 138*  --  121* 129*    < > = values in this interval not displayed.      ABG:No results found for: POCPH, PHART, PH, POCPCO2, PBJ5OOU, PCO2, POCPO2, PO2ART, PO2, POCHCO3, SQS8UPF, HCO3, NBEA, PBEA, BEART, BE, THGBART, THB, VXG0NQX, ZQLY0BND, K2VFRFBD, O2SAT, FIO2  Lab Results   Component Value Date/Time    SPECIAL RAC 12ML 03/11/2019 08:52 PM     Lab Results   Component Value Date/Time    CULTURE NO GROWTH 6 DAYS 03/11/2019 08:52 PM       Radiology:  XR CHEST PORTABLE    Result Date: 1/5/2023  Cardiomegaly with pulmonary edema.        Physical Examination:        General appearance:  alert, cooperative and no distress  Mental Status:  oriented to person, place and time and normal affect  Lungs: Diminished breath sounds at bases normal effort  Heart:  regular rate and rhythm, no murmur  Abdomen:  soft, nontender, nondistended, normal bowel sounds, no masses, hepatomegaly, splenomegaly  Extremities:  ++ edema, + stasis dermatitis both lower extremities-currently Ace wrap has been wrapped just now  Skin:  no other gross lesions, rashes, induration    Assessment:        Hospital Problems             Last Modified POA    * (Principal) Acute on chronic combined systolic and diastolic CHF (congestive heart failure) (HCC) (Chronic) 1/6/2023 Yes    Status post implantation of automatic cardioverter/defibrillator (AICD) 1/6/2023 Yes    ARIADNA (obstructive sleep apnea) 1/6/2023 Yes    Type 2 diabetes mellitus, without long-term current use of insulin ([address] 1/6/2023 Yes    Paroxysmal atrial fibrillation ([address] 1/6/2023 Yes    Edema of both legs 1/6/2023 Yes    Acute congestive heart failure ([address] 1/6/2023 Yes    CAD (coronary artery disease) 1/6/2023 Yes   Stasis dermatitis    Plan:        Continue IV diuresis with Lasix  Add Aldactone add Aldactone 25 mg daily which will be titrated up slowly  Continue midodrine for blood pressure support  Continue Xarelto  Continue other home medicines as before  PT OT  Placement plan in progress, possibly will be discharged tomorrow  Discussed to remain compliant with his medications  Follow-up with cardiology as outpatient    Nati Trevino MD  1/7/2023  1:51 PM

## 2023-01-07 NOTE — PROGRESS NOTES
Physical Therapy  Facility/Department: [facility]  Daily Treatment Note  NAME: Natalie Scott  : 1960  MRN: [id number]    Date of Service: 2023    Discharge Recommendations:  Patient would benefit from continued therapy after discharge   Patient Diagnosis(es): The encounter diagnosis was Acute congestive heart failure, unspecified heart failure type ([facility] [address] Assessment   Assessment: Patient with decreased aerobic capacity and limited activity tolerance with all functional mobility. Pt becomes SOB with short distance ambulation requiring extended time for recovery. AM-PAC score of 21/24 for current level of function. Activity Tolerance: Patient limited by endurance     Plan    Physcial Therapy Plan  General Plan: 5-7 times per week  Current Treatment Recommendations: Strengthening;Balance training;Functional mobility training; Endurance training;Gait training;Neuromuscular re-education;Home exercise program;Safety education & training;Patient/Caregiver education & training; Therapeutic activities     Restrictions  Restrictions/Precautions  Restrictions/Precautions: General Precautions, Contact Precautions  Required Braces or Orthoses?: No  Position Activity Restriction  Other position/activity restrictions: Up w/ assist, contact precautions, O2 via NC, continous pulse oximeter, telemetry, LUE IV     Subjective    Subjective  Subjective: Pt in bed upon arrival for PT session, required encouragement to participate in therapy.  (Nurse gives approval for PT session)  Orientation  Overall Orientation Status: Within Functional Limits  Orientation Level: Oriented X4     Objective   Bed Mobility Training  Bed Mobility Training: Yes  Overall Level of Assistance: Independent  Interventions: Verbal cues  Supine to Sit: Independent  Sit to Supine: Independent  Scooting: Independent  Balance  Sitting: Intact  Standing: Intact  Transfer Training  Transfer Training: Yes  Overall Level of Assistance: Stand-by assistance  Interventions: Safety awareness training  Sit to Stand: Stand-by assistance  Stand to Sit: Stand-by assistance  Gait Training  Gait Training: Yes  Gait  Overall Level of Assistance: Stand-by assistance  Interventions: Verbal cues; Safety awareness training  Base of Support: Widened  Speed/[place]: Slow  Gait Abnormalities: Trunk sway increased SOB  Extended time for recovery. Distance (ft): 25 Feet  PT exercises  Pt declined exercises for balance stating he has tried therapy in the past and it does not work  Provided education on purpose of PT and benefits of mobility however pt not willing to participate in exercises   Static standing at end of bed after ambulation approx 8-10 min. Goals  Short Term Goals  Time Frame for Short Term Goals: 6 visits  Short Term Goal 1: Patient will ambulate 200 feet indep. Short Term Goal 2: Patient will tolerate 30 minutes of ther-ex and ther-act. Short Term Goal 3: Patient will be indep with HEP. Short Term Goal 4: Patient will demo good understanding of CHF education.   Patient Goals   Patient Goals : Improve breathing    Education  Patient Education  Education Given To: Patient  Education Provided: Role of Therapy;Plan of Care;Transfer Training;Energy Conservation  Education Provided Comments: Educationon safety with transfers and all mobility  Education Method: Verbal  Barriers to Learning: None  Education Outcome: Verbalized understanding;Continued education needed    Therapy Time   Individual Concurrent Group Co-treatment   Time In 1435         Time Out 1500         Minutes [address], PTA

## 2023-01-07 NOTE — PLAN OF CARE
Problem: Discharge Planning  Goal: Discharge to home or other facility with appropriate resources  1/7/2023 0556 by Evelyn Sarabia RN  Outcome: Progressing  Flowsheets (Taken 1/6/2023 2220)  Discharge to home or other facility with appropriate resources: Identify barriers to discharge with patient and caregiver  1/6/2023 1731 by Ana Almazan RN  Outcome: Progressing     Problem: ABCDS Injury Assessment  Goal: Absence of physical injury  1/7/2023 0556 by Evelyn Sarabia RN  Outcome: Progressing  1/6/2023 1731 by Ana Almazan RN  Outcome: Progressing  Flowsheets (Taken 1/6/2023 0948)  Absence of Physical Injury: Implement safety measures based on patient assessment     Problem: Safety - Adult  Goal: Free from fall injury  1/7/2023 0556 by Evelyn Sarabia RN  Outcome: Progressing  1/6/2023 1731 by Ana Almazan RN  Outcome: Progressing  Flowsheets (Taken 1/6/2023 0948)  Free From Fall Injury:   Instruct family/caregiver on patient safety   Based on caregiver fall risk screen, instruct family/caregiver to ask for assistance with transferring infant if caregiver noted to have fall risk factors     Problem: Skin/Tissue Integrity  Goal: Absence of new skin breakdown  Description: 1. Monitor for areas of redness and/or skin breakdown  2. Assess vascular access sites hourly  3. Every 4-6 hours minimum:  Change oxygen saturation probe site  4. Every 4-6 hours:  If on nasal continuous positive airway pressure, respiratory therapy assess nares and determine need for appliance change or resting period.   1/7/2023 0556 by Evelyn Sarabia RN  Outcome: Progressing  1/6/2023 1731 by Ana Almazan RN  Outcome: Progressing     Problem: Chronic Conditions and Co-morbidities  Goal: Patient's chronic conditions and co-morbidity symptoms are monitored and maintained or improved  1/7/2023 0556 by Evelyn Sarabia RN  Outcome: Progressing  1/6/2023 1731 by Ana Almazan RN  Outcome: Progressing     Problem: Skin/Tissue Integrity  Goal: Absence of new skin breakdown  Description: 1. Monitor for areas of redness and/or skin breakdown  2. Assess vascular access sites hourly  3. Every 4-6 hours minimum:  Change oxygen saturation probe site  4. Every 4-6 hours:  If on nasal continuous positive airway pressure, respiratory therapy assess nares and determine need for appliance change or resting period.   1/7/2023 0556 by Fadia Parham RN  Outcome: Progressing  1/6/2023 1731 by Octaviano Santos RN  Outcome: Progressing

## 2023-01-07 NOTE — CARE COORDINATION
Social work: [facility] has accepted this pt and they are ready to admit today if pt is ready. Will need jorge.  Meka cristina

## 2023-01-08 LAB
ANION GAP SERPL CALCULATED.3IONS-SCNC: 9 MMOL/L (ref 9–17)
BUN BLDV-MCNC: 23 MG/DL (ref 8–23)
BUN/CREAT BLD: 21 (ref 9–20)
CALCIUM SERPL-MCNC: 8.7 MG/DL (ref 8.6–10.4)
CHLORIDE BLD-SCNC: 99 MMOL/L (ref 98–107)
CO2: 30 MMOL/L (ref 20–31)
CREAT SERPL-MCNC: 1.08 MG/DL (ref 0.7–1.2)
GFR SERPL CREATININE-BSD FRML MDRD: >60 ML/MIN/1.73M2
GLUCOSE BLD-MCNC: 116 MG/DL (ref 75–110)
GLUCOSE BLD-MCNC: 118 MG/DL (ref 75–110)
GLUCOSE BLD-MCNC: 125 MG/DL (ref 70–99)
GLUCOSE BLD-MCNC: 128 MG/DL (ref 75–110)
GLUCOSE BLD-MCNC: 142 MG/DL (ref 75–110)
MAGNESIUM: 2 MG/DL (ref 1.6–2.6)
POTASSIUM SERPL-SCNC: 3.6 MMOL/L (ref 3.7–5.3)
SODIUM BLD-SCNC: 138 MMOL/L (ref 135–144)

## 2023-01-08 PROCEDURE — 2580000003 HC RX 258: Performed by: NURSE PRACTITIONER

## 2023-01-08 PROCEDURE — 6360000002 HC RX W HCPCS: Performed by: NURSE PRACTITIONER

## 2023-01-08 PROCEDURE — 83735 ASSAY OF MAGNESIUM: CPT

## 2023-01-08 PROCEDURE — 2580000003 HC RX 258: Performed by: INTERNAL MEDICINE

## 2023-01-08 PROCEDURE — 80048 BASIC METABOLIC PNL TOTAL CA: CPT

## 2023-01-08 PROCEDURE — 82947 ASSAY GLUCOSE BLOOD QUANT: CPT

## 2023-01-08 PROCEDURE — 1200000000 HC SEMI PRIVATE

## 2023-01-08 PROCEDURE — 36415 COLL VENOUS BLD VENIPUNCTURE: CPT

## 2023-01-08 PROCEDURE — 6370000000 HC RX 637 (ALT 250 FOR IP): Performed by: INTERNAL MEDICINE

## 2023-01-08 PROCEDURE — 6370000000 HC RX 637 (ALT 250 FOR IP): Performed by: NURSE PRACTITIONER

## 2023-01-08 PROCEDURE — 99231 SBSQ HOSP IP/OBS SF/LOW 25: CPT | Performed by: INTERNAL MEDICINE

## 2023-01-08 RX ORDER — 0.9 % SODIUM CHLORIDE 0.9 %
250 INTRAVENOUS SOLUTION INTRAVENOUS ONCE
Status: COMPLETED | OUTPATIENT
Start: 2023-01-08 | End: 2023-01-09

## 2023-01-08 RX ORDER — SPIRONOLACTONE 25 MG/1
25 TABLET ORAL 2 TIMES DAILY
Status: DISCONTINUED | OUTPATIENT
Start: 2023-01-08 | End: 2023-01-09

## 2023-01-08 RX ORDER — MIDODRINE HYDROCHLORIDE 5 MG/1
5 TABLET ORAL
Status: DISCONTINUED | OUTPATIENT
Start: 2023-01-09 | End: 2023-01-10 | Stop reason: HOSPADM

## 2023-01-08 RX ADMIN — CARVEDILOL 12.5 MG: 12.5 TABLET, FILM COATED ORAL at 17:56

## 2023-01-08 RX ADMIN — AMIODARONE HYDROCHLORIDE 200 MG: 200 TABLET ORAL at 08:13

## 2023-01-08 RX ADMIN — SODIUM CHLORIDE, PRESERVATIVE FREE 10 ML: 5 INJECTION INTRAVENOUS at 08:13

## 2023-01-08 RX ADMIN — SODIUM CHLORIDE, PRESERVATIVE FREE 10 ML: 5 INJECTION INTRAVENOUS at 21:28

## 2023-01-08 RX ADMIN — SPIRONOLACTONE 25 MG: 25 TABLET ORAL at 08:13

## 2023-01-08 RX ADMIN — ATORVASTATIN CALCIUM 80 MG: 80 TABLET, FILM COATED ORAL at 20:32

## 2023-01-08 RX ADMIN — SPIRONOLACTONE 25 MG: 25 TABLET ORAL at 17:56

## 2023-01-08 RX ADMIN — EMPAGLIFLOZIN 10 MG: 10 TABLET, FILM COATED ORAL at 08:13

## 2023-01-08 RX ADMIN — FUROSEMIDE 40 MG: 10 INJECTION, SOLUTION INTRAMUSCULAR; INTRAVENOUS at 08:13

## 2023-01-08 RX ADMIN — MIDODRINE HYDROCHLORIDE 5 MG: 5 TABLET ORAL at 08:13

## 2023-01-08 RX ADMIN — RIVAROXABAN 20 MG: 20 TABLET, FILM COATED ORAL at 17:56

## 2023-01-08 RX ADMIN — ASPIRIN 81 MG: 81 TABLET, COATED ORAL at 08:13

## 2023-01-08 RX ADMIN — CLOPIDOGREL BISULFATE 75 MG: 75 TABLET ORAL at 08:13

## 2023-01-08 RX ADMIN — SODIUM CHLORIDE 250 ML: 9 INJECTION, SOLUTION INTRAVENOUS at 22:10

## 2023-01-08 RX ADMIN — MIDODRINE HYDROCHLORIDE 5 MG: 5 TABLET ORAL at 11:35

## 2023-01-08 RX ADMIN — MULTIPLE VITAMINS W/ MINERALS TAB 1 TABLET: TAB at 08:13

## 2023-01-08 RX ADMIN — MIDODRINE HYDROCHLORIDE 5 MG: 5 TABLET ORAL at 17:56

## 2023-01-08 RX ADMIN — FUROSEMIDE 40 MG: 10 INJECTION, SOLUTION INTRAMUSCULAR; INTRAVENOUS at 17:56

## 2023-01-08 NOTE — CARE COORDINATION
Social work: [facility] still has bed. After Cario to see if pt ready please complete jorge. Report [phone]  Fax:[phone]  Call Ten Mcintyre or ARMANDO today if ready.    Sheridan Siemens lsw

## 2023-01-08 NOTE — PROGRESS NOTES
[facility]  Office: [address], DO, Michael Olivas, DO, Kerrie Russell, DO, Alberto Celina Sandhu, DO, Neil Valdez MD, Jaden Gan MD, Dontae Marshall MD, Luvenia Duverney, MD,  Erasmo Jean MD, Janes Wagner MD, Carrington Mckeon, DO, Leah Alvarez MD,  Naresh Miller MD, Jennifer Connor MD, Joanie Cavazos, DO, Abilio Ford MD, Skyler Louise MD, Nela Mendez DO, Jermain Zepeda MD, Riri Santana MD, Nicole Khan MD, Celestino Delgadillo MD, Marlon Rey, DO, Antonieta Zurita MD, Herbie Durant MD, Dean Kirkland, CNP,  Antonina Dueñas, CNP, Marlon Peralta, CNP, Elizabeth Rooney, CNP,  Agus Rosas, [facility], Cyril Newton, CNP, Leslie You, CNP, Mari Wolf, CNP, Zain Severino, CNP, Kirstin Whaley, CNP, Alfonzo Contreras PA-C, Sukhwinder Buchanan, [facility], Jose Dueñas, CNP, Martin Newell, [facility]    Progress Note    1/8/2023    2:18 PM    Name:   Cynthia Yo  MRN:     [id number]     [place]:      [de-identified]   Room:   2009/2009-02   Day:  2  Admit Date:  1/5/2023  9:59 PM    PCP:   Angelo Thompson PA-C  Code Status:  Full Code    Subjective:     C/C:   Chief Complaint   Patient presents with    Shortness of Breath    Leg Swelling     Interval History Status: . Patient had good diuresis with IV Lasix and oral Aldactone  Legs swelling is still there but slightly better, on removing Ace wrap's has wounds on both lower extremities  proBNP 1400-980  Potassium 3.6  Wants to go to ECF at discharge  Patient had expressed wishes for CHF to be treated more aggressively, he was reminded that during previous admission on adding Entresto his blood pressure had dropped significantly and that had to be stopped, apparently he is not following with cardiology on regular basis and has questionable compliance for taking medications  Brief History:   Cynthia Yo is a 57 y.o.  Non- / non  male who presents with Shortness of Breath and Leg Swelling   and is admitted to the hospital for the management of Acute on chronic combined systolic and diastolic CHF (congestive heart failure) ([facility] [address] Emmie Hernandez is a 57year old male who presented for evaluation of SOB and bilateral low leg edema. He states that he has home health services who come and change his Unna Boots twice weekly. He states that today a new nurse came to do his dressings and told him that she was recommending ED evaluation for edema. He states he has been taking his diuretics as prescribed. He states he has chronic SOB and edema through his legs but \"didn't realize it was this bad\". He states he was scheduled to see cardiology on 1/7/23 for follow-up since his last hospitalization in October, 2022. PmHx significant for CAD with drug-eluting stent  (5/22, CHF (EF 30-35%), HTN and HLD. He denies use of tobacco, alcohol or recreational drugs. He had AICD placed April. 2022. ED workup today showed pBNP 1400 with CXR consistent with cardiomegaly and pulmonary edema. He will be admitted for continued care.        1/6/2023     Patient was previously admitted with similar problem and was discharged after treated for heart failure  Currently he is significant legs edema with stasis dermatitis and complains of exertional shortness of breath  He has AICD in place  During previous admission he was started on Entresto which had to be stopped because of hypotension and was sent back on ACE inhibitor's  Patient states he is compliant with his medications  His home dose of Jardiance was also resumed  Review of Systems:     Constitutional:  negative for chills, fevers, sweats  Respiratory:  negative for cough, +dyspnea on exertion, shortness of breath  Cardiovascular:  negative for chest pain, chest pressure/discomfort,+ lower extremity edema, denies palpitations  Gastrointestinal:  negative for abdominal pain, constipation, diarrhea, nausea, vomiting  Neurological:  negative for dizziness, headache    Medications: Allergies:  No Known Allergies    Current Meds:   Scheduled Meds:    spironolactone  25 mg Oral BID    sodium chloride flush  5-40 mL IntraVENous 2 times per day    lisinopril  5 mg Oral Daily    insulin lispro  0-4 Units SubCUTAneous TID WC    insulin lispro  0-4 Units SubCUTAneous Nightly    furosemide  40 mg IntraVENous BID    amiodarone  200 mg Oral Daily    aspirin EC  81 mg Oral Daily    atorvastatin  80 mg Oral Nightly    carvedilol  12.5 mg Oral BID WC    clopidogrel  75 mg Oral Daily    midodrine  5 mg Oral TID AC    therapeutic multivitamin-minerals  1 tablet Oral Daily    rivaroxaban  20 mg Oral Dinner    empagliflozin  10 mg Oral Daily     Continuous Infusions:    dextrose      sodium chloride       PRN Meds: glucose, dextrose bolus **OR** dextrose bolus, glucagon (rDNA), dextrose, sodium chloride flush, sodium chloride, ondansetron **OR** ondansetron, polyethylene glycol, acetaminophen **OR** acetaminophen, potassium chloride **OR** potassium alternative oral replacement **OR** potassium chloride, magnesium sulfate, perflutren lipid microspheres    Data:     Past Medical History:   has a past medical history of CAD (coronary artery disease), CHF (congestive heart failure) ([facility] [address] [facility] Hyperlipidemia, Hypertension, MI (myocardial infarction) ([facility] MRSA (methicillin resistant staph aureus) culture positive, Skin cancer, Snores, Stroke ([facility] Wears dentures, Wears reading eyeglasses, and Wellness examination. Social History:   reports that he quit smoking about 23 years ago. He has never used smokeless tobacco. He reports that he does not drink alcohol and does not use drugs.      Family History:   Family History   Problem Relation Age of Onset    Coronary Art Dis Father     Liver Disease Father     Alcohol Abuse Sister        Vitals:  /61   Pulse 63   Temp 97.3 °F (36.3 °C) (Axillary)   Resp 19   Ht 5' 8\" (1.727 m)   Wt 259 lb 4.8 oz (117.6 kg)   SpO2 94%   BMI 39.43 kg/m²   Temp (24hrs), Av.4 °F (36.3 °C), Min:97.2 °F (36.2 °C), Max:97.9 °F (36.6 °C)    Recent Labs     23  1643 23  2045 23  0623 23  1033   POCGLU 114* 123* 118* 142*         I/O (24Hr):     Intake/Output Summary (Last 24 hours) at 2023 1418  Last data filed at 2023 1213  Gross per 24 hour   Intake 980 ml   Output 1875 ml   Net -895 ml         Labs:  Hematology:  Recent Labs     23  0553   WBC 5.8 4.5   RBC 4.95 4.72   HGB 12.4* 11.8*   HCT 39.8* 38.6*   MCV 80.4* 81.8*   MCH 25.1* 25.0*   MCHC 31.2 30.6   RDW 18.7* 18.5*   * 122*   MPV 10.7 10.6   INR  --  4.2       Chemistry:  Recent Labs     23  0037 23  0244 23  0625 23  0553 23  0634     --   --  139 136 138   K 3.5*  --   --  3.9 3.6* 3.6*   CL 98  --   --  101 99 99   CO2 26  --   --  31 28 30   GLUCOSE 109*  --   --  150* 133* 125*   BUN 17  --   --  15 18 23   CREATININE 0.85  --   --  0.96 1.04 1.08   MG  --   --   --  1.7 1.9 2.0   ANIONGAP 11  --   --  7* 9 9   LABGLOM >60  --   --  >60 >60 >60   CALCIUM 8.9  --   --  8.6 8.8 8.7   PROBNP 1,400*  --   --   --  980*  --    TROPHS 24* 24* 24* 22  --   --    MYOGLOBIN 52 48 52  --   --   --        Recent Labs     23  2232 23  0046 23  0553 23  0609 23  1130 23  1643 23  2045 23  1033   PROT 6.6  --   --   --   --   --   --   --   --    LABALBU 3.4*  --   --   --   --   --   --   --   --    TSH  --   --  1.15  --   --   --   --   --   --    AST 27  --   --   --   --   --   --   --   --    ALT 14  --   --   --   --   --   --   --   --    ALKPHOS 240*  --   --   --   --   --   --   --   --    BILITOT 2.5*  --   --   --   --   --   --   --   --    CHOL  --   --  207*  --   --   --   --   --   --    HDL  --   --  23*  --   --   --   --   --   --    LDLCHOLESTEROL  -- --  172*  --   --   --   --   --   --    CHOLHDLRATIO  --   --  9.0*  --   --   --   --   --   --    TRIG  --   --  62  --   --   --   --   --   --    POCGLU  --    < >  --  121* 129* 114* 123* 118* 142*    < > = values in this interval not displayed. ABG:No results found for: POCPH, PHART, PH, POCPCO2, LQA9YDC, PCO2, POCPO2, PO2ART, PO2, POCHCO3, MHW0LWP, HCO3, NBEA, PBEA, BEART, BE, THGBART, THB, EMZ9SLL, TBLP0JLA, N3CUBHSF, O2SAT, FIO2  Lab Results   Component Value Date/Time    SPECIAL RAC 12ML 03/11/2019 08:52 PM     Lab Results   Component Value Date/Time    CULTURE NO GROWTH 6 DAYS 03/11/2019 08:52 PM       Radiology:  XR CHEST PORTABLE    Result Date: 1/5/2023  Cardiomegaly with pulmonary edema.        Physical Examination:        General appearance:  alert, cooperative and no distress  Mental Status:  oriented to person, place and time and normal affect  Lungs: Diminished breath sounds at bases normal effort  Heart:  regular rate and rhythm, no murmur  Abdomen:  soft, nontender, nondistended, normal bowel sounds, no masses, hepatomegaly, splenomegaly  Extremities:  ++ edema, + stasis dermatitis both lower extremities-wounds on both lower extremities-seen after removing Ace wraps  Skin:  no other gross lesions, rashes, induration    Assessment:        Hospital Problems             Last Modified POA    * (Principal) Acute on chronic combined systolic and diastolic CHF (congestive heart failure) (HCC) (Chronic) 1/6/2023 Yes    Status post implantation of automatic cardioverter/defibrillator (AICD) 1/6/2023 Yes    ARIADNA (obstructive sleep apnea) 1/6/2023 Yes    Type 2 diabetes mellitus, without long-term current use of insulin ([facility] [address] [facility] 1/6/2023 Yes    Paroxysmal atrial fibrillation ([facility] [address] 1/6/2023 Yes    Edema of both legs 1/6/2023 Yes    Acute congestive heart failure ([facility] [address] 1/6/2023 Yes    CAD (coronary artery disease) 1/6/2023 Yes   Stasis dermatitis with nonhealing wounds both lower extremities    Plan: Continue IV diuresis with Lasix  Increase Aldactone  25 mg 2 times daily   Continue midodrine for blood pressure support  Continue Xarelto  Continue other home medicines as before  PT OT  Wound care consult for wounds both lower extremities  Consult cardiology for optimization of CHF therapy  Discussed to remain compliant with his medications  ECF placement plan in progress    Christopher Robles MD  1/8/2023  2:18 PM

## 2023-01-08 NOTE — PLAN OF CARE
Problem: Discharge Planning  Goal: Discharge to home or other facility with appropriate resources  Outcome: Progressing     Problem: ABCDS Injury Assessment  Goal: Absence of physical injury  Outcome: Progressing     Problem: Safety - Adult  Goal: Free from fall injury  Outcome: Progressing     Problem: Skin/Tissue Integrity  Goal: Absence of new skin breakdown  Description: 1. Monitor for areas of redness and/or skin breakdown  2. Assess vascular access sites hourly  3. Every 4-6 hours minimum:  Change oxygen saturation probe site  4. Every 4-6 hours:  If on nasal continuous positive airway pressure, respiratory therapy assess nares and determine need for appliance change or resting period.   Outcome: Progressing     Problem: Chronic Conditions and Co-morbidities  Goal: Patient's chronic conditions and co-morbidity symptoms are monitored and maintained or improved  Outcome: Progressing     Problem: Pain  Goal: Verbalizes/displays adequate comfort level or baseline comfort level  Outcome: Progressing

## 2023-01-08 NOTE — CONSULTS
[facility]   Consult Note         Today's Date: 1/8/2023  Patient Name: Tessa Johnson  Date of admission: 1/5/2023  9:59 PM  Patient's age: 57 y.o., 1960  Admission Dx: Heart failure ([address] [I50.9]  Acute congestive heart failure, unspecified heart failure type ([address] [I50.9]    Reason for Consult:  Cardiac evaluation    Requesting Physician: Christopher Robles MD    REASON FOR CONSULT:  SOB, CHF, Edema    History Obtained From:  Patient, chart, staff, records    HISTORY OF PRESENT ILLNESS:      The patient is a 57 y.o. male who is admitted to the hospital for sob, edema, abdominal distension, chf exacerbation. Past Medical History:   has a past medical history of CAD (coronary artery disease), CHF (congestive heart failure) ([facility] [address] Heart attack ([facility] [address] Hyperlipidemia, Hypertension, MI (myocardial infarction) ([facility] [address] MRSA (methicillin resistant staph aureus) culture positive, Skin cancer, Snores, Stroke [facility]), Wears dentures, Wears reading eyeglasses, and Wellness examination. Past Surgical History:   has a past surgical history that includes Coronary artery bypass graft; Coronary angioplasty with stent; skin biopsy; eye surgery; Mandible surgery; hernia repair (Bilateral, 12/21/2020); Cardioversion (11/17/2021); other surgical history (11/17/2021); Cardiac surgery (1999); Cardiac catheterization (08/06/2020); and Cardiac defibrillator placement (04/04/2022). Home Medications:    Prior to Admission medications    Medication Sig Start Date End Date Taking?  Authorizing Provider   ARTIFICIAL TEAR SOLUTION OP Place 2 drops into both eyes daily   Yes Historical Provider, MD   clopidogrel (PLAVIX) 75 MG tablet Take 1 tablet by mouth daily 10/16/22   Christopher Robles MD   empagliflozin (JARDIANCE) 10 MG tablet Take 1 tablet by mouth daily 10/16/22   Christopher Robles MD   midodrine (PROAMATINE) 5 MG tablet Take 1 tablet by mouth 3 times daily (before meals) 10/16/22   Christopher Robles MD amiodarone (CORDARONE) 200 MG tablet Take 1 tablet by mouth daily 10/16/22   Pool Cannon MD   bumetanide Nile Elsie) 2 MG tablet Take 1 tablet by mouth 2 times daily 10/15/22   Pool Cannon MD   aspirin EC 81 MG EC tablet Take 1 tablet by mouth daily 10/15/22   Pool Cannon MD   rivaroxaban (XARELTO) 20 MG TABS tablet Take 20 mg by mouth Daily with supper    Historical Provider, MD   metFORMIN (GLUCOPHAGE) 1000 MG tablet Take 1 tablet by mouth daily Resume on 5/21 5/19/22   Mikael Lugo DO   Cyanocobalamin (VITAMIN B-12 PO) Take 1 tablet by mouth daily    Historical Provider, MD   naproxen (NAPROSYN) 500 MG tablet Take 1 tablet by mouth 2 times daily (with meals) 3/28/22 3/28/22  Tre Hemphill PA-C   Multiple Vitamins-Minerals (THERAPEUTIC MULTIVITAMIN-MINERALS) tablet Take 1 tablet by mouth daily    Historical Provider, MD   nitroGLYCERIN (NITROSTAT) 0.4 MG SL tablet Place 0.4 mg under the tongue every 5 minutes as needed for Chest pain up to max of 3 total doses. If no relief after 1 dose, call 911.   Dr. Gerard Hassan Provider, MD   atorvastatin (LIPITOR) 80 MG tablet Take 1 tablet by mouth nightly 2/20/19   Melanie Gee MD   carvedilol (COREG) 12.5 MG tablet Take 1 tablet by mouth 2 times daily (with meals) 2/20/19   Melanie Gee MD       spironolactone, 25 mg, Oral, BID    sodium chloride flush, 5-40 mL, IntraVENous, 2 times per day    lisinopril, 5 mg, Oral, Daily    insulin lispro, 0-4 Units, SubCUTAneous, TID WC    insulin lispro, 0-4 Units, SubCUTAneous, Nightly    furosemide, 40 mg, IntraVENous, BID    amiodarone, 200 mg, Oral, Daily    aspirin EC, 81 mg, Oral, Daily    atorvastatin, 80 mg, Oral, Nightly    carvedilol, 12.5 mg, Oral, BID WC    clopidogrel, 75 mg, Oral, Daily    midodrine, 5 mg, Oral, TID AC    therapeutic multivitamin-minerals, 1 tablet, Oral, Daily    rivaroxaban, 20 mg, Oral, Dinner    empagliflozin, 10 mg, Oral, Daily      Allergies:  Patient has no known allergies. Social History:   reports that he quit smoking about 23 years ago. He has never used smokeless tobacco. He reports that he does not drink alcohol and does not use drugs. Family History: family history includes Alcohol Abuse in his sister; Coronary Art Dis in his father; Liver Disease in his father. No h/o sudden cardiac death. REVIEW OF SYSTEMS:    Constitutional: there has been no unanticipated weight loss. There's been No change in energy level, No change in activity level. Eyes: No visual changes or diplopia. No scleral icterus. ENT: No Headaches, hearing loss or vertigo. No mouth sores or sore throat. Cardiovascular: per HPI  Respiratory: per HPI  Gastrointestinal: No abdominal pain, appetite loss, blood in stools. No change in bowel or bladder habits. Genitourinary: No dysuria, trouble voiding, or hematuria. Musculoskeletal:  No gait disturbance, No weakness or joint complaints. Integumentary: No rash or pruritis. Neurological: No headache, diplopia, change in muscle strength, numbness or tingling. No change in gait, balance, coordination, mood, affect, memory, mentation, behavior. Psychiatric: No anxiety, or depression. Endocrine: No temperature intolerance. No excessive thirst, fluid intake, or urination. No tremor. Hematologic/Lymphatic: No abnormal bruising or bleeding, blood clots or swollen lymph nodes. Allergic/Immunologic: No nasal congestion or hives. PHYSICAL EXAM:      /81   Pulse 72   Temp 97.3 °F (36.3 °C) (Oral)   Resp 18   Ht 5' 8\" (1.727 m)   Wt 259 lb 4.8 oz (117.6 kg)   SpO2 95%   BMI 39.43 kg/m²    Constitutional and General Appearance: alert, cooperative, no distress and appears stated age  HEENT: PERRL, no cervical lymphadenopathy. No masses palpable. Normal oral mucosa  Respiratory:  Normal excursion and expansion without use of accessory muscles  Resp Auscultation: Good respiratory effort.  No for increased work of breathing. On auscultation: clear to auscultation bilaterally  Cardiovascular: The apical impulse is not displaced  Heart tones are crisp and normal. regular S1 and S2.  Jugular venous pulsation Normal  The carotid upstroke is normal in amplitude and contour without delay or bruit  Peripheral pulses are symmetrical and full   Abdomen:   No masses or tenderness  Bowel sounds present  Extremities:   No Cyanosis or Clubbing   Lower extremity edema: Pitting to knees   Skin: Warm and dry  Neurological:  Alert and oriented. Moves all extremities well  No abnormalities of mood, affect, memory, mentation, or behavior are noted        EKG:    Date: 01/08/23  Reading: No acute ischemia      LAST ECHO:  Date:  Findings Summary:      LAST Stress Test:   Date of last ST:  Major Findings:    LAST Cardiac Angiography:.  Date:  Findings:      Labs:     CBC:   Recent Labs     01/05/23 2232 01/07/23 0553   WBC 5.8 4.5   HGB 12.4* 11.8*   HCT 39.8* 38.6*   * 122*     BMP:   Recent Labs     01/07/23 0553 01/08/23  0634    138   K 3.6* 3.6*   CO2 28 30   BUN 18 23   CREATININE 1.04 1.08   LABGLOM >60 >60   GLUCOSE 133* 125*     BNP: No results for input(s): BNP in the last 72 hours. PT/INR:   Recent Labs     01/07/23 0553   PROTIME 40.6*   INR 4.2     APTT:No results for input(s): APTT in the last 72 hours. CARDIAC ENZYMES:No results for input(s): CKTOTAL, CKMB, CKMBINDEX, TROPONINI in the last 72 hours.   FASTING LIPID PANEL:  Lab Results   Component Value Date/Time    HDL 23 01/07/2023 05:53 AM    TRIG 62 01/07/2023 05:53 AM     LIVER PROFILE:  Recent Labs     01/05/23 2232   AST 27   ALT 14   LABALBU 3.4*     Troponins: Invalid input(s): TROPONIN     Other Current Problems  Patient Active Problem List   Diagnosis    Hyperbilirubinemia    Essential hypertension    Hypercholesterolemia    CAD (coronary artery disease)    Gross hematuria    Abdominal pain, right upper quadrant    Right nephrolithiasis    Abnormal liver function test    Acute systolic HF (heart failure) (HCC)    Noncompliance    Acute on chronic systolic heart failure (HCC)    Pneumonia    Dyspnea and respiratory abnormalities    Status post inguinal hernia repair    S/P repair of ventral hernia    Post-op pain    Non-recurrent bilateral inguinal hernia without obstruction or gangrene    Umbilical hernia with obstruction, without gangrene    Status post implantation of automatic cardioverter/defibrillator (AICD)    NSTEMI (non-ST elevated myocardial infarction) (HCC)    ARIADNA (obstructive sleep apnea)    S/P CABG (coronary artery bypass graft)    Nausea    Anorexia    Thrombocytopenia (HCC)    Obesity (BMI 30-39. 9)    Hypokalemia    Uncontrolled type 2 diabetes mellitus with hyperglycemia (HCC)    Acute on chronic combined systolic and diastolic CHF (congestive heart failure) (HCC)    Type 2 diabetes mellitus, without long-term current use of insulin (HCC)    Paroxysmal atrial fibrillation (HCC)    Edema of both legs    Acute congestive heart failure (HCC)           IMPRESSION & Recommendations:      Acute decompensated systolic heart failure exacerbation. Optimize iv diuretics. -5Liters  Type 2 netsmi- Minimally elevated troponin, doubt ACS  Recent PCI to SVG-OM 5/18/22. Stable, doing well  Abdominal distension- will cont iv diuretics  Afib  ARIADNA  Obesity      Discussed with patient, family, and Nurse. Electronically signed by Ifrah Drew DO on 1/8/2023 at 4:09 PM    Ifrah Drew, [id number] [facility]  [facility]. [facility]  [phone]

## 2023-01-08 NOTE — PROGRESS NOTES
Patient showered with soap and water. BLE wound covered with nonadherent dressing and wrapped with kerlex and ACE wrap until wound care is able to see him for further orders.

## 2023-01-08 NOTE — PLAN OF CARE
Problem: Discharge Planning  Goal: Discharge to home or other facility with appropriate resources  Outcome: Progressing     Problem: ABCDS Injury Assessment  Goal: Absence of physical injury  Outcome: Progressing     Problem: Safety - Adult  Goal: Free from fall injury  Outcome: Progressing     Problem: Skin/Tissue Integrity  Goal: Absence of new skin breakdown  Description: 1. Monitor for areas of redness and/or skin breakdown  2. Assess vascular access sites hourly  3. Every 4-6 hours minimum:  Change oxygen saturation probe site  4. Every 4-6 hours:  If on nasal continuous positive airway pressure, respiratory therapy assess nares and determine need for appliance change or resting period.   Outcome: Progressing     Problem: Chronic Conditions and Co-morbidities  Goal: Patient's chronic conditions and co-morbidity symptoms are monitored and maintained or improved  Outcome: Progressing     Problem: Pain  Goal: Verbalizes/displays adequate comfort level or baseline comfort level  Outcome: Progressing Otc Regimen: Sunscreen with SPF 30 or higher daily on the face and other sun-exposed areas like the scalp, neck, ears, chest, and hands. Sunscreen recommendations include Cetaphil, Cerave, Neutrogena, La Roche Posay, or Elta MD. Detail Level: Zone

## 2023-01-09 PROBLEM — L97.922 SKIN ULCER OF LEFT LOWER LEG WITH FAT LAYER EXPOSED (HCC): Status: ACTIVE | Noted: 2023-01-09

## 2023-01-09 PROBLEM — L97.912 SKIN ULCER OF RIGHT LOWER LEG, WITH FAT LAYER EXPOSED (HCC): Status: ACTIVE | Noted: 2023-01-09

## 2023-01-09 LAB
ANION GAP SERPL CALCULATED.3IONS-SCNC: 11 MMOL/L (ref 9–17)
BUN BLDV-MCNC: 23 MG/DL (ref 8–23)
BUN/CREAT BLD: 21 (ref 9–20)
CALCIUM SERPL-MCNC: 9 MG/DL (ref 8.6–10.4)
CHLORIDE BLD-SCNC: 97 MMOL/L (ref 98–107)
CO2: 29 MMOL/L (ref 20–31)
CREAT SERPL-MCNC: 1.09 MG/DL (ref 0.7–1.2)
GFR SERPL CREATININE-BSD FRML MDRD: >60 ML/MIN/1.73M2
GLUCOSE BLD-MCNC: 112 MG/DL (ref 70–99)
GLUCOSE BLD-MCNC: 112 MG/DL (ref 75–110)
GLUCOSE BLD-MCNC: 116 MG/DL (ref 75–110)
GLUCOSE BLD-MCNC: 123 MG/DL (ref 75–110)
GLUCOSE BLD-MCNC: 127 MG/DL (ref 75–110)
MAGNESIUM: 2 MG/DL (ref 1.6–2.6)
POTASSIUM SERPL-SCNC: 3.9 MMOL/L (ref 3.7–5.3)
PRO-BNP: 947 PG/ML
SODIUM BLD-SCNC: 137 MMOL/L (ref 135–144)

## 2023-01-09 PROCEDURE — 6360000002 HC RX W HCPCS: Performed by: NURSE PRACTITIONER

## 2023-01-09 PROCEDURE — 6370000000 HC RX 637 (ALT 250 FOR IP): Performed by: NURSE PRACTITIONER

## 2023-01-09 PROCEDURE — 83735 ASSAY OF MAGNESIUM: CPT

## 2023-01-09 PROCEDURE — 6370000000 HC RX 637 (ALT 250 FOR IP): Performed by: INTERNAL MEDICINE

## 2023-01-09 PROCEDURE — 97110 THERAPEUTIC EXERCISES: CPT

## 2023-01-09 PROCEDURE — 97116 GAIT TRAINING THERAPY: CPT

## 2023-01-09 PROCEDURE — 83880 ASSAY OF NATRIURETIC PEPTIDE: CPT

## 2023-01-09 PROCEDURE — 1200000000 HC SEMI PRIVATE

## 2023-01-09 PROCEDURE — 80048 BASIC METABOLIC PNL TOTAL CA: CPT

## 2023-01-09 PROCEDURE — 97530 THERAPEUTIC ACTIVITIES: CPT

## 2023-01-09 PROCEDURE — 99232 SBSQ HOSP IP/OBS MODERATE 35: CPT | Performed by: NURSE PRACTITIONER

## 2023-01-09 PROCEDURE — 36415 COLL VENOUS BLD VENIPUNCTURE: CPT

## 2023-01-09 PROCEDURE — 2580000003 HC RX 258: Performed by: NURSE PRACTITIONER

## 2023-01-09 PROCEDURE — 82947 ASSAY GLUCOSE BLOOD QUANT: CPT

## 2023-01-09 RX ORDER — LISINOPRIL 2.5 MG/1
2.5 TABLET ORAL DAILY
Status: DISCONTINUED | OUTPATIENT
Start: 2023-01-10 | End: 2023-01-10 | Stop reason: HOSPADM

## 2023-01-09 RX ORDER — CARVEDILOL 12.5 MG/1
6.25 TABLET ORAL 2 TIMES DAILY WITH MEALS
Qty: 60 TABLET | Refills: 3 | Status: SHIPPED | OUTPATIENT
Start: 2023-01-09 | End: 2023-01-10 | Stop reason: SDUPTHER

## 2023-01-09 RX ORDER — CARVEDILOL 6.25 MG/1
6.25 TABLET ORAL 2 TIMES DAILY WITH MEALS
Status: DISCONTINUED | OUTPATIENT
Start: 2023-01-09 | End: 2023-01-10 | Stop reason: HOSPADM

## 2023-01-09 RX ORDER — SPIRONOLACTONE 25 MG/1
25 TABLET ORAL DAILY
Qty: 30 TABLET | Refills: 3 | Status: SHIPPED | OUTPATIENT
Start: 2023-01-10

## 2023-01-09 RX ORDER — SPIRONOLACTONE 25 MG/1
25 TABLET ORAL DAILY
Status: DISCONTINUED | OUTPATIENT
Start: 2023-01-10 | End: 2023-01-10 | Stop reason: HOSPADM

## 2023-01-09 RX ORDER — LORAZEPAM 0.5 MG/1
0.5 TABLET ORAL ONCE
Status: COMPLETED | OUTPATIENT
Start: 2023-01-09 | End: 2023-01-09

## 2023-01-09 RX ADMIN — FUROSEMIDE 40 MG: 10 INJECTION, SOLUTION INTRAMUSCULAR; INTRAVENOUS at 18:22

## 2023-01-09 RX ADMIN — MIDODRINE HYDROCHLORIDE 5 MG: 5 TABLET ORAL at 18:24

## 2023-01-09 RX ADMIN — MULTIPLE VITAMINS W/ MINERALS TAB 1 TABLET: TAB at 08:49

## 2023-01-09 RX ADMIN — RIVAROXABAN 20 MG: 20 TABLET, FILM COATED ORAL at 18:24

## 2023-01-09 RX ADMIN — CARVEDILOL 12.5 MG: 12.5 TABLET, FILM COATED ORAL at 08:49

## 2023-01-09 RX ADMIN — SODIUM CHLORIDE, PRESERVATIVE FREE 10 ML: 5 INJECTION INTRAVENOUS at 08:51

## 2023-01-09 RX ADMIN — LORAZEPAM 0.5 MG: 0.5 TABLET ORAL at 23:03

## 2023-01-09 RX ADMIN — LISINOPRIL 5 MG: 5 TABLET ORAL at 08:50

## 2023-01-09 RX ADMIN — ATORVASTATIN CALCIUM 80 MG: 80 TABLET, FILM COATED ORAL at 22:22

## 2023-01-09 RX ADMIN — AMIODARONE HYDROCHLORIDE 200 MG: 200 TABLET ORAL at 08:50

## 2023-01-09 RX ADMIN — CARVEDILOL 6.25 MG: 6.25 TABLET, FILM COATED ORAL at 18:22

## 2023-01-09 RX ADMIN — FUROSEMIDE 40 MG: 10 INJECTION, SOLUTION INTRAMUSCULAR; INTRAVENOUS at 08:51

## 2023-01-09 RX ADMIN — SPIRONOLACTONE 25 MG: 25 TABLET ORAL at 08:50

## 2023-01-09 RX ADMIN — EMPAGLIFLOZIN 10 MG: 10 TABLET, FILM COATED ORAL at 08:49

## 2023-01-09 RX ADMIN — MIDODRINE HYDROCHLORIDE 5 MG: 5 TABLET ORAL at 12:08

## 2023-01-09 RX ADMIN — CLOPIDOGREL BISULFATE 75 MG: 75 TABLET ORAL at 08:49

## 2023-01-09 RX ADMIN — ASPIRIN 81 MG: 81 TABLET, COATED ORAL at 08:49

## 2023-01-09 RX ADMIN — MIDODRINE HYDROCHLORIDE 5 MG: 5 TABLET ORAL at 08:50

## 2023-01-09 ASSESSMENT — ENCOUNTER SYMPTOMS
COLOR CHANGE: 0
PHOTOPHOBIA: 0
SINUS PRESSURE: 0
SHORTNESS OF BREATH: 1
ABDOMINAL PAIN: 0
ABDOMINAL DISTENTION: 1
SINUS PAIN: 0
NAUSEA: 0

## 2023-01-09 NOTE — PROGRESS NOTES
[facility]  Office: [address], DO, Stella Godinez, DO, Lc Melton, DO, Elise Ackerman Blood, DO, Meredith Ferrell MD, Don Britton MD, Lianne Crockett MD, Donal Wade MD,  Jorge Luis Dillard MD, Mio Teixeira MD, Rae Coulter, DO, Jolene Pitt MD,  Neel Sow MD, Earlene Quan MD, Martha Gupta, DO, Mare Choudhury MD, Brigette Duval MD, Terrence Tanner, DO, Que Burt MD, Ava Decker MD, Mark Palacios MD, Kandice López MD, Ela Richardson, DO, Swapnil Raphael MD, Tyra Jaramillo MD, Toby Tanner, Claudine Tiwari, CNP, Sera Aj, CNP, Aliya Hickman, CNP,  Eduarda Parham, [facility], Sumeet Domingo, CNP, Wilma Macedo, CNP, Oracio Ortiz, CNP, Dk Kidd, CNP, Archie Zapien, CNP, Lacey Hood PA-C, Haylie Lozano, CNS, Tejas Hewitt, CNP, Tono Turpin, CNP         [facility]    Progress Note    1/9/2023    1:09 PM    Name:   Thuan Robbins  MRN:     [id number]     [place]:      [de-identified]   Room:   2009/2009-02  IP Day:  3  Admit Date:  1/5/2023  9:59 PM    PCP:   Amanuel Pearson PA-C  Code Status:  Full Code    Subjective:     C/C:   Chief Complaint   Patient presents with    Shortness of Breath    Leg Swelling     Interval History Status: improved. Condition continues to improve. Patient has responded well to diuretics. Patient reports that his functional status has improved to his baseline. Patient no longer wishes to go to skilled facility and would like to be discharged home. Renal function is stable. Blood sugar control underway. Patient is stable for hospital discharge provided cardiology agrees he can be discharged to home with home care as early as this afternoon. Brief History:     1/6 - Tam De La Rosa is a 57year old male who presented for evaluation of SOB and bilateral low leg edema.  He states that he has home health services who come and change his Unna Boots twice weekly. He states that today a new nurse came to do his dressings and told him that she was recommending ED evaluation for edema. He states he has been taking his diuretics as prescribed. He states he has chronic SOB and edema through his legs but \"didn't realize it was this bad\". He states he was scheduled to see cardiology on 1/7/23 for follow-up since his last hospitalization in October, 2022. 1/7-1/9 -patient is responded well to diuretics. He is greater than 5 L out since admission. Now refusing skilled facility and would like to return home. Review of Systems:     Review of Systems   Constitutional:  Positive for activity change and fatigue. HENT:  Negative for sinus pressure and sinus pain. Eyes:  Negative for photophobia and visual disturbance. Respiratory:  Positive for shortness of breath (improved). Cardiovascular:  Negative for chest pain and palpitations. Gastrointestinal:  Positive for abdominal distention. Negative for abdominal pain and nausea. Endocrine: Negative for cold intolerance and heat intolerance. Genitourinary:  Negative for frequency and urgency. Musculoskeletal:  Negative for arthralgias. Skin:  Negative for color change and pallor. Neurological:  Negative for dizziness, seizures and weakness. Hematological:  Negative for adenopathy. Does not bruise/bleed easily. Psychiatric/Behavioral:  Negative for agitation and confusion. Medications:      Allergies:  No Known Allergies    Current Meds:   Scheduled Meds:    influenza virus vaccine  0.5 mL IntraMUSCular Prior to discharge    spironolactone  25 mg Oral BID    midodrine  5 mg Oral TID WC    sodium chloride flush  5-40 mL IntraVENous 2 times per day    lisinopril  5 mg Oral Daily    insulin lispro  0-4 Units SubCUTAneous TID WC    insulin lispro  0-4 Units SubCUTAneous Nightly    furosemide  40 mg IntraVENous BID    amiodarone  200 mg Oral Daily    aspirin EC  81 mg Oral Daily    atorvastatin  80 mg Oral Nightly    carvedilol  12.5 mg Oral BID WC    clopidogrel  75 mg Oral Daily    therapeutic multivitamin-minerals  1 tablet Oral Daily    rivaroxaban  20 mg Oral Dinner    empagliflozin  10 mg Oral Daily     Continuous Infusions:    dextrose      sodium chloride       PRN Meds: glucose, dextrose bolus **OR** dextrose bolus, glucagon (rDNA), dextrose, sodium chloride flush, sodium chloride, ondansetron **OR** ondansetron, polyethylene glycol, acetaminophen **OR** acetaminophen, potassium chloride **OR** potassium alternative oral replacement **OR** potassium chloride, magnesium sulfate, perflutren lipid microspheres    Data:     Past Medical History:   has a past medical history of CAD (coronary artery disease), CHF (congestive heart failure) ([facility] [address] Heart attack ([facility] [address] Hyperlipidemia, Hypertension, [facility] MRSA (methicillin resistant staph aureus) culture positive, Skin cancer, Snores, St[facility] Wears dentures, Wears reading eyeglasses, and Wellness examination. Social History:   reports that he quit smoking about 23 years ago. He has never used smokeless tobacco. He reports that he does not drink alcohol and does not use drugs. Family History:   Family History   Problem Relation Age of Onset    Coronary Art Dis Father     Liver Disease Father     Alcohol Abuse Sister        Vitals:  BP (!) 88/42   Pulse 61   Temp 97.3 °F (36.3 °C) (Oral)   Resp 17   Ht 5' 8\" (1.727 m)   Wt 259 lb (117.5 kg)   SpO2 93%   BMI 39.38 kg/m²   Temp (24hrs), Av.3 °F (36.3 °C), Min:97.3 °F (36.3 °C), Max:97.5 °F (36.4 °C)    Recent Labs     23  1645 23  2117 23  0554 23  1134   POCGLU 116* 128* 112* 123*       I/O (24Hr):     Intake/Output Summary (Last 24 hours) at 2023 1309  Last data filed at 2023 1212  Gross per 24 hour   Intake 615 ml   Output 2400 ml   Net -1785 ml       Labs:  Hematology:  Recent Labs     23  0553   WBC 4.5   RBC 4.72   HGB 11.8* HCT 38.6*   MCV 81.8*   MCH 25.0*   MCHC 30.6   RDW 18.5*   *   MPV 10.6   INR 4.2     Chemistry:  Recent Labs     01/07/23  0553 01/08/23  0634 01/09/23  0549    138 137   K 3.6* 3.6* 3.9   CL 99 99 97*   CO2 28 30 29   GLUCOSE 133* 125* 112*   BUN 18 23 23   CREATININE 1.04 1.08 1.09   MG 1.9 2.0 2.0   ANIONGAP 9 9 11   LABGLOM >60 >60 >60   CALCIUM 8.8 8.7 9.0   PROBNP 980*  --  947*     Recent Labs     01/07/23  0553 01/07/23  0609 01/08/23  0623 01/08/23  1033 01/08/23  1645 01/08/23  2117 01/09/23  0554 01/09/23  1134   TSH 1.15  --   --   --   --   --   --   --    CHOL 207*  --   --   --   --   --   --   --    HDL 23*  --   --   --   --   --   --   --    LDLCHOLESTEROL 172*  --   --   --   --   --   --   --    CHOLHDLRATIO 9.0*  --   --   --   --   --   --   --    TRIG 62  --   --   --   --   --   --   --    POCGLU  --    < > 118* 142* 116* 128* 112* 123*    < > = values in this interval not displayed. ABG:No results found for: POCPH, PHART, PH, POCPCO2, XDZ2NXW, PCO2, POCPO2, PO2ART, PO2, POCHCO3, CPJ2AMS, HCO3, NBEA, PBEA, BEART, BE, THGBART, THB, GYM9ODT, HUEN2JBM, T7VOPXRO, O2SAT, FIO2  Lab Results   Component Value Date/Time    SPECIAL RAC 12ML 03/11/2019 08:52 PM     Lab Results   Component Value Date/Time    CULTURE NO GROWTH 6 DAYS 03/11/2019 08:52 PM       Radiology:  XR CHEST PORTABLE    Result Date: 1/5/2023  Cardiomegaly with pulmonary edema. Physical Examination:        Physical Exam  Constitutional:       Appearance: Normal appearance. He is normal weight. HENT:      Head: Normocephalic and atraumatic. Nose: Nose normal.      Mouth/Throat:      Mouth: Mucous membranes are dry. Eyes:      Extraocular Movements: Extraocular movements intact. Pupils: Pupils are equal, round, and reactive to light. Cardiovascular:      Rate and Rhythm: Regular rhythm. Tachycardia present. Pulses: Normal pulses. Heart sounds: Normal heart sounds.  No murmur heard.  Pulmonary:      Effort: Pulmonary effort is normal. No respiratory distress. Breath sounds: Rales present. Abdominal:      General: Abdomen is flat. Bowel sounds are normal. There is no distension. Palpations: Abdomen is soft. There is no mass. Tenderness: There is no abdominal tenderness. Musculoskeletal:         General: No swelling, tenderness or deformity. Normal range of motion. Cervical back: Normal range of motion and neck supple. No rigidity. Skin:     General: Skin is warm and dry. Capillary Refill: Capillary refill takes less than 2 seconds. Coloration: Skin is not jaundiced. Findings: No bruising. Neurological:      General: No focal deficit present. Mental Status: He is alert and oriented to person, place, and time. Cranial Nerves: No cranial nerve deficit. Motor: No weakness.    Psychiatric:         Mood and Affect: Mood normal.         Behavior: Behavior normal.       Assessment:        Hospital Problems             Last Modified POA    * (Principal) Acute on chronic combined systolic and diastolic CHF (congestive heart failure) (HCC) (Chronic) 1/6/2023 Yes    Status post implantation of automatic cardioverter/defibrillator (AICD) 1/6/2023 Yes    ARIADNA (obstructive sleep apnea) 1/6/2023 Yes    Type 2 diabetes mellitus, without long-term current use of insulin ([address] 1/6/2023 Yes    Paroxysmal atrial fibrillation ([address] 1/6/2023 Yes    Edema of both legs 1/6/2023 Yes    Acute congestive heart failure ([address] 1/6/2023 Yes    CAD (coronary artery disease) 1/6/2023 Yes       Plan:        Acute on chronic combined systolic heart failure   Continue Lasix twice daily  Continue spironolactone  Paroxysmal atrial fibrillation  Continue Xarelto  Continue amiodarone, Coreg  Obesity with type 2 diabetes  Educational need for diet lifestyle modification  Corrective insulin as ordered  Hyperlipidemia  Lipitor      ELY Cao NP  1/9/2023  1:09 PM

## 2023-01-09 NOTE — CARE COORDINATION
Social work: pt refused [facility] or any in pt rehab as his \"car needs fixed\". Went over options for care. Pt wants home. Will follow as needed.   Galileo cristina

## 2023-01-09 NOTE — PLAN OF CARE
Problem: Discharge Planning  Goal: Discharge to home or other facility with appropriate resources  1/9/2023 1158 by Iveth Lora RN  Outcome: Progressing  Flowsheets (Taken 1/9/2023 0900)  Discharge to home or other facility with appropriate resources:   Identify barriers to discharge with patient and caregiver   Arrange for needed discharge resources and transportation as appropriate   Identify discharge learning needs (meds, wound care, etc)   Refer to discharge planning if patient needs post-hospital services based on physician order or complex needs related to functional status, cognitive ability or social support system     Problem: ABCDS Injury Assessment  Goal: Absence of physical injury  1/9/2023 1158 by Iveth Lora RN  Outcome: Progressing  Flowsheets (Taken 1/9/2023 1158)  Absence of Physical Injury: Implement safety measures based on patient assessment     Problem: Safety - Adult  Goal: Free from fall injury  1/9/2023 1158 by Iveth Lora RN  Outcome: Progressing  Note: Patient is a fall risk during this admission. Fall risk assessment was performed. Patient is absent of falls. Bed is in the lowest position. Wheels on the bed are locked. Call light and bed side table are within reach. Clutter is removed. Patient was educated to call out when needing assistance      Problem: Skin/Tissue Integrity  Goal: Absence of new skin breakdown  Description: 1. Monitor for areas of redness and/or skin breakdown  2. Assess vascular access sites hourly  3. Every 4-6 hours minimum:  Change oxygen saturation probe site  4. Every 4-6 hours:  If on nasal continuous positive airway pressure, respiratory therapy assess nares and determine need for appliance change or resting period. 1/9/2023 1158 by Iveth Lora RN  Outcome: Progressing  Note: Skin assessment performed. Patient turns self PRN.  Dressings to BLE remain clean, dry and intact     Problem: Chronic Conditions and Co-morbidities  Goal: Patient's chronic conditions and co-morbidity symptoms are monitored and maintained or improved  1/9/2023 1158 by Simone Salcedo RN  Outcome: Progressing  Flowsheets (Taken 1/9/2023 0900)  Care Plan - Patient's Chronic Conditions and Co-Morbidity Symptoms are Monitored and Maintained or Improved:   Monitor and assess patient's chronic conditions and comorbid symptoms for stability, deterioration, or improvement   Collaborate with multidisciplinary team to address chronic and comorbid conditions and prevent exacerbation or deterioration   Update acute care plan with appropriate goals if chronic or comorbid symptoms are exacerbated and prevent overall improvement and discharge     Problem: Pain  Goal: Verbalizes/displays adequate comfort level or baseline comfort level  1/9/2023 1158 by Simone Salcedo RN  Outcome: Progressing  Flowsheets (Taken 1/9/2023 0809)  Verbalizes/displays adequate comfort level or baseline comfort level:   Encourage patient to monitor pain and request assistance   Assess pain using appropriate pain scale   Administer analgesics based on type and severity of pain and evaluate response   Implement non-pharmacological measures as appropriate and evaluate response   Notify Licensed Independent Practitioner if interventions unsuccessful or patient reports new pain     Problem: Respiratory - Adult  Goal: Achieves optimal ventilation and oxygenation  Outcome: Progressing  Flowsheets (Taken 1/9/2023 1158)  Achieves optimal ventilation and oxygenation:   Assess for changes in respiratory status   Assess for changes in mentation and behavior   Position to facilitate oxygenation and minimize respiratory effort   Oxygen supplementation based on oxygen saturation or arterial blood gases   Encourage broncho-pulmonary hygiene including cough, deep breathe, incentive spirometry   Assess and instruct to report shortness of breath or any respiratory difficulty   Respiratory therapy support as indicated     Problem: Cardiovascular - Adult  Goal: Maintains optimal cardiac output and hemodynamic stability  Outcome: Progressing  Flowsheets (Taken 1/9/2023 1158)  Maintains optimal cardiac output and hemodynamic stability:   Monitor blood pressure and heart rate   Monitor urine output and notify Licensed Independent Practitioner for values outside of normal range   Assess for signs of decreased cardiac output   Administer fluid and/or volume expanders as ordered     Problem: Cardiovascular - Adult  Goal: Absence of cardiac dysrhythmias or at baseline  Outcome: Progressing  Flowsheets (Taken 1/9/2023 1158)  Absence of cardiac dysrhythmias or at baseline:   Monitor cardiac rate and rhythm   Assess for signs of decreased cardiac output   Administer antiarrhythmia medication and electrolyte replacement as ordered     Problem: Skin/Tissue Integrity - Adult  Goal: Skin integrity remains intact  Outcome: Progressing    Problem: Skin/Tissue Integrity - Adult  Goal: Skin integrity remains intact  Outcome: Progressing  Flowsheets  Taken 1/9/2023 0900 by Kenia Mandujano RN  Skin Integrity Remains Intact: Monitor for areas of redness and/or skin breakdown    Problem: Gastrointestinal - Adult  Goal: Minimal or absence of nausea and vomiting  Outcome: Completed     Problem: Gastrointestinal - Adult  Goal: Maintains adequate nutritional intake  Outcome: Completed     Problem: Infection - Adult  Goal: Absence of infection at discharge  Outcome: Completed     Problem: Infection - Adult  Goal: Absence of infection during hospitalization  Outcome: Completed     Problem: Metabolic/Fluid and Electrolytes - Adult  Goal: Electrolytes maintained within normal limits  Outcome: Progressing  Flowsheets (Taken 1/9/2023 1158)  Electrolytes maintained within normal limits:   Monitor labs and assess patient for signs and symptoms of electrolyte imbalances   Administer electrolyte replacement as ordered   Monitor response to electrolyte replacements, including repeat lab results as appropriate   Fluid restriction as ordered   Instruct patient on fluid and nutrition restrictions as appropriate     Problem: Metabolic/Fluid and Electrolytes - Adult  Goal: Hemodynamic stability and optimal renal function maintained  Outcome: Progressing  Flowsheets (Taken 1/9/2023 1158)  Hemodynamic stability and optimal renal function maintained:   Monitor labs and assess for signs and symptoms of volume excess or deficit   Monitor intake, output and patient weight   Monitor urine specific gravity, serum osmolarity and serum sodium as indicated or ordered   Monitor response to interventions for patient's volume status, including labs, urine output, blood pressure (other measures as available)   Encourage oral intake as appropriate   Instruct patient on fluid and nutrition restrictions as appropriate     Problem: Metabolic/Fluid and Electrolytes - Adult  Goal: Glucose maintained within prescribed range  Outcome: Progressing  Flowsheets (Taken 1/9/2023 1158)  Glucose maintained within prescribed range:   Monitor blood glucose as ordered   Assess for signs and symptoms of hyperglycemia and hypoglycemia   Assess barriers to adequate nutritional intake and initiate nutrition consult as needed   Instruct patient on self management of diabetes and initiate consult as needed   Administer ordered medications to maintain glucose within target range

## 2023-01-09 NOTE — CARE COORDINATION
Plan is for home tomorrow with [facility]. Sofia Head with [facility] informed.

## 2023-01-09 NOTE — DISCHARGE SUMMARY
[facility]  Office: [address], DO, Mike Jimenez, DO, Herber Murillo, DO, Marlin Sandhu, DO, Velton Habermann, MD, An Disla MD, Luann Valentine MD, Chi Contreras MD,  Brittaney Flannery MD, Lucina Condon MD, Natalya Pimentel, DO, Hetal Sanchez MD,  Marc Watson MD, Justine Giang MD, Azul Chavez DO, Joya Collier MD, Tabatha Ellis MD, Rachelle Villeda DO, Yasmani Land MD, Tunde Marie MD, Morris Jones MD, Jim Herrera MD, Andrew Patel, DO, Desirae Zaldivar MD, Luis Fernando Hutchins MD, Adam Sheffield, CNP,  Gena Albarran, CNP, Gerda Dominguez, CNP, Cally Rojas, CNP,  Johnny Peterson, [facility], Manisha Acosta, CNP, Hemanth Child, CNP, Tim Saunders, CNP, Mary Marie, CNP, Adam Nunes, CNP, Torito Gonzales, [facility], Clifford Guy, CNS, Toni Drew, [facility], Rusty Figueroa, [facility]    Discharge Summary     Patient ID: Arnaldo Delarosa  :  1960   MRN: [id number]     ACCOUNT:  [de-identified]   Patient's PCP: Andrew Retana PA-C  Admit Date: 2023   Discharge Date: 2023     Length of Stay: 3  Code Status:  Full Code  Admitting Physician: Brien Cantu MD  Discharge Physician: ELY Hughes NP     Active Discharge Diagnoses:     Hospital Problem Lists:  Principal Problem:    Acute on chronic combined systolic and diastolic CHF (congestive heart failure) [facility])  Active Problems:    Status post implantation of automatic cardioverter/defibrillator (AICD)    ARIADNA (obstructive sleep apnea)    Type 2 diabetes mellitus, without long-term current use of insulin (HCC)    Paroxysmal atrial fibrillation (HCC)    Edema of both legs    Acute congestive heart failure [facility])    CAD (coronary artery disease)  Resolved Problems:    * No resolved hospital problems.  *      Admission Condition:  fair     Discharged Condition: stable    Hospital Stay:     Hospital Course:  Arnaldo oliva 57 y.o. male who was admitted for the management of  Acute on chronic combined systolic and diastolic CHF (congestive heart failure) ([facility] [address] , presented to ER with Shortness of Breath and Leg Swelling    1/6 - Alverto Dietz is a 57year old male who presented for evaluation of SOB and bilateral low leg edema. He states that he has home health services who come and change his Unna Boots twice weekly. He states that today a new nurse came to do his dressings and told him that she was recommending ED evaluation for edema. He states he has been taking his diuretics as prescribed. He states he has chronic SOB and edema through his legs but \"didn't realize it was this bad\". He states he was scheduled to see cardiology on 1/7/23 for follow-up since his last hospitalization in October, 2022. 1/7-1/9 -patient is responded well to diuretics. He is greater than 5 L out since admission. Now refusing skilled facility and would like to return home.     Significant therapeutic interventions: As above    Significant Diagnostic Studies:   Labs / Micro:  CBC:   Lab Results   Component Value Date/Time    WBC 4.5 01/07/2023 05:53 AM    RBC 4.72 01/07/2023 05:53 AM    RBC 5.00 01/02/2012 07:23 PM    HGB 11.8 01/07/2023 05:53 AM    HCT 38.6 01/07/2023 05:53 AM    MCV 81.8 01/07/2023 05:53 AM    MCH 25.0 01/07/2023 05:53 AM    MCHC 30.6 01/07/2023 05:53 AM    RDW 18.5 01/07/2023 05:53 AM     01/07/2023 05:53 AM     01/02/2012 07:23 PM     BMP:    Lab Results   Component Value Date/Time    GLUCOSE 112 01/09/2023 05:49 AM    GLUCOSE 132 01/02/2012 07:23 PM     01/09/2023 05:49 AM    K 3.9 01/09/2023 05:49 AM    CL 97 01/09/2023 05:49 AM    CO2 29 01/09/2023 05:49 AM    ANIONGAP 11 01/09/2023 05:49 AM    BUN 23 01/09/2023 05:49 AM    CREATININE 1.09 01/09/2023 05:49 AM    BUNCRER 21 01/09/2023 05:49 AM    CALCIUM 9.0 01/09/2023 05:49 AM    LABGLOM >60 01/09/2023 05:49 AM    GFRAA >60 09/02/2022 05:42 PM    GFR 09/02/2022 05:42 PM        Radiology:  XR CHEST PORTABLE    Result Date: 1/5/2023  Cardiomegaly with pulmonary edema. Consultations:    Consults:     Final Specialist Recommendations/Findings:   IP CONSULT TO HEART FAILURE NURSE/COORDINATOR  IP CONSULT TO DIETITIAN  IP CONSULT TO CARDIOLOGY      The patient was seen and examined on day of discharge and this discharge summary is in conjunction with any daily progress note from day of discharge. Discharge plan:     Disposition: Home    Physician Follow Up:     Brodie Santiago, [address]  [phone]    Follow up in 1 week(s)  bmp and vitals check on new medication    Cristhian Cohen DO  [address] [facility] [facility] [address] [phone]    Follow up in 2 week(s)  heart valve replacment evaluation     Requiring Further Evaluation/Follow Up POST HOSPITALIZATION/Incidental Findings: Worsening heart failure with noncompliance with medication regiment and complete lack of understanding on dietary restriction    Diet: cardiac diet and drink no more than 1200 mL/day    Activity: As tolerated    Instructions to Patient: Please make and keep a follow-up appointment with your primary care provider for approximately 1 week to have your blood work rechecked and to have your vital signs reevaluated. Please make and keep a follow-up appointment with the structural heart center to discuss having your heart valve corrected. It would be in your best interest to go to the heart failure education clinic and management center to discuss diet and lifestyle modifications that she can make to help reduce your exacerbations.     Discharge Medications:      Medication List        START taking these medications      spironolactone 25 MG tablet  Commonly known as: ALDACTONE  Take 1 tablet by mouth daily  Start taking on: January 10, 2023            CHANGE how you take these medications      carvedilol 12.5 MG tablet  Commonly known as: COREG  Take 0.5 tablets by mouth 2 times daily (with meals)  What changed: how much to take            CONTINUE taking these medications      amiodarone 200 MG tablet  Commonly known as: CORDARONE  Take 1 tablet by mouth daily     ARTIFICIAL TEAR SOLUTION OP     aspirin EC 81 MG EC tablet  Take 1 tablet by mouth daily     atorvastatin 80 MG tablet  Commonly known as: LIPITOR  Take 1 tablet by mouth nightly     bumetanide 2 MG tablet  Commonly known as: BUMEX  Take 1 tablet by mouth 2 times daily     clopidogrel 75 MG tablet  Commonly known as: PLAVIX  Take 1 tablet by mouth daily     empagliflozin 10 MG tablet  Commonly known as: JARDIANCE  Take 1 tablet by mouth daily     metFORMIN 1000 MG tablet  Commonly known as: GLUCOPHAGE  Take 1 tablet by mouth daily Resume on 5/21     midodrine 5 MG tablet  Commonly known as: PROAMATINE  Take 1 tablet by mouth 3 times daily (before meals)     nitroGLYCERIN 0.4 MG SL tablet  Commonly known as: NITROSTAT     rivaroxaban 20 MG Tabs tablet  Commonly known as: XARELTO     therapeutic multivitamin-minerals tablet     VITAMIN B-12 PO               Where to Get Your Medications        These medications were sent to [facility] [id number]  [place] [address]      Phone: [phone]   carvedilol 12.5 MG tablet  spironolactone 25 MG tablet         No discharge procedures on file. Time Spent on discharge is  38 mins in patient examination, evaluation, counseling as well as medication reconciliation, prescriptions for required medications, discharge plan and follow up. Electronically signed by   ELY Barrow NP  1/9/2023  1:27 PM      Thank you Dr. Arlen Shea PA-C for the opportunity to be involved in this patient's care.

## 2023-01-09 NOTE — PROGRESS NOTES
[facility] Wound Ostomy Continence Nurse  Consult Note       NAME:  Des Smith  MEDICAL RECORD NUMBER:  [id number]  AGE: 57 y.o. GENDER: male  : 1960  TODAY'S DATE:  2023    Subjective:      Des Smith is a 57 y.o. male with inpatient referral to Wound Ostomy Continence Specialty for:  Bilateral lower extremity ulcerations      Wound Identification:  Wound Type: venous  Contributing Factors: edema, venous stasis, obesity, and smoking    Wound History: The patient states he has a history of chronic venous stasis ulcerations and has been having home care nursing come twice weekly for Unna boot dressing changes that is being managed by his PCP. Current Wound Care Treatment: Nonadherent Telfa with gauze roll and Ace wrap removed today. Patient Goal of Care:  [x] Wound Healing  [] Odor Control  [] Palliative Care  [] Pain Control   [x] Other: Infection prevention, compression therapy, wound management and prevention        PAST MEDICAL HISTORY        Diagnosis Date    CAD (coronary artery disease)      cath    CHF (congestive heart failure) ([facility])     Dr. Ermelinda Allen attack [facility])     Hyperlipidemia     Dr. Naya Coello    Hypertension     Dr. Naya Coello    MI (myocardial infarction) [facility])     MRSA (methicillin resistant staph aureus) culture positive 2018    abdomen    Skin cancer     Snores     no cpap    Stroke [facility])       Dr. Naya Coello monitors    Wears dentures     upper full denture    Wears reading eyeglasses     Wellness examination     Figueroa PINA-SULY last seen 2020       PAST SURGICAL HISTORY    Past Surgical History:   Procedure Laterality Date    CARDIAC CATHETERIZATION  2020    Dr. Jade Lugo therapy.   Report on chart    CARDIAC DEFIBRILLATOR PLACEMENT  2022    [address]    unsure of date, bilateral radials    CARDIOVERSION  2021    CORONARY ANGIOPLASTY WITH STENT PLACEMENT      6 total stents per patient    CORONARY ARTERY BYPASS GRAFT      4 vessel bypass    EYE SURGERY      eye injury  new lens  and laser lt eye 2019    HERNIA REPAIR Bilateral 2020    XI LAPAROSCOPIC ROBOTIC INGUINAL HERNIA REPAIR WITH MESH; UMBILICAL HERNIA REPAIR WITH MESH performed by Wilder Montes DO at [address] [place] [address]      plate/hardware present    OTHER SURGICAL HISTORY  2021    BENITO    SKIN BIOPSY      back       FAMILY HISTORY    Family History   Problem Relation Age of Onset    Coronary Art Dis Father     Liver Disease Father     Alcohol Abuse Sister        SOCIAL HISTORY    Social History     Tobacco Use    Smoking status: Former     Types: Cigarettes     Quit date: 1999     Years since quittin.9    Smokeless tobacco: Never   Vaping Use    Vaping Use: Never used   Substance Use Topics    Alcohol use: No    Drug use: No         ALLERGIES    No Known Allergies    HOME MEDICATIONS  Prior to Admission medications    Medication Sig Start Date End Date Taking?  Authorizing Provider   carvedilol (COREG) 12.5 MG tablet Take 0.5 tablets by mouth 2 times daily (with meals) 23  Yes ELY Vazquez NP   spironolactone (ALDACTONE) 25 MG tablet Take 1 tablet by mouth daily 1/10/23  Yes ELY Vazquez NP   ARTIFICIAL TEAR SOLUTION OP Place 2 drops into both eyes daily   Yes Historical Provider, MD   clopidogrel (PLAVIX) 75 MG tablet Take 1 tablet by mouth daily 10/16/22   Nancy Teran MD   empagliflozin (JARDIANCE) 10 MG tablet Take 1 tablet by mouth daily 10/16/22   Nancy Teran MD   midodrine (PROAMATINE) 5 MG tablet Take 1 tablet by mouth 3 times daily (before meals) 10/16/22   Nancy Teran MD   amiodarone (CORDARONE) 200 MG tablet Take 1 tablet by mouth daily 10/16/22   Nancy Teran MD   bumetanide (BUMEX) 2 MG tablet Take 1 tablet by mouth 2 times daily 10/15/22   Nancy Teran MD   aspirin EC 81 MG EC tablet Take 1 tablet by mouth daily 10/15/22   Nancy Teran MD   rivaroxaban (XARELTO) 20 MG TABS tablet Take 20 mg by mouth Daily with supper    Historical Provider, MD   metFORMIN (GLUCOPHAGE) 1000 MG tablet Take 1 tablet by mouth daily Resume on 5/21 5/19/22   Nader Greenfield DO   Cyanocobalamin (VITAMIN B-12 PO) Take 1 tablet by mouth daily    Historical Provider, MD   naproxen (NAPROSYN) 500 MG tablet Take 1 tablet by mouth 2 times daily (with meals) 3/28/22 3/28/22  Lisandro Gray PA-C   Multiple Vitamins-Minerals (THERAPEUTIC MULTIVITAMIN-MINERALS) tablet Take 1 tablet by mouth daily    Historical Provider, MD   nitroGLYCERIN (NITROSTAT) 0.4 MG SL tablet Place 0.4 mg under the tongue every 5 minutes as needed for Chest pain up to max of 3 total doses. If no relief after 1 dose, call 911.   Dr. Michael Ferrera Provider, MD   atorvastatin (LIPITOR) 80 MG tablet Take 1 tablet by mouth nightly 2/20/19   Bill Gross MD       CURRENT MEDICATIONS:  Current Facility-Administered Medications   Medication Dose Route Frequency Provider Last Rate Last Admin    influenza quadrivalent split vaccine (FLUZONE;FLUARIX;FLULAVAL;AFLURIA) injection 0.5 mL  0.5 mL IntraMUSCular Prior to discharge ELY Walsh - NP        Deedee Alvarez ON 1/10/2023] spironolactone (ALDACTONE) tablet 25 mg  25 mg Oral Daily Gale Kramer MD        carvedilol (COREG) tablet 6.25 mg  6.25 mg Oral BID  Gale Kramer MD        [START ON 1/10/2023] lisinopril (PRINIVIL;ZESTRIL) tablet 2.5 mg  2.5 mg Oral Daily Gale Kramer MD        midodrine (PROAMATINE) tablet 5 mg  5 mg Oral TID  ELY Hutton CNP   5 mg at 01/09/23 1208    glucose chewable tablet 16 g  4 tablet Oral PRN ELY Cheng - CNP        dextrose bolus 10% 125 mL  125 mL IntraVENous PRN ELY Cheng CNP        Or    dextrose bolus 10% 250 mL  250 mL IntraVENous PRN ELY Cheng - CNP        glucagon (rDNA) injection 1 mg  1 mg SubCUTAneous PRN ELY Cheng - CNP        dextrose 10 % infusion   IntraVENous Continuous PRN Jocelynn Pilar, APRN - CNP        sodium chloride flush 0.9 % injection 5-40 mL  5-40 mL IntraVENous 2 times per day Jocelynn Pilar, APRN - CNP   10 mL at 01/09/23 0851    sodium chloride flush 0.9 % injection 10 mL  10 mL IntraVENous PRN Jocelynn Pilar, APRN - CNP        0.9 % sodium chloride infusion   IntraVENous PRN Jocelynn Pilar, APRN - CNP        ondansetron (ZOFRAN-ODT) disintegrating tablet 4 mg  4 mg Oral Q8H PRN Jocelynn Pilar, APRN - CNP        Or    ondansetron [facility]) injection 4 mg  4 mg IntraVENous Q6H PRN Jocelynn Pilar, APRN - CNP        polyethylene glycol (GLYCOLAX) packet 17 g  17 g Oral Daily PRN Jocelynn Pilar, APRN - CNP        acetaminophen (TYLENOL) tablet 650 mg  650 mg Oral Q6H PRN Jocelynn Pilar, APRN - CNP   650 mg at 01/07/23 2035    Or    acetaminophen (TYLENOL) suppository 650 mg  650 mg Rectal Q6H PRN Jocelynn Pilar, APRN - CNP        potassium chloride (KLOR-CON M) extended release tablet 40 mEq  40 mEq Oral PRN Jocelynn Pilar, APRN - CNP        Or    potassium bicarb-citric acid (EFFER-K) effervescent tablet 40 mEq  40 mEq Oral PRN Jocelynn Pilar, APRN - CNP        Or    potassium chloride 10 mEq/100 mL IVPB (Peripheral Line)  10 mEq IntraVENous PRN Jocelynn Pilar, APRN - CNP        magnesium sulfate 2000 mg in 50 mL IVPB premix  2,000 mg IntraVENous PRN Jocelynn Pilar, APRN - CNP        perflutren lipid microspheres (DEFINITY) injection 1.5 mL  1.5 mL IntraVENous ONCE PRN Jocelynn Pilar, APRN - CNP        insulin lispro (HUMALOG) injection vial 0-4 Units  0-4 Units SubCUTAneous TID WC Jocelynn Pilar, APRN - CNP        insulin lispro (HUMALOG) injection vial 0-4 Units  0-4 Units SubCUTAneous Nightly Jocelynn Pilar, APRN - CNP        furosemide (LASIX) injection 40 mg  40 mg IntraVENous BID Jocelynn Pilar, APRN - CNP   40 mg at 01/09/23 0851    amiodarone (CORDARONE) tablet 200 mg  200 mg Oral Daily Leanneward Tuttle, APRN - NP   200 mg at 01/09/23 0850    atorvastatin (LIPITOR) tablet 80 mg  80 mg Oral Nightly Leanne Bruins, APRN - NP   80 mg at 01/08/23 2032    clopidogrel (PLAVIX) tablet 75 mg  75 mg Oral Daily Leanne Marry, APRN - NP   75 mg at 01/09/23 0849    therapeutic multivitamin-minerals 1 tablet  1 tablet Oral Daily Leanne Marry, APRN - NP   1 tablet at 01/09/23 0849    rivaroxaban (XARELTO) tablet 20 mg  20 mg Oral Dinner Leanne Maryr, APRN - NP   20 mg at 01/08/23 1756    empagliflozin (JARDIANCE) tablet 10 mg  10 mg Oral Daily Laura Pearson MD   10 mg at 01/09/23 0849         Review of Systems:  Constitutional: negative for chills and fevers  Respiratory: positive for dyspnea on exertion, negative for cough and wheezing  Cardiovascular: positive for lower extremity edema, negative for chest pain and palpitations  Gastrointestinal: negative for abdominal pain and nausea  Genitourinary:negative  Integument: Bilateral lower extremity pretibial ulcerations  Endocrine: negative  Musculoskeletal:negative for arthralgias and back pain  Behavioral/Psych: negative  Pain: negative, denies claudication type pain      Objective:      BP (!) 88/42   Pulse 61   Temp 97.3 °F (36.3 °C) (Oral)   Resp 17   Ht 5' 8\" (1.727 m)   Wt 259 lb (117.5 kg)   SpO2 93%   BMI 39.38 kg/m²       LABS    CBC:   Lab Results   Component Value Date/Time    WBC 4.5 01/07/2023 05:53 AM    RBC 4.72 01/07/2023 05:53 AM    RBC 5.00 01/02/2012 07:23 PM    HGB 11.8 01/07/2023 05:53 AM     SED RATE:   Lab Results   Component Value Date    SEDRATE 5 09/02/2022       CMP:  Albumin:    Lab Results   Component Value Date/Time    LABALBU 3.4 01/05/2023 10:32 PM    LABALBU 4.1 01/02/2012 07:23 PM     PT/INR:    Lab Results   Component Value Date/Time    PROTIME 40.6 01/07/2023 05:53 AM    INR 4.2 01/07/2023 05:53 AM     HgBA1c:    Lab Results   Component Value Date/Time    LABA1C 7.4 10/11/2022 11:36 PM     PTT: No components found for: LABPTT      PHYSICAL EXAM    General Appearance: alert and oriented to person, place and time, well-developed and well-nourished, in no acute distress  Head: normocephalic and atraumatic  Pulmonary/Chest: normal air movement, no respiratory distress  Skin: Irregularly bordered clusters of wounds on both left and right pretibial areas. Both wounds appear to be full-thickness. Appear to be venous in etiology. Cardiovascular/Circulation: 2+ pitting edema, no cyanosis, nonpalpable peripheral pulses but biphasic audible with bedside Doppler. Feet are warm, reddish-purple discoloration to both feet and toes when dependent and elevated.   Extremities: no cyanosis and no clubbing  Musculoskeletal: no joint deformity or tenderness, no extremity amputations  Neurologic: gait slow and steady, coordination normal and speech normal      Assessment:       Ashish Risk Score: Ashish Scale Score: 20    Patient Active Problem List   Diagnosis Code    Hyperbilirubinemia E80.6    Essential hypertension I10    Hypercholesterolemia E78.00    CAD (coronary artery disease) I25.10    Gross hematuria R31.0    Abdominal pain, right upper quadrant R10.11    Right nephrolithiasis N20.0    Abnormal liver function test S01.05    Acute systolic HF (heart failure) ([facility]) I50.21    Noncompliance Z91.199    Acute on chronic systolic heart failure (HCC) I50.23    Pneumonia J18.9    Dyspnea and respiratory abnormalities R06.00, R06.89    Status post inguinal hernia repair Z98.890, Z87.19    S/P repair of ventral hernia Z98.890, Z87.19    Post-op pain G89.18    Non-recurrent bilateral inguinal hernia without obstruction or gangrene L82.06    Umbilical hernia with obstruction, without gangrene K42.0    Status post implantation of automatic cardioverter/defibrillator (AICD) Z95.810    NSTEMI (non-ST elevated myocardial infarction) ([facility]) I21.4    ARIADNA (obstructive sleep apnea) G47.33    S/P CABG (coronary artery bypass graft) Z95.1    Nausea R11.0    Anorexia R63.0    Thrombocytopenia (HCC) D69.6    Obesity (BMI 30-39. 9) E66.9    Hypokalemia E87.6    Uncontrolled type 2 diabetes mellitus with hyperglycemia (HCC) E11.65    Acute on chronic combined systolic and diastolic CHF (congestive heart failure) (HCC) I50.43    Type 2 diabetes mellitus, without long-term current use of insulin (HCC) E11.9    Paroxysmal atrial fibrillation (HCC) I48.0    Edema of both legs R60.0    Acute congestive heart failure (HCC) I50.9    Skin ulcer of left lower leg with fat layer exposed ([address] L97.922    Skin ulcer of right lower leg, with fat layer exposed ([address] L97.912         Plan:     Plan of Care:     -Unna boots applied today with silver alginate to wound beds.     -Will update ESTELA for wound care needs    -Follow up outpatient wound care center      Discharge Plan:  Placement for patient upon discharge: home with support   Patient appropriate for Outpatient [address] [facility]: Yes    Patient/Caregiver Teaching:  Level of patientunderstanding able to:     [x] Indicates understanding       [] Needs reinforcement  [] Unsuccessful      [x] Verbal Understanding  [] Demonstrated understanding       [] No evidence of learning  [] Refused teaching         [] N/A       Electronically signed by ELY Prakash CNP on  1/9/2023 at 2:15 PM

## 2023-01-09 NOTE — PROGRESS NOTES
Physical Therapy  Facility/Department: [facility]  Rehabilitation Physical Therapy Treatment Note    NAME: Des Smith  : 1960 (57 y.o.)  MRN: [id number]  CODE STATUS: Full Code    Date of Service: 23   Due to recent hospitalization and medical condition, pt would benefit from additional intermittent skilled therapy at time of discharge. Please refer to the AM-PAC score for current functional status. Restrictions:  Restrictions/Precautions: General Precautions;Contact Precautions  Position Activity Restriction  Other position/activity restrictions: Up w/ assist, contact precautions, O2 via NC, continous pulse oximeter, telemetry, LUE IV     SUBJECTIVE  Subjective  Subjective: Pt in bed upon arrival for PT session, required encouragement to participate in therapy. RN states patient is appropriate for PT treatment    OBJECTIVE  Cognition  Overall Cognitive Status: Exceptions  Arousal/Alertness: Appropriate responses to stimuli  Following Commands: Follows all commands without difficulty  Attention Span: Attends with cues to redirect  Memory: Appears intact  Safety Judgement: Decreased awareness of need for safety;Decreased awareness of need for assistance  Problem Solving: Decreased awareness of errors;Assistance required to implement solutions;Assistance required to generate solutions  Insights: Decreased awareness of deficits  Initiation: Does not require cues  Sequencing: Requires cues for some  Cognition Comment: Patient hyperverbose and tangental in speech and thought, but easily redirected. Orientation  Overall Orientation Status: Within Functional Limits  Orientation Level: Oriented X4    Functional Mobility  Bed Mobility  Overall Assistance Level: Supervision  Additional Factors: Set-up; Verbal cues  Roll Left  Assistance Level: Independent  Roll Right  Assistance Level: Independent  Sit to Supine  Assistance Level:  Independent  Supine to Sit  Assistance Level: Independent  Scooting  Assistance Level: Independent  Skilled Clinical Factors: Patient with good technique and progression to EOB seated posture this date. Patient becomes easily SOB and requries vc's for self pacing and energy conservation. Patient attempts to move quickly and requires education to slow down and utilize pursed lip breathing techniques to maximize endurance and tolerance to activities. Balance  Sitting Balance: Independent  Standing Balance: Supervision  Standing Balance  Time: 3 minutes  Activity: Standing EOB working on core control and stability performing WS and marches, followed by reaching across midline to challenge balance. VC's for breathing techniques. Following activity patient requires seated rest break for recover. Comments: Patient issues personal log for daily tracking of weight, BP, SpO2%, and pulse to maximize awareness of changes and to promote communication with medical team.  Transfers  Surface: To chair with arms;From bed  Additional Factors: With handrails  Device: Walker  Sit to Stand  Assistance Level: Supervision  Stand to Sit  Assistance Level: Supervision  Bed To/From Chair  Technique: Stand step  Assistance Level: Supervision  Skilled Clinical Factors: Patient requries standing rest break to maximize tolerance in actvity. Environmental Mobility  Ambulation  Surface: Level surface  Device: Rolling walker  Distance: 50 feet x2  Activity: Within Room  Activity Comments: Seated rest break between attempts with pursed lip breathing techniques to promote respiratory recovery before progression. Additional Factors: Verbal cues  Assistance Level: Supervision  Gait Deviations: Decreased heel strike right;Decreased heel strike left      Neuromuscular Education  Neuromuscular Education: Yes  NDT Treatment: Gait ;Lower extremity; Sitting;Standing      PT Exercises  Exercise Treatment: yes  A/AROM Exercises: Standing 3 way hip, heel/toe raises. and lupe. ASSESSMENT/PROGRESS TOWARDS GOALS     AM-[facility] Inpatient Mobility Raw Score  21   AM-[facility] Inpatient T-Scale Score  50.25   Mobility Inpatient CMS 0-100% Score 28.97   Mobility Inpatient CMS G-Code Modifier  CJ     Tinetti Scores    Gait Sore  11    Tinetti Total Score  24  (20-26 = Moderate Fall Risk)  Assessment  Assessment: Patient with decreased aerobic capacity patient requries frequent rest breaks throughout treatment to maximize tolerance and endurance to activities. Patient would benefit from continued skilled PT treatment to maximize High level balance and mobility activities and promote return to PLOF. Activity Tolerance: Patient limited by endurance  Discharge Recommendations: Patient would benefit from continued therapy after discharge    Goals  Patient Goals   Patient Goals : Improve breathing  Short Term Goals  Time Frame for Short Term Goals: 6 visits  Short Term Goal 1: Patient will ambulate 200 feet indep. Short Term Goal 2: Patient will tolerate 30 minutes of ther-ex and ther-act. Short Term Goal 3: Patient will be indep with HEP. Short Term Goal 4: Patient will demo good understanding of CHF education. PLAN OF CARE/SAFETY  Physcial Therapy Plan  General Plan: 5-7 times per week  Current Treatment Recommendations: Strengthening;Balance training;Functional mobility training; Endurance training;Gait training;Neuromuscular re-education;Home exercise program;Safety education & training;Patient/Caregiver education & training; Therapeutic activities  Safety Devices  Type of Devices: Call light within reach;Gait belt;Left in chair;Heels elevated for pressure relief; All fall risk precautions in place    EDUCATION  Education  Education Given To: Patient  Education Provided: Role of Therapy;Home Exercise Program;Safety; Mobility Training;Transfer Training;Energy Conservation  Education Provided Comments: Patient provided educational handout regarding heart failure.   Patient educated on things to watch for (new or worsening SOB, increases in physical activity, new or worsening swelling of feet and legs, no significant weight gain(+2 lbs in a day) (+5 lbs in a week), or chest pain. Patient knows to pay attention to dry hacking cough, worsening SOB with activity, discomfort or swelling of abdomen, or trouble sleeping. Patient is educated on seeking medical attention with frequent dry hacking cough, SOB at rest, increased discomfort or swelling in the lower body, sudden weight gain of more than 2-3 lbs in a 24 hour period (or 5 lbs in a week), new or worsening dizziness, confusion, sadness, or depression, loss of appetite, or increase trouble sleeping ( cannot lie flat).   Education Method: Demonstration;Verbal;Teach Back;Printed Information/Hand-outs  Barriers to Learning: None  Education Outcome: Verbalized understanding;Continued education needed        Therapy Time   Individual Concurrent Group Co-treatment   Time In 0901         Time Out 0940         Minutes [address], 01/09/23 at 12:06 PM

## 2023-01-09 NOTE — PLAN OF CARE
Checked for incontinence every 2 hours and prn. Pericare as needed. Assisted to reposition off back frequently. On waffle mattress. Heels off bed with pillows. Siderails up x 2  Hourly rounding  Call light in reach  Instructed to call for assist before attempting out of bed. Remains free from falls and accidental injury at this time   Floor free from obstacles  Bed is locked and in lowest position  Adequate lighting provided  Bed alarm on, [place] star and Stay with Me signs posted    Pain level assessment complete.    Patient educated on pain scale and control interventions  PRN pain medication given per patient request  Patient instructed to call out with new onset of pain or unrelieved pain

## 2023-01-09 NOTE — PROGRESS NOTES
[facility]   Progress Note                   Date:   1/9/2023  Patient name: Arnaldo Delarosa  Date of admission:  1/5/2023  9:59 PM  MRN:   [id number]  YOB: 1960  PCP: Andrew Retana PA-C    Reason for Admission: Heart failure [facility]) [I50.9]  Acute congestive heart failure, unspecified heart failure type ([facility] [address] [I50.9]    Subjective:       Clinical Changes / Abnormalities: no chest pain or dyspnea      Medications:   Scheduled Meds:   influenza virus vaccine  0.5 mL IntraMUSCular Prior to discharge    spironolactone  25 mg Oral BID    midodrine  5 mg Oral TID WC    sodium chloride flush  5-40 mL IntraVENous 2 times per day    lisinopril  5 mg Oral Daily    insulin lispro  0-4 Units SubCUTAneous TID WC    insulin lispro  0-4 Units SubCUTAneous Nightly    furosemide  40 mg IntraVENous BID    amiodarone  200 mg Oral Daily    aspirin EC  81 mg Oral Daily    atorvastatin  80 mg Oral Nightly    carvedilol  12.5 mg Oral BID WC    clopidogrel  75 mg Oral Daily    therapeutic multivitamin-minerals  1 tablet Oral Daily    rivaroxaban  20 mg Oral Dinner    empagliflozin  10 mg Oral Daily     Continuous Infusions:   dextrose      sodium chloride       CBC:   Recent Labs     01/07/23  0553   WBC 4.5   HGB 11.8*   *     BMP:    Recent Labs     01/07/23  0553 01/08/23  0634 01/09/23  0549    138 137   K 3.6* 3.6* 3.9   CL 99 99 97*   CO2 28 30 29   BUN 18 23 23   CREATININE 1.04 1.08 1.09   GLUCOSE 133* 125* 112*     Hepatic: No results for input(s): AST, ALT, ALB, BILITOT, ALKPHOS in the last 72 hours. Troponin: No results for input(s): TROPONINI in the last 72 hours. BNP: No results for input(s): BNP in the last 72 hours.   Lipids:   Recent Labs     01/07/23  0553   CHOL 207*   HDL 23*     INR:   Recent Labs     01/07/23  0553   INR 4.2       Objective:   Vitals: BP (!) 88/42   Pulse 61   Temp 97.3 °F (36.3 °C) (Oral)   Resp 17   Ht 5' 8\" (1.727 m)   Wt 259 lb (117.5 kg) SpO2 93%   BMI 39.38 kg/m²   General appearance: alert and cooperative with exam  HEENT: Head: Normocephalic, no lesions, without obvious abnormality. Neck: no JVD  Lungs: CTAB  Heart: RRR s1+s2, no murmurs  Abdomen: soft, non-tender  Extremities: ACE wraps  Neurologic: not done        Assessment / Acute Cardiac Problems:   Acute on chronic systolic CHF  CAD/ CABG with PCI SVG-OM 5/18/22  S/p AICD  Afib  Severe mitral regurgitation on TTE 11/17/21  Moderate to severe TR    Patient Active Problem List:     Hyperbilirubinemia     Essential hypertension     Hypercholesterolemia     CAD (coronary artery disease)     Gross hematuria     Abdominal pain, right upper quadrant     Right nephrolithiasis     Abnormal liver function test     Acute systolic HF (heart failure) ([address]     Noncompliance     Acute on chronic systolic heart failure (HCC)     Pneumonia     Dyspnea and respiratory abnormalities     Status post inguinal hernia repair     S/P repair of ventral hernia     Post-op pain     Non-recurrent bilateral inguinal hernia without obstruction or gangrene     Umbilical hernia with obstruction, without gangrene     Status post implantation of automatic cardioverter/defibrillator (AICD)     NSTEMI (non-ST elevated myocardial infarction) ([address]     ARIADNA (obstructive sleep apnea)     S/P CABG (coronary artery bypass graft)     Nausea     Anorexia     Thrombocytopenia (HCC)     Obesity (BMI 30-39. 9)     Hypokalemia     Uncontrolled type 2 diabetes mellitus with hyperglycemia (HCC)     Acute on chronic combined systolic and diastolic CHF (congestive heart failure) (HCC)     Type 2 diabetes mellitus, without long-term current use of insulin (HCC)     Paroxysmal atrial fibrillation (HCC)     Edema of both legs     Acute congestive heart failure ([address]      Plan of Treatment:   Fluid restriction 1.2L  IV lasix 40mg BID  Monitor and replace electrolytes as needed  Continue plavix with statin  On BB/ACE and SGLT2 inhibitor  BP running low. Reduce coreg to 6.25mg po BID and lisinopril to 2.5mg po qday  Aldactone 25mg po qday  Continue xarelto  Patient to follow up in structural heart disease clinic for mitral clip evaluation.     Marium Rouse MD, MD  [facility]  [phone]

## 2023-01-09 NOTE — PROGRESS NOTES
Physician Progress Note      Mark Phelan  CSN #:                  [id number]  :                       1960  ADMIT DATE:       2023 9:59 PM  [address] [place] DATE:  RESPONDING  PROVIDER #:        Mathew Rodrigues NP          QUERY TEXT:    Patient admitted with CHF, noted to have paroxysmal atrial fibrillation and is   maintained on Xarelto. If possible, please document in progress notes and   discharge summary if you are evaluating and/or treating any of the following: The medical record reflects the following:  Risk Factors: CHF, HTN, DM  Clinical Indicators: Melchor vasc score of 3, paroxysmal afib maintained on   Xarelto  Treatment: Xarelto    Thank you,  Chuy Solitario RN  Options provided:  -- Secondary hypercoagulable state in a patient with atrial fibrillation  -- Other - I will add my own diagnosis  -- Disagree - Not applicable / Not valid  -- Disagree - Clinically unable to determine / Unknown  -- Refer to Clinical Documentation Reviewer    PROVIDER RESPONSE TEXT:    This patient has secondary hypercoagulable state in a patient with atrial   fibrillation. Query created by:  Refugio Nyhan on 2023 5:51 AM      Electronically signed by:  Mathew Rodrigues NP 2023 7:52 AM

## 2023-01-10 VITALS
WEIGHT: 249.8 LBS | HEIGHT: 68 IN | DIASTOLIC BLOOD PRESSURE: 64 MMHG | HEART RATE: 56 BPM | RESPIRATION RATE: 16 BRPM | SYSTOLIC BLOOD PRESSURE: 102 MMHG | OXYGEN SATURATION: 95 % | BODY MASS INDEX: 37.86 KG/M2 | TEMPERATURE: 97.3 F

## 2023-01-10 LAB
ANION GAP SERPL CALCULATED.3IONS-SCNC: 10 MMOL/L (ref 9–17)
BUN BLDV-MCNC: 26 MG/DL (ref 8–23)
BUN/CREAT BLD: 23 (ref 9–20)
CALCIUM SERPL-MCNC: 8.7 MG/DL (ref 8.6–10.4)
CHLORIDE BLD-SCNC: 98 MMOL/L (ref 98–107)
CO2: 29 MMOL/L (ref 20–31)
CREAT SERPL-MCNC: 1.12 MG/DL (ref 0.7–1.2)
GFR SERPL CREATININE-BSD FRML MDRD: >60 ML/MIN/1.73M2
GLUCOSE BLD-MCNC: 100 MG/DL (ref 75–110)
GLUCOSE BLD-MCNC: 104 MG/DL (ref 70–99)
GLUCOSE BLD-MCNC: 114 MG/DL (ref 75–110)
GLUCOSE BLD-MCNC: 132 MG/DL (ref 75–110)
MAGNESIUM: 1.9 MG/DL (ref 1.6–2.6)
POTASSIUM SERPL-SCNC: 3.6 MMOL/L (ref 3.7–5.3)
SODIUM BLD-SCNC: 137 MMOL/L (ref 135–144)

## 2023-01-10 PROCEDURE — 6360000002 HC RX W HCPCS: Performed by: NURSE PRACTITIONER

## 2023-01-10 PROCEDURE — 6370000000 HC RX 637 (ALT 250 FOR IP): Performed by: NURSE PRACTITIONER

## 2023-01-10 PROCEDURE — 6370000000 HC RX 637 (ALT 250 FOR IP): Performed by: INTERNAL MEDICINE

## 2023-01-10 PROCEDURE — 36415 COLL VENOUS BLD VENIPUNCTURE: CPT

## 2023-01-10 PROCEDURE — APPSS45 APP SPLIT SHARED TIME 31-45 MINUTES: Performed by: NURSE PRACTITIONER

## 2023-01-10 PROCEDURE — 82947 ASSAY GLUCOSE BLOOD QUANT: CPT

## 2023-01-10 PROCEDURE — 83735 ASSAY OF MAGNESIUM: CPT

## 2023-01-10 PROCEDURE — G0008 ADMIN INFLUENZA VIRUS VAC: HCPCS | Performed by: NURSE PRACTITIONER

## 2023-01-10 PROCEDURE — 99239 HOSP IP/OBS DSCHRG MGMT >30: CPT | Performed by: NURSE PRACTITIONER

## 2023-01-10 PROCEDURE — 80048 BASIC METABOLIC PNL TOTAL CA: CPT

## 2023-01-10 PROCEDURE — 90686 IIV4 VACC NO PRSV 0.5 ML IM: CPT | Performed by: NURSE PRACTITIONER

## 2023-01-10 RX ORDER — CARVEDILOL 12.5 MG/1
6.25 TABLET ORAL 2 TIMES DAILY WITH MEALS
Qty: 60 TABLET | Refills: 3 | Status: ON HOLD
Start: 2023-01-10 | End: 2023-01-24 | Stop reason: HOSPADM

## 2023-01-10 RX ORDER — FUROSEMIDE 40 MG/1
40 TABLET ORAL ONCE
Status: COMPLETED | OUTPATIENT
Start: 2023-01-10 | End: 2023-01-10

## 2023-01-10 RX ORDER — LISINOPRIL 2.5 MG/1
2.5 TABLET ORAL DAILY
Qty: 30 TABLET | Refills: 3 | Status: ON HOLD
Start: 2023-01-11 | End: 2023-01-24 | Stop reason: HOSPADM

## 2023-01-10 RX ADMIN — EMPAGLIFLOZIN 10 MG: 10 TABLET, FILM COATED ORAL at 10:04

## 2023-01-10 RX ADMIN — CARVEDILOL 6.25 MG: 6.25 TABLET, FILM COATED ORAL at 10:02

## 2023-01-10 RX ADMIN — LISINOPRIL 2.5 MG: 2.5 TABLET ORAL at 10:03

## 2023-01-10 RX ADMIN — SPIRONOLACTONE 25 MG: 25 TABLET ORAL at 10:03

## 2023-01-10 RX ADMIN — MIDODRINE HYDROCHLORIDE 5 MG: 5 TABLET ORAL at 10:03

## 2023-01-10 RX ADMIN — INFLUENZA A VIRUS A/VICTORIA/2570/2019 IVR-215 (H1N1) ANTIGEN (PROPIOLACTONE INACTIVATED), INFLUENZA A VIRUS A/DARWIN/6/2021 IVR-227 (H3N2) ANTIGEN (PROPIOLACTONE INACTIVATED), INFLUENZA B VIRUS B/AUSTRIA/1359417/2021 BVR-26 ANTIGEN (PROPIOLACTONE INACTIVATED), INFLUENZA B VIRUS B/PHUKET/3073/2013 BVR-1B ANTIGEN (PROPIOLACTONE INACTIVATED) 0.5 ML: 15; 15; 15; 15 INJECTION, SOLUTION INTRAMUSCULAR at 19:23

## 2023-01-10 RX ADMIN — MULTIPLE VITAMINS W/ MINERALS TAB 1 TABLET: TAB at 10:03

## 2023-01-10 RX ADMIN — CLOPIDOGREL BISULFATE 75 MG: 75 TABLET ORAL at 10:02

## 2023-01-10 RX ADMIN — AMIODARONE HYDROCHLORIDE 200 MG: 200 TABLET ORAL at 10:03

## 2023-01-10 RX ADMIN — MIDODRINE HYDROCHLORIDE 5 MG: 5 TABLET ORAL at 18:26

## 2023-01-10 RX ADMIN — FUROSEMIDE 40 MG: 40 TABLET ORAL at 10:03

## 2023-01-10 RX ADMIN — CARVEDILOL 6.25 MG: 6.25 TABLET, FILM COATED ORAL at 18:26

## 2023-01-10 RX ADMIN — RIVAROXABAN 20 MG: 20 TABLET, FILM COATED ORAL at 18:25

## 2023-01-10 RX ADMIN — MIDODRINE HYDROCHLORIDE 5 MG: 5 TABLET ORAL at 11:57

## 2023-01-10 ASSESSMENT — ENCOUNTER SYMPTOMS
SHORTNESS OF BREATH: 1
SINUS PRESSURE: 0
SINUS PAIN: 0
NAUSEA: 0
COLOR CHANGE: 0
ABDOMINAL DISTENTION: 1
PHOTOPHOBIA: 0
ABDOMINAL PAIN: 0

## 2023-01-10 NOTE — PROGRESS NOTES
[facility]  Office: [address], DO, Floridalma Eagle, DO, Eric Jimysal, DO, Joo Brunson Blood, DO, Ness Woodson MD, Liam Anderson MD, Eliot Yarbrough MD, Azeem Swain MD,  Karly Castellon MD, Stephanie Steward MD, Paty Santoro, DO, Ignacio Sloan MD,  Fco Montesinos MD, Kristen Huff MD, Pasquale Phelan, DO, Tayler Mc MD, Lera Mcardle, MD, Vandana Thomas, DO, Kirsten Sanchez MD, Harjit Ricketts MD, Keith Lombard, MD, Lianne Hickman MD, Melly Carrizales, DO, Sonam Ty MD, Nura Minor MD, Mirian Lee, Shahbaz Gonzalez, CNP, Dottie Galdamez, CNP, Genie Torres, CNP,  Ileana Bernal, [facility], Crow Kendrick, CNP, Ayaan Mcmahon, CNP, Robert Godinez, CNP, Ariela Beth, CNP, Adelaide Quiros, CNP, Anna Hatfield PA-C, Chad Corea, [facility], Ismael Gram, CNP, Georgie Toscano, CNP         [facility]    Progress Note    1/10/2023    10:59 AM    Name:   Katie Diaz  MRN:     [id number]     [place]:      [de-identified]   Room:   2009/2009-02  IP Day:  4  Admit Date:  1/5/2023  9:59 PM    PCP:   Khurram Kiran PA-C  Code Status:  Full Code    Subjective:     C/C:   Chief Complaint   Patient presents with    Shortness of Breath    Leg Swelling     Interval History Status: improved. Patient is functioning at his baseline. Patient has diuresed well. Patient remained in the hospital overnight at cardiology recommendation. Plan for discharge today on new oral regiment. Brief History:     1/6 - Rimma Strong is a 57year old male who presented for evaluation of SOB and bilateral low leg edema. He states that he has home health services who come and change his Unna Boots twice weekly. He states that today a new nurse came to do his dressings and told him that she was recommending ED evaluation for edema. He states he has been taking his diuretics as prescribed.  He states he has chronic SOB and edema through his legs but \"didn't realize it was this bad\". He states he was scheduled to see cardiology on 1/7/23 for follow-up since his last hospitalization in October, 2022. 1/7-1/9 -patient is responded well to diuretics. He is greater than 5 L out since admission. Now refusing skilled facility and would like to return home. 1/10 - Patient is functioning at his baseline. Patient has diuresed well. Patient remained in the hospital overnight at cardiology recommendation. Plan for discharge today on new oral regiment. Review of Systems:     Review of Systems   Constitutional:  Positive for activity change and fatigue. HENT:  Negative for sinus pressure and sinus pain. Eyes:  Negative for photophobia and visual disturbance. Respiratory:  Positive for shortness of breath (improved). Cardiovascular:  Negative for chest pain and palpitations. Gastrointestinal:  Positive for abdominal distention. Negative for abdominal pain and nausea. Endocrine: Negative for cold intolerance and heat intolerance. Genitourinary:  Negative for frequency and urgency. Musculoskeletal:  Negative for arthralgias. Skin:  Negative for color change and pallor. Neurological:  Negative for dizziness, seizures and weakness. Hematological:  Negative for adenopathy. Does not bruise/bleed easily. Psychiatric/Behavioral:  Negative for agitation and confusion. Medications:      Allergies:  No Known Allergies    Current Meds:   Scheduled Meds:    influenza virus vaccine  0.5 mL IntraMUSCular Prior to discharge    spironolactone  25 mg Oral Daily    carvedilol  6.25 mg Oral BID WC    lisinopril  2.5 mg Oral Daily    midodrine  5 mg Oral TID     sodium chloride flush  5-40 mL IntraVENous 2 times per day    insulin lispro  0-4 Units SubCUTAneous TID     insulin lispro  0-4 Units SubCUTAneous Nightly    amiodarone  200 mg Oral Daily    atorvastatin  80 mg Oral Nightly    clopidogrel  75 mg Oral Daily    therapeutic multivitamin-minerals  1 tablet Oral Daily    rivaroxaban  20 mg Oral Dinner    empagliflozin  10 mg Oral Daily     Continuous Infusions:    dextrose      sodium chloride       PRN Meds: glucose, dextrose bolus **OR** dextrose bolus, glucagon (rDNA), dextrose, sodium chloride flush, sodium chloride, ondansetron **OR** ondansetron, polyethylene glycol, acetaminophen **OR** acetaminophen, potassium chloride **OR** potassium alternative oral replacement **OR** potassium chloride, magnesium sulfate, perflutren lipid microspheres    Data:     Past Medical History:   has a past medical history of CAD (coronary artery disease), CHF (congestive heart failure) ([facility] [address] [facility] Hyperlipidemia, Hypertension, MI (myocardial infarction) ([facility] MRSA (methicillin resistant staph aureus) culture positive, Skin cancer, Snores, Stroke ([facility] Wears dentures, Wears reading eyeglasses, and Wellness examination. Social History:   reports that he quit smoking about 24 years ago. He has never used smokeless tobacco. He reports that he does not drink alcohol and does not use drugs. Family History:   Family History   Problem Relation Age of Onset    Coronary Art Dis Father     Liver Disease Father     Alcohol Abuse Sister        Vitals:  /72   Pulse 71   Temp 97.5 °F (36.4 °C) (Oral)   Resp 16   Ht 5' 8\" (1.727 m)   Wt 249 lb 12.8 oz (113.3 kg)   SpO2 99%   BMI 37.98 kg/m²   Temp (24hrs), Av.4 °F (36.3 °C), Min:97.2 °F (36.2 °C), Max:97.5 °F (36.4 °C)    Recent Labs     23  1134 23  1625 01/09/23  2031 01/10/23  0618   POCGLU 123* 116* 127* 100         I/O (24Hr):     Intake/Output Summary (Last 24 hours) at 1/10/2023 1059  Last data filed at 1/10/2023 1010  Gross per 24 hour   Intake 695 ml   Output 1025 ml   Net -330 ml         Labs:  Hematology:  No results for input(s): WBC, RBC, HGB, HCT, MCV, MCH, MCHC, RDW, PLT, MPV, SEDRATE, CRP, INR, DDIMER, LD0NAETU, LABABSO in the last 72 hours.    Invalid input(s): PT    Chemistry:  Recent Labs     01/08/23  0634 01/09/23  0549 01/10/23  0555    137 137   K 3.6* 3.9 3.6*   CL 99 97* 98   CO2 30 29 29   GLUCOSE 125* 112* 104*   BUN 23 23 26*   CREATININE 1.08 1.09 1.12   MG 2.0 2.0 1.9   ANIONGAP 9 11 10   LABGLOM >60 >60 >60   CALCIUM 8.7 9.0 8.7   PROBNP  --  947*  --        Recent Labs     01/08/23  2117 01/09/23  0554 01/09/23  1134 01/09/23  1625 01/09/23  2031 01/10/23  0618   POCGLU 128* 112* 123* 116* 127* 100       ABG:No results found for: POCPH, PHART, PH, POCPCO2, NZZ8FYN, PCO2, POCPO2, PO2ART, PO2, POCHCO3, KMD0XPS, HCO3, NBEA, PBEA, BEART, BE, THGBART, THB, GUJ4IXV, ASCP1GAT, I7XCKUFT, O2SAT, FIO2  Lab Results   Component Value Date/Time    SPECIAL RAC 12ML 03/11/2019 08:52 PM     Lab Results   Component Value Date/Time    CULTURE NO GROWTH 6 DAYS 03/11/2019 08:52 PM       Radiology:  XR CHEST PORTABLE    Result Date: 1/5/2023  Cardiomegaly with pulmonary edema. Physical Examination:        Physical Exam  Constitutional:       Appearance: Normal appearance. He is normal weight. HENT:      Head: Normocephalic and atraumatic. Nose: Nose normal.      Mouth/Throat:      Mouth: Mucous membranes are dry. Eyes:      Extraocular Movements: Extraocular movements intact. Pupils: Pupils are equal, round, and reactive to light. Cardiovascular:      Rate and Rhythm: Regular rhythm. Tachycardia present. Pulses: Normal pulses. Heart sounds: Normal heart sounds. No murmur heard. Pulmonary:      Effort: Pulmonary effort is normal. No respiratory distress. Breath sounds: Rales present. Abdominal:      General: Abdomen is flat. Bowel sounds are normal. There is no distension. Palpations: Abdomen is soft. There is no mass. Tenderness: There is no abdominal tenderness. Musculoskeletal:         General: No swelling, tenderness or deformity. Normal range of motion.       Cervical back: Normal range of motion and neck supple. No rigidity. Skin:     General: Skin is warm and dry. Capillary Refill: Capillary refill takes less than 2 seconds. Coloration: Skin is not jaundiced. Findings: No bruising. Neurological:      General: No focal deficit present. Mental Status: He is alert and oriented to person, place, and time. Cranial Nerves: No cranial nerve deficit. Motor: No weakness.    Psychiatric:         Mood and Affect: Mood normal.         Behavior: Behavior normal.       Assessment:        Hospital Problems             Last Modified POA    * (Principal) Acute on chronic combined systolic and diastolic CHF (congestive heart failure) (HCC) (Chronic) 1/6/2023 Yes    Status post implantation of automatic cardioverter/defibrillator (AICD) 1/6/2023 Yes    ARIADNA (obstructive sleep apnea) 1/6/2023 Yes    Type 2 diabetes mellitus, without long-term current use of insulin ([address] 1/6/2023 Yes    Paroxysmal atrial fibrillation ([address] 1/6/2023 Yes    Edema of both legs 1/6/2023 Yes    Acute congestive heart failure ([address] 1/6/2023 Yes    Skin ulcer of left lower leg with fat layer exposed ([address] 1/9/2023 Yes    Skin ulcer of right lower leg, with fat layer exposed ([address] 1/9/2023 Yes    CAD (coronary artery disease) 1/6/2023 Yes     Plan:        Acute on chronic combined systolic heart failure   Continue Lasix twice daily  Continue spironolactone  Paroxysmal atrial fibrillation  Continue Xarelto  Continue amiodarone, Coreg  Obesity with type 2 diabetes  Educational need for diet lifestyle modification  Corrective insulin as ordered  Hyperlipidemia  Lipitor      ELY Parson NP  1/10/2023  10:59 AM

## 2023-01-10 NOTE — PROGRESS NOTES
Went into patient's room, and patient accidentally dislodged his IV. Small Mepilex placed on IV site. Patient is refusing a new IV start at this time. NP notified.

## 2023-01-10 NOTE — PLAN OF CARE
Problem: Discharge Planning  Goal: Discharge to home or other facility with appropriate resources  1/10/2023 1301 by Nick Krishnamurthy RN  Outcome: Progressing  Flowsheets (Taken 1/10/2023 0900)  Discharge to home or other facility with appropriate resources:   Identify barriers to discharge with patient and caregiver   Arrange for needed discharge resources and transportation as appropriate   Identify discharge learning needs (meds, wound care, etc)   Refer to discharge planning if patient needs post-hospital services based on physician order or complex needs related to functional status, cognitive ability or social support system     Problem: ABCDS Injury Assessment  Goal: Absence of physical injury  1/10/2023 1301 by Nick Krishnamurthy RN  Outcome: Progressing     Problem: Safety - Adult  Goal: Free from fall injury  1/10/2023 1301 by Nick Krishnamurthy RN  Outcome: Progressing  [address] (Taken 1/6/2023 0948 by Dallas Pollard RN)  Free From Fall Injury:   Instruct family/caregiver on patient safety   Based on caregiver fall risk screen, instruct family/caregiver to ask for assistance with transferring infant if caregiver noted to have fall risk factors     Problem: Skin/Tissue Integrity  Goal: Absence of new skin breakdown  Description: 1. Monitor for areas of redness and/or skin breakdown  2. Assess vascular access sites hourly  3. Every 4-6 hours minimum:  Change oxygen saturation probe site  4. Every 4-6 hours:  If on nasal continuous positive airway pressure, respiratory therapy assess nares and determine need for appliance change or resting period.   1/10/2023 1301 by Nick Krishnamurthy RN  Outcome: Progressing     Problem: Chronic Conditions and Co-morbidities  Goal: Patient's chronic conditions and co-morbidity symptoms are monitored and maintained or improved  1/10/2023 1301 by Nick Krishnamurthy RN  Outcome: Progressing  Flowsheets (Taken 1/10/2023 0900)  Care Plan - Patient's Chronic Conditions and Co-Morbidity Symptoms are Monitored and Maintained or Improved:   Monitor and assess patient's chronic conditions and comorbid symptoms for stability, deterioration, or improvement   Collaborate with multidisciplinary team to address chronic and comorbid conditions and prevent exacerbation or deterioration   Update acute care plan with appropriate goals if chronic or comorbid symptoms are exacerbated and prevent overall improvement and discharge     Problem: Pain  Goal: Verbalizes/displays adequate comfort level or baseline comfort level  1/10/2023 1301 by Deolris Rosario RN  Outcome: Progressing  Flowsheets (Taken 1/10/2023 1000)  Verbalizes/displays adequate comfort level or baseline comfort level:   Encourage patient to monitor pain and request assistance   Administer analgesics based on type and severity of pain and evaluate response   Assess pain using appropriate pain scale   Implement non-pharmacological measures as appropriate and evaluate response   Notify Licensed Independent Practitioner if interventions unsuccessful or patient reports new pain     Problem: Respiratory - Adult  Goal: Achieves optimal ventilation and oxygenation  1/10/2023 1301 by Deloris Rosario RN  Outcome: Progressing  Flowsheets (Taken 1/10/2023 0900)  Achieves optimal ventilation and oxygenation:   Assess for changes in respiratory status   Assess for changes in mentation and behavior   Position to facilitate oxygenation and minimize respiratory effort   Oxygen supplementation based on oxygen saturation or arterial blood gases   Encourage broncho-pulmonary hygiene including cough, deep breathe, incentive spirometry   Assess and instruct to report shortness of breath or any respiratory difficulty     Problem: Cardiovascular - Adult  Goal: Maintains optimal cardiac output and hemodynamic stability  1/10/2023 1301 by Deloris Rosario RN  Outcome: Progressing  Flowsheets (Taken 1/10/2023 0900)  Maintains optimal cardiac output and hemodynamic stability:   Monitor blood pressure and heart rate   Monitor urine output and notify Licensed Independent Practitioner for values outside of normal range   Assess for signs of decreased cardiac output   Administer fluid and/or volume expanders as ordered     Problem: Cardiovascular - Adult  Goal: Absence of cardiac dysrhythmias or at baseline  1/10/2023 1301 by Poppy Cooper RN  Outcome: Progressing  Flowsheets (Taken 1/10/2023 0900)  Absence of cardiac dysrhythmias or at baseline:   Monitor cardiac rate and rhythm   Assess for signs of decreased cardiac output   Administer antiarrhythmia medication and electrolyte replacement as ordered     Problem: Skin/Tissue Integrity - Adult  Goal: Skin integrity remains intact  1/10/2023 1301 by Poppy Cooper RN  Outcome: Progressing  Flowsheets (Taken 1/10/2023 0900)  Skin Integrity Remains Intact: Monitor for areas of redness and/or skin breakdown     Problem: Metabolic/Fluid and Electrolytes - Adult  Goal: Electrolytes maintained within normal limits  1/10/2023 1301 by Poppy Cooper RN  Outcome: Progressing  Flowsheets (Taken 1/10/2023 0900)  Electrolytes maintained within normal limits:   Monitor labs and assess patient for signs and symptoms of electrolyte imbalances   Administer electrolyte replacement as ordered   Monitor response to electrolyte replacements, including repeat lab results as appropriate   Fluid restriction as ordered   Instruct patient on fluid and nutrition restrictions as appropriate     Problem: Metabolic/Fluid and Electrolytes - Adult  Goal: Hemodynamic stability and optimal renal function maintained  1/10/2023 1301 by Poppy Cooper RN  Outcome: Progressing  Flowsheets (Taken 1/10/2023 0900)  Hemodynamic stability and optimal renal function maintained:   Monitor labs and assess for signs and symptoms of volume excess or deficit   Monitor intake, output and patient weight   Monitor response to interventions for patient's volume status, including labs, urine output, blood pressure (other measures as available)   Instruct patient on fluid and nutrition restrictions as appropriate     Problem: Metabolic/Fluid and Electrolytes - Adult  Goal: Glucose maintained within prescribed range  1/10/2023 1301 by Cory Toscano RN  Outcome: Progressing  Flowsheets (Taken 1/10/2023 0900)  Glucose maintained within prescribed range:   Monitor blood glucose as ordered   Assess for signs and symptoms of hyperglycemia and hypoglycemia   Administer ordered medications to maintain glucose within target range   Assess barriers to adequate nutritional intake and initiate nutrition consult as needed   Instruct patient on self management of diabetes and initiate consult as needed

## 2023-01-10 NOTE — DISCHARGE SUMMARY
[facility]  Office: [address], DO, Cassandrahazel Barillas, DO, Darius Forrest, DO, Kinjal Portillo Blood, DO, Lloyd Mckenna MD, Joo Pace MD, Cecille Foley MD, Charlene Lambert MD,  Abraham Jefferson MD, Abdulaziz Vora MD, Clem Hdz, DO, Lex Daigle MD,  Skip Li DO, Haydee Valenzuela MD, Chaz Zamudio MD, Leonidas Rubin DO, Aura Mora MD, Guera Jc MD, Titus Meade DO, Jg Steven MD, Jesika Alcocer MD, Arian Spangler MD, Cherelle Light MD, Kelli Gleason DO, Yancy Hearn MD, Kary Piedra MD, Nate Norman, CNP,  Catherine Hopper, CNP, Diane Cummings, CNP, Constantine Maradiaga, CNP,  Maggy Rodriguez, [facility], Iram Carlisle, CNP, Dwight Joyce, CNP, Alfred Bardales, CNP, Silvia Laws, [facility], [facility], CNP, Ari Webb PA-C, Manasa Hopper, CNS, Pratik More CNP, [facility]    Discharge Summary     Patient ID: Tessa Johnson  :  1960   MRN: [id number]     ACCOUNT:  [de-identified]   Patient's PCP: Eulalia Carrillo PA-C  Admit Date: 2023   Discharge Date: 1/10/2023     Length of Stay: 4  Code Status:  Full Code  Admitting Physician: Christopher Robles MD  Discharge Physician: ELY Langford NP     Active Discharge Diagnoses:     Hospital Problem Lists:  Principal Problem:    Acute on chronic combined systolic and diastolic CHF (congestive heart failure) [facility])  Active Problems:    Status post implantation of automatic cardioverter/defibrillator (AICD)    ARIADNA (obstructive sleep apnea)    Type 2 diabetes mellitus, without long-term current use of insulin (HCC)    Paroxysmal atrial fibrillation (HCC)    Edema of both legs    Acute congestive heart failure (HCC)    Skin ulcer of left lower leg with fat layer exposed ([address]    Skin ulcer of right lower leg, with fat layer exposed ([address]    CAD (coronary artery disease)  Resolved Problems:    * No resolved hospital problems. *      Admission Condition:  fair     Discharged Condition: stable    Hospital Stay:     Hospital Course:  Ricardo Hoff is a 57 y.o. male who was admitted for the management of  Acute on chronic combined systolic and diastolic CHF (congestive heart failure) ([facility] [address] , presented to ER with Shortness of Breath and Leg Swelling    1/6 - Indio Nazario is a 57year old male who presented for evaluation of SOB and bilateral low leg edema. He states that he has home health services who come and change his Unna Boots twice weekly. He states that today a new nurse came to do his dressings and told him that she was recommending ED evaluation for edema. He states he has been taking his diuretics as prescribed. He states he has chronic SOB and edema through his legs but \"didn't realize it was this bad\". He states he was scheduled to see cardiology on 1/7/23 for follow-up since his last hospitalization in October, 2022. 1/7-1/9 -patient is responded well to diuretics. He is greater than 5 L out since admission. Now refusing skilled facility and would like to return home. 1/10 - Patient is functioning at his baseline. Patient has diuresed well. Patient remained in the hospital overnight at cardiology recommendation. Plan for discharge today on new oral regiment.     Significant therapeutic interventions: As above    Significant Diagnostic Studies:   Labs / Micro:  CBC:   Lab Results   Component Value Date/Time    WBC 4.5 01/07/2023 05:53 AM    RBC 4.72 01/07/2023 05:53 AM    RBC 5.00 01/02/2012 07:23 PM    HGB 11.8 01/07/2023 05:53 AM    HCT 38.6 01/07/2023 05:53 AM    MCV 81.8 01/07/2023 05:53 AM    MCH 25.0 01/07/2023 05:53 AM    MCHC 30.6 01/07/2023 05:53 AM    RDW 18.5 01/07/2023 05:53 AM     01/07/2023 05:53 AM     01/02/2012 07:23 PM     BMP:    Lab Results   Component Value Date/Time    GLUCOSE 104 01/10/2023 05:55 AM    GLUCOSE 132 01/02/2012 07:23 PM     01/10/2023 05:55 AM    K 3.6 01/10/2023 05:55 AM    CL 98 01/10/2023 05:55 AM    CO2 29 01/10/2023 05:55 AM    ANIONGAP 10 01/10/2023 05:55 AM    BUN 26 01/10/2023 05:55 AM    CREATININE 1.12 01/10/2023 05:55 AM    BUNCRER 23 01/10/2023 05:55 AM    CALCIUM 8.7 01/10/2023 05:55 AM    LABGLOM >60 01/10/2023 05:55 AM    GFRAA >60 09/02/2022 05:42 PM    GFR      09/02/2022 05:42 PM        Radiology:  XR CHEST PORTABLE    Result Date: 1/5/2023  Cardiomegaly with pulmonary edema. Consultations:    Consults:     Final Specialist Recommendations/Findings:   IP CONSULT TO HEART FAILURE NURSE/COORDINATOR  IP CONSULT TO DIETITIAN  IP CONSULT TO CARDIOLOGY      The patient was seen and examined on day of discharge and this discharge summary is in conjunction with any daily progress note from day of discharge.     Discharge plan:     Disposition: Home    Physician Follow Up:     Dede Hamm, [address] [facility] [address]  [phone]    Follow up in 1 week(s)  bmp and vitals check on new medication    Ree Coughlin,   [address] [id number] [phone]    Follow up in 2 week(s)  heart valve replacment evaluation    [address] [facility]  Follow up  [facility]    [address] [id number] [facility]  [phone]  Schedule an appointment as soon as possible for a visit  referral sent- prescription provided    [address]  [address] [facility]  [phone]  Schedule an appointment as soon as possible for a visit  referral sent- prescription provided     Requiring Further Evaluation/Follow Up POST HOSPITALIZATION/Incidental Findings: Worsening heart failure with noncompliance with medication regiment and complete lack of understanding on dietary restriction    Diet: cardiac diet and drink no more than 1200 mL/day    Activity: As tolerated    Instructions to Patient: Please make and keep a follow-up appointment with your primary care provider for approximately 1 week to have your blood work rechecked and to have your vital signs reevaluated. Please make and keep a follow-up appointment with the structural heart center to discuss having your heart valve corrected. It would be in your best interest to go to the heart failure education clinic and management center to discuss diet and lifestyle modifications that she can make to help reduce your exacerbations.     Discharge Medications:      Medication List        START taking these medications      lisinopril 2.5 MG tablet  Commonly known as: PRINIVIL;ZESTRIL  Take 1 tablet by mouth daily  Start taking on: January 11, 2023     spironolactone 25 MG tablet  Commonly known as: ALDACTONE  Take 1 tablet by mouth daily            CHANGE how you take these medications      carvedilol 12.5 MG tablet  Commonly known as: COREG  Take 0.5 tablets by mouth 2 times daily (with meals)  What changed: how much to take            CONTINUE taking these medications      amiodarone 200 MG tablet  Commonly known as: CORDARONE  Take 1 tablet by mouth daily     ARTIFICIAL TEAR SOLUTION OP     atorvastatin 80 MG tablet  Commonly known as: LIPITOR  Take 1 tablet by mouth nightly     bumetanide 2 MG tablet  Commonly known as: BUMEX  Take 1 tablet by mouth 2 times daily     clopidogrel 75 MG tablet  Commonly known as: PLAVIX  Take 1 tablet by mouth daily     empagliflozin 10 MG tablet  Commonly known as: JARDIANCE  Take 1 tablet by mouth daily     metFORMIN 1000 MG tablet  Commonly known as: GLUCOPHAGE  Take 1 tablet by mouth daily Resume on 5/21     midodrine 5 MG tablet  Commonly known as: PROAMATINE  Take 1 tablet by mouth 3 times daily (before meals)     nitroGLYCERIN 0.4 MG SL tablet  Commonly known as: NITROSTAT     rivaroxaban 20 MG Tabs tablet  Commonly known as: XARELTO     therapeutic multivitamin-minerals tablet     VITAMIN B-12 PO            STOP taking these medications aspirin EC 81 MG EC tablet               Where to Get Your Medications        These medications were sent to [facility] [id number]  [facility],  [place]       Phone: [phone]   carvedilol 12.5 MG tablet  lisinopril 2.5 MG tablet  spironolactone 25 MG tablet         Discharge Procedure Orders   [address]   Referral Priority: Routine Referral Type: Eval and Treat   Referral Reason: Specialty Services Required   Number of Visits Requested: [facility]   Referral Priority: Routine Referral Type: Eval and Treat   Referral Reason: Specialty Services Required   Number of Visits Requested: 1       Time Spent on discharge is  38 mins in patient examination, evaluation, counseling as well as medication reconciliation, prescriptions for required medications, discharge plan and follow up. Electronically signed by   ELY Dailey NP  1/10/2023  6:52 PM      Thank you Dr. Berhane Mitchell PA-C for the opportunity to be involved in this patient's care.

## 2023-01-11 NOTE — PROGRESS NOTES
Eric [facility]  PROGRESS NOTE    Room # 2009/2009-02   Name: Natalie Scott              Reason for visit: Routine    I visited the patient. Admit Date & Time: 1/5/2023  9:59 PM    Assessment:  Natalie Scott is a 57 y.o. male. Upon entering the room patient was sleeping. Intervention:   provided a ministry presence and brief prayer. Outcome:  Patient did not respond. Plan:  Chaplains will remain available to offer spiritual and emotional support as needed. Electronically signed by Chaplain Caroline, on 1/10/2023 at 8:03 PM.  Cy      01/10/23 1957   Encounter Summary   Service Provided For: Patient   Referral/Consult From: [facility]   Support System Unknown   Last Encounter  01/10/23   Complexity of Encounter Low   Begin Time 0715   End Time  0716   Total Time Calculated 1 min   Encounter    Type Initial Screen/Assessment   Assessment/Intervention/Outcome   Assessment Unable to assess   Intervention Prayer (assurance of)/[place];Sustaining Presence/Ministry of presence

## 2023-01-11 NOTE — FLOWSHEET NOTE
DC instructions given to patient & reviewed. Pt verbalized understanding. All belongings packed and ready.  Pt calling sig other for ride home    [facility] notified regarding pt DC tonight and will fax DC paperwork

## 2023-01-13 NOTE — DISCHARGE INSTRUCTIONS
[address] [facility],[address] -Phone: [phone] Fax: [phone]   Visit  Discharge Instructions / Physician Orders    DATE: 1/16/2023     Home Care:      SUPPLIES ORDERED THRU:      Wound Location:      Cleanse with: Liquid antibacterial soap and water, rinse well      Dressing Orders:      Frequency:      Additional Orders: Increase protein to diet (meat, cheese, eggs, fish, peanut butter, nuts and beans)  Multivitamin daily  ELEVATE LEGS AS MUCH AS POSSIBLE    Your next appointment with [address] [place] [address] [facility]     (Please note your next appointment above and if you are unable to keep, kindly give a 24 hour notice. Thank you.)  If more than 15 min late we cannot guarantee you will be seen due to clinician schedule  Per Policy, Excessive cancellation will call for dismissal from program.     If you experience any of the following, please call the [address] [place] [address] during business hours:  [phone]  Your Phone call may be forwarded to [email or web address] during business hours that [employer] is closed. * Increase in Pain  * Temperature over 101  * Increase in drainage from your wound  * Drainage with a foul odor  * Bleeding  * Increase in swelling  * Need for compression bandage changes due to slippage, breakthrough drainage. If you need medical attention outside of the business hours of the [address] [place] [address] please contact your PCP or go to the nearest emergency room. The information contained in the After Visit Summary has been reviewed with me, the patient and/or responsible adult, by my health care provider(s). I had the opportunity to ask questions regarding this information.  I have elected to receive;      []After Visit Summary  [x]Comprehensive Discharge Instruction      Patient signature______________________________________Date:________

## 2023-01-16 ENCOUNTER — HOSPITAL ENCOUNTER (OUTPATIENT)
Dept: WOUND CARE | Age: 63
Discharge: HOME | End: 2023-01-16

## 2023-01-20 ENCOUNTER — APPOINTMENT (OUTPATIENT)
Dept: ULTRASOUND IMAGING | Age: 63
DRG: 291 | End: 2023-01-20
Payer: MEDICARE

## 2023-01-20 ENCOUNTER — HOSPITAL ENCOUNTER (INPATIENT)
Age: 63
LOS: 4 days | Discharge: HOME | DRG: 291 | End: 2023-01-24
Attending: EMERGENCY MEDICINE | Admitting: INTERNAL MEDICINE
Payer: MEDICARE

## 2023-01-20 ENCOUNTER — APPOINTMENT (OUTPATIENT)
Dept: CT IMAGING | Age: 63
DRG: 291 | End: 2023-01-20
Payer: MEDICARE

## 2023-01-20 ENCOUNTER — TELEPHONE (OUTPATIENT)
Dept: OTHER | Facility: CLINIC | Age: 63
End: 2023-01-20

## 2023-01-20 DIAGNOSIS — L97.912 SKIN ULCER OF RIGHT LOWER LEG, WITH FAT LAYER EXPOSED (HCC): ICD-10-CM

## 2023-01-20 DIAGNOSIS — N43.3 BILATERAL HYDROCELE: ICD-10-CM

## 2023-01-20 DIAGNOSIS — R06.00 DYSPNEA AND RESPIRATORY ABNORMALITIES: ICD-10-CM

## 2023-01-20 DIAGNOSIS — R18.8 OTHER ASCITES: Primary | ICD-10-CM

## 2023-01-20 DIAGNOSIS — R06.89 DYSPNEA AND RESPIRATORY ABNORMALITIES: ICD-10-CM

## 2023-01-20 PROBLEM — R60.1 ANASARCA: Status: ACTIVE | Noted: 2023-01-01

## 2023-01-20 LAB
ABSOLUTE EOS #: 0.05 K/UL (ref 0–0.44)
ABSOLUTE IMMATURE GRANULOCYTE: 0.02 K/UL (ref 0–0.3)
ABSOLUTE LYMPH #: 0.99 K/UL (ref 1.1–3.7)
ABSOLUTE MONO #: 0.84 K/UL (ref 0.1–1.2)
ANION GAP SERPL CALCULATED.3IONS-SCNC: 11 MMOL/L (ref 9–17)
BASOPHILS # BLD: 1 % (ref 0–2)
BASOPHILS ABSOLUTE: 0.03 K/UL (ref 0–0.2)
BILIRUBIN URINE: NEGATIVE
BUN BLDV-MCNC: 19 MG/DL (ref 8–23)
BUN/CREAT BLD: 22 (ref 9–20)
CALCIUM SERPL-MCNC: 8.7 MG/DL (ref 8.6–10.4)
CHLORIDE BLD-SCNC: 98 MMOL/L (ref 98–107)
CO2: 27 MMOL/L (ref 20–31)
COLOR: YELLOW
CREAT SERPL-MCNC: 0.88 MG/DL (ref 0.7–1.2)
EOSINOPHILS RELATIVE PERCENT: 1 % (ref 1–4)
EPITHELIAL CELLS UA: ABNORMAL /HPF (ref 0–5)
GFR SERPL CREATININE-BSD FRML MDRD: >60 ML/MIN/1.73M2
GLUCOSE BLD-MCNC: 105 MG/DL (ref 75–110)
GLUCOSE BLD-MCNC: 114 MG/DL (ref 70–99)
GLUCOSE URINE: NEGATIVE
HCT VFR BLD CALC: 39.7 % (ref 40.7–50.3)
HEMOGLOBIN: 12 G/DL (ref 13–17)
IMMATURE GRANULOCYTES: 0 %
INR BLD: 1.4
KETONES, URINE: NEGATIVE
LEUKOCYTE ESTERASE, URINE: NEGATIVE
LYMPHOCYTES # BLD: 15 % (ref 24–43)
MAGNESIUM: 1.9 MG/DL (ref 1.6–2.6)
MCH RBC QN AUTO: 24.5 PG (ref 25.2–33.5)
MCHC RBC AUTO-ENTMCNC: 30.2 G/DL (ref 28.4–34.8)
MCV RBC AUTO: 81 FL (ref 82.6–102.9)
MONOCYTES # BLD: 13 % (ref 3–12)
NITRITE, URINE: NEGATIVE
NRBC AUTOMATED: 0 PER 100 WBC
PARTIAL THROMBOPLASTIN TIME: 30.3 SEC (ref 23.9–33.8)
PDW BLD-RTO: 19.5 % (ref 11.8–14.4)
PH UA: 8 (ref 5–8)
PLATELET # BLD: 148 K/UL (ref 138–453)
PMV BLD AUTO: 10.5 FL (ref 8.1–13.5)
POTASSIUM SERPL-SCNC: 4.3 MMOL/L (ref 3.7–5.3)
PRO-BNP: 1698 PG/ML
PROTEIN UA: ABNORMAL
PROTHROMBIN TIME: 17.6 SEC (ref 11.5–14.2)
RBC # BLD: 4.9 M/UL (ref 4.21–5.77)
RBC # BLD: ABNORMAL 10*6/UL
RBC UA: ABNORMAL /HPF (ref 0–2)
REASON FOR REJECTION: NORMAL
SEG NEUTROPHILS: 70 % (ref 36–65)
SEGMENTED NEUTROPHILS ABSOLUTE COUNT: 4.56 K/UL (ref 1.5–8.1)
SODIUM BLD-SCNC: 136 MMOL/L (ref 135–144)
SPECIFIC GRAVITY UA: 1.01 (ref 1–1.03)
TROPONIN, HIGH SENSITIVITY: 23 NG/L (ref 0–22)
TURBIDITY: CLEAR
URINE HGB: ABNORMAL
UROBILINOGEN, URINE: ABNORMAL
WBC # BLD: 6.5 K/UL (ref 3.5–11.3)
WBC UA: ABNORMAL /HPF (ref 0–5)
ZZ NTE CLEAN UP: ORDERED TEST: NORMAL
ZZ NTE WITH NAME CLEAN UP: SPECIMEN SOURCE: NORMAL

## 2023-01-20 PROCEDURE — 6360000004 HC RX CONTRAST MEDICATION: Performed by: EMERGENCY MEDICINE

## 2023-01-20 PROCEDURE — 99222 1ST HOSP IP/OBS MODERATE 55: CPT | Performed by: NURSE PRACTITIONER

## 2023-01-20 PROCEDURE — 84484 ASSAY OF TROPONIN QUANT: CPT

## 2023-01-20 PROCEDURE — 2580000003 HC RX 258: Performed by: NURSE PRACTITIONER

## 2023-01-20 PROCEDURE — 83880 ASSAY OF NATRIURETIC PEPTIDE: CPT

## 2023-01-20 PROCEDURE — 80048 BASIC METABOLIC PNL TOTAL CA: CPT

## 2023-01-20 PROCEDURE — 2580000003 HC RX 258: Performed by: EMERGENCY MEDICINE

## 2023-01-20 PROCEDURE — 51798 US URINE CAPACITY MEASURE: CPT

## 2023-01-20 PROCEDURE — 2060000000 HC ICU INTERMEDIATE R&B

## 2023-01-20 PROCEDURE — 82947 ASSAY GLUCOSE BLOOD QUANT: CPT

## 2023-01-20 PROCEDURE — 2500000003 HC RX 250 WO HCPCS: Performed by: NURSE PRACTITIONER

## 2023-01-20 PROCEDURE — 74177 CT ABD & PELVIS W/CONTRAST: CPT

## 2023-01-20 PROCEDURE — 93976 VASCULAR STUDY: CPT

## 2023-01-20 PROCEDURE — 6370000000 HC RX 637 (ALT 250 FOR IP): Performed by: NURSE PRACTITIONER

## 2023-01-20 PROCEDURE — 85025 COMPLETE CBC W/AUTO DIFF WBC: CPT

## 2023-01-20 PROCEDURE — 83735 ASSAY OF MAGNESIUM: CPT

## 2023-01-20 PROCEDURE — 85730 THROMBOPLASTIN TIME PARTIAL: CPT

## 2023-01-20 PROCEDURE — 93005 ELECTROCARDIOGRAM TRACING: CPT | Performed by: EMERGENCY MEDICINE

## 2023-01-20 PROCEDURE — 87086 URINE CULTURE/COLONY COUNT: CPT

## 2023-01-20 PROCEDURE — 81001 URINALYSIS AUTO W/SCOPE: CPT

## 2023-01-20 PROCEDURE — 85610 PROTHROMBIN TIME: CPT

## 2023-01-20 PROCEDURE — 99285 EMERGENCY DEPT VISIT HI MDM: CPT

## 2023-01-20 RX ORDER — ONDANSETRON 4 MG/1
4 TABLET, ORALLY DISINTEGRATING ORAL EVERY 8 HOURS PRN
Status: DISCONTINUED | OUTPATIENT
Start: 2023-01-20 | End: 2023-01-24 | Stop reason: HOSPADM

## 2023-01-20 RX ORDER — BUMETANIDE 1 MG/1
2 TABLET ORAL 2 TIMES DAILY
Status: CANCELLED | OUTPATIENT
Start: 2023-01-20

## 2023-01-20 RX ORDER — SODIUM CHLORIDE 0.9 % (FLUSH) 0.9 %
10 SYRINGE (ML) INJECTION PRN
Status: DISCONTINUED | OUTPATIENT
Start: 2023-01-20 | End: 2023-01-24 | Stop reason: HOSPADM

## 2023-01-20 RX ORDER — AMIODARONE HYDROCHLORIDE 200 MG/1
200 TABLET ORAL DAILY
Status: DISCONTINUED | OUTPATIENT
Start: 2023-01-20 | End: 2023-01-24 | Stop reason: HOSPADM

## 2023-01-20 RX ORDER — CLOPIDOGREL BISULFATE 75 MG/1
75 TABLET ORAL DAILY
Status: DISCONTINUED | OUTPATIENT
Start: 2023-01-21 | End: 2023-01-24 | Stop reason: HOSPADM

## 2023-01-20 RX ORDER — BUMETANIDE 0.25 MG/ML
1 INJECTION, SOLUTION INTRAMUSCULAR; INTRAVENOUS 2 TIMES DAILY
Status: DISCONTINUED | OUTPATIENT
Start: 2023-01-20 | End: 2023-01-21

## 2023-01-20 RX ORDER — 0.9 % SODIUM CHLORIDE 0.9 %
80 INTRAVENOUS SOLUTION INTRAVENOUS ONCE
Status: DISCONTINUED | OUTPATIENT
Start: 2023-01-20 | End: 2023-01-24 | Stop reason: HOSPADM

## 2023-01-20 RX ORDER — INSULIN LISPRO 100 [IU]/ML
0-4 INJECTION, SOLUTION INTRAVENOUS; SUBCUTANEOUS
Status: DISCONTINUED | OUTPATIENT
Start: 2023-01-20 | End: 2023-01-24 | Stop reason: HOSPADM

## 2023-01-20 RX ORDER — MAGNESIUM SULFATE 1 G/100ML
1000 INJECTION INTRAVENOUS PRN
Status: DISCONTINUED | OUTPATIENT
Start: 2023-01-20 | End: 2023-01-24 | Stop reason: HOSPADM

## 2023-01-20 RX ORDER — SPIRONOLACTONE 25 MG/1
25 TABLET ORAL DAILY
Status: DISCONTINUED | OUTPATIENT
Start: 2023-01-20 | End: 2023-01-24 | Stop reason: HOSPADM

## 2023-01-20 RX ORDER — CARVEDILOL 6.25 MG/1
6.25 TABLET ORAL 2 TIMES DAILY WITH MEALS
Status: DISCONTINUED | OUTPATIENT
Start: 2023-01-20 | End: 2023-01-24 | Stop reason: HOSPADM

## 2023-01-20 RX ORDER — ATORVASTATIN CALCIUM 80 MG/1
80 TABLET, FILM COATED ORAL NIGHTLY
Status: DISCONTINUED | OUTPATIENT
Start: 2023-01-20 | End: 2023-01-24 | Stop reason: HOSPADM

## 2023-01-20 RX ORDER — POTASSIUM CHLORIDE 7.45 MG/ML
10 INJECTION INTRAVENOUS PRN
Status: DISCONTINUED | OUTPATIENT
Start: 2023-01-20 | End: 2023-01-24 | Stop reason: HOSPADM

## 2023-01-20 RX ORDER — SODIUM CHLORIDE 0.9 % (FLUSH) 0.9 %
5-40 SYRINGE (ML) INJECTION EVERY 12 HOURS SCHEDULED
Status: DISCONTINUED | OUTPATIENT
Start: 2023-01-20 | End: 2023-01-24 | Stop reason: HOSPADM

## 2023-01-20 RX ORDER — POLYETHYLENE GLYCOL 3350 17 G/17G
17 POWDER, FOR SOLUTION ORAL DAILY PRN
Status: DISCONTINUED | OUTPATIENT
Start: 2023-01-20 | End: 2023-01-24 | Stop reason: HOSPADM

## 2023-01-20 RX ORDER — ONDANSETRON 2 MG/ML
4 INJECTION INTRAMUSCULAR; INTRAVENOUS EVERY 6 HOURS PRN
Status: DISCONTINUED | OUTPATIENT
Start: 2023-01-20 | End: 2023-01-24 | Stop reason: HOSPADM

## 2023-01-20 RX ORDER — ACETAMINOPHEN 650 MG/1
650 SUPPOSITORY RECTAL EVERY 6 HOURS PRN
Status: DISCONTINUED | OUTPATIENT
Start: 2023-01-20 | End: 2023-01-24 | Stop reason: HOSPADM

## 2023-01-20 RX ORDER — MIDODRINE HYDROCHLORIDE 5 MG/1
5 TABLET ORAL
Status: DISCONTINUED | OUTPATIENT
Start: 2023-01-20 | End: 2023-01-22

## 2023-01-20 RX ORDER — M-VIT,TX,IRON,MINS/CALC/FOLIC 27MG-0.4MG
1 TABLET ORAL DAILY
Status: DISCONTINUED | OUTPATIENT
Start: 2023-01-20 | End: 2023-01-24 | Stop reason: HOSPADM

## 2023-01-20 RX ORDER — SODIUM CHLORIDE 9 MG/ML
INJECTION, SOLUTION INTRAVENOUS PRN
Status: DISCONTINUED | OUTPATIENT
Start: 2023-01-20 | End: 2023-01-24 | Stop reason: HOSPADM

## 2023-01-20 RX ORDER — INSULIN LISPRO 100 [IU]/ML
0-4 INJECTION, SOLUTION INTRAVENOUS; SUBCUTANEOUS NIGHTLY
Status: DISCONTINUED | OUTPATIENT
Start: 2023-01-20 | End: 2023-01-24 | Stop reason: HOSPADM

## 2023-01-20 RX ORDER — LISINOPRIL 2.5 MG/1
2.5 TABLET ORAL DAILY
Status: DISCONTINUED | OUTPATIENT
Start: 2023-01-20 | End: 2023-01-24 | Stop reason: HOSPADM

## 2023-01-20 RX ORDER — DEXTROSE MONOHYDRATE 100 MG/ML
INJECTION, SOLUTION INTRAVENOUS CONTINUOUS PRN
Status: DISCONTINUED | OUTPATIENT
Start: 2023-01-20 | End: 2023-01-24 | Stop reason: HOSPADM

## 2023-01-20 RX ORDER — ACETAMINOPHEN 325 MG/1
650 TABLET ORAL EVERY 6 HOURS PRN
Status: DISCONTINUED | OUTPATIENT
Start: 2023-01-20 | End: 2023-01-24 | Stop reason: HOSPADM

## 2023-01-20 RX ORDER — POTASSIUM CHLORIDE 20 MEQ/1
40 TABLET, EXTENDED RELEASE ORAL PRN
Status: DISCONTINUED | OUTPATIENT
Start: 2023-01-20 | End: 2023-01-24 | Stop reason: HOSPADM

## 2023-01-20 RX ADMIN — AMIODARONE HYDROCHLORIDE 200 MG: 200 TABLET ORAL at 20:26

## 2023-01-20 RX ADMIN — LISINOPRIL 2.5 MG: 2.5 TABLET ORAL at 20:25

## 2023-01-20 RX ADMIN — SODIUM CHLORIDE, PRESERVATIVE FREE 10 ML: 5 INJECTION INTRAVENOUS at 14:10

## 2023-01-20 RX ADMIN — ATORVASTATIN CALCIUM 80 MG: 80 TABLET, FILM COATED ORAL at 20:25

## 2023-01-20 RX ADMIN — BUMETANIDE 1 MG: 0.25 INJECTION INTRAMUSCULAR; INTRAVENOUS at 20:26

## 2023-01-20 RX ADMIN — SODIUM CHLORIDE, PRESERVATIVE FREE 10 ML: 5 INJECTION INTRAVENOUS at 20:26

## 2023-01-20 RX ADMIN — CARVEDILOL 6.25 MG: 6.25 TABLET, FILM COATED ORAL at 20:25

## 2023-01-20 RX ADMIN — EMPAGLIFLOZIN 10 MG: 10 TABLET, FILM COATED ORAL at 20:42

## 2023-01-20 RX ADMIN — IOPAMIDOL 75 ML: 755 INJECTION, SOLUTION INTRAVENOUS at 14:10

## 2023-01-20 RX ADMIN — Medication 80 ML: at 14:10

## 2023-01-20 RX ADMIN — RIVAROXABAN 20 MG: 20 TABLET, FILM COATED ORAL at 20:25

## 2023-01-20 ASSESSMENT — ENCOUNTER SYMPTOMS
BACK PAIN: 0
CONSTIPATION: 0
COUGH: 0
NAUSEA: 0
DIARRHEA: 0
EYE PAIN: 0
FACIAL SWELLING: 0
ABDOMINAL DISTENTION: 1
SHORTNESS OF BREATH: 0
CHEST TIGHTNESS: 0
EYE DISCHARGE: 0
VOMITING: 0
SHORTNESS OF BREATH: 1
WHEEZING: 0
ABDOMINAL PAIN: 0
ABDOMINAL DISTENTION: 0

## 2023-01-20 ASSESSMENT — PAIN SCALES - GENERAL
PAINLEVEL_OUTOF10: 0
PAINLEVEL_OUTOF10: 0
PAINLEVEL_OUTOF10: 8

## 2023-01-20 ASSESSMENT — PAIN - FUNCTIONAL ASSESSMENT: PAIN_FUNCTIONAL_ASSESSMENT: 0-10

## 2023-01-20 NOTE — PROGRESS NOTES
Pt admitted from ED to 1007. Oriented to room. Standing weight 252.3 lb. Placed on telemetry, bed alarm on and fall precautions in place. Pt educated on fall precautions. History obtained and assessment completed. Dressings to legs intact. Discussed importance of complying with medications and daily weights. Pt states has had several sleep studies but no f/u has been completed re: a CPAP. Answers at times are very vague and he proceeds to other topics.

## 2023-01-20 NOTE — TELEPHONE ENCOUNTER
Writer contacted Dr. Pierce to inform of 30 day readmission risk.     Writer was informed of readmission.

## 2023-01-20 NOTE — H&P
[facility]  Office: [address], DO, Leatha Warner, DO, Everett Luna, DO, Massiel Weber Phoebe, DO, Angi Degroot MD, Jaime Longo MD, Debbie Escamilla MD, Daniel Elizondo MD,  Haroon Dickens MD, Richard Vela MD, India Bruce DO, Latisha Rose MD,  Darlene Foy MD, Warren Jimenez MD, Nely Dumont DO, Tiffanie Adams MD, Gaby Peralta MD, Cris Zhao, DO, Nicolás Medrano MD, Xochitl Rios MD, Severiano Cake, MD, Juan Manuel Ernst MD, Margoth Sheehan, DO, Chayo Sung MD, Mellissa Watkins MD, Anayeli Taylor, CNP,  Frandy Bennett, CNP, Yareli Miguel, CNP, Mohit Casillas, CNP,  Titus Ca, [facility], Elmer Harmon, CNP, Galindo Pineda, CNP, Raul Sheppard, CNP, Arvin Monzon, CNP, Sherman Wagner, CNP, Glenn Miller PA-C, Win Jackson, CNS, Greg Dickerson, CNP, Betty Boyer, CNP         [address]    HISTORY AND PHYSICAL EXAMINATION            Date:   1/20/2023  Patient name:  Kurtis Curry  Date of admission:  1/20/2023 11:53 AM  MRN:   [id number]  Account:  [de-identified]  YOB: 1960  PCP:    Rossana Neal PA-C  Room:   1007/1007-02  Code Status:    Full    Chief Complaint:     Chief Complaint   Patient presents with    Testicle Swelling     Pt states the swelling started around 1/15 and swelling has continued since. Pt states pain is 8/10     History Obtained From:     patient, electronic medical record    History of Present Illness:     Patient presents to the emergency room today with complaints of scrotal swelling. Patient has a significant past medical history of CAD, CHF, myocardial infarction, diabetes, hyperlipidemia, hypertension and has an ICD in place. Patient was recently admitted and discharged on January 10/2022 for a CHF exacerbation. Patient states that since 1/15/2023 the patient started to develop scrotal swelling which has progressively worsened since then.   Patient states that at times he has difficulty urinating due to his penis retracting inside the swollen scrotum. Patient is also experiencing abdominal swelling and dyspnea with exertion. Patient denies any recent fevers, chills, chest pain, nausea, vomiting and diarrhea. Patient states that he stopped taking his Bumex approximately 2 days ago because it was not making a difference in the swelling of his scrotum. Throughout the emergency room evaluation it was noted that his glucose is 114. proBNP is 1698. Troponin 23. Hemoglobin 12.0. CT abdomen and pelvis shows:   1. Moderate volume abdominopelvic ascites. 2.  Body wall edema and diffuse scrotal edema. No evidence for organized   soft tissue fluid collection or soft tissue gas. 3.  Hepatic steatosis. Subtle lobular liver contour is noted, for which the   possibility of underlying chronic liver disease should be considered. 4.  Nonobstructing right renal stone. 5.  Nonspecific mosaic appearance of the lung bases, which may in part be due   to atelectasis or air trapping. No pleural effusion or evidence for edema. Scrotal ultrasound shows: Nonspecific diffuse scrotal wall edema. Large bilateral hydroceles.     Past Medical History:     Past Medical History:   Diagnosis Date    CAD (coronary artery disease)     2020 cath    CHF (congestive heart failure) (HCC)     Dr. Treva Mariscal attack [facility])     Hyperlipidemia     Dr. Betsy Holm    Hypertension     Dr. Betsy Holm    MI (myocardial infarction) [facility])     MRSA (methicillin resistant staph aureus) culture positive 01/26/2018    abdomen    Skin cancer     Snores     no cpap    Stroke [facility])     2011  Dr. Betsy Holm monitors    Wears dentures     upper full denture    Wears reading eyeglasses     Wellness examination     Jose Medina PA-C last seen Nov 2020        Past Surgical History:     Past Surgical History:   Procedure Laterality Date    CARDIAC CATHETERIZATION  08/06/2020    Dr. Lionel Jaime [facility]. Report on chart    CARDIAC DEFIBRILLATOR PLACEMENT  04/04/2022    [address]    unsure of date, bilateral radials    CARDIOVERSION  11/17/2021    CORONARY ANGIOPLASTY WITH STENT PLACEMENT      6 total stents per patient    CORONARY ARTERY BYPASS GRAFT      4 vessel bypass    EYE SURGERY      eye injury  new lens  and laser lt eye 2019    HERNIA REPAIR Bilateral 12/21/2020    XI LAPAROSCOPIC ROBOTIC INGUINAL HERNIA REPAIR WITH MESH; UMBILICAL HERNIA REPAIR WITH MESH performed by Samy Lopez DO at [address] [place] [address]      plate/hardware present    OTHER SURGICAL HISTORY  11/17/2021    BENITO    SKIN BIOPSY      back        Medications Prior to Admission:     Prior to Admission medications    Medication Sig Start Date End Date Taking?  Authorizing Provider   carvedilol (COREG) 12.5 MG tablet Take 0.5 tablets by mouth 2 times daily (with meals) 1/10/23   ELY Bhardwaj NP   lisinopril (PRINIVIL;ZESTRIL) 2.5 MG tablet Take 1 tablet by mouth daily 1/11/23   ELY Bhardwaj NP   spironolactone (ALDACTONE) 25 MG tablet Take 1 tablet by mouth daily 1/10/23   ELY Bhardwaj NP   ARTIFICIAL TEAR SOLUTION OP Place 2 drops into both eyes daily    Historical Provider, MD   clopidogrel (PLAVIX) 75 MG tablet Take 1 tablet by mouth daily 10/16/22   Lou Obregon MD   empagliflozin (JARDIANCE) 10 MG tablet Take 1 tablet by mouth daily 10/16/22   Lou Obregon MD   midodrine (PROAMATINE) 5 MG tablet Take 1 tablet by mouth 3 times daily (before meals) 10/16/22   Lou Obregon MD   amiodarone (CORDARONE) 200 MG tablet Take 1 tablet by mouth daily 10/16/22   Lou Obregon MD   bumetanide (BUMEX) 2 MG tablet Take 1 tablet by mouth 2 times daily 10/15/22   Lou Obregon MD   rivaroxaban (XARELTO) 20 MG TABS tablet Take 20 mg by mouth Daily with supper    Historical Provider, MD   metFORMIN (GLUCOPHAGE) 1000 MG tablet Take 1 tablet by mouth daily Resume on 5/21 5/19/22   Michael Arenas DO   Cyanocobalamin (VITAMIN B-12 PO) Take 1 tablet by mouth daily    Historical Provider, MD   naproxen (NAPROSYN) 500 MG tablet Take 1 tablet by mouth 2 times daily (with meals) 3/28/22 3/28/22  Easton Melton PA-C   Multiple Vitamins-Minerals (THERAPEUTIC MULTIVITAMIN-MINERALS) tablet Take 1 tablet by mouth daily    Historical Provider, MD   nitroGLYCERIN (NITROSTAT) 0.4 MG SL tablet Place 0.4 mg under the tongue every 5 minutes as needed for Chest pain up to max of 3 total doses. If no relief after 1 dose, call 911. Dr. Praneeth Norris Provider, MD   atorvastatin (LIPITOR) 80 MG tablet Take 1 tablet by mouth nightly 2/20/19   Danny Harley MD        Allergies:     Patient has no known allergies. Social History:     Tobacco:    reports that he quit smoking about 24 years ago. He has never used smokeless tobacco.  Alcohol:      reports no history of alcohol use. Drug Use:  reports no history of drug use. Family History:     Family History   Problem Relation Age of Onset    Coronary Art Dis Father     Liver Disease Father     Alcohol Abuse Sister        Review of Systems:     Positive and Negative as described in HPI. Review of Systems   Constitutional:  Negative for activity change, chills, fatigue and fever. HENT:  Negative for congestion. Respiratory:  Positive for shortness of breath. Negative for cough, chest tightness and wheezing. Cardiovascular: Negative. Gastrointestinal:  Positive for abdominal distention. Negative for abdominal pain, constipation, diarrhea, nausea and vomiting. Endocrine: Negative for cold intolerance and polyuria. Genitourinary:  Positive for difficulty urinating and scrotal swelling. Musculoskeletal:  Negative for arthralgias and myalgias. Skin:  Positive for wound (bilateral lower legs). Neurological:  Negative for dizziness and numbness. Psychiatric/Behavioral:  Negative for confusion.       Physical Exam:   /78   Pulse 83   Temp 98.2 °F (36.8 °C) (Oral)   Resp 18   Ht 5' 9\" (1.753 m)   Wt 232 lb (105.2 kg)   SpO2 96%   BMI 34.26 kg/m²   Temp (24hrs), Av.2 °F (36.8 °C), Min:98.2 °F (36.8 °C), Max:98.2 °F (36.8 °C)    No results for input(s): POCGLU in the last 72 hours. No intake or output data in the 24 hours ending 23 1820    Physical Exam  Vitals and nursing note reviewed. Constitutional:       General: He is not in acute distress. Appearance: Normal appearance. He is obese. He is not ill-appearing. HENT:      Head: Normocephalic. Mouth/Throat:      Mouth: Mucous membranes are dry. Eyes:      Pupils: Pupils are equal, round, and reactive to light. Cardiovascular:      Rate and Rhythm: Normal rate and regular rhythm. Pulses: Normal pulses. Heart sounds: Murmur heard. No friction rub. No gallop. Pulmonary:      Effort: Pulmonary effort is normal. Tachypnea (at times) present. No respiratory distress. Breath sounds: Normal breath sounds. No wheezing, rhonchi or rales. Abdominal:      General: Bowel sounds are normal. There is no distension. Tenderness: There is no abdominal tenderness. There is no guarding. Comments: Edematous and taught   Musculoskeletal:         General: Swelling present. Cervical back: Normal range of motion. Skin:     General: Skin is warm and dry. Capillary Refill: Capillary refill takes less than 2 seconds. Findings: Erythema (abdomen) and wound (bilateral lower legs) present. Comments: Scattered scabs noted   Neurological:      General: No focal deficit present. Mental Status: He is alert and oriented to person, place, and time. Psychiatric:         Mood and Affect: Mood normal.         Behavior: Behavior normal.         Thought Content:  Thought content normal.         Judgment: Judgment normal.       Investigations:      Laboratory Testing:  Recent Results (from the past 24 hour(s)) CBC with Auto Differential    Collection Time: 01/20/23 12:17 PM   Result Value Ref Range    WBC 6.5 3.5 - 11.3 k/uL    RBC 4.90 4.21 - 5.77 m/uL    Hemoglobin 12.0 (L) 13.0 - 17.0 g/dL    Hematocrit 39.7 (L) 40.7 - 50.3 %    MCV 81.0 (L) 82.6 - 102.9 fL    MCH 24.5 (L) 25.2 - 33.5 pg    MCHC 30.2 28.4 - 34.8 g/dL    RDW 19.5 (H) 11.8 - 14.4 %    Platelets 322 990 - 442 k/uL    MPV 10.5 8.1 - 13.5 fL    NRBC Automated 0.0 0.0 per 100 WBC    Seg Neutrophils 70 (H) 36 - 65 %    Lymphocytes 15 (L) 24 - 43 %    Monocytes 13 (H) 3 - 12 %    Eosinophils % 1 1 - 4 %    Basophils 1 0 - 2 %    Immature Granulocytes 0 0 %    Segs Absolute 4.56 1.50 - 8.10 k/uL    Absolute Lymph # 0.99 (L) 1.10 - 3.70 k/uL    Absolute Mono # 0.84 0.10 - 1.20 k/uL    Absolute Eos # 0.05 0.00 - 0.44 k/uL    Basophils Absolute 0.03 0.00 - 0.20 k/uL    Absolute Immature Granulocyte 0.02 0.00 - 0.30 k/uL    RBC Morphology ANISOCYTOSIS PRESENT    BMP    Collection Time: 01/20/23 12:17 PM   Result Value Ref Range    Glucose 114 (H) 70 - 99 mg/dL    BUN 19 8 - 23 mg/dL    Creatinine 0.88 0.70 - 1.20 mg/dL    Est, Glom Filt Rate >60 >60 mL/min/1.73m2    Bun/Cre Ratio 22 (H) 9 - 20    Calcium 8.7 8.6 - 10.4 mg/dL    Sodium 136 135 - 144 mmol/L    Potassium 4.3 3.7 - 5.3 mmol/L    Chloride 98 98 - 107 mmol/L    CO2 27 20 - 31 mmol/L    Anion Gap 11 9 - 17 mmol/L   Magnesium    Collection Time: 01/20/23 12:17 PM   Result Value Ref Range    Magnesium 1.9 1.6 - 2.6 mg/dL   Brain Natriuretic Peptide    Collection Time: 01/20/23 12:17 PM   Result Value Ref Range    Pro-BNP 1,698 (H) <300 pg/mL   Troponin    Collection Time: 01/20/23 12:17 PM   Result Value Ref Range    Troponin, High Sensitivity 23 (H) 0 - 22 ng/L   EKG 12 Lead    Collection Time: 01/20/23 12:39 PM   Result Value Ref Range    Ventricular Rate 76 BPM    Atrial Rate 76 BPM    P-R Interval 242 ms    QRS Duration 140 ms    Q-T Interval 456 ms    QTc Calculation (Bazett) 513 ms    P Axis 81 degrees    R Axis 99 degrees    T Axis 50 degrees   Protime-INR    Collection Time: 01/20/23  3:45 PM   Result Value Ref Range    Protime 17.6 (H) 11.5 - 14.2 sec    INR 1.4    APTT    Collection Time: 01/20/23  3:45 PM   Result Value Ref Range    PTT 30.3 23.9 - 33.8 sec   SPECIMEN REJECTION    Collection Time: 01/20/23  3:45 PM   Result Value Ref Range    Specimen Source U     Ordered Test [facility]     Reason for Rejection Unable to perform testing: No specimen received. Urinalysis with Microscopic    Collection Time: 01/20/23  4:27 PM   Result Value Ref Range    Color, UA Yellow Yellow    Turbidity UA Clear Clear    Glucose, Ur NEGATIVE NEGATIVE    Bilirubin Urine NEGATIVE NEGATIVE    Ketones, Urine NEGATIVE NEGATIVE    Specific Gravity, UA 1.010 1.005 - 1.030    Urine Hgb TRACE (A) NEGATIVE    pH, UA 8.0 5.0 - 8.0    Protein, UA 1+ (A) NEGATIVE    Urobilinogen, Urine ELEVATED (A) Normal    Nitrite, Urine NEGATIVE NEGATIVE    Leukocyte Esterase, Urine NEGATIVE NEGATIVE    WBC, UA 0 TO 2 0 - 5 /HPF    RBC, UA 0 TO 2 0 - 2 /HPF    Epithelial Cells UA 2 TO 5 0 - 5 /HPF       Imaging/Diagnostics:  CT ABDOMEN PELVIS W IV CONTRAST Additional Contrast? None    Result Date: 1/20/2023  1. Moderate volume abdominopelvic ascites. 2.  Body wall edema and diffuse scrotal edema. No evidence for organized soft tissue fluid collection or soft tissue gas. 3.  Hepatic steatosis. Subtle lobular liver contour is noted, for which the possibility of underlying chronic liver disease should be considered. 4.  Nonobstructing right renal stone. 5.  Nonspecific mosaic appearance of the lung bases, which may in part be due to atelectasis or air trapping. No pleural effusion or evidence for edema. US SCROTUM W LIMITED DUPLEX    Result Date: 1/20/2023  Nonspecific diffuse scrotal wall edema. Large bilateral hydroceles.        Assessment :      Hospital Problems             Last Modified POA    * (Principal) Acute on chronic combined systolic and diastolic CHF (congestive heart failure) (HCC) (Chronic) 1/20/2023 Yes    Status post implantation of automatic cardioverter/defibrillator (AICD) 1/20/2023 Yes    Obesity (BMI 30-39.9) 1/20/2023 Yes    Type 2 diabetes mellitus, without long-term current use of insulin ([facility] [address] 1/20/2023 Yes    Anasarca 1/20/2023 Yes    Essential hypertension 1/20/2023 Yes    Hypercholesterolemia 1/20/2023 Yes    CAD (coronary artery disease) 1/20/2023 Yes    Dyspnea and respiratory abnormalities 1/20/2023 Yes       Plan:     Patient status inpatient in the  Progressive Unit/Step down    Acute on chronic combined heart failure  Cardiology consulted  IV diuretics  Obesity  Advise dietary and lifestyle modification  Follow-up outpatient with PCP  Diabetes  Continue home medications  Monitor blood sugar levels before meals and at bedtime with sliding scale coverage  Anasarca  IV diuretics  Will evaluate for potential paracentesis needs  Bilateral hydrocele  Urology consulted  Hypertension  Continue home medications with holding parameters  Atrial fibrillation  Continue home medications  CAD  Continue home medications  Dyspnea  Monitor pulse oximetry  Advise cough and deep breathing  IV diuretics  Apply supplemental O2 as needed  Adult diet with fluid restriction  Monitor a.m. labs  PT/OT  Wound care consulted    Consultations:   IP CONSULT TO INTERNAL MEDICINE  IP CONSULT TO UROLOGY  IP CONSULT TO CARDIOLOGY    Patient is admitted as inpatient status because of co-morbidities listed above, severity of signs and symptoms as outlined, requirement for current medical therapies and most importantly because of direct risk to patient if care not provided in a hospital setting. Expected length of stay > 48 hours.     On this date 1/20/2023 I have spent 31 minutes reviewing previous notes, test results and face to face with the patient discussing the diagnosis and importance of compliance with the treatment plan as well as documenting on the day of the visit. At least 50% of the time documented was spent with the patient to provide counseling and/or coordination of care.       ELY Preciado - CNP  1/20/2023  6:20 PM    Copy sent to Dr. Avani Feliz PA-C

## 2023-01-20 NOTE — ED PROVIDER NOTES
EMERGENCY DEPARTMENT ENCOUNTER    Pt Name: Franci Kirby  MRN: [id number]  [place] 1960  Date of evaluation: 1/20/23  CHIEF COMPLAINT       Chief Complaint   Patient presents with    Testicle Swelling     Pt states the swelling started around 1/15 and swelling has continued since. Pt states pain is 8/10     HISTORY OF PRESENT ILLNESS   HPI   The patient is a 20-year-old male with a history of CHF who presented to the emergency department secondary to testicular swelling. Patient stated he noted swelling in his bilateral testicles on January 15 but got progressively worse. Denied any trauma or injury. He initially was able to urinate but has had difficulty urinating. Patient has a history of CHF he was on Bumex been taken as prescribed was recently admitted for peripheral edema. States he been compliant with his diet. REVIEW OF SYSTEMS     Review of Systems   Constitutional:  Negative for chills, diaphoresis and fever. HENT:  Negative for congestion, ear pain and facial swelling. Eyes:  Negative for pain, discharge and visual disturbance. Respiratory:  Negative for chest tightness and shortness of breath. Cardiovascular:  Negative for chest pain and palpitations. Gastrointestinal:  Negative for abdominal distention and abdominal pain. Genitourinary:  Positive for difficulty urinating, scrotal swelling and testicular pain. Negative for flank pain and penile swelling. Musculoskeletal:  Negative for back pain. Skin:  Negative for wound. Neurological:  Negative for dizziness, light-headedness and headaches.    PASTMEDICAL HISTORY     Past Medical History:   Diagnosis Date    CAD (coronary artery disease)     2020 cath    CHF (congestive heart failure) ([facility])     Dr. Kandice Covarrubias attack [facility])     Hyperlipidemia     Dr. Rizwana Ames    Hypertension     Dr. Rizwana Ames    MI (myocardial infarction) [facility])     MRSA (methicillin resistant staph aureus) culture positive 01/26/2018    abdomen    Skin cancer     Snores     no cpap    Stroke ([facility])     2011  Dr. Marquis monitors    Wears dentures     upper full denture    Wears reading eyeglasses     Wellness examination     Eulalio Schuster PA-C last seen Nov 2020     Past Problem List  Patient Active Problem List   Diagnosis Code    Hyperbilirubinemia E80.6    Essential hypertension I10    Hypercholesterolemia E78.00    CAD (coronary artery disease) I25.10    Gross hematuria R31.0    Abdominal pain, right upper quadrant R10.11    Right nephrolithiasis N20.0    Abnormal liver function test R79.89    Acute systolic HF (heart failure) ([facility]) I50.21    Noncompliance Z91.199    Acute on chronic systolic heart failure ([facility]) I50.23    Pneumonia J18.9    Dyspnea and respiratory abnormalities R06.00, R06.89    Status post inguinal hernia repair Z98.890, Z87.19    S/P repair of ventral hernia Z98.890, Z87.19    Post-op pain G89.18    Non-recurrent bilateral inguinal hernia without obstruction or gangrene K40.20    Umbilical hernia with obstruction, without gangrene K42.0    Status post implantation of automatic cardioverter/defibrillator (AICD) Z95.810    NSTEMI (non-ST elevated myocardial infarction) ([facility]) I21.4    ARIADNA (obstructive sleep apnea) G47.33    S/P CABG (coronary artery bypass graft) Z95.1    Nausea R11.0    Anorexia R63.0    Thrombocytopenia ([facility]) D69.6    Obesity (BMI 30-39.9) E66.9    Hypokalemia E87.6    Uncontrolled type 2 diabetes mellitus with hyperglycemia ([facility]) E11.65    Acute on chronic combined systolic and diastolic CHF (congestive heart failure) ([facility]) I50.43    Type 2 diabetes mellitus, without long-term current use of insulin ([facility]) E11.9    Paroxysmal atrial fibrillation ([facility]) I48.0    Edema of both legs R60.0    Acute congestive heart failure ([facility]) I50.9    Skin ulcer of left lower leg with fat layer exposed ([facility]) L97.922    Skin ulcer of right lower leg, with fat layer exposed ([facility]) L97.912    Anasarca R60.1    Hydrocele, bilateral N43.3     SURGICAL  HISTORY       Past Surgical History:   Procedure Laterality Date    CARDIAC CATHETERIZATION  08/06/2020    Dr. Torres  Medical therapy.  Report on chart    CARDIAC DEFIBRILLATOR PLACEMENT  04/04/2022    CARDIAC SURGERY  1999    unsure of date, bilateral radials    CARDIOVERSION  11/17/2021    CORONARY ANGIOPLASTY WITH STENT PLACEMENT      6 total stents per patient    CORONARY ARTERY BYPASS GRAFT      4 vessel bypass    EYE SURGERY      eye injury  new lens  and laser lt eye 2019    HERNIA REPAIR Bilateral 12/21/2020    XI LAPAROSCOPIC ROBOTIC INGUINAL HERNIA REPAIR WITH MESH; UMBILICAL HERNIA REPAIR WITH MESH performed by Kody Hernandez DO at [facility] OR    MANDIBLE SURGERY      plate/hardware present    OTHER SURGICAL HISTORY  11/17/2021    BENITO    SKIN BIOPSY      back     CURRENT MEDICATIONS       Current Discharge Medication List        CONTINUE these medications which have NOT CHANGED    Details   carvedilol (COREG) 12.5 MG tablet Take 0.5 tablets by mouth 2 times daily (with meals)  Qty: 60 tablet, Refills: 3      lisinopril (PRINIVIL;ZESTRIL) 2.5 MG tablet Take 1 tablet by mouth daily  Qty: 30 tablet, Refills: 3      spironolactone (ALDACTONE) 25 MG tablet Take 1 tablet by mouth daily  Qty: 30 tablet, Refills: 3      ARTIFICIAL TEAR SOLUTION OP Place 2 drops into both eyes daily      clopidogrel (PLAVIX) 75 MG tablet Take 1 tablet by mouth daily  Qty: 30 tablet, Refills: 1      empagliflozin (JARDIANCE) 10 MG tablet Take 1 tablet by mouth daily  Qty: 30 tablet, Refills: 2      midodrine (PROAMATINE) 5 MG tablet Take 1 tablet by mouth 3 times daily (before meals)  Qty: 90 tablet, Refills: 0      amiodarone (CORDARONE) 200 MG tablet Take 1 tablet by mouth daily  Qty: 30 tablet, Refills: 0      bumetanide (BUMEX) 2 MG tablet Take 1 tablet by mouth 2 times daily  Qty: 30 tablet, Refills: 3      rivaroxaban (XARELTO) 20 MG TABS tablet Take 20 mg by mouth Daily with supper      metFORMIN (GLUCOPHAGE) 1000 MG  tablet Take 1 tablet by mouth daily Resume on   Qty: 60 tablet, Refills: 3      Cyanocobalamin (VITAMIN B-12 PO) Take 1 tablet by mouth daily      Multiple Vitamins-Minerals (THERAPEUTIC MULTIVITAMIN-MINERALS) tablet Take 1 tablet by mouth daily      nitroGLYCERIN (NITROSTAT) 0.4 MG SL tablet Place 0.4 mg under the tongue every 5 minutes as needed for Chest pain up to max of 3 total doses. If no relief after 1 dose, call 911. Dr. Greta Leach      atorvastatin (LIPITOR) 80 MG tablet Take 1 tablet by mouth nightly  Qty: 30 tablet, Refills: 3           ALLERGIES     has No Known Allergies. FAMILY HISTORY     He indicated that his mother is . He indicated that his father is . He indicated that only one of his two sisters is alive. SOCIAL HISTORY       Social History     Tobacco Use    Smoking status: Former     Types: Cigarettes     Quit date: 1999     Years since quittin.0    Smokeless tobacco: Never   Vaping Use    Vaping Use: Never used   Substance Use Topics    Alcohol use: No    Drug use: No     PHYSICAL EXAM     INITIAL VITALS: BP (!) 101/56   Pulse 74   Temp 97.3 °F (36.3 °C) (Oral)   Resp 20   Ht 5' 9\" (1.753 m)   Wt 252 lb 3 oz (114.4 kg)   SpO2 94%   BMI 37.24 kg/m²    Physical Exam  Vitals and nursing note reviewed. Constitutional:       General: He is not in acute distress. Appearance: He is well-developed. He is not diaphoretic. HENT:      Head: Normocephalic and atraumatic. Eyes:      Pupils: Pupils are equal, round, and reactive to light. Cardiovascular:      Rate and Rhythm: Normal rate and regular rhythm. Pulmonary:      Effort: Pulmonary effort is normal.      Breath sounds: Normal breath sounds. Abdominal:      General: Bowel sounds are normal.      Palpations: Abdomen is soft. Genitourinary:     Comments: Bilateral testicular swelling no tenderness palpation no mass appreciated  Musculoskeletal:         General: Normal range of motion. Cervical back: Normal range of motion and neck supple. Skin:     General: Skin is warm. Capillary Refill: Capillary refill takes less than 2 seconds. Neurological:      Mental Status: He is alert and oriented to person, place, and time. MEDICAL DECISION MAKING / ED COURSE:   Summary of Patient Presentation:    Is a 68-year-old male who presented to the emergency department secondary to testicular swelling as well as abdominal distention. Orders for ultrasound of the testicles as well as labs including CT abdomen pelvis. 1)  Number and Complexity of Problems  Problem List This Visit: testicular swelling abdominal    Differential Diagnosis: Hydrocele, testicular torsion, herniated    Diagnoses Considered but Do Not Suspect:  none    Pertinent Comorbid Conditions:  none    2)  Data Reviewed  My EKG interpretation:  NA     Decision Rules/Scores utilized:  NA    HEART SCORE: NA       NIH STROKE SCALE         Tests considered but not ordered and why:  none    External Documents Reviewed:  none    Imaging that is independently reviewed and interpreted by me as:  none    See more data below for the lab and radiology tests and orders. 3)  Treatment and Disposition    Patient repeat assessment: Testicular ultrasound positive for bilateral hydroceles no evidence of epididymitis or torsion.   Patient discussed with the internal medicine service will be admitted for further evaluation treatment given the abdominal distention likely require paracentesis    Disposition discussion with patient/family: Patient admitted for further evaluation treatment, patient remained with treatment plan    Case discussed with consulting clinician: Dr. Heather Ureña, urology internal medicine service    MIPS:  na    Social determinants of health impacting treatment or disposition:  none    Shared Decision Making:  yes    Code Status Discussion:  full code    \"ED Course\" Notes From Epic Narrator:         CRITICAL CARE: PROCEDURES:    Procedures      DATA FOR LAB AND RADIOLOGY TESTS ORDERED BELOW ARE REVIEWED BY THE ED CLINICIAN:    RADIOLOGY: All x-rays, CT, MRI, and formal ultrasound images (except ED bedside ultrasound) are read by the radiologist, see reports below, unless otherwise noted in MDM or here. Reports below are reviewed by myself. CT ABDOMEN PELVIS W IV CONTRAST Additional Contrast? None   Final Result   1. Moderate volume abdominopelvic ascites. 2.  Body wall edema and diffuse scrotal edema. No evidence for organized   soft tissue fluid collection or soft tissue gas. 3.  Hepatic steatosis. Subtle lobular liver contour is noted, for which the   possibility of underlying chronic liver disease should be considered. 4.  Nonobstructing right renal stone. 5.  Nonspecific mosaic appearance of the lung bases, which may in part be due   to atelectasis or air trapping. No pleural effusion or evidence for edema. US SCROTUM W LIMITED DUPLEX   Final Result   Nonspecific diffuse scrotal wall edema. Large bilateral hydroceles. LABS: Lab orders shown below, the results are reviewed by myself, and all abnormals are listed below.   Labs Reviewed   CBC WITH AUTO DIFFERENTIAL - Abnormal; Notable for the following components:       Result Value    Hemoglobin 12.0 (*)     Hematocrit 39.7 (*)     MCV 81.0 (*)     MCH 24.5 (*)     RDW 19.5 (*)     Seg Neutrophils 70 (*)     Lymphocytes 15 (*)     Monocytes 13 (*)     Absolute Lymph # 0.99 (*)     All other components within normal limits   BASIC METABOLIC PANEL - Abnormal; Notable for the following components:    Glucose 114 (*)     Bun/Cre Ratio 22 (*)     All other components within normal limits   BRAIN NATRIURETIC PEPTIDE - Abnormal; Notable for the following components:    Pro-BNP 1,698 (*)     All other components within normal limits   TROPONIN - Abnormal; Notable for the following components:    Troponin, High Sensitivity 23 (*) All other components within normal limits   PROTIME-INR - Abnormal; Notable for the following components:    Protime 17.6 (*)     All other components within normal limits   URINALYSIS WITH MICROSCOPIC - Abnormal; Notable for the following components:    Urine Hgb TRACE (*)     Protein, UA 1+ (*)     Urobilinogen, Urine ELEVATED (*)     All other components within normal limits   BASIC METABOLIC PANEL W/ REFLEX TO MG FOR LOW K - Abnormal; Notable for the following components:    Sodium 134 (*)     Chloride 97 (*)     All other components within normal limits   CBC WITH AUTO DIFFERENTIAL - Abnormal; Notable for the following components:    Hemoglobin 12.0 (*)     Hematocrit 39.0 (*)     MCV 81.4 (*)     MCH 25.1 (*)     RDW 19.3 (*)     Platelets 512 (*)     Seg Neutrophils 72 (*)     Lymphocytes 15 (*)     Absolute Lymph # 0.83 (*)     All other components within normal limits   CULTURE, URINE   MAGNESIUM   APTT   SPECIMEN REJECTION   POC GLUCOSE FINGERSTICK   POC GLUCOSE FINGERSTICK   POCT GLUCOSE   POCT GLUCOSE   POCT GLUCOSE   POCT GLUCOSE       Vitals Reviewed:    Vitals:    01/20/23 2351 01/21/23 0424 01/21/23 0745 01/21/23 0757   BP: 87/60 112/74  (!) 101/56   Pulse: 67   74   Resp: 18 18  20   Temp: 97.9 °F (36.6 °C) 97.7 °F (36.5 °C)  97.3 °F (36.3 °C)   TempSrc:  Tympanic  Oral   SpO2: 92% 94%  94%   Weight:   252 lb 3 oz (114.4 kg)    Height:         MEDICATIONS GIVEN TO PATIENT THIS ENCOUNTER:  Orders Placed This Encounter   Medications    sodium chloride flush 0.9 % injection 10 mL    iopamidol (ISOVUE-370) 76 % injection 75 mL    0.9 % sodium chloride bolus    amiodarone (CORDARONE) tablet 200 mg    atorvastatin (LIPITOR) tablet 80 mg    carvedilol (COREG) tablet 6.25 mg    clopidogrel (PLAVIX) tablet 75 mg    empagliflozin (JARDIANCE) tablet 10 mg     Order Specific Question:   Indication of Use     Answer:   Heart Failure (reduced EF)    lisinopril (PRINIVIL;ZESTRIL) tablet 2.5 mg    metFORMIN (GLUCOPHAGE) tablet 1,000 mg    midodrine (PROAMATINE) tablet 5 mg    therapeutic multivitamin-minerals 1 tablet    spironolactone (ALDACTONE) tablet 25 mg    glucose chewable tablet 16 g    OR Linked Order Group     dextrose bolus 10% 125 mL     dextrose bolus 10% 250 mL    glucagon (rDNA) injection 1 mg    dextrose 10 % infusion    sodium chloride flush 0.9 % injection 5-40 mL    sodium chloride flush 0.9 % injection 10 mL    0.9 % sodium chloride infusion    OR Linked Order Group     potassium chloride (KLOR-CON M) extended release tablet 40 mEq     potassium bicarb-citric acid (EFFER-K) effervescent tablet 40 mEq     potassium chloride 10 mEq/100 mL IVPB (Peripheral Line)    magnesium sulfate 1000 mg in dextrose 5% 100 mL IVPB    OR Linked Order Group     ondansetron (ZOFRAN-ODT) disintegrating tablet 4 mg     ondansetron (ZOFRAN) injection 4 mg    polyethylene glycol (GLYCOLAX) packet 17 g    OR Linked Order Group     acetaminophen (TYLENOL) tablet 650 mg     acetaminophen (TYLENOL) suppository 650 mg    insulin lispro (HUMALOG) injection vial 0-4 Units    insulin lispro (HUMALOG) injection vial 0-4 Units    bumetanide (BUMEX) injection 1 mg    rivaroxaban (XARELTO) tablet 20 mg     Order Specific Question:   Indication of Use     Answer:   A Fib/A Flutter     DISCHARGE PRESCRIPTIONS:  Current Discharge Medication List        PHYSICIAN CONSULTS ORDERED THIS ENCOUNTER:  IP CONSULT TO INTERNAL MEDICINE  IP CONSULT TO UROLOGY  IP CONSULT TO CARDIOLOGY  IP CONSULT TO SPIRITUAL SERVICES  FINAL IMPRESSION      1. Other ascites    2. Bilateral hydrocele          DISPOSITION/PLAN   DISPOSITION Admitted 01/20/2023 06:10:46 PM      OUTPATIENT FOLLOW UP THE PATIENT:  No follow-up provider specified.     MD Silas Gomez MD  52/63/98 2018

## 2023-01-20 NOTE — CONSULTS
[facility]  Urology Consultation    Patient:  Valentino Dallas  MRN: [id number]  YOB: 1960    CHIEF COMPLAINT: Scrotal swelling and scrotal pain, congestive heart failure with anasarca    HISTORY OF PRESENT ILLNESS:   The patient is a 57 y.o. male who presents with scrotal swelling  And increased symptoms of CHF and fluid retention    The patient was recently discharged from the hospital on January 10    He has systolic and diastolic congestive heart failure contributing to anasarca and the scrotal swelling    He has significant peripheral edema. Urology consulted for the scrotal swelling  Patient's old records, notes and chart reviewed and summarized above. Past Medical History:    Past Medical History:   Diagnosis Date    CAD (coronary artery disease)     2020 cath    CHF (congestive heart failure) ([facility])     Dr. Magnolia Hackett attack [facility])     Hyperlipidemia     Dr. Dee Dee Cano    Hypertension     Dr. Dee Dee Cano    MI (myocardial infarction) [facility])     MRSA (methicillin resistant staph aureus) culture positive 01/26/2018    abdomen    Skin cancer     Snores     no cpap    Stroke [facility])     2011  Dr. Dee Dee Cano monitors    Wears dentures     upper full denture    Wears reading eyeglasses     Wellness examination     Robert Vela PA-C last seen Nov 2020       Past Surgical History:    Past Surgical History:   Procedure Laterality Date    CARDIAC CATHETERIZATION  08/06/2020    Dr. Fox Viveros therapy.   Report on chart    CARDIAC DEFIBRILLATOR PLACEMENT  04/04/2022    [address]    unsure of date, bilateral radials    CARDIOVERSION  11/17/2021    CORONARY ANGIOPLASTY WITH STENT PLACEMENT      6 total stents per patient    CORONARY ARTERY BYPASS GRAFT      4 vessel bypass    EYE SURGERY      eye injury  new lens  and laser lt eye 2019    HERNIA REPAIR Bilateral 12/21/2020    XI LAPAROSCOPIC ROBOTIC INGUINAL HERNIA REPAIR WITH MESH; UMBILICAL HERNIA REPAIR WITH MESH performed by Ally Yoder Stern Flatten, DO at [address]      plate/hardware present    OTHER SURGICAL HISTORY  2021    BENITO    SKIN BIOPSY      back     Previous  surgery: none     Medications:    Scheduled Meds:   sodium chloride  80 mL IntraVENous Once     Continuous Infusions:  PRN Meds:.sodium chloride flush    Allergies:  Patient has no known allergies.     Social History:    Social History     Socioeconomic History    Marital status: Single     Spouse name: Not on file    Number of children: Not on file    Years of education: Not on file    Highest education level: Not on file   Occupational History    Not on file   Tobacco Use    Smoking status: Former     Types: Cigarettes     Quit date: 1999     Years since quittin.0    Smokeless tobacco: Never   Vaping Use    Vaping Use: Never used   Substance and Sexual Activity    Alcohol use: No    Drug use: No    Sexual activity: Not Currently   Other Topics Concern    Not on file   Social History Narrative    Not on file     Social Determinants of Health     Financial Resource Strain: Not on file   Food Insecurity: Not on file   Transportation Needs: Not on file   Physical Activity: Not on file   Stress: Not on file   Social Connections: Not on file   Intimate Partner Violence: Not on file   Housing Stability: Not on file       Family History:    Family History   Problem Relation Age of Onset    Coronary Art Dis Father     Liver Disease Father     Alcohol Abuse Sister      Previous Urologic Family history: none    REVIEW OF SYSTEMS:  Constitutional: negative  Eyes: negative  Respiratory:   Shortness of breath  Cardiovascular: see history of present illness  Gastrointestinal: negative  Genitourinary: see HPI  Musculoskeletal: negative  Skin: significant interstitial edema including scrotal swelling   Neurological: negative  Hematological/Lymphatic: negative  Psychological: negative    Physical Exam:    This a 57 y.o. male   Patient Vitals for the past 24 hrs:   BP Temp Temp src Pulse Resp SpO2 Height Weight   01/20/23 1155 138/89 98.2 °F (36.8 °C) Oral 87 18 94 % 5' 9\" (1.753 m) 232 lb (105.2 kg)     Constitutional: Patient in  acute distress; Neuro: alert and oriented to person place and time. Psych: very anxious. Skin: Normal  Lungs: Respiratory shortness of breath associated with CHF  Cardiovascular: see history of present illness  Abdomen: Soft, abdominal swelling and  ascites   no evidence ofCVA, flank pain, hepatosplenomegaly or hernia. Kidneys normal.  Bladder non-tender and not distended. Lymphatics: no palpable lymphadenopathy  Penis normal and circumcised  Urethral meatus normal  Significant scrotal swelling associated with interstitial edema as well as bilateral hydroceles. LABS:  Recent Labs     01/20/23  1217   WBC 6.5   HGB 12.0*   HCT 39.7*   MCV 81.0*        Recent Labs     01/20/23  1217      K 4.3   CL 98   CO2 27   BUN 19   CREATININE 0.88     No results found for: PSA    Additional Lab/culture results:    Urinalysis: No results for input(s): COLORU, PHUR, LABCAST, WBCUA, RBCUA, MUCUS, TRICHOMONAS, YEAST, BACTERIA, CLARITYU, SPECGRAV, LEUKOCYTESUR, UROBILINOGEN, BILIRUBINUR, BLOODU in the last 72 hours. Invalid input(s): NITRATE, GLUCOSEUKETONESUAMORPHOUS     -----------------------------------------------------------------  Imaging Results:  Impression   1. Moderate volume abdominopelvic ascites. 2.  Body wall edema and diffuse scrotal edema. No evidence for organized   soft tissue fluid collection or soft tissue gas. 3.  Hepatic steatosis. Subtle lobular liver contour is noted, for which the   possibility of underlying chronic liver disease should be considered. 4.  Nonobstructing right renal stone.        5.  Nonspecific mosaic appearance of the lung bases, which may in part be due   to atelectasis or air trapping     Assessment and Plan   Impression:    Patient Active Problem List   Diagnosis Hyperbilirubinemia    Essential hypertension    Hypercholesterolemia    CAD (coronary artery disease)    Gross hematuria    Abdominal pain, right upper quadrant    Right nephrolithiasis    Abnormal liver function test    Acute systolic HF (heart failure) ([facility])    Noncompliance    Acute on chronic systolic heart failure ([facility])    Pneumonia    Dyspnea and respiratory abnormalities    Status post inguinal hernia repair    S/P repair of ventral hernia    Post-op pain    Non-recurrent bilateral inguinal hernia without obstruction or gangrene    Umbilical hernia with obstruction, without gangrene    Status post implantation of automatic cardioverter/defibrillator (AICD)    NSTEMI (non-ST elevated myocardial infarction) ([facility])    ARIADNA (obstructive sleep apnea)    S/P CABG (coronary artery bypass graft)    Nausea    Anorexia    Thrombocytopenia ([facility])    Obesity (BMI 30-39. 9)    Hypokalemia    Uncontrolled type 2 diabetes mellitus with hyperglycemia ([facility])    Acute on chronic combined systolic and diastolic CHF (congestive heart failure) ([facility])    Type 2 diabetes mellitus, without long-term current use of insulin ([facility])    Paroxysmal atrial fibrillation ([facility])    Edema of both legs    Acute congestive heart failure ([facility])    Skin ulcer of left lower leg with fat layer exposed ([address]    Skin ulcer of right lower leg, with fat layer exposed ([address]    Anasarca       Plan: we recommend aggressive diuresis and drainage of ascites, the scrotal swelling should gradually improve we will follow along with primary service    Tito Buckner MD  4:25 PM 1/20/2023

## 2023-01-20 NOTE — ED NOTES
ED to inpatient nurses report     Chief Complaint   Patient presents with    Testicle Swelling     Pt states the swelling started around 1/15 and swelling has continued since. Pt states pain is 8/10      Present to ED from home with a history of CHF who presented to the emergency department secondary to testicular swelling. Patient stated he noted swelling in his bilateral testicles on January 15 but got progressively worse. Denied any trauma or injury. He initially was able to urinate but has had difficulty urinating. Patient has a history of CHF. He is on Bumex and has been taking as prescribed. was recently admitted for peripheral edema. States he has been compliant with his diet. LOC: alert and orientated to name, place, date  Vital signs   Vitals:    01/20/23 1155 01/20/23 1545 01/20/23 1630   BP: 138/89 124/69 129/75   Pulse: 87 82 84   Resp: 18  19   Temp: 98.2 °F (36.8 °C)     TempSrc: Oral     SpO2: 94%     Weight: 232 lb (105.2 kg)     Height: 5' 9\" (1.753 m)        Oxygen Baseline RA    Current needs required RA       LDAs:   Peripheral IV 01/20/23 Left Forearm (Active)   Site Assessment Clean, dry & intact 01/20/23 1215   Line Status Blood return noted; Flushed 01/20/23 1215   Dressing Status New dressing applied;Clean, dry & intact 01/20/23 1215     Mobility: Requires assistance * 1  Fall Risk:    Pending ED orders: None  Present condition: Stable  Code Status: full  Consults: IP CONSULT TO INTERNAL MEDICINE  IP CONSULT TO UROLOGY  [x]  Hospitalist  Completed  [x] yes [] no Who:   []  Medicine  Completed  [] yes [] No Who:   []  Cardiology  Completed  [] yes [] No Who:   []  GI   Completed  [] yes [] No Who:   []  Neurology  Completed  [] yes [] No Who:   []  Nephrology Completed  [] yes [] No Who:    []  Vascular  Completed  [] yes [] No Who:   []  Ortho  Completed  [] yes [] No Who:     []  Surgery  Completed  [] yes [] No Who:    [x]  Urology  Completed  [x] yes [] No Who: ariss    []  CT Surgery Completed  [] yes [] No Who:   []  Podiatry  Completed  [] yes [] No Who:    []  Other    Completed  [] yes [] No Who:  Interventions: IV, Labs, EKG  Important Events: difficulty urinating, scrotal swelling and testicular pain.  BNP 1698, trop 23, protime 17.6       Electronically signed by Bari Cesar RN on 1/20/2023 at 4:35 PM     Bari Cesar RN  01/20/23 1 [facility], RN  01/20/23 1 [facility], RN  01/20/23 [id number]

## 2023-01-21 LAB
ABSOLUTE EOS #: 0.06 K/UL (ref 0–0.44)
ABSOLUTE IMMATURE GRANULOCYTE: 0 K/UL (ref 0–0.3)
ABSOLUTE LYMPH #: 0.83 K/UL (ref 1.1–3.7)
ABSOLUTE MONO #: 0.61 K/UL (ref 0.1–1.2)
ANION GAP SERPL CALCULATED.3IONS-SCNC: 11 MMOL/L (ref 9–17)
BASOPHILS # BLD: 1 % (ref 0–2)
BASOPHILS ABSOLUTE: 0.06 K/UL (ref 0–0.2)
BUN BLDV-MCNC: 16 MG/DL (ref 8–23)
BUN/CREAT BLD: 20 (ref 9–20)
CALCIUM SERPL-MCNC: 8.7 MG/DL (ref 8.6–10.4)
CHLORIDE BLD-SCNC: 97 MMOL/L (ref 98–107)
CO2: 26 MMOL/L (ref 20–31)
CREAT SERPL-MCNC: 0.79 MG/DL (ref 0.7–1.2)
CULTURE: NORMAL
EKG ATRIAL RATE: 76 BPM
EKG P AXIS: 81 DEGREES
EKG P-R INTERVAL: 242 MS
EKG Q-T INTERVAL: 456 MS
EKG QRS DURATION: 140 MS
EKG QTC CALCULATION (BAZETT): 513 MS
EKG R AXIS: 99 DEGREES
EKG T AXIS: 50 DEGREES
EKG VENTRICULAR RATE: 76 BPM
EOSINOPHILS RELATIVE PERCENT: 1 % (ref 1–4)
GFR SERPL CREATININE-BSD FRML MDRD: >60 ML/MIN/1.73M2
GLUCOSE BLD-MCNC: 104 MG/DL (ref 75–110)
GLUCOSE BLD-MCNC: 108 MG/DL (ref 75–110)
GLUCOSE BLD-MCNC: 153 MG/DL (ref 75–110)
GLUCOSE BLD-MCNC: 88 MG/DL (ref 75–110)
GLUCOSE BLD-MCNC: 97 MG/DL (ref 70–99)
HCT VFR BLD CALC: 39 % (ref 40.7–50.3)
HEMOGLOBIN: 12 G/DL (ref 13–17)
IMMATURE GRANULOCYTES: 0 %
LV EF: 30 %
LVEF MODALITY: NORMAL
LYMPHOCYTES # BLD: 15 % (ref 24–43)
MCH RBC QN AUTO: 25.1 PG (ref 25.2–33.5)
MCHC RBC AUTO-ENTMCNC: 30.8 G/DL (ref 28.4–34.8)
MCV RBC AUTO: 81.4 FL (ref 82.6–102.9)
MONOCYTES # BLD: 11 % (ref 3–12)
NRBC AUTOMATED: 0 PER 100 WBC
PDW BLD-RTO: 19.3 % (ref 11.8–14.4)
PLATELET # BLD: 133 K/UL (ref 138–453)
PMV BLD AUTO: 11.2 FL (ref 8.1–13.5)
POTASSIUM SERPL-SCNC: 4.4 MMOL/L (ref 3.7–5.3)
RBC # BLD: 4.79 M/UL (ref 4.21–5.77)
SEG NEUTROPHILS: 72 % (ref 36–65)
SEGMENTED NEUTROPHILS ABSOLUTE COUNT: 3.94 K/UL (ref 1.5–8.1)
SODIUM BLD-SCNC: 134 MMOL/L (ref 135–144)
SPECIMEN DESCRIPTION: NORMAL
TROPONIN, HIGH SENSITIVITY: 23 NG/L (ref 0–22)
WBC # BLD: 5.5 K/UL (ref 3.5–11.3)

## 2023-01-21 PROCEDURE — 97116 GAIT TRAINING THERAPY: CPT

## 2023-01-21 PROCEDURE — 6370000000 HC RX 637 (ALT 250 FOR IP): Performed by: INTERNAL MEDICINE

## 2023-01-21 PROCEDURE — 2500000003 HC RX 250 WO HCPCS: Performed by: NURSE PRACTITIONER

## 2023-01-21 PROCEDURE — 6370000000 HC RX 637 (ALT 250 FOR IP): Performed by: NURSE PRACTITIONER

## 2023-01-21 PROCEDURE — 97535 SELF CARE MNGMENT TRAINING: CPT

## 2023-01-21 PROCEDURE — 85025 COMPLETE CBC W/AUTO DIFF WBC: CPT

## 2023-01-21 PROCEDURE — 36415 COLL VENOUS BLD VENIPUNCTURE: CPT

## 2023-01-21 PROCEDURE — 84484 ASSAY OF TROPONIN QUANT: CPT

## 2023-01-21 PROCEDURE — 2580000003 HC RX 258: Performed by: NURSE PRACTITIONER

## 2023-01-21 PROCEDURE — 97162 PT EVAL MOD COMPLEX 30 MIN: CPT

## 2023-01-21 PROCEDURE — 97166 OT EVAL MOD COMPLEX 45 MIN: CPT

## 2023-01-21 PROCEDURE — 99231 SBSQ HOSP IP/OBS SF/LOW 25: CPT | Performed by: INTERNAL MEDICINE

## 2023-01-21 PROCEDURE — 80048 BASIC METABOLIC PNL TOTAL CA: CPT

## 2023-01-21 PROCEDURE — 93010 ELECTROCARDIOGRAM REPORT: CPT | Performed by: INTERNAL MEDICINE

## 2023-01-21 PROCEDURE — 2500000003 HC RX 250 WO HCPCS: Performed by: INTERNAL MEDICINE

## 2023-01-21 PROCEDURE — 82947 ASSAY GLUCOSE BLOOD QUANT: CPT

## 2023-01-21 PROCEDURE — 93306 TTE W/DOPPLER COMPLETE: CPT

## 2023-01-21 PROCEDURE — 2060000000 HC ICU INTERMEDIATE R&B

## 2023-01-21 PROCEDURE — 6360000002 HC RX W HCPCS: Performed by: NURSE PRACTITIONER

## 2023-01-21 RX ORDER — LANOLIN ALCOHOL/MO/W.PET/CERES
3 CREAM (GRAM) TOPICAL NIGHTLY PRN
Status: DISCONTINUED | OUTPATIENT
Start: 2023-01-21 | End: 2023-01-24 | Stop reason: HOSPADM

## 2023-01-21 RX ORDER — BUMETANIDE 0.25 MG/ML
2 INJECTION, SOLUTION INTRAMUSCULAR; INTRAVENOUS 3 TIMES DAILY
Status: DISCONTINUED | OUTPATIENT
Start: 2023-01-21 | End: 2023-01-23

## 2023-01-21 RX ORDER — ACETAZOLAMIDE 250 MG/1
250 TABLET ORAL DAILY
Status: DISCONTINUED | OUTPATIENT
Start: 2023-01-21 | End: 2023-01-24 | Stop reason: HOSPADM

## 2023-01-21 RX ORDER — BUMETANIDE 0.25 MG/ML
2 INJECTION, SOLUTION INTRAMUSCULAR; INTRAVENOUS 2 TIMES DAILY
Status: DISCONTINUED | OUTPATIENT
Start: 2023-01-21 | End: 2023-01-21

## 2023-01-21 RX ADMIN — SPIRONOLACTONE 25 MG: 25 TABLET ORAL at 08:25

## 2023-01-21 RX ADMIN — MIDODRINE HYDROCHLORIDE 5 MG: 5 TABLET ORAL at 12:36

## 2023-01-21 RX ADMIN — MULTIPLE VITAMINS W/ MINERALS TAB 1 TABLET: TAB at 00:03

## 2023-01-21 RX ADMIN — SODIUM CHLORIDE, PRESERVATIVE FREE 10 ML: 5 INJECTION INTRAVENOUS at 08:25

## 2023-01-21 RX ADMIN — MIDODRINE HYDROCHLORIDE 5 MG: 5 TABLET ORAL at 17:52

## 2023-01-21 RX ADMIN — MIDODRINE HYDROCHLORIDE 5 MG: 5 TABLET ORAL at 00:03

## 2023-01-21 RX ADMIN — MULTIPLE VITAMINS W/ MINERALS TAB 1 TABLET: TAB at 08:25

## 2023-01-21 RX ADMIN — CARVEDILOL 6.25 MG: 6.25 TABLET, FILM COATED ORAL at 08:25

## 2023-01-21 RX ADMIN — SPIRONOLACTONE 25 MG: 25 TABLET ORAL at 00:03

## 2023-01-21 RX ADMIN — BUMETANIDE 2 MG: 0.25 INJECTION INTRAMUSCULAR; INTRAVENOUS at 12:40

## 2023-01-21 RX ADMIN — MIDODRINE HYDROCHLORIDE 5 MG: 5 TABLET ORAL at 08:26

## 2023-01-21 RX ADMIN — METFORMIN HYDROCHLORIDE 1000 MG: 500 TABLET, FILM COATED ORAL at 08:25

## 2023-01-21 RX ADMIN — SODIUM CHLORIDE, PRESERVATIVE FREE 10 ML: 5 INJECTION INTRAVENOUS at 19:50

## 2023-01-21 RX ADMIN — EMPAGLIFLOZIN 10 MG: 10 TABLET, FILM COATED ORAL at 08:25

## 2023-01-21 RX ADMIN — AMIODARONE HYDROCHLORIDE 200 MG: 200 TABLET ORAL at 08:25

## 2023-01-21 RX ADMIN — LISINOPRIL 2.5 MG: 2.5 TABLET ORAL at 08:25

## 2023-01-21 RX ADMIN — ATORVASTATIN CALCIUM 80 MG: 80 TABLET, FILM COATED ORAL at 19:50

## 2023-01-21 RX ADMIN — BUMETANIDE 1 MG: 0.25 INJECTION INTRAMUSCULAR; INTRAVENOUS at 08:25

## 2023-01-21 RX ADMIN — BUMETANIDE 2 MG: 0.25 INJECTION INTRAMUSCULAR; INTRAVENOUS at 17:45

## 2023-01-21 RX ADMIN — RIVAROXABAN 20 MG: 20 TABLET, FILM COATED ORAL at 17:45

## 2023-01-21 RX ADMIN — ACETAZOLAMIDE 250 MG: 250 TABLET ORAL at 12:36

## 2023-01-21 RX ADMIN — CLOPIDOGREL BISULFATE 75 MG: 75 TABLET ORAL at 08:25

## 2023-01-21 RX ADMIN — ONDANSETRON 4 MG: 2 INJECTION INTRAMUSCULAR; INTRAVENOUS at 20:44

## 2023-01-21 NOTE — CONSULTS
[facility]  In Patient Cardiology Consult             Date:   1/21/2023  Patient name: John Evans  Date of admission:  1/20/2023 11:53 AM  MRN:   [id number]  YOB: 1960    Reason for Admission: Worsening lower extremity edema, abdominal edema, scrotal edema    CHIEF COMPLAINT:  Worsening lower extremity edema, abdominal edema, scrotal edema    History Obtained From:  pt and chart    HISTORY OF PRESENT ILLNESS:    This is a 59-year-old male with history of heart failure with reduced ejection fraction status post AICD. He presented due to worsening lower extremity edema,. Also abdominal edema. Also scrotal edema. Continues to have significant weight gain. He had a recent admission. For similar episode. Was then discharged. He returned to the emergency room due to the above. He was found to be in acute on chronic systolic heart failure. He has significant anasarca. He is despite Bumex 2 mg p.o. twice daily. No cp or sob. Past Medical History:    Past Medical History:   Diagnosis Date    CAD (coronary artery disease)     2020 cath    CHF (congestive heart failure) (HCC)     Dr. Michelle Rubin attack [facility])     Hyperlipidemia     Dr. Wisam Duenas    Hypertension     Dr. Wisam Duenas    MI (myocardial infarction) [facility])     MRSA (methicillin resistant staph aureus) culture positive 01/26/2018    abdomen    Skin cancer     Snores     no cpap    Stroke [facility])     2011  Dr. Wisam Duenas monitors    Wears dentures     upper full denture    Wears reading eyeglasses     Wellness examination     Michel Belcher PA-C last seen Nov 2020         Past Surgical History:    Past Surgical History:   Procedure Laterality Date    CARDIAC CATHETERIZATION  08/06/2020    Dr. Buchanan [facility].   Report on chart    CARDIAC DEFIBRILLATOR PLACEMENT  04/04/2022    [address]    unsure of date, bilateral radials    CARDIOVERSION  11/17/2021    CORONARY ANGIOPLASTY WITH STENT PLACEMENT      6 total stents per patient    CORONARY ARTERY BYPASS GRAFT      4 vessel bypass    EYE SURGERY      eye injury  new lens  and laser lt eye 2019    HERNIA REPAIR Bilateral 2020    XI LAPAROSCOPIC ROBOTIC INGUINAL HERNIA REPAIR WITH MESH; UMBILICAL HERNIA REPAIR WITH MESH performed by Nicole Kang DO at [address] [place] [address]      plate/hardware present    OTHER SURGICAL HISTORY  2021    BENITO    SKIN BIOPSY      back         Home Medications:    No outpatient medications have been marked as taking for the 23 encounter [facility] Encounter). Allergies:  Patient has no known allergies. Social History:    Social History     Socioeconomic History    Marital status: Single     Spouse name: None    Number of children: None    Years of education: None    Highest education level: None   Tobacco Use    Smoking status: Former     Types: Cigarettes     Quit date: 1999     Years since quittin.0    Smokeless tobacco: Never   Vaping Use    Vaping Use: Never used   Substance and Sexual Activity    Alcohol use: No    Drug use: No    Sexual activity: Not Currently        Family History:   Family History   Problem Relation Age of Onset    Coronary Art Dis Father     Liver Disease Father     Alcohol Abuse Sister        REVIEW OF SYSTEMS:    Constitutional: there has been no unanticipated weight loss. There's been No change in energy level, No change in activity level. Eyes: No visual changes or diplopia. No scleral icterus. ENT: No Headaches, hearing loss or vertigo. No mouth sores or sore throat. Cardiovascular: As HPI  Respiratory: As HPI  Gastrointestinal: No abdominal pain, appetite loss, blood in stools. No change in bowel or bladder habits. Genitourinary: No dysuria, trouble voiding, or hematuria. Musculoskeletal:  No gait disturbance, No weakness or joint complaints. Integumentary: No rash or pruritis.   Neurological: No headache, diplopia, change in muscle strength, numbness or tingling. No change in gait, balance, coordination, mood, affect, memory, mentation, behavior. Psychiatric: No anxiety, or depression. Endocrine: No temperature intolerance. No excessive thirst, fluid intake, or urination. No tremor. Hematologic/Lymphatic: No abnormal bruising or bleeding, blood clots or swollen lymph nodes. Allergic/Immunologic: No nasal congestion or hives. PHYSICAL EXAM:    Physical Examination:    BP (!) 101/56   Pulse 74   Temp 97.3 °F (36.3 °C) (Oral)   Resp 20   Ht 5' 9\" (1.753 m)   Wt 252 lb 3 oz (114.4 kg)   SpO2 94%   BMI 37.24 kg/m²    Constitutional and General Appearance: alert, cooperative, no distress and appears stated age  HEENT: PERRL, no cervical lymphadenopathy. No masses palpable. Normal oral mucosa  Respiratory:  Normal excursion and expansion without use of accessory muscles  Resp Auscultation: Good respiratory effort. No for increased work of breathing. On auscultation: reduced  Cardiovascular:  Heart tones are crisp and normal. regular S1 and S2. Murmurs: none  Jugular venous pulsation Normal  Abdomen:  No masses or tenderness  Bowel sounds present  Extremities:   Diffuse anasarca, abdominal edema, scrotal edema. Skin: Warm and dry  Neurological:  Alert and oriented.   Moves all extremities well  No abnormalities of mood, affect, memory, mentation, or behavior are noted    DATA:    Diagnostics:      EKG:   Results for orders placed or performed during the hospital encounter of 01/20/23   EKG 12 Lead   Result Value Ref Range    Ventricular Rate 76 BPM    Atrial Rate 76 BPM    P-R Interval 242 ms    QRS Duration 140 ms    Q-T Interval 456 ms    QTc Calculation (Bazett) 513 ms    P Axis 81 degrees    R Axis 99 degrees    T Axis 50 degrees    Narrative    Sinus rhythm with 1st degree A-V block with occasional Premature ventricular complexes  Right bundle branch block  Abnormal ECG  When compared with ECG of 05-JAN-2023 22:00,  Premature ventricular complexes are now Present  T wave inversion no longer evident in Anterior leads       Echo:  Results for orders placed or performed during the hospital encounter of 02/17/19   Echocardiogram complete 2D with doppler with color   Result Value Ref Range    Left Ventricular Ejection Fraction 35     LVEF MODALITY ECHO     Narrative    [facility]    Transthoracic Echocardiography Report (TTE)     Patient Name Ann Britt  Date of Study               02/18/2019                A      Date of      1960  Gender                      Male   Birth      Age          59 year(s)  Race                              Room Number  1021        Height:                     69 inch, 175.26 cm      Corporate ID [id number]  Weight:                     245 pounds, 111.1 kg   #      Patient Acct [de-identified]   BSA:          2.25 m^2      BMI:      36.18   #                                                              kg/m^2      MR #         [id number]     Sonographer                 Brett Almanza      Accession #  [id number]   Interpreting Physician      [address] [place] [address]      Fellow                   Referring Nurse                            Practitioner      Interpreting             Referring Physician         Candi Jefferson     Type of Study      TTE procedure:2D Echocardiogram, M-Mode, Doppler, Color Doppler. Procedure Date  Date: 02/18/2019 Start: 10:41 AM    Study Location: [address] [place] [address]  Technical Quality: Adequate visualization    Indications:Congestive heart failure. Patient Status: Inpatient    Height: 69 inches Weight: 245.01 pounds BSA: 2.25 m^2 BMI: 36.18 kg/m^2    CONCLUSIONS    Summary  Mild left ventricular hypertrophy  Global left ventricular systolic function is moderately reduced  Estimated ejection fraction is 35 % . Leftward compression of inter-ventricular septum (\"D-sign\") indicating RV  pressure overload. Left atrium is moderately dilated. Right atrium is moderately dilated .   Right ventricular dilatation with reduced systolic function. Mild mitral regurgitation. Signature  ----------------------------------------------------------------------------   Electronically signed by Wellington Ramon on 2019   12:20 PM  ----------------------------------------------------------------------------    ----------------------------------------------------------------------------   Electronically signed by Rodolfo Werner(Interpreting physician) on 2019   01:27 PM  ----------------------------------------------------------------------------  FINDINGS  Left Atrium  Left atrium is moderately dilated. Left Ventricle  Mild left ventricular hypertrophy  Global left ventricular systolic function is moderately reduced  Estimated ejection fraction is 35 % . Leftward compression of inter-ventricular septum (\"D-sign\") indicating RV  pressure overload. Right Atrium  Right atrium is moderately dilated . Right Ventricle  Right ventricular dilatation with reduced systolic function. TAPSE value of 1.4cm noted. Mitral Valve  Normal mitral valve structure and function. Mild mitral regurgitation. Aortic Valve  Aortic valve is sclerotic but opens well. No aortic insufficiency. Tricuspid Valve  Trivial tricuspid regurgitation. No pulmonary hypertension. Pulmonic Valve  The pulmonic valve is normal in structure. Trivial pulmonic insufficiency. Pericardial Effusion  No significant pericardial effusion is seen. Miscellaneous  Normal aortic root dimension. IVC normal diameter & inspiratory collapse indicating normal RA filling  pressure .     M-mode / 2D Measurements & Calculations:      LVIDd:5.64 cm(3.7 - 5.6 cm)      Diastolic YEPNTP:220 ml   QZTWX:9.31 cm(2.2 - 4.0 cm)      Systolic HZXRPN:869 ml   BTLU:8.05 cm(0.6 - 1.1 cm)       Aortic Root:3.3 cm(2.0 - 3.7 cm)   LVPWd:0.94 cm(0.6 - 1.1 cm)      LA Dimension: 5.89 cm(1.9 - 4.0 cm)   Fractional Shortenin.01 %   Calculated LVEF (%): 36.31 %      Mitral:                                  Aortic      Valve Area (P1/2-Time): 3.28 cm^2        Peak Velocity: 1.40 m/s   Peak E-Wave: 1.51 m/s                    Peak Gradient: 7.84 mmHg   Peak A-Wave: 0.50 m/s   E/A Ratio: 3.01   Peak Gradient: 9.12 mmHg   Deceleration Time: 190 msec   P1/2t: 67 msec      Tricuspid:                               Pulmonic:      Estimated RVSP: 29 mmHg   Peak TR Velocity: 2.19 m/s   Peak TR Gradient: 19.1844 mmHg   Estimated RA Pressure: 10 mmHg                                            Estimated PASP: 29.18 mmHg     Lateral Wall E' velocity:0.11 m/s       LAST CATH:   Results for orders placed or performed during the hospital encounter of 08/06/20   Cardiac Catheterization    Narrative    Cardiac Diagnostic Report     Demographics      Patient   [place] RAUL A          Date of Study       08/06/2020   Name      Date of   1960            Gender              Male   Birth      Age       62 year(s)            Race                      Room      [id number]  Height:             69 inch, 175.26 cm   Number      Corporate [id number]              Weight:             241 pounds, 109.3 kg   ID #      Patient   [id number]             BSA:      2.24 m^2  BMI:      35.59 kg/m^2   Acct #      MR #      [de-identified]               Performing          Osvaldo,Janemaya                                   Physician      Accession [id number]             Referring   #                               Physician      Case ID # [id number]                 Assisting                                   Physician     Additional Comments  H&P reviewed and patient examined by performing physician prior to the  procedure on 08/06/20 at 1301  No changes noted. If changes, see note below. Mallampati Classification 2 / ASA Classification II : per Physician . Patient medications reviewed by Physician prior to procedure.     Procedure  Procedure Type:     Diagnostic procedure: SVG Injection, ARMANDO Injection, Aortic Root   Angiography, Lt Heart, Coronary Angio, LVgram     Complications:    - No complication    Indications:    - Abnormal nuclear perfusion test      - Pre-op cardiac evaluation     Conclusions      Procedure Summary      Multi-vessel Coronary Artery Disease. Patent LIMA-LAD and SVG to OM. Occluded native RCA with left to right collaterals. Mildly impaired ventricular function. Dilated aortic root with no other grafts seen. Recommendations      Medical therapy as needed. Risk factor modification. Signature      ----------------------------------------------------------------   Electronically signed by Deangelo Riley(Performing Physician) on   08/06/2020 16:49   ----------------------------------------------------------------      Angiographic Findings      Cardiac Arteries and Lesion Findings     LMCA: Normal 0% stenosis. LAD: Single stenosis. with patent LIMA-LAD       Lesion on Prox LAD: 95% stenosis. LCx: Chronic occlusion 100 % . with patent SVG-OM       Lesion on Prox CX: 100% stenosis. RCA: Chronic occlusion 100 % . Lesion on Prox RCA: Ostial.100% stenosis. Cardiac Grafts      -  There is a LIMA graft that originates at the LIMA and attaches to the      Mid LAD. Patent with 0% stenosis      -  There is a Vein graft that originates at the Aorta Left and attaches to      the 1st Ob Samantha. Patent with luminal irregularities of 20-30%. Coronary Tree      Dominance: Right     LV Analysis  LV function assessed as:Abnormal.  Ejection Fraction  +----------------------------------------------------------------------+---+  ! Method                                                                ! EF%! +----------------------------------------------------------------------+---+  ! LV gram                                                               !40 !  +----------------------------------------------------------------------+---+     LV Segment Contractility      1 - Normal 3 - Mild         5 - Severe       7 - Dyskinesis   hypokinesis      hypokinesis      2 -           4 - Moderate     6 - Akinesis     8 - Aneurysm   Hypokinesis   hypokinesis     Procedure Data  Procedure Start Time: 08/06/2020 13:10. Procedure End Time: 08/06/2020  13:42. The procedure was explained in detail to the patient. Risks, complications  and alternative treatments were reviewed. Written consent was obtained. Diagnostic Cath Status: Urgent    Entry Locations    - Retrograde Percutaneous access was performed through the Right Femoral      artery. A 6 Fr sheath was inserted. Hemostasis was successfully obtained      using Mynx (Access). Procedure Medications:    - Oxygen NC 2 l/min. - Versed I.V. 2 mg.      - Fentanyl I.V. 50 mcg.      - Lidocaine HCl 1% 10mg/ml S.Q. 20 ml. Catheters and Wires:    - 6 Fr6F Catheter JL 4 was used for Left coronary angiography. - 6 Fr6F Catheter JR 4 was used for Left ventriculography. - 6 Fr6F Catheter JR 4 was used for Right coronary angiography. - 6 Fr6F Catheter JR 4 was used for SVG. - 6 Fr6F Catheter JR 4 was used for LIMA. - 6 Fr6F Catheter - Lemon Chamber was used for Right coronary angiography. - 6 Fr6F Catheter Angled Pigtail was used for Aortic Root. Contrast Material:    - Optiray [id number] ml    Fluoroscopy Time: Diagnostic: 8:14 minutes. Total: 8:14 minutes. Estimated Blood Loss: 10 ml. Medical History     Allergies    - *No Known Allergies. Risk Factors      The patient risk factors include:treated hypertension, last creatinine:   0.7 mg/dl, creatinine clearance: 171.35 ml/min, dyslipidemia and former   tobacco use. Admission Data  Admission Date: 08/06/2020    Admission Status: Outpatient.        -The patient's anginal syndrome was assessed as CCS III according to the      [place] clinical classification. Hemodynamics      Condition: Baseline Room Air      Estimated: 271. 41Heart Rate: 80 bpm Pressure  +-----+--------------------------------------------------------------------+  ! Site ! Pressure                                                            ! +-----+--------------------------------------------------------------------+  ! AO   !119/72 (93)                                                         !  +-----+--------------------------------------------------------------------+  ! AO   !132/81 (103)                                                        ! +-----+--------------------------------------------------------------------+  ! LV   !130/2 ,13                                                           ! +-----+--------------------------------------------------------------------+  ! LV   !126/3 ,16                                                           ! +-----+--------------------------------------------------------------------+    Valve Gradients and Areas  +-----------+---------+---------+---------+----------+---------+-----------+  ! Valve      ! Peak     ! Mean     ! Area     ! Index     ! Flow     ! Source     ! +-----------+---------+---------+---------+----------+---------+-----------+  ! Aortic     !7        !6        !         !          !         !           !  +-----------+---------+---------+---------+----------+---------+-----------+  ! Aortic     !7        !6        !         !          !         !           !  +-----------+---------+---------+---------+----------+---------+-----------+    Shunts     Oxygen Values      O2 [id number] Cwrpdeshwap567.41            Labs:     CBC:   Recent Labs     01/20/23 1217 01/21/23  0500   WBC 6.5 5.5   HGB 12.0* 12.0*   HCT 39.7* 39.0*    133*     BMP:   Recent Labs     01/20/23 1217 01/21/23  0500    134*   K 4.3 4.4   CO2 27 26   BUN 19 16   CREATININE 0.88 0.79   LABGLOM >60 >60   GLUCOSE 114* 97     BNP: No results for input(s): BNP in the last 72 hours.   PT/INR:   Recent Labs     01/20/23  1545   PROTIME 17.6*   INR 1.4 APTT:  Recent Labs     01/20/23  1545   APTT 30.3     CARDIAC ENZYMES:  Recent Labs     01/20/23  1217   TROPHS 23*     FASTING LIPID PANEL:  Lab Results   Component Value Date/Time    HDL 23 01/07/2023 05:53 AM    TRIG 62 01/07/2023 05:53 AM     LIVER PROFILE:No results for input(s): AST, ALT, LABALBU in the last 72 hours. IMPRESSION:        - Ac on ch Sys, HFrEF, s/p AICD  - Diffuse anasarca, fluid overload, scrotal edema  - Known CAD s/p CABG, PCI to SVG-OM- on 5/22  - Known PAF  - Obesity  - Borderline hypotension    RECOMMENDATIONS:  - I recommend nephro eval and Ultrafiltration  - will intensify diuresis with bumex 2 IV TID  - add diamox  - hold coreg/lisinopril due to lower BP  - may need tx to ICU for Levophed support while diuresing  - check 2d echo    Discussed with patient and nursing. Thank you for allowing me to participate in the care of this patient, please do not hesitate to call if you have any questions. Sarath Henriquez DO, [facility] [place], [address], [address], [place] [facility]  [email or web address]  [phone]

## 2023-01-21 NOTE — DISCHARGE INSTR - COC
Continuity of Care Form    Patient Name: Elmer Figueroa   :  1960  MRN:  [id number]    Admit date:  2023  Discharge date:  23    Code Status Order: Full Code   Advance Directives:     Admitting Physician:  Jing Bean MD  PCP: Maximiliano Ceron PA-C    Discharging Nurse: [place] [facility]/Room#: 5113/1478-09  Discharging Unit Phone Number: [phone]    Emergency Contact:   Extended Emergency Contact Information  Primary Emergency Contact: [facility]  Home Phone: [phone]  Mobile Phone: [phone]  Relation: Other  Preferred language: English   needed? No    Past Surgical History:  Past Surgical History:   Procedure Laterality Date    CARDIAC CATHETERIZATION  2020    Dr. Gregoria Xiong therapy.   Report on chart    CARDIAC DEFIBRILLATOR PLACEMENT  2022    [address]    unsure of date, bilateral radials    CARDIOVERSION  2021    CORONARY ANGIOPLASTY WITH STENT PLACEMENT      6 total stents per patient    CORONARY ARTERY BYPASS GRAFT      4 vessel bypass    EYE SURGERY      eye injury  new lens  and laser lt eye 2019    HERNIA REPAIR Bilateral 2020    XI LAPAROSCOPIC ROBOTIC INGUINAL HERNIA REPAIR WITH MESH; UMBILICAL HERNIA REPAIR WITH MESH performed by Alina Allen DO at [address] [place] [address]      plate/hardware present    OTHER SURGICAL HISTORY  2021    BENITO    SKIN BIOPSY      back       Immunization History:   Immunization History   Administered Date(s) Administered    Influenza Virus Vaccine 10/22/2017    Influenza, FLUARIX, FLULAVAL, FLUZONE (age 10 mo+) AND AFLURIA, (age 1 y+), PF, 0.5mL 01/10/2023    Influenza, Quadv, 6 mo and older, IM, PF (Flulaval, Fluarix) 2019    Pneumococcal Polysaccharide ([id number]) 2017       Active Problems:  Patient Active Problem List   Diagnosis Code    Hyperbilirubinemia E80.6    Essential hypertension I10    Hypercholesterolemia E78.00    CAD (coronary artery disease) I25.10    Gross hematuria R31.0    Abdominal pain, right upper quadrant R10.11    Right nephrolithiasis N20.0    Abnormal liver function test W32.33    Acute systolic HF (heart failure) ([facility]) I50.21    Noncompliance Z91.199    Acute on chronic systolic heart failure ([facility]) I50.23    Pneumonia J18.9    Dyspnea and respiratory abnormalities R06.00, R06.89    Status post inguinal hernia repair Z98.890, Z87.19    S/P repair of ventral hernia Z98.890, Z87.19    Post-op pain G89.18    Non-recurrent bilateral inguinal hernia without obstruction or gangrene Y96.29    Umbilical hernia with obstruction, without gangrene K42.0    Status post implantation of automatic cardioverter/defibrillator (AICD) Z95.810    NSTEMI (non-ST elevated myocardial infarction) ([facility]) I21.4    ARIADNA (obstructive sleep apnea) G47.33    S/P CABG (coronary artery bypass graft) Z95.1    Nausea R11.0    Anorexia R63.0    Thrombocytopenia ([facility]) D69.6    Obesity (BMI 30-39. 9) E66.9    Hypokalemia E87.6    Uncontrolled type 2 diabetes mellitus with hyperglycemia ([facility]) E11.65    Acute on chronic combined systolic and diastolic CHF (congestive heart failure) ([facility]) I50.43    Type 2 diabetes mellitus, without long-term current use of insulin ([facility]) E11.9    Paroxysmal atrial fibrillation ([facility]) I48.0    Edema of both legs R60.0    Acute congestive heart failure ([facility]) I50.9    Skin ulcer of left lower leg with fat layer exposed ([address] J36.689    Skin ulcer of right lower leg, with fat layer exposed ([address] L97.912    Anasarca R60.1    Hydrocele, bilateral N43.3       Isolation/Infection:   Isolation            Contact          Patient Infection Status       Infection Onset Added Last Indicated Last Indicated By Review Planned Expiration Resolved Resolved By    MRSA  04/19/15 12/14/20 MRSA DNA Probe, Nasal        Abdomen 1/2018    Resolved    COVID-19 (Rule Out) 01/05/23 01/05/23 01/05/23 COVID-19, Rapid (Ordered)   01/05/23 Rule-Out Test Resulted    COVID-19 (Rule Out) 10/08/21 10/08/21 10/08/21 COVID-19 (Ordered)   10/10/21 Rule-Out Test Resulted    COVID-19 (Rule Out) 01/22/21 01/22/21 01/22/21 COVID-19 (Ordered)   01/24/21 Rule-Out Test Resulted    COVID-19 (Rule Out) 12/17/20 12/17/20 12/17/20 COVID-19 (Ordered)   12/18/20 Rule-Out Test Resulted    COVID-19 (Rule Out) 08/02/20 08/02/20 08/03/20 Covid-19 Ambulatory (Ordered)   08/06/20 Rule-Out Test Resulted            Nurse Assessment:  Last Vital Signs: BP (!) 90/47   Pulse 55   Temp 97.7 °F (36.5 °C) (Temporal)   Resp 19   Ht 5' 9\" (1.753 m)   Wt 252 lb 3 oz (114.4 kg)   SpO2 94%   BMI 37.24 kg/m²     Last documented pain score (0-10 scale): Pain Level: 0  Last Weight:   Wt Readings from Last 1 Encounters:   01/21/23 252 lb 3 oz (114.4 kg)     Mental Status:  oriented and alert    IV Access:  - None    Nursing Mobility/ADLs:  Walking   Independent  Transfer  Independent  Bathing  Assisted  Dressing  Independent  Toileting  Independent  Feeding  Independent  Med Admin  Assisted  Med Delivery   whole    Wound Care Documentation and Therapy:  Wound 10/14/22 Pretibial Right Open, weeping (Active)   Number of days: 99       Wound 10/14/22 Pretibial Left open, weeping (Active)   Number of days: 99       Incision 04/04/22 Chest Left;Upper (Active)   Number of days: 292        Elimination:  Continence: Bowel: Yes  Bladder: Yes  Urinary Catheter: None   Colostomy/Ileostomy/Ileal Conduit: No       Date of Last BM: 1/19/23    Intake/Output Summary (Last 24 hours) at 1/21/2023 1318  Last data filed at 1/21/2023 1024  Gross per 24 hour   Intake --   Output 1200 ml   Net -1200 ml     No intake/output data recorded. Safety Concerns:     None    Impairments/Disabilities:      None    Nutrition Therapy:  Current Nutrition Therapy:   - Oral Diet:  Renal    Routes of Feeding: Oral  Liquids:  Thin Liquids  Daily Fluid Restriction: yes - amount 1200mls  Last Modified Barium Swallow with Video (Video Swallowing Test): not done    Treatments at the Time of Hospital Discharge:   Respiratory Treatments: NA  Oxygen Therapy:  is not on home oxygen therapy. Ventilator:    - No ventilator support    Rehab Therapies:   Weight Bearing Status/Restrictions: No weight bearing restrictions  Other Medical Equipment (for information only, NOT a DME order): Other Treatments: Skilled nursing assessment  Med teaching and compliance  Resume bilateral lower leg dressing changes twice a week      Patient's personal belongings (please select all that are sent with patient):      RN SIGNATURE:  Electronically signed by Jacek Velazquez RN on 1/24/23 at 6:04 PM EST    CASE MANAGEMENT/SOCIAL WORK SECTION    Inpatient Status Date: ***    Readmission Risk Assessment Score:  Readmission Risk              Risk of Unplanned Readmission:  28           Discharging to Facility/ Agency   Name: [facility]  Address:  Phone: [phone]     Dialysis Facility (if applicable)   Name:  Address:  Dialysis Schedule:  Phone:  Fax:    / signature: Electronically signed by Janey Huff RN on 1/21/23 at 1:18 PM EST    PHYSICIAN SECTION    Prognosis: Good    Condition at Discharge: Stable    Rehab Potential (if transferring to Rehab): Good    Recommended Labs or Other Treatments After Discharge:   Get BMP 1 week, see nephrology outpatient  See PCP  1 week  Wound Care  -RLE wound near ankle care: use silver alginate. Change every other day. Ace wraps BLE from toes to just under knees. May come off at [address] off on unna boot for now, recommend NIVS then reconsider.   -Follow up outpatient [address]    Physician Certification: I certify the above information and transfer of Bridget Berger  is necessary for the continuing treatment of the diagnosis listed and that he requires [address] for greater 30 days.      Update Admission H&P: No change in H&P    PHYSICIAN SIGNATURE:  Electronically signed by Ej Leahy DO on 1/24/23 at 12:41 PM EST

## 2023-01-21 NOTE — PLAN OF CARE
[facility]  Office: [address], DO, Clayton Ryan, DO, Basil Khan, DO, Kiara Sandhu, DO, Henry Morel MD, Chloé Joyce MD, Carlos Madrid MD, Shay Boone MD,  Tameka Glover MD, Denise Donnelly MD, Ilana Marie, DO, Familia Diop MD,  Hoa Gutierrez MD, Isidro العلي MD, Sheila Martínez, DO, Tawnya Braden MD, Lyn Hopson MD, Kimmie De La Vega, DO, Allen Rand MD, Araceli Greer MD, Nica Baker MD, Yoshi Arteaga MD, Moris Dotson, DO, Saige Madrid MD, Krys Li MD, Esther Curtis, CNP,  Martha Coates, CNP, Noemy Gu, CNP, Santosh Maradiaga, CNP,  Madelyn Downs, DNP, Isabella Alexandra, CNP, Ida Barajas, CNP, Arpit Figueroa, CNP, Kaylah Schmidt, CNP, Mary Grace Cunha, CNP, Jassi Yarbrough PA-C, Lois Victor Hugo, CNS, Karina Heredia, CNP, Ching Gong, CNP         [facility]    Second Visit Note  For more detailed information please refer to the progress note of the day      1/21/2023    4:17 PM    Name:   Lc Mcbride  MRN:     [id number]     Acct:      [de-identified]   Room:   [address] [place] [address] Day:  1  Admit Date:  1/20/2023 11:53 AM    PCP:   Fermin Tompkins PA-C  Code Status:  DNR-CCA      Pt vitals were reviewed   New labs were reviewed   Patient was seen    Updated plan :     Patient is requesting his CODE STATUS to be changed to DNR CC arrest no intubation  Patient is fully alert and competent to make his own decisions  Will honor patient's wishes and change his course with DNR CCA no intubation        Paras Arrington MD  1/21/2023  4:17 PM

## 2023-01-21 NOTE — PROGRESS NOTES
Physical Therapy  Facility/Department: [facility] PROGRESSIVE CARE  Physical Therapy Initial Assessment    Name: Natan Arboleda  : 1960  MRN: [id number]  Date of Service: 2023    Discharge Recommendations:  Continue to assess pending progress, Patient would benefit from continued therapy after discharge   PT Equipment Recommendations  Equipment Needed: No    The patient is a 78-year-old male with a history of CHF who presented to the emergency department secondary to testicular swelling. Patient stated he noted swelling in his bilateral testicles on January 15 but got progressively worse. Denied any trauma or injury. He initially was able to urinate but has had difficulty urinating. Patient has a history of CHF he was on Bumex been taken as prescribed was recently admitted for peripheral edema. States he been compliant with his diet. Patient Diagnosis(es): The primary encounter diagnosis was Other ascites. A diagnosis of Bilateral hydrocele was also pertinent to this visit. Past Medical History:  has a past medical history of CAD (coronary artery disease), CHF (congestive heart failure) ([facility] Heart attack ([facility] Hyperlipidemia, Hypertension, MI (myocardial infarction) ([facility] MRSA (methicillin resistant staph aureus) culture positive, Skin cancer, [place], Stroke [facility]), Wears dentures, Wears reading eyeglasses, and Wellness examination. Past Surgical History:  has a past surgical history that includes Coronary artery bypass graft; Coronary angioplasty with stent; skin biopsy; eye surgery; Mandible surgery; hernia repair (Bilateral, 2020); Cardioversion (2021); other surgical history (2021); Cardiac surgery (); Cardiac catheterization (2020); and Cardiac defibrillator placement (2022).     Assessment   Body Structures, Functions, Activity Limitations Requiring Skilled Therapeutic Intervention: Decreased endurance  Assessment: pt reports that at home he can be limited with dynamic standing/walking activities within 3 min due to SOB. to work on endurance to improve overall function at home  Therapy Prognosis: Good  Decision Making: Medium Complexity  Requires PT Follow-Up: Yes  Activity Tolerance  Activity Tolerance: Patient tolerated evaluation without incident  Activity Tolerance Comments: pt did not have any dizzyness, SOB, balance deficits     Plan   Physcial Therapy Plan  General Plan: 5-7 times per week  Current Treatment Recommendations: Strengthening, Endurance training, Patient/Caregiver education & training, Home exercise program, Safety education & training  Safety Devices  Type of Devices: Call light within reach, Gait belt, Left in chair, Nurse notified     Restrictions  Restrictions/Precautions  Restrictions/Precautions: Contact Precautions (MRSA)  Required Braces or Orthoses?: No     Subjective   General  Chart Reviewed: Yes  Patient assessed for rehabilitation services?: Yes  Response To Previous Treatment: Not applicable  Family / Caregiver Present: No  Follows Commands: Within Functional Limits  General Comment  Comments: Jenny RN cleared pt for eval.  Dr Jon Jones was present during eval. no objections for eval  Subjective  Subjective: pt is agreeable to PT eval         Social/Functional History  Social/Functional History  Lives With: Friend(s)  Type of Home: House  Home Layout: Able to Live on Main level with bedroom/bathroom  Home Access: Stairs to enter with rails  Entrance Stairs - Number of Steps: 2  Entrance Stairs - Rails: Right  Bathroom Shower/Tub: Walk-in shower  Bathroom Equipment: Built-in shower seat  Home Equipment: Walker, rolling  Receives Help From: Friend(s)  ADL Assistance: Independent  Homemaking Assistance: Needs assistance  Ambulation Assistance: Independent  Transfer Assistance: Independent  Active : Yes  Mode of Transportation: Car  Occupation: On disability  Vision/Hearing       Cognition         Objective   Heart Rate: 74  Heart Rate Source: Monitor  BP: (!) 101/56  BP Location: Right upper arm  Patient Position: Supine  MAP (Calculated): 71  Resp: 20  SpO2: 94 %  O2 Device: None (Room air)              AROM RLE (degrees)  RLE AROM: WFL  AROM LLE (degrees)  LLE AROM : WFL  AROM RUE (degrees)  RUE General AROM: refer to OT eval  AROM LUE (degrees)  LUE General AROM: refer to OT eval  Strength RLE  Strength RLE: WFL  Comment: grossly 5/5  Strength LLE  Strength LLE: WFL  Comment: grossly 5/5  Strength RUE  Comment: refer to OT eval  Strength LUE  Comment: refer to OT eval        Bed Mobility Training  Bed Mobility Training: No  Overall Level of Assistance:  (pt was sitting on EOB upon arrival)  Balance  Sitting: Intact  Standing: Intact  Transfer Training  Transfer Training: Yes  Overall Level of Assistance: Stand-by assistance  Interventions: Safety awareness training  Sit to Stand: Stand-by assistance  Stand to Sit: Stand-by assistance  Toilet Transfer: Stand-by assistance  Gait Training  Gait Training: Yes  Gait  Overall Level of Assistance: Stand-by assistance  Interventions: Verbal cues; Safety awareness training  Distance (ft): 15 Feet  Assistive Device: Gait belt              Balance  Posture: Good  Sitting - Static: Good  Sitting - Dynamic: Good  Standing - Static: Good  Standing - Dynamic: Good           OutComes Score                                                  AM-PAC Score  AM-PAC Inpatient Mobility Raw Score : 21 (01/21/23 1053)  AM-PAC Inpatient T-Scale Score : 50.25 (01/21/23 1053)  Mobility Inpatient CMS 0-100% Score: 28.97 (01/21/23 1053)  Mobility Inpatient CMS G-Code Modifier : CJ (01/21/23 1053)          Tinneti Score       Goals  Short Term Goals  Time Frame for Short Term Goals: 12 visits  Short Term Goal 1: to be able to walk 200' independently  Short Term Goal 2: to be independent with HEP  Short Term Goal 3: to be able to participate in at least 25 min of PT       Education  Patient Education  Education Given To: Patient  Education Provided: Role of Therapy  Education Method: Verbal  Barriers to Learning: None  Education Outcome: Verbalized understanding      Therapy Time   Individual Concurrent Group Co-treatment   Time In 0843         Time Out 0910         Minutes 27          + 10 min for chart review    Co-treatment with OT warranted secondary to decreased safety and independence requiring 2 skilled therapy professionals to address individual discipline's goals. PT addressing transfer training.          Chrissie Gant, PT

## 2023-01-21 NOTE — PROGRESS NOTES
Advance Care Planning   Advance Care Planning Note  Ambulatory Spiritual Care Services    Date:  1/20/2023    Received request from [facility] Provider and family. Consultation conversation participants:   Patient who understands ACP conversation     Goals of ACP Conversation:  Discuss advance care planning documents    Health Care Decision Makers:      Summary:  No Decision Maker named by patient at this time    Advance Care Planning Documents (Patient Wishes)  Currently on file:   None    Assessment:    helps PT: understand POA documents,. PT: not ready to complete. We also discussed code status. Interventions:  Provided education on documents for clarity and greater understanding  Discussed and provided education on state decision maker hierarchy  Reviewed but did not complete ACP document    Care Preferences Communicated:     Hospitalization:  If the patient's health worsens and it becomes clear that the chance of recovery is unlikely,     the patient prefers comfort-focused treatment without hospitalization. Ventilation:   If the patient, in their present state of health, suddenly became very ill and unable to breathe on their own,     the patient would NOT desire the use of a ventilator (breathing machine). If their health worsens and it becomes clear that the change of recovery is unlikely,     the patient would NOT desire the use of a ventilator (breathing machine). Resuscitation:  In the event the patient's heart stopped as a result of an underlying serious health condition, the patient communicates a preference for      a natural death (no CPR). Outcomes:  ACP Discussion: Postponed    Patient / Healthcare Decision Maker Instructions: Follow up with  to continue their ACP conversation. Electronically signed by Danica Thapa Poster on 1/21/2023 at 3:58 PM.     01/21/23 1103   Encounter Summary   Service Provided For: Patient   Referral/Consult From: Rounding;Nurse Support System Unknown   Last Encounter  01/21/23   Complexity of Encounter Moderate   Begin Time 0210   End Time  0300   Total Time Calculated 50 min   Encounter    Type Initial Screen/Assessment   Spiritual/Emotional needs   Type Spiritual Support;Emotional Distress   Grief, Loss, and Adjustments   Type Life Adjustments; Adjustment to illness   Advance Care Planning   Type ACP conversation   Assessment/Intervention/Outcome   Assessment Passive   Intervention Active listening;Discussed belief system/Judaism practices/saeid;Prayer (assurance of)/[place];Sustaining Presence/Ministry of presence   Outcome Expressed feelings, needs, and concerns;Expressed Gratitude

## 2023-01-21 NOTE — PROGRESS NOTES
Patient's BP low,w/o s/s of hypotension. Denies dizziness or SOB. Medicated with proamatine as ordered.

## 2023-01-21 NOTE — CARE COORDINATION
Case Management Initial Discharge Plan  Franci Kirby,             Met with:patient to discuss discharge plans. Information verified: address, contacts, phone number, , insurance Yes  PCP: Chayo Mitchell PA-C  Date of last visit: December or January    Insurance Provider: Medicare, Medicaid    Discharge Planning    Living Arrangements:  Friends   Support Systems:  Friends/Neighbors    Home has 2 stories  2 stairs to climb to get into front door, 20 stairs to climb to reach second floor  Location of bedroom/bathroom in home  - sleeps on main, goes upstairs for bathroom     Patient able to perform ADL's:Independent    Current Services (outpatient & in home) SN  DME equipment: walker, cane, built in shower chair  DME provider: N/A    Ph[facility]: [facility]    Potential Assistance Needed:  Home Care    Patient agreeable to home care: Yes  Freedom of choice provided:  yes    Prior SNF/Rehab Placement and Facility: No  Agreeable to SNF/Rehab: No  [place] of choice provided: n/a   Evaluation: n/a    Expected Discharge date:     Patient expects to be discharged to:   home  Follow Up Appointment: Best Day/ Time:      Transportation provider: friend  Transportation arrangements needed for discharge: No    Readmission Risk              Risk of Unplanned Readmission:  28             Does patient have a readmission risk score greater than 14?: Yes  If yes, follow-up appointment must be made within 7 days of discharge. Goal of Care:       Discharge Plan: Met with patient at bedside. Lives with friend, independent and drives. Admitted for anasarca. States started to get scrotal swelling 1-15. Currently on IV Bumex. Has history of CHF and pt states follows with multiple physicians. Has bilateral lower legs wrapped and has HHC with [facility] for wound care twice a week. Columba Butcher informed of admission. Urology, cardiology and nephro following.     May need tx to ICU while diuresing. Continue to follow and plan is to return home with current services. ESTELA initiated.                 Electronically signed by Jozef Garnett RN on 1/21/23 at 1:23 PM EST

## 2023-01-21 NOTE — PROGRESS NOTES
Occupational Therapy  Facility/Department: [facility] PROGRESSIVE CARE  Occupational Therapy Initial Assessment    Name: [place]as  : 1960  MRN: [id number]  Date of Service: 2023    Initiated session with pt seated EOB engaging in self feeding. Pt tolerated ROM and MMT, demo access to feet for LB ADLs, sit to stand, functional mobility to doorway, to bathroom, stand to sit on toilet, seated rest break and passing flatus, sit to stand, functional mobility around bed to chair, and stand to sit in chair. Concluded session with pt seated in chair with call light in reach and chair alarm armed. RN approved pt for OOB activity this date and pt agreeable. Discharge Recommendations:  Patient would benefit from continued therapy after discharge          Patient Diagnosis(es): The primary encounter diagnosis was Other ascites. A diagnosis of Bilateral hydrocele was also pertinent to this visit. Past Medical History:  has a past medical history of CAD (coronary artery disease), CHF (congestive heart failure) ([address] Heart attack ([address] Hyperlipidemia, Hypertension, MI (myocardial infarction) ([address] MRSA (methicillin resistant staph aureus) culture positive, Skin cancer, Snores, Stroke [facility]), Wears dentures, Wears reading eyeglasses, and Wellness examination. Past Surgical History:  has a past surgical history that includes Coronary artery bypass graft; Coronary angioplasty with stent; skin biopsy; eye surgery; Mandible surgery; hernia repair (Bilateral, 2020); Cardioversion (2021); other surgical history (2021); Cardiac surgery (); Cardiac catheterization (2020); and Cardiac defibrillator placement (2022). Assessment   Performance deficits / Impairments: Decreased functional mobility ; Decreased ADL status; Decreased endurance;Decreased high-level IADLs  Prognosis: Good  Decision Making: Medium Complexity  REQUIRES OT FOLLOW-UP: Yes  Activity Tolerance  Activity Tolerance: Patient limited by fatigue        Plan   Occupational Therapy Plan  Times Per Week: 4-5  Therapy Duration: 2 Weeks  Current Treatment Recommendations: Functional mobility training, Endurance training, Safety education & training, Patient/Caregiver education & training, Equipment evaluation, education, & procurement, Self-Care / ADL, Home management training     Restrictions  Restrictions/Precautions  Restrictions/Precautions: Contact Precautions (MRSA)  Required Braces or Orthoses?: No    Subjective   General  Patient assessed for rehabilitation services?: Yes  Family / Caregiver Present: No  Referring Practitioner: Noemy Wilkinson  Diagnosis: Anasarca  Subjective  Subjective: Pt pleasant and cooperative     Social/Functional History  Social/Functional History  Lives With: Friend(s)  Type of Home: House  Home Layout: Able to Live on Main level with bedroom/bathroom  Home Access: Stairs to enter with rails  Entrance Stairs - Number of Steps: 2  Entrance Stairs - Rails: Right  Bathroom Shower/Tub: Walk-in shower  Bathroom Toilet: Standard  Bathroom Equipment: Built-in shower seat  Home Equipment: chanda Workman  Has the patient had two or more falls in the past year or any fall with injury in the past year?: No  Receives Help From: Friend(s)  ADL Assistance: Independent  Homemaking Assistance: Needs assistance  Ambulation Assistance: Independent  Transfer Assistance: Independent  Active : Yes  Mode of Transportation: Car  Occupation: On disability       Objective   Heart Rate: 57  Heart Rate Source: Monitor  BP: (!) 91/50  BP Location: Right upper arm  Patient Position: Sitting  MAP (Calculated): 64  Resp: 19  SpO2: 94 %  O2 Device: None (Room air)             Safety Devices  Type of Devices: Call light within reach;Gait belt;Left in chair;Nurse notified  Bed Mobility Training  Bed Mobility Training: No  Overall Level of Assistance:  (pt was sitting on EOB upon arrival)  Balance  Sitting: Intact  Standing: Intact  Transfer Training  Transfer Training: Yes  Overall Level of Assistance: Stand-by assistance  Interventions: Safety awareness training  Sit to Stand: Stand-by assistance  Stand to Sit: Stand-by assistance  Toilet Transfer: Stand-by assistance  Gait Training  Gait Training: Yes  Gait  Overall Level of Assistance: Stand-by assistance  Interventions: Verbal cues; Safety awareness training  Distance (ft): 15 Feet  Assistive Device: Gait belt  Toilet Transfers  Toilet - Technique: Ambulating  Equipment Used: Grab bars  Toilet Transfer: Stand by assistance  AROM: Within functional limits  Strength:  Within functional limits  Coordination: Within functional limits  Sensation: Intact  ADL  Feeding: Independent  Feeding Skilled Clinical Factors: eating breakfast on arrival and during interview  Grooming: Stand by assistance  UE Bathing: Stand by assistance  LE Bathing: Stand by assistance  UE Dressing: Stand by assistance  LE Dressing: Stand by assistance  Toileting: Stand by assistance  Toileting Skilled Clinical Factors: toilet transfer with seated rest break and passing flatus     Activity Tolerance  Activity Tolerance: Patient tolerated evaluation without incident  Activity Tolerance Comments: pt did not have any dizzyness, SOB, balance deficits     Transfers  Sit to stand: Stand by assistance  Stand to sit: Stand by assistance  Vision - Basic Assessment  Prior Vision: No visual deficits  Vision  Vision: Impaired  Vision Exceptions: Wears glasses for reading  Hearing  Hearing: Within functional limits  Cognition  Overall Cognitive Status: [facility]  Cognition Comment: tangential and verbose  Orientation  Overall Orientation Status: Within Functional Limits                  Education Given To: Patient  Education Provided: Role of Therapy;Plan of Care  Education Method: Verbal  Education Outcome: Verbalized understanding                        G-Code     OutComes Score                                                  AM-PAC Score        AM-PAC Inpatient Daily Activity Raw Score: 19 (01/21/23 1202)  AM-PAC Inpatient ADL T-Scale Score : 40.22 (01/21/23 1202)  ADL Inpatient CMS 0-100% Score: 42.8 (01/21/23 1202)  ADL Inpatient CMS G-Code Modifier : CK (01/21/23 1202)    Tinneti Score       Goals  Short Term Goals  Time Frame for Short Term Goals: Within 2 weeks, pt will. ..   Short Term Goal 1: demo mod I and safety with transfers and functional mobility  Short Term Goal 2: demo mod I and safety with ADLs  Short Term Goal 3: increase activity tolerance to engage in at least 20-30 minutes of dynamic seated/standing activity with no more than min rest breaks  Short Term Goal 4: demo good understanding and implementation of EC/WS and safety awareness       Therapy Time   Individual Concurrent Group Co-treatment   Time In       [address]   Time Out       0910   Minutes       [address] [facility] [place], OT

## 2023-01-21 NOTE — PROGRESS NOTES
Patient was restless most of shift. No c/o pain ,just inability to stay in bed. Up to bathroom with standby assist several times. Voiding per pan . Scrotal swelling continued. Slept in recliner for a few hours. Continued IV bumex as ordered, voided over a liter.

## 2023-01-21 NOTE — PROGRESS NOTES
[facility]  Office: [address], DO, Radha Henley, DO, Vanesa Johnson, DO, [facility], DO, Jose Keyes MD, Kirti Rose MD, Brissa Ty MD, Magno Raya MD,  Juan Whitman MD, Aida Moss MD, Kat Pitts, DO, Shawna Barreto MD,  Isaac Hernandez MD, Diana Cardoso MD, Nika Medellin, DO, Roxi Martin MD, Fidelina Rodriguez MD, Olga Garcia, DO, Uvaldo Meza MD, Karoline Dakin, MD, Alfonso Nath MD, Cici Varghese MD, [place], DO, João Daniels MD, Hira Burch MD, Jerica Levi, Heber Durham, CNP, Jacinto Begum, CNP, My Mcneil, CNP,  Robert Collier, [facility], Dianne Ford, CNP, Naa Brock, CNP, Yoav Herndon, CNP, Pam Chaudhary, CNP, Jade Centeno, CNP, Leonardo Paul PALiliaC, Tobias Parmar, CNS, Joie Crane, CNP, Domingo Styles, [facility]    Progress Note    1/21/2023    1:11 PM    Name:   Jamie Tony  MRN:     [id number]     Acct:      [de-identified]   Room:   1007/1007-02  IP Day:  1  Admit Date:  1/20/2023 11:53 AM    PCP:   Mancel Mcardle, PA-C  Code Status:  Full Code    Subjective:     C/C:   Chief Complaint   Patient presents with    Testicle Swelling     Pt states the swelling started around 1/15 and swelling has continued since. Pt states pain is 8/10     Interval History Status: . Patient has voided more than liter after IV Bumex  Still has legs and scrotal swelling as well as ascites,  Cardiology recommending more aggressive diuresis with Bumex and added Diamox, he will need transfer to ICU for pressor support with Levophed if BP drops  Cardiology also recommending nephrology consult for ultrafiltration    Brief History:     Patient presents to the emergency room today with complaints of scrotal swelling.   Patient has a significant past medical history of CAD, CHF, myocardial infarction, diabetes, hyperlipidemia, hypertension and has an ICD in place. Patient was recently admitted and discharged on January 10/2022 for a CHF exacerbation. Patient states that since 1/15/2023 the patient started to develop scrotal swelling which has progressively worsened since then. Patient states that at times he has difficulty urinating due to his penis retracting inside the swollen scrotum. Patient is also experiencing abdominal swelling and dyspnea with exertion. Patient denies any recent fevers, chills, chest pain, nausea, vomiting and diarrhea. Patient states that he stopped taking his Bumex approximately 2 days ago because it was not making a difference in the swelling of his scrotum. Throughout the emergency room evaluation it was noted that his glucose is 114. proBNP is 1698. Troponin 23. Hemoglobin 12.0. CT abdomen and pelvis shows:   1. Moderate volume abdominopelvic ascites. 2.  Body wall edema and diffuse scrotal edema. No evidence for organized   soft tissue fluid collection or soft tissue gas. 3.  Hepatic steatosis. Subtle lobular liver contour is noted, for which the   possibility of underlying chronic liver disease should be considered. 4.  Nonobstructing right renal stone. 5.  Nonspecific mosaic appearance of the lung bases, which may in part be due   to atelectasis or air trapping. No pleural effusion or evidence for edema. Scrotal ultrasound shows: Nonspecific diffuse scrotal wall edema. Large bilateral hydroceles. Review of Systems:     Constitutional:  negative for chills, fevers, sweats  Respiratory:  negative for cough, +dyspnea on exertion, denies wheezing  Cardiovascular:  negative for chest pain, chest pressure/discomfort,++ lower extremity edema, palpitations  Gastrointestinal:  negative for abdominal pain, constipation, diarrhea, nausea, vomiting,+ scrotal swelling  Neurological:  negative for dizziness, headache    Medications:      Allergies:  No Known Allergies    Current Meds: Scheduled Meds:    acetaZOLAMIDE  250 mg Oral Daily    bumetanide  2 mg IntraVENous TID    sodium chloride  80 mL IntraVENous Once    amiodarone  200 mg Oral Daily    atorvastatin  80 mg Oral Nightly    [Held by provider] carvedilol  6.25 mg Oral BID WC    clopidogrel  75 mg Oral Daily    empagliflozin  10 mg Oral Daily    [Held by provider] lisinopril  2.5 mg Oral Daily    metFORMIN  1,000 mg Oral Daily    midodrine  5 mg Oral TID AC    therapeutic multivitamin-minerals  1 tablet Oral Daily    spironolactone  25 mg Oral Daily    sodium chloride flush  5-40 mL IntraVENous 2 times per day    insulin lispro  0-4 Units SubCUTAneous TID WC    insulin lispro  0-4 Units SubCUTAneous Nightly    rivaroxaban  20 mg Oral Dinner     Continuous Infusions:    dextrose      sodium chloride       PRN Meds: sodium chloride flush, glucose, dextrose bolus **OR** dextrose bolus, glucagon (rDNA), dextrose, sodium chloride flush, sodium chloride, potassium chloride **OR** potassium alternative oral replacement **OR** potassium chloride, magnesium sulfate, ondansetron **OR** ondansetron, polyethylene glycol, acetaminophen **OR** acetaminophen    Data:     Past Medical History:   has a past medical history of CAD (coronary artery disease), [address]F (congestive heart failure) ([facility] Heart attack ([facility] Hyperlipidemia, Hypertension, MI (myocardial infarction) ([facility] MRSA (methicillin resistant staph aureus) culture positive, Skin cancer, Snores, Stroke ([facility] Wears dentures, Wears reading eyeglasses, and Wellness examination. Social History:   reports that he quit smoking about 24 years ago. He has never used smokeless tobacco. He reports that he does not drink alcohol and does not use drugs.      Family History:   Family History   Problem Relation Age of Onset    Coronary Art Dis Father     Liver Disease Father     Alcohol Abuse Sister        Vitals:  BP (!) 90/47   Pulse 55   Temp 97.7 °F (36.5 °C) (Temporal)   Resp 19   Ht 5' 9\" (1.753 m)   Wt 252 lb 3 oz (114.4 kg)   SpO2 94%   BMI 37.24 kg/m²   Temp (24hrs), Av.6 °F (36.4 °C), Min:96.6 °F (35.9 °C), Max:98.2 °F (36.8 °C)    Recent Labs     23  1824 23  0759 23  1206   POCGLU 105 88 104       I/O (24Hr): Intake/Output Summary (Last 24 hours) at 2023 1311  Last data filed at 2023 1024  Gross per 24 hour   Intake --   Output 1200 ml   Net -1200 ml       Labs:  Hematology:  Recent Labs     23  1217 23  1545 23  0500   WBC 6.5  --  5.5   RBC 4.90  --  4.79   HGB 12.0*  --  12.0*   HCT 39.7*  --  39.0*   MCV 81.0*  --  81.4*   MCH 24.5*  --  25.1*   MCHC 30.2  --  30.8   RDW 19.5*  --  19.3*     --  133*   MPV 10.5  --  11.2   INR  --  1.4  --      Chemistry:  Recent Labs     23  1217 23  0500 23  1038    134*  --    K 4.3 4.4  --    CL 98 97*  --    CO2 27 26  --    GLUCOSE 114* 97  --    BUN 19 16  --    CREATININE 0.88 0.79  --    MG 1.9  --   --    ANIONGAP 11 11  --    LABGLOM >60 >60  --    CALCIUM 8.7 8.7  --    PROBNP 1,698*  --   --    TROPHS 23*  --  23*     Recent Labs     23  1824 23  0759 23  1206   POCGLU 105 88 104     ABG:No results found for: POCPH, PHART, PH, POCPCO2, OJW0GWJ, PCO2, POCPO2, PO2ART, PO2, POCHCO3, ZZP1RDS, HCO3, NBEA, PBEA, BEART, BE, THGBART, THB, WCI2IXB, IXAR9QDV, A0SJEQJH, O2SAT, FIO2  Lab Results   Component Value Date/Time    SPECIAL RAC 12ML 2019 08:52 PM     Lab Results   Component Value Date/Time    CULTURE NO GROWTH 6 DAYS 2019 08:52 PM       Radiology:  CT ABDOMEN PELVIS W IV CONTRAST Additional Contrast? None    Result Date: 2023  1. Moderate volume abdominopelvic ascites. 2.  Body wall edema and diffuse scrotal edema. No evidence for organized soft tissue fluid collection or soft tissue gas. 3.  Hepatic steatosis.   Subtle lobular liver contour is noted, for which the possibility of underlying chronic liver disease should be considered. 4.  Nonobstructing right renal stone. 5.  Nonspecific mosaic appearance of the lung bases, which may in part be due to atelectasis or air trapping. No pleural effusion or evidence for edema. US SCROTUM W LIMITED DUPLEX    Result Date: 1/20/2023  Nonspecific diffuse scrotal wall edema. Large bilateral hydroceles.        Physical Examination:        General appearance:  alert, cooperative and no distress  Mental Status:  oriented to person, place and time and normal affect  Lungs: Diminished breath sounds at bases  Heart:  regular rate and rhythm, no murmur,+ AICD  Abdomen:  + Ascites and scrotal swelling, normal bowel sounds, no masses, hepatomegaly, splenomegaly  Extremities:  ++ edema, no redness, tenderness in the calves  Skin:  no gross lesions, rashes, induration    Assessment:        Hospital Problems             Last Modified POA    * (Principal) Acute on chronic combined systolic and diastolic CHF (congestive heart failure) ([address] (Chronic) 1/20/2023 Yes    Status post implantation of automatic cardioverter/defibrillator (AICD) 1/20/2023 Yes    Obesity (BMI 30-39.9) 1/20/2023 Yes    Type 2 diabetes mellitus, without long-term current use of insulin ([address] 1/20/2023 Yes    Paroxysmal atrial fibrillation ([address] 1/20/2023 Yes    Anasarca 1/20/2023 Yes    Hydrocele, bilateral 1/20/2023 Yes    Essential hypertension 1/20/2023 Yes    Hypercholesterolemia 1/20/2023 Yes    CAD (coronary artery disease) 1/20/2023 Yes    Dyspnea and respiratory abnormalities 1/20/2023 Yes       Plan:        IV diuresis and acetazolamide as recommended by cardiology  Consult nephrology for ultrafiltration as recommended by cardiology  Urology note reviewed  Monitor daily labs  May need transfer to ICU/CVICU for pressor support with Levophed if blood pressure dropped with IV diuresis    Elieser Haddad MD  1/21/2023  1:11 PM

## 2023-01-22 LAB
ANION GAP SERPL CALCULATED.3IONS-SCNC: 6 MMOL/L (ref 9–17)
BUN BLDV-MCNC: 20 MG/DL (ref 8–23)
BUN/CREAT BLD: 18 (ref 9–20)
CALCIUM SERPL-MCNC: 8.8 MG/DL (ref 8.6–10.4)
CHLORIDE BLD-SCNC: 102 MMOL/L (ref 98–107)
CO2: 29 MMOL/L (ref 20–31)
CREAT SERPL-MCNC: 1.14 MG/DL (ref 0.7–1.2)
CREATININE URINE: 11.5 MG/DL (ref 39–259)
FREE KAPPA/LAMBDA RATIO: 1.38 (ref 0.26–1.65)
GFR SERPL CREATININE-BSD FRML MDRD: >60 ML/MIN/1.73M2
GLUCOSE BLD-MCNC: 103 MG/DL (ref 75–110)
GLUCOSE BLD-MCNC: 115 MG/DL (ref 70–99)
GLUCOSE BLD-MCNC: 123 MG/DL (ref 75–110)
GLUCOSE BLD-MCNC: 149 MG/DL (ref 75–110)
GLUCOSE BLD-MCNC: 93 MG/DL (ref 75–110)
HCT VFR BLD CALC: 42.4 % (ref 40.7–50.3)
HEMOGLOBIN: 12.5 G/DL (ref 13–17)
KAPPA FREE LIGHT CHAINS QNT: 5.11 MG/DL (ref 0.37–1.94)
LAMBDA FREE LIGHT CHAINS QNT: 3.7 MG/DL (ref 0.57–2.63)
MAGNESIUM: 2.2 MG/DL (ref 1.6–2.6)
MCH RBC QN AUTO: 24.6 PG (ref 25.2–33.5)
MCHC RBC AUTO-ENTMCNC: 29.5 G/DL (ref 28.4–34.8)
MCV RBC AUTO: 83.5 FL (ref 82.6–102.9)
NRBC AUTOMATED: 0 PER 100 WBC
PDW BLD-RTO: 19.7 % (ref 11.8–14.4)
PLATELET # BLD: 145 K/UL (ref 138–453)
PMV BLD AUTO: 10.6 FL (ref 8.1–13.5)
POTASSIUM SERPL-SCNC: 4.4 MMOL/L (ref 3.7–5.3)
RBC # BLD: 5.08 M/UL (ref 4.21–5.77)
SODIUM BLD-SCNC: 137 MMOL/L (ref 135–144)
TOTAL PROTEIN, URINE: <4 MG/DL
WBC # BLD: 6.3 K/UL (ref 3.5–11.3)

## 2023-01-22 PROCEDURE — 86334 IMMUNOFIX E-PHORESIS SERUM: CPT

## 2023-01-22 PROCEDURE — 2060000000 HC ICU INTERMEDIATE R&B

## 2023-01-22 PROCEDURE — 84165 PROTEIN E-PHORESIS SERUM: CPT

## 2023-01-22 PROCEDURE — 2580000003 HC RX 258: Performed by: NURSE PRACTITIONER

## 2023-01-22 PROCEDURE — 6370000000 HC RX 637 (ALT 250 FOR IP): Performed by: INTERNAL MEDICINE

## 2023-01-22 PROCEDURE — 84156 ASSAY OF PROTEIN URINE: CPT

## 2023-01-22 PROCEDURE — 6360000002 HC RX W HCPCS: Performed by: NURSE PRACTITIONER

## 2023-01-22 PROCEDURE — 82570 ASSAY OF URINE CREATININE: CPT

## 2023-01-22 PROCEDURE — 82947 ASSAY GLUCOSE BLOOD QUANT: CPT

## 2023-01-22 PROCEDURE — 83521 IG LIGHT CHAINS FREE EACH: CPT

## 2023-01-22 PROCEDURE — 2500000003 HC RX 250 WO HCPCS: Performed by: INTERNAL MEDICINE

## 2023-01-22 PROCEDURE — 83735 ASSAY OF MAGNESIUM: CPT

## 2023-01-22 PROCEDURE — 36415 COLL VENOUS BLD VENIPUNCTURE: CPT

## 2023-01-22 PROCEDURE — 84155 ASSAY OF PROTEIN SERUM: CPT

## 2023-01-22 PROCEDURE — 85027 COMPLETE CBC AUTOMATED: CPT

## 2023-01-22 PROCEDURE — 99223 1ST HOSP IP/OBS HIGH 75: CPT | Performed by: INTERNAL MEDICINE

## 2023-01-22 PROCEDURE — 6370000000 HC RX 637 (ALT 250 FOR IP): Performed by: NURSE PRACTITIONER

## 2023-01-22 PROCEDURE — 80048 BASIC METABOLIC PNL TOTAL CA: CPT

## 2023-01-22 PROCEDURE — 99231 SBSQ HOSP IP/OBS SF/LOW 25: CPT | Performed by: INTERNAL MEDICINE

## 2023-01-22 RX ORDER — MIDODRINE HYDROCHLORIDE 10 MG/1
10 TABLET ORAL
Status: DISCONTINUED | OUTPATIENT
Start: 2023-01-22 | End: 2023-01-24 | Stop reason: HOSPADM

## 2023-01-22 RX ORDER — MIDODRINE HYDROCHLORIDE 10 MG/1
10 TABLET ORAL
Status: DISCONTINUED | OUTPATIENT
Start: 2023-01-22 | End: 2023-01-22

## 2023-01-22 RX ORDER — DIPHENHYDRAMINE HYDROCHLORIDE 50 MG/ML
25 INJECTION INTRAMUSCULAR; INTRAVENOUS EVERY 6 HOURS PRN
Status: DISCONTINUED | OUTPATIENT
Start: 2023-01-22 | End: 2023-01-24 | Stop reason: HOSPADM

## 2023-01-22 RX ADMIN — SODIUM CHLORIDE, PRESERVATIVE FREE 10 ML: 5 INJECTION INTRAVENOUS at 20:22

## 2023-01-22 RX ADMIN — AMIODARONE HYDROCHLORIDE 200 MG: 200 TABLET ORAL at 08:30

## 2023-01-22 RX ADMIN — BUMETANIDE 2 MG: 0.25 INJECTION INTRAMUSCULAR; INTRAVENOUS at 16:32

## 2023-01-22 RX ADMIN — SPIRONOLACTONE 25 MG: 25 TABLET ORAL at 08:31

## 2023-01-22 RX ADMIN — SODIUM CHLORIDE, PRESERVATIVE FREE 10 ML: 5 INJECTION INTRAVENOUS at 08:31

## 2023-01-22 RX ADMIN — DIPHENHYDRAMINE HYDROCHLORIDE 25 MG: 50 INJECTION, SOLUTION INTRAMUSCULAR; INTRAVENOUS at 20:21

## 2023-01-22 RX ADMIN — ACETAZOLAMIDE 250 MG: 250 TABLET ORAL at 08:30

## 2023-01-22 RX ADMIN — MIDODRINE HYDROCHLORIDE 10 MG: 10 TABLET ORAL at 18:10

## 2023-01-22 RX ADMIN — CLOPIDOGREL BISULFATE 75 MG: 75 TABLET ORAL at 08:31

## 2023-01-22 RX ADMIN — RIVAROXABAN 20 MG: 20 TABLET, FILM COATED ORAL at 18:10

## 2023-01-22 RX ADMIN — METFORMIN HYDROCHLORIDE 1000 MG: 500 TABLET, FILM COATED ORAL at 08:30

## 2023-01-22 RX ADMIN — MIDODRINE HYDROCHLORIDE 10 MG: 10 TABLET ORAL at 13:11

## 2023-01-22 RX ADMIN — MULTIPLE VITAMINS W/ MINERALS TAB 1 TABLET: TAB at 08:30

## 2023-01-22 RX ADMIN — BUMETANIDE 2 MG: 0.25 INJECTION INTRAMUSCULAR; INTRAVENOUS at 08:30

## 2023-01-22 RX ADMIN — EMPAGLIFLOZIN 10 MG: 10 TABLET, FILM COATED ORAL at 08:30

## 2023-01-22 RX ADMIN — ACETAMINOPHEN 650 MG: 325 TABLET ORAL at 18:10

## 2023-01-22 RX ADMIN — BUMETANIDE 2 MG: 0.25 INJECTION INTRAMUSCULAR; INTRAVENOUS at 20:21

## 2023-01-22 RX ADMIN — ATORVASTATIN CALCIUM 80 MG: 80 TABLET, FILM COATED ORAL at 20:21

## 2023-01-22 NOTE — PROGRESS NOTES
[facility]  Office: [address], DO, Jose Elias Quinones, DO, Patrick Albright, DO, Elisabet Brown Blood, DO, Alejandra Catalan MD, Sunita Blackman MD, Fletcher Frost MD, Rachel Guy MD,  Corbin Stern MD, Chelsie Chu MD, Gerardo Weiner, DO, Aydee Hurt MD,  Jordan Brown MD, Svitlana Palacios MD, Dede Seaman, DO, Adolfo Goldberg MD, Stas Tolentino MD, Adrián Monk, DO, Livia Arredondo MD, Norma Meneses MD, Peewee Levine MD, Yasir Rojas MD, Shaq Sue, DO, Ayaz Manzanares MD, Angie Marques MD, Eliu Carroll, Ramila Fajardo, CNP, Shannan Reese, CNP, Oliva Luo, CNP,  Opal Drake, [facility], Betzy Stockton, CNP, Kade Ellis, CNP, Casey Gu, CNP, Dottie Moreno, CNP, Dela Cruz Cancer, CNP, Gio Erickson PA-C, Elton Mcpherson, CNS, Donn Burr, CNP, Jacque Mcmillan, CNP         [facility]    Progress Note    1/22/2023    1:41 PM    Name:   Hui Villeda  MRN:     [id number]     Acct:      [de-identified]   Room:   [facility]/1007-02   Day:  2  Admit Date:  1/20/2023 11:53 AM    PCP:   Zora Bean PA-C  Code Status:  DNR-CCA    Subjective:     C/C:   Chief Complaint   Patient presents with    Testicle Swelling     Pt states the swelling started around 1/15 and swelling has continued since. Pt states pain is 8/10     Interval History Status: . Patient having good diuresis with IV Bumex, creatinine has increased to 1.14  Still has legs, abdominal wall and scrotal swelling   Cardiology recommending more aggressive diuresis with Bumex and added Diamox, he will need transfer to ICU for pressor support with Levophed if BP drops  Nephrology also following the patient now no plan for ultrafiltration  Echo shows EF of 30%  Brief History:     Patient presents to the emergency room today with complaints of scrotal swelling.   Patient has a significant past medical history of CAD, CHF, myocardial infarction, diabetes, hyperlipidemia, hypertension and has an ICD in place. Patient was recently admitted and discharged on January 10/2022 for a CHF exacerbation. Patient states that since 1/15/2023 the patient started to develop scrotal swelling which has progressively worsened since then. Patient states that at times he has difficulty urinating due to his penis retracting inside the swollen scrotum. Patient is also experiencing abdominal swelling and dyspnea with exertion. Patient denies any recent fevers, chills, chest pain, nausea, vomiting and diarrhea. Patient states that he stopped taking his Bumex approximately 2 days ago because it was not making a difference in the swelling of his scrotum. Throughout the emergency room evaluation it was noted that his glucose is 114. proBNP is 1698. Troponin 23. Hemoglobin 12.0. CT abdomen and pelvis shows:   1. Moderate volume abdominopelvic ascites. 2.  Body wall edema and diffuse scrotal edema. No evidence for organized   soft tissue fluid collection or soft tissue gas. 3.  Hepatic steatosis. Subtle lobular liver contour is noted, for which the   possibility of underlying chronic liver disease should be considered. 4.  Nonobstructing right renal stone. 5.  Nonspecific mosaic appearance of the lung bases, which may in part be due   to atelectasis or air trapping. No pleural effusion or evidence for edema. Scrotal ultrasound shows: Nonspecific diffuse scrotal wall edema. Large bilateral hydroceles.       Review of Systems:     Constitutional:  negative for chills, fevers, sweats  Respiratory:  negative for cough, +dyspnea on exertion, denies wheezing  Cardiovascular:  negative for chest pain, chest pressure/discomfort,++ lower extremity edema, palpitations  Gastrointestinal:  negative for abdominal pain, constipation, diarrhea, nausea, vomiting,+ scrotal swelling  Neurological:  negative for dizziness, headache    Medications: Allergies:  No Known Allergies    Current Meds:   Scheduled Meds:    midodrine  10 mg Oral TID WC    acetaZOLAMIDE  250 mg Oral Daily    bumetanide  2 mg IntraVENous TID    sodium chloride  80 mL IntraVENous Once    amiodarone  200 mg Oral Daily    atorvastatin  80 mg Oral Nightly    [Held by provider] carvedilol  6.25 mg Oral BID WC    clopidogrel  75 mg Oral Daily    empagliflozin  10 mg Oral Daily    [Held by provider] lisinopril  2.5 mg Oral Daily    metFORMIN  1,000 mg Oral Daily    therapeutic multivitamin-minerals  1 tablet Oral Daily    spironolactone  25 mg Oral Daily    sodium chloride flush  5-40 mL IntraVENous 2 times per day    insulin lispro  0-4 Units SubCUTAneous TID WC    insulin lispro  0-4 Units SubCUTAneous Nightly    rivaroxaban  20 mg Oral Dinner     Continuous Infusions:    dextrose      sodium chloride       PRN Meds: melatonin, sodium chloride flush, glucose, dextrose bolus **OR** dextrose bolus, glucagon (rDNA), dextrose, sodium chloride flush, sodium chloride, potassium chloride **OR** potassium alternative oral replacement **OR** potassium chloride, magnesium sulfate, ondansetron **OR** ondansetron, polyethylene glycol, acetaminophen **OR** acetaminophen    Data:     Past Medical History:   has a past medical history of CAD (coronary artery disease), CHF (congestive heart failure) ([facility] Heart attack ([facility] Hyperlipidemia, Hypertension, [facility] MRSA (methicillin resistant staph aureus) culture positive, Skin cancer, Snores, [facility] Wears dentures, Wears reading eyeglasses, and Wellness examination. Social History:   reports that he quit smoking about 24 years ago. He has never used smokeless tobacco. He reports that he does not drink alcohol and does not use drugs.      Family History:   Family History   Problem Relation Age of Onset    Coronary Art Dis Father     Liver Disease Father     Alcohol Abuse Sister        Vitals:  /71   Pulse 66 Temp 97.3 °F (36.3 °C) (Oral)   Resp 16   Ht 5' 9\" (1.753 m)   Wt 252 lb 3 oz (114.4 kg)   SpO2 97%   BMI 37.24 kg/m²   Temp (24hrs), Av.7 °F (36.5 °C), Min:97.3 °F (36.3 °C), Max:98.4 °F (36.9 °C)    Recent Labs     23  1635 23  1914 23  0749 23  1205   POCGLU 108 153* 103 93         I/O (24Hr): Intake/Output Summary (Last 24 hours) at 2023 1341  Last data filed at 2023  Gross per 24 hour   Intake --   Output 1650 ml   Net -1650 ml         Labs:  Hematology:  Recent Labs     23  1217 23  1545 23  0500 23  0529   WBC 6.5  --  5.5 6.3   RBC 4.90  --  4.79 5.08   HGB 12.0*  --  12.0* 12.5*   HCT 39.7*  --  39.0* 42.4   MCV 81.0*  --  81.4* 83.5   MCH 24.5*  --  25.1* 24.6*   MCHC 30.2  --  30.8 29.5   RDW 19.5*  --  19.3* 19.7*     --  133* 145   MPV 10.5  --  11.2 10.6   INR  --  1.4  --   --        Chemistry:  Recent Labs     23  1217 23  0500 23  1038 23  0529    134*  --  137   K 4.3 4.4  --  4.4   CL 98 97*  --  102   CO2 27 26  --  29   GLUCOSE 114* 97  --  115*   BUN 19 16  --  20   CREATININE 0.88 0.79  --  1.14   MG 1.9  --   --  2.2   ANIONGAP 11 11  --  6*   LABGLOM >60 >60  --  >60   CALCIUM 8.7 8.7  --  8.8   PROBNP 1,698*  --   --   --    TROPHS 23*  --  23*  --        Recent Labs     23  0759 23  1206 23  1635 23  1914 23  0749 23  1205   POCGLU 88 104 108 153* 103 93       ABG:No results found for: POCPH, PHART, PH, POCPCO2, TVV3NOY, PCO2, POCPO2, PO2ART, PO2, POCHCO3, OGK9EME, HCO3, NBEA, PBEA, BEART, BE, THGBART, THB, RGQ2CIW, ESJB9YWA, H9IDIMLH, O2SAT, FIO2  Lab Results   Component Value Date/Time    SPECIAL RAC 12ML 2019 08:52 PM     Lab Results   Component Value Date/Time    CULTURE NO SIGNIFICANT GROWTH 2023 04:27 PM       Radiology:  CT ABDOMEN PELVIS W IV CONTRAST Additional Contrast? None    Result Date: 2023  1.   Moderate volume abdominopelvic ascites. 2.  Body wall edema and diffuse scrotal edema. No evidence for organized soft tissue fluid collection or soft tissue gas. 3.  Hepatic steatosis. Subtle lobular liver contour is noted, for which the possibility of underlying chronic liver disease should be considered. 4.  Nonobstructing right renal stone. 5.  Nonspecific mosaic appearance of the lung bases, which may in part be due to atelectasis or air trapping. No pleural effusion or evidence for edema. US SCROTUM W LIMITED DUPLEX    Result Date: 1/20/2023  Nonspecific diffuse scrotal wall edema. Large bilateral hydroceles.        Physical Examination:        General appearance:  alert, cooperative and no distress  Mental Status:  oriented to person, place and time and normal affect  Lungs: Diminished breath sounds at bases  Heart:  regular rate and rhythm, no murmur,+ AICD  Abdomen:  + Abdominal wall and scrotal swelling, normal bowel sounds, no masses, hepatomegaly, splenomegaly  Extremities:  ++ edema, no redness, tenderness in the calves  Skin:  no gross lesions, rashes, induration    Assessment:        Hospital Problems             Last Modified POA    * (Principal) Acute on chronic combined systolic and diastolic CHF (congestive heart failure) ([address] (Chronic) 1/20/2023 Yes    Status post implantation of automatic cardioverter/defibrillator (AICD) 1/20/2023 Yes    Obesity (BMI 30-39.9) 1/20/2023 Yes    Type 2 diabetes mellitus, without long-term current use of insulin ([address] 1/20/2023 Yes    Paroxysmal atrial fibrillation ([address] 1/20/2023 Yes    Anasarca 1/20/2023 Yes    Hydrocele, bilateral 1/20/2023 Yes    Essential hypertension 1/20/2023 Yes    Hypercholesterolemia 1/20/2023 Yes    CAD (coronary artery disease) 1/20/2023 Yes    Dyspnea and respiratory abnormalities 1/20/2023 Yes     Plan:        IV diuresis, spironolactone and acetazolamide as recommended by cardiology  Nephrology monitoring kidney function  Urology note reviewed, no plan for active intervention at present  Monitor daily labs  May need transfer to ICU/CVICU for pressor support with Levophed if blood pressure dropped with IV diuresis    Matt Branch MD  1/22/2023  1:41 PM

## 2023-01-22 NOTE — CONSULTS
Renal Consult Note    Patient :  Tino Salinas; 57 y.o. MRN# [id number]  Location:  9428/0488-51  Attending:  Maddie Massey MD  Admit Date:  1/20/2023   Hospital Day: 2    Reason for Consult:     Asked by Dr Maddie Massey MD to see for NOEL/Elevated Creatinine. History Obtained From:     patient    History of Present Illness:     Tino Salinas; 57 y.o. male with past medical history as mentioned below. Known history of atherosclerotic heart disease history of coronary bypass graft, ischemic cardiomyopathy ejection fraction 30%, recurrent CHF has had several hospitalizations with decompensated left heart failure. He also has known history of obesity and significant lower extremity edema ongoing basis. Home medicines include lisinopril Aldactone and Bumex. Home medicine list also includes naproxen as one of his medicines the patient denies that. He also has known history of type 2 diabetes and is on Glucophage. He presented to the hospital with complaints of worsening lower extremity and scrotal edema along with abdominal wall edema going on for the last 2 to 3 days. Symptoms continue to worsen so he came into the ER. On presentation he did have significant edema as described above chest x-ray was unremarkable. He did have low blood pressures in the 80-90 range. Creatinine presentation was 0.9. He was placed on IV diuretics, this morning creatinine is 1.1. Nephrology has been consulted for recommendation of diuretic therapy and for slight bump in creatinine. With the addition of loop diuretics his blood pressures have improved. Shortness of breath is bilateral and urine output has improved significantly. He admits to dietary compliance, although does say that he may have had excess amount of fluid to drink. He has been through the CHF clinic and understands fluid restriction and salt restriction very well.   Labs today showed a sodium of 137 potassium 4.4 chloride 102 bicarb 29 creatinine 1.1 BUN 6, glucose 115 hemoglobin 12.5 white count 6.3    No history of recent contrast exposure, No h/o prolonged NSAIDs use in the past, No h/o nephrolithiasis, No recent skin rashes or arthralgias, No hematuria or pyuria noticed in the recent past. Doesn't report any reduction in the urine output recently. Non report of any obstructive urinary symptoms (urgency, frequency, weak stream, straining while urination). No h/o recurrent UTIs in the past.    Past History/Allergies? Social History:     Past Medical History:   Diagnosis Date    CAD (coronary artery disease)      cath    CHF [facility]congestive heart failure) ([facility]     Dr. Vázquez Argue attack [facility])     Hyperlipidemia     Dr. Edwige Chaparro    Hypertension     Dr. Edwige Chaparro    MI (myocardial infarction) [facility])     MRSA (methicillin resistant staph aureus) culture positive 2018    abdomen    Skin cancer     Snores     no cpap    Stroke [facility])       Dr. Edwige Chaparro monitors    Wears dentures     upper full denture    Wears reading eyeglasses     Wellness examination     Cassandra Condon PA-C last seen 2020       No Known Allergies    Social History     Socioeconomic History    Marital status: Single     Spouse name: Not on file    Number of children: Not on file    Years of education: Not on file    Highest education level: Not on file   Occupational History    Not on file   Tobacco Use    Smoking status: Former     Types: Cigarettes     Quit date: 1999     Years since quittin.0    Smokeless tobacco: Never   Vaping Use    Vaping Use: Never used   Substance and Sexual Activity    Alcohol use: No    Drug use: No    Sexual activity: Not Currently   Other Topics Concern    Not on file   Social History Narrative    Not on file     Social Determinants of Health     Financial Resource Strain: Not on file   Food Insecurity: Not on file   Transportation Needs: Not on file   Physical Activity: Not on file   Stress: Not on file   Social Connections: Not on file   Intimate Partner Violence: Not on file   Housing Stability: Not on file       Family History:        Family History   Problem Relation Age of Onset    Coronary Art Dis Father     Liver Disease Father     Alcohol Abuse Sister        Outpatient Medications:     Medications Prior to Admission: carvedilol (COREG) 12.5 MG tablet, Take 0.5 tablets by mouth 2 times daily (with meals)  lisinopril (PRINIVIL;ZESTRIL) 2.5 MG tablet, Take 1 tablet by mouth daily  spironolactone (ALDACTONE) 25 MG tablet, Take 1 tablet by mouth daily  ARTIFICIAL TEAR SOLUTION OP, Place 2 drops into both eyes daily  clopidogrel (PLAVIX) 75 MG tablet, Take 1 tablet by mouth daily  empagliflozin (JARDIANCE) 10 MG tablet, Take 1 tablet by mouth daily  midodrine (PROAMATINE) 5 MG tablet, Take 1 tablet by mouth 3 times daily (before meals)  amiodarone (CORDARONE) 200 MG tablet, Take 1 tablet by mouth daily  bumetanide (BUMEX) 2 MG tablet, Take 1 tablet by mouth 2 times daily  rivaroxaban (XARELTO) 20 MG TABS tablet, Take 20 mg by mouth Daily with supper  metFORMIN (GLUCOPHAGE) 1000 MG tablet, Take 1 tablet by mouth daily Resume on 5/21  Cyanocobalamin (VITAMIN B-12 PO), Take 1 tablet by mouth daily  Multiple Vitamins-Minerals (THERAPEUTIC MULTIVITAMIN-MINERALS) tablet, Take 1 tablet by mouth daily  nitroGLYCERIN (NITROSTAT) 0.4 MG SL tablet, Place 0.4 mg under the tongue every 5 minutes as needed for Chest pain up to max of 3 total doses. If no relief after 1 dose, call 911.   Dr. Kimber Hensley  atorvastatin (LIPITOR) 80 MG tablet, Take 1 tablet by mouth nightly    Current Medications:     Scheduled Meds:    acetaZOLAMIDE  250 mg Oral Daily    bumetanide  2 mg IntraVENous TID    sodium chloride  80 mL IntraVENous Once    amiodarone  200 mg Oral Daily    atorvastatin  80 mg Oral Nightly    [Held by provider] carvedilol  6.25 mg Oral BID WC    clopidogrel  75 mg Oral Daily    empagliflozin  10 mg Oral Daily    [Held by provider] lisinopril  2.5 mg Oral Daily    metFORMIN 1,000 mg Oral Daily    midodrine  5 mg Oral TID AC    therapeutic multivitamin-minerals  1 tablet Oral Daily    spironolactone  25 mg Oral Daily    sodium chloride flush  5-40 mL IntraVENous 2 times per day    insulin lispro  0-4 Units SubCUTAneous TID WC    insulin lispro  0-4 Units SubCUTAneous Nightly    rivaroxaban  20 mg Oral Dinner     Continuous Infusions:    dextrose      sodium chloride       PRN Meds:  melatonin, sodium chloride flush, glucose, dextrose bolus **OR** dextrose bolus, glucagon (rDNA), dextrose, sodium chloride flush, sodium chloride, potassium chloride **OR** potassium alternative oral replacement **OR** potassium chloride, magnesium sulfate, ondansetron **OR** ondansetron, polyethylene glycol, acetaminophen **OR** acetaminophen    Review of Systems:     Constitutional: No fever, no chills, no lethargy, no weakness. HEENT:  No headache, otalgia, itchy eyes, nasal discharge or sore throat. Cardiac:  No chest pain, dyspnea, orthopnea or PND. Some decrease in effort tolerance  Chest:  No cough, phlegm or wheezing. Abdomen:  No abdominal pain, nausea or vomiting. Neuro:  No focal weakness, abnormal movements orseizure like activity. Skin:   No rashes, no itching. :   No hematuria, no pyuria, no dysuria, no flank pain. Extremities:  Generalized lower extremity edema extending up to the anterior abdominal wall along with scrotal edema  ROS was otherwise negative except as mentioned in the [address]      Input/Output:       I/O last 3 completed shifts:  In: -   Out: 2850 [Urine:2850]  Patient Vitals for the past 96 hrs (Last 3 readings):   Weight   23 0745 252 lb 3 oz (114.4 kg)   23 1155 232 lb (105.2 kg)      Vital Signs:   Temperature:  Temp: 97.3 °F (36.3 °C)  TMax:   Temp (24hrs), Av.6 °F (36.4 °C), Min:96.6 °F (35.9 °C), Max:98.4 °F (36.9 °C)    Respirations:  Resp: 18  Pulse:   Heart Rate: 69  BP:    BP: 113/75  BP Range: Systolic (05PCU), ZFR:961 , Min:87 , Max:123 Diastolic (04JKL), MGR:70, Min:47, Max:99      Physical Examination:     General:  AAO x 3, speaking in full sentences, no accessory muscle use. HEENT: Atraumatic, normocephalic, no throat congestion, moist mucosa. Eyes:   Pupils equal, round and reactive to light, EOMI. Neck:   No JVD, no thyromegaly, no lymphadenopathy. Chest:  Bilateral vesicular breath sounds, no rales or wheezes. Cardiac:  S1 S2 RR, systolic murmur at the apex, S3 at the apex heard, no S4 gallop or rubs, JVP +6 to 7 cm although patient was sitting when it was examined  Abdomen: Soft, non-tender, no masses or organomegaly, BS audible. :   No suprapubic or flank tenderness. Neuro:  AAO x 3, No FND. SKIN:  No rashes, good skin turgor. Extremities:  2-3+ edema, palpable peripheral pulses, no calf tenderness. Labs:       Recent Labs     01/20/23  1217 01/21/23  0500 01/22/23  0529   WBC 6.5 5.5 6.3   RBC 4.90 4.79 5.08   HGB 12.0* 12.0* 12.5*   HCT 39.7* 39.0* 42.4   MCV 81.0* 81.4* 83.5   MCH 24.5* 25.1* 24.6*   MCHC 30.2 30.8 29.5   RDW 19.5* 19.3* 19.7*    133* 145   MPV 10.5 11.2 10.6      BMP:   Recent Labs     01/20/23  1217 01/21/23  0500 01/22/23  0529    134* 137   K 4.3 4.4 4.4   CL 98 97* 102   CO2 27 26 29   BUN 19 16 20   CREATININE 0.88 0.79 1.14   GLUCOSE 114* 97 115*   CALCIUM 8.7 8.7 8.8      Phosphorus:   No results for input(s): PHOS in the last 72 hours. Magnesium:    Recent Labs     01/20/23  1217 01/22/23  0529   MG 1.9 2.2     Albumin:  No results for input(s): LABALBU in the last 72 hours.   BNP:    No results found for: BNP  DAPHNE:    No results found for: DAPHNE  SPEP:  Lab Results   Component Value Date/Time    PROT 6.6 01/05/2023 10:32 PM     UPEP:   No results found for: LABPE  C3:   No results found for: C3  C4:   No results found for: C4  MPO ANCA:   No results found for: MPO  PR3 ANCA:   No results found for: PR3  Anti-GBM:   No results found for: GBMABIGG  Hep BsAg:         Lab Results Component Value Date/Time    HEPBSAG NONREACTIVE 11/22/2017 06:14 PM     Hep C AB:          Lab Results   Component Value Date/Time    HEPCAB NONREACTIVE 11/22/2017 06:14 PM       Urinalysis/Chemistries:      Lab Results   Component Value Date/Time    NITRU NEGATIVE 01/20/2023 04:27 PM    COLORU Yellow 01/20/2023 04:27 PM    PHUR 8.0 01/20/2023 04:27 PM    WBCUA 0 TO 2 01/20/2023 04:27 PM    RBCUA 0 TO 2 01/20/2023 04:27 PM    MUCUS NOT REPORTED 11/22/2020 07:43 PM    TRICHOMONAS NOT REPORTED 11/22/2020 07:43 PM    YEAST NOT REPORTED 11/22/2020 07:43 PM    BACTERIA RARE 11/22/2020 07:43 PM    CLARITYU clear 11/22/2020 07:49 PM    SPECGRAV 1.010 01/20/2023 04:27 PM    LEUKOCYTESUR NEGATIVE 01/20/2023 04:27 PM    UROBILINOGEN ELEVATED 01/20/2023 04:27 PM    BILIRUBINUR NEGATIVE 01/20/2023 04:27 PM    BILIRUBINUR negative 11/22/2020 07:49 PM    BILIRUBINUR NOT REPORTED 01/02/2012 05:41 PM    BLOODU trace hemolyzed 11/22/2020 07:49 PM    GLUCOSEU NEGATIVE 01/20/2023 04:27 PM    GLUCOSEU NOT REPORTED 01/02/2012 05:41 PM    KETUA NEGATIVE 01/20/2023 04:27 PM    AMORPHOUS NOT REPORTED 11/22/2020 07:43 PM     Urine Sodium:   No results found for: CRISTAL  Urine Potassium:  No results found for: KUR  Urine Chloride:  No results found for: CLUR  Urine Osmolarity: No results found for: OSMOU  Urine Protein:   No results found for: TPU  Urine Creatinine:     Lab Results   Component Value Date/Time    LABCREA 137.0 09/06/2013 09:58 AM     UPC:     Urine Eosinophils:  No components found for: UEOS    Radiology:     CXR:     Assessment:     1. Acute Kidney Injury: Likely prerenal acidemia from depleted circulating volume from loop diuretic use, creatinine up to 1.1 from a baseline of 0.8 on admission. We will follow it closely  2. Decompensated biventricular failure both left and right on clinical exam from severe ischemic cardiomyopathy, mitral regurgitation, noncompliance with dietary restrictions  3.   Atherosclerotic heart disease history of coronary bypass graft  4. Type 2 diabetes  5. Hypotension from cardiomyopathy and decreased forward flow    Plan:   1. Continue Bumex 2 mg IV 3 times a day for now, watch creatinine closely  2. Fluid restriction 1.2 L a day  3. Will Check Renal Ultrasound to r/o element of obstruction and to assess the kidney size/echotexture. 4.  Check urine protein creatinine ratio   5. Check serum protein electrophoresis and immunofixation   6. Hold ACE inhibitor's for now  7. Consider inotropic therapy if forward failure continues to persist we will leave the decision of her cardiology  8. No indication for extracorporeal therapy  9. Check BMP daily  10. Importance of fluid restriction again emphasized  11. We will follow    Nutrition   Please ensure that patient is on a renal diet/TF. Avoid nephrotoxic drugs/contrast exposure. Thank you for the consultation. Please do not hesitate to contact us for any further questions/concerns. We will continue to follow along with you.

## 2023-01-22 NOTE — PROGRESS NOTES
Anca Terrazas   Urology Progress Note            Subjective:  Follow-up scrotal swelling with anasarca    Patient Vitals for the past 24 hrs:   BP Temp Temp src Pulse Resp SpO2 Weight   01/21/23 1956 99/60 -- -- 61 -- -- --   01/21/23 1914 (!) 87/52 97.7 °F (36.5 °C) Axillary (!) 117 18 92 % --   01/21/23 1601 (!) 91/57 -- -- 60 -- -- --   01/21/23 1559 (!) 116/99 97.3 °F (36.3 °C) Oral 59 18 96 % --   01/21/23 1418 102/65 -- -- 61 -- 95 % --   01/21/23 1203 (!) 90/47 97.7 °F (36.5 °C) Temporal 55 -- -- --   01/21/23 1158 (!) 91/50 (!) 96.6 °F (35.9 °C) Oral 57 19 94 % --   01/21/23 0757 (!) 101/56 97.3 °F (36.3 °C) Oral 74 20 94 % --   01/21/23 0745 -- -- -- -- -- -- 252 lb 3 oz (114.4 kg)   01/21/23 0424 112/74 97.7 °F (36.5 °C) Tympanic -- 18 94 % --   01/20/23 2351 87/60 97.9 °F (36.6 °C) -- 67 18 92 % --       Intake/Output Summary (Last 24 hours) at 1/21/2023 2103  Last data filed at 1/21/2023 2009  Gross per 24 hour   Intake --   Output 2350 ml   Net -2350 ml       Recent Labs     01/20/23  1217 01/21/23  0500   WBC 6.5 5.5   HGB 12.0* 12.0*   HCT 39.7* 39.0*   MCV 81.0* 81.4*    133*     Recent Labs     01/20/23  1217 01/21/23  0500    134*   K 4.3 4.4   CL 98 97*   CO2 27 26   BUN 19 16   CREATININE 0.88 0.79       Recent Labs     01/20/23  1627   COLORU Yellow   PHUR 8.0   WBCUA 0 TO 2   RBCUA 0 TO 2   SPECGRAV 1.010   LEUKOCYTESUR NEGATIVE   UROBILINOGEN ELEVATED*   BILIRUBINUR NEGATIVE       Additional Lab/culture results:    Physical Exam: Patient alert oriented not in acute distress, scrotal pain improved, discussed with the patient the scrotal swelling interstitial edema associated with CHF he voiced understanding    Patient apparently is scheduled for paracentesis    Interval Imaging Findings:    Impression:    Patient Active Problem List   Diagnosis    Hyperbilirubinemia    Essential hypertension    Hypercholesterolemia    CAD (coronary artery disease) Gross hematuria    Abdominal pain, right upper quadrant    Right nephrolithiasis    Abnormal liver function test    Acute systolic HF (heart failure) (HCC)    Noncompliance    Acute on chronic systolic heart failure (HCC)    Pneumonia    Dyspnea and respiratory abnormalities    Status post inguinal hernia repair    S/P repair of ventral hernia    Post-op pain    Non-recurrent bilateral inguinal hernia without obstruction or gangrene    Umbilical hernia with obstruction, without gangrene    Status post implantation of automatic cardioverter/defibrillator (AICD)    NSTEMI (non-ST elevated myocardial infarction) (HCC)    ARIADNA (obstructive sleep apnea)    S/P CABG (coronary artery bypass graft)    Nausea    Anorexia    Thrombocytopenia (HCC)    Obesity (BMI 30-39. 9)    Hypokalemia    Uncontrolled type 2 diabetes mellitus with hyperglycemia (HCC)    Acute on chronic combined systolic and diastolic CHF (congestive heart failure) (HCC)    Type 2 diabetes mellitus, without long-term current use of insulin (HCC)    Paroxysmal atrial fibrillation (HCC)    Edema of both legs    Acute congestive heart failure (HCC)    Skin ulcer of left lower leg with fat layer exposed ([address]    Skin ulcer of right lower leg, with fat layer exposed ([address]    Anasarca    Hydrocele, bilateral       Plan: No urologic intervention at this time we will continue to monitor the scrotal Vásquez    Panchito Dickerson MD  9:03 PM 1/21/2023

## 2023-01-22 NOTE — PROGRESS NOTES
Breann Eddy   Urology Progress Note            Subjective:  Follow-up penoscrotal swelling, interstitial edema    Patient Vitals for the past 24 hrs:   BP Temp Temp src Pulse Resp SpO2   01/22/23 0747 113/75 97.3 °F (36.3 °C) Oral 69 -- 90 %   01/22/23 0634 123/82 -- -- 69 -- --   01/22/23 0533 116/78 -- -- 70 -- --   01/22/23 0408 (!) 89/50 98.4 °F (36.9 °C) Axillary 63 18 95 %   01/21/23 2312 103/67 97.9 °F (36.6 °C) -- 62 16 94 %   01/21/23 1956 99/60 -- -- 61 -- --   01/21/23 1914 (!) 87/52 97.7 °F (36.5 °C) Axillary (!) 117 18 92 %   01/21/23 1601 (!) 91/57 -- -- 60 -- --   01/21/23 1559 (!) 116/99 97.3 °F (36.3 °C) Oral 59 18 96 %   01/21/23 1418 102/65 -- -- 61 -- 95 %   01/21/23 1203 (!) 90/47 97.7 °F (36.5 °C) Temporal 55 -- --   01/21/23 1158 (!) 91/50 (!) 96.6 °F (35.9 °C) Oral 57 19 94 %       Intake/Output Summary (Last 24 hours) at 1/22/2023 1044  Last data filed at 1/21/2023 2009  Gross per 24 hour   Intake --   Output 1650 ml   Net -1650 ml       Recent Labs     01/20/23  1217 01/21/23  0500 01/22/23  0529   WBC 6.5 5.5 6.3   HGB 12.0* 12.0* 12.5*   HCT 39.7* 39.0* 42.4   MCV 81.0* 81.4* 83.5    133* 145     Recent Labs     01/20/23  1217 01/21/23  0500 01/22/23  0529    134* 137   K 4.3 4.4 4.4   CL 98 97* 102   CO2 27 26 29   BUN 19 16 20   CREATININE 0.88 0.79 1.14       Recent Labs     01/20/23  1627   COLORU Yellow   PHUR 8.0   WBCUA 0 TO 2   RBCUA 0 TO 2   SPECGRAV 1.010   LEUKOCYTESUR NEGATIVE   UROBILINOGEN ELEVATED*   BILIRUBINUR NEGATIVE       Additional Lab/culture results:    Physical Exam: Patient alert oriented not in acute distress, he has had significant diuresis, there is improvement in the scrotal swelling no urinary retention    Interval Imaging Findings:    Impression:    Patient Active Problem List   Diagnosis    Hyperbilirubinemia    Essential hypertension    Hypercholesterolemia    CAD (coronary artery disease)    Gross hematuria Abdominal pain, right upper quadrant    Right nephrolithiasis    Abnormal liver function test    Acute systolic HF (heart failure) (HCC)    Noncompliance    Acute on chronic systolic heart failure (HCC)    Pneumonia    Dyspnea and respiratory abnormalities    Status post inguinal hernia repair    S/P repair of ventral hernia    Post-op pain    Non-recurrent bilateral inguinal hernia without obstruction or gangrene    Umbilical hernia with obstruction, without gangrene    Status post implantation of automatic cardioverter/defibrillator (AICD)    NSTEMI (non-ST elevated myocardial infarction) (HCC)    ARIADNA (obstructive sleep apnea)    S/P CABG (coronary artery bypass graft)    Nausea    Anorexia    Thrombocytopenia (HCC)    Obesity (BMI 30-39. 9)    Hypokalemia    Uncontrolled type 2 diabetes mellitus with hyperglycemia (HCC)    Acute on chronic combined systolic and diastolic CHF (congestive heart failure) (HCC)    Type 2 diabetes mellitus, without long-term current use of insulin (HCC)    Paroxysmal atrial fibrillation (HCC)    Edema of both legs    Acute congestive heart failure (HCC)    Skin ulcer of left lower leg with fat layer exposed ([address]    Skin ulcer of right lower leg, with fat layer exposed ([address]    Anasarca    Hydrocele, bilateral       Plan: Continue with the same plan of care findings discussed with the patient    Linda Matos MD  10:44 AM 1/22/2023

## 2023-01-22 NOTE — PLAN OF CARE
D/c plan still underway, POC still to diurese, still need urine sample. Pt was quite tired today despite sleeping well overnight. Pt was up in chair this am but spent most of afternoon thereafter back in bed. Pt remains free of new s/s of skin breakdown or injury.    Problem: Discharge Planning  Goal: Discharge to home or other facility with appropriate resources  Outcome: Progressing     Problem: Pain  Goal: Verbalizes/displays adequate comfort level or baseline comfort level  Outcome: Progressing     Problem: Safety - Adult  Goal: Free from fall injury  Outcome: Progressing     Problem: Chronic Conditions and Co-morbidities  Goal: Patient's chronic conditions and co-morbidity symptoms are monitored and maintained or improved  Outcome: Progressing

## 2023-01-22 NOTE — PROGRESS NOTES
[facility]   Progress Note                   Date:   1/22/2023  Patient name: Kurtis Curry  Date of admission:  1/20/2023 11:53 AM  MRN:   [id number]  YOB: 1960  PCP: Rossana Neal PA-C    Reason for Admission:     Subjective:       Clinical Changes / Abnormalities  States he feels better  Less SOB with exertion  Echo resulted as below    Tele- sinus HR 68      Medications:   Scheduled Meds:   Current Facility-Administered Medications:     midodrine (PROAMATINE) tablet 10 mg, 10 mg, Oral, TID AC, Rodolfo Alphonso, DO    acetaZOLAMIDE (DIAMOX) tablet 250 mg, 250 mg, Oral, Daily, Rodolfo Alphonso, DO, 250 mg at 01/22/23 0830    bumetanide (BUMEX) injection 2 mg, 2 mg, IntraVENous, TID, Rodolfo Alphonso, DO, 2 mg at 01/22/23 0830    melatonin tablet 3 mg, 3 mg, Oral, Nightly PRN, Des Nath MD    sodium chloride flush 0.9 % injection 10 mL, 10 mL, IntraVENous, PRN, Lesa Cheadle, MD, 10 mL at 01/20/23 1410    0.9 % sodium chloride bolus, 80 mL, IntraVENous, Once, Lesa Cheadle, MD, Stopped at 01/20/23 1411    amiodarone (CORDARONE) tablet 200 mg, 200 mg, Oral, Daily, ELY Andre - CNP, 200 mg at 01/22/23 0830    atorvastatin (LIPITOR) tablet 80 mg, 80 mg, Oral, Nightly, ELY Andre - CNP, 80 mg at 01/21/23 1950    [Held by provider] carvedilol (COREG) tablet 6.25 mg, 6.25 mg, Oral, BID WC, ELY Andre - CNP, 6.25 mg at 01/21/23 0825    clopidogrel (PLAVIX) tablet 75 mg, 75 mg, Oral, Daily, ELY Andre - CNP, 75 mg at 01/22/23 0831    empagliflozin (JARDIANCE) tablet 10 mg, 10 mg, Oral, Daily, ELY Andre CNP, 10 mg at 01/22/23 0830    [Held by provider] lisinopril (PRINIVIL;ZESTRIL) tablet 2.5 mg, 2.5 mg, Oral, Daily, ELY Andre CNP, 2.5 mg at 01/21/23 0825    metFORMIN (GLUCOPHAGE) tablet 1,000 mg, 1,000 mg, Oral, Daily, ELY Andre CNP, 1,000 mg at 01/22/23 0830    therapeutic multivitamin-minerals 1 tablet, 1 tablet, Oral, Daily, Jina YANES Ceferino, APRN - CNP, 1 tablet at 01/22/23 0830    spironolactone (ALDACTONE) tablet 25 mg, 25 mg, Oral, Daily, Jina YANES Ceferino, APRN - CNP, 25 mg at 01/22/23 0831    glucose chewable tablet 16 g, 4 tablet, Oral, PRN, [facility]q [facility] Cefer[facility], APRN - CNP    dextrose bolus 10% 125 mL, 125 mL, IntraVENous, PRN **OR** dextrose bolus 10% 250 mL, 250 mL, IntraVENous, PRN, Ijna FARZANA Ceferino, APRN - CNP    glucagon (rDNA) injection 1 mg, 1 mg, SubCUTAneous, PRN, Jina De La Vega, APRN - CNP    dextrose 10 % infusion, , IntraVENous, Continuous PRN, Jina De La Vega, APRN - CNP    sodium chloride flush 0.9 % injection 5-40 mL, 5-40 mL, IntraVENous, 2 times per day, Jina De La Vega, APRN - CNP, 10 mL at 01/22/23 0831    sodium chloride flush 0.9 % injection 10 mL, 10 mL, IntraVENous, PRN, [facility]q [facility] Ceferino, APRN - CNP, 10 mL at 01/20/23 2026    0.9 % sodium chloride infusion, , IntraVENous, PRN, Sadiq [facility] Ceferino, APRN - CNP    potassium chloride (KLOR-CON M) extended release tablet 40 mEq, 40 mEq, Oral, PRN **OR** potassium bicarb-citric acid (EFFER-K) effervescent tablet 40 mEq, 40 mEq, Oral, PRN **OR** potassium chloride 10 mEq/100 mL IVPB (Peripheral Line), 10 mEq, IntraVENous, PRN, Jina De La Vega, APRN - CNP    magnesium sulfate 1000 mg in dextrose 5% 100 mL IVPB, 1,000 mg, IntraVENous, PRN, Jina De La Vega, APRN - CNP    ondansetron (ZOFRAN-ODT) disintegrating tablet 4 mg, 4 mg, Oral, Q8H PRN **OR** ondansetron (ZOFRAN) injection 4 mg, 4 mg, IntraVENous, Q6H PRN, Sadiq Samuel Ceferino, APRN - CNP, 4 mg at 01/21/23 2044    polyethylene glycol (GLYCOLAX) packet 17 g, 17 g, Oral, Daily PRN, ELY Mercado - CNP    acetaminophen (TYLENOL) tablet 650 mg, 650 mg, Oral, Q6H PRN **OR** acetaminophen (TYLENOL) suppository 650 mg, 650 mg, Rectal, Q6H PRN, ELY Mercado - CNP    insulin lispro (HUMALOG) injection vial 0-4 Units, 0-4 Units, SubCUTAneous, TID Gretchen HUDDLESTON, ELY - FAMILIA insulin lispro (HUMALOG) injection vial 0-4 Units, 0-4 Units, SubCUTAneous, Nightly, Jina FARZANA De La Vega, APRN - CNP    rivaroxaban (XARELTO) tablet 20 mg, 20 mg, Oral, Dinner, John Cardenas, APRN - CNP, 20 mg at 01/21/23 1745   Continuous Infusions:   dextrose      sodium chloride       CBC:   Recent Labs     01/20/23  1217 01/21/23  0500 01/22/23  0529   WBC 6.5 5.5 6.3   HGB 12.0* 12.0* 12.5*    133* 145     BMP:    Recent Labs     01/20/23  1217 01/21/23  0500 01/22/23  0529    134* 137   K 4.3 4.4 4.4   CL 98 97* 102   CO2 27 26 29   BUN 19 16 20   CREATININE 0.88 0.79 1.14   GLUCOSE 114* 97 115*     Hepatic: No results for input(s): AST, ALT, ALB, BILITOT, ALKPHOS in the last 72 hours. Troponin:   Recent Labs     01/20/23  1217 01/21/23  1038   TROPHS 23* 23*     BNP: No results for input(s): BNP in the last 72 hours. Lipids: No results for input(s): CHOL, HDL in the last 72 hours. Invalid input(s): LDLCALCU  INR:   Recent Labs     01/20/23  1545   INR 1.4       Objective:   Vitals: /71   Pulse 66   Temp 97.3 °F (36.3 °C) (Oral)   Resp 16   Ht 5' 9\" (1.753 m)   Wt 252 lb 3 oz (114.4 kg)   SpO2 97%   BMI 37.24 kg/m²   General appearance: alert and cooperative with exam  HEENT: Head: Normocephalic, no lesions, without obvious abnormality.   Neck: no adenopathy, no carotid bruit, no JVD, supple, symmetrical, trachea midline and thyroid not enlarged, symmetric, no tenderness/mass/nodules  Lungs: clear to auscultation bilaterally  Heart: regular rate and rhythm, S1, S2 normal, no murmur, click, rub or gallop  Abdomen: soft, non-tender; bowel sounds normal; no masses,  no organomegaly  Extremities: extremities normal, atraumatic, no cyanosis or edema  Neurologic: Mental status: Alert, oriented, thought content appropriate    EKG:   Results for orders placed or performed during the hospital encounter of 01/20/23   EKG 12 Lead   Result Value Ref Range    Ventricular Rate 76 BPM    Atrial Rate 76 BPM    P-R Interval 242 ms    QRS Duration 140 ms    Q-T Interval 456 ms    QTc Calculation (Bazett) 513 ms    P Axis 81 degrees    R Axis 99 degrees    T Axis 50 degrees    Narrative    Sinus rhythm with 1st degree A-V block with occasional Premature ventricular complexes  Right bundle branch block  Abnormal ECG  When compared with ECG of 05-JAN-2023 22:00,  Premature ventricular complexes are now Present  T wave inversion no longer evident in Anterior leads     ECHO:   Results for orders placed or performed during the hospital encounter of 01/20/23   Echocardiogram 2D W M-Mode   Result Value Ref Range    Left Ventricular Ejection Fraction 30     LVEF MODALITY ECHO     Narrative    [facility]    Transthoracic Echocardiography Report (TTE)     Patient Name Ann Whitehead 40  Date of Study               01/21/2023                SADAF      Date of      1960  Gender                      Male   Birth      Age          57 year(s)  Race                              Room Number  1007        Height:                     69 inch, 175.26 cm      Corporate ID [id number]    Weight:                     252 pounds, 114.3 kg   #      Patient Acct [de-identified]   BSA:          2.28 m^2      BMI:      37.21   #                                                              kg/m^2      MR #         [id number]     Sonographer                 Leanne Schrader      Accession #  [id number]  Interpreting Physician      Alexus oM      Fellow                   Referring Nurse                            Practitioner      Interpreting             Referring Physician         [address] [place] [address]   Fellow     Type of Study      TTE procedure:2D Echocardiogram, M-Mode, Doppler, Color Doppler. Procedure Date  Date: 01/21/2023 Start: 10:51 AM    Study Location: [address] [place] [address]  Technical Quality: Fair visualization    Indications:Congestive heart failure. History / Tech.  Comments:  Pt acanned sitting in chair    Patient Status: Inpatient    Height: 69 inches Weight: 252.01 pounds BSA: 2.28 m^2 BMI: 37.21 kg/m^2    Rhythm: Within normal limits HR: 71 bpm    Allergies    - *No Known Allergies. CONCLUSIONS    Summary  Left ventricle is mildly enlarged. Mild to moderate left ventricular hypertrophy. Global left ventricular systolic function is severely reduced with an  estimated ejection fraction of 30 % . Severe global hypokinesis. Left atrium is severely dilated. Right atrium is severely dilated . Normal right ventricular size and function. Pacemaker / ICD lead seen in RV & RA. Aortic valve sclerosis without stenosis. No aortic insufficiency. Mitral annular calcification is seen with thickened mitral valve leaflets. Mild to moderate mitral regurgitation. Normal tricuspid valve leaflets. Mild tricuspid regurgitation. No significant pericardial effusion is seen. Signature  ----------------------------------------------------------------------------   Electronically signed by Leanne Schrader(Sonographer) on 01/21/2023 11:42   AM  ----------------------------------------------------------------------------    ----------------------------------------------------------------------------   Electronically signed by Chay Salazar(Interpreting physician) on   01/21/2023 01:08 PM  ----------------------------------------------------------------------------  FINDINGS  Left Atrium  Left atrium is severely dilated. Left Ventricle  Left ventricle is mildly enlarged. Mild to moderate left ventricular hypertrophy. Global left ventricular systolic function is severely reduced with an  estimated ejection fraction of 30 % . Severe global hypokinesis. Right Atrium  Right atrium is severely dilated . Pacing lead seen in the right atrium. Right Ventricle  Normal right ventricular size and function. Pacemaker / ICD lead seen in right ventricle.   Mitral Valve  Mitral annular calcification is seen with thickened mitral valve leaflets. Mild to moderate mitral regurgitation. Aortic Valve  Aortic valve sclerosis without stenosis. No aortic insufficiency. Tricuspid Valve  Normal tricuspid valve leaflets. Mild tricuspid regurgitation. Pulmonic Valve  The pulmonic valve is normal in structure. Pericardial Effusion  No significant pericardial effusion is seen. Pleural Effusion  No pleural effusion seen. Miscellaneous  Normal aortic root dimension.     M-mode / 2D Measurements & Calculations:      LVIDd:6.1 cm(3.7 - 5.6 cm)       Diastolic PEEEQJ:723 ml   UALPE:0.6 cm(2.2 - 4.0 cm)       Systolic YQTQXP:909 ml   MJLL:1.5 cm(0.6 - 1.1 cm)        Aortic Root:3.3 cm(2.0 - 3.7 cm)   LVPWd:1.3 cm(0.6 - 1.1 cm)       LA Dimension: 6.7 cm(1.9 - 4.0 cm)   Fractional Shortenin.48 %    LA volume/Index: 174 ml /76m^2   Calculated LVEF (%): 30.84 %      Mitral:                                  Aortic      Valve Area (P1/2-Time): 2.29 cm^2        Peak Velocity: 1.42 m/s   Peak E-Wave: 1.36 m/s                    Mean Velocity: 0.90 m/s   Peak A-Wave: 0.00 m/s                    Peak Gradient: 8.07 mmHg   E/A Ratio: 0                             Mean Gradient: 4 mmHg   Peak Gradient: 7.4 mmHg   Deceleration Time: 303 msec   P1/2t: 96 msec                           AV VTI: 30.5 cm      Tricuspid:                               Pulmonic:      Estimated RVSP: 30 mmHg   Peak TR Velocity: 2.46 m/s   Peak TR Gradient: 24.2064 mmHg   Estimated RA Pressure: 5 mmHg                                            Estimated PASP: 29.21 mmHg     Septal Wall E' velocity:0.06 m/s  Lateral Wall E' velocity:0.10 m/s       Cardiac Angiography:   Results for orders placed or performed during the hospital encounter of 20   Cardiac Catheterization    Narrative    Cardiac Diagnostic Report     Demographics      Patient   ARG[place] CARTER A          Date of Study       2020   Name      Date of   1960            Gender              Male   Birth      Age       62 year(s)            Race                      Room      [id number]  Height:             69 inch, 175.26 cm   Number      Corporate [id number]              Weight:             241 pounds, 109.3 kg   ID #      Patient   [id number]             BSA:      2.24 m^2  BMI:      35.59 kg/m^2   Acct #      MR #      [de-identified]               Performing          Deangelo Riley                                   Physician      Accession [id number]             Referring   #                               Physician      Case ID # [id number]                 Assisting                                   Physician     Additional Comments  H&P reviewed and patient examined by performing physician prior to the  procedure on 08/06/20 at 1301  No changes noted. If changes, see note below. Mallampati Classification 2 / ASA Classification II : per Physician . Patient medications reviewed by Physician prior to procedure. Procedure  Procedure Type:     Diagnostic procedure: SVG Injection, ARMANDO Injection, Aortic Root   Angiography, Lt Heart, Coronary Angio, LVgram     Complications:    - No complication    Indications:    - Abnormal nuclear perfusion test      - Pre-op cardiac evaluation     Conclusions      Procedure Summary      Multi-vessel Coronary Artery Disease. Patent LIMA-LAD and SVG to OM. Occluded native RCA with left to right collaterals. Mildly impaired ventricular function. Dilated aortic root with no other grafts seen. Recommendations      Medical therapy as needed. Risk factor modification. Signature      ----------------------------------------------------------------   Electronically signed by Deangelo Riley(Performing Physician) on   08/06/2020 16:49   ----------------------------------------------------------------      Angiographic Findings      Cardiac Arteries and Lesion Findings     LMCA: Normal 0% stenosis. LAD: Single stenosis. with patent LIMA-LAD       Lesion on Prox LAD: 95% stenosis. LCx: Chronic occlusion 100 % . with patent SVG-OM       Lesion on Prox CX: 100% stenosis. RCA: Chronic occlusion 100 % . Lesion on Prox RCA: Ostial.100% stenosis. Cardiac Grafts      -  There is a LIMA graft that originates at the LIMA and attaches to the      Mid LAD. Patent with 0% stenosis      -  There is a Vein graft that originates at the Aorta Left and attaches to      the 1st Ob Samantha. Patent with luminal irregularities of 20-30%. Coronary Tree      Dominance: Right     LV Analysis  LV function assessed as:Abnormal.  Ejection Fraction  +----------------------------------------------------------------------+---+  ! Method                                                                ! EF%! +----------------------------------------------------------------------+---+  ! LV gram                                                               !40 !  +----------------------------------------------------------------------+---+     LV Segment Contractility      1 - Normal    3 - Mild         5 - Severe       7 - Dyskinesis   hypokinesis      hypokinesis      2 -           4 - Moderate     6 - Akinesis     8 - Aneurysm   Hypokinesis   hypokinesis     Procedure Data  Procedure Start Time: 08/06/2020 13:10. Procedure End Time: 08/06/2020  13:42. The procedure was explained in detail to the patient. Risks, complications  and alternative treatments were reviewed. Written consent was obtained. Diagnostic Cath Status: Urgent    Entry Locations    - Retrograde Percutaneous access was performed through the Right Femoral      artery. A 6 Fr sheath was inserted. Hemostasis was successfully obtained      using Mynx (Access). Procedure Medications:    - Oxygen NC 2 l/min. - Versed I.V. 2 mg.      - Fentanyl I.V. 50 mcg.      - Lidocaine HCl 1% 10mg/ml S.Q. 20 ml. Catheters and Wires:    - 6 Fr6F Catheter JL 4 was used for Left coronary angiography.       - 6 Fr6F Catheter JR 4 was used for Left ventriculography. - 6 Fr6F Catheter JR 4 was used for Right coronary angiography. - 6 Fr6F Catheter JR 4 was used for SVG. - 6 Fr6F Catheter JR 4 was used for LIMA. - 6 Fr6F Catheter - Creed Charles was used for Right coronary angiography. - 6 Fr6F Catheter Angled Pigtail was used for Aortic Root. Contrast Material:    - Optiray [id number] ml    Fluoroscopy Time: Diagnostic: 8:14 minutes. Total: 8:14 minutes. Estimated Blood Loss: 10 ml. Medical History     Allergies    - *No Known Allergies. Risk Factors      The patient risk factors include:treated hypertension, last creatinine:   0.7 mg/dl, creatinine clearance: 171.35 ml/min, dyslipidemia and former   tobacco use. Admission Data  Admission Date: 08/06/2020    Admission Status: Outpatient.        -The patient's anginal syndrome was assessed as CCS III according to the      [facility] clinical classification. Hemodynamics      Condition: Baseline Room Air      Estimated: 271. 41Heart Rate: 80 bpm     Pressure  +-----+--------------------------------------------------------------------+  ! Site ! Pressure                                                            ! +-----+--------------------------------------------------------------------+  ! AO   !119/72 (93)                                                         !  +-----+--------------------------------------------------------------------+  ! AO   !132/81 (103)                                                        ! +-----+--------------------------------------------------------------------+  ! LV   !130/2 ,13                                                           ! +-----+--------------------------------------------------------------------+  ! LV   !126/3 ,16                                                           !   +-----+--------------------------------------------------------------------+    Valve Gradients and Areas  +-----------+---------+---------+---------+----------+---------+-----------+  ! Valve      ! Peak     ! Mean     ! Area     ! Index     ! Flow     ! Source     ! +-----------+---------+---------+---------+----------+---------+-----------+  ! Aortic     !7        !6        !         !          !         !           !  +-----------+---------+---------+---------+----------+---------+-----------+  ! Aortic     !7        !6        !         !          !         !           !  +-----------+---------+---------+---------+----------+---------+-----------+    Shunts     Oxygen Values      O2 [id number] [id number]              Assessment / Acute Cardiac Problems:   Patient Active Problem List:       - Ac on ch Sys, HFrEF, s/p AICD- due to non compliance  - Echo this admission- EF 30%  - Diffuse anasarca, fluid overload, scrotal edema  - Known CAD s/p CABG, PCI to SVG-OM- on 5/22  - Known PAF- in NSR  - Obesity  - Borderline hypotension    Plan of Treatment:   - continue amio, statin, plavix, xarelto  - increase midodrine 10 tid  - continue IV bumex  - nephro is following- for now no plan to start UF  - hold coreg/lisinopril due to lower BP  - will monitor BP closely, if has hypotension- will transfer to ICU for vasopressor support while we hughe     Thank you for allowing me to participate in the care of this patient, please do not hesitate to call if you have any questions. Jennifer Friedman DO, [facility], [address], [address] [place] [place] [address] [facility]  [email or web address]  [phone]

## 2023-01-23 ENCOUNTER — APPOINTMENT (OUTPATIENT)
Dept: ULTRASOUND IMAGING | Age: 63
DRG: 291 | End: 2023-01-23
Payer: MEDICARE

## 2023-01-23 PROBLEM — R09.89 DIMINISHED PULSES IN LOWER EXTREMITY: Status: ACTIVE | Noted: 2023-01-23

## 2023-01-23 LAB
ALBUMIN (CALCULATED): 3.6 G/DL (ref 3.2–5.2)
ALBUMIN PERCENT: 56 % (ref 45–65)
ALPHA 1 PERCENT: 3 % (ref 3–6)
ALPHA 2 PERCENT: 8 % (ref 6–13)
ALPHA-1-GLOBULIN: 0.2 G/DL (ref 0.1–0.4)
ALPHA-2-GLOBULIN: 0.5 G/DL (ref 0.5–0.9)
ANION GAP SERPL CALCULATED.3IONS-SCNC: 8 MMOL/L (ref 9–17)
BETA GLOBULIN: 0.9 G/DL (ref 0.5–1.1)
BETA PERCENT: 15 % (ref 11–19)
BUN BLDV-MCNC: 20 MG/DL (ref 8–23)
BUN/CREAT BLD: 16 (ref 9–20)
CALCIUM SERPL-MCNC: 8.4 MG/DL (ref 8.6–10.4)
CHLORIDE BLD-SCNC: 99 MMOL/L (ref 98–107)
CO2: 27 MMOL/L (ref 20–31)
CREAT SERPL-MCNC: 1.23 MG/DL (ref 0.7–1.2)
ESTIMATED AVERAGE GLUCOSE: 146 MG/DL
GAMMA GLOBULIN %: 19 % (ref 9–20)
GAMMA GLOBULIN: 1.3 G/DL (ref 0.5–1.5)
GFR SERPL CREATININE-BSD FRML MDRD: >60 ML/MIN/1.73M2
GLUCOSE BLD-MCNC: 103 MG/DL (ref 75–110)
GLUCOSE BLD-MCNC: 107 MG/DL (ref 75–110)
GLUCOSE BLD-MCNC: 112 MG/DL (ref 70–99)
GLUCOSE BLD-MCNC: 120 MG/DL (ref 75–110)
GLUCOSE BLD-MCNC: 123 MG/DL (ref 75–110)
HBA1C MFR BLD: 6.7 % (ref 4–6)
HCT VFR BLD CALC: 39.8 % (ref 40.7–50.3)
HEMOGLOBIN: 12 G/DL (ref 13–17)
MAGNESIUM: 2.3 MG/DL (ref 1.6–2.6)
MCH RBC QN AUTO: 25.1 PG (ref 25.2–33.5)
MCHC RBC AUTO-ENTMCNC: 30.2 G/DL (ref 28–38)
MCV RBC AUTO: 83.3 FL (ref 82.6–102.9)
PATHOLOGIST: NORMAL
PATHOLOGIST: NORMAL
PDW BLD-RTO: 19.6 % (ref 11.8–14.4)
PLATELET # BLD: 145 K/UL (ref 138–453)
PMV BLD AUTO: 10.9 FL (ref 8.1–13.5)
POTASSIUM SERPL-SCNC: 3.8 MMOL/L (ref 3.7–5.3)
PROTEIN ELECTROPHORESIS, SERUM: NORMAL
RBC # BLD: 4.78 M/UL (ref 4.21–5.77)
SERUM IFX INTERP: NORMAL
SODIUM BLD-SCNC: 134 MMOL/L (ref 135–144)
TOTAL PROT. SUM,%: 101 % (ref 98–102)
TOTAL PROT. SUM: 6.5 G/DL (ref 6.3–8.2)
TOTAL PROTEIN: 6.5 G/DL (ref 6.4–8.3)
WBC # BLD: 4.8 K/UL (ref 3.5–11.3)

## 2023-01-23 PROCEDURE — 76770 US EXAM ABDO BACK WALL COMP: CPT

## 2023-01-23 PROCEDURE — 83735 ASSAY OF MAGNESIUM: CPT

## 2023-01-23 PROCEDURE — 97535 SELF CARE MNGMENT TRAINING: CPT

## 2023-01-23 PROCEDURE — 36415 COLL VENOUS BLD VENIPUNCTURE: CPT

## 2023-01-23 PROCEDURE — 99231 SBSQ HOSP IP/OBS SF/LOW 25: CPT | Performed by: INTERNAL MEDICINE

## 2023-01-23 PROCEDURE — 99222 1ST HOSP IP/OBS MODERATE 55: CPT

## 2023-01-23 PROCEDURE — 6370000000 HC RX 637 (ALT 250 FOR IP): Performed by: NURSE PRACTITIONER

## 2023-01-23 PROCEDURE — 6370000000 HC RX 637 (ALT 250 FOR IP): Performed by: INTERNAL MEDICINE

## 2023-01-23 PROCEDURE — 80048 BASIC METABOLIC PNL TOTAL CA: CPT

## 2023-01-23 PROCEDURE — 2060000000 HC ICU INTERMEDIATE R&B

## 2023-01-23 PROCEDURE — 2500000003 HC RX 250 WO HCPCS: Performed by: INTERNAL MEDICINE

## 2023-01-23 PROCEDURE — 6360000002 HC RX W HCPCS: Performed by: NURSE PRACTITIONER

## 2023-01-23 PROCEDURE — 83036 HEMOGLOBIN GLYCOSYLATED A1C: CPT

## 2023-01-23 PROCEDURE — 2580000003 HC RX 258: Performed by: NURSE PRACTITIONER

## 2023-01-23 PROCEDURE — 85027 COMPLETE CBC AUTOMATED: CPT

## 2023-01-23 PROCEDURE — 82947 ASSAY GLUCOSE BLOOD QUANT: CPT

## 2023-01-23 RX ORDER — BUMETANIDE 0.25 MG/ML
2 INJECTION, SOLUTION INTRAMUSCULAR; INTRAVENOUS 2 TIMES DAILY
Status: DISCONTINUED | OUTPATIENT
Start: 2023-01-23 | End: 2023-01-24

## 2023-01-23 RX ADMIN — EMPAGLIFLOZIN 10 MG: 10 TABLET, FILM COATED ORAL at 08:29

## 2023-01-23 RX ADMIN — RIVAROXABAN 20 MG: 20 TABLET, FILM COATED ORAL at 17:43

## 2023-01-23 RX ADMIN — CLOPIDOGREL BISULFATE 75 MG: 75 TABLET ORAL at 08:30

## 2023-01-23 RX ADMIN — MULTIPLE VITAMINS W/ MINERALS TAB 1 TABLET: TAB at 08:29

## 2023-01-23 RX ADMIN — SPIRONOLACTONE 25 MG: 25 TABLET ORAL at 08:30

## 2023-01-23 RX ADMIN — MIDODRINE HYDROCHLORIDE 10 MG: 10 TABLET ORAL at 17:43

## 2023-01-23 RX ADMIN — ONDANSETRON 4 MG: 2 INJECTION INTRAMUSCULAR; INTRAVENOUS at 15:17

## 2023-01-23 RX ADMIN — MIDODRINE HYDROCHLORIDE 10 MG: 10 TABLET ORAL at 12:53

## 2023-01-23 RX ADMIN — ACETAZOLAMIDE 250 MG: 250 TABLET ORAL at 08:29

## 2023-01-23 RX ADMIN — MIDODRINE HYDROCHLORIDE 10 MG: 10 TABLET ORAL at 08:30

## 2023-01-23 RX ADMIN — SODIUM CHLORIDE, PRESERVATIVE FREE 10 ML: 5 INJECTION INTRAVENOUS at 08:31

## 2023-01-23 RX ADMIN — SODIUM CHLORIDE, PRESERVATIVE FREE 10 ML: 5 INJECTION INTRAVENOUS at 20:16

## 2023-01-23 RX ADMIN — BUMETANIDE 2 MG: 0.25 INJECTION INTRAMUSCULAR; INTRAVENOUS at 08:30

## 2023-01-23 RX ADMIN — AMIODARONE HYDROCHLORIDE 200 MG: 200 TABLET ORAL at 08:30

## 2023-01-23 RX ADMIN — POLYETHYLENE GLYCOL 3350 17 G: 17 POWDER, FOR SOLUTION ORAL at 17:43

## 2023-01-23 RX ADMIN — ATORVASTATIN CALCIUM 80 MG: 80 TABLET, FILM COATED ORAL at 20:16

## 2023-01-23 NOTE — PROGRESS NOTES
[facility] Wound Ostomy Continence Nurse  Consult Note       NAME:  Mariely Martin  MEDICAL RECORD NUMBER:  [id number]  AGE: 57 y.o. GENDER: male  : 1960  TODAY'S DATE:  2023    Subjective:      Mariely Martin is a 57 y.o. male with inpatient referral to Wound Ostomy Continence [facility]ty for:  RLE wound, other wounds closed at this time      Wound Identification:  Wound Type: venous  Contributing Factors: edema, venous stasis, and smoking    Wound History: The patient states he has a history of chronic venous stasis ulcerations and has been having home care nursing come twice weekly for Unna boot dressing changes that is being managed by his PCP. He was referred to wound care at last hospital stay, but he was unable to make an appt prior to being readmitted to hospital.       Current Wound Care Treatment:  unna boot removed this visit    Patient Goal of Care:  [x] Wound Healing  [] Odor Control  [] Palliative Care  [] Pain Control   [x] Other: infection prevention, further wound prevention        PAST MEDICAL HISTORY        Diagnosis Date    CAD (coronary artery disease)      cath    CHF (congestive heart failure) ([facility] [address]     Dr. Betzy Lepe attack [facility])     Hyperlipidemia     Dr. Adria Arzate    Hypertension     Dr. Adria Arzate    MI (myocardial infarction) [facility])     MRSA (methicillin resistant staph aureus) culture positive 2018    abdomen    Skin cancer     Snores     no cpap    Stroke [facility])       Dr. Adria Arzate monitors    Wears dentures     upper full denture    Wears reading eyeglasses     Wellness examination     Rea Juarez PA-C last seen 2020       PAST SURGICAL HISTORY    Past Surgical History:   Procedure Laterality Date    CARDIAC CATHETERIZATION  2020    Dr. Tete Ritchie therapy.   Report on chart    CARDIAC DEFIBRILLATOR PLACEMENT  2022    [address]    unsure of date, bilateral radials    CARDIOVERSION  2021    CORONARY ANGIOPLASTY WITH STENT PLACEMENT 6 total stents per patient    CORONARY ARTERY BYPASS GRAFT      4 vessel bypass    EYE SURGERY      eye injury  new lens  and laser lt eye 2019    HERNIA REPAIR Bilateral 2020    XI LAPAROSCOPIC ROBOTIC INGUINAL HERNIA REPAIR WITH MESH; UMBILICAL HERNIA REPAIR WITH MESH performed by Nelia Pinzon DO at [address] [place] [address]      plate/hardware present    OTHER SURGICAL HISTORY  2021    BENITO    SKIN BIOPSY      back       FAMILY HISTORY    Family History   Problem Relation Age of Onset    Coronary Art Dis Father     Liver Disease Father     Alcohol Abuse Sister        SOCIAL HISTORY    Social History     Tobacco Use    Smoking status: Former     Types: Cigarettes     Quit date: 1999     Years since quittin.0    Smokeless tobacco: Never   Vaping Use    Vaping Use: Never used   Substance Use Topics    Alcohol use: No    Drug use: No         ALLERGIES    No Known Allergies    HOME MEDICATIONS  Prior to Admission medications    Medication Sig Start Date End Date Taking?  Authorizing Provider   carvedilol (COREG) 12.5 MG tablet Take 0.5 tablets by mouth 2 times daily (with meals) 1/10/23   ELY Westbrook - NP   lisinopril (PRINIVIL;ZESTRIL) 2.5 MG tablet Take 1 tablet by mouth daily 23   ELY Westbrook NP   spironolactone (ALDACTONE) 25 MG tablet Take 1 tablet by mouth daily 1/10/23   ELY Westbrook NP   ARTIFICIAL TEAR SOLUTION OP Place 2 drops into both eyes daily    Historical Provider, MD   clopidogrel (PLAVIX) 75 MG tablet Take 1 tablet by mouth daily 10/16/22   Radha Neville MD   empagliflozin (JARDIANCE) 10 MG tablet Take 1 tablet by mouth daily 10/16/22   Radha Neville MD   midodrine (PROAMATINE) 5 MG tablet Take 1 tablet by mouth 3 times daily (before meals) 10/16/22   Radha Neville MD   amiodarone (CORDARONE) 200 MG tablet Take 1 tablet by mouth daily 10/16/22   Radha Neville MD   bumetanide (BUMEX) 2 MG tablet Take 1 tablet by mouth 2 times daily 10/15/22   Yasmine Castro MD   rivaroxaban (XARELTO) 20 MG TABS tablet Take 20 mg by mouth Daily with supper    Historical Provider, MD   metFORMIN (GLUCOPHAGE) 1000 MG tablet Take 1 tablet by mouth daily Resume on 5/21 5/19/22   Marjan Figueroa DO   Cyanocobalamin (VITAMIN B-12 PO) Take 1 tablet by mouth daily    Historical Provider, MD   naproxen (NAPROSYN) 500 MG tablet Take 1 tablet by mouth 2 times daily (with meals) 3/28/22 3/28/22  Jeffrey Fox PA-C   Multiple Vitamins-Minerals (THERAPEUTIC MULTIVITAMIN-MINERALS) tablet Take 1 tablet by mouth daily    Historical Provider, MD   nitroGLYCERIN (NITROSTAT) 0.4 MG SL tablet Place 0.4 mg under the tongue every 5 minutes as needed for Chest pain up to max of 3 total doses. If no relief after 1 dose, call 911.   Dr. Marcianne Aschoff Provider, MD   atorvastatin (LIPITOR) 80 MG tablet Take 1 tablet by mouth nightly 2/20/19   Damián Riddle MD       CURRENT MEDICATIONS:  Current Facility-Administered Medications   Medication Dose Route Frequency Provider Last Rate Last Admin    bumetanide (BUMEX) injection 2 mg  2 mg IntraVENous BID Yasmine Castro MD        midodrine (PROAMATINE) tablet 10 mg  10 mg Oral TID  Rodolfo Alphonso, DO   10 mg at 01/23/23 1253    diphenhydrAMINE (BENADRYL) injection 25 mg  25 mg IntraVENous Q6H PRN ELY Fish CNP   25 mg at 01/22/23 2021    acetaZOLAMIDE (DIAMOX) tablet 250 mg  250 mg Oral Daily Rodolfo Alphonso, DO   250 mg at 01/23/23 8692    melatonin tablet 3 mg  3 mg Oral Nightly PRN Laura Dunlap MD        sodium chloride flush 0.9 % injection 10 mL  10 mL IntraVENous PRN Sonja Iraheta MD   10 mL at 01/20/23 1410    0.9 % sodium chloride bolus  80 mL IntraVENous Once Sonja Iraheta MD   Stopped at 01/20/23 1411    amiodarone (CORDARONE) tablet 200 mg  200 mg Oral Daily ELY España CNP   200 mg at 01/23/23 0830    atorvastatin (LIPITOR) tablet 80 mg  80 mg Oral Nightly Haroon Merlin, APRN - CNP   80 mg at 01/22/23 2021    [Held by provider] carvedilol (COREG) tablet 6.25 mg  6.25 mg Oral BID WC Haroon Merlin, APRN - CNP   6.25 mg at 01/21/23 0825    clopidogrel (PLAVIX) tablet 75 mg  75 mg Oral Daily Haroon Merlin, APRN - CNP   75 mg at 01/23/23 0830    empagliflozin (JARDIANCE) tablet 10 mg  10 mg Oral Daily Haroon Merlin, APRN - CNP   10 mg at 01/23/23 7141    [Held by provider] lisinopril (PRINIVIL;ZESTRIL) tablet 2.5 mg  2.5 mg Oral Daily Haroon Merlin, APRN - CNP   2.5 mg at 01/21/23 0825    [Held by provider] metFORMIN (GLUCOPHAGE) tablet 1,000 mg  1,000 mg Oral Daily Haroon Merlin, APRN - CNP   1,000 mg at 01/22/23 0830    therapeutic multivitamin-minerals 1 tablet  1 tablet Oral Daily Haroon Merlin, APRN - CNP   1 tablet at 01/23/23 8182    spironolactone (ALDACTONE) tablet 25 mg  25 mg Oral Daily Haroon Merlin, APRN - CNP   25 mg at 01/23/23 0830    glucose chewable tablet 16 g  4 tablet Oral PRN Haroon Merlin, APRN - CNP        dextrose bolus 10% 125 mL  125 mL IntraVENous PRN Haroon Merlin, APRN - CNP        Or    dextrose bolus 10% 250 mL  250 mL IntraVENous PRN Haroon Merlin, APRN - CNP        glucagon (rDNA) injection 1 mg  1 mg SubCUTAneous PRN Haroon Merlin, APRN - CNP        dextrose 10 % infusion   IntraVENous Continuous PRN Haroon Merlin, APRN - CNP        sodium chloride flush 0.9 % injection 5-40 mL  5-40 mL IntraVENous 2 times per day Haroon Merlin, APRN - CNP   10 mL at 01/23/23 0831    sodium chloride flush 0.9 % injection 10 mL  10 mL IntraVENous PRN Haroon Merlin, APRN - CNP   10 mL at 01/20/23 2026    0.9 % sodium chloride infusion   IntraVENous PRN Haroon Merlin, APRN - CNP        potassium chloride (KLOR-CON M) extended release tablet 40 mEq  40 mEq Oral PRN Haroon Merlin, APRN - CNP        Or    potassium bicarb-citric acid (EFFER-K) effervescent tablet 40 mEq  40 mEq Oral PRN Darold Merlin, APRN - CNP        Or    potassium chloride 10 mEq/100 mL IVPB (Peripheral Line)  10 mEq IntraVENous PRN Christina Diamond, APRN - CNP        magnesium sulfate 1000 mg in dextrose 5% 100 mL IVPB  1,000 mg IntraVENous PRN Christina Diamond, APRN - CNP        ondansetron (ZOFRAN-ODT) disintegrating tablet 4 mg  4 mg Oral Q8H PRN Christina Diamond, APRN - CNP        Or    ondansetron [facility]) injection 4 mg  4 mg IntraVENous Q6H PRN Christina Diamond, APRN - CNP   4 mg at 01/23/23 1517    polyethylene glycol (GLYCOLAX) packet 17 g  17 g Oral Daily PRN Christina Diamond, APRN - CNP        acetaminophen (TYLENOL) tablet 650 mg  650 mg Oral Q6H PRN Christina Diamond, APRN - CNP   650 mg at 01/22/23 1810    Or    acetaminophen (TYLENOL) suppository 650 mg  650 mg Rectal Q6H PRN Christina Diamond, APRN - CNP        insulin lispro (HUMALOG) injection vial 0-4 Units  0-4 Units SubCUTAneous TID WC Jina De La Vega APRN - CNP        insulin lispro (HUMALOG) injection vial 0-4 Units  0-4 Units SubCUTAneous Nightly ELY Andre - FAMILIA        rivaroxaban (XARELTO) tablet 20 mg  20 mg Oral Dinner Jina De La Vega APRN - CNP   20 mg at 01/22/23 1810         Review of Systems:  Constitutional: negative for chills and fevers  Respiratory: positive for dyspnea on exertion, negative for cough and wheezing  Cardiovascular: positive for lower extremity edema, negative for chest pain and palpitations. Denies claudication pain to legs.   Gastrointestinal: negative for abdominal pain and nausea  Genitourinary:negative  Integument: Bilateral lower extremity pretibial ulcerations  Endocrine: negative  Musculoskeletal:negative for arthralgias and back pain  Behavioral/Psych: negative  Pain: negative, denies claudication type pain      Objective:      /68   Pulse 66   Temp 97.3 °F (36.3 °C) (Oral)   Resp 18   Ht 5' 9\" (1.753 m)   Wt 252 lb 3 oz (114.4 kg)   SpO2 93%   BMI 37.24 kg/m²       LABS    CBC:   Lab Results   Component Value Date/Time    WBC 4.8 01/23/2023 05:10 AM    RBC 4.78 01/23/2023 05:10 AM    RBC 5.00 01/02/2012 07:23 PM    HGB 12.0 01/23/2023 05:10 AM     SED RATE:   Lab Results   Component Value Date    SEDRATE 5 09/02/2022       CMP:  Albumin:    Lab Results   Component Value Date/Time    LABALBU 3.4 01/05/2023 10:32 PM    LABALBU 4.1 01/02/2012 07:23 PM     PT/INR:    Lab Results   Component Value Date/Time    PROTIME 17.6 01/20/2023 03:45 PM    INR 1.4 01/20/2023 03:45 PM     HgBA1c:    Lab Results   Component Value Date/Time    LABA1C 6.7 01/23/2023 05:10 AM     PTT: No components found for: LABPTT      PHYSICAL EXAM    General Appearance: alert and oriented to person, place and time, well-developed and well-nourished, in no acute distress  Head: normocephalic and atraumatic  Pulmonary/Chest: normal air movement, no respiratory distress  Skin: LLE small wound near ankle- irregular shape granular wound bed with surrounding maceration. Hemosiderin staining especially prominent of bilat feet. Purple discoloration to toes. Cardiovascular/Circulation: !+ BLE edema, no cyanosis, pulses not palpable- doppler right DP and PT strong and sounds likely triphasic, left DP and PT are weak and sound monophasic.   Extremities: no cyanosis and no clubbing  Musculoskeletal: no joint deformity or tenderness, no extremity amputations  Neurologic: gait slow and steady, coordination normal and speech normal      Assessment:       Ashish Risk Score: Ashish Scale Score: 19    Patient Active Problem List   Diagnosis Code    Hyperbilirubinemia E80.6    Essential hypertension I10    Hypercholesterolemia E78.00    CAD (coronary artery disease) I25.10    Gross hematuria R31.0    Abdominal pain, right upper quadrant R10.11    Right nephrolithiasis N20.0    Abnormal liver function test N30.26    Acute systolic HF (heart failure) (HCC) I50.21    Noncompliance Z91.199    Acute on chronic systolic heart failure (HCC) I50.23    Pneumonia J18.9    Dyspnea and respiratory abnormalities R06.00, R06.89    Status post inguinal hernia repair Z98.890, Z87.19    S/P repair of ventral hernia Z98.890, Z87.19    Post-op pain G89.18    Non-recurrent bilateral inguinal hernia without obstruction or gangrene T11.16    Umbilical hernia with obstruction, without gangrene K42.0    Status post implantation of automatic cardioverter/defibrillator (AICD) Z95.810    NSTEMI (non-ST elevated myocardial infarction) ([facility]) I21.4    ARIADNA (obstructive sleep apnea) G47.33    S/P CABG (coronary artery bypass graft) Z95.1    Nausea R11.0    Anorexia R63.0    Thrombocytopenia ([facility]) D69.6    Obesity (BMI 30-39. 9) E66.9    Hypokalemia E87.6    Uncontrolled type 2 diabetes mellitus with hyperglycemia ([facility]) E11.65    Acute on chronic combined systolic and diastolic CHF (congestive heart failure) ([facility]) I50.43    Type 2 diabetes mellitus, without long-term current use of insulin ([facility]) E11.9    Paroxysmal atrial fibrillation ([facility]) I48.0    Edema of both legs R60.0    Acute congestive heart failure ([facility]) I50.9    Skin ulcer of left lower leg with fat layer exposed ([address] H78.796    Skin ulcer of right lower leg, with fat layer exposed ([address] L97.912    Anasarca R60.1    Hydrocele, bilateral N43.3    Diminished pulses in lower extremity R09.89         Measurements:  Wound Pretibial Right;Medial;Distal (Active)   Wound Image   01/23/23 1714   Wound Etiology Venous 01/23/23 1714   Dressing Status New dressing applied; Old drainage noted 01/23/23 1714   Wound Cleansed Soap and water 01/23/23 1714   Dressing/Treatment Alginate with Ag;ABD;Roll gauze 01/23/23 1714   Dressing Change Due 01/25/23 01/23/23 1714   Wound Length (cm) 2.5 cm 01/23/23 1714   Wound Width (cm) 0.8 cm 01/23/23 1714   Wound Depth (cm) 0.2 cm 01/23/23 1714   Wound Surface Area (cm^2) 2 cm^2 01/23/23 1714   Wound Volume (cm^3) 0.4 cm^3 01/23/23 1714   Wound Assessment Pink/red;Granulation tissue 01/23/23 1714   Drainage Amount Moderate 01/23/23 171   Drainage Description Serosanguinous 23 171   Odor None 23   Felicity-wound Assessment Maceration 23   Margins Attached edges; Defined edges 23   Wound Thickness Description not for Pressure Injury Full thickness 23   Number of days: Addressed wound healing factors:    [x] Diabetes: yes- type 2, most recent A1C: 6.7       [x] CHF: yes- combined systolic and diastolic. Echo 23 EF: 30%       [x] Infection: n/a       [x] Debridement: n/a  [x] Compliance; compression/offloading: consistently using unna boot for some time. Ambulatory, elevates when sitting   [x] Diagnostics: n/a- states has never had vascular testing  [x] Smokin pack year history, quit long ago    Plan:     Plan of Care:     -RLE wound near ankle care: use silver alginate. Change every other day. Ace wraps BLE from toes to just under knees. [facility] come [facility] at [facility].    -Hold off on unna boot for now, recommend NIVS then reconsider. Discussed with RN. -Follow up outpatient [facility]      Current Diet: ADULT DIET;  Regular; 4 carb choices (60 gm/meal); 1200 ml    Discharge Plan:  Placement for patient upon discharge: home with support   Patient appropriate for Outpatient [address]: Yes    Patient/Caregiver Teaching:  Level of patientunderstanding able to:     [x] Indicates understanding       [] Needs reinforcement  [] Unsuccessful      [x] Verbal Understanding  [] Demonstrated understanding       [] No evidence of learning  [] Refused teaching         [] N/A       Electronically signed by ELY Stewart CNP on  2023 at 5:38 PM

## 2023-01-23 NOTE — PROGRESS NOTES
[facility]  Office: [address], DO, Jose Elias Quinones, DO, Patrick Albright, DO, Elisabet Brown Blood, DO, Alejandra Catalan MD, Sunita Blackman MD, Fletcher Frost MD, Rachel Guy MD,  Corbin Stern MD, Chelsie Chu MD, Gerardo Weiner, DO, Aydee Hurt MD,  Jordan Brown MD, Svitlana Palacios MD, Dede Seaman, DO, Adolfo Goldberg MD, Stas Tolentino MD, Adrián Monk DO, Livia Arredondo MD, Norma Meneses MD, Peewee Levine MD, Yasir Rojas MD, Shaq Sue, DO, Ayaz Manzanares MD, Angie Marques MD, Eliu Carroll, Ramila Fajardo, CNP, Shannan Reese, CNP, Oliva Luo, CNP,  Opal Drake, [facility], Betzy Stockton, CNP, Kade Ellis, CNP, Casey Gu, CNP, Dottie Moreno, CNP, Dela Cruz Cancer, CNP, Gio Erickson PA-C, Elton Mcpherson, CNS, Donn Burr, CNP, Jacque Mcmillan, [facility]    Progress Note    1/23/2023    11:57 AM    Name:   Hui Villeda  MRN:     [id number]     [place]:      [de-identified]   Room:   [address] [place] [address]   Day:  3  Admit Date:  1/20/2023 11:53 AM    PCP:   Zora Bean PA-C  Code Status:  DNR-CCA    Subjective:     C/C:   Chief Complaint   Patient presents with    Testicle Swelling     Pt states the swelling started around 1/15 and swelling has continued since. Pt states pain is 8/10     Interval History Status: . Patient having good diuresis with IV Bumex, creatinine has increased to 1.23  Still has legs, abdominal wall and scrotal swelling   Cardiology recommending to continue aggressive diuresis with Bumex Aldactone and  Diamox, he will need transfer to ICU for pressor support with Levophed if BP drops  Nephrology also following the patient now no plan for ultrafiltration  Echo shows EF of 30%  Brief History:     Patient presents to the emergency room today with complaints of scrotal swelling.   Patient has a significant past medical history of CAD, CHF, myocardial infarction, diabetes, hyperlipidemia, hypertension and has an ICD in place. Patient was recently admitted and discharged on January 10/2022 for a CHF exacerbation. Patient states that since 1/15/2023 the patient started to develop scrotal swelling which has progressively worsened since then. Patient states that at times he has difficulty urinating due to his penis retracting inside the swollen scrotum. Patient is also experiencing abdominal swelling and dyspnea with exertion. Patient denies any recent fevers, chills, chest pain, nausea, vomiting and diarrhea. Patient states that he stopped taking his Bumex approximately 2 days ago because it was not making a difference in the swelling of his scrotum. Throughout the emergency room evaluation it was noted that his glucose is 114. proBNP is 1698. Troponin 23. Hemoglobin 12.0. CT abdomen and pelvis shows:   1. Moderate volume abdominopelvic ascites. 2.  Body wall edema and diffuse scrotal edema. No evidence for organized   soft tissue fluid collection or soft tissue gas. 3.  Hepatic steatosis. Subtle lobular liver contour is noted, for which the   possibility of underlying chronic liver disease should be considered. 4.  Nonobstructing right renal stone. 5.  Nonspecific mosaic appearance of the lung bases, which may in part be due   to atelectasis or air trapping. No pleural effusion or evidence for edema. Scrotal ultrasound shows: Nonspecific diffuse scrotal wall edema. Large bilateral hydroceles.       Review of Systems:     Constitutional:  negative for chills, fevers, sweats  Respiratory:  negative for cough, +dyspnea on exertion, denies wheezing  Cardiovascular:  negative for chest pain, chest pressure/discomfort,++ lower extremity edema, palpitations  Gastrointestinal:  negative for abdominal pain, constipation, diarrhea, nausea, vomiting,+ scrotal swelling  Neurological:  negative for dizziness, headache    Medications: Allergies:  No Known Allergies    Current Meds:   Scheduled Meds:    midodrine  10 mg Oral TID WC    acetaZOLAMIDE  250 mg Oral Daily    bumetanide  2 mg IntraVENous TID    sodium chloride  80 mL IntraVENous Once    amiodarone  200 mg Oral Daily    atorvastatin  80 mg Oral Nightly    [Held by provider] carvedilol  6.25 mg Oral BID WC    clopidogrel  75 mg Oral Daily    empagliflozin  10 mg Oral Daily    [Held by provider] lisinopril  2.5 mg Oral Daily    [Held by provider] metFORMIN  1,000 mg Oral Daily    therapeutic multivitamin-minerals  1 tablet Oral Daily    spironolactone  25 mg Oral Daily    sodium chloride flush  5-40 mL IntraVENous 2 times per day    insulin lispro  0-4 Units SubCUTAneous TID WC    insulin lispro  0-4 Units SubCUTAneous Nightly    rivaroxaban  20 mg Oral Dinner     Continuous Infusions:    dextrose      sodium chloride       PRN Meds: diphenhydrAMINE, melatonin, sodium chloride flush, glucose, dextrose bolus **OR** dextrose bolus, glucagon (rDNA), dextrose, sodium chloride flush, sodium chloride, potassium chloride **OR** potassium alternative oral replacement **OR** potassium chloride, magnesium sulfate, ondansetron **OR** ondansetron, polyethylene glycol, acetaminophen **OR** acetaminophen    Data:     Past Medical History:   has a past medical history of CAD (coronary artery disease), CHF (congestive heart failure) ([facility] [facility] Hyperlipidemia, Hypertension, MI (myocardial infarction) ([facility] MRSA (methicillin resistant staph aureus) culture positive, Skin cancer, Snores, Stroke ([facility] Wears dentures, Wears reading eyeglasses, and Wellness examination. Social History:   reports that he quit smoking about 24 years ago. He has never used smokeless tobacco. He reports that he does not drink alcohol and does not use drugs.      Family History:   Family History   Problem Relation Age of Onset    Coronary Art Dis Father     Liver Disease Father Alcohol Abuse Sister        Vitals:  /81   Pulse 72   Temp 97.3 °F (36.3 °C) (Oral)   Resp 20   Ht 5' 9\" (1.753 m)   Wt 252 lb 3 oz (114.4 kg)   SpO2 97%   BMI 37.24 kg/m²   Temp (24hrs), Av.5 °F (36.4 °C), Min:97.3 °F (36.3 °C), Max:98.1 °F (36.7 °C)    Recent Labs     23  1205 23  1608 23  1949 23  0807   POCGLU 93 123* 149* 123*         I/O (24Hr):     Intake/Output Summary (Last 24 hours) at 2023 1157  Last data filed at 2023 0630  Gross per 24 hour   Intake 1140 ml   Output 1900 ml   Net -760 ml         Labs:  Hematology:  Recent Labs     23  1545 23  0500 23  0529 23  0510   WBC  --  5.5 6.3 4.8   RBC  --  4.79 5.08 4.78   HGB  --  12.0* 12.5* 12.0*   HCT  --  39.0* 42.4 39.8*   MCV  --  81.4* 83.5 83.3   MCH  --  25.1* 24.6* 25.1*   MCHC  --  30.8 29.5 30.2   RDW  --  19.3* 19.7* 19.6*   PLT  --  133* 145 145   MPV  --  11.2 10.6 10.9   INR 1.4  --   --   --        Chemistry:  Recent Labs     23  1217 23  0500 23  1038 23  0529 23  0510    134*  --  137 134*   K 4.3 4.4  --  4.4 3.8   CL 98 97*  --  102 99   CO2 27 26  --  29 27   GLUCOSE 114* 97  --  115* 112*   BUN 19 16  --  20 20   CREATININE 0.88 0.79  --  1.14 1.23*   MG 1.9  --   --  2.2 2.3   ANIONGAP 11 11  --  6* 8*   LABGLOM >60 >60  --  >60 >60   CALCIUM 8.7 8.7  --  8.8 8.4*   PROBNP 1,698*  --   --   --   --    TROPHS 23*  --  23*  --   --        Recent Labs     23  1914 23  0749 23  1035 23  1205 23  1608 23  1949 23  0510 23  0807   PROT  --   --  6.5  --   --   --   --   --    LABA1C  --   --   --   --   --   --  6.7*  --    POCGLU 153* 103  --  93 123* 149*  --  123*       ABG:No results found for: POCPH, PHART, PH, POCPCO2, ZYS5MSM, PCO2, POCPO2, PO2ART, PO2, POCHCO3, FLB9ZNP, HCO3, NBEA, PBEA, BEART, BE, THGBART, THB, ERM2UEI, USUU8WKU, B0LFIQBO, O2SAT, FIO2  Lab Results   Component Value Date/Time    SPECIAL RAC 12ML 03/11/2019 08:52 PM     Lab Results   Component Value Date/Time    CULTURE NO SIGNIFICANT GROWTH 01/20/2023 04:27 PM       Radiology:  CT ABDOMEN PELVIS W IV CONTRAST Additional Contrast? None    Result Date: 1/20/2023  1. Moderate volume abdominopelvic ascites. 2.  Body wall edema and diffuse scrotal edema. No evidence for organized soft tissue fluid collection or soft tissue gas. 3.  Hepatic steatosis. Subtle lobular liver contour is noted, for which the possibility of underlying chronic liver disease should be considered. 4.  Nonobstructing right renal stone. 5.  Nonspecific mosaic appearance of the lung bases, which may in part be due to atelectasis or air trapping. No pleural effusion or evidence for edema. US SCROTUM W LIMITED DUPLEX    Result Date: 1/20/2023  Nonspecific diffuse scrotal wall edema. Large bilateral hydroceles.        Physical Examination:        General appearance:  alert, cooperative and no distress  Mental Status:  oriented to person, place and time and normal affect  Lungs: Diminished breath sounds at bases  Heart:  regular rate and rhythm, no murmur,+ AICD  Abdomen:  + Abdominal wall and scrotal swelling, + ascites, normal bowel sounds, no masses, hepatomegaly, splenomegaly  Extremities:  ++ edema, no redness, tenderness in the calves  Skin:  no gross lesions, rashes, induration    Assessment:        Hospital Problems             Last Modified POA    * (Principal) Acute on chronic combined systolic and diastolic CHF (congestive heart failure) ([address] (Chronic) 1/20/2023 Yes    Status post implantation of automatic cardioverter/defibrillator (AICD) 1/20/2023 Yes    Obesity (BMI 30-39.9) 1/20/2023 Yes    Type 2 diabetes mellitus, without long-term current use of insulin ([address] 1/20/2023 Yes    Paroxysmal atrial fibrillation ([address] 1/20/2023 Yes    Anasarca 1/20/2023 Yes    Hydrocele, bilateral 1/20/2023 Yes    Essential hypertension 1/20/2023 Yes Hypercholesterolemia 1/20/2023 Yes    CAD (coronary artery disease) 1/20/2023 Yes    Dyspnea and respiratory abnormalities 1/20/2023 Yes     Plan:        IV diuresis with Bumex, oral spironolactone and acetazolamide as recommended by cardiology  Nephrology monitoring kidney function, no plan for ultrafiltration  Urology note reviewed, no plan for active intervention at present  Will avoid paracentesis which can drop  blood pressure and is not having any discomfort in abdomen  Coreg and lisinopril on hold by cardiology to avoid hypotension  Hold metformin due to elevated creatinine and continue insulin sliding scale and Jardiance  May need transfer to ICU/CVICU for pressor support with Levophed if blood pressure dropped with IV diuresis    [address] MD  1/23/2023  11:57 AM

## 2023-01-23 NOTE — CARE COORDINATION
Discharge planning    Patient chart reviewed. Appreciate prior CM assessment. Patient known to writer and has had multiple admissions. Was here on 1/5-1/10 for CHF. Was sent home with [facility] as [facility] refused [facility] because his car needed fixed. DME in home includes cane. [address] and built in shower chair. He is now admitted with CHF again. Cardiology/nephrology and urology are following. Cards contiue with diuresing. Nephrology has no plans for HD as of yet. Urology following for scrotal swelling but no signs of urinary retention.      PT recommending home therapy

## 2023-01-23 NOTE — PLAN OF CARE
Pt had a good night overall, pt vitals stable overnight. Pt had complaints of itching overnight. Np was contacted and benadryl was ordered and given. Pt up adlib. Safety maintained, free of falls, bed in lowest position, call light within reach, all questions asked and answered. Pt had no complaints of pain overnight, continuing to monitor.      Problem: Discharge Planning  Goal: Discharge to home or other facility with appropriate resources  1/23/2023 0336 by Jef Carreon RN  Outcome: Progressing     Problem: Pain  Goal: Verbalizes/displays adequate comfort level or baseline comfort level  1/23/2023 0336 by Jef Carreon RN  Outcome: Progressing     Problem: Safety - Adult  Goal: Free from fall injury  1/23/2023 0336 by Jef Carreon RN  Outcome: Progressing     Problem: Chronic Conditions and Co-morbidities  Goal: Patient's chronic conditions and co-morbidity symptoms are monitored and maintained or improved  1/23/2023 0336 by Jef Carreon RN  Outcome: Progressing

## 2023-01-23 NOTE — PLAN OF CARE
Problem: Pain  Goal: Verbalizes/displays adequate comfort level or baseline comfort level  1/23/2023 1443 by Mark Mcneill RN  Outcome: Progressing     Problem: Chronic Conditions and Co-morbidities  Goal: Patient's chronic conditions and co-morbidity symptoms are monitored and maintained or improved  1/23/2023 1443 by Mark Mcneill RN  Outcome: Progressing  Flowsheets (Taken 1/23/2023 1443)  Care Plan - Patient's Chronic Conditions and Co-Morbidity Symptoms are Monitored and Maintained or Improved:   Monitor and assess patient's chronic conditions and comorbid symptoms for stability, deterioration, or improvement   Collaborate with multidisciplinary team to address chronic and comorbid conditions and prevent exacerbation or deterioration     Problem: Safety - Adult  Goal: Free from fall injury  1/23/2023 1443 by Mark Mcneill RN  Outcome: Progressing  Flowsheets (Taken 1/23/2023 1443)  Free From Fall Injury: Instruct family/caregiver on patient safety

## 2023-01-23 NOTE — PROGRESS NOTES
Occupational Therapy  Facility/Department: [facility]  Rehabilitation Occupational Therapy Daily Treatment Note    Date: 23  Patient Name: Daniele Herrera       Room: 7177/0181-89  MRN: [id number]  Account: [de-identified]   : 1960  (60 y.o.) Gender: male   Past Medical History:  has a past medical history of CAD (coronary artery disease), CHF (congestive heart failure) ([facility] Heart attack ([facility] [facility] Hyperlipidemia, Hypertension, [facility]) [facility] MRSA (methicillin resistant staph aureus) culture positive, Skin cancer, Snores, Stroke [facility]), Wears dentures, Wears reading eyeglasses, and Wellness examination. Past Surgical History:   has a past surgical history that includes Coronary artery bypass graft; Coronary angioplasty with stent; skin biopsy; eye surgery; Mandible surgery; hernia repair (Bilateral, 2020); Cardioversion (2021); other surgical history (2021); Cardiac surgery (); Cardiac catheterization (2020); and Cardiac defibrillator placement (2022). Restrictions  Restrictions/Precautions: Contact Precautions  Required Braces or Orthoses?: No    Subjective  Subjective: \"I am so tired. ... Estela Bloodgood Estela Bloodgood I really can't exercise\". Pt. agreeable for ADLS at bedside. Restrictions/Precautions: Contact Precautions       Objective     Cognition  Overall Cognitive Status: [facility]  Cognition Comment: tangential and verbose  Orientation  Overall Orientation Status: Within Functional Limits         ADL  Grooming/Oral Hygiene  Assistance Level: Supervision  Skilled Clinical Factors: Supervised for brushing teeth. Upper Extremity Bathing  Assistance Level: Stand by assist  Skilled Clinical Factors: SBA with UE bathing while sitting at EOB. Pt. required occassional cues to remained focused on task at hand. Lower Extremity Bathing  Assistance Level: Supervision  Skilled Clinical Factors: Supervised with LE bathing while sitting bedside.   Upper Extremity Dressing  Assistance Level: Minimal assistance  Skilled Clinical Factors: Min assist with gown management to manage telemetry and snaps on gown. Lower Extremity Dressing  Assistance Level: Minimal assistance  Skilled Clinical Factors: Min assist with management of pulling clothing over feet  Toileting  Assistance Level: Supervision  Skilled Clinical Factors: Supervised sitting on toilet. Verbal cues required to pull underwear up to waist before walking. Pt. easily distracted. Toilet Transfers  Technique: Stand step  Equipment: Standard toilet;Grab bars  Additional Factors: Set-up; Verbal cues  Assistance Level: Supervision          Functional Mobility  Activity: To/From bathroom  Assistance Level: Stand by assist;Supervision  Skilled Clinical Factors: Pt. completed transfer to toilet with supervision/SBA to ensure safety. Pt. did not use walker but required occassional cues on safety and for sequencing steps of task. Bed Mobility  Overall Assistance Level: Independent  Additional Factors: Set-up; Increased time to complete;Verbal cues; Head of bed flat  Bridging  Assistance Level: Independent  Roll Left  Assistance Level: Independent  Roll Right  Assistance Level: Independent  Sit to Supine  Assistance Level: Independent  Supine to Sit  Assistance Level: Independent  Scooting  Assistance Level: Independent  Transfers  Surface: From bed;Standard toilet  Additional Factors: Set-up; Verbal cues; Increased time to complete  Sit to Stand  Assistance Level: Stand by assist  Stand to Sit  Assistance Level: Stand by assist         Assessment  Assessment  Assessment: Pt. voiced he was very fatigue but agreed to completing ADLS. Pt. completed toilet transfer with SBA and toileting task with supervision. Pt. required cues to pull underwear up to waist before walking away. Pt. completed ADLS at EOB with success and much time.  Skilled OT warranted to promote I/safety to return pt to prior living arrangement with assist as needed. Activity Tolerance: Patient tolerated treatment well;Patient limited by fatigue  Discharge Recommendations: Patient would benefit from continued therapy after discharge  OT Equipment Recommendations  Equipment Needed: No  Safety Devices  Safety Devices in place: Yes  Type of devices: All fall risk precautions in place;Call light within reach; Bed alarm in place;Nurse notified; Left in bed;Gait belt  Restraints  Initially in place: No    Patient Education  Education  Education Given To: Patient  Education Provided: Role of Therapy; Safety;Precautions;Plan of Care;ADL Function;Transfer Training  Education Provided Comments: Pt. educated on home safety and ADLS with showering. Pt. appeared attentive and receptive to information. Education Method: Demonstration;Verbal  Barriers to Learning:  (Pt. impulsive and easily distracted)  Education Outcome: Verbalized understanding;Continued education needed    Plan  Occupational Therapy Plan  Times Per Week: 4-5  Times Per Day: Once a day  Therapy Duration: 2 Weeks  Current Treatment Recommendations: Functional mobility training; Endurance training; Safety education & training;Patient/Caregiver education & training;Equipment evaluation, education, & procurement;Self-Care / ADL; Home management training  Additional Comments: Cont with stated POC    Goals  Short Term Goals  Time Frame for Short Term Goals: Within 2 weeks, pt will. ..   Short Term Goal 1: demo mod I and safety with transfers and functional mobility  Short Term Goal 2: demo mod I and safety with ADLs  Short Term Goal 3: increase activity tolerance to engage in at least 20-30 minutes of dynamic seated/standing activity with no more than min rest breaks  Short Term Goal 4: demo good understanding and implementation of EC/WS and safety awareness  Additional Goals?: No    AM-PAC Score        AM-[facility] Inpatient Daily Activity Raw Score: 19 (01/23/23 1221)  AM-PAC Inpatient ADL T-Scale Score : 40.22 (01/23/23 1221)  ADL Inpatient CMS 0-100% Score: 42.8 (01/23/23 1221)  ADL Inpatient CMS G-Code Modifier : CK (01/23/23 1221)      Therapy Time   Individual Concurrent Group Co-treatment   Time In 1         Time Out 1104         Minutes 44             RN reports patient is medically stable for therapy treatment this date. Chart reviewed prior to treatment and patient is agreeable for therapy. All lines intact and patient positioned comfortably at end of treatment. All patient needs addressed prior to ending therapy session.        Kiki Lowe LEONA

## 2023-01-23 NOTE — PROGRESS NOTES
Physical Therapy  DATE: 2023    NAME: Lc Mcbride  MRN: [id number]   : 1960    Patient not seen this date for Physical Therapy due to:      [] Cancel by RN or physician due to:    [] Hemodialysis    [] Critical Lab Value Level     [] Blood transfusion in progress    [] Acute or unstable cardiovascular status   _MAP < 55 or more than >115  _HR < 40 or > 130    [] Acute or unstable pulmonary status   -FiO2 > 60%   _RR < 5 or >40    _O2 sats < 85%    [] Strict Bedrest    [] Off Unit for surgery or procedure    [] Off Unit for testing       [] Pending imaging to R/O fracture    [x] Refusal by Patient: Pt adamantly declining PT services stating he has been delirious from how tired he is. Will cont to follow. [] Other      [] PT being discontinued at this time. Patient independent. No further needs. [] PT being discontinued at this time as the patient has been transferred to hospice care. No further needs.       Edilma Burch, PTA

## 2023-01-23 NOTE — PROGRESS NOTES
[facility]   Progress Note                   Date:   1/23/2023  Patient name: Ten Hernandez  Date of admission:  1/20/2023 11:53 AM  MRN:   [id number]  YOB: 1960  PCP: Shawna Vaughn PA-C    Reason for Admission: Anasarca [R60.1]  Acute on chronic combined systolic and diastolic CHF (congestive heart failure) ([facility] [address] [I50.43]    Subjective:       Clinical Changes / Abnormalities: no chest pain or dyspnea. Reports that abdomen feels less distended. Medications:   Scheduled Meds:   midodrine  10 mg Oral TID WC    acetaZOLAMIDE  250 mg Oral Daily    bumetanide  2 mg IntraVENous TID    sodium chloride  80 mL IntraVENous Once    amiodarone  200 mg Oral Daily    atorvastatin  80 mg Oral Nightly    [Held by provider] carvedilol  6.25 mg Oral BID WC    clopidogrel  75 mg Oral Daily    empagliflozin  10 mg Oral Daily    [Held by provider] lisinopril  2.5 mg Oral Daily    [Held by provider] metFORMIN  1,000 mg Oral Daily    therapeutic multivitamin-minerals  1 tablet Oral Daily    spironolactone  25 mg Oral Daily    sodium chloride flush  5-40 mL IntraVENous 2 times per day    insulin lispro  0-4 Units SubCUTAneous TID WC    insulin lispro  0-4 Units SubCUTAneous Nightly    rivaroxaban  20 mg Oral Dinner     Continuous Infusions:   dextrose      sodium chloride       CBC:   Recent Labs     01/21/23  0500 01/22/23  0529 01/23/23  0510   WBC 5.5 6.3 4.8   HGB 12.0* 12.5* 12.0*   * 145 145     BMP:    Recent Labs     01/21/23  0500 01/22/23  0529 01/23/23  0510   * 137 134*   K 4.4 4.4 3.8   CL 97* 102 99   CO2 26 29 27   BUN 16 20 20   CREATININE 0.79 1.14 1.23*   GLUCOSE 97 115* 112*     Hepatic: No results for input(s): AST, ALT, ALB, BILITOT, ALKPHOS in the last 72 hours. Troponin: No results for input(s): TROPONINI in the last 72 hours. BNP: No results for input(s): BNP in the last 72 hours. Lipids: No results for input(s): CHOL, HDL in the last 72 hours.     Invalid input(s): LDLCALCU  INR:   Recent Labs     01/20/23  1545   INR 1.4       Objective:   Vitals: /81   Pulse 72   Temp 97.3 °F (36.3 °C) (Oral)   Resp 20   Ht 5' 9\" (1.753 m)   Wt 252 lb 3 oz (114.4 kg)   SpO2 97%   BMI 37.24 kg/m²   General appearance: alert and cooperative with exam  HEENT: Head: Normocephalic, no lesions, without obvious abnormality. Neck: no JVD  Lungs: CTAB  Heart: RRR S0+E1, systolic murmur  Abdomen: soft, distended, non-tender  Extremities: 3+edema ACE wraps both legs  Neurologic: not done    TTE 1/21/23  Summary  Left ventricle is mildly enlarged. Mild to moderate left ventricular hypertrophy. Global left ventricular systolic function is severely reduced with an estimated ejection fraction of 30 % . Severe global hypokinesis. Left atrium is severely dilated. Right atrium is severely dilated . Normal right ventricular size and function. Pacemaker / ICD lead seen in RV & RA. Aortic valve sclerosis without stenosis. No aortic insufficiency. Mitral annular calcification is seen with thickened mitral valve leaflets. Mild to moderate mitral regurgitation. Normal tricuspid valve leaflets. Mild tricuspid regurgitation. No significant pericardial effusion is seen.       Assessment / Acute Cardiac Problems:   - Acute on chronic Systolic, HFrEF, s/p AICD- due to non compliance  - Echo this admission- EF 30%- ICD in place  - Diffuse anasarca, fluid overload, scrotal edema  - Known CAD s/p CABG, PCI to SVG-OM- on 5/22  - Known PAF- in NSR  - Obesity  - Borderline hypotension    Patient Active Problem List:     Hyperbilirubinemia     Essential hypertension     Hypercholesterolemia     CAD (coronary artery disease)     Gross hematuria     Abdominal pain, right upper quadrant     Right nephrolithiasis     Abnormal liver function test     Acute systolic HF (heart failure) (HCC)     Noncompliance     Acute on chronic systolic heart failure (HCC)     Pneumonia     Dyspnea and respiratory abnormalities     Status post inguinal hernia repair     S/P repair of ventral hernia     Post-op pain     Non-recurrent bilateral inguinal hernia without obstruction or gangrene     Umbilical hernia with obstruction, without gangrene     Status post implantation of automatic cardioverter/defibrillator (AICD)     NSTEMI (non-ST elevated myocardial infarction) ([facility])     ARIADNA (obstructive sleep apnea)     S/P CABG (coronary artery bypass graft)     Nausea     Anorexia     Thrombocytopenia ([facility])     Obesity (BMI 30-39. 9)     Hypokalemia     Uncontrolled type 2 diabetes mellitus with hyperglycemia ([facility])     Acute on chronic combined systolic and diastolic CHF (congestive heart failure) ([facility])     Type 2 diabetes mellitus, without long-term current use of insulin ([facility])     Paroxysmal atrial fibrillation ([facility])     Edema of both legs     Acute congestive heart failure ([facility])     Skin ulcer of left lower leg with fat layer exposed ([address]     Skin ulcer of right lower leg, with fat layer exposed ([address]     Anasarca     Hydrocele, bilateral      Plan of Treatment:   Monitor I/O  Replace electrolytes  Nephrology is on board. Continue IV bumex  BP low. Coreg and ACE on hold.  Continue midodrine  Continue amiodarone, plavix statin and [address], [place], [place]  [facility]  [phone]

## 2023-01-23 NOTE — PROGRESS NOTES
Megan Hinojosa   Urology Progress Note            Subjective:  Follow-up bilateral hydrocele, scrotal pain    Patient Vitals for the past 24 hrs:   BP Temp Temp src Pulse Resp SpO2   01/23/23 0329 102/66 97.5 °F (36.4 °C) Oral 62 20 93 %   01/23/23 0014 120/77 97.3 °F (36.3 °C) Oral 65 18 96 %   01/22/23 1923 114/71 98.1 °F (36.7 °C) -- 67 16 90 %   01/22/23 1526 102/68 97.5 °F (36.4 °C) Oral 63 16 91 %   01/22/23 1125 107/71 97.3 °F (36.3 °C) Oral 66 16 97 %   01/22/23 0747 113/75 97.3 °F (36.3 °C) Oral 69 18 90 %   01/22/23 0634 123/82 -- -- 69 -- --       Intake/Output Summary (Last 24 hours) at 1/23/2023 0612  Last data filed at 1/22/2023 5064  Gross per 24 hour   Intake 820 ml   Output 1000 ml   Net -180 ml       Recent Labs     01/21/23  0500 01/22/23  0529 01/23/23  0510   WBC 5.5 6.3 4.8   HGB 12.0* 12.5* 12.0*   HCT 39.0* 42.4 39.8*   MCV 81.4* 83.5 83.3   * 145 145     Recent Labs     01/21/23  0500 01/22/23  0529 01/23/23  0510   * 137 134*   K 4.4 4.4 3.8   CL 97* 102 99   CO2 26 29 27   BUN 16 20 20   CREATININE 0.79 1.14 1.23*       Recent Labs     01/20/23  1627   COLORU Yellow   PHUR 8.0   WBCUA 0 TO 2   RBCUA 0 TO 2   SPECGRAV 1.010   LEUKOCYTESUR NEGATIVE   UROBILINOGEN ELEVATED*   BILIRUBINUR NEGATIVE       Additional Lab/culture results:    Physical Exam: Penoscrotal swelling improving interstitial edema decreasing in the scrotum  Patient able to ambulate, scrotal pain improved  Interval Imaging Findings:    Impression:    Patient Active Problem List   Diagnosis    Hyperbilirubinemia    Essential hypertension    Hypercholesterolemia    CAD (coronary artery disease)    Gross hematuria    Abdominal pain, right upper quadrant    Right nephrolithiasis    Abnormal liver function test    Acute systolic HF (heart failure) ([facility] [address]    Noncompliance    Acute on chronic systolic heart failure (HCC)    Pneumonia    Dyspnea and respiratory abnormalities    Status post inguinal hernia repair    S/P repair of ventral hernia    Post-op pain    Non-recurrent bilateral inguinal hernia without obstruction or gangrene    Umbilical hernia with obstruction, without gangrene    Status post implantation of automatic cardioverter/defibrillator (AICD)    NSTEMI (non-ST elevated myocardial infarction) (HCC)    ARIADNA (obstructive sleep apnea)    S/P CABG (coronary artery bypass graft)    Nausea    Anorexia    Thrombocytopenia (HCC)    Obesity (BMI 30-39. 9)    Hypokalemia    Uncontrolled type 2 diabetes mellitus with hyperglycemia (HCC)    Acute on chronic combined systolic and diastolic CHF (congestive heart failure) (HCC)    Type 2 diabetes mellitus, without long-term current use of insulin (HCC)    Paroxysmal atrial fibrillation (HCC)    Edema of both legs    Acute congestive heart failure (HCC)    Skin ulcer of left lower leg with fat layer exposed ([address]    Skin ulcer of right lower leg, with fat layer exposed ([address]    Anasarca    Hydrocele, bilateral       Plan: Continue with diuresis, follow-up as outpatient for management of hydrocele    Branden Cruz MD  6:12 AM 1/23/2023

## 2023-01-23 NOTE — PROGRESS NOTES
NEPHROLOGY PROGRESS NOTE      ASSESSMENT     #1 acute kidney injury nonoliguric secondary to prerenal injury from reduced EBV related to decompensated biventricular congestive heart failure  Baseline creatinine 0.8  #2 decompensated biventricular congestive heart failure/ischemic cardiomyopathy EF 40% with moderate mitral regurgitation  #3 history of CABG  #4 type 2 diabetes      PLAN     #1 continue fluid restriction  #2 continue IV Bumex and p.o. Aldactone  #3 hold ACE inhibitor's for the time being  #4 no indication of extracorporeal therapy  #5 we will follow      SUBJECTIVE   Urine output decent. 1.9 L recorded last 24 hours. On IV Bumex 3 times a day. Overall negative cumulative balance. Continues to complain of scrotal swelling but improved. Lower extremity edema. No shortness of breath. Maintaining good saturation without supplemental oxygen. Blood pressure stable. Creatinine 1.2. OBJECTIVE     Vitals:    01/22/23 1923 01/23/23 0014 01/23/23 0329 01/23/23 0804   BP: 114/71 120/77 102/66 120/81   Pulse: 67 65 62 72   Resp: 16 18 20 20   Temp: 98.1 °F (36.7 °C) 97.3 °F (36.3 °C) 97.5 °F (36.4 °C) 97.3 °F (36.3 °C)   TempSrc:  Oral Oral Oral   SpO2: 90% 96% 93% 97%   Weight:       Height:         24HR INTAKE/OUTPUT:    Intake/Output Summary (Last 24 hours) at 1/23/2023 1016  Last data filed at 1/23/2023 0630  Gross per 24 hour   Intake 1140 ml   Output 1900 ml   Net -760 ml       General appearance:Awake, alert, in no acute distress  HEENT: PERRLA  Respiratory::vesicular breath sounds,no wheeze/crackles  Cardiovascular:S1 S2 normal,no gallop or organic murmur. Abdomen:Non tender/non distended. Bowel sounds present  Extremities: No Cyanosis or Clubbing, present lower extremity edema  Neurological:Alert and oriented. No abnormalities of mood, affect, memory, mentation, or behavior are noted      MEDICATIONS     Scheduled Meds:    midodrine  10 mg Oral TID WC    acetaZOLAMIDE  250 mg Oral Daily bumetanide  2 mg IntraVENous TID    sodium chloride  80 mL IntraVENous Once    amiodarone  200 mg Oral Daily    atorvastatin  80 mg Oral Nightly    [Held by provider] carvedilol  6.25 mg Oral BID WC    clopidogrel  75 mg Oral Daily    empagliflozin  10 mg Oral Daily    [Held by provider] lisinopril  2.5 mg Oral Daily    [Held by provider] metFORMIN  1,000 mg Oral Daily    therapeutic multivitamin-minerals  1 tablet Oral Daily    spironolactone  25 mg Oral Daily    sodium chloride flush  5-40 mL IntraVENous 2 times per day    insulin lispro  0-4 Units SubCUTAneous TID WC    insulin lispro  0-4 Units SubCUTAneous Nightly    rivaroxaban  20 mg Oral Dinner     Continuous Infusions:    dextrose      sodium chloride       PRN Meds:  diphenhydrAMINE, melatonin, sodium chloride flush, glucose, dextrose bolus **OR** dextrose bolus, glucagon (rDNA), dextrose, sodium chloride flush, sodium chloride, potassium chloride **OR** potassium alternative oral replacement **OR** potassium chloride, magnesium sulfate, ondansetron **OR** ondansetron, polyethylene glycol, acetaminophen **OR** acetaminophen  Home Meds:                Medications Prior to Admission: carvedilol (COREG) 12.5 MG tablet, Take 0.5 tablets by mouth 2 times daily (with meals)  lisinopril (PRINIVIL;ZESTRIL) 2.5 MG tablet, Take 1 tablet by mouth daily  spironolactone (ALDACTONE) 25 MG tablet, Take 1 tablet by mouth daily  ARTIFICIAL TEAR SOLUTION OP, Place 2 drops into both eyes daily  clopidogrel (PLAVIX) 75 MG tablet, Take 1 tablet by mouth daily  empagliflozin (JARDIANCE) 10 MG tablet, Take 1 tablet by mouth daily  midodrine (PROAMATINE) 5 MG tablet, Take 1 tablet by mouth 3 times daily (before meals)  amiodarone (CORDARONE) 200 MG tablet, Take 1 tablet by mouth daily  bumetanide (BUMEX) 2 MG tablet, Take 1 tablet by mouth 2 times daily  rivaroxaban (XARELTO) 20 MG TABS tablet, Take 20 mg by mouth Daily with supper  metFORMIN (GLUCOPHAGE) 1000 MG tablet, Take 1 tablet by mouth daily Resume on 5/21  Cyanocobalamin (VITAMIN B-12 PO), Take 1 tablet by mouth daily  Multiple Vitamins-Minerals (THERAPEUTIC MULTIVITAMIN-MINERALS) tablet, Take 1 tablet by mouth daily  nitroGLYCERIN (NITROSTAT) 0.4 MG SL tablet, Place 0.4 mg under the tongue every 5 minutes as needed for Chest pain up to max of 3 total doses. If no relief after 1 dose, call 911.   Dr. Lynne Moise  atorvastatin (LIPITOR) 80 MG tablet, Take 1 tablet by mouth nightly    INVESTIGATIONS     Last 3 CMP:    Recent Labs     01/21/23  0500 01/22/23  0529 01/22/23  1035 01/23/23  0510   * 137  --  134*   K 4.4 4.4  --  3.8   CL 97* 102  --  99   CO2 26 29  --  27   BUN 16 20  --  20   CREATININE 0.79 1.14  --  1.23*   CALCIUM 8.7 8.8  --  8.4*   PROT  --   --  6.5  --        Last 3 CBC:  Recent Labs     01/21/23  0500 01/22/23  0529 01/23/23  0510   WBC 5.5 6.3 4.8   RBC 4.79 5.08 4.78   HGB 12.0* 12.5* 12.0*   HCT 39.0* 42.4 39.8*   MCV 81.4* 83.5 83.3   MCH 25.1* 24.6* 25.1*   MCHC 30.8 29.5 30.2   RDW 19.3* 19.7* 19.6*   * 145 145   MPV 11.2 10.6 10.9       Please do not hesitate to call with questions    This note is created with the assistance of a speech-recognition program. While intending to generate a document that actually reflects the content of the visit, no guarantees can be provided that every mistake has been identified and corrected by editing    Jaqueline Navas MD MD, [facility] [address]   1/23/2023 10:16 AM  [facility] [place]

## 2023-01-24 VITALS
DIASTOLIC BLOOD PRESSURE: 82 MMHG | HEIGHT: 69 IN | RESPIRATION RATE: 17 BRPM | WEIGHT: 232 LBS | SYSTOLIC BLOOD PRESSURE: 122 MMHG | BODY MASS INDEX: 34.36 KG/M2 | HEART RATE: 73 BPM | OXYGEN SATURATION: 97 % | TEMPERATURE: 98.2 F

## 2023-01-24 LAB
ANION GAP SERPL CALCULATED.3IONS-SCNC: 11 MMOL/L (ref 9–17)
BUN BLDV-MCNC: 21 MG/DL (ref 8–23)
BUN/CREAT BLD: 20 (ref 9–20)
CALCIUM SERPL-MCNC: 8.6 MG/DL (ref 8.6–10.4)
CHLORIDE BLD-SCNC: 103 MMOL/L (ref 98–107)
CO2: 23 MMOL/L (ref 20–31)
CREAT SERPL-MCNC: 1.07 MG/DL (ref 0.7–1.2)
GFR SERPL CREATININE-BSD FRML MDRD: >60 ML/MIN/1.73M2
GLUCOSE BLD-MCNC: 100 MG/DL (ref 70–99)
GLUCOSE BLD-MCNC: 100 MG/DL (ref 75–110)
GLUCOSE BLD-MCNC: 148 MG/DL (ref 75–110)
GLUCOSE BLD-MCNC: 93 MG/DL (ref 75–110)
HCT VFR BLD CALC: 39.5 % (ref 40.7–50.3)
HEMOGLOBIN: 12.1 G/DL (ref 13–17)
MAGNESIUM: 2.3 MG/DL (ref 1.6–2.6)
MCH RBC QN AUTO: 24.8 PG (ref 25.2–33.5)
MCHC RBC AUTO-ENTMCNC: 30.6 G/DL (ref 28.4–34.8)
MCV RBC AUTO: 80.9 FL (ref 82.6–102.9)
NRBC AUTOMATED: 0 PER 100 WBC
PDW BLD-RTO: 19.3 % (ref 11.8–14.4)
PLATELET # BLD: 140 K/UL (ref 138–453)
PMV BLD AUTO: 10.6 FL (ref 8.1–13.5)
POTASSIUM SERPL-SCNC: 4 MMOL/L (ref 3.7–5.3)
RBC # BLD: 4.88 M/UL (ref 4.21–5.77)
SODIUM BLD-SCNC: 137 MMOL/L (ref 135–144)
WBC # BLD: 4.8 K/UL (ref 3.5–11.3)

## 2023-01-24 PROCEDURE — 83735 ASSAY OF MAGNESIUM: CPT

## 2023-01-24 PROCEDURE — 82947 ASSAY GLUCOSE BLOOD QUANT: CPT

## 2023-01-24 PROCEDURE — 36415 COLL VENOUS BLD VENIPUNCTURE: CPT

## 2023-01-24 PROCEDURE — 6370000000 HC RX 637 (ALT 250 FOR IP): Performed by: NURSE PRACTITIONER

## 2023-01-24 PROCEDURE — 2580000003 HC RX 258: Performed by: NURSE PRACTITIONER

## 2023-01-24 PROCEDURE — 2500000003 HC RX 250 WO HCPCS: Performed by: INTERNAL MEDICINE

## 2023-01-24 PROCEDURE — 85027 COMPLETE CBC AUTOMATED: CPT

## 2023-01-24 PROCEDURE — 97110 THERAPEUTIC EXERCISES: CPT

## 2023-01-24 PROCEDURE — 93922 UPR/L XTREMITY ART 2 LEVELS: CPT

## 2023-01-24 PROCEDURE — 6370000000 HC RX 637 (ALT 250 FOR IP): Performed by: INTERNAL MEDICINE

## 2023-01-24 PROCEDURE — 97530 THERAPEUTIC ACTIVITIES: CPT

## 2023-01-24 PROCEDURE — 97116 GAIT TRAINING THERAPY: CPT

## 2023-01-24 PROCEDURE — 99239 HOSP IP/OBS DSCHRG MGMT >30: CPT | Performed by: INTERNAL MEDICINE

## 2023-01-24 PROCEDURE — 80048 BASIC METABOLIC PNL TOTAL CA: CPT

## 2023-01-24 RX ORDER — TORSEMIDE 20 MG/1
40 TABLET ORAL DAILY
Status: DISCONTINUED | OUTPATIENT
Start: 2023-01-24 | End: 2023-01-24 | Stop reason: HOSPADM

## 2023-01-24 RX ORDER — MIDODRINE HYDROCHLORIDE 10 MG/1
10 TABLET ORAL
Qty: 90 TABLET | Refills: 0 | Status: SHIPPED | OUTPATIENT
Start: 2023-01-24 | End: 2023-02-23

## 2023-01-24 RX ADMIN — MIDODRINE HYDROCHLORIDE 10 MG: 10 TABLET ORAL at 09:12

## 2023-01-24 RX ADMIN — MIDODRINE HYDROCHLORIDE 10 MG: 10 TABLET ORAL at 16:23

## 2023-01-24 RX ADMIN — AMIODARONE HYDROCHLORIDE 200 MG: 200 TABLET ORAL at 09:11

## 2023-01-24 RX ADMIN — ACETAZOLAMIDE 250 MG: 250 TABLET ORAL at 09:11

## 2023-01-24 RX ADMIN — SODIUM CHLORIDE, PRESERVATIVE FREE 10 ML: 5 INJECTION INTRAVENOUS at 09:17

## 2023-01-24 RX ADMIN — CLOPIDOGREL BISULFATE 75 MG: 75 TABLET ORAL at 09:12

## 2023-01-24 RX ADMIN — EMPAGLIFLOZIN 10 MG: 10 TABLET, FILM COATED ORAL at 09:12

## 2023-01-24 RX ADMIN — BUMETANIDE 2 MG: 0.25 INJECTION INTRAMUSCULAR; INTRAVENOUS at 09:12

## 2023-01-24 RX ADMIN — TORSEMIDE 40 MG: 20 TABLET ORAL at 13:27

## 2023-01-24 RX ADMIN — RIVAROXABAN 20 MG: 20 TABLET, FILM COATED ORAL at 16:23

## 2023-01-24 RX ADMIN — MIDODRINE HYDROCHLORIDE 10 MG: 10 TABLET ORAL at 13:27

## 2023-01-24 RX ADMIN — SPIRONOLACTONE 25 MG: 25 TABLET ORAL at 09:11

## 2023-01-24 RX ADMIN — MULTIPLE VITAMINS W/ MINERALS TAB 1 TABLET: TAB at 09:11

## 2023-01-24 NOTE — PLAN OF CARE
Pt changed from IV diuretic to PO. Educated on medication. Pt v/u. Dr. Amadeo Mejias in this morning and states pt OK for discharge. Waiting on urology to sign off. Pt aware. Safety precautions remain in place.        Problem: Pain  Goal: Verbalizes/displays adequate comfort level or baseline comfort level  Outcome: Progressing  Flowsheets (Taken 1/24/2023 1543)  Verbalizes/displays adequate comfort level or baseline comfort level:   Encourage patient to monitor pain and request assistance   Assess pain using appropriate pain scale   Administer analgesics based on type and severity of pain and evaluate response     Problem: Safety - Adult  Goal: Free from fall injury  Outcome: Progressing  Flowsheets (Taken 1/24/2023 1543)  Free From Fall Injury: Instruct family/caregiver on patient safety     Problem: Chronic Conditions and Co-morbidities  Goal: Patient's chronic conditions and co-morbidity symptoms are monitored and maintained or improved  Outcome: Progressing  Flowsheets (Taken 1/24/2023 1543)  Care Plan - Patient's Chronic Conditions and Co-Morbidity Symptoms are Monitored and Maintained or Improved:   Monitor and assess patient's chronic conditions and comorbid symptoms for stability, deterioration, or improvement   Collaborate with multidisciplinary team to address chronic and comorbid conditions and prevent exacerbation or deterioration   Update acute care plan with appropriate goals if chronic or comorbid symptoms are exacerbated and prevent overall improvement and discharge

## 2023-01-24 NOTE — PROGRESS NOTES
NEPHROLOGY PROGRESS NOTE      SUBJECTIVE   Was seen examined. Patient was lying flat. She was not short of breath. Arterial Doppler studies was in progress. Patient states that he is feeling relatively better. He was able to walk better without significant shortness of breath. Patient has fair urine output in last 24 hours patient is 2.3 L net negative in the last 24 hours. Patient is on Bumex 2 mg IV twice daily  Cardiology signed off. Cardiac echo shows ejection fraction of 30% patient also has bilateral atrial enlargement. Although the right ventricle on echocardiogram described as normal but patient is clinically looks like right heart failure. Continues to have lateral abdominal wall edema  OBJECTIVE     Vitals:    01/24/23 0026 01/24/23 0445 01/24/23 0520 01/24/23 0754   BP: 113/75 97/61  98/62   Pulse: 62 65  71   Resp: 16 20  19   Temp: 97.3 °F (36.3 °C) 97.7 °F (36.5 °C)  97.9 °F (36.6 °C)   TempSrc: Oral Oral  Oral   SpO2: 96% 93%  94%   Weight:   232 lb (105.2 kg)    Height:         24HR INTAKE/OUTPUT:    Intake/Output Summary (Last 24 hours) at 1/24/2023 1123  Last data filed at 1/24/2023 0026  Gross per 24 hour   Intake 900 ml   Output --   Net 900 ml       General appearance: Awake and alert x3  HEENT: PERRLA  Respiratory[de-identified] Bilateral air entry and clear   cardiovascular: S1 and S2 audible no S3 or murmur  Abdomen: Abdomen was distended but soft and tympanitic on percussion  Extremities: No cyanosis. Lower extremity was wrapped in bandage. Neurological:Alert and oriented. No abnormalities of mood, affect, memory, mentation, or behavior are noted      MEDICATIONS     Scheduled Meds:    bumetanide  2 mg IntraVENous BID    midodrine  10 mg Oral TID WC    acetaZOLAMIDE  250 mg Oral Daily    sodium chloride  80 mL IntraVENous Once    amiodarone  200 mg Oral Daily    atorvastatin  80 mg Oral Nightly    [Held by provider] carvedilol  6.25 mg Oral BID WC    clopidogrel  75 mg Oral Daily empagliflozin  10 mg Oral Daily    [Held by provider] lisinopril  2.5 mg Oral Daily    [Held by provider] metFORMIN  1,000 mg Oral Daily    therapeutic multivitamin-minerals  1 tablet Oral Daily    spironolactone  25 mg Oral Daily    sodium chloride flush  5-40 mL IntraVENous 2 times per day    insulin lispro  0-4 Units SubCUTAneous TID WC    insulin lispro  0-4 Units SubCUTAneous Nightly    rivaroxaban  20 mg Oral Dinner     INVESTIGATIONS     Last 3 CMP:    Recent Labs     01/22/23  0529 01/22/23  1035 01/23/23  0510 01/24/23  0525     --  134* 137   K 4.4  --  3.8 4.0     --  99 103   CO2 29  --  27 23   BUN 20  --  20 21   CREATININE 1.14  --  1.23* 1.07   CALCIUM 8.8  --  8.4* 8.6   PROT  --  6.5  --   --        Last 3 CBC:  Recent Labs     01/22/23  0529 01/23/23  0510 01/24/23  0525   WBC 6.3 4.8 4.8   RBC 5.08 4.78 4.88   HGB 12.5* 12.0* 12.1*   HCT 42.4 39.8* 39.5*   MCV 83.5 83.3 80.9*   MCH 24.6* 25.1* 24.8*   MCHC 29.5 30.2 30.6   RDW 19.7* 19.6* 19.3*    145 140   MPV 10.6 10.9 10.6       ASSESSMENT   #1 acute kidney injury nonoliguric secondary to prerenal injury from reduced EBV related to decompensated biventricular congestive heart failure. His EF is 30% with global hypokinesia and bilateral enlargement on echocardiogram.  His creatinine is slowly improving and down to 1 mg/dL. Baseline creatinine 0.8  #2  Decompensated CHF. Patient is currently on IV Bumex. Still has significant edema on lower extremities  #3 history of CABG  #4 type 2 diabetes      PLAN     #1  Change Bumex to Demadex 40 mg once a day  2. Continue Aldactone  3. Continue SGLT2 inhibitors  4.  DC Bumex 2 mg IV twice daily  5.   Continue strict intake and output charting  Please do not hesitate to call with questions    This note is created with the assistance of a speech-recognition program. While intending to generate a document that actually reflects the content of the visit, no guarantees can be provided that every mistake has been identified and corrected by editing    Hai Landaverde MD  1/24/2023 11:23 AM  [facility] [place]

## 2023-01-24 NOTE — PROGRESS NOTES
[facility]  Office: [address], DO, Jed Lorenz, DO, Sridevi Santao, DO, Balbina Sandhu, DO, Kaur Avina MD, Brian Gonzalez MD, Shivam Latif MD, Jerri Delgadillo MD,  Sujatha Sol MD, John Carbone MD, Dharmesh Soliz, DO, Hannah Thomson MD,  Abe Mohs, MD, Jenni Mcclure MD, Kaykay Govea, DO, Reyna Adams MD, Kenzie Munguia MD, Thalia Tillman, DO, Kwan Valencia MD, Helena Durán MD, Faraz Combs MD, Macie Aguilar MD, Townsend Officer, DO, Priyanka Gonzalez MD, Tere Donovan MD, Ludmila Nevarez, Sparkle Donnelly, CNP, Gearline Precise, CNP, Fouzia Niñopshire, CNP,  Nic Beltran, [facility], Hansel Bush, CNP, [place], CNP, Hallie Sadler, CNP, Rashid Rivas, CNP, Jl Elizabeth, CNP, Mohit Mary PA-C, Neno Connors, CNS, Gustavo Zhao, CNP, Mary Cruz, [facility]    Progress Note    1/24/2023    12:28 PM    Name:   mAado Hunter  MRN:     [id number]     [place]:      [de-identified]   Room:   [facility]/1007-02   Day:  4  Admit Date:  1/20/2023 11:53 AM    PCP:   Tameka Villarreal PA-C  Code Status:  DNR-CCA    Subjective:     C/C:   Chief Complaint   Patient presents with    Testicle Swelling     Pt states the swelling started around 1/15 and swelling has continued since. Pt states pain is 8/10     Interval History Status: . Patient seen and examined. No issues overnight but he states he feels significantly better today. Discussed discharge home which he was agreeable with. Brief History:     Patient presents to the emergency room today with complaints of scrotal swelling. Patient has a significant past medical history of CAD, CHF, myocardial infarction, diabetes, hyperlipidemia, hypertension and has an ICD in place. Patient was recently admitted and discharged on January 10/2022 for a CHF exacerbation.   Patient states that since 1/15/2023 the patient started to develop scrotal swelling which has progressively worsened since then. Patient states that at times he has difficulty urinating due to his penis retracting inside the swollen scrotum. Patient is also experiencing abdominal swelling and dyspnea with exertion. Patient denies any recent fevers, chills, chest pain, nausea, vomiting and diarrhea. Patient states that he stopped taking his Bumex approximately 2 days ago because it was not making a difference in the swelling of his scrotum. Throughout the emergency room evaluation it was noted that his glucose is 114. proBNP is 1698. Troponin 23. Hemoglobin 12.0. CT abdomen and pelvis shows:   1. Moderate volume abdominopelvic ascites. 2.  Body wall edema and diffuse scrotal edema. No evidence for organized   soft tissue fluid collection or soft tissue gas. 3.  Hepatic steatosis. Subtle lobular liver contour is noted, for which the   possibility of underlying chronic liver disease should be considered. 4.  Nonobstructing right renal stone. 5.  Nonspecific mosaic appearance of the lung bases, which may in part be due   to atelectasis or air trapping. No pleural effusion or evidence for edema. Scrotal ultrasound shows: Nonspecific diffuse scrotal wall edema. Large bilateral hydroceles. Review of Systems:     Constitutional:  negative for chills, fevers, sweats  Respiratory:  negative for cough, +dyspnea on exertion, denies wheezing  Cardiovascular:  negative for chest pain, chest pressure/discomfort,++ lower extremity edema, palpitations  Gastrointestinal:  negative for abdominal pain, constipation, diarrhea, nausea, vomiting,+ scrotal swelling  Neurological:  negative for dizziness, headache    Medications:      Allergies:  No Known Allergies    Current Meds:   Scheduled Meds:    torsemide  40 mg Oral Daily    midodrine  10 mg Oral TID WC    [Held by provider] acetaZOLAMIDE  250 mg Oral Daily    sodium chloride  80 mL IntraVENous Once    amiodarone  200 mg Oral Daily    atorvastatin  80 mg Oral Nightly    [Held by provider] carvedilol  6.25 mg Oral BID WC    clopidogrel  75 mg Oral Daily    empagliflozin  10 mg Oral Daily    [Held by provider] lisinopril  2.5 mg Oral Daily    [Held by provider] metFORMIN  1,000 mg Oral Daily    therapeutic multivitamin-minerals  1 tablet Oral Daily    spironolactone  25 mg Oral Daily    sodium chloride flush  5-40 mL IntraVENous 2 times per day    insulin lispro  0-4 Units SubCUTAneous TID WC    insulin lispro  0-4 Units SubCUTAneous Nightly    rivaroxaban  20 mg Oral Dinner     Continuous Infusions:    dextrose      sodium chloride       PRN Meds: diphenhydrAMINE, melatonin, sodium chloride flush, glucose, dextrose bolus **OR** dextrose bolus, glucagon (rDNA), dextrose, sodium chloride flush, sodium chloride, potassium chloride **OR** potassium alternative oral replacement **OR** potassium chloride, magnesium sulfate, ondansetron **OR** ondansetron, polyethylene glycol, acetaminophen **OR** acetaminophen    Data:     Past Medical History:   has a past medical history of CAD (coronary artery disease), CHF (congestive heart failure) ([facility] [facility] Hyperlipidemia, Hypertension, MI (myocardial infarction) ([facility] MRSA (methicillin resistant staph aureus) culture positive, Skin cancer, Snores, Stroke ([facility] Wears dentures, Wears reading eyeglasses, and Wellness examination. Social History:   reports that he quit smoking about 24 years ago. He has never used smokeless tobacco. He reports that he does not drink alcohol and does not use drugs.      Family History:   Family History   Problem Relation Age of Onset    Coronary Art Dis Father     Liver Disease Father     Alcohol Abuse Sister        Vitals:  BP 98/62   Pulse 71   Temp 97.9 °F (36.6 °C) (Oral)   Resp 19   Ht 5' 9\" (1.753 m)   Wt 232 lb (105.2 kg)   SpO2 94%   BMI 34.26 kg/m²   Temp (24hrs), Av.7 °F (36.5 °C), Min:97.3 °F (36.3 °C), Max:98.2 °F (36.8 °C)    Recent Labs     01/23/23  1217 01/23/23  1702 01/23/23 1944 01/24/23  0757   POCGLU 103 107 120* 93         I/O (24Hr): Intake/Output Summary (Last 24 hours) at 1/24/2023 1228  Last data filed at 1/24/2023 0026  Gross per 24 hour   Intake 900 ml   Output --   Net 900 ml         Labs:  Hematology:  Recent Labs     01/22/23  0529 01/23/23  0510 01/24/23  0525   WBC 6.3 4.8 4.8   RBC 5.08 4.78 4.88   HGB 12.5* 12.0* 12.1*   HCT 42.4 39.8* 39.5*   MCV 83.5 83.3 80.9*   MCH 24.6* 25.1* 24.8*   MCHC 29.5 30.2 30.6   RDW 19.7* 19.6* 19.3*    145 140   MPV 10.6 10.9 10.6       Chemistry:  Recent Labs     01/22/23  0529 01/23/23  0510 01/24/23  0525    134* 137   K 4.4 3.8 4.0    99 103   CO2 29 27 23   GLUCOSE 115* 112* 100*   BUN 20 20 21   CREATININE 1.14 1.23* 1.07   MG 2.2 2.3 2.3   ANIONGAP 6* 8* 11   LABGLOM >60 >60 >60   CALCIUM 8.8 8.4* 8.6       Recent Labs     01/22/23  1035 01/22/23  1205 01/22/23  1949 01/23/23  0510 01/23/23  0807 01/23/23  1217 01/23/23  1702 01/23/23 1944 01/24/23  0757   PROT 6.5  --   --   --   --   --   --   --   --    LABA1C  --   --   --  6.7*  --   --   --   --   --    POCGLU  --    < > 149*  --  123* 103 107 120* 93    < > = values in this interval not displayed. ABG:No results found for: POCPH, PHART, PH, POCPCO2, JRT5XQL, PCO2, POCPO2, PO2ART, PO2, POCHCO3, DJD7ARD, HCO3, NBEA, PBEA, BEART, BE, THGBART, THB, EQO8BVG, PVYJ4XSI, X2VNXSDF, O2SAT, FIO2  Lab Results   Component Value Date/Time    SPECIAL RAC 12ML 03/11/2019 08:52 PM     Lab Results   Component Value Date/Time    CULTURE NO SIGNIFICANT GROWTH 01/20/2023 04:27 PM       Radiology:  CT ABDOMEN PELVIS W IV CONTRAST Additional Contrast? None    Result Date: 1/20/2023  1. Moderate volume abdominopelvic ascites. 2.  Body wall edema and diffuse scrotal edema. No evidence for organized soft tissue fluid collection or soft tissue gas. 3.  Hepatic steatosis.   Subtle lobular liver contour is noted, for which the possibility of underlying chronic liver disease should be considered. 4.  Nonobstructing right renal stone. 5.  Nonspecific mosaic appearance of the lung bases, which may in part be due to atelectasis or air trapping. No pleural effusion or evidence for edema. US SCROTUM W LIMITED DUPLEX    Result Date: 1/20/2023  Nonspecific diffuse scrotal wall edema. Large bilateral hydroceles.        Physical Examination:        General appearance:  alert, cooperative and no distress  Mental Status:  oriented to person, place and time and normal affect  Lungs: Diminished breath sounds at bases  Heart:  regular rate and rhythm, no murmur,+ AICD  Abdomen:  + Abdominal wall and scrotal swelling, + ascites, normal bowel sounds, no masses, hepatomegaly, splenomegaly  Extremities:  ++ edema, no redness, tenderness in the calves  Skin:  no gross lesions, rashes, induration    Assessment:        Hospital Problems             Last Modified POA    * (Principal) Acute on chronic combined systolic and diastolic CHF (congestive heart failure) (HCC) (Chronic) 1/20/2023 Yes    Status post implantation of automatic cardioverter/defibrillator (AICD) 1/20/2023 Yes    Obesity (BMI 30-39.9) 1/20/2023 Yes    Type 2 diabetes mellitus, without long-term current use of insulin ([address] 1/20/2023 Yes    Paroxysmal atrial fibrillation ([address] 1/20/2023 Yes    Skin ulcer of right lower leg, with fat layer exposed ([address] 1/23/2023 Yes    Anasarca 1/20/2023 Yes    Hydrocele, bilateral 1/20/2023 Yes    Diminished pulses in lower extremity 1/23/2023 Yes    Essential hypertension 1/20/2023 Yes    Hypercholesterolemia 1/20/2023 Yes    CAD (coronary artery disease) 1/20/2023 Yes    Dyspnea and respiratory abnormalities 1/20/2023 Yes   Plan:        Restart BP meds outpatient, cannot start due to hypotension   Continue toresemide outpatient, recheck BMP Monday, PCP and nephrology follow  Continue all other meds as above.     Tima You, DO  1/24/2023  12:28 PM

## 2023-01-24 NOTE — PROGRESS NOTES
[facility]   Progress Note                   Date:   1/24/2023  Patient name: Mary Reynoso  Date of admission:  1/20/2023 11:53 AM  MRN:   [id number]  YOB: 1960  PCP: Lynne Walter PA-C    Reason for Admission: Anasarca [R60.1]  Acute on chronic combined systolic and diastolic CH[facility] (congest[facility] failure) ([facility] [I50.43]    Subjective:       Clinical Changes / Abnormalities: no chest pain or dyspnea. Feels better compared to before. Medications:   Scheduled Meds:   bumetanide  2 mg IntraVENous BID    midodrine  10 mg Oral TID WC    acetaZOLAMIDE  250 mg Oral Daily    sodium chloride  80 mL IntraVENous Once    amiodarone  200 mg Oral Daily    atorvastatin  80 mg Oral Nightly    [Held by provider] carvedilol  6.25 mg Oral BID WC    clopidogrel  75 mg Oral Daily    empagliflozin  10 mg Oral Daily    [Held by provider] lisinopril  2.5 mg Oral Daily    [Held by provider] metFORMIN  1,000 mg Oral Daily    therapeutic multivitamin-minerals  1 tablet Oral Daily    spironolactone  25 mg Oral Daily    sodium chloride flush  5-40 mL IntraVENous 2 times per day    insulin lispro  0-4 Units SubCUTAneous TID WC    insulin lispro  0-4 Units SubCUTAneous Nightly    rivaroxaban  20 mg Oral Dinner     Continuous Infusions:   dextrose      sodium chloride       CBC:   Recent Labs     01/22/23  0529 01/23/23  0510 01/24/23  0525   WBC 6.3 4.8 4.8   HGB 12.5* 12.0* 12.1*    145 140       BMP:    Recent Labs     01/22/23  0529 01/23/23  0510 01/24/23  0525    134* 137   K 4.4 3.8 4.0    99 103   CO2 29 27 23   BUN 20 20 21   CREATININE 1.14 1.23* 1.07   GLUCOSE 115* 112* 100*       Hepatic: No results for input(s): AST, ALT, ALB, BILITOT, ALKPHOS in the last 72 hours. Troponin: No results for input(s): TROPONINI in the last 72 hours. BNP: No results for input(s): BNP in the last 72 hours. Lipids: No results for input(s): CHOL, HDL in the last 72 hours.     Invalid input(s): LDLCALCU  INR:   No results for input(s): INR in the last 72 hours. Objective:   Vitals: BP 98/62   Pulse 71   Temp 97.9 °F (36.6 °C) (Oral)   Resp 19   Ht 5' 9\" (1.753 m)   Wt 232 lb (105.2 kg)   SpO2 94%   BMI 34.26 kg/m²   General appearance: alert and cooperative with exam  HEENT: Head: Normocephalic, no lesions, without obvious abnormality. Neck: no JVD  Lungs: CTAB  Heart: RRR T7+U1, systolic murmur  Abdomen: soft, distended, non-tender  Extremities: 3+edema ACE wraps both legs  Neurologic: not done    TTE 1/21/23  Summary  Left ventricle is mildly enlarged. Mild to moderate left ventricular hypertrophy. Global left ventricular systolic function is severely reduced with an estimated ejection fraction of 30 % . Severe global hypokinesis. Left atrium is severely dilated. Right atrium is severely dilated . Normal right ventricular size and function. Pacemaker / ICD lead seen in RV & RA. Aortic valve sclerosis without stenosis. No aortic insufficiency. Mitral annular calcification is seen with thickened mitral valve leaflets. Mild to moderate mitral regurgitation. Normal tricuspid valve leaflets. Mild tricuspid regurgitation. No significant pericardial effusion is seen. Assessment / Acute Cardiac Problems:   - Acute on chronic Systolic, HFrEF, s/p AICD-   - Echo this admission- EF 30%- ICD in place  - Diffuse anasarca, fluid overload, scrotal edema  - Known CAD s/p CABG, PCI to SVG-OM- on 5/22  - Known PAF- in NSR  - Obesity  - Borderline hypotension          Plan of Treatment:   Monitor I/O  Replace electrolytes  Nephrology is on board. Continue IV bumex  BP low. Coreg and ACE on hold.  Continue midodrine  Continue amiodarone, plavix statin and Jimmie Locke MD, MD  [facility]  [phone]

## 2023-01-24 NOTE — PROGRESS NOTES
Pt discharged to home, left via private care with significant other. Belonging gathered and taken with pt, IV removed, discharge instruction given, pt verbalizes understanding. All questions and concerns addressed. Safety maintained.

## 2023-01-24 NOTE — DISCHARGE INSTRUCTIONS
See PCP 1 week  Check BMP 1 week, fax results to Dr Reza Lab   See nephrology 2-3 weeks  Continue new medications as prescribed   Check BP twice daily, if SBP <90 mmHg discuss with PCP

## 2023-01-24 NOTE — DISCHARGE SUMMARY
[facility]  Office: [address], DO, Radha Henley DO, Vanesa Johnson DO, [facility], DO, Jose Keyes MD, Kirti Rose MD, Brissa Ty MD, Magno Raya MD,  Juan Whitman MD, Aida Moss MD, Kat Pitts DO, Shawna Barreto MD,  Cortes Sneed DO, Beto Maloney MD, Diana Cardoso MD, Nika Medellin DO, Roxi Martin MD, Fidelina Rodriguez MD, Olga Garcia DO, Uvaldo Meza MD, Karoline Dakin, MD, Alfonso Nath MD, Cici Varghese MD, Juve De La Vega DO, João Daniels MD, Hira Burch MD, Jerica Levi, CNP,  Daniela Garcia, CNP, Jacinto Begum, CNP, My Mcneil, CNP,  Robert Collier, [facility], Dianne Ford, CNP, Naa Brock, CNP, Yoav Herndon, CNP, Pam Chaudhary, CNP, Jade Centeno, CNP, Leonardo Paul, PALiliaC, Tobias Parmar, CNS, Joie Crane, CNP, Domingo Styles, [facility] [address] [facility]    Discharge Summary     Patient ID: Jamie Tony  :  1960   MRN: [id number]     ACCOUNT:  [de-identified]   Patient's PCP: Mancel Mcardle, PA-C  Admit Date: 2023   Discharge Date: 2023     Length of Stay: 4  Code Status:  DNR-CCA  Admitting Physician: Matt Branch MD  Discharge Physician: Kat Pitts DO     Active Discharge Diagnoses:     Hospital Problem Lists:  Principal Problem:    Acute on chronic combined systolic and diastolic CHF (congestive heart failure) [facility])  Active Problems:    Status post implantation of automatic cardioverter/defibrillator (AICD)    Obesity (BMI 30-39. 9)    Type 2 diabetes mellitus, without long-term current use of insulin (HCC)    Paroxysmal atrial fibrillation (HCC)    Skin ulcer of right lower leg, with fat layer exposed ([address]    Anasarca    Hydrocele, bilateral    Diminished pulses in lower extremity    Essential hypertension    Hypercholesterolemia    CAD (coronary artery disease)    Dyspnea and respiratory abnormalities  Resolved Problems:    * No resolved hospital problems. *      Admission Condition:  good     Discharged Condition: good    Hospital Stay:     Hospital Course:  Patient presents to the emergency room today with complaints of scrotal swelling. Patient has a significant past medical history of CAD, CHF, myocardial infarction, diabetes, hyperlipidemia, hypertension and has an ICD in place. Patient was recently admitted and discharged on January 10/2022 for a CHF exacerbation. Patient states that since 1/15/2023 the patient started to develop scrotal swelling which has progressively worsened since then. Patient states that at times he has difficulty urinating due to his penis retracting inside the swollen scrotum. Patient is also experiencing abdominal swelling and dyspnea with exertion. Patient denies any recent fevers, chills, chest pain, nausea, vomiting and diarrhea. Patient states that he stopped taking his Bumex approximately 2 days ago because it was not making a difference in the swelling of his scrotum. Throughout the emergency room evaluation it was noted that his glucose is 114. proBNP is 1698. Troponin 23. Hemoglobin 12.0. Patient was started on diuretics under the direction of nephrology and seen by cardiology. Patient during diuresis had drastic improvement in symptoms. Patient slowly was ambulated and worked with physical therapy. Eventually patient was able to ambulate with little issue and was discharged home to continue care outpatient with primary care physician, nephrology.   Patient was discussed that a few of his medications could not be started due to hypotension, they can be restarted under direction of PCP outpatient    Followup  See PCP 1 week  Check BMP 1 week, fax results to Dr Donte Weir   See nephrology 2-3 weeks  Continue new medications as prescribed   Check BP twice daily, if SBP <90 mmHg discuss with PCP     Significant therapeutic interventions: none    Significant Diagnostic Studies:   Labs / Micro:  CBC:   Lab Results   Component Value Date/Time    WBC 4.8 01/24/2023 05:25 AM    RBC 4.88 01/24/2023 05:25 AM    RBC 5.00 01/02/2012 07:23 PM    HGB 12.1 01/24/2023 05:25 AM    HCT 39.5 01/24/2023 05:25 AM    MCV 80.9 01/24/2023 05:25 AM    MCH 24.8 01/24/2023 05:25 AM    MCHC 30.6 01/24/2023 05:25 AM    RDW 19.3 01/24/2023 05:25 AM     01/24/2023 05:25 AM     01/02/2012 07:23 PM     BMP:    Lab Results   Component Value Date/Time    GLUCOSE 100 01/24/2023 05:25 AM    GLUCOSE 132 01/02/2012 07:23 PM     01/24/2023 05:25 AM    K 4.0 01/24/2023 05:25 AM     01/24/2023 05:25 AM    CO2 23 01/24/2023 05:25 AM    ANIONGAP 11 01/24/2023 05:25 AM    BUN 21 01/24/2023 05:25 AM    CREATININE 1.07 01/24/2023 05:25 AM    BUNCRER 20 01/24/2023 05:25 AM    CALCIUM 8.6 01/24/2023 05:25 AM    LABGLOM >60 01/24/2023 05:25 AM    GFRAA >60 09/02/2022 05:42 PM    GFR      09/02/2022 05:42 PM     HFP:    Lab Results   Component Value Date/Time    PROT 6.5 01/22/2023 10:35 AM     CMP:    Lab Results   Component Value Date/Time    GLUCOSE 100 01/24/2023 05:25 AM    GLUCOSE 132 01/02/2012 07:23 PM     01/24/2023 05:25 AM    K 4.0 01/24/2023 05:25 AM     01/24/2023 05:25 AM    CO2 23 01/24/2023 05:25 AM    BUN 21 01/24/2023 05:25 AM    CREATININE 1.07 01/24/2023 05:25 AM    ANIONGAP 11 01/24/2023 05:25 AM    ALKPHOS 240 01/05/2023 10:32 PM    ALT 14 01/05/2023 10:32 PM    AST 27 01/05/2023 10:32 PM    BILITOT 2.5 01/05/2023 10:32 PM    LABALBU 3.4 01/05/2023 10:32 PM    LABALBU 4.1 01/02/2012 07:23 PM    ALBUMIN 1.5 05/18/2022 05:04 AM    LABGLOM >60 01/24/2023 05:25 AM    GFRAA >60 09/02/2022 05:42 PM    GFR      09/02/2022 05:42 PM    PROT 6.5 01/22/2023 10:35 AM    CALCIUM 8.6 01/24/2023 05:25 AM     PT/INR:    Lab Results   Component Value Date/Time    PROTIME 17.6 01/20/2023 03:45 PM    INR 1.4 01/20/2023 03:45 PM     PTT:   Lab Results   Component Value Date/Time    APTT 30.3 01/20/2023 03:45 PM     FLP:    Lab Results   Component Value Date/Time    CHOL 207 01/07/2023 05:53 AM    TRIG 62 01/07/2023 05:53 AM    HDL 23 01/07/2023 05:53 AM     U/A:    Lab Results   Component Value Date/Time    COLORU Yellow 01/20/2023 04:27 PM    TURBIDITY Clear 01/20/2023 04:27 PM    SPECGRAV 1.010 01/20/2023 04:27 PM    HGBUR TRACE 01/20/2023 04:27 PM    PHUR 8.0 01/20/2023 04:27 PM    PROTEINU 1+ 01/20/2023 04:27 PM    GLUCOSEU NEGATIVE 01/20/2023 04:27 PM    GLUCOSEU NOT REPORTED 01/02/2012 05:41 PM    KETUA NEGATIVE 01/20/2023 04:27 PM    BILIRUBINUR NEGATIVE 01/20/2023 04:27 PM    BILIRUBINUR negative 11/22/2020 07:49 PM    BILIRUBINUR NOT REPORTED 01/02/2012 05:41 PM    UROBILINOGEN ELEVATED 01/20/2023 04:27 PM    NITRU NEGATIVE 01/20/2023 04:27 PM    LEUKOCYTESUR NEGATIVE 01/20/2023 04:27 PM        Radiology:  US RENAL COMPLETE    Result Date: 1/23/2023  1. Suspected punctate left-sided renal calculi. No hydronephrosis. 2. Abdominal ascites adjacent to the liver and urinary bladder. 3. Bilateral ureteral jets are visualized. Urinary bladder grossly unremarkable in appearance. CT ABDOMEN PELVIS W IV CONTRAST Additional Contrast? None    Result Date: 1/20/2023  1. Moderate volume abdominopelvic ascites. 2.  Body wall edema and diffuse scrotal edema. No evidence for organized soft tissue fluid collection or soft tissue gas. 3.  Hepatic steatosis. Subtle lobular liver contour is noted, for which the possibility of underlying chronic liver disease should be considered. 4.  Nonobstructing right renal stone. 5.  Nonspecific mosaic appearance of the lung bases, which may in part be due to atelectasis or air trapping. No pleural effusion or evidence for edema. US SCROTUM W LIMITED DUPLEX    Result Date: 1/20/2023  Nonspecific diffuse scrotal wall edema. Large bilateral hydroceles.        Consultations:    Consults:     Final Specialist Recommendations/Findings:   IP CONSULT TO INTERNAL MEDICINE  IP CONSULT TO UROLOGY  IP CONSULT TO CARDIOLOGY  IP CONSULT TO SPIRITUAL SERVICES  IP CONSULT TO NEPHROLOGY  IP CONSULT TO HOME CARE NEEDS      The patient was seen and examined on day of discharge and this discharge summary is in conjunction with any daily progress note from day of discharge.     Discharge plan:     Disposition: Home    Physician Follow Up:     [address] [facility] [phone]  Follow up  [address] [place] [address],[address] [facility], [address] [place] [address]  [phone]    Schedule an appointment as soon as possible for a visit in 1 week(s)  hospital followup    [facility]  [facility] [address] [phone]    Schedule an appointment as soon as possible for a visit in 3 week(s)  hospital followup       Requiring Further Evaluation/Follow Up POST HOSPITALIZATION/Incidental Findings: none    Diet: regular diet    Activity: As tolerated    Instructions to Patient:   See PCP 1 week  Check BMP 1 week, fax results to Dr Debi Sanabria   See nephrology 2-3 weeks  Continue new medications as prescribed   Check BP twice daily, if SBP <90 mmHg discuss with PCP     Discharge Medications:      Medication List        START taking these medications      Torsemide 40 MG Tabs  Take 40 mg by mouth daily            CHANGE how you take these medications      midodrine 10 MG tablet  Commonly known as: PROAMATINE  Take 1 tablet by mouth 3 times daily (with meals)  What changed:   medication strength  how much to take  when to take this            CONTINUE taking these medications      amiodarone 200 MG tablet  Commonly known as: CORDARONE  Take 1 tablet by mouth daily     ARTIFICIAL TEAR SOLUTION OP     atorvastatin 80 MG tablet  Commonly known as: LIPITOR  Take 1 tablet by mouth nightly     clopidogrel 75 MG tablet  Commonly known as: PLAVIX  Take 1 tablet by mouth daily     empagliflozin 10 MG tablet  Commonly known as: JARDIANCE  Take 1 tablet by mouth daily     metFORMIN 1000 MG tablet  Commonly known as: GLUCOPHAGE  Take 1 tablet by mouth daily Resume on 5/21     nitroGLYCERIN 0.4 MG SL tablet  Commonly known as: NITROSTAT     rivaroxaban 20 MG Tabs tablet  Commonly known as: XARELTO     spironolactone 25 MG tablet  Commonly known as: ALDACTONE  Take 1 tablet by mouth daily     therapeutic multivitamin-minerals tablet     VITAMIN B-12 PO            STOP taking these medications      bumetanide 2 MG tablet  Commonly known as: BUMEX     carvedilol 12.5 MG tablet  Commonly known as: COREG     lisinopril 2.5 MG tablet  Commonly known as: PRINIVIL;ZESTRIL               Where to Get Your Medications        These medications were sent to [facility] [id number] [facility], [address] [facility] -  [phone]  [address] [place] [address]      Phone: [phone]   midodrine 10 MG tablet  Torsemide 40 MG Tabs         Discharge Procedure Orders   Basic Metabolic Panel   Standing Status: Future Standing Exp. Date: 01/24/24   Order Comments: Fax results to Dr Juaquin Cummings (nephrology)       Time Spent on discharge is  45 mins in patient examination, evaluation, counseling as well as medication reconciliation, prescriptions for required medications, discharge plan and follow up. Electronically signed by   Mary England DO  1/24/2023  12:42 PM      Thank you Dr. Aditi Mason PA-C for the opportunity to be involved in this patient's care.

## 2023-01-24 NOTE — CARE COORDINATION
01/24/23 1326   Readmission Assessment   Number of Days since last admission? 8-30 days   Previous Disposition Home with Home Health   Who is being Interviewed Patient   What was the patient's/caregiver's perception as to why they think they needed to return back to the hospital? Did not agree to original recommended D/C plan   Did you visit your Primary Care Physician after you left the hospital, before you returned this time? No   Why weren't you able to visit your PCP? Did not have an appointment   Did you see a specialist, such as Cardiac, Pulmonary, Orthopedic Physician, etc. after you left the hospital? No   Who advised the patient to return to the hospital? Self-referral   Does the patient report anything that got in the way of taking their medications? Yes   What reasons did they give? Didn't want to take them (Comment)   In our efforts to provide the best possible care to you and others like you, can you think of anything that we could have done to help you after you left the hospital the first time, so that you might not have needed to return so soon? Discharge instructions that are concise, clear, and non contradictory     Patient went home with [facility]. [facility] and accepted by [facility].

## 2023-01-24 NOTE — PROGRESS NOTES
Pt remained calm and cooperative throughout shift. Pt did not receive nightly dose of insulin. Order parameters were not met (). Pt's Bumex was held d/t low BP, per NP orders.  Will continue with care plan

## 2023-01-24 NOTE — PLAN OF CARE
Problem: Discharge Planning  Goal: Discharge to home or other facility with appropriate resources  1/23/2023 2213 by Fortunato Jasmine RN  Outcome: Progressing  Flowsheets (Taken 1/23/2023 2000)  Discharge to home or other facility with appropriate resources:   Identify barriers to discharge with patient and caregiver   Arrange for needed discharge resources and transportation as appropriate   Identify discharge learning needs (meds, wound care, etc)  1/23/2023 1443 by Jeison Gee RN  Outcome: Progressing     Problem: Pain  Goal: Verbalizes/displays adequate comfort level or baseline comfort level  1/23/2023 2213 by Fortunato Jasmine RN  Outcome: Progressing  1/23/2023 1443 by Jeison Gee RN  Outcome: Progressing     Problem: Safety - Adult  Goal: Free from fall injury  1/23/2023 2213 by Fortunato Jasmine RN  Outcome: Progressing  1/23/2023 1443 by Jeison Gee RN  Outcome: Progressing  Flowsheets (Taken 1/23/2023 1443)  Free From Fall Injury: Instruct family/caregiver on patient safety     Problem: Chronic Conditions and Co-morbidities  Goal: Patient's chronic conditions and co-morbidity symptoms are monitored and maintained or improved  1/23/2023 2213 by Fortunato Jasmine RN  Outcome: Progressing  Flowsheets (Taken 1/23/2023 2000)  Care Plan - Patient's Chronic Conditions and Co-Morbidity Symptoms are Monitored and Maintained or Improved:   Monitor and assess patient's chronic conditions and comorbid symptoms for stability, deterioration, or improvement   Collaborate with multidisciplinary team to address chronic and comorbid conditions and prevent exacerbation or deterioration   Update acute care plan with appropriate goals if chronic or comorbid symptoms are exacerbated and prevent overall improvement and discharge  1/23/2023 1443 by Jeison Gee RN  Outcome: Progressing  Flowsheets (Taken 1/23/2023 1443)  Care Plan - Patient's Chronic Conditions and Co-Morbidity Symptoms are Monitored and Maintained or Improved: Monitor and assess patient's chronic conditions and comorbid symptoms for stability, deterioration, or improvement   Collaborate with multidisciplinary team to address chronic and comorbid conditions and prevent exacerbation or deterioration

## 2023-01-24 NOTE — PROGRESS NOTES
Physical Therapy  Facility/Department: [facility] PROGRESSIVE CARE  Daily Treatment Note  NAME: Tu Carrion  : 1960  MRN: [id number]    Date of Service: 2023    Discharge Recommendations:  Patient would benefit from continued therapy after discharge   PT Equipment Recommendations  Equipment Needed: No    Patient Diagnosis(es): The primary encounter diagnosis was Other ascites. Diagnoses of Bilateral hydrocele, Dyspnea and respiratory abnormalities, and Skin ulcer of right lower leg, with fat layer exposed ([address] were also pertinent to this visit. Assessment   Assessment: Pt ambulates 50 ft in hallway w/no AD, requires 3 standing rest breaks to complete distance. Consistent VC's for breathing techniques throughout ambulation and exercise. Pt transfers STS independently. Activity Tolerance: Patient tolerated treatment well;Patient limited by fatigue;Patient limited by endurance  Equipment Needed: No     Plan    Physcial Therapy Plan  General Plan: 5-7 times per week     Restrictions  Restrictions/Precautions  Restrictions/Precautions: Contact Precautions  Required Braces or Orthoses?: No     Subjective    Subjective  Subjective: Pt seated up in chair, donning pants upon arrival. Pt agreeable to therapy. RN, Chris Saenz, states pt is fit to receive PT. Orientation  Overall Orientation Status: Within Normal Limits  Cognition  Overall Cognitive Status: WNL     Objective      Bed Mobility Training  Bed Mobility Training: No  Balance  Sitting: Intact  Standing: With support (Pt demos good standing balance when performing gait. Stands w/support for standing exercises, occassional LOB w/good correction when performing 3-way hip.)  Transfer Training  Transfer Training: Yes  Overall Level of Assistance: Contact-guard assistance  Interventions: Demonstration; Safety awareness training; Tactile cues; Verbal cues  Sit to Stand: Independent  Stand to Sit: Independent  Gait Training  Gait Training: Yes  Gait  Overall Level of Assistance: Contact-guard assistance  Interventions: Safety awareness training;Verbal cues  Speed/Catina: Slow;Shuffled  Gait Abnormalities:  (Slight lateral sway, lack of trunk mobility resulting in limited range of motion and stiff movements.)  Distance (ft): 50', 40'  Comment: Once extend rest breaks with Vcs for pursed lip breathing and self pacing. Toward end of seconds gait required seated break in [address] then too tired to complete gait back to room, used WC    PT Exercises  Standing Open/Closed Kinetic Chain Exercises: Squats x5 reps, marches and 3-way hip x10 ea w/UE support on counter. Patient required Vcs and tactile cues for upright posture during exercises with multiple LOBs during single leg stance exercises and balance decreased as he fatigued. Standing rest breaks needed and Vcs for pursed lip breathing. Safety Devices  Type of Devices: Left in chair;Call light within reach;Gait belt  Restraints  Restraints Initially in Place: No       Goals  Short Term Goals  Time Frame for Short Term Goals: 12 visits  Short Term Goal 1: to be able to walk 200' independently  Short Term Goal 2: to be independent with HEP  Short Term Goal 3: to be able to participate in at least 25 min of PT    Education  Patient Education  Education Given To: Patient  Education Provided: Role of Therapy; Energy Conservation; ADL Adaptive Strategies; Plan of Care;Precautions  Education Provided Comments: Education focused on energy conservation, safety, and breathing techniques as pt demos SOB when performing gait and standing exercises.   Education Method: Demonstration;Verbal  Education Outcome: Verbalized understanding;Demonstrated understanding    Therapy Time   Individual Concurrent Group Co-treatment   Time In 4536         Time Out 1576         Minutes Ranjith, Student Physical Therapist Assistant  co-signed by Nabor Mitchell PTA   I attest that I was present for the entire session, directed all activities, made all clinical decisions, and was responsible for all assessments.

## 2023-01-26 NOTE — PROGRESS NOTES
Merna Do   Urology Progress Note            Subjective: Patient seen in conjunction with nursing staff   late entry progress note, follow-up bilateral hydrocele        Recent Labs     01/24/23  0525   WBC 4.8   HGB 12.1*   HCT 39.5*   MCV 80.9*        Recent Labs     01/24/23  0525      K 4.0      CO2 23   BUN 21   CREATININE 1.07           Additional Lab/culture results:    Physical Exam: Patient wife at bedside, patient is sitting in bed not in acute distress    Scrotal examination demonstrates significant improvement in interstitial fluid in the scrotal sac, there is evidence of bilateral hydrocele    Interval Imaging Findings:    Impression:    Patient Active Problem List   Diagnosis    Hyperbilirubinemia    Essential hypertension    Hypercholesterolemia    CAD (coronary artery disease)    Gross hematuria    Abdominal pain, right upper quadrant    Right nephrolithiasis    Abnormal liver function test    Acute systolic HF (heart failure) ([facility])    Noncompliance    Acute on chronic systolic heart failure ([facility])    Pneumonia    Dyspnea and respiratory abnormalities    Status post inguinal hernia repair    S/P repair of ventral hernia    Post-op pain    Non-recurrent bilateral inguinal hernia without obstruction or gangrene    Umbilical hernia with obstruction, without gangrene    Status post implantation of automatic cardioverter/defibrillator (AICD)    NSTEMI (non-ST elevated myocardial infarction) ([facility])    ARIANDA (obstructive sleep apnea)    S/P CABG (coronary artery bypass graft)    Nausea    Anorexia    Thrombocytopenia ([facility])    Obesity (BMI 30-39. 9)    Hypokalemia    Uncontrolled type 2 diabetes mellitus with hyperglycemia ([facility])    Acute on chronic combined systolic and diastolic CHF (congestive heart failure) ([facility])    Type 2 diabetes mellitus, without long-term current use of insulin ([facility])    Paroxysmal atrial fibrillation ([facility])    Edema of both legs Acute congestive heart failure (HCC)    Skin ulcer of left lower leg with fat layer exposed ([address]    Skin ulcer of right lower leg, with fat layer exposed ([address]    Anasarca    Hydrocele, bilateral    Diminished pulses in lower extremity       Plan: Discussed with the patient and his family the plan of care, discussed follow-up office visit repeat ultrasonography to reassess the hydrocele as outpatient    Zeferino Mcgee MD  5:52 AM 1/26/2023

## 2023-01-31 NOTE — PROGRESS NOTES
Physician Progress Note      Slava Arriaga  CSN #:                  [id number]  :                       1960  ADMIT DATE:       2023 11:53 AM  DISCH DATE:        2023 6:43 PM  RESPONDING  PROVIDER #:        Princess Merida DO          QUERY TEXT:    Pt admitted with CHF. Pt noted to also have hypertension and ICMP. If   possible, please document in progress notes and discharge summary the etiology   of CHF, if able to be determined. The medical record reflects the following:  Risk Factors: HTN, ICMP  Clinical Indicators: Patient is a 56-year-old male with history of heart   failure with reduced ejection fraction status post AICD. He presented due to   worsening lower extremity edema,. Also abdominal edema. Also scrotal edema. Continues to have significant weight gain. He had a recent admission. For   similar episode. Was then discharged. He returned to the emergency room due   to the above. He was found to be in acute on chronic systolic heart failure.  Nephro consult note states  \"Decompensated biventricular failure both   left and right on clinical exam from severe  Treatment: IV Bumex, Labs, 2d Echo    Thank you,  Peg Snider RN  Options provided:  -- CHF due to Hypertensive Heart Disease  -- CHF due to Hypertensive Heart Disease and ICMP  -- CHF not due to Hypertension but due to ICMP  -- Other - I will add my own diagnosis  -- Disagree - Not applicable / Not valid  -- Disagree - Clinically unable to determine / Unknown  -- Refer to Clinical Documentation Reviewer    PROVIDER RESPONSE TEXT:    This patient has CHF due to hypertensive heart disease and ICMP. Query created by:  Clive Zepeda on 2023 11:37 AM      Electronically signed by:  Princess Merida DO 2023 11:39 AM

## 2023-02-14 NOTE — DISCHARGE INSTRUCTIONS
[address] [facility],5[address] Floor -Phone: [phone] Fax: [phone]   Visit  Discharge Instructions / Physician Orders    DATE: 2/15/2023     Home Care: [facility]- not needed for wound care at this time     [facility] [address]:      Wound Location: Bilateral Lower Legs     Cleanse with: Keep Dry and Intact     Dressing Orders: Silvercel, Zinc Unna Boots     Frequency: Keep Dry and Intact     Additional Orders: Increase protein to diet (meat, cheese, eggs, fish, peanut butter, nuts and beans)  ELEVATE LEGS AS MUCH AS POSSIBLE  Vascular Studies have been ordered, Please Call [phone] to schedule an appointment at your earliest convenience. Your next appointment with [address] [place] [address] is in 1 week     (Please note your next appointment above and if you are unable to keep, kindly give a 24 hour notice. Thank you.)  If more than 15 min late we cannot guarantee you will be seen due to clinician schedule  Per Policy, Excessive cancellation will call for dismissal from program.     If you experience any of the following, please call the [address] [place] [address] during business hours:  [phone]  Your Phone call may be forwarded to [email or web address] during business hours that [employer] is closed. * Increase in Pain  * Temperature over 101  * Increase in drainage from your wound  * Drainage with a foul odor  * Bleeding  * Increase in swelling  * Need for compression bandage changes due to slippage, breakthrough drainage. If you need medical attention outside of the business hours of the [address] [place] [address] [facility] please contact your PCP or go to the nearest emergency room. The information contained in the After Visit Summary has been reviewed with me, the patient and/or responsible adult, by my health care provider(s). I had the opportunity to ask questions regarding this information.  I have elected to receive;      []After Visit Summary  [x]Comprehensive Discharge Instruction      Patient signature______________________________________Date:________  Electronically signed by Reagan Chow RN on 2/15/2023 at 3:59 PM  Electronically signed by Xavier Sarabia MD on 2/15/2023 at 3:56 PM

## 2023-02-15 ENCOUNTER — HOSPITAL ENCOUNTER (OUTPATIENT)
Dept: WOUND CARE | Age: 63
Discharge: HOME | End: 2023-02-15
Payer: MEDICARE

## 2023-02-15 VITALS
TEMPERATURE: 97.7 F | DIASTOLIC BLOOD PRESSURE: 79 MMHG | HEART RATE: 88 BPM | SYSTOLIC BLOOD PRESSURE: 122 MMHG | RESPIRATION RATE: 18 BRPM | BODY MASS INDEX: 34.36 KG/M2 | WEIGHT: 232 LBS | HEIGHT: 69 IN

## 2023-02-15 DIAGNOSIS — L97.922 SKIN ULCER OF LEFT LOWER LEG WITH FAT LAYER EXPOSED (HCC): ICD-10-CM

## 2023-02-15 DIAGNOSIS — R60.0 EDEMA OF BOTH LEGS: Primary | ICD-10-CM

## 2023-02-15 DIAGNOSIS — L97.912 SKIN ULCER OF RIGHT LOWER LEG, WITH FAT LAYER EXPOSED (HCC): ICD-10-CM

## 2023-02-15 PROCEDURE — 29580 STRAPPING UNNA BOOT: CPT

## 2023-02-15 PROCEDURE — 99213 OFFICE O/P EST LOW 20 MIN: CPT

## 2023-02-15 PROCEDURE — 99203 OFFICE O/P NEW LOW 30 MIN: CPT | Performed by: STUDENT IN AN ORGANIZED HEALTH CARE EDUCATION/TRAINING PROGRAM

## 2023-02-15 RX ORDER — LIDOCAINE HYDROCHLORIDE 40 MG/ML
SOLUTION TOPICAL ONCE
OUTPATIENT
Start: 2023-02-15 | End: 2023-02-15

## 2023-02-15 RX ORDER — BETAMETHASONE DIPROPIONATE 0.05 %
OINTMENT (GRAM) TOPICAL ONCE
OUTPATIENT
Start: 2023-02-15 | End: 2023-02-15

## 2023-02-15 RX ORDER — GENTAMICIN SULFATE 1 MG/G
OINTMENT TOPICAL ONCE
OUTPATIENT
Start: 2023-02-15 | End: 2023-02-15

## 2023-02-15 RX ORDER — BACITRACIN, NEOMYCIN, POLYMYXIN B 400; 3.5; 5 [USP'U]/G; MG/G; [USP'U]/G
OINTMENT TOPICAL ONCE
OUTPATIENT
Start: 2023-02-15 | End: 2023-02-15

## 2023-02-15 RX ORDER — LIDOCAINE 40 MG/G
CREAM TOPICAL ONCE
OUTPATIENT
Start: 2023-02-15 | End: 2023-02-15

## 2023-02-15 RX ORDER — CLOBETASOL PROPIONATE 0.5 MG/G
OINTMENT TOPICAL ONCE
OUTPATIENT
Start: 2023-02-15 | End: 2023-02-15

## 2023-02-15 RX ORDER — LIDOCAINE 50 MG/G
OINTMENT TOPICAL ONCE
OUTPATIENT
Start: 2023-02-15 | End: 2023-02-15

## 2023-02-15 RX ORDER — LIDOCAINE HYDROCHLORIDE 20 MG/ML
JELLY TOPICAL ONCE
OUTPATIENT
Start: 2023-02-15 | End: 2023-02-15

## 2023-02-15 RX ORDER — BACITRACIN ZINC AND POLYMYXIN B SULFATE 500; 1000 [USP'U]/G; [USP'U]/G
OINTMENT TOPICAL ONCE
OUTPATIENT
Start: 2023-02-15 | End: 2023-02-15

## 2023-02-15 RX ORDER — LIDOCAINE HYDROCHLORIDE 20 MG/ML
JELLY TOPICAL ONCE
Status: COMPLETED | OUTPATIENT
Start: 2023-02-15 | End: 2023-02-15

## 2023-02-15 RX ORDER — GINSENG 100 MG
CAPSULE ORAL ONCE
OUTPATIENT
Start: 2023-02-15 | End: 2023-02-15

## 2023-02-15 RX ADMIN — LIDOCAINE HYDROCHLORIDE 6 ML: 20 JELLY TOPICAL at 16:00

## 2023-02-15 ASSESSMENT — PAIN SCALES - GENERAL: PAINLEVEL_OUTOF10: 4

## 2023-02-15 NOTE — PLAN OF CARE
Problem: Chronic Conditions and Co-morbidities  Goal: Patient's chronic conditions and co-morbidity symptoms are monitored and maintained or improved  Outcome: Progressing     Problem: Discharge Planning  Goal: Discharge to home or other facility with appropriate resources  Outcome: Progressing     Problem: Pain  Goal: Verbalizes/displays adequate comfort level or baseline comfort level  Outcome: Progressing     Problem: Safety - Adult  Goal: Free from fall injury  Outcome: Progressing     Problem: Wound:  Goal: Will show signs of wound healing; wound closure and no evidence of infection  Description: Will show signs of wound healing; wound closure and no evidence of infection  Outcome: Progressing     Problem: Falls - Risk of:  Goal: Will remain free from falls  Description: Will remain free from falls  Outcome: Progressing

## 2023-02-15 NOTE — PROGRESS NOTES
[address]   Progress Note and Procedure Note      [facility] [address] RECORD NUMBER:  [id number]  AGE: 57 y.o. GENDER: male  : 1960  EPISODE DATE:  2/15/2023    Subjective:     Chief Complaint   Patient presents with    Wound Check     Bilateral legs         HISTORY of PRESENT ILLNESS HPI     Ghassan Allen is a 57 y.o. male who presents today for wound/ulcer evaluation. History of Wound Context: Has about a month of bilateral leg swelling. Was getting home care. Concerned about worsening skin at the pretibial region. Had LE PVR study on 23. R KALEN is 0.8 and L KALEN is 0.7. Wound/Ulcer Pain Timing/Severity: mild  Quality of pain: sharp  Severity:  2 / 10   Modifying Factors: None  Associated Signs/Symptoms: edema    Ulcer Identification:  Ulcer Type: venous  Contributing Factors: edema and venous stasis        PAST MEDICAL HISTORY        Diagnosis Date    CAD (coronary artery disease)      cath    CHF (congestive heart failure) ([facility])     Dr. Stroud Side attack [facility])     Hyperlipidemia     Dr. Sheron Hadley    Hypertension     Dr. Sheron Hadley    MI (myocardial infarction) [facility])     MRSA (methicillin resistant staph aureus) culture positive 2018    abdomen    Skin cancer     Snores     no cpap    Stroke [facility])       Dr. Sheron Hadley monitors    Wears dentures     upper full denture    Wears reading eyeglasses     Wellness examination     Cory Chinchilla PA-C last seen 2020       PAST SURGICAL HISTORY    Past Surgical History:   Procedure Laterality Date    CARDIAC CATHETERIZATION  2020    Dr. Janette Aguilar therapy.   Report on chart    CARDIAC DEFIBRILLATOR PLACEMENT  2022    [address]    unsure of date, bilateral radials    CARDIOVERSION  2021    CORONARY ANGIOPLASTY WITH STENT PLACEMENT      6 total stents per patient    CORONARY ARTERY BYPASS GRAFT      4 vessel bypass    EYE SURGERY      eye injury  new lens  and laser lt eye 2019    HERNIA REPAIR Bilateral 2020    XI LAPAROSCOPIC ROBOTIC INGUINAL HERNIA REPAIR WITH MESH; UMBILICAL HERNIA REPAIR WITH MESH performed by Nicole Kang DO at [address] [facility]      plate/hardware present    OTHER SURGICAL HISTORY  2021    BENITO    SKIN BIOPSY      back       FAMILY HISTORY    Family History   Problem Relation Age of Onset    Coronary Art Dis Father     Liver Disease Father     Alcohol Abuse Sister        SOCIAL HISTORY    Social History     Tobacco Use    Smoking status: Former     Types: Cigarettes     Quit date: 1999     Years since quittin.0    Smokeless tobacco: Never   Vaping Use    Vaping Use: Never used   Substance Use Topics    Alcohol use: No    Drug use: No       ALLERGIES    No Known Allergies    MEDICATIONS    Current Outpatient Medications on File Prior to Encounter   Medication Sig Dispense Refill    midodrine (PROAMATINE) 10 MG tablet Take 1 tablet by mouth 3 times daily (with meals) 90 tablet 0    torsemide 40 MG TABS Take 40 mg by mouth daily 30 tablet 3    spironolactone (ALDACTONE) 25 MG tablet Take 1 tablet by mouth daily 30 tablet 3    ARTIFICIAL TEAR SOLUTION OP Place 2 drops into both eyes daily      clopidogrel (PLAVIX) 75 MG tablet Take 1 tablet by mouth daily 30 tablet 1    empagliflozin (JARDIANCE) 10 MG tablet Take 1 tablet by mouth daily 30 tablet 2    amiodarone (CORDARONE) 200 MG tablet Take 1 tablet by mouth daily 30 tablet 0    rivaroxaban (XARELTO) 20 MG TABS tablet Take 20 mg by mouth Daily with supper      metFORMIN (GLUCOPHAGE) 1000 MG tablet Take 1 tablet by mouth daily Resume on  60 tablet 3    Cyanocobalamin (VITAMIN B-12 PO) Take 1 tablet by mouth daily      [DISCONTINUED] naproxen (NAPROSYN) 500 MG tablet Take 1 tablet by mouth 2 times daily (with meals) 20 tablet 0    Multiple Vitamins-Minerals (THERAPEUTIC MULTIVITAMIN-MINERALS) tablet Take 1 tablet by mouth daily      nitroGLYCERIN (NITROSTAT) 0.4 MG SL tablet Place 0.4 mg under the tongue every 5 minutes as needed for Chest pain up to max of 3 total doses. If no relief after 1 dose, call 911. Dr. Wilner Elder      atorvastatin (LIPITOR) 80 MG tablet Take 1 tablet by mouth nightly 30 tablet 3     No current facility-administered medications on file prior to encounter.        REVIEW OF SYSTEMS    Review of Systems     Objective:      /79   Pulse 88   Temp 97.7 °F (36.5 °C) (Tympanic)   Resp 18   Ht 5' 9\" (1.753 m)   Wt 232 lb (105.2 kg)   BMI 34.26 kg/m²     Wt Readings from Last 3 Encounters:   02/15/23 232 lb (105.2 kg)   01/24/23 232 lb (105.2 kg)   01/10/23 249 lb 12.8 oz (113.3 kg)       PHYSICAL EXAM    General Appearance: alert and oriented to person, place and time, well developed and well- nourished, in no acute distress  Skin: warm and dry, no rash or erythema; has chronic venous changes to bilateral legs  Head: normocephalic and atraumatic  Eyes: pupils equal, round, and reactive to light, extraocular eye movements intact, conjunctivae normal  ENT: tympanic membrane, external ear and ear canal normal bilaterally, nose without deformity, nasal mucosa and turbinates normal without polyps  Neck: supple and non-tender without mass, no thyromegaly or thyroid nodules, no cervical lymphadenopathy  Pulmonary/Chest: clear to auscultation bilaterally- no wheezes, rales or rhonchi, normal air movement, no respiratory distress  Cardiovascular: normal rate, regular rhythm, normal S1 and S2  Abdomen: soft, non-tender, non-distended, normal bowel sounds, no masses or organomegaly  Extremities: no cyanosis, clubbing or edema  Musculoskeletal: normal range of motion, no joint swelling, deformity or tenderness  Neurologic: reflexes normal and symmetric, no cranial nerve deficit, gait, coordination and speech normal      Assessment:     Problem List Items Addressed This Visit          Other    Edema of both legs - Primary    Relevant Orders    VL DUP LOWER EXTREMITY VENOUS BILATERAL          Wound 10/14/22 Pretibial Right Open, weeping (Active)   Number of days: 124       Wound 10/14/22 Pretibial Left open, weeping (Active)   Number of days: 124       Wound 02/15/23 Pretibial Right;Medial;Distal wound #1 right pretib cluster (Active)   Wound Image   02/15/23 1512   Wound Etiology Venous 01/23/23 1714   Dressing Status Clean;Dry; Intact 02/15/23 1512   Wound Cleansed Cleansed with saline 02/15/23 1512   Dressing/Treatment Alginate with Ag;ABD;Roll gauze 01/23/23 2000   Dressing Change Due 01/25/23 01/23/23 1714   Wound Length (cm) 9 cm 02/15/23 1512   Wound Width (cm) 3.5 cm 02/15/23 1512   Wound Depth (cm) 0.1 cm 02/15/23 1512   Wound Surface Area (cm^2) 31.5 cm^2 02/15/23 1512   Change in Wound Size % (l*w) -1475 02/15/23 1512   Wound Volume (cm^3) 3.15 cm^3 02/15/23 1512   Wound Healing % -688 02/15/23 1512   Wound Assessment Pink/red;Fluid filled blister 02/15/23 1512   Drainage Amount Moderate 02/15/23 1512   Drainage Description Serosanguinous 02/15/23 1512   Odor None 02/15/23 1512   Felicity-wound Assessment Blanchable erythema 02/15/23 1512   Margins Attached edges; Defined edges 02/15/23 1512   Wound Thickness Description not for Pressure Injury Full thickness 02/15/23 1512   Number of days: 0       Wound 02/15/23 Pretibial Left wound #2 left pretib (Active)   Wound Image   02/15/23 1512   Dressing Status New drainage noted; Old drainage noted 02/15/23 1512   Wound Cleansed Cleansed with saline 02/15/23 1512   Wound Length (cm) 0.3 cm 02/15/23 1512   Wound Width (cm) 0.7 cm 02/15/23 1512   Wound Depth (cm) 0.1 cm 02/15/23 1512   Wound Surface Area (cm^2) 0.21 cm^2 02/15/23 1512   Wound Volume (cm^3) 0.021 cm^3 02/15/23 1512   Wound Assessment Pink/red 02/15/23 1512   Drainage Amount Moderate 02/15/23 1512   Drainage Description Serosanguinous 02/15/23 1512   Odor None 02/15/23 1512   Felicity-wound Assessment Blanchable erythema 02/15/23 1512   Margins Attached edges; Defined edges 02/15/23 1512   Wound Thickness Description not for Pressure Injury Full thickness 02/15/23 1512   Number of days: 0     Incision 04/04/22 Chest Left;Upper (Active)   Number of days: 317     No debridement needed      Plan:     Has fluid filled blisters, will need unna boot. Will order venous studies looking for reflux.    Treatment Note please see Discharge Instructions    Written patient dismissal instructions given to patient and signed by patient or POA.             Electronically signed by SYDNEY ERNANDEZ MD on 2/15/2023 at 3:59 PM

## 2023-02-17 NOTE — DISCHARGE INSTRUCTIONS
[address] [facility],[address] -Phone: [phone] Fax: [phone]    Visit  Discharge Instructions / Physician Orders     DATE: 2/21/2023     Home Care: N/A     SUPPLIES ORDERED THRU: N/A     Wound Location: Bilateral Lower Legs     Cleanse with: Keep Dry and Intact     Dressing Orders: Silvercel, Zinc Unna Boots     Frequency: Keep Dry and Intact     Additional Orders: Increase protein to diet (meat, cheese, eggs, fish, peanut butter, nuts and beans)  ELEVATE LEGS AS MUCH AS POSSIBLE  Vascular Studies have been ordered, Please Call [phone] to schedule an appointment at your earliest convenience. Your next appointment with [facility] is in 1 week     (Please note your next appointment above and if you are unable to keep, kindly give a 24 hour notice. Thank you.)  If more than 15 min late we cannot guarantee you will be seen due to clinician schedule  Per Policy, Excessive cancellation will call for dismissal from program.     If you experience any of the following, please call the [facility] during business hours:  [phone]  Your Phone call may be forwarded to [email or web address] during business hours that Tiffany Pennington is closed. * Increase in Pain  * Temperature over 101  * Increase in drainage from your wound  * Drainage with a foul odor  * Bleeding  * Increase in swelling  * Need for compression bandage changes due to slippage, breakthrough drainage. If you need medical attention outside of the business hours of the [facility] please contact your PCP or go to the nearest emergency room. The information contained in the After Visit Summary has been reviewed with me, the patient and/or responsible adult, by my health care provider(s). I had the opportunity to ask questions regarding this information.  I have elected to receive;      []After Visit Summary  [x]Comprehensive Discharge Instruction        Patient signature______________________________________Date:________

## 2023-02-20 DIAGNOSIS — D69.6 THROMBOCYTOPENIA (HCC): Primary | ICD-10-CM

## 2023-02-21 ENCOUNTER — HOSPITAL ENCOUNTER (OUTPATIENT)
Age: 63
Discharge: HOME | End: 2023-02-21
Payer: MEDICARE

## 2023-02-21 ENCOUNTER — HOSPITAL ENCOUNTER (OUTPATIENT)
Dept: WOUND CARE | Age: 63
Discharge: HOME | End: 2023-02-21

## 2023-02-21 DIAGNOSIS — D69.6 THROMBOCYTOPENIA (HCC): ICD-10-CM

## 2023-02-21 LAB
ABSOLUTE EOS #: 0.05 K/UL (ref 0–0.44)
ABSOLUTE IMMATURE GRANULOCYTE: <0.03 K/UL (ref 0–0.3)
ABSOLUTE LYMPH #: 0.97 K/UL (ref 1.1–3.7)
ABSOLUTE MONO #: 0.65 K/UL (ref 0.1–1.2)
BASOPHILS # BLD: 1 % (ref 0–2)
BASOPHILS ABSOLUTE: 0.03 K/UL (ref 0–0.2)
EOSINOPHILS RELATIVE PERCENT: 1 % (ref 1–4)
HCT VFR BLD AUTO: 38.6 % (ref 40.7–50.3)
HGB BLD-MCNC: 12 G/DL (ref 13–17)
IMMATURE GRANULOCYTES: 0 %
LYMPHOCYTES # BLD: 18 % (ref 24–43)
MCH RBC QN AUTO: 24.8 PG (ref 25.2–33.5)
MCHC RBC AUTO-ENTMCNC: 31.1 G/DL (ref 28.4–34.8)
MCV RBC AUTO: 79.8 FL (ref 82.6–102.9)
MONOCYTES # BLD: 12 % (ref 3–12)
NRBC AUTOMATED: 0 PER 100 WBC
PDW BLD-RTO: 19.9 % (ref 11.8–14.4)
PLATELET # BLD AUTO: 186 K/UL (ref 138–453)
PMV BLD AUTO: 10.5 FL (ref 8.1–13.5)
RBC # BLD: 4.84 M/UL (ref 4.21–5.77)
RBC # BLD: ABNORMAL 10*6/UL
SEG NEUTROPHILS: 68 % (ref 36–65)
SEGMENTED NEUTROPHILS ABSOLUTE COUNT: 3.72 K/UL (ref 1.5–8.1)
WBC # BLD AUTO: 5.4 K/UL (ref 3.5–11.3)

## 2023-02-21 PROCEDURE — 85025 COMPLETE CBC W/AUTO DIFF WBC: CPT

## 2023-02-21 PROCEDURE — 36415 COLL VENOUS BLD VENIPUNCTURE: CPT

## 2023-02-24 NOTE — DISCHARGE INSTRUCTIONS
[address] [facility],[address] -Phone: [phone] Fax: [phone]    Visit  Discharge Instructions / Physician Orders     DATE: 2/28/2023     Home Care: N/A     SUPPLIES ORDERED THRU: N/A     Wound Location: Bilateral Lower Legs     Cleanse with: Keep Dry and Intact     Dressing Orders: Silvercel, Zinc Unna Boots     Frequency: Keep Dry and Intact     Additional Orders: Increase protein to diet (meat, cheese, eggs, fish, peanut butter, nuts and beans)  ELEVATE LEGS AS MUCH AS POSSIBLE  Vascular Studies have been ordered, Please Call [phone] to schedule an appointment at your earliest convenience. Your next appointment with [employer] [facility] is in 1 week     (Please note your next appointment above and if you are unable to keep, kindly give a 24 hour notice. Thank you.)  If more than 15 min late we cannot guarantee you will be seen due to clinician schedule  Per Policy, Excessive cancellation will call for dismissal from program.     If you experience any of the following, please call the [employer] [facility] [facility] during business hours:  [phone]  Your Phone call may be forwarded to [employer] during business hours that Madeline Tavera is closed. * Increase in Pain  * Temperature over 101  * Increase in drainage from your wound  * Drainage with a foul odor  * Bleeding  * Increase in swelling  * Need for compression bandage changes due to slippage, breakthrough drainage. If you need medical attention outside of the business hours of the [employer] [facility] please contact your PCP or go to the nearest emergency room. The information contained in the After Visit Summary has been reviewed with me, the patient and/or responsible adult, by my health care provider(s). I had the opportunity to ask questions regarding this information.  I have elected to receive;      []After Visit Summary  [x]Comprehensive Discharge Instruction        Patient signature______________________________________Date:________

## 2023-02-28 ENCOUNTER — HOSPITAL ENCOUNTER (OUTPATIENT)
Dept: WOUND CARE | Age: 63
Discharge: HOME | End: 2023-02-28

## 2023-03-02 ENCOUNTER — HOSPITAL ENCOUNTER (OUTPATIENT)
Dept: WOUND CARE | Age: 63
Discharge: HOME | End: 2023-03-02
Payer: MEDICARE

## 2023-03-02 VITALS
DIASTOLIC BLOOD PRESSURE: 83 MMHG | TEMPERATURE: 97.6 F | HEART RATE: 90 BPM | HEIGHT: 69 IN | RESPIRATION RATE: 18 BRPM | SYSTOLIC BLOOD PRESSURE: 133 MMHG | WEIGHT: 232 LBS | BODY MASS INDEX: 34.36 KG/M2

## 2023-03-02 DIAGNOSIS — R60.0 EDEMA OF BOTH LEGS: ICD-10-CM

## 2023-03-02 DIAGNOSIS — L97.912 SKIN ULCER OF RIGHT LOWER LEG, WITH FAT LAYER EXPOSED (HCC): ICD-10-CM

## 2023-03-02 DIAGNOSIS — L97.922 SKIN ULCER OF LEFT LOWER LEG WITH FAT LAYER EXPOSED (HCC): Primary | ICD-10-CM

## 2023-03-02 PROCEDURE — 11042 DBRDMT SUBQ TIS 1ST 20SQCM/<: CPT

## 2023-03-02 PROCEDURE — 29580 STRAPPING UNNA BOOT: CPT

## 2023-03-02 RX ORDER — LIDOCAINE HYDROCHLORIDE 20 MG/ML
JELLY TOPICAL ONCE
OUTPATIENT
Start: 2023-03-02 | End: 2023-03-02

## 2023-03-02 RX ORDER — LIDOCAINE HYDROCHLORIDE 40 MG/ML
SOLUTION TOPICAL ONCE
OUTPATIENT
Start: 2023-03-02 | End: 2023-03-02

## 2023-03-02 RX ORDER — BACITRACIN, NEOMYCIN, POLYMYXIN B 400; 3.5; 5 [USP'U]/G; MG/G; [USP'U]/G
OINTMENT TOPICAL ONCE
OUTPATIENT
Start: 2023-03-02 | End: 2023-03-02

## 2023-03-02 RX ORDER — CLOBETASOL PROPIONATE 0.5 MG/G
OINTMENT TOPICAL ONCE
OUTPATIENT
Start: 2023-03-02 | End: 2023-03-02

## 2023-03-02 RX ORDER — GINSENG 100 MG
CAPSULE ORAL ONCE
OUTPATIENT
Start: 2023-03-02 | End: 2023-03-02

## 2023-03-02 RX ORDER — LIDOCAINE 50 MG/G
OINTMENT TOPICAL ONCE
OUTPATIENT
Start: 2023-03-02 | End: 2023-03-02

## 2023-03-02 RX ORDER — LIDOCAINE 40 MG/G
CREAM TOPICAL ONCE
OUTPATIENT
Start: 2023-03-02 | End: 2023-03-02

## 2023-03-02 RX ORDER — BACITRACIN ZINC AND POLYMYXIN B SULFATE 500; 1000 [USP'U]/G; [USP'U]/G
OINTMENT TOPICAL ONCE
OUTPATIENT
Start: 2023-03-02 | End: 2023-03-02

## 2023-03-02 RX ORDER — GENTAMICIN SULFATE 1 MG/G
OINTMENT TOPICAL ONCE
OUTPATIENT
Start: 2023-03-02 | End: 2023-03-02

## 2023-03-02 RX ORDER — LIDOCAINE HYDROCHLORIDE 20 MG/ML
JELLY TOPICAL ONCE
Status: COMPLETED | OUTPATIENT
Start: 2023-03-02 | End: 2023-03-02

## 2023-03-02 RX ORDER — BETAMETHASONE DIPROPIONATE 0.05 %
OINTMENT (GRAM) TOPICAL ONCE
OUTPATIENT
Start: 2023-03-02 | End: 2023-03-02

## 2023-03-02 RX ADMIN — LIDOCAINE HYDROCHLORIDE 6 ML: 20 JELLY TOPICAL at 14:23

## 2023-03-02 ASSESSMENT — PAIN SCALES - GENERAL: PAINLEVEL_OUTOF10: 0

## 2023-03-02 NOTE — PROGRESS NOTES
[address]   Progress Note and Procedure Note      [address] RECORD NUMBER:  [id number]  AGE: 57 y.o. GENDER: male  : 1960  EPISODE DATE:  3/2/2023    Subjective:     Chief Complaint   Patient presents with    Wound Check     Bilateral lower legs         HISTORY of PRESENT ILLNESS HPI     Jame Hutton is a 57 y.o. male who presents today for wound/ulcer evaluation. History of Wound Context: chronic wounds BLE. Recent PVR study 2023 KALEN L=0.7, R=0.8. Interval history: wounds improved using unna boot, awaiting scheduled venous study 3/17/23. Wound/Ulcer Pain Timing/Severity: mild  Quality of pain: sharp  Severity:  2 / 10   Modifying Factors: None  Associated Signs/Symptoms: edema    Wound/Ulcer Identification:  Ulcer Type: venous  Contributing Factors: edema and venous stasis          PAST MEDICAL HISTORY        Diagnosis Date    CAD (coronary artery disease)      cath    CHF (congestive heart failure) ([facility])     Dr. Toro attack [facility])     Hyperlipidemia     Dr. Rachael Wright    Hypertension     Dr. Rachael Wright    MI (myocardial infarction) [facility])     MRSA (methicillin resistant staph aureus) culture positive 2018    abdomen    Skin cancer     Snores     no cpap    Stroke [facility])       Dr. Rachael Wright monitors    Wears dentures     upper full denture    Wears reading eyeglasses     Wellness examination     Norma Webster PA-C last seen 2020       PAST SURGICAL HISTORY    Past Surgical History:   Procedure Laterality Date    CARDIAC CATHETERIZATION  2020    Dr. Alia Mario therapy.   Report on chart    CARDIAC DEFIBRILLATOR PLACEMENT  2022    [address]    unsure of date, bilateral radials    CARDIOVERSION  2021    CORONARY ANGIOPLASTY WITH STENT PLACEMENT      6 total stents per patient    CORONARY ARTERY BYPASS GRAFT      4 vessel bypass    EYE SURGERY      eye injury  new lens  and laser lt eye 2019 HERNIA REPAIR Bilateral 2020    XI LAPAROSCOPIC ROBOTIC INGUINAL HERNIA REPAIR WITH MESH; UMBILICAL HERNIA REPAIR WITH MESH performed by Rich Vidales DO at [address] [facility]      plate/hardware present    OTHER SURGICAL HISTORY  2021    BENITO    SKIN BIOPSY      back       FAMILY HISTORY    Family History   Problem Relation Age of Onset    Coronary Art Dis Father     Liver Disease Father     Alcohol Abuse Sister        SOCIAL HISTORY    Social History     Tobacco Use    Smoking status: Former     Types: Cigarettes     Quit date: 1999     Years since quittin.1    Smokeless tobacco: Never   Vaping Use    Vaping Use: Never used   Substance Use Topics    Alcohol use: No    Drug use: No       ALLERGIES    No Known Allergies    MEDICATIONS    Current Outpatient Medications on File Prior to Encounter   Medication Sig Dispense Refill    midodrine (PROAMATINE) 10 MG tablet Take 1 tablet by mouth 3 times daily (with meals) 90 tablet 0    torsemide 40 MG TABS Take 40 mg by mouth daily 30 tablet 3    spironolactone (ALDACTONE) 25 MG tablet Take 1 tablet by mouth daily 30 tablet 3    ARTIFICIAL TEAR SOLUTION OP Place 2 drops into both eyes daily      clopidogrel (PLAVIX) 75 MG tablet Take 1 tablet by mouth daily 30 tablet 1    empagliflozin (JARDIANCE) 10 MG tablet Take 1 tablet by mouth daily 30 tablet 2    amiodarone (CORDARONE) 200 MG tablet Take 1 tablet by mouth daily 30 tablet 0    rivaroxaban (XARELTO) 20 MG TABS tablet Take 20 mg by mouth Daily with supper      metFORMIN (GLUCOPHAGE) 1000 MG tablet Take 1 tablet by mouth daily Resume on  60 tablet 3    Cyanocobalamin (VITAMIN B-12 PO) Take 1 tablet by mouth daily      [DISCONTINUED] naproxen (NAPROSYN) 500 MG tablet Take 1 tablet by mouth 2 times daily (with meals) 20 tablet 0    Multiple Vitamins-Minerals (THERAPEUTIC MULTIVITAMIN-MINERALS) tablet Take 1 tablet by mouth daily      nitroGLYCERIN (NITROSTAT) 0.4 MG SL tablet Place 0.4 mg under the tongue every 5 minutes as needed for Chest pain up to max of 3 total doses. If no relief after 1 dose, call 911. Dr. Briana Crystal      atorvastatin (LIPITOR) 80 MG tablet Take 1 tablet by mouth nightly 30 tablet 3     No current facility-administered medications on file prior to encounter. REVIEW OF SYSTEMS    Constitutional: negative for chills and fevers  Respiratory: negative for cough and shortness of breath  Cardiovascular: positive for lower extremity edema, negative for chest pain and palpitations  Gastrointestinal: negative for abdominal pain and nausea  Genitourinary:negative  Integument:  RLE wounds  Musculoskeletal:negative  Behavioral/Psych: negative  Endocrine: negative  Hematologic/Immunologic: negative    Objective:      /83   Pulse 90   Temp 97.6 °F (36.4 °C) (Tympanic)   Resp 18   Ht 5' 9\" (1.753 m)   Wt 232 lb (105.2 kg)   BMI 34.26 kg/m²     Wt Readings from Last 3 Encounters:   03/02/23 232 lb (105.2 kg)   02/15/23 232 lb (105.2 kg)   01/24/23 232 lb (105.2 kg)       PHYSICAL EXAM    General Appearance: alert and oriented to person, place and time, well-developed and well-nourished, in no acute distress  Skin: RLE wounds primarily crusted over. Two very small wounds remains open with 100% red granular tissue.   Head: normocephalic and atraumatic  Pulmonary: normal air movement, no respiratory distress  Cardiovascular/Circulation: minimal edema, no cyanosis, doppler peripheral pulses  Extremities: no cyanosis and no clubbing   Musculoskeletal: no joint swelling, deformity or tenderness  Neurologic: gait, coordination normal and speech normal      Assessment:     Problem List Items Addressed This Visit          Other    Edema of both legs    Relevant Orders    Initiate Outpatient Wound Care Protocol    Skin ulcer of left lower leg with fat layer exposed ([address] - Primary    Relevant Orders    Initiate Outpatient Wound Care Protocol    Skin ulcer of right lower leg, with fat layer exposed [facility])    Relevant Orders    Initiate Outpatient Wound Care Protocol        Procedure Note  Indications:  Based on my examination of this patient's wound(s)/ulcer(s) today, debridement is required to promote healing and evaluate the wound base. Performed by: ELY Drummond CNP    Consent obtained:  Yes    Time out taken:  Yes    Pain Control: Anesthetic  Anesthetic: 2% Lidocaine Gel Topical       Debridement:Excisional Debridement    Using curette the wound(s)/ulcer(s) was/were sharply debrided down through and including the removal of subcutaneous tissue. Devitalized Tissue Debrided:  fibrin and biofilm    Pre Debridement Measurements:  Are located in the [place]  Documentation Flow Sheet    Wound/Ulcer #: 1    Post Debridement Measurements:  Wound/Ulcer Descriptions are Pre Debridement except measurements:    Wound 10/14/22 Pretibial Right Open, weeping (Active)   Number of days: 139       Wound 10/14/22 Pretibial Left open, weeping (Active)   Number of days: 139       Wound 02/15/23 Pretibial Right;Medial;Distal wound #1 right pretib cluster (Active)   Wound Image   02/15/23 1512   Wound Etiology Venous 02/15/23 1512   Dressing Status Clean;Dry; Intact 02/15/23 1512   Wound Cleansed Cleansed with saline 02/15/23 1512   Dressing/Treatment Other (comment) 02/15/23 1559   Wound Length (cm) 13.8 cm 03/02/23 1424   Wound Width (cm) 4.3 cm 03/02/23 1424   Wound Depth (cm) 0.1 cm 03/02/23 1424   Wound Surface Area (cm^2) 59.34 cm^2 03/02/23 1424   Change in Wound Size % (l*w) -2867 03/02/23 1424   Wound Volume (cm^3) 5.934 cm^3 03/02/23 1424   Wound Healing % -1384 03/02/23 1424   Post-Procedure Length (cm) 13.8 cm 03/02/23 1424   Post-Procedure Width (cm) 4.3 cm 03/02/23 1424   Post-Procedure Depth (cm) 0.1 cm 03/02/23 1424   Post-Procedure Surface Area (cm^2) 59.34 cm^2 03/02/23 1424   Post-Procedure Volume (cm^3) 5.934 cm^3 03/02/23 1424   Wound Assessment Pink/red;Fluid filled blister 03/02/23 1424   Drainage Amount Moderate 03/02/23 1424   Drainage Description Serosanguinous 03/02/23 1424   Odor None 03/02/23 1424   Felicity-wound Assessment Blanchable erythema 03/02/23 1424   Margins Attached edges; Defined edges 03/02/23 1424   Wound Thickness Description not for Pressure Injury Full thickness 03/02/23 1424   Number of days: 14       Wound 02/15/23 Pretibial Left wound #2 left pretib (Active)   Wound Image   02/15/23 1512   Wound Etiology Venous 02/15/23 1512   Dressing Status New drainage noted; Old drainage noted 02/15/23 1512   Wound Cleansed Cleansed with saline 02/15/23 1512   Dressing/Treatment Other (comment) 02/15/23 1559   Wound Length (cm) 0 cm 03/02/23 1424   Wound Width (cm) 0 cm 03/02/23 1424   Wound Depth (cm) 0 cm 03/02/23 1424   Wound Surface Area (cm^2) 0 cm^2 03/02/23 1424   Change in Wound Size % (l*w) 100 03/02/23 1424   Wound Volume (cm^3) 0 cm^3 03/02/23 1424   Wound Healing % 100 03/02/23 1424   Post-Procedure Length (cm) 0 cm 03/02/23 1424   Post-Procedure Width (cm) 0 cm 03/02/23 1424   Post-Procedure Depth (cm) 0 cm 03/02/23 1424   Post-Procedure Surface Area (cm^2) 0 cm^2 03/02/23 1424   Post-Procedure Volume (cm^3) 0 cm^3 03/02/23 1424   Wound Assessment Pink/red 02/15/23 1512   Drainage Amount Moderate 02/15/23 1512   Drainage Description Serosanguinous 02/15/23 1512   Odor None 02/15/23 1512   Felicity-wound Assessment Blanchable erythema 02/15/23 1512   Margins Attached edges; Defined edges 02/15/23 1512   Wound Thickness Description not for Pressure Injury Full thickness 02/15/23 1512   Number of days: 14     Incision 04/04/22 Chest Left;Upper (Active)   Number of days: 332       Percent of Wound(s)/Ulcer(s) Debrided: 10%    Total Surface Area Debrided:  5.93 sq cm     Diabetic/Pressure/Non Pressure Ulcers only:  Ulcer: Non-Pressure ulcer, fat layer exposed      Estimated Blood Loss:  Minimal    Hemostasis Achieved:  by pressure    Procedural Pain:  0  / 10 Post Procedural Pain:  0 / 10     Response to treatment:  Well tolerated by patient. Plan:     -continue unna boot this week    -RTC one week    -Venous study scheduled at [facility] 3/17/23 (Friday)      -I have reviewed instructions with the patient, answering all questions to satisfaction.    -Patient to call or return to clinic as needed if wound symptoms worsen or fail to improve as anticipated.    -See Discharge Instructions for wound management orders. Written patient dismissal instructions given to patient and signed by patient or POA.            Electronically signed by Merline Hoot, APRN - CNP on 3/2/2023 at 2:59 PM

## 2023-03-02 NOTE — DISCHARGE INSTRUCTIONS
[address] [facility],[address] -Phone: [phone] Fax: [phone]    Visit  Discharge Instructions / Physician Orders     DATE: 3/2/2023     Home Care: N/A     SUPPLIES ORDERED THRU: N/A     Wound Location: Bilateral Lower Legs     Cleanse with: Keep Dry and Intact     Dressing Orders: Silvercel, Zinc Unna Boots     Frequency: Keep Dry and Intact     Additional Orders: Increase protein to diet (meat, cheese, eggs, fish, peanut butter, nuts and beans)  ELEVATE LEGS AS MUCH AS POSSIBLE  Vascular Studies 3/17/23     Your next appointment with Walk-in is in 1 week     (Please note your next appointment above and if you are unable to keep, kindly give a 24 hour notice. Thank you.)  If more than 15 min late we cannot guarantee you will be seen due to clinician schedule  Per Policy, Excessive cancellation will call for dismissal from program.     If you experience any of the following, please call the [facility] during business hours:  [phone]  Your Phone call may be forwarded to [employer] [facility] during business hours that [facility] is closed. * Increase in Pain  * Temperature over 101  * Increase in drainage from your wound  * Drainage with a foul odor  * Bleeding  * Increase in swelling  * Need for compression bandage changes due to slippage, breakthrough drainage. If you need medical attention outside of the business hours of the [facility] please contact your PCP or go to the nearest emergency room. The information contained in the After Visit Summary has been reviewed with me, the patient and/or responsible adult, by my health care provider(s). I had the opportunity to ask questions regarding this information.  I have elected to receive;      []After Visit Summary  [x]Comprehensive Discharge Instruction        Patient signature______________________________________Date:________  Electronically signed by Sandra Ceballos RN on 3/2/2023 at 2:50 PM  Electronically signed by ELY Stevens CNP on 3/2/2023 at 2:24 PM

## 2023-03-02 NOTE — PROGRESS NOTES
[employer] Application   Below Knee    NAME:  Delio Goncalves OF BIRTH:  1960  MEDICAL RECORD NUMBER:  [id number]  DATE:  3/2/2023    [x] Applied moisturizing agent to dry skin as needed. [x] Appied primary and secondary dressing as ordered    [x] Applied Unna roll from toes to knee overlapping each time. [x] Applied ace wrap or coban from toes to below the knee. [x] Secured with tape and/or metal clips covered with tape. [x] Instructed patient/caregiver to keep dressing dry and intact. DO NOT REMOVE DRESSING. [x] Instructed pt/family/caregiver to report excessive draining, loose bandage, wet dressing, severe pain or tingling in toes. [x] Applied [employer] dressing below the knee to Bilateral lower leg(s)       Unna Boot(s) were applied per  Guidelines.      Electronically signed by Chandler Healy RN on 3/2/2023 at 2:55 PM

## 2023-03-06 NOTE — DISCHARGE INSTRUCTIONS
[address] [facility],[address] -Phone: [phone] Fax: [phone]    Visit  Discharge Instructions / Physician Orders     DATE: 3/9/2023     Home Care: N/A     SUPPLIES ORDERED THRU: N/A     Wound Location: Bilateral Lower Legs     Cleanse with: Keep Dry and Intact     Dressing Orders: Silvercel, Zinc Unna Boots     Frequency: Keep Dry and Intact     Additional Orders: Increase protein to diet (meat, cheese, eggs, fish, peanut butter, nuts and beans)  ELEVATE LEGS AS MUCH AS POSSIBLE  Vascular Studies 3/17/23     Your next appointment with [address] [place] [address] is in 1 week      (Please note your next appointment above and if you are unable to keep, kindly give a 24 hour notice. Thank you.)  If more than 15 min late we cannot guarantee you will be seen due to clinician schedule  Per Policy, Excessive cancellation will call for dismissal from program.     If you experience any of the following, please call the [address] [place] [address] during business hours:  [phone]  Your Phone call may be forwarded to [phone] during business hours that [facility] is closed. * Increase in Pain  * Temperature over 101  * Increase in drainage from your wound  * Drainage with a foul odor  * Bleeding  * Increase in swelling  * Need for compression bandage changes due to slippage, breakthrough drainage. If you need medical attention outside of the business hours of the [address] [place] [address] please contact your PCP or go to the nearest emergency room. The information contained in the After Visit Summary has been reviewed with me, the patient and/or responsible adult, by my health care provider(s). I had the opportunity to ask questions regarding this information.  I have elected to receive;      []After Visit Summary  [x]Comprehensive Discharge Instruction        Patient signature______________________________________Date:________  Electronically signed by Michelle Sparks RN on 3/9/2023 at 2:44 PM  Electronically signed by ELY Paula CNP on 3/9/2023 at 2:49 PM

## 2023-03-09 NOTE — PROGRESS NOTES
[employer] Application   Below Knee    NAME:  Mandeep Soares OF BIRTH:  1960  MEDICAL RECORD NUMBER:  [id number]  DATE:  3/9/2023    [x] Applied moisturizing agent to dry skin as needed. [x] Appied primary and secondary dressing as ordered    [x] Applied Unna roll from toes to knee overlapping each time. [x] Applied ace wrap or coban from toes to below the knee. [x] Secured with tape and/or metal clips covered with tape. [x] Instructed patient/caregiver to keep dressing dry and intact. DO NOT REMOVE DRESSING. [x] Instructed pt/family/caregiver to report excessive draining, loose bandage, wet dressing, severe pain or tingling in toes. [x] Applied [employer] dressing below the knee to Bilateral lower leg(s)       Unna Boot(s) were applied per  Guidelines.      Electronically signed by Kelvin Boxer, RN on 3/9/2023 at 3:32 PM

## 2023-03-09 NOTE — PROGRESS NOTES
[address]   Progress Note and Procedure Note      [address] RECORD NUMBER:  [id number]  AGE: 57 y.o. GENDER: male  : 1960  EPISODE DATE:  3/9/2023    Subjective:     Chief Complaint   Patient presents with    Wound Check     Ble           HISTORY of PRESENT ILLNESS HPI     Suhas Goodwin is a 57 y.o. male who presents today for wound/ulcer evaluation. History of Wound Context: chronic wounds BLE. Recent PVR study 2023 KALEN L=0.7, R=0.8. Interval history: wounds improved using unna boot, awaiting scheduled venous study 3/17/23. Wound/Ulcer Pain Timing/Severity: mild  Quality of pain: sharp  Severity:  2 / 10   Modifying Factors: None  Associated Signs/Symptoms: edema     Wound/Ulcer Identification:  Ulcer Type: venous  Contributing Factors: edema and venous stasis          PAST MEDICAL HISTORY        Diagnosis Date    CAD (coronary artery disease)      cath    CHF (congestive heart failure) ([facility])     Dr. Benedict Lama attack [facility])     Hyperlipidemia     Dr. Rubin Singh    Hypertension     Dr. Rubin Singh    MI (myocardial infarction) [facility])     MRSA (methicillin resistant staph aureus) culture positive 2018    abdomen    Skin cancer     Snores     no cpap    Stroke [facility])       Dr. Rubin Singh monitors    Wears dentures     upper full denture    Wears reading eyeglasses     Wellness examination     Codie Merida PA-C last seen 2020       PAST SURGICAL HISTORY    Past Surgical History:   Procedure Laterality Date    CARDIAC CATHETERIZATION  2020    Dr. Gates Saliva therapy.   Report on chart    CARDIAC DEFIBRILLATOR PLACEMENT  2022    [address]    unsure of date, bilateral radials    CARDIOVERSION  2021    CORONARY ANGIOPLASTY WITH STENT PLACEMENT      6 total stents per patient    CORONARY ARTERY BYPASS GRAFT      4 vessel bypass    EYE SURGERY      eye injury  new lens  and laser lt eye 2019    HERNIA REPAIR Bilateral 2020    XI LAPAROSCOPIC ROBOTIC INGUINAL HERNIA REPAIR WITH MESH; UMBILICAL HERNIA REPAIR WITH MESH performed by Jeniffer Daughters, DO at [address]      plate/hardware present    OTHER SURGICAL HISTORY  2021    BENITO    SKIN BIOPSY      back       FAMILY HISTORY    Family History   Problem Relation Age of Onset    Coronary Art Dis Father     Liver Disease Father     Alcohol Abuse Sister        SOCIAL HISTORY    Social History     Tobacco Use    Smoking status: Former     Types: Cigarettes     Quit date: 1999     Years since quittin.1    Smokeless tobacco: Never   Vaping Use    Vaping Use: Never used   Substance Use Topics    Alcohol use: No    Drug use: No       ALLERGIES    No Known Allergies    MEDICATIONS    Current Outpatient Medications on File Prior to Encounter   Medication Sig Dispense Refill    midodrine (PROAMATINE) 10 MG tablet Take 1 tablet by mouth 3 times daily (with meals) 90 tablet 0    torsemide 40 MG TABS Take 40 mg by mouth daily 30 tablet 3    spironolactone (ALDACTONE) 25 MG tablet Take 1 tablet by mouth daily 30 tablet 3    ARTIFICIAL TEAR SOLUTION OP Place 2 drops into both eyes daily      clopidogrel (PLAVIX) 75 MG tablet Take 1 tablet by mouth daily 30 tablet 1    empagliflozin (JARDIANCE) 10 MG tablet Take 1 tablet by mouth daily 30 tablet 2    amiodarone (CORDARONE) 200 MG tablet Take 1 tablet by mouth daily 30 tablet 0    rivaroxaban (XARELTO) 20 MG TABS tablet Take 20 mg by mouth Daily with supper      metFORMIN (GLUCOPHAGE) 1000 MG tablet Take 1 tablet by mouth daily Resume on  60 tablet 3    Cyanocobalamin (VITAMIN B-12 PO) Take 1 tablet by mouth daily      [DISCONTINUED] naproxen (NAPROSYN) 500 MG tablet Take 1 tablet by mouth 2 times daily (with meals) 20 tablet 0    Multiple Vitamins-Minerals (THERAPEUTIC MULTIVITAMIN-MINERALS) tablet Take 1 tablet by mouth daily      nitroGLYCERIN (NITROSTAT) 0.4 MG SL tablet Place 0.4 mg under the tongue every 5 minutes as needed for Chest pain up to max of 3 total doses. If no relief after 1 dose, call 911. Dr. Janiya Reed      atorvastatin (LIPITOR) 80 MG tablet Take 1 tablet by mouth nightly 30 tablet 3     No current facility-administered medications on file prior to encounter. REVIEW OF SYSTEMS    Constitutional: negative for chills and fevers  Respiratory: negative for cough and shortness of breath  Cardiovascular: positive for lower extremity edema, negative for chest pain and palpitations  Gastrointestinal: negative for abdominal pain and nausea  Genitourinary:negative  Integument:  RLE wounds  Musculoskeletal:negative  Behavioral/Psych: negative  Endocrine: negative  Hematologic/Immunologic: negative    Objective:      /75   Pulse 95   Temp (!) 96.3 °F (35.7 °C) (Tympanic)   Ht 5' 9\" (1.753 m)   Wt 232 lb (105.2 kg)   BMI 34.26 kg/m²     Wt Readings from Last 3 Encounters:   03/09/23 232 lb (105.2 kg)   03/02/23 232 lb (105.2 kg)   02/15/23 232 lb (105.2 kg)       PHYSICAL EXAM    General Appearance: alert and oriented to person, place and time, well-developed and well-nourished, in no acute distress  Skin: RLE wounds primarily crusted over. Two very small wounds remains open with 100% red granular tissue.   Head: normocephalic and atraumatic  Pulmonary: normal air movement, no respiratory distress  Cardiovascular/Circulation: minimal edema, no cyanosis, doppler peripheral pulses  Extremities: no cyanosis and no clubbing   Musculoskeletal: no joint swelling, deformity or tenderness  Neurologic: gait, coordination normal and speech normal      Assessment:     Problem List Items Addressed This Visit          Other    Edema of both legs - Primary    Relevant Orders    Initiate Outpatient Wound Care Protocol    Skin ulcer of left lower leg with fat layer exposed ([address]    Relevant Orders    Initiate Outpatient Wound Care Protocol    Skin ulcer of right lower leg, with fat layer exposed [facility])    Relevant Orders    Initiate Outpatient Wound Care Protocol        Procedure Note  Indications:  Based on my examination of this patient's wound(s)/ulcer(s) today, debridement is required to promote healing and evaluate the wound base. Performed by: ELY Collins CNP    Consent obtained:  Yes    Time out taken:  Yes    Pain Control: Anesthetic  Anesthetic: 2% Lidocaine Gel Topical       Debridement:Excisional Debridement    Using curette the wound(s)/ulcer(s) was/were sharply debrided down through and including the removal of subcutaneous tissue. Devitalized Tissue Debrided:  fibrin and biofilm    Pre Debridement Measurements:  Are located in the [facility]  Documentation Flow Sheet    Wound/Ulcer #: 1    Post Debridement Measurements:  Wound/Ulcer Descriptions are Pre Debridement except measurements:    Wound 10/14/22 Pretibial Right Open, weeping (Active)   Number of days: 146       Wound 10/14/22 Pretibial Left open, weeping (Active)   Number of days: 146       Wound 02/15/23 Pretibial Right; Lower;Medial #1 Cluster (Active)   Wound Image   02/15/23 1512   Wound Etiology Venous 03/09/23 1423   Dressing Status New drainage noted; Old drainage noted 03/09/23 1423   Wound Cleansed Soap and water 03/09/23 1423   Dressing/Treatment Other (comment) 02/15/23 1559   Wound Length (cm) 0.3 cm 03/09/23 1423   Wound Width (cm) 0.7 cm 03/09/23 1423   Wound Depth (cm) 0.1 cm 03/09/23 1423   Wound Surface Area (cm^2) 0.21 cm^2 03/09/23 1423   Change in Wound Size % (l*w) 89.5 03/09/23 1423   Wound Volume (cm^3) 0.021 cm^3 03/09/23 1423   Wound Healing % 95 03/09/23 1423   Post-Procedure Length (cm) 0.3 cm 03/09/23 1423   Post-Procedure Width (cm) 0.7 cm 03/09/23 1423   Post-Procedure Depth (cm) 0.1 cm 03/09/23 1423   Post-Procedure Surface Area (cm^2) 0.21 cm^2 03/09/23 1423   Post-Procedure Volume (cm^3) 0.021 cm^3 03/09/23 1423   Wound Assessment Pink/red 03/09/23 1423   Drainage Amount Moderate 03/09/23 1423   Drainage Description Serosanguinous 03/09/23 1423   Odor None 03/09/23 1423   Felicity-wound Assessment Blanchable erythema 03/09/23 1423   Margins Defined edges 03/09/23 1423   Wound Thickness Description not for Pressure Injury Full thickness 03/09/23 1423   Number of days: 21       Wound 02/15/23 Pretibial Left #2 (Active)   Wound Image   02/15/23 1512   Wound Etiology Venous 02/15/23 1512   Dressing Status New drainage noted; Old drainage noted 02/15/23 1512   Wound Cleansed Cleansed with saline 02/15/23 1512   Dressing/Treatment Other (comment) 02/15/23 1559   Wound Length (cm) 0 cm 03/09/23 1423   Wound Width (cm) 0 cm 03/09/23 1423   Wound Depth (cm) 0 cm 03/09/23 1423   Wound Surface Area (cm^2) 0 cm^2 03/09/23 1423   Change in Wound Size % (l*w) 100 03/09/23 1423   Wound Volume (cm^3) 0 cm^3 03/09/23 1423   Wound Healing % 100 03/09/23 1423   Post-Procedure Length (cm) 0 cm 03/09/23 1423   Post-Procedure Width (cm) 0 cm 03/09/23 1423   Post-Procedure Depth (cm) 0 cm 03/09/23 1423   Post-Procedure Surface Area (cm^2) 0 cm^2 03/09/23 1423   Post-Procedure Volume (cm^3) 0 cm^3 03/09/23 1423   Wound Assessment Pink/red 02/15/23 1512   Drainage Amount Moderate 02/15/23 1512   Drainage Description Serosanguinous 02/15/23 1512   Odor None 02/15/23 1512   Felicity-wound Assessment Blanchable erythema 02/15/23 1512   Margins Attached edges; Defined edges 02/15/23 1512   Wound Thickness Description not for Pressure Injury Full thickness 02/15/23 1512   Number of days: 21     Incision 04/04/22 Chest Left;Upper (Active)   Number of days: 339       Percent of Wound(s)/Ulcer(s) Debrided: 100%    Total Surface Area Debrided:  0.21 sq cm     Diabetic/Pressure/Non Pressure Ulcers only:  Ulcer: Non-Pressure ulcer, fat layer exposed      Estimated Blood Loss:  Minimal    Hemostasis Achieved:  by pressure    Procedural Pain:  0  / 10     Post Procedural Pain:  0 / 10     Response to treatment:  Well tolerated by patient. Plan:     -cont unna boot    -scheduled NIVS next Friday    -Patient instructed to address leg elevation as a wound healing concern.    -I have reviewed instructions with the patient, answering all questions to satisfaction.    -Patient to call or return to clinic as needed if wound symptoms worsen or fail to improve as anticipated.    -See Discharge Instructions for wound management orders. Written patient dismissal instructions given to patient and signed by patient or POA.            Electronically signed by ELY Huber CNP on 3/9/2023 at 2:49 PM

## 2023-03-10 PROBLEM — E11.9 TYPE 2 DIABETES MELLITUS (HCC): Status: RESOLVED | Noted: 2023-01-01 | Resolved: 2023-01-01

## 2023-03-10 PROBLEM — Z95.5 H/O HEART ARTERY STENT: Status: ACTIVE | Noted: 2017-03-30

## 2023-03-10 PROBLEM — E11.65 UNCONTROLLED TYPE 2 DIABETES MELLITUS WITH HYPERGLYCEMIA (HCC): Status: RESOLVED | Noted: 2022-01-01 | Resolved: 2023-01-01

## 2023-03-10 PROBLEM — E11.9 TYPE 2 DIABETES MELLITUS (HCC): Status: ACTIVE | Noted: 2023-01-01

## 2023-03-10 PROBLEM — K74.60 CIRRHOSIS OF LIVER WITH ASCITES, UNSPECIFIED HEPATIC CIRRHOSIS TYPE (HCC): Status: ACTIVE | Noted: 2023-01-01

## 2023-03-10 PROBLEM — I50.23 ACUTE ON CHRONIC SYSTOLIC HEART FAILURE (HCC): Status: RESOLVED | Noted: 2019-03-01 | Resolved: 2023-01-01

## 2023-03-10 PROBLEM — R18.8 CIRRHOSIS OF LIVER WITH ASCITES, UNSPECIFIED HEPATIC CIRRHOSIS TYPE (HCC): Status: ACTIVE | Noted: 2023-01-01

## 2023-03-10 PROBLEM — I50.41 ACUTE COMBINED SYSTOLIC AND DIASTOLIC HEART FAILURE (HCC): Status: ACTIVE | Noted: 2019-03-01

## 2023-03-10 PROBLEM — J44.9 CHRONIC OBSTRUCTIVE PULMONARY DISEASE (HCC): Status: ACTIVE | Noted: 2023-01-01

## 2023-03-10 NOTE — ED PROVIDER NOTES
[facility] ED  EMERGENCY DEPARTMENT ENCOUNTER      Pt Name: Katie Diaz  MRN: [id number]  [place] 1960  Date of evaluation: 3/10/2023  Provider: Minor Guido MD    CHIEF COMPLAINT       Chief Complaint   Patient presents with    Shortness of Breath         HISTORY OF PRESENT ILLNESS  (Location/Symptom, Timing/Onset, Context/Setting, Quality, Duration, Modifying Factors, Severity.)   Katie Diaz is a 57 y.o. male who presents to the emergency department for shortness of breath and abdominal pain. He is a very poor historian. He states he did not sleep well last night and his belly is been big since he left her a month ago. No fever or cough and he has no chest pain. No vomiting or diarrhea and his whole stomach hurts. His stomach pain is making him short of breath he states      Nursing Notes were reviewed. ALLERGIES     Patient has no known allergies. CURRENT MEDICATIONS       Previous Medications    AMIODARONE (CORDARONE) 200 MG TABLET    Take 1 tablet by mouth daily    ARTIFICIAL TEAR SOLUTION OP    Place 2 drops into both eyes daily    ATORVASTATIN (LIPITOR) 80 MG TABLET    Take 1 tablet by mouth nightly    CLOPIDOGREL (PLAVIX) 75 MG TABLET    Take 1 tablet by mouth daily    CYANOCOBALAMIN (VITAMIN B-12 PO)    Take 1 tablet by mouth daily    EMPAGLIFLOZIN (JARDIANCE) 10 MG TABLET    Take 1 tablet by mouth daily    METFORMIN (GLUCOPHAGE) 1000 MG TABLET    Take 1 tablet by mouth daily Resume on 5/21    MIDODRINE (PROAMATINE) 10 MG TABLET    Take 1 tablet by mouth 3 times daily (with meals)    MULTIPLE VITAMINS-MINERALS (THERAPEUTIC MULTIVITAMIN-MINERALS) TABLET    Take 1 tablet by mouth daily    NITROGLYCERIN (NITROSTAT) 0.4 MG SL TABLET    Place 0.4 mg under the tongue every 5 minutes as needed for Chest pain up to max of 3 total doses. If no relief after 1 dose, call 911.   Dr. Lesvia Trujillo    RIVAROXABAN (XARELTO) 20 MG TABS TABLET    Take 20 mg by mouth Daily with supper    SPIRONOLACTONE (ALDACTONE) 25 MG TABLET    Take 1 tablet by mouth daily    TORSEMIDE 40 MG TABS    Take 40 mg by mouth daily       PAST MEDICAL HISTORY         Diagnosis Date    CAD (coronary artery disease)      cath    CHF (congestive heart failure) ([facility] [address]     Dr. Quentin Jackson attack [facility])     Hyperlipidemia     Dr. Bronson King    Hypertension     Dr. Bronson King    MI (myocardial infarction) [facility])     MRSA (methicillin resistant staph aureus) culture positive 2018    abdomen    Skin cancer     Snores     no cpap    Stroke [facility])       Dr. Bronson King monitors    Wears dentures     upper full denture    Wears reading eyeglasses     Wellness examination     Ashley Gilman PA-C last seen 2020       SURGICAL HISTORY           Procedure Laterality Date    CARDIAC CATHETERIZATION  2020    Dr. Devorah Partida therapy. Report on chart    CARDIAC DEFIBRILLATOR PLACEMENT  2022    [address]    unsure of date, bilateral radials    CARDIOVERSION  2021    CORONARY ANGIOPLASTY WITH STENT PLACEMENT      6 total stents per patient    CORONARY ARTERY BYPASS GRAFT      4 vessel bypass    EYE SURGERY      eye injury  new lens  and laser lt eye 2019    HERNIA REPAIR Bilateral 2020    XI LAPAROSCOPIC ROBOTIC INGUINAL HERNIA REPAIR WITH MESH; UMBILICAL HERNIA REPAIR WITH MESH performed by Eloina Baker DO at [address] [place] [address]      plate/hardware present    OTHER SURGICAL HISTORY  2021    BENITO    SKIN BIOPSY      back         FAMILY HISTORY           Problem Relation Age of Onset    Coronary Art Dis Father     Liver Disease Father     Alcohol Abuse Sister      Family Status   Relation Name Status    Father      Mother      Sister  Alive    Sister          SOCIAL HISTORY      reports that he quit smoking about 24 years ago. His smoking use included cigarettes. He has never used smokeless tobacco. He reports that he does not drink alcohol and does not use drugs.     REVIEW OF SYSTEMS    (2-9 systems for level 4, 10 or more for level 5)     Review of Systems   Constitutional:  Negative for chills, fatigue and fever. HENT:  Negative for congestion, ear discharge and facial swelling. Eyes:  Negative for discharge and redness. Respiratory:  Positive for shortness of breath. Negative for cough. Cardiovascular:  Negative for chest pain. Gastrointestinal:  Positive for abdominal distention and abdominal pain. Negative for constipation, diarrhea and vomiting. Genitourinary:  Negative for dysuria and hematuria. Musculoskeletal:  Negative for arthralgias. Skin:  Negative for color change and rash. Neurological:  Negative for syncope, numbness and headaches. Hematological:  Negative for adenopathy. Psychiatric/Behavioral:  Negative for confusion. The patient is not nervous/anxious. Except as noted above the remainder of the review of systems was reviewed and negative. PHYSICAL EXAM    (up to 7 for level 4, 8 or more for level 5)     Vitals:    03/10/23 0900 03/10/23 0910 03/10/23 0930 03/10/23 0945   BP: (!) 150/94 (!) 156/87 (!) 160/82 (!) 160/104   Pulse: 93 92 91 96   Resp: 25 25 (!) 31 (!) 34   Temp:       TempSrc:       SpO2: 96% 97% 96% 97%       Physical Exam  Vitals reviewed. Constitutional:       General: He is not in acute distress. Appearance: He is well-developed. He is not diaphoretic. Comments: He is moaning and calling out \"help me. \"   HENT:      Head: Normocephalic and atraumatic. Eyes:      General: No scleral icterus. Right eye: No discharge. Left eye: No discharge. Cardiovascular:      Rate and Rhythm: Normal rate and regular rhythm. Pulmonary:      Effort: Pulmonary effort is normal. No respiratory distress. Breath sounds: Normal breath sounds. No stridor. No wheezing or rales. Abdominal:      General: There is distension. Palpations: Abdomen is soft. Tenderness:  There is no abdominal tenderness. Comments: Abdomen is quite distended. Musculoskeletal:         General: Normal range of motion. Cervical back: Neck supple. Lymphadenopathy:      Cervical: No cervical adenopathy. Skin:     General: Skin is warm and dry. Findings: No erythema or rash. Neurological:      Mental Status: He is alert and oriented to person, place, and time. Psychiatric:         Behavior: Behavior normal.           DIAGNOSTIC RESULTS     EKG: All EKG's are interpreted by the Emergency Department Physician who either signs or Co-signs this chart in the absence of a cardiologist.    EKG on my interpretation shows no acute finding    RADIOLOGY:   Non-plain film images such as CT, Ultrasound and MRI are read by the radiologist. Plain radiographic images are visualized and preliminarily interpreted by the emergency physician with the below findings:    Interpretation per the Radiologist below, if available at the time of this note:    CT ABDOMEN PELVIS W IV CONTRAST Additional Contrast? None    Result Date: 3/10/2023  EXAMINATION: CT OF THE ABDOMEN AND PELVIS WITH CONTRAST 3/10/2023 7:46 am TECHNIQUE: CT of the abdomen and pelvis was performed with the administration of intravenous contrast. Multiplanar reformatted images are provided for review. Automated exposure control, iterative reconstruction, and/or weight based adjustment of the mA/kV was utilized to reduce the radiation dose to as low as reasonably achievable. COMPARISON: 20 January 2023 HISTORY: ORDERING SYSTEM PROVIDED HISTORY: Pain, distention TECHNOLOGIST PROVIDED HISTORY: Pain, distention Decision Support Exception - unselect if not a suspected or confirmed emergency medical condition->Emergency Medical Condition (MA) Reason for Exam: distention FINDINGS: Lower Chest: Cardiomegaly, indwelling pacemaker, small right pleural effusion and pulmonary venous congestion or noted. Organs: Liver is fatty infiltrated. No focal masses.   Nodular liver appearance which may be related to chronic hepatic disease/cirrhosis. Spleen pancreas, adrenals and left kidney are unremarkable. Vascular renal calcifications are noted. Nonobstructing small papillary nephrolith right kidney. Gallbladder difficult to assess due to respiratory motion artifact. Heterogeneity is noted suggesting sludge in the gallbladder. GI/Bowel: Moderate to moderately large volume ascites. No free air, bowel obstruction or bowel wall thickening. Sigmoid diverticulosis without evidence of acute diverticulitis. No evidence of acute appendicitis. Pelvis: Bladder unremarkable. No campos pelvic adenopathy. The patient has mild inguinal adenopathy bilaterally. Bilateral fat containing dilated inguinal rings are noted without strangulation. Peritoneum/Retroperitoneum: Moderate calcification of the aorta and major branches. No retroperitoneal or mesenteric adenopathy. Bones/Soft Tissues: Multilevel degenerative changes in the spine. No acute osseous abnormality. Diffuse haziness in the subcutaneous soft tissues with anasarca type appearance. 1.  Anasarca type appearance. 2.  Cardiomegaly. 3.  Small right effusion. 4.  Moderate to moderately large volume ascites in the abdomen and pelvis. 5.  Fatty infiltration of the liver. Nodular appearance suggesting cirrhosis. Question COHEN 6. Congestive vascular changes in the lungs. 7.  Atherosclerotic disease. 8.  Other incidental findings as above. XR CHEST PORTABLE    Result Date: 3/10/2023  EXAMINATION: ONE XRAY VIEW OF THE CHEST 3/10/2023 7:01 am COMPARISON: 01/05/2023, 2222 hours HISTORY: ORDERING SYSTEM PROVIDED HISTORY: dyspnea TECHNOLOGIST PROVIDED HISTORY: dyspnea Reason for Exam: dyspnea Additional signs and symptoms: dyspnea 78-year-old male with dyspnea FINDINGS: Portable upright view of the chest. Stable cardiomegaly. Left-sided cardiac pacemaker device in place, stable.  Cardiac monitor leads overlie the chest. Prior median sternotomy and CABG. Trachea midline. No pneumothorax. No free air. Stable perihilar airspace opacities, left greater than right. Mild pulmonary vascular congestion. No sizable pleural effusions. Visualized osseous structures remain unchanged. 1. Stable perihilar airspace disease, left greater than right. Mild pulmonary vascular congestion. Stable cardiomegaly. Probable CHF related changes. Underlying infiltrate not excluded. 2. Prior median sternotomy and CABG.        LABS:  Labs Reviewed   BASIC METABOLIC PANEL - Abnormal; Notable for the following components:       Result Value    Bun/Cre Ratio 21 (*)     Sodium 133 (*)     CO2 19 (*)     All other components within normal limits   BRAIN NATRIURETIC PEPTIDE - Abnormal; Notable for the following components:    Pro-BNP 1,778 (*)     All other components within normal limits   CBC WITH AUTO DIFFERENTIAL - Abnormal; Notable for the following components:    Hemoglobin 12.9 (*)     MCV 81.1 (*)     MCH 24.2 (*)     RDW 19.8 (*)     Seg Neutrophils 78 (*)     Lymphocytes 10 (*)     Eosinophils % 0 (*)     Immature Granulocytes 1 (*)     Absolute Lymph # 0.88 (*)     All other components within normal limits   PROTIME-INR - Abnormal; Notable for the following components:    Protime 22.9 (*)     All other components within normal limits   TROPONIN - Abnormal; Notable for the following components:    Troponin, High Sensitivity 39 (*)     All other components within normal limits   TROPONIN - Abnormal; Notable for the following components:    Troponin, High Sensitivity 42 (*)     All other components within normal limits   LACTATE, SEPSIS - Abnormal; Notable for the following components:    Lactic Acid, Sepsis 6.0 (*)     All other components within normal limits   LACTATE, SEPSIS - Abnormal; Notable for the following components:    Lactic Acid, Sepsis 9.0 (*)     All other components within normal limits   HEPATIC FUNCTION PANEL - Abnormal; Notable for the following components:    Albumin 3.2 (*)     Alkaline Phosphatase 283 (*)     Total Bilirubin 3.3 (*)     Bilirubin, Direct 1.9 (*)     Bilirubin, Indirect 1.4 (*)     All other components within normal limits   COVID-19, RAPID   MAGNESIUM   APTT   LIPASE       All other labs were within normal range or not returned as of this dictation. EMERGENCY DEPARTMENT COURSE and DIFFERENTIAL DIAGNOSIS/MDM:   Vitals:    Vitals:    03/10/23 0900 03/10/23 0910 03/10/23 0930 03/10/23 0945   BP: (!) 150/94 (!) 156/87 (!) 160/82 (!) 160/104   Pulse: 93 92 91 96   Resp: 25 25 (!) 31 (!) 34   Temp:       TempSrc:       SpO2: 96% 97% 96% 97%       Orders Placed This Encounter   Medications    LORazepam (ATIVAN) injection 0.5 mg    0.9 % sodium chloride bolus    iopamidol (ISOVUE-370) 76 % injection 75 mL    sodium chloride flush 0.9 % injection 10 mL    LORazepam (ATIVAN) injection 1 mg       HEART SCORE: Not applicable    Medical Decision Making: The patient presents with ascites and anasarca. He will likely need paracentesis. WBC is 8.7 and I have no clinical suspicion of sepsis. Bilirubin is also elevated at 3.3. Lipase is normal.  The patient is being admitted. Evaluation and treatment course in the ED, and plan of care upon mission was discussed in length with the patient. DDx include ascites, anasarca, CHF      I will order labs including CBC, BMP, LFTs, BNP, complete CXR and monitor closely. Test considered, but not ordered: None    The patient was involved in his/her plan of care. The testing that was ordered was discussed with the patient. Any medications that may have been ordered were discussed with the patient. I have reviewed the patient's previous medical records using the electronic health record that we have available. Labs and imaging were reviewed. I have discusssed recommendation for admission with the patient and/or family.  We have discussed benefits of admission and the risks of discharge home. The patient agrees with the plan of care and admission. The patient will be admitted for further evaluation and treatment. I have consulted the admitting service. The patient will be admitted to them, he/she agrees to accept the patient for further evaluation and treatment. CONSULTS:  IP CONSULT TO HOSPITALIST    PROCEDURES:  None    FINAL IMPRESSION      1. Other ascites    2. Anasarca          DISPOSITION/PLAN   DISPOSITION Decision To Admit 03/10/2023 10:08:11 AM      PATIENT REFERRED TO:   No follow-up provider specified. DISCHARGE MEDICATIONS:     New Prescriptions    No medications on file       The care is provided during an unprecedented national emergency due to the novel coronavirus, COVID-19.     (Please note that portions of this note were completed with a voice recognition program.  Efforts were made to edit the dictations but occasionally words are mis-transcribed.)    Nafisa Walter MD  Attending Emergency Physician            Nafisa Walter MD  03/10/23 4614

## 2023-03-10 NOTE — PROGRESS NOTES
Writer and PCA checked blood sugar on patient, which showed LOW <10. Dextrose 250ml bolus given per hypoglycemic protocol. blood sugar recheck t 1644 showed 60. Patient able to open eyes, but still had garbled speech. Minutes after, patient's oxygen level had dropped from mid 90's to 70%. Patient would not open eyes by himself, skin became cyanotic and was agonal breathing. Rapid response was called, code blue then called shortly after. Patient was pulseless. Dr. Sandhu responded, called time of death at [phone]. Kian Kelley, JESSICA  and Dr. Katrina Lawrence at bedside. 65: Writer spoke with 's office, who stated will not be a 's Case and are releasing patient's body. 1805: Writer called [facility] and spoke with Dom Brandon, who stated patient is eligible for donation, Dom Brandon will follow up in 1 hour with reevaluation. Referral number given: [id number].

## 2023-03-10 NOTE — PROGRESS NOTES
Writer spoke with Rae Aguilera RN regarding parcentesis. Patient currently unable to sign consent for himself, significant other on file, Mary Scot, would need to provider documentation of POA paperwork to consent for procedure. Jakob Fields NP spoke with significant other, who stated she will look for paperwork. NP is aware that IR is not here on the weekend.

## 2023-03-10 NOTE — PROGRESS NOTES
Patient admitted to room 1026 from ED via stretcher. Per report patient is arousable but lethargic. When patient arrived to room, condition remained the same. Able to open eyes and move extremities but lethargy still present. Will address admission database when patient is more coherent. VS taken, telemetry placed, bed exit armed, and call light given/within reach. See chart for more detail.

## 2023-03-10 NOTE — ED NOTES
Pt restless and sitting on the edge of bed. MD notified, Ativan IV given as ordered. Upon reassessment, medication was effective.      Tammy Tillman, OBI  03/10/23 3707

## 2023-03-10 NOTE — PROGRESS NOTES
[facility]   Patient Death Note  DEATH                  Room # 1026/1026-01   Name: Ten Hernandez            Age: 57 y.o. Gender: male          Advent: Non-Synagogue      Place of Adventist: [facility]  Admit Date & Time: 3/10/2023  6:41 AM        Actual date of death: 03/10/2023   TOD: 4:55pm       Referral:  Unit: Progressive  Nurse: [address]:  Patient was called over PA as a rapid response.  arrived at room and patient was unable to be revived. IS THIS A 'S CASE? No    SPIRITUAL/EMOTIONAL INTERVENTION:  Lisbeth East NP was able to reach significant other, who was on her way to the hospital. Patient was pronounced just minutes before she arrived. Significant other Thad Milling was quite tearful and  offers prayer, emotional support and companionship. Thad Milling asked that I call her Rosalia Teran at [facility] and Audrey Contreras was able to support Thad Milling via phone. Thad Milling stayed with patient while this  and [employer] [address] completed Release of Body form and both  and [facility] were notified. Thad Milling left after about two hours at bedside and security was notified and release of body form taken to ED reception desk.  and [facility] both sign off. DOCTOR SIGNING DEATH CERTIFICATE:  Name: DR. Josefa Lynne  Phone number: [phone]    Copy of COMPLETED Release of Body Form Received? Yes    Patient's belongings: With domestic partner Carla Gibbons:  Contacted Time : 7:30pm  Name: ARIELLE PATRICIA [facility]: [place]  Phone Number: [phone]    NEXT OF KIN:  Name: Kimberly Hidalgo  Relationship: Domestic Partner  Street Address: [address] [place] [address]: [place]  State: [place]  Zip code: [address]   Phone Number: [phone]    ANY FOLLOW-UP NEEDED? No    IF SO, WHAT?   None    Electronically signed by Salima Rodriguez, on 3/10/2023 at 6:38 PM.  [facility] Valladaresarcenioleeanna  [phone]      03/10/23 1834   Encounter Summary   Service Provided For: Patient and family together   Referral/Consult From: Multi-disciplinary team   Support System Significant other   Last Encounter  03/10/23   Complexity of Encounter High   Begin Time 1655   End Time  1930   Total Time Calculated 155 min   Encounter    Type Family Care   Crisis   Type Rapid Response   Spiritual/Emotional needs   Type Emotional Distress   Grief, Loss, and Adjustments   Type Death;Bereavement Care;Grief and loss; End of Life;Life Adjustments   Assessment/Intervention/Outcome   Assessment Complicated grieving; Impaired resilience; Impaired social interaction; Interrupted family processes;Sad; Shock; Tearful   Intervention Active listening;Discussed death, afterlife; Discussed illness injury and its impact; End of Life Care;Explored/Affirmed feelings, thoughts, concerns;Grief Care;Life review/Legacy; Nurtured Hope;Prayer (assurance of)/[place];Sustaining Presence/Ministry of presence   Outcome Comfort; Connection/Belonging;Expressed feelings, needs, and concerns;Grieving;Receptive;Venting emotion

## 2023-03-10 NOTE — PLAN OF CARE
DNR CCA with No intubation per paperwork in Jan 2023. Change of code status discussed with [facility].  She is currently trying to find the POA paperwork

## 2023-03-10 NOTE — PROGRESS NOTES
IV lasix given, patient changed and repositioned. Patient too lethargic still, oral meds held until patient is more alert and can safely swallow.

## 2023-03-10 NOTE — ED NOTES
Ayleen Soler who is the pt significant other was notified of the paracentesis and agreed to have the pt do this procedure because of his large abdomen. I told her that she would get an update as soon as we had more information.      Mo Reynoso RN  03/10/23 6705

## 2023-03-10 NOTE — H&P
[facility]  Office: [address], DO, Katherine Rodrigez, DO, Helene Hardy, DO, Jenniferchetna Sandhu, DO, Omaira Wild MD, Jerri Morel MD, Parish Rome MD, Vitor Lwas MD,  Karthik Buchanan MD, Fariba Cash MD, Tuan Edwards DO, Radha Christiansen MD,  Michelle Parekh MD, Yina Dejesus MD, Marilia Delgadillo DO, Osman Reynolds MD, Lanette Lopez MD, Alexander Chatterjee DO, Mirian Morrow MD, Vidal Shoemaker MD, Fabiana Falcon MD, Viraj Diaz MD, Corrie Michele MD, Nandini Vazquez DO, Danny Perez MD, Colby Li MD, Rivera Samuel, CNP,  Eulogio Choudhury, CNP, Peter Angry, CNP, Godfrey Juarez, CNP,  Cleone Denver, [facility], Nell Spears, CNP, Alli Bautista, CNP, Volodymyr Baird, CNP, Damon Person, CNP, Jessie Lopez, CNP, Joo Sheppard PA-C, Leonel Edwards, CNS, Chuy aGry, CNP, Marzena Butler, CNP         [facility]    HISTORY AND PHYSICAL EXAMINATION            Date:   3/10/2023  Patient name:  Quinn Denney  Date of admission:  3/10/2023  6:41 AM  MRN:   [id number]  Account:  [de-identified]  YOB: 1960  PCP:    Demetrius Marcelo PA-C  Room:   [place] [address]  Code Status:    Prior    Chief Complaint:     Chief Complaint   Patient presents with    Shortness of Breath       History Obtained From:     patient, electronic medical record    History of Present Illness:     Quinn Denney is a 57 y.o. Non- / non  male who presents with Shortness of Breath   and is admitted to the hospital for the management of Cirrhosis of liver with ascites, unspecified hepatic cirrhosis type ([facility] [address] According to the notes the patient presented to the ER with complaints of shortness of breath and abdominal pain. He is a reported poor historian according to the ER physician he was restless and Moaning and calling out for help. Received 0.5 of Ativan and additional 1 mg of Ativan prior to my assessment. .  My assessment his eyes are closed and he appears to be in no distress. His pulse ox is in the high 90s on 2 L and his blood pressure is within normal limits. .  Chest x-ray showed stable perihilar airspace disease left greater than right with mild pulmonary vascular congestion with probable CHF and a CT of the abdomen showed anasarca with cardiomegaly, small right effusion and moderate to moderately large volume ascites in the abdomen and pelvis. There is also mention of fatty infiltration of liver nodular appearance suggesting cirrhosis questionable COHEN. I consulted GI and IR for a paracentesis. Echo 1/20/2023 showed Mild to moderate left ventricular hypertrophy. Global left ventricular systolic function is severely reduced with an  estimated ejection fraction of 30 % with Severe global hypokinesis. He had stent placed placed to the left Ob Samantha 5/18/2022. Past Medical History:     Past Medical History:   Diagnosis Date    CAD (coronary artery disease)     2020 cath    CHF (congestive heart failure) (HCC)     Dr. Lenz Expose attack [facility])     Hyperlipidemia     Dr. Shelby Mukherjee    Hypertension     Dr. Shelby Mukherjee    MI (myocardial infarction) [facility])     MRSA (methicillin resistant staph aureus) culture positive 01/26/2018    abdomen    Skin cancer     Snores     no cpap    Stroke [facility])     2011  Dr. Shelby Mukherjee monitors    Wears dentures     upper full denture    Wears reading eyeglasses     Wellness examination     Argentina Hawkins PA-C last seen Nov 2020        Past Surgical History:     Past Surgical History:   Procedure Laterality Date    CARDIAC CATHETERIZATION  08/06/2020    Dr. Marjan Encarnacion therapy.   Report on chart    CARDIAC DEFIBRILLATOR PLACEMENT  04/04/2022    [address]    unsure of date, bilateral radials    CARDIOVERSION  11/17/2021    CORONARY ANGIOPLASTY WITH STENT PLACEMENT      6 total stents per patient    CORONARY ARTERY BYPASS GRAFT      4 vessel bypass    EYE SURGERY      eye injury  new lens  and laser lt eye 2019    HERNIA REPAIR Bilateral 12/21/2020    XI LAPAROSCOPIC ROBOTIC INGUINAL HERNIA REPAIR WITH MESH; UMBILICAL HERNIA REPAIR WITH MESH performed by Gabriela Urban DO at [address] [place] [address]      plate/hardware present    OTHER SURGICAL HISTORY  11/17/2021    BENITO    SKIN BIOPSY      back        Medications Prior to Admission:     Prior to Admission medications    Medication Sig Start Date End Date Taking? Authorizing Provider   midodrine (PROAMATINE) 10 MG tablet Take 1 tablet by mouth 3 times daily (with meals) 1/24/23 2/23/23  Tuan Edwards DO   torsemide 40 MG TABS Take 40 mg by mouth daily 1/24/23   Tuan Edwards DO   spironolactone (ALDACTONE) 25 MG tablet Take 1 tablet by mouth daily 1/10/23   ELY Danielle NP   ARTIFICIAL TEAR SOLUTION OP Place 2 drops into both eyes daily    Historical Provider, MD   clopidogrel (PLAVIX) 75 MG tablet Take 1 tablet by mouth daily 10/16/22   Linda Quan MD   empagliflozin (JARDIANCE) 10 MG tablet Take 1 tablet by mouth daily 10/16/22   Linda Quan MD   amiodarone (CORDARONE) 200 MG tablet Take 1 tablet by mouth daily 10/16/22   Linda Quan MD   rivaroxaban (XARELTO) 20 MG TABS tablet Take 20 mg by mouth Daily with supper    Historical Provider, MD   metFORMIN (GLUCOPHAGE) 1000 MG tablet Take 1 tablet by mouth daily Resume on 5/21 5/19/22   Tejal Godwin DO   Cyanocobalamin (VITAMIN B-12 PO) Take 1 tablet by mouth daily    Historical Provider, MD   naproxen (NAPROSYN) 500 MG tablet Take 1 tablet by mouth 2 times daily (with meals) 3/28/22 3/28/22  Jairo Gong PA-C   Multiple Vitamins-Minerals (THERAPEUTIC MULTIVITAMIN-MINERALS) tablet Take 1 tablet by mouth daily    Historical Provider, MD   nitroGLYCERIN (NITROSTAT) 0.4 MG SL tablet Place 0.4 mg under the tongue every 5 minutes as needed for Chest pain up to max of 3 total doses. If no relief after 1 dose, call 911.   Dr. Wood Rodriguez Provider, MD   atorvastatin (LIPITOR) 80 MG tablet Take 1 tablet by mouth nightly 19   Tamera Horan MD        Allergies:     Patient has no known allergies. Social History:     Tobacco:    reports that he quit smoking about 24 years ago. His smoking use included cigarettes. He has never used smokeless tobacco.  Alcohol:      reports no history of alcohol use. Drug Use:  reports no history of drug use. Family History:     Family History   Problem Relation Age of Onset    Coronary Art Dis Father     Liver Disease Father     Alcohol Abuse Sister        Review of Systems:     Positive and Negative as described in HPI. Review of Systems   Unable to perform ROS: Other     Physical Exam:   BP (!) 160/104   Pulse 96   Temp 97.9 °F (36.6 °C) (Oral)   Resp (!) 34   SpO2 97%   Temp (24hrs), Av.1 °F (36.2 °C), Min:96.3 °F (35.7 °C), Max:97.9 °F (36.6 °C)    No results for input(s): POCGLU in the last 72 hours. No intake or output data in the 24 hours ending 03/10/23 1056    Physical Exam  Vitals and nursing note reviewed. Constitutional:       Appearance: He is obese. Comments: Recently received ativan IV and is currently difficult to wake up   HENT:      Mouth/Throat:      Mouth: Mucous membranes are dry. Eyes:      Extraocular Movements: Extraocular movements intact. Pupils: Pupils are equal, round, and reactive to light. Cardiovascular:      Rate and Rhythm: Normal rate and regular rhythm. Pulses: Normal pulses. Heart sounds: Normal heart sounds. Pulmonary:      Effort: Pulmonary effort is normal.      Breath sounds: Normal breath sounds. Abdominal:      General: There is distension. Musculoskeletal:         General: Normal range of motion. Skin:     General: Skin is warm and dry. Capillary Refill: Capillary refill takes less than 2 seconds. Comments: Unna boots to both lower extremities    Neurological:      Mental Status: He is lethargic.       GCS: GCS eye subscore is 3. GCS verbal subscore is 1. GCS motor subscore is 4. Psychiatric:         Attention and Perception: He is inattentive.        Investigations:      Laboratory Testing:  Recent Results (from the past 24 hour(s))   EKG 12 Lead    Collection Time: 03/10/23  6:44 AM   Result Value Ref Range    Ventricular Rate 95 BPM    Atrial Rate 95 BPM    P-R Interval 208 ms    QRS Duration 136 ms    Q-T Interval 406 ms    QTc Calculation (Bazett) 510 ms    P Axis 49 degrees    R Axis 121 degrees    T Axis -21 degrees   Basic Metabolic Panel    Collection Time: 03/10/23  6:50 AM   Result Value Ref Range    Glucose 72 70 - 99 mg/dL    BUN 23 8 - 23 mg/dL    Creatinine 1.09 0.70 - 1.20 mg/dL    Est, Glom Filt Rate >60 >60 mL/min/1.73m2    Bun/Cre Ratio 21 (H) 9 - 20    Calcium 9.3 8.6 - 10.4 mg/dL    Sodium 133 (L) 135 - 144 mmol/L    Potassium 4.9 3.7 - 5.3 mmol/L    Chloride 98 98 - 107 mmol/L    CO2 19 (L) 20 - 31 mmol/L    Anion Gap 16 9 - 17 mmol/L   Brain Natriuretic Peptide    Collection Time: 03/10/23  6:50 AM   Result Value Ref Range    Pro-BNP 1,778 (H) <300 pg/mL   CBC with Auto Differential    Collection Time: 03/10/23  6:50 AM   Result Value Ref Range    WBC 8.7 3.5 - 11.3 k/uL    RBC 5.33 4.21 - 5.77 m/uL    Hemoglobin 12.9 (L) 13.0 - 17.0 g/dL    Hematocrit 43.2 40.7 - 50.3 %    MCV 81.1 (L) 82.6 - 102.9 fL    MCH 24.2 (L) 25.2 - 33.5 pg    MCHC 29.9 28.4 - 34.8 g/dL    RDW 19.8 (H) 11.8 - 14.4 %    Platelets 896 553 - 046 k/uL    MPV 10.7 8.1 - 13.5 fL    NRBC Automated 0.0 0.0 per 100 WBC    Seg Neutrophils 78 (H) 36 - 65 %    Lymphocytes 10 (L) 24 - 43 %    Monocytes 10 3 - 12 %    Eosinophils % 0 (L) 1 - 4 %    Basophils 1 0 - 2 %    Immature Granulocytes 1 (H) 0 %    Segs Absolute 6.80 1.50 - 8.10 k/uL    Absolute Lymph # 0.88 (L) 1.10 - 3.70 k/uL    Absolute Mono # 0.89 0.10 - 1.20 k/uL    Absolute Eos # <0.03 0.00 - 0.44 k/uL    Basophils Absolute 0.05 0.00 - 0.20 k/uL    Absolute Immature Granulocyte 0. 04 0.00 - 0.30 k/uL    RBC Morphology ANISOCYTOSIS PRESENT    Magnesium    Collection Time: 03/10/23  6:50 AM   Result Value Ref Range    Magnesium 1.9 1.6 - 2.6 mg/dL   APTT    Collection Time: 03/10/23  6:50 AM   Result Value Ref Range    PTT 33.1 23.9 - 33.8 sec   Protime-INR    Collection Time: 03/10/23  6:50 AM   Result Value Ref Range    Protime 22.9 (H) 11.5 - 14.2 sec    INR 2.0    Troponin    Collection Time: 03/10/23  6:50 AM   Result Value Ref Range    Troponin, High Sensitivity 39 (H) 0 - 22 ng/L   Lactate, Sepsis    Collection Time: 03/10/23  6:50 AM   Result Value Ref Range    Lactic Acid, Sepsis 6.0 (H) 0.5 - 1.9 mmol/L   Hepatic Function Panel    Collection Time: 03/10/23  6:50 AM   Result Value Ref Range    Albumin 3.2 (L) 3.5 - 5.2 g/dL    Alkaline Phosphatase 283 (H) 40 - 129 U/L    ALT 14 5 - 41 U/L    AST 32 <40 U/L    Total Bilirubin 3.3 (H) 0.3 - 1.2 mg/dL    Bilirubin, Direct 1.9 (H) <0.3 mg/dL    Bilirubin, Indirect 1.4 (H) 0.0 - 1.0 mg/dL    Total Protein 7.2 6.4 - 8.3 g/dL   Lipase    Collection Time: 03/10/23  6:50 AM   Result Value Ref Range    Lipase 23 13 - 60 U/L   COVID-19, Rapid    Collection Time: 03/10/23  7:17 AM    Specimen: Nasopharyngeal Swab   Result Value Ref Range    Specimen Description . NASOPHARYNGEAL SWAB     SARS-CoV-2, Rapid Not Detected Not Detected   Troponin    Collection Time: 03/10/23  8:41 AM   Result Value Ref Range    Troponin, High Sensitivity 42 (H) 0 - 22 ng/L   Lactate, Sepsis    Collection Time: 03/10/23  8:41 AM   Result Value Ref Range    Lactic Acid, Sepsis 9.0 (H) 0.5 - 1.9 mmol/L       Imaging/Diagnostics:  CT ABDOMEN PELVIS W IV CONTRAST Additional Contrast? None    Result Date: 3/10/2023  1. Anasarca type appearance. 2.  Cardiomegaly. 3.  Small right effusion. 4.  Moderate to moderately large volume ascites in the abdomen and pelvis. 5.  Fatty infiltration of the liver. Nodular appearance suggesting cirrhosis. Question COHEN 6.   Congestive vascular changes in the lungs. 7.  Atherosclerotic disease. 8.  Other incidental findings as above. XR CHEST PORTABLE    Result Date: 3/10/2023  1. Stable perihilar airspace disease, left greater than right. Mild pulmonary vascular congestion. Stable cardiomegaly. Probable CHF related changes. Underlying infiltrate not excluded. 2. Prior median sternotomy and CABG.        Assessment :      Hospital Problems             Last Modified POA    * (Principal) Cirrhosis of liver with ascites, unspecified hepatic cirrhosis type ([address] 3/10/2023 Yes    ARIADNA (obstructive sleep apnea) 3/10/2023 Yes    Thrombocytopenia ([address] 3/10/2023 Yes    Acute on chronic combined systolic and diastolic CHF (congestive heart failure) (HCC) (Chronic) 3/10/2023 Yes    Type 2 diabetes mellitus, without long-term current use of insulin ([address] 3/10/2023 Yes    Paroxysmal atrial fibrillation ([address] 3/10/2023 Yes    Anasarca 3/10/2023 Yes    Chronic obstructive pulmonary disease ([address] 3/10/2023 Yes    Essential hypertension 3/10/2023 Yes    CAD (coronary artery disease) 3/10/2023 Yes       Plan:     Patient status inpatient in the  Progressive Unit/Step down    Cirrhosis with ascites/anasarca  IR consulted for paracentesis  Labs for fluid culture, cytology, amylase, lipase and cell count ordered  GI consulted    ARIADNA  Okay for home CPAP  Monitor closely for CO2 retention  Continuous pulse ox    Acute on chronic diastolic and systolic heart failure  Lasix 40 mg IV twice daily  Continue home Aldactone  Monitor I&O closely  Continue home ProAmatine    Type 2 diabetes  Start insulin sliding scale AC/at bedtime  Hold metformin    Paroxysmal A-fib  Continue Xarelto after IR  Hold amiodarone for now    Essential hypertension  Home torsemide on hold for IV Lasix  Continue home Aldactone  Monitor BP closely    CAD  Continue home statin and Plavix      Consultations:   IP CONSULT TO HOSPITALIST  IP CONSULT TO INTERVENTIONAL RADIOLOGY    Patient is admitted as inpatient status because of co-morbidities listed above, severity of signs and symptoms as outlined, requirement for current medical therapies and most importantly because of direct risk to patient if care not provided in a hospital setting. Expected length of stay > 48 hours.     ELY Messer CNP  3/10/2023  10:56 AM    Copy sent to Dr. Selby Oppenheim, PA-C

## 2023-03-10 NOTE — PROGRESS NOTES
Physical Therapy  DATE: 3/10/2023    NAME: Saad Cline  MRN: [id number]   : 1960    Patient not seen this date for Physical Therapy due to:      [] Cancel by RN or physician due to:    [] Hemodialysis    [] Critical Lab Value Level     [] Blood transfusion in progress    [] Acute or unstable cardiovascular status   _MAP < 55 or more than >115  _HR < 40 or > 130    [] Acute or unstable pulmonary status   -FiO2 > 60%   _RR < 5 or >40    _O2 sats < 85%    [] Strict Bedrest    [] Off Unit for surgery or procedure    [] Off Unit for testing       [] Pending imaging to R/O fracture    [] Refusal by Patient      [x] Other: Patient given ativan, not appropriate for PT at this time. [] PT being discontinued at this time. Patient independent. No further needs. [] PT being discontinued at this time as the patient has been transferred to hospice care. No further needs.       Sylvie Hayes, PT

## 2023-03-10 NOTE — CARE COORDINATION
Case Management Assessment  Initial Evaluation    Date/Time of Evaluation: 3/10/2023 2:11 PM  Assessment Completed by: ZULY Andersen    If patient is discharged prior to next notation, then this note serves as note for discharge by case management. Patient Name: Jamie Tony                   YOB: 1960  Diagnosis: Anasarca [R60.1]  Other ascites [R18.8]  Cirrhosis of liver with ascites, unspecified hepatic cirrhosis type ([facility] [address] [K74.60, R18.8]                   Date / Time: 3/10/2023  6:41 AM    Patient Admission Status: Inpatient   Readmission Risk (Low < 19, Mod (19-27), High > 27): Readmission Risk Score: 23.2    Current PCP: Mancel Mcardle, PA-C  PCP verified by CM? Yes    Chart Reviewed: Yes      History Provided by: Significant Other  Patient Orientation: Unable to Assess    Patient Cognition: Other (see comment) (assessed with significant other, pt was asleep)    Hospitalization in the last 30 days (Readmission):  No    If yes, Readmission Assessment in  Navigator will be completed. Advance Directives:      Code Status: Full Code   Patient's Primary Decision Maker is: Legal Next of Kin      Discharge Planning:    Patient lives with: Spouse/Significant Other Type of Home: House  Primary Care Giver: Spouse  Patient Support Systems include: Spouse/Significant Other, Children   Current Financial resources: Medicare  Current community resources:    Current services prior to admission: Durable Medical Equipment, Home Care (Home care/wound care with [facility])            Current DME: [place] [facility], Wheelchair            Type of Home Care services:  Nursing Services, PT, OT (PT/OT ordered, but had not yet started)    ADLS  Prior functional level: Assistance with the following:, Mobility, Bathing  Current functional level: Assistance with the following:, Mobility, Bathing    PT AM-PAC:   /24  OT AM-PAC:   /24    Family can provide assistance at DC:  Yes  Would you like Case Management HPI     Post-op Evaluation    Additional comments: 1 day check.            Comments   POD 1 CE with IOL OD 01/09/2017    Pt states doing well, some flashing temporally.  Mild pain temporally OD.    Vision is improved post surgery.    PGN tid OD       Last edited by Theresa Sifuentes on 1/10/2017  8:54 AM. (History)            Assessment /Plan     For exam results, see Encounter Report.    Post-operative state    Nuclear sclerotic cataract of right eye      Slit lamp exam:  L/L: nl  K: clear, wound sealed  AC: 1+ cell  Lens: IOL centered and stable    POD1 s/p Phaco/IOL  Appropriate precautions and post op medications reviewed.  Patient instructed to call or come in if symptoms of redness, decreased vision, or pain are experienced.                    to discuss the discharge plan with any other family members/significant others, and if so, who? No  Plans to Return to Present Housing: Unknown at present  Other Identified Issues/Barriers to RETURNING to current housing: difficulty ambulating   Potential Assistance needed at discharge: [address]            Potential DME: [address]  Patient expects to discharge to: Unknown  Plan for transportation at discharge:      Financial    Payor: [address] [place] [address] / Plan: MEDICARE PART A AND B / Product Type: *No Product type* /     Does insurance require precert for SNF: No    Potential assistance Purchasing Medications: No  Meds-to-Beds request:        [facility] [phone] - [address] [facility], [address] [facility],[address]  [address] [place] [address]  Phone: [phone] Fax: [phone]    [facility] [phone] - [facility] - [address] [facility] [phone] - F [phone]  [address] [place] [address]  Phone: [phone] Fax: [phone]      Notes:    Factors facilitating achievement of predicted outcomes: Family support and Has needed Durable Medical Equipment at home    Barriers to discharge: Limited family support, Lower extremity weakness, and Wound Care    Additional Case Management Notes: Patient lives at home with significant other. She reports that he primarily utilizes the wheelchair for getting around. She states that he has refused [facility] in the past but does not think that he truly understood the facility and the goal. Current with [facility] at this time and has wound care. Patient has wheelchair and walker at home. Significant other would like a hospital bed and a shower chair. Plan is TBD at this time. Possible referral to [facility].      The Plan for Transition of Care is related to the following treatment goals of Anasarca [R60.1]  Other ascites [R18.8]  Cirrhosis of liver with ascites, unspecified hepatic cirrhosis type ([facility] [address] [address] [K74.60, Y56.6]    IF APPLICABLE: The Patient and/or patient representative Krysten Navas and his family were provided with a choice of provider and agrees with the discharge plan. Freedom of choice list with basic dialogue that supports the patient's individualized plan of care/goals and shares the quality data associated with the providers was provided to:     Patient Representative Name:       The Patient and/or Patient Representative Agree with the Discharge Plan?       Margaret Cruz [place]  Case Management Department  Ph: [phone] Fax: [phone]

## 2023-03-10 NOTE — PROGRESS NOTES
Writer received call from [employer] and spoke with Ferny Geiger. Patient is not eligible for donation and can be released. Will inform .

## 2023-03-10 NOTE — SIGNIFICANT EVENT
I responded to rapid response. When I arrived patient pulseless, cyanotic and had few agonal respirations prior to my arrival.  For me apneic and pulseless    Pronouncement Note:    Patient without spontaneous respirations or pulse. Pupils fixed and dilated and non-reactive. No response to painful stimuli.     Monika Gaytan  on 3/10/2023 at 23 [facility], DO  3/10/2023  5:00 PM

## 2023-03-10 NOTE — ACP (ADVANCE CARE PLANNING)
Advance Care Planning     Advance Care Planning Activator (Inpatient)  Conversation Note      Date of ACP Conversation: 3/10/2023     Conversation Conducted with: Patient with Decision Making Capacity    ACP Activator: Tisha Donnelly, [address] [place]:     Current Designated Health Care Decision Maker:     Click here to complete [address] including section of the Healthcare Decision Maker Relationship (ie \"Primary\")  Today we documented Decision Maker(s) consistent with ACP documents on file. Care Preferences    Ventilation: \"If you were in your present state of health and suddenly became very ill and were unable to breathe on your own, what would your preference be about the use of a ventilator (breathing machine) if it were available to you? \"      Would the patient desire the use of ventilator (breathing machine)?: no    \"If your health worsens and it becomes clear that your chance of recovery is unlikely, what would your preference be about the use of a ventilator (breathing machine) if it were available to you? \"     Would the patient desire the use of ventilator (breathing machine)?: No      Resuscitation  \"CPR works best to restart the heart when there is a sudden event, like a heart attack, in someone who is otherwise healthy. Unfortunately, CPR does not typically restart the heart for people who have serious health conditions or who are very sick. \"    \"In the event your heart stopped as a result of an underlying serious health condition, would you want attempts to be made to restart your heart (answer \"yes\" for attempt to resuscitate) or would you prefer a natural death (answer \"no\" for do not attempt to resuscitate)? \" no       [] Yes   [x] No   Educated Patient / Celeste Khan regarding differences between Advance Directives and portable DNR orders.     Length of ACP Conversation in minutes:  2    Conversation Outcomes:  ACP discussion completed    Follow-up plan: [] Schedule follow-up conversation to continue planning  [] Referred individual to Provider for additional questions/concerns   [] Advised patient/agent/surrogate to review completed ACP document and update if needed with changes in condition, patient preferences or care setting    [] This note routed to one or more involved healthcare providers        Pt has DNR-CCA on file

## 2023-03-10 NOTE — PROGRESS NOTES
Occupational Therapy  DATE: 3/10/2023    NAME: Ghassan Allen  MRN: [id number]   : 1960    Patient not seen this date for Occupational Therapy due to:      [] Cancel by RN or physician due to:    [] Hemodialysis    [] Critical Lab Value Level     [] Blood transfusion in progress    [] Acute or unstable cardiovascular status   _MAP < 55 or more than >115  _HR < 40 or > 130    [] Acute or unstable pulmonary status   -FiO2 > 60%   _RR < 5 or >40    _O2 sats < 85%    [] Strict Bedrest    [] Off Unit for surgery or procedure    [] Off Unit for testing       [] Pending imaging to R/O fracture    [] Refusal by Patient      [x] Other: Patient given ativan, not appropriate for OT at this time. [] OT being discontinued at this time. Patient independent. No further needs. [] OT being discontinued at this time as the patient has been transferred to hospice care. No further needs.       [facility], OT

## 2023-03-10 NOTE — ED NOTES
ED to inpatient nurses report     Chief Complaint   Patient presents with    Shortness of Breath      Present to ED from Home via EMS  LOC: alert and orientated to name, place, date  Vital signs   Vitals:    03/10/23 0900 03/10/23 0910 03/10/23 0930 03/10/23 0945   BP: (!) 150/94 (!) 156/87 (!) 160/82 (!) 160/104   Pulse: 93 92 91 96   Resp: 25 25 (!) 31 (!) 34   Temp:       TempSrc:       SpO2: 96% 97% 96% 97%      Oxygen Baseline 97% 2L NC    Current needs required none       LDAs:   Peripheral IV 03/10/23 Left Antecubital (Active)   Site Assessment Clean, dry & intact 03/10/23 0655   Line Status Blood return noted 03/10/23 0655   Phlebitis Assessment No symptoms 03/10/23 0655   Infiltration Assessment 0 03/10/23 0655   Dressing Status Clean, dry & intact 03/10/23 0655   Dressing Type Transparent 03/10/23 0655     Mobility: Requires assistance * 2  Fall Risk:  High  Pending ED orders: none  Present condition: fair  Code Status: DNR-CCA   Consults: None  []  Hospitalist  Completed  [] yes [] no Who:   []  Medicine  Completed  [] yes [] No Who:   []  Cardiology  Completed  [] yes [] No Who:   []  GI   Completed  [] yes [] No Who:   []  Neurology  Completed  [] yes [] No Who:   []  Nephrology Completed  [] yes [] No Who:    []  Vascular  Completed  [] yes [] No Who:   []  Ortho  Completed  [] yes [] No Who:     []  Surgery  Completed  [] yes [] No Who:    []  Urology  Completed  [] yes [] No Who:    []  CT Surgery Completed  [] yes [] No Who:   []  Podiatry  Completed  [] yes [] No Who:    []  Other    Completed  [] yes [] No Who:  Interventions: Labs, EKG, IV, CT chest, CT abdomen   Important Events: Pt has felt SOB x 1 week. Pt reports abdomen has been distended x 1 month. Pt restless, ativan IV given, medication was effective.        Electronically signed by Kim Boyce RN on 3/10/2023 at 9:55 AM       Kim Boyce RN  03/10/23 1002

## 2023-03-10 NOTE — ED NOTES
Pt stating he has felt SOB x1week. Pt's abdomen appears distended and pt states it has been like this x1 month. Pt appears restless and states he had to take benadryl in the middle of the night to try and fall asleep.      Joo Thomas RN  03/10/23 [id number]

## 2023-03-11 LAB
EKG ATRIAL RATE: 95 BPM
EKG P AXIS: 49 DEGREES
EKG P-R INTERVAL: 208 MS
EKG Q-T INTERVAL: 406 MS
EKG QRS DURATION: 136 MS
EKG QTC CALCULATION (BAZETT): 510 MS
EKG R AXIS: 121 DEGREES
EKG T AXIS: -21 DEGREES
EKG VENTRICULAR RATE: 95 BPM

## 2023-03-13 LAB
EST. AVERAGE GLUCOSE BLD GHB EST-MCNC: 137 MG/DL
HBA1C MFR BLD: 6.4 % (ref 4–6)

## 2023-03-13 NOTE — DISCHARGE SUMMARY
[facility]  Office: [phone]  Jose Figueroa DO, Guillermo Schmid DO, Daniel Paulson DO, Charli Sandhu DO, Laura Bell MD, Brandy Shin MD, Tyra Roland MD, Madeleine Santos MD,  Geronimo Villalta MD, Jeny Weinstein MD, Jorge Hogan DO, Tamela Schroeder MD,  Jonah Roland DO, Archana Urban MD, Clinton Jones MD, Puneet Figueroa DO, Delores Jordan MD, Arnel Martinez MD, Eulalio Irvin DO, Liane Kramer MD, Evelina Houston MD, Marilou Schmidt MD, Susana Ramos MD, Tita Berman MD, Shreyas Gutierres DO, Della Mcrae MD, Anupam Molina MD, Shirley Waterhouse, CNP,  Camilla Mendez, FAMILIA, Jenny Bal CNP, Vivek Plunkett, FAMILIA,  Meredith Day, TIM, Adeola Arnold, FAMILIA, Macrina Marks, CNP, Jina De La Vega, CNP, Paola Jackson, CNP, Marielos Grimes, CNP, Geoff Dickerson PA-C, Radha Torres, CNS, Sofia Moore, CNP, Ary Ayala, CNP         [facility]   IN-PATIENT SERVICE   [facility]    Discharge Summary     Patient ID: Arya Gleason  :  1960   MRN: [id number]     ACCOUNT:  [id number]   Patient's PCP: Eulalio Schuster PA-C  Admit Date: 3/10/2023   Discharge Date 3/10/2023     Length of Stay: 1  Code Status:  Prior  Admitting Physician: Tyra Roland MD  Discharge Physician: ELY Lawson CNP     Active Discharge Diagnoses:     Hospital Problem Lists:  Principal Problem:    Cirrhosis of liver with ascites, unspecified hepatic cirrhosis type (HCC)  Active Problems:    ARIADNA (obstructive sleep apnea)    Thrombocytopenia (HCC)    Acute on chronic combined systolic and diastolic CHF (congestive heart failure) (HCC)    Type 2 diabetes mellitus, without long-term current use of insulin (HCC)    Paroxysmal atrial fibrillation (HCC)    Anasarca    Chronic obstructive pulmonary disease (HCC)    Essential hypertension    CAD (coronary artery disease)  Resolved Problems:    Acute on chronic systolic heart failure (HCC)      Admission Condition:  fair     Discharged  Condition:      Hospital Stay:     Hospital Course:  Vic Adhikari is a 57 y.o. Non- / non  male who presents with Shortness of Breath   and is admitted to the hospital for the management of Cirrhosis of liver with ascites, unspecified hepatic cirrhosis type ([facility] [address] the patient presented to the ER with complaints of shortness of breath and abdominal pain. Per the ER physician the patient presented restless and Moaning and calling out for help. He Received 0.5 of Ativan and additional 1 mg of Ativan prior to my assessment. .  During my assessment his eyes were closed and he appeared to be in no distress. His pulse ox is in the high 90s on 2 L and his blood pressure is within normal limits. .  Chest x-ray showed stable perihilar airspace disease left greater than right with mild pulmonary vascular congestion with probable CHF and a CT of the abdomen showed anasarca with cardiomegaly, small right effusion and moderate to moderately large volume ascites in the abdomen and pelvis. There is also mention of fatty infiltration of liver nodular appearance suggesting cirrhosis questionable COHEN. I consulted GI and IR for a paracentesis. Later I called the patient's significant other to discuss his DNR CCA status and power of . I explained to her that since he had received Ativan and was pretty somnolent we would not be able to obtain consent for paracentesis. She stated she was going to try to find her POA paperwork. She also stated that she was aware he had changed his CODE STATUS last time he was in the hospital did not want to be intubated or have extravagant measures needed if necessary. Unfortunately, around 1630 the patient is glucose was checked and it showed low less than 10. According to the notes a dextrose bolus was given per the hypoglycemic protocol. Repeat blood glucose at 1644 showed 60.   Per the notes the patient was able to open his eyes but had garbled speech. Minutes after the patient's oxygen level dropped from the 90s to the 70s. Per the notes the patient incident became cyanotic with agonal breathing and a rapid response then CODE BLUE was called. Dr. Sandhu and Dr. Jose Yates were at bedside. Per Dr. Sandhu when he arrived at bedside the patient was pulseless, cyanotic with agonal respirations and quickly became apneic and pulseless. Time of death was noted at 1650 5 PM.  Shortly after the patient's significant other arrived and I explained to her that he had passed away. I allowed time for her to ask questions and provided comfort. Prior to my exiting the room the hospital  had arrived.              Significant therapeutic interventions:   Ativan  Oxygen  Fluid bolus    Significant Diagnostic Studies:   Labs / Micro:  CBC:   Lab Results   Component Value Date/Time    WBC 8.7 03/10/2023 06:50 AM    RBC 5.33 03/10/2023 06:50 AM    RBC 5.00 01/02/2012 07:23 PM    HGB 12.9 03/10/2023 06:50 AM    HCT 43.2 03/10/2023 06:50 AM    MCV 81.1 03/10/2023 06:50 AM    MCH 24.2 03/10/2023 06:50 AM    MCHC 29.9 03/10/2023 06:50 AM    RDW 19.8 03/10/2023 06:50 AM     03/10/2023 06:50 AM     01/02/2012 07:23 PM     BMP:    Lab Results   Component Value Date/Time    GLUCOSE 72 03/10/2023 06:50 AM    GLUCOSE 132 01/02/2012 07:23 PM     03/10/2023 06:50 AM    K 4.9 03/10/2023 06:50 AM    CL 98 03/10/2023 06:50 AM    CO2 19 03/10/2023 06:50 AM    ANIONGAP 16 03/10/2023 06:50 AM    BUN 23 03/10/2023 06:50 AM    CREATININE 1.09 03/10/2023 06:50 AM    BUNCRER 21 03/10/2023 06:50 AM    CALCIUM 9.3 03/10/2023 06:50 AM    LABGLOM >60 03/10/2023 06:50 AM    GFRAA >60 09/02/2022 05:42 PM    GFR      09/02/2022 05:42 PM     HFP:    Lab Results   Component Value Date/Time    PROT 7.2 03/10/2023 06:50 AM     CMP:    Lab Results   Component Value Date/Time    GLUCOSE 72 03/10/2023 06:50 AM    GLUCOSE 132 01/02/2012 07:23 PM     03/10/2023 06:50 AM    K 4.9 03/10/2023 06:50 AM    CL 98 03/10/2023 06:50 AM    CO2 19 03/10/2023 06:50 AM    BUN 23 03/10/2023 06:50 AM    CREATININE 1.09 03/10/2023 06:50 AM    ANIONGAP 16 03/10/2023 06:50 AM    ALKPHOS 283 03/10/2023 06:50 AM    ALT 14 03/10/2023 06:50 AM    AST 32 03/10/2023 06:50 AM    BILITOT 3.3 03/10/2023 06:50 AM    LABALBU 3.2 03/10/2023 06:50 AM    LABALBU 4.1 01/02/2012 07:23 PM    ALBUMIN 1.5 05/18/2022 05:04 AM    LABGLOM >60 03/10/2023 06:50 AM    GFRAA >60 09/02/2022 05:42 PM    GFR      09/02/2022 05:42 PM    PROT 7.2 03/10/2023 06:50 AM    CALCIUM 9.3 03/10/2023 06:50 AM     PT/INR:    Lab Results   Component Value Date/Time    PROTIME 22.9 03/10/2023 06:50 AM    INR 2.0 03/10/2023 06:50 AM     PTT:   Lab Results   Component Value Date/Time    APTT 33.1 03/10/2023 06:50 AM     FLP:    Lab Results   Component Value Date/Time    CHOL 207 01/07/2023 05:53 AM    TRIG 62 01/07/2023 05:53 AM    HDL 23 01/07/2023 05:53 AM     U/A:    Lab Results   Component Value Date/Time    COLORU Yellow 01/20/2023 04:27 PM    TURBIDITY Clear 01/20/2023 04:27 PM    SPECGRAV 1.010 01/20/2023 04:27 PM    HGBUR TRACE 01/20/2023 04:27 PM    PHUR 8.0 01/20/2023 04:27 PM    PROTEINU 1+ 01/20/2023 04:27 PM    GLUCOSEU NEGATIVE 01/20/2023 04:27 PM    GLUCOSEU NOT REPORTED 01/02/2012 05:41 PM    KETUA NEGATIVE 01/20/2023 04:27 PM    BILIRUBINUR NEGATIVE 01/20/2023 04:27 PM    BILIRUBINUR negative 11/22/2020 07:49 PM    BILIRUBINUR NOT REPORTED 01/02/2012 05:41 PM    UROBILINOGEN ELEVATED 01/20/2023 04:27 PM    NITRU NEGATIVE 01/20/2023 04:27 PM    LEUKOCYTESUR NEGATIVE 01/20/2023 04:27 PM     TSH:    Lab Results   Component Value Date/Time    TSH 1.15 01/07/2023 05:53 AM      =  Radiology:  CT ABDOMEN PELVIS W IV CONTRAST Additional Contrast? None    Result Date: 3/10/2023  1. Anasarca type appearance. 2.  Cardiomegaly. 3.  Small right effusion. 4.  Moderate to moderately large volume ascites in the abdomen and pelvis.  5.  Fatty infiltration of the liver. Nodular appearance suggesting cirrhosis. Question COHEN 6. Congestive vascular changes in the lungs. 7.  Atherosclerotic disease. 8.  Other incidental findings as above. XR CHEST PORTABLE    Result Date: 3/10/2023  1. Stable perihilar airspace disease, left greater than right. Mild pulmonary vascular congestion. Stable cardiomegaly. Probable CHF related changes. Underlying infiltrate not excluded. 2. Prior median sternotomy and CABG. Consultations:    Consults:     Final Specialist Recommendations/Findings:   IP CONSULT TO HOSPITALIST  IP CONSULT TO INTERVENTIONAL RADIOLOGY  IP CONSULT TO GI      The patient was seen and examined on day of discharge and this discharge summary is in conjunction with any daily progress note from day of discharge. Discharge plan:     Disposition:         No discharge procedures on file. Time Spent on discharge is  32 mins in patient examination, evaluation, counseling as well as medication reconciliation, prescriptions for required medications, discharge plan and follow up. Electronically signed by   ELY Vega CNP  3/13/2023  7:46 PM      Thank you Dr. Meredith Guzman PA-C for the opportunity to be involved in this patient's care.
